# Patient Record
Sex: MALE | Race: BLACK OR AFRICAN AMERICAN | URBAN - METROPOLITAN AREA
[De-identification: names, ages, dates, MRNs, and addresses within clinical notes are randomized per-mention and may not be internally consistent; named-entity substitution may affect disease eponyms.]

---

## 2017-04-04 ENCOUNTER — INPATIENT (INPATIENT)
Facility: HOSPITAL | Age: 82
LOS: 6 days | Discharge: HOME | DRG: 291 | End: 2017-04-11
Attending: HOSPITALIST | Admitting: FAMILY MEDICINE
Payer: COMMERCIAL

## 2017-04-04 VITALS
WEIGHT: 149.91 LBS | HEIGHT: 64 IN | HEART RATE: 79 BPM | SYSTOLIC BLOOD PRESSURE: 139 MMHG | RESPIRATION RATE: 18 BRPM | OXYGEN SATURATION: 95 % | TEMPERATURE: 97 F | DIASTOLIC BLOOD PRESSURE: 70 MMHG

## 2017-04-04 LAB
ALBUMIN SERPL ELPH-MCNC: 4.1 G/DL — SIGNIFICANT CHANGE UP (ref 3.3–5.2)
ALP SERPL-CCNC: 74 U/L — SIGNIFICANT CHANGE UP (ref 40–120)
ALT FLD-CCNC: 31 U/L — SIGNIFICANT CHANGE UP
ANION GAP SERPL CALC-SCNC: 17 MMOL/L — SIGNIFICANT CHANGE UP (ref 5–17)
APPEARANCE UR: ABNORMAL
AST SERPL-CCNC: 40 U/L — HIGH
BASOPHILS # BLD AUTO: 0 K/UL — SIGNIFICANT CHANGE UP (ref 0–0.2)
BASOPHILS NFR BLD AUTO: 0.2 % — SIGNIFICANT CHANGE UP (ref 0–2)
BILIRUB SERPL-MCNC: 0.4 MG/DL — SIGNIFICANT CHANGE UP (ref 0.4–2)
BILIRUB UR-MCNC: NEGATIVE — SIGNIFICANT CHANGE UP
BUN SERPL-MCNC: 87 MG/DL — HIGH (ref 8–20)
CALCIUM SERPL-MCNC: 8.8 MG/DL — SIGNIFICANT CHANGE UP (ref 8.6–10.2)
CHLORIDE SERPL-SCNC: 98 MMOL/L — SIGNIFICANT CHANGE UP (ref 98–107)
CK SERPL-CCNC: 136 U/L — SIGNIFICANT CHANGE UP (ref 30–200)
CO2 SERPL-SCNC: 21 MMOL/L — LOW (ref 22–29)
COLOR SPEC: YELLOW — SIGNIFICANT CHANGE UP
CREAT SERPL-MCNC: 4.03 MG/DL — HIGH (ref 0.5–1.3)
DIFF PNL FLD: ABNORMAL
EOSINOPHIL # BLD AUTO: 0.2 K/UL — SIGNIFICANT CHANGE UP (ref 0–0.5)
EOSINOPHIL NFR BLD AUTO: 3.9 % — SIGNIFICANT CHANGE UP (ref 0–6)
GLUCOSE SERPL-MCNC: 151 MG/DL — HIGH (ref 70–115)
GLUCOSE UR QL: NEGATIVE MG/DL — SIGNIFICANT CHANGE UP
HCT VFR BLD CALC: 27.2 % — LOW (ref 42–52)
HGB BLD-MCNC: 8.4 G/DL — LOW (ref 14–18)
INR BLD: 1.28 RATIO — HIGH (ref 0.88–1.16)
KETONES UR-MCNC: NEGATIVE — SIGNIFICANT CHANGE UP
LEUKOCYTE ESTERASE UR-ACNC: ABNORMAL
LYMPHOCYTES # BLD AUTO: 1.5 K/UL — SIGNIFICANT CHANGE UP (ref 1–4.8)
LYMPHOCYTES # BLD AUTO: 33.3 % — SIGNIFICANT CHANGE UP (ref 20–55)
MAGNESIUM SERPL-MCNC: 2.3 MG/DL — SIGNIFICANT CHANGE UP (ref 1.8–2.5)
MCHC RBC-ENTMCNC: 27.5 PG — SIGNIFICANT CHANGE UP (ref 27–31)
MCHC RBC-ENTMCNC: 30.9 G/DL — LOW (ref 32–36)
MCV RBC AUTO: 88.9 FL — SIGNIFICANT CHANGE UP (ref 80–94)
MONOCYTES # BLD AUTO: 0.4 K/UL — SIGNIFICANT CHANGE UP (ref 0–0.8)
MONOCYTES NFR BLD AUTO: 9.6 % — SIGNIFICANT CHANGE UP (ref 3–10)
NEUTROPHILS # BLD AUTO: 2.3 K/UL — SIGNIFICANT CHANGE UP (ref 1.8–8)
NEUTROPHILS NFR BLD AUTO: 52.5 % — SIGNIFICANT CHANGE UP (ref 37–73)
NITRITE UR-MCNC: POSITIVE
PH UR: 6 — SIGNIFICANT CHANGE UP (ref 4.8–8)
PLATELET # BLD AUTO: 103 K/UL — LOW (ref 150–400)
POTASSIUM SERPL-MCNC: 5.8 MMOL/L — HIGH (ref 3.5–5.3)
POTASSIUM SERPL-SCNC: 5.8 MMOL/L — HIGH (ref 3.5–5.3)
PROT SERPL-MCNC: 7.7 G/DL — SIGNIFICANT CHANGE UP (ref 6.6–8.7)
PROT UR-MCNC: 30 MG/DL
PROTHROM AB SERPL-ACNC: 14.2 SEC — HIGH (ref 9.8–12.7)
RBC # BLD: 3.06 M/UL — LOW (ref 4.6–6.2)
RBC # FLD: 17.5 % — HIGH (ref 11–15.6)
SODIUM SERPL-SCNC: 136 MMOL/L — SIGNIFICANT CHANGE UP (ref 135–145)
SP GR SPEC: 1.01 — SIGNIFICANT CHANGE UP (ref 1.01–1.02)
TROPONIN T SERPL-MCNC: 0.08 NG/ML — HIGH (ref 0–0.06)
UROBILINOGEN FLD QL: NEGATIVE MG/DL — SIGNIFICANT CHANGE UP
WBC # BLD: 4.4 K/UL — LOW (ref 4.8–10.8)
WBC # FLD AUTO: 4.4 K/UL — LOW (ref 4.8–10.8)

## 2017-04-04 PROCEDURE — 93010 ELECTROCARDIOGRAM REPORT: CPT

## 2017-04-04 PROCEDURE — 71010: CPT | Mod: 26

## 2017-04-04 RX ORDER — SODIUM CHLORIDE 9 MG/ML
3 INJECTION INTRAMUSCULAR; INTRAVENOUS; SUBCUTANEOUS ONCE
Qty: 0 | Refills: 0 | Status: COMPLETED | OUTPATIENT
Start: 2017-04-04 | End: 2017-04-04

## 2017-04-04 RX ADMIN — SODIUM CHLORIDE 3 MILLILITER(S): 9 INJECTION INTRAMUSCULAR; INTRAVENOUS; SUBCUTANEOUS at 22:23

## 2017-04-04 NOTE — ED STATDOCS - PROGRESS NOTE DETAILS
88 y/o M, with hx of CHF, DM, PM, on ASA 81 mg presents to ED c/o bilateral LE edema, decreased PO, and generalized weakness s/p mechanical fall. Pt ambulates with a walker and states he fell this morning. He denies head trauma, LOC, blood thinners, chest pain, SOB, neck pain, dysuria, and fevers. Pt denies cardiac stents. Will place in main ED for further evaluation. 88 y/o M, with hx of CHF, DM, PM, on ASA 81 mg presents to ED c/o bilateral LE edema, decreased PO, and generalized weakness s/p mechanical fall. Pt ambulates with a walker and states he fell this morning. He denies head trauma, LOC, blood thinners, chest pain, SOB, neck pain, dysuria, and fevers. Pt has hx of anemia and is on procrit therapy, last hgb ~7. Will place in main ED for further evaluation. 86 y/o M, with hx of CHF, DM, PM, on ASA 81 mg presents to ED c/o bilateral LE edema, decreased PO, and generalized weakness s/p mechanical fall. Pt ambulates with a walker and states he fell this morning. He denies head trauma, LOC, blood thinners, chest pain, SOB, neck pain, dysuria, and fevers. Pt has hx of anemia and is on procrit therapy, last hgb ~7. No hx of transfusion. Will place in main ED for further evaluation.

## 2017-04-04 NOTE — ED PROVIDER NOTE - MEDICAL DECISION MAKING DETAILS
An 87 year old male pt presents to the ED c/o swelling to the lower extremities. Will check labs, CXR, EKG, and re-evaluate.

## 2017-04-04 NOTE — ED STATDOCS - DETAILS:
The progress note was documented by the scribe in my presence and I attest to the accuracy of the documentation.

## 2017-04-04 NOTE — ED ADULT NURSE NOTE - OBJECTIVE STATEMENT
pt received 2200. Pt AOx3, resp even unlabored. c/o bilateral leg swelling and increased SOB with exertion. Pt denies chest pain, numbness or tingling, SOB at rest. + pedal pulses, + ROM, +2 edema to lower extremities. will continue to monitor.

## 2017-04-04 NOTE — ED PROVIDER NOTE - PROGRESS NOTE DETAILS
labs as noted.  Pt with worsening renal function and elev Trop with increased KAY and LE edema.  Case d/w Hospitalist/Dr. Guadarrama and agrees with Tele admission and eval by Cardio and Laure

## 2017-04-04 NOTE — ED ADULT NURSE NOTE - CHPI ED SYMPTOMS NEG
no diaphoresis/no dizziness/no cough/no nausea/no vomiting/no fever/no chills/no back pain/no syncope

## 2017-04-04 NOTE — ED PROVIDER NOTE - OBJECTIVE STATEMENT
An 87 year old male pt presents to the ED c/o swelling to the lower extremities.  The pt has been experiencing swelling in the lower extremities and a decrease in appetite over the past few days. Pt does report chronic dyspnea on exertion. He denies any fevers, N/V/D, or CP. His PMD is Dr. Rosenberg and his cardiologist is Dr. Arora. No further complaints at this time.

## 2017-04-04 NOTE — ED PROVIDER NOTE - PMH
Cataract    CHF (congestive heart failure)  FAMILY/PT NOT SURE IF DIAGNOSED WITH CHF OR COPD  Chronic renal failure    Coronary artery disease involving autologous vein bypass graft    Diabetes    Glaucoma    Hyperlipemia    Hypertension    Pacemaker    PSA elevation

## 2017-04-04 NOTE — ED PROVIDER NOTE - NS ED MD SCRIBE ATTENDING SCRIBE SECTIONS
RESULTS/PHYSICAL EXAM/HISTORY OF PRESENT ILLNESS/DISPOSITION/REVIEW OF SYSTEMS/PAST MEDICAL/SURGICAL/SOCIAL HISTORY/VITAL SIGNS( Pullset)

## 2017-04-04 NOTE — ED ADULT NURSE REASSESSMENT NOTE - NS ED NURSE REASSESS COMMENT FT1
Received Pt awake alert and oriented x 3 respiration even and unlabored, skin warm and dry, color normal for race, Swelling to Bilateral LE observed, Pt denies CP or SOB at this time, kept bed in low position with side rails uip, wife at bed side, plan of are made aware, verbalized understanding. will continue to monitor.

## 2017-04-04 NOTE — ED ADULT TRIAGE NOTE - CHIEF COMPLAINT QUOTE
Pt c/o swelling to bilateral lower extremities and decreased appetite. Denies any fever, chills or sick contacts. Pt denies any pain or discomfort. Denies CP or sob, respirations even and unlabored.

## 2017-04-05 DIAGNOSIS — E87.70 FLUID OVERLOAD, UNSPECIFIED: ICD-10-CM

## 2017-04-05 DIAGNOSIS — I50.9 HEART FAILURE, UNSPECIFIED: ICD-10-CM

## 2017-04-05 DIAGNOSIS — R74.8 ABNORMAL LEVELS OF OTHER SERUM ENZYMES: ICD-10-CM

## 2017-04-05 DIAGNOSIS — N39.0 URINARY TRACT INFECTION, SITE NOT SPECIFIED: ICD-10-CM

## 2017-04-05 DIAGNOSIS — D64.9 ANEMIA, UNSPECIFIED: ICD-10-CM

## 2017-04-05 DIAGNOSIS — I50.23 ACUTE ON CHRONIC SYSTOLIC (CONGESTIVE) HEART FAILURE: ICD-10-CM

## 2017-04-05 DIAGNOSIS — Z95.810 PRESENCE OF AUTOMATIC (IMPLANTABLE) CARDIAC DEFIBRILLATOR: ICD-10-CM

## 2017-04-05 DIAGNOSIS — I10 ESSENTIAL (PRIMARY) HYPERTENSION: ICD-10-CM

## 2017-04-05 DIAGNOSIS — E87.5 HYPERKALEMIA: ICD-10-CM

## 2017-04-05 DIAGNOSIS — E11.9 TYPE 2 DIABETES MELLITUS WITHOUT COMPLICATIONS: ICD-10-CM

## 2017-04-05 DIAGNOSIS — Z29.9 ENCOUNTER FOR PROPHYLACTIC MEASURES, UNSPECIFIED: ICD-10-CM

## 2017-04-05 DIAGNOSIS — N17.9 ACUTE KIDNEY FAILURE, UNSPECIFIED: ICD-10-CM

## 2017-04-05 LAB
ANION GAP SERPL CALC-SCNC: 14 MMOL/L — SIGNIFICANT CHANGE UP (ref 5–17)
BACTERIA # UR AUTO: ABNORMAL
BASOPHILS # BLD AUTO: 0 K/UL — SIGNIFICANT CHANGE UP (ref 0–0.2)
BASOPHILS NFR BLD AUTO: 0.2 % — SIGNIFICANT CHANGE UP (ref 0–2)
BLD GP AB SCN SERPL QL: SIGNIFICANT CHANGE UP
BUN SERPL-MCNC: 82 MG/DL — HIGH (ref 8–20)
CALCIUM SERPL-MCNC: 9.6 MG/DL — SIGNIFICANT CHANGE UP (ref 8.6–10.2)
CHLORIDE SERPL-SCNC: 100 MMOL/L — SIGNIFICANT CHANGE UP (ref 98–107)
CO2 SERPL-SCNC: 25 MMOL/L — SIGNIFICANT CHANGE UP (ref 22–29)
CREAT SERPL-MCNC: 3.64 MG/DL — HIGH (ref 0.5–1.3)
EOSINOPHIL # BLD AUTO: 0.1 K/UL — SIGNIFICANT CHANGE UP (ref 0–0.5)
EOSINOPHIL NFR BLD AUTO: 2.6 % — SIGNIFICANT CHANGE UP (ref 0–5)
GLUCOSE SERPL-MCNC: 122 MG/DL — HIGH (ref 70–115)
LYMPHOCYTES # BLD AUTO: 1 K/UL — SIGNIFICANT CHANGE UP (ref 1–4.8)
LYMPHOCYTES # BLD AUTO: 23.5 % — SIGNIFICANT CHANGE UP (ref 20–55)
MAGNESIUM SERPL-MCNC: 2.1 MG/DL — SIGNIFICANT CHANGE UP (ref 1.8–2.5)
MONOCYTES # BLD AUTO: 0.4 K/UL — SIGNIFICANT CHANGE UP (ref 0–0.8)
MONOCYTES NFR BLD AUTO: 9.1 % — SIGNIFICANT CHANGE UP (ref 3–10)
NEUTROPHILS # BLD AUTO: 2.7 K/UL — SIGNIFICANT CHANGE UP (ref 1.8–8)
NEUTROPHILS NFR BLD AUTO: 64.4 % — SIGNIFICANT CHANGE UP (ref 37–73)
NT-PROBNP SERPL-SCNC: HIGH PG/ML (ref 0–300)
POTASSIUM SERPL-MCNC: 4.5 MMOL/L — SIGNIFICANT CHANGE UP (ref 3.5–5.3)
POTASSIUM SERPL-MCNC: 4.8 MMOL/L — SIGNIFICANT CHANGE UP (ref 3.5–5.3)
POTASSIUM SERPL-SCNC: 4.5 MMOL/L — SIGNIFICANT CHANGE UP (ref 3.5–5.3)
POTASSIUM SERPL-SCNC: 4.8 MMOL/L — SIGNIFICANT CHANGE UP (ref 3.5–5.3)
RBC CASTS # UR COMP ASSIST: >50 /HPF (ref 0–4)
SODIUM SERPL-SCNC: 139 MMOL/L — SIGNIFICANT CHANGE UP (ref 135–145)
TROPONIN T SERPL-MCNC: 0.06 NG/ML — SIGNIFICANT CHANGE UP (ref 0–0.06)
TYPE + AB SCN PNL BLD: SIGNIFICANT CHANGE UP
WBC UR QL: ABNORMAL

## 2017-04-05 PROCEDURE — 99223 1ST HOSP IP/OBS HIGH 75: CPT

## 2017-04-05 PROCEDURE — 99223 1ST HOSP IP/OBS HIGH 75: CPT | Mod: 25

## 2017-04-05 PROCEDURE — 99221 1ST HOSP IP/OBS SF/LOW 40: CPT | Mod: 25

## 2017-04-05 PROCEDURE — 99285 EMERGENCY DEPT VISIT HI MDM: CPT

## 2017-04-05 PROCEDURE — 93010 ELECTROCARDIOGRAM REPORT: CPT

## 2017-04-05 RX ORDER — DEXTROSE 50 % IN WATER 50 %
12.5 SYRINGE (ML) INTRAVENOUS ONCE
Qty: 0 | Refills: 0 | Status: DISCONTINUED | OUTPATIENT
Start: 2017-04-05 | End: 2017-04-11

## 2017-04-05 RX ORDER — GLUCAGON INJECTION, SOLUTION 0.5 MG/.1ML
1 INJECTION, SOLUTION SUBCUTANEOUS ONCE
Qty: 0 | Refills: 0 | Status: DISCONTINUED | OUTPATIENT
Start: 2017-04-05 | End: 2017-04-11

## 2017-04-05 RX ORDER — FOLIC ACID 0.8 MG
1 TABLET ORAL DAILY
Qty: 0 | Refills: 0 | Status: DISCONTINUED | OUTPATIENT
Start: 2017-04-05 | End: 2017-04-11

## 2017-04-05 RX ORDER — CEFTRIAXONE 500 MG/1
1 INJECTION, POWDER, FOR SOLUTION INTRAMUSCULAR; INTRAVENOUS EVERY 24 HOURS
Qty: 0 | Refills: 0 | Status: DISCONTINUED | OUTPATIENT
Start: 2017-04-05 | End: 2017-04-07

## 2017-04-05 RX ORDER — ISOSORBIDE DINITRATE 5 MG/1
10 TABLET ORAL THREE TIMES A DAY
Qty: 0 | Refills: 0 | Status: DISCONTINUED | OUTPATIENT
Start: 2017-04-05 | End: 2017-04-11

## 2017-04-05 RX ORDER — CEFTRIAXONE 500 MG/1
1 INJECTION, POWDER, FOR SOLUTION INTRAMUSCULAR; INTRAVENOUS ONCE
Qty: 0 | Refills: 0 | Status: COMPLETED | OUTPATIENT
Start: 2017-04-05 | End: 2017-04-05

## 2017-04-05 RX ORDER — ATORVASTATIN CALCIUM 80 MG/1
40 TABLET, FILM COATED ORAL AT BEDTIME
Qty: 0 | Refills: 0 | Status: DISCONTINUED | OUTPATIENT
Start: 2017-04-05 | End: 2017-04-11

## 2017-04-05 RX ORDER — FERROUS SULFATE 325(65) MG
325 TABLET ORAL DAILY
Qty: 0 | Refills: 0 | Status: DISCONTINUED | OUTPATIENT
Start: 2017-04-05 | End: 2017-04-07

## 2017-04-05 RX ORDER — ASPIRIN/CALCIUM CARB/MAGNESIUM 324 MG
325 TABLET ORAL DAILY
Qty: 0 | Refills: 0 | Status: DISCONTINUED | OUTPATIENT
Start: 2017-04-05 | End: 2017-04-09

## 2017-04-05 RX ORDER — DEXTROSE 50 % IN WATER 50 %
1 SYRINGE (ML) INTRAVENOUS ONCE
Qty: 0 | Refills: 0 | Status: DISCONTINUED | OUTPATIENT
Start: 2017-04-05 | End: 2017-04-11

## 2017-04-05 RX ORDER — SACCHAROMYCES BOULARDII 250 MG
250 POWDER IN PACKET (EA) ORAL
Qty: 0 | Refills: 0 | Status: DISCONTINUED | OUTPATIENT
Start: 2017-04-05 | End: 2017-04-08

## 2017-04-05 RX ORDER — DEXTROSE 50 % IN WATER 50 %
25 SYRINGE (ML) INTRAVENOUS ONCE
Qty: 0 | Refills: 0 | Status: DISCONTINUED | OUTPATIENT
Start: 2017-04-05 | End: 2017-04-11

## 2017-04-05 RX ORDER — CARVEDILOL PHOSPHATE 80 MG/1
6.25 CAPSULE, EXTENDED RELEASE ORAL EVERY 12 HOURS
Qty: 0 | Refills: 0 | Status: DISCONTINUED | OUTPATIENT
Start: 2017-04-05 | End: 2017-04-11

## 2017-04-05 RX ORDER — HEPARIN SODIUM 5000 [USP'U]/ML
5000 INJECTION INTRAVENOUS; SUBCUTANEOUS EVERY 12 HOURS
Qty: 0 | Refills: 0 | Status: DISCONTINUED | OUTPATIENT
Start: 2017-04-05 | End: 2017-04-05

## 2017-04-05 RX ORDER — HYDRALAZINE HCL 50 MG
10 TABLET ORAL EVERY 8 HOURS
Qty: 0 | Refills: 0 | Status: DISCONTINUED | OUTPATIENT
Start: 2017-04-05 | End: 2017-04-11

## 2017-04-05 RX ORDER — INSULIN LISPRO 100/ML
VIAL (ML) SUBCUTANEOUS
Qty: 0 | Refills: 0 | Status: DISCONTINUED | OUTPATIENT
Start: 2017-04-05 | End: 2017-04-08

## 2017-04-05 RX ORDER — INSULIN LISPRO 100/ML
2 VIAL (ML) SUBCUTANEOUS ONCE
Qty: 0 | Refills: 0 | Status: COMPLETED | OUTPATIENT
Start: 2017-04-05 | End: 2017-04-05

## 2017-04-05 RX ORDER — SODIUM CHLORIDE 9 MG/ML
1000 INJECTION, SOLUTION INTRAVENOUS
Qty: 0 | Refills: 0 | Status: DISCONTINUED | OUTPATIENT
Start: 2017-04-05 | End: 2017-04-11

## 2017-04-05 RX ORDER — FUROSEMIDE 40 MG
40 TABLET ORAL
Qty: 0 | Refills: 0 | Status: DISCONTINUED | OUTPATIENT
Start: 2017-04-05 | End: 2017-04-08

## 2017-04-05 RX ADMIN — ATORVASTATIN CALCIUM 40 MILLIGRAM(S): 80 TABLET, FILM COATED ORAL at 21:43

## 2017-04-05 RX ADMIN — Medication 250 MILLIGRAM(S): at 05:54

## 2017-04-05 RX ADMIN — CARVEDILOL PHOSPHATE 6.25 MILLIGRAM(S): 80 CAPSULE, EXTENDED RELEASE ORAL at 17:44

## 2017-04-05 RX ADMIN — Medication 40 MILLIGRAM(S): at 05:54

## 2017-04-05 RX ADMIN — Medication 10 MILLIGRAM(S): at 21:43

## 2017-04-05 RX ADMIN — Medication 10 MILLIGRAM(S): at 14:48

## 2017-04-05 RX ADMIN — Medication 250 MILLIGRAM(S): at 17:45

## 2017-04-05 RX ADMIN — Medication 325 MILLIGRAM(S): at 11:59

## 2017-04-05 RX ADMIN — Medication 40 MILLIGRAM(S): at 17:44

## 2017-04-05 RX ADMIN — Medication 1 MILLIGRAM(S): at 11:59

## 2017-04-05 RX ADMIN — ISOSORBIDE DINITRATE 10 MILLIGRAM(S): 5 TABLET ORAL at 14:48

## 2017-04-05 RX ADMIN — ISOSORBIDE DINITRATE 10 MILLIGRAM(S): 5 TABLET ORAL at 05:54

## 2017-04-05 RX ADMIN — CARVEDILOL PHOSPHATE 6.25 MILLIGRAM(S): 80 CAPSULE, EXTENDED RELEASE ORAL at 05:54

## 2017-04-05 RX ADMIN — Medication 1: at 11:58

## 2017-04-05 RX ADMIN — ISOSORBIDE DINITRATE 10 MILLIGRAM(S): 5 TABLET ORAL at 21:43

## 2017-04-05 RX ADMIN — CEFTRIAXONE 100 GRAM(S): 500 INJECTION, POWDER, FOR SOLUTION INTRAMUSCULAR; INTRAVENOUS at 02:14

## 2017-04-05 RX ADMIN — Medication 2 UNIT(S): at 23:43

## 2017-04-05 NOTE — H&P ADULT - PROBLEM SELECTOR PLAN 1
Admit to medicine resident service  Diet- Consistent carb  CBC, CMP, Mag, Phos pending in the AM  BNP pending  Echocardiogram, Chest X-ray Penduing  Nuclear stress test- March 31 2016- LVH is  present. There is paradoxical septal motion. Overall LVEF  is 28% with diffuse hypokinesis. Admit to medicine resident service  Diet- Consistent carb  CBC, CMP, Mag, Phos pending in the AM  BNP 66615  Echocardiogram, Chest X-ray Penduing  Nuclear stress test- March 31 2016- LVH is  present. There is paradoxical septal motion. Overall LVEF  is 28% with diffuse hypokinesis.  Dual ICD placed in 2015  Cardio consult pending Admit to medicine resident service  Diet- Consistent carb  CBC, CMP, Mag, Phos pending in the AM  BNP 56561  Echocardiogram, Chest X-ray Penduing  Nuclear stress test- March 31 2016- LVH is  present. There is paradoxical septal motion. Overall LVEF  is 28% with diffuse hypokinesis.  Dual ICD placed in 2015  CABG in 2010  Cardio consult pending Admit to medicine resident service  Diet- Consistent carb  CBC, CMP, Mag, Phos pending in the AM  BNP 50795  Echocardiogram, Chest X-ray Pending  Strict In's and Outs  lasix 40 mg IV BID  Nuclear stress test- March 31 2016- LVH is  present. There is paradoxical septal motion. Overall LVEF  is 28% with diffuse hypokinesis.  Dual ICD placed in 2015  CABG in 2010  Cardio consult pending Admit to medicine resident service  Diet- Consistent carb  CBC, CMP, Mag, Phos pending in the AM  BNP 27095  Echocardiogram, Chest X-ray Pending  Strict In's and Outs  lasix 40 mg IV BID  Nuclear stress test- March 31 2016- LVH is  present. There is paradoxical septal motion. Overall LVEF  is 28% with diffuse hypokinesis.  Dual ICD placed in 2015  CABG in 2010  Cardio consult pending   mg  isosorbide dinitrate 10 mg

## 2017-04-05 NOTE — PROGRESS NOTE ADULT - SUBJECTIVE AND OBJECTIVE BOX
HPI:  ·	67 year old male with a PMH of CHF, DM, HTN, HLD, and CKD presents to the ED with a cc of SOB and leg swelling for 2-3 weeks. Patient states he has been having moderate difficulty of breathing due to "fluid around the lungs". Patient was seen at the bedside this AM in no apparent distress. Patient admits to improved breathing since admission. He requested to be seen later in the day as he did not want to be bothered at that time.          ANTIMICROBIAL:  cefTRIAXone   IVPB 1Gram(s) IV Intermittent every 24 hours    CARDIOVASCULAR:  carvedilol 6.25milliGRAM(s) Oral every 12 hours  furosemide   Injectable 40milliGRAM(s) IV Push two times a day  isosorbide   dinitrate Tablet (ISORDIL) 10milliGRAM(s) Oral three times a day    NEUROLOGIC:  aspirin 325milliGRAM(s) Oral daily    ENDO/METABOLIC:  atorvastatin 40milliGRAM(s) Oral at bedtime  insulin lispro (HumaLOG) corrective regimen sliding scale  SubCutaneous three times a day before meals  dextrose Gel 1Dose(s) Oral once PRN  dextrose 50% Injectable 12.5Gram(s) IV Push once  dextrose 50% Injectable 25Gram(s) IV Push once  dextrose 50% Injectable 25Gram(s) IV Push once  glucagon  Injectable 1milliGRAM(s) IntraMuscular once PRN    IV FLUID/NUTRITION:  ferrous    sulfate 325milliGRAM(s) Oral daily  folic acid 1milliGRAM(s) Oral daily  dextrose 5%. 1000milliLiter(s) IV Continuous <Continuous>    IMMUNOLOGIC & OTHER  saccharomyces boulardii 250milliGRAM(s) Oral two times a day        Allergies    No Known Allergies    Intolerances        SOCIAL HISTORY:    FAMILY HISTORY:  No pertinent family history in first degree relatives      Vital Signs Last 24 Hrs  T(C): 36.6, Max: 36.6 (-05 @ 00:28)  T(F): 97.9, Max: 97.9 (-05 @ 02:34)  HR: 93 (73 - 93)  BP: 182/79 (133/68 - 182/79)  BP(mean): --  RR: 20 (18 - 20)  SpO2: 100% (95% - 100%)      I&O's Detail    I & Os for current day (as of 2017 07:36)  =============================================  IN:    Total IN: 0 ml  ---------------------------------------------  OUT:    Voided: 700 ml    Total OUT: 700 ml  ---------------------------------------------  Total NET: -700 ml    Daily Height in cm: 162.56 (2017 18:00)    Daily     REVIEW OF SYSTEMS:    CONSTITUTIONAL:NAD  improved SOB    PHYSICAL EXAM:    GENERAL: NAD    Primary team to return to complete physical exam today as patient refused physical this AM        LABS:                        8.4    4.4   )-----------( 103      ( 2017 22:23 )             27.2     2017 05:34    x      |  x      |  x      ----------------------------<  x      4.5     |  x      |  x        Ca    8.8        2017 22:23  Mg     2.3       2017 22:23    TPro  7.7    /  Alb  4.1    /  TBili  0.4    /  DBili  x      /  AST  40     /  ALT  31     /  AlkPhos  74     2017 22:23    PT/INR - ( 2017 22:23 )   PT: 14.2 sec;   INR: 1.28 ratio           Urinalysis Basic - ( 2017 23:46 )    Color: Yellow / Appearance: Slightly Turbid / S.010 / pH: x  Gluc: x / Ketone: Negative  / Bili: Negative / Urobili: Negative mg/dL   Blood: x / Protein: 30 mg/dL / Nitrite: Positive   Leuk Esterase: Small / RBC: >50 /HPF / WBC 11-25   Sq Epi: x / Non Sq Epi: x / Bacteria: Many      Serum Pro-Brain Natriuretic Peptide: 63985 pg/mL ( @ 01:27)      PT/INR - ( 2017 22:23 )   PT: 14.2 sec;   INR: 1.28 ratio             RADIOLOGY & ADDITIONAL STUDIES: HPI:  ·	87 year old male with a PMH of CHF, DM, HTN, HLD, and CKD presents to the ED with a cc of SOB and leg swelling for 2-3 weeks. Patient states he has been having moderate difficulty of breathing due to "fluid around the lungs". Patient was seen at the bedside this AM in no apparent distress. Patient admits to improved breathing since admission. He requested to be seen later in the day as he did not want to be bothered at that time.          ANTIMICROBIAL:  cefTRIAXone   IVPB 1Gram(s) IV Intermittent every 24 hours    CARDIOVASCULAR:  carvedilol 6.25milliGRAM(s) Oral every 12 hours  furosemide   Injectable 40milliGRAM(s) IV Push two times a day  isosorbide   dinitrate Tablet (ISORDIL) 10milliGRAM(s) Oral three times a day    NEUROLOGIC:  aspirin 325milliGRAM(s) Oral daily    ENDO/METABOLIC:  atorvastatin 40milliGRAM(s) Oral at bedtime  insulin lispro (HumaLOG) corrective regimen sliding scale  SubCutaneous three times a day before meals  dextrose Gel 1Dose(s) Oral once PRN  dextrose 50% Injectable 12.5Gram(s) IV Push once  dextrose 50% Injectable 25Gram(s) IV Push once  dextrose 50% Injectable 25Gram(s) IV Push once  glucagon  Injectable 1milliGRAM(s) IntraMuscular once PRN    IV FLUID/NUTRITION:  ferrous    sulfate 325milliGRAM(s) Oral daily  folic acid 1milliGRAM(s) Oral daily  dextrose 5%. 1000milliLiter(s) IV Continuous <Continuous>    IMMUNOLOGIC & OTHER  saccharomyces boulardii 250milliGRAM(s) Oral two times a day        Allergies    No Known Allergies    Intolerances        SOCIAL HISTORY:    FAMILY HISTORY:  No pertinent family history in first degree relatives      Vital Signs Last 24 Hrs  T(C): 36.6, Max: 36.6 (-05 @ 00:28)  T(F): 97.9, Max: 97.9 (-05 @ 02:34)  HR: 93 (73 - 93)  BP: 182/79 (133/68 - 182/79)  BP(mean): --  RR: 20 (18 - 20)  SpO2: 100% (95% - 100%)      I&O's Detail    I & Os for current day (as of 2017 07:36)  =============================================  IN:    Total IN: 0 ml  ---------------------------------------------  OUT:    Voided: 700 ml    Total OUT: 700 ml  ---------------------------------------------  Total NET: -700 ml    Daily Height in cm: 162.56 (2017 18:00)    Daily     REVIEW OF SYSTEMS:    CONSTITUTIONAL:NAD  improved SOB    PHYSICAL EXAM:    GENERAL: NAD    Primary team to return to complete physical exam today as patient refused physical this AM        LABS:                        8.4    4.4   )-----------( 103      ( 2017 22:23 )             27.2     2017 05:34    x      |  x      |  x      ----------------------------<  x      4.5     |  x      |  x        Ca    8.8        2017 22:23  Mg     2.3       2017 22:23    TPro  7.7    /  Alb  4.1    /  TBili  0.4    /  DBili  x      /  AST  40     /  ALT  31     /  AlkPhos  74     2017 22:23    PT/INR - ( 2017 22:23 )   PT: 14.2 sec;   INR: 1.28 ratio           Urinalysis Basic - ( 2017 23:46 )    Color: Yellow / Appearance: Slightly Turbid / S.010 / pH: x  Gluc: x / Ketone: Negative  / Bili: Negative / Urobili: Negative mg/dL   Blood: x / Protein: 30 mg/dL / Nitrite: Positive   Leuk Esterase: Small / RBC: >50 /HPF / WBC 11-25   Sq Epi: x / Non Sq Epi: x / Bacteria: Many      Serum Pro-Brain Natriuretic Peptide: 34181 pg/mL ( @ 01:27)      PT/INR - ( 2017 22:23 )   PT: 14.2 sec;   INR: 1.28 ratio             RADIOLOGY & ADDITIONAL STUDIES:

## 2017-04-05 NOTE — CONSULT NOTE ADULT - ASSESSMENT
87 AA male with carotid artery disease, ischemic cardiomyopathy (last EF 40-45% from 2015 echo), CABG in 2012, biventricular ICD 2015, hypertension, hyperlipidemia, DM, CKD who presents with acute decompensation of systolic heart failure.  NYHA Class IV on presentation.  Hypervolemic.  Warm.

## 2017-04-05 NOTE — CONSULT NOTE ADULT - PROBLEM SELECTOR RECOMMENDATION 5
no active issues  on ECG, pacing almost the entire time  will have pacemaker interrogated just to make sure settings are optimized and to ensure > 95% CRT

## 2017-04-05 NOTE — H&P ADULT - ASSESSMENT
67 year old male with a PMH of CHF, DM, HTN, HLD, and CKD presents to the ED with a cc of SOB and leg swelling for 2-3 weeks. Admitted for CHF exacerbation 87 year old male with a PMH of CHF, DM, HTN, HLD, and CKD presents to the ED with a cc of SOB and leg swelling for 2-3 weeks. Admitted for CHF exacerbation

## 2017-04-05 NOTE — CONSULT NOTE ADULT - PROBLEM SELECTOR RECOMMENDATION 2
non-specific  likely due to CHF  do not trend, unless patient complains of chest pain, has sustained ventricular arrhythmias, or hemodynamic instability

## 2017-04-05 NOTE — CONSULT NOTE ADULT - SUBJECTIVE AND OBJECTIVE BOX
Patient is a 87y old  Male who presents with a chief complaint of SOB and leg swelling (2017 01:08)      HPI:  ·	87 year old male with a PMH of CHF, DM, HTN, HLD, and CKD presents to the ED with a cc of SOB and leg swelling for 2-3 weeks. We are called for CRF.  Pt follows with Dr Chambers in our office.      PAST MEDICAL & SURGICAL HISTORY:  Chronic renal failure  Coronary artery disease involving autologous vein bypass graft  CHF (congestive heart failure): FAMILY/PT NOT SURE IF DIAGNOSED WITH CHF OR COPD  Pacemaker  Diabetes  PSA elevation  Hyperlipemia  Hypertension  Cataract  Glaucoma  S/P CABG (coronary artery bypass graft)  S/P prostatectomy:       FAMILY HISTORY:  No pertinent family history in first degree relatives      Social History:  No tobacco, etoh nor drugs    MEDICATIONS  (STANDING):  atorvastatin 40milliGRAM(s) Oral at bedtime  cefTRIAXone   IVPB 1Gram(s) IV Intermittent every 24 hours  ferrous    sulfate 325milliGRAM(s) Oral daily  folic acid 1milliGRAM(s) Oral daily  saccharomyces boulardii 250milliGRAM(s) Oral two times a day  insulin lispro (HumaLOG) corrective regimen sliding scale  SubCutaneous three times a day before meals  dextrose 5%. 1000milliLiter(s) IV Continuous <Continuous>  dextrose 50% Injectable 12.5Gram(s) IV Push once  dextrose 50% Injectable 25Gram(s) IV Push once  dextrose 50% Injectable 25Gram(s) IV Push once  carvedilol 6.25milliGRAM(s) Oral every 12 hours  furosemide   Injectable 40milliGRAM(s) IV Push two times a day  aspirin 325milliGRAM(s) Oral daily  isosorbide   dinitrate Tablet (ISORDIL) 10milliGRAM(s) Oral three times a day  hydrALAZINE 10milliGRAM(s) Oral every 8 hours    MEDICATIONS  (PRN):  dextrose Gel 1Dose(s) Oral once PRN Blood Glucose LESS THAN 70 milliGRAM(s)/deciliter  glucagon  Injectable 1milliGRAM(s) IntraMuscular once PRN Glucose LESS THAN 70 milligrams/deciliter      Allergies    No Known Allergies    Intolerances        REVIEW OF SYSTEMS:    CONSTITUTIONAL: + weight gain, + fatigue  EYES: No eye pain, visual disturbances, or discharge  ENMT:  No difficulty hearing, tinnitus, vertigo; No sinus or throat pain  NECK: No pain or stiffness  BREASTS: No pain, masses, or nipple discharge  RESPIRATORY: + shortness of breath; + dry cough  CARDIOVASCULAR: No chest pain, palpitations, dizziness, + leg swelling  GASTROINTESTINAL: No abdominal or epigastric pain. No nausea, vomiting, or hematemesis; No diarrhea or constipation. No melena or hematochezia.  GENITOURINARY: No dysuria, frequency, hematuria, or incontinence  NEUROLOGICAL: No headaches, memory loss, loss of strength, numbness, or tremors  SKIN: No itching, burning, rashes, or lesions   LYMPH NODES: No enlarged glands  MUSCULOSKELETAL: No joint pain; + swelling; No muscle, back, or extremity pain        Vital Signs Last 24 Hrs  T(C): 35.8, Max: 36.6 ( @ 00:28)  T(F): 96.4, Max: 97.9 ( @ 02:34)  HR: 91 (73 - 93)  BP: 187/74 (133/68 - 187/74)  BP(mean): --  RR: 22 (18 - 22)  SpO2: 100% (95% - 100%)    PHYSICAL EXAM:    GENERAL: NAD, well-groomed, well-developed  HEAD:  Atraumatic, Normocephalic  EYES: EOMI, PERRLA  NECK: Supple, No JVD, Normal thyroid  NERVOUS SYSTEM:  Alert & Oriented X3,  intact and symmetric  CHEST/LUNG: Clear with decreased BS at bases  HEART: Regular rate and rhythm; No rub  ABDOMEN: Soft, Nontender, Nondistended; Bowel sounds present  EXTREMITIES:  2+ Peripheral Pulses, + edema  LYMPH: No lymphadenopathy noted  SKIN: No rashes or lesions      LABS:                        8.4    4.4   )-----------( 103      ( 2017 22:23 )             27.2     -    x   |  x   |  x   ----------------------------<  x   4.5   |  x   |  x     Ca    8.8      2017 22:23  Mg     2.3     -    TPro  7.7  /  Alb  4.1  /  TBili  0.4  /  DBili  x   /  AST  40<H>  /  ALT  31  /  AlkPhos  74  04-    PT/INR - ( 2017 22:23 )   PT: 14.2 sec;   INR: 1.28 ratio       Creatinine, Serum: 4.03 mg/dL (17 @ 22:23)        Urinalysis Basic - ( 2017 23:46 )    Color: Yellow / Appearance: Slightly Turbid / S.010 / pH: x  Gluc: x / Ketone: Negative  / Bili: Negative / Urobili: Negative mg/dL   Blood: x / Protein: 30 mg/dL / Nitrite: Positive   Leuk Esterase: Small / RBC: >50 /HPF / WBC 11-25   Sq Epi: x / Non Sq Epi: x / Bacteria: Many      Magnesium, Serum: 2.3 mg/dL ( @ 22:23)      RADIOLOGY & ADDITIONAL TESTS:   EXAM:  CHEST SINGLE VIEW FRONTAL                          PROCEDURE DATE:  2017        INTERPRETATION:  TECHNIQUE: Single portable view of the chest.    COMPARISON: 3/30/2016    CLINICAL HISTORY: Chest pain, swollen feet, loss of appetite.     FINDINGS:    Single frontal view of the chest demonstrates the lungs to be clear. The   cardiomediastinal silhouette is normal. No acute osseous abnormalities.   Overlying EKG leads and wires are noted. Left-sided AICD. Mediastinal   sternotomy wires.    IMPRESSION: No acute cardiopulmonary disease process.

## 2017-04-05 NOTE — H&P ADULT - HISTORY OF PRESENT ILLNESS
·	67 year old male with a PMH of CHF, DM, HTN, HLD, and CKD presents to the ED with a cc of SOB and leg swelling for 2-3 weeks. Patient states he has been having moderate difficulty of breathing due to "fluid around the lungs". He has had a productive cough. He denies fever, chills, and sick contacts. No recent travel. Patient states that he has noticed his legs swollen more than normal. He states he has be admitted in the past for CHF x 3. He denies chest pain, palpitations, or any acute distress. ·	87 year old male with a PMH of CHF, DM, HTN, HLD, and CKD presents to the ED with a cc of SOB and leg swelling for 2-3 weeks. Patient states he has been having moderate difficulty of breathing due to "fluid around the lungs". He has had a productive cough. He denies fever, chills, and sick contacts. No recent travel. Patient states that he has noticed his legs swollen more than normal. He states he has be admitted in the past for CHF x 3. He denies chest pain, palpitations, or any acute distress.

## 2017-04-05 NOTE — PROGRESS NOTE ADULT - PROBLEM SELECTOR PLAN 1
improving  CBC, CMP, Mag, Phos pending in the AM  BNP 92968  Echocardiogram, Chest X-ray Pending  Strict In's and Outs  lasix 40 mg IV BID  Nuclear stress test- March 31 2016- LVH is  present. There is paradoxical septal motion. Overall LVEF  is 28% with diffuse hypokinesis.  Dual ICD placed in 2015  CABG in 2010  Cardio consult pending- [place]   mg  isosorbide dinitrate 10 mg

## 2017-04-05 NOTE — H&P ADULT - NSHPPHYSICALEXAM_GEN_ALL_CORE
PHYSICAL EXAM:    GENERAL: NAD  HEAD:  Atraumatic, Normocephalic  EYES: +cataracts in right eye  ENMT: No tonsillar erythema, exudates, or enlargement, dentures present  NECK: Supple, No JVD, Normal thyroid  NERVOUS SYSTEM:  Alert & Oriented X3, Good concentration  CHEST/LUNG: Mild crackles at the base of the lungs  HEART: irRegular rate and rhythm; No murmurs  ABDOMEN: Soft, Nontender, Nondistended; Bowel sounds present  EXTREMITIES:  2+ Peripheral Pulses, +2 edema  SKIN: No rashes or lesions PHYSICAL EXAM:    GENERAL: NAD  HEAD:  Atraumatic, Normocephalic  EYES: +cataracts in right eye  ENMT: No tonsillar erythema, exudates, or enlargement, dentures present  NECK: Supple, No JVD, Normal thyroid  NERVOUS SYSTEM:  Alert & Oriented X3, Good concentration  CHEST/LUNG: Mild crackles at the base of the lungs  HEART: irRegular rate and rhythm; No murmurs  ABDOMEN: Soft, Nontender, Nondistended; Bowel sounds present, + hepatojugular reflex  EXTREMITIES:  2+ Peripheral Pulses, +2 edema  SKIN: No rashes or lesions

## 2017-04-05 NOTE — H&P ADULT - PROBLEM SELECTOR PLAN 2
Consult- Dr. Cheema  Trend BUN/ CR  holding IV hydration due to fluid overload at this time likely secondary to cardio renal  Consult- Dr. Cheema  Trend BUN/ CR  holding IV hydration due to fluid overload at this time

## 2017-04-05 NOTE — PROGRESS NOTE ADULT - PROBLEM SELECTOR PLAN 2
likely secondary to cardio renal  Consult- Dr. Cheema  Trend BUN/ CR  holding IV hydration due to fluid overload at this time

## 2017-04-05 NOTE — H&P ADULT - NSHPLABSRESULTS_GEN_ALL_CORE
8.4    4.4   )-----------( 103      ( 04 Apr 2017 22:23 )             27.2      04 Apr 2017 22:23    136    |  98     |  87.0   ----------------------------<  151    5.8     |  21.0   |  4.03     Ca    8.8        04 Apr 2017 22:23  Mg     2.3       04 Apr 2017 22:23    TPro  7.7    /  Alb  4.1    /  TBili  0.4    /  DBili  x      /  AST  40     /  ALT  31     /  AlkPhos  74     04 Apr 2017 22:23

## 2017-04-05 NOTE — H&P ADULT - PROBLEM SELECTOR PLAN 8
Blood pressure stable at this time 133/68  plan to resume home medications once confirmed by pharmacist

## 2017-04-05 NOTE — CONSULT NOTE ADULT - ASSESSMENT
CRF(IV): decompensated CHF  - continue diuretics to keep azotemic  - [place] catheter - daily weights and monitor labs

## 2017-04-05 NOTE — CONSULT NOTE ADULT - SUBJECTIVE AND OBJECTIVE BOX
Patient is a 87y old  Male who presents with a chief complaint of SOB and leg swelling (2017 01:08)      HPI:67 year old male with a PMH of CHF, DM, HTN, HLD, and CKD presents to the ED with a cc of SOB and leg swelling for 2-3 weeks. Patient states he has been having moderate difficulty of breathing due to "fluid around the lungs". He has had a productive cough. He denies fever, chills, and sick contacts. No recent travel. Patient states that he has noticed his legs swollen more than normal. He states he has be admitted in the past for CHF x 3. He denies chest pain, palpitations, or any acute distress. (2017 01:08)      Patient seen and examined this morning.  87 AA male with carotid artery disease, ischemic cardiomyopathy (last EF 40-45% from  echo), CABG in , biventricular ICD , hypertension, hyperlipidemia, DM, CKD who presents with acute decompensation of systolic heart failure.  Received some improvement in dyspnea with lasix 40 mg IV - diuresed ~ 400 cc already. Not orthopneic.  Unclear trigger for decompensation. ?medication non-compliance.  No chest pain, PND, orthopnea, palpitations, no dizziness, no abdominal distention, no syncope. Denies any recent illnesses. Lack of appetite for the past 3 days.       PAST MEDICAL & SURGICAL HISTORY:  Chronic renal failure  Coronary artery disease involving autologous vein bypass graft  CHF (congestive heart failure): FAMILY/PT NOT SURE IF DIAGNOSED WITH CHF OR COPD  Pacemaker  Diabetes  PSA elevation  Hyperlipemia  Hypertension  Cataract  Glaucoma  S/P CABG (coronary artery bypass graft)  S/P prostatectomy:     Allergies  No Known Allergies  Intolerances    MEDICATIONS  (STANDING):  atorvastatin 40milliGRAM(s) Oral at bedtime  cefTRIAXone   IVPB 1Gram(s) IV Intermittent every 24 hours  ferrous    sulfate 325milliGRAM(s) Oral daily  folic acid 1milliGRAM(s) Oral daily  saccharomyces boulardii 250milliGRAM(s) Oral two times a day  insulin lispro (HumaLOG) corrective regimen sliding scale  SubCutaneous three times a day before meals  dextrose 5%. 1000milliLiter(s) IV Continuous <Continuous>  dextrose 50% Injectable 12.5Gram(s) IV Push once  dextrose 50% Injectable 25Gram(s) IV Push once  dextrose 50% Injectable 25Gram(s) IV Push once  carvedilol 6.25milliGRAM(s) Oral every 12 hours  furosemide   Injectable 40milliGRAM(s) IV Push two times a day  aspirin 325milliGRAM(s) Oral daily  isosorbide   dinitrate Tablet (ISORDIL) 10milliGRAM(s) Oral three times a day    MEDICATIONS  (PRN):  dextrose Gel 1Dose(s) Oral once PRN Blood Glucose LESS THAN 70 milliGRAM(s)/deciliter  glucagon  Injectable 1milliGRAM(s) IntraMuscular once PRN Glucose LESS THAN 70 milligrams/deciliter      FAMILY HISTORY:  No pertinent family history in first degree relatives    SOCIAL HISTORY:no tobacco, no etoh, no illicit drug use    PREVIOUS DIAGNOSTIC TESTING:      ECHO FINDINGS: 2015   IMPRESSION:  Summary:   1. The left atrium is normal in size.   2. Normal left ventricular internal cavity size.   3. Left ventricular ejection fraction, by visual estimation, is 40 to   45%.   4. The right atrium is normal in size.   5. Normal right ventricular size and function.   6. Valves not evaluated.   7. There is no evidence of pericardial effusion.   8. Compared to prior study, LV function has improved from 25 to 30% -->   40 to 45%.   9. ECHO-CRT evaluation:  10. Baseline: AV autodetect:110 ms. LV offset=0; Mitral inflow TVI: 31.3;   AV TVI: 22.8; LV ejection interval: 322; Mitral inflow A wave   truncation:Optimal E and A. A wave duration: 164 msec.  11. No significant change and sometimes worse VTI obtained with other   settings. An LV offset of -20 msec did improve the peak Septal to   posterior wall delay on M mode.  12. REcommend current settings. Rec    STRESS FINDINGS: 3/2016  NUCLEAR FINDINGS:  Review of raw data shows: The study is of good technical  quality.  There are small, moderate defects in apex and apical  septal walls that are fixed consistent with a prominent  apical slit. There is no evidence of ischemia  ------------------------------------------------------------------------      GATED ANALYSIS:  The left ventricle is dilated (NST=912 ml). LVH is  present. There is paradoxical septal motion. Overall LVEF  is 28% with diffuse hypokinesis.  ------------------------------------------------------------------------    IMPRESSIONS:  * Review of raw data shows: The study is of good technical  quality.  * There are small, moderate defects in apex and apical  septal walls that are fixed consistent with a prominent  apical slit. There is no evidence of ischemia  * The left ventricle is dilated (OZP=935 ml). LVH is  present. There is paradoxical septal motion. Overall LVEF  is 28% with diffuse hypokinesis.  * Chest Pain: No chest pain with administration of  Regadenoson.  * Symptom: No Symptom.  * HR Response: Appropriate.  * BP Response: Appropriate.  * Heart Rhythm: Normal Sinus Rhythm - 89 BPM.  * ECG Abnormalities: ECG changes could not be interpreted  due to bundle branch block.  * Arrhythmia: Occasional PVC's.    REVIEW OF SYSTEMS:  CONSTITUTIONAL: No fever, weight loss, or fatigue  EYES: No eye pain, visual disturbances, or discharge  ENMT:  No difficulty hearing, tinnitus, vertigo; No sinus or throat pain  NECK: No pain or stiffness  RESPIRATORY: No cough, wheezing, chills or hemoptysis; + SOB  CARDIOVASCULAR: No chest pain, palpitations, passing out, dizziness,No PND or orthopnea; +leg swelling  GASTROINTESTINAL: No abdominal or epigastric pain. No nausea, vomiting, or hematemesis; No diarrhea or constipation. No melena or hematochezia.  GENITOURINARY: No dysuria, frequency, hematuria, or incontinence  NEUROLOGICAL: No headaches, memory loss, loss of strength, numbness, or tremors  SKIN: No itching, burning, rashes, or lesions   LYMPH Nodes: No enlarged glands  ENDOCRINE: No heat or cold intolerance; No hair loss  MUSCULOSKELETAL: No joint pain or swelling; No muscle, back, or extremity pain  PSYCHIATRIC: No depression, anxiety, mood swings, or difficulty sleeping  HEME/LYMPH: No easy bruising, or bleeding gums  ALLERY AND IMMUNOLOGIC: No hives or eczema	    Vital Signs Last 24 Hrs  T(C): 36.4, Max: 36.6 (04-05 @ 00:28)  T(F): 97.5, Max: 97.9 ( @ 02:34)  HR: 81 (73 - 93)  BP: 154/69 (133/68 - 182/79)  BP(mean): --  RR: 18 (18 - 22)  SpO2: 99% (95% - 100%)  Daily Height in cm: 162.56 (2017 18:00)    Daily   I&O's Detail    I & Os for current day (as of 2017 09:01)  =============================================  IN:    Total IN: 0 ml  ---------------------------------------------  OUT:    Voided: 700 ml    Total OUT: 700 ml  ---------------------------------------------  Total NET: -700 ml      PHYSICAL EXAM:  Appearance:  NAD	  HEENT:   Normal oral mucosa, PERRL, EOMI, sclera non-icteric	  Lymphatic: No cervical lymphadenopathy  Cardiovascular: Normal S1 S2,elevated JVP ~ 10-12 cm,  No cardiac murmurs, No carotid bruits, 1+ bilateral LE edema  Respiratory: mid to base crackles  Psychiatry: A & O x 3, Mood & affect appropriate  Gastrointestinal:  Soft, Non-tender, + BS, no bruits	  Skin: No rashes, No ecchymoses, No cyanosis, Dry  Neurologic: Grossly non-focal with full strength in all four extremities  Extremities: Normal range of motion, No clubbing, cyanosis  Vascular: Peripheral pulses palpable 2+ bilaterally, warm      INTERPRETATION OF TELEMETRY: A sensed V paced    ECG (tracing reviewed by me): A sensed, V paced 90 bpm, isolated PVC    LABS:                        8.4    4.4   )-----------( 103      ( 2017 22:23 )             27.2     2017 05:34    x      |  x      |  x      ----------------------------<  x      4.5     |  x      |  x        Ca    8.8        2017 22:23  Mg     2.3       2017 22:23    TPro  7.7    /  Alb  4.1    /  TBili  0.4    /  DBili  x      /  AST  40     /  ALT  31     /  AlkPhos  74     2017 22:23    CARDIAC MARKERS ( 2017 22:23 )  x     / 0.08 ng/mL / 136 U/L / x     / x        PT/INR - ( 2017 22:23 )   PT: 14.2 sec;   INR: 1.28 ratio           Urinalysis Basic - ( 2017 23:46 )    Color: Yellow / Appearance: Slightly Turbid / S.010 / pH: x  Gluc: x / Ketone: Negative  / Bili: Negative / Urobili: Negative mg/dL   Blood: x / Protein: 30 mg/dL / Nitrite: Positive   Leuk Esterase: Small / RBC: >50 /HPF / WBC 11-25   Sq Epi: x / Non Sq Epi: x / Bacteria: Many      I&O's Summary    I & Os for current day (as of 2017 09:01)  =============================================  IN: 0 ml / OUT: 700 ml / NET: -700 ml    BNPSerum Pro-Brain Natriuretic Peptide: 54038 pg/mL ( @ 01:27)    RADIOLOGY & ADDITIONAL STUDIES:  CXR (image reviewed by me): FINDINGS:   A cardiac device overlies and obscures the left hemithorax with leads in   place.  HEART: Normal in size  LUNGS: There is evidence of interstitial edema characterized by Kerley B   line's. No effusion or consolidation. No pneumothorax. Biapical pleural   thickening is seen.    OSSEOUS STRUCTURES: Sternal wires    IMPRESSION: Mild pulmonary congestion.

## 2017-04-05 NOTE — CONSULT NOTE ADULT - PROBLEM SELECTOR RECOMMENDATION 9
Not in a low flow state  will need to touch base with family regarding compliance  agree with intravenous diuresis - mild pulmonary edema, mildly elevated JVP and LE edema  awaiting repeat K; keep K > 4, <5  continue carvedilol  given CKD, not a good candidate for ACEi/ARB (including entresto)  used isordil/hydralazine combination for afterload reduction.   strict I/O  daily standing weights  low sodium diet

## 2017-04-06 LAB
-  AMIKACIN: SIGNIFICANT CHANGE UP
-  AMPICILLIN/SULBACTAM: SIGNIFICANT CHANGE UP
-  AMPICILLIN: SIGNIFICANT CHANGE UP
-  AZTREONAM: SIGNIFICANT CHANGE UP
-  CEFAZOLIN: SIGNIFICANT CHANGE UP
-  CEFEPIME: SIGNIFICANT CHANGE UP
-  CEFOXITIN: SIGNIFICANT CHANGE UP
-  CEFTAZIDIME: SIGNIFICANT CHANGE UP
-  CEFTRIAXONE: SIGNIFICANT CHANGE UP
-  CIPROFLOXACIN: SIGNIFICANT CHANGE UP
-  ERTAPENEM: SIGNIFICANT CHANGE UP
-  GENTAMICIN: SIGNIFICANT CHANGE UP
-  IMIPENEM: SIGNIFICANT CHANGE UP
-  LEVOFLOXACIN: SIGNIFICANT CHANGE UP
-  MEROPENEM: SIGNIFICANT CHANGE UP
-  NITROFURANTOIN: SIGNIFICANT CHANGE UP
-  PIPERACILLIN/TAZOBACTAM: SIGNIFICANT CHANGE UP
-  TOBRAMYCIN: SIGNIFICANT CHANGE UP
-  TRIMETHOPRIM/SULFAMETHOXAZOLE: SIGNIFICANT CHANGE UP
ALBUMIN SERPL ELPH-MCNC: 3.5 G/DL — SIGNIFICANT CHANGE UP (ref 3.3–5.2)
ALP SERPL-CCNC: 64 U/L — SIGNIFICANT CHANGE UP (ref 40–120)
ALT FLD-CCNC: 17 U/L — SIGNIFICANT CHANGE UP
ANION GAP SERPL CALC-SCNC: 12 MMOL/L — SIGNIFICANT CHANGE UP (ref 5–17)
AST SERPL-CCNC: 15 U/L — SIGNIFICANT CHANGE UP
BASOPHILS # BLD AUTO: 0 K/UL — SIGNIFICANT CHANGE UP (ref 0–0.2)
BASOPHILS NFR BLD AUTO: 0.2 % — SIGNIFICANT CHANGE UP (ref 0–2)
BILIRUB SERPL-MCNC: 0.3 MG/DL — LOW (ref 0.4–2)
BUN SERPL-MCNC: 86 MG/DL — HIGH (ref 8–20)
CALCIUM SERPL-MCNC: 9.2 MG/DL — SIGNIFICANT CHANGE UP (ref 8.6–10.2)
CHLORIDE SERPL-SCNC: 101 MMOL/L — SIGNIFICANT CHANGE UP (ref 98–107)
CO2 SERPL-SCNC: 28 MMOL/L — SIGNIFICANT CHANGE UP (ref 22–29)
CREAT SERPL-MCNC: 3.84 MG/DL — HIGH (ref 0.5–1.3)
CULTURE RESULTS: SIGNIFICANT CHANGE UP
EOSINOPHIL # BLD AUTO: 0.2 K/UL — SIGNIFICANT CHANGE UP (ref 0–0.5)
EOSINOPHIL NFR BLD AUTO: 3.7 % — SIGNIFICANT CHANGE UP (ref 0–5)
FERRITIN SERPL-MCNC: 498 NG/ML — HIGH (ref 30–400)
GLUCOSE SERPL-MCNC: 150 MG/DL — HIGH (ref 70–115)
HBA1C BLD-MCNC: 7 % — HIGH (ref 4–5.6)
HCT VFR BLD CALC: 24.9 % — LOW (ref 42–52)
HGB BLD-MCNC: 7.7 G/DL — LOW (ref 14–18)
IRON SATN MFR SERPL: 17 UG/DL — LOW (ref 59–158)
IRON SATN MFR SERPL: 7 % — LOW (ref 16–55)
LYMPHOCYTES # BLD AUTO: 1.3 K/UL — SIGNIFICANT CHANGE UP (ref 1–4.8)
LYMPHOCYTES # BLD AUTO: 30.5 % — SIGNIFICANT CHANGE UP (ref 20–55)
MAGNESIUM SERPL-MCNC: 2 MG/DL — SIGNIFICANT CHANGE UP (ref 1.8–2.5)
MCHC RBC-ENTMCNC: 26.6 PG — LOW (ref 27–31)
MCHC RBC-ENTMCNC: 30.9 G/DL — LOW (ref 32–36)
MCV RBC AUTO: 86.2 FL — SIGNIFICANT CHANGE UP (ref 80–94)
METHOD TYPE: SIGNIFICANT CHANGE UP
MONOCYTES # BLD AUTO: 0.4 K/UL — SIGNIFICANT CHANGE UP (ref 0–0.8)
MONOCYTES NFR BLD AUTO: 8.4 % — SIGNIFICANT CHANGE UP (ref 3–10)
NEUTROPHILS # BLD AUTO: 2.4 K/UL — SIGNIFICANT CHANGE UP (ref 1.8–8)
NEUTROPHILS NFR BLD AUTO: 57 % — SIGNIFICANT CHANGE UP (ref 37–73)
ORGANISM # SPEC MICROSCOPIC CNT: SIGNIFICANT CHANGE UP
ORGANISM # SPEC MICROSCOPIC CNT: SIGNIFICANT CHANGE UP
PHOSPHATE SERPL-MCNC: 4.2 MG/DL — SIGNIFICANT CHANGE UP (ref 2.4–4.7)
PLATELET # BLD AUTO: 74 K/UL — LOW (ref 150–400)
POTASSIUM SERPL-MCNC: 4.4 MMOL/L — SIGNIFICANT CHANGE UP (ref 3.5–5.3)
POTASSIUM SERPL-SCNC: 4.4 MMOL/L — SIGNIFICANT CHANGE UP (ref 3.5–5.3)
PROT SERPL-MCNC: 6.8 G/DL — SIGNIFICANT CHANGE UP (ref 6.6–8.7)
RBC # BLD: 2.89 M/UL — LOW (ref 4.6–6.2)
RBC # FLD: 16.1 % — HIGH (ref 11–15.6)
SODIUM SERPL-SCNC: 141 MMOL/L — SIGNIFICANT CHANGE UP (ref 135–145)
SPECIMEN SOURCE: SIGNIFICANT CHANGE UP
TIBC SERPL-MCNC: 256 UG/DL — SIGNIFICANT CHANGE UP (ref 220–430)
TRANSFERRIN SERPL-MCNC: 179 MG/DL — LOW (ref 180–329)
TROPONIN T SERPL-MCNC: 0.08 NG/ML — HIGH (ref 0–0.06)
WBC # BLD: 4.3 K/UL — LOW (ref 4.8–10.8)
WBC # FLD AUTO: 4.3 K/UL — LOW (ref 4.8–10.8)

## 2017-04-06 PROCEDURE — 93306 TTE W/DOPPLER COMPLETE: CPT | Mod: 26

## 2017-04-06 PROCEDURE — 99233 SBSQ HOSP IP/OBS HIGH 50: CPT | Mod: GC

## 2017-04-06 RX ORDER — ERYTHROPOIETIN 10000 [IU]/ML
15000 INJECTION, SOLUTION INTRAVENOUS; SUBCUTANEOUS
Qty: 0 | Refills: 0 | Status: DISCONTINUED | OUTPATIENT
Start: 2017-04-06 | End: 2017-04-11

## 2017-04-06 RX ORDER — INSULIN LISPRO 100/ML
1 VIAL (ML) SUBCUTANEOUS ONCE
Qty: 0 | Refills: 0 | Status: COMPLETED | OUTPATIENT
Start: 2017-04-06 | End: 2017-04-06

## 2017-04-06 RX ORDER — INFLUENZA VIRUS VACCINE 15; 15; 15; 15 UG/.5ML; UG/.5ML; UG/.5ML; UG/.5ML
0.5 SUSPENSION INTRAMUSCULAR ONCE
Qty: 0 | Refills: 0 | Status: DISCONTINUED | OUTPATIENT
Start: 2017-04-06 | End: 2017-04-11

## 2017-04-06 RX ADMIN — Medication 1 MILLIGRAM(S): at 12:43

## 2017-04-06 RX ADMIN — Medication 10 MILLIGRAM(S): at 22:36

## 2017-04-06 RX ADMIN — ISOSORBIDE DINITRATE 10 MILLIGRAM(S): 5 TABLET ORAL at 14:01

## 2017-04-06 RX ADMIN — Medication 10 MILLIGRAM(S): at 05:03

## 2017-04-06 RX ADMIN — Medication 325 MILLIGRAM(S): at 12:42

## 2017-04-06 RX ADMIN — Medication 1: at 17:27

## 2017-04-06 RX ADMIN — Medication 1 UNIT(S): at 23:19

## 2017-04-06 RX ADMIN — Medication 40 MILLIGRAM(S): at 05:03

## 2017-04-06 RX ADMIN — Medication 250 MILLIGRAM(S): at 17:27

## 2017-04-06 RX ADMIN — ATORVASTATIN CALCIUM 40 MILLIGRAM(S): 80 TABLET, FILM COATED ORAL at 22:36

## 2017-04-06 RX ADMIN — Medication 250 MILLIGRAM(S): at 05:03

## 2017-04-06 RX ADMIN — ERYTHROPOIETIN 15000 UNIT(S): 10000 INJECTION, SOLUTION INTRAVENOUS; SUBCUTANEOUS at 17:27

## 2017-04-06 RX ADMIN — CARVEDILOL PHOSPHATE 6.25 MILLIGRAM(S): 80 CAPSULE, EXTENDED RELEASE ORAL at 05:03

## 2017-04-06 RX ADMIN — ISOSORBIDE DINITRATE 10 MILLIGRAM(S): 5 TABLET ORAL at 05:04

## 2017-04-06 RX ADMIN — Medication 40 MILLIGRAM(S): at 17:27

## 2017-04-06 RX ADMIN — Medication 325 MILLIGRAM(S): at 12:43

## 2017-04-06 RX ADMIN — Medication: at 12:43

## 2017-04-06 RX ADMIN — ISOSORBIDE DINITRATE 10 MILLIGRAM(S): 5 TABLET ORAL at 22:36

## 2017-04-06 RX ADMIN — CEFTRIAXONE 100 GRAM(S): 500 INJECTION, POWDER, FOR SOLUTION INTRAMUSCULAR; INTRAVENOUS at 01:35

## 2017-04-06 RX ADMIN — Medication 10 MILLIGRAM(S): at 14:00

## 2017-04-06 NOTE — PROGRESS NOTE ADULT - PROBLEM SELECTOR PLAN 8
Stable, BP: 126/68  c/w carvedilol 6.25milliGRAM(s) Oral every 12 hours  furosemide   Injectable 40milliGRAM(s) IV Push two times a day  hydrALAZINE 10milliGRAM(s) Oral every 8 hours

## 2017-04-06 NOTE — PROGRESS NOTE ADULT - ASSESSMENT
CRF(IV): decompensated CHF  - continue diuretics to keep azotemic==> consider change to PO dosing at cardiology's discretion  - norwood catheter - daily weights and monitor labs  - pt may require HD at some point given severity of his renal disease    Anemia: +CRF  - check iron stores and serum immunofixation  - check stool for OB  - start NATHAN  - monitor H/H

## 2017-04-06 NOTE — PROGRESS NOTE ADULT - SUBJECTIVE AND OBJECTIVE BOX
Patient is a 87y old  Male who presents with a chief complaint of SOB and leg swelling (2017 01:08)      INTERVAL HPI/OVERNIGHT EVENTS:  87y  Male    ANTIMICROBIAL:  cefTRIAXone   IVPB 1Gram(s) IV Intermittent every 24 hours    CARDIOVASCULAR:  carvedilol 6.25milliGRAM(s) Oral every 12 hours  furosemide   Injectable 40milliGRAM(s) IV Push two times a day  isosorbide   dinitrate Tablet (ISORDIL) 10milliGRAM(s) Oral three times a day  hydrALAZINE 10milliGRAM(s) Oral every 8 hours    PULMONARY:    NEUROLOGIC:  aspirin 325milliGRAM(s) Oral daily    ONCOLOGIC:    HEMATOLOGIC:    GATROINTESTINAL:     MEDS:    ENDO/METABOLIC:  atorvastatin 40milliGRAM(s) Oral at bedtime  insulin lispro (HumaLOG) corrective regimen sliding scale  SubCutaneous three times a day before meals  dextrose Gel 1Dose(s) Oral once PRN  dextrose 50% Injectable 12.5Gram(s) IV Push once  dextrose 50% Injectable 25Gram(s) IV Push once  dextrose 50% Injectable 25Gram(s) IV Push once  glucagon  Injectable 1milliGRAM(s) IntraMuscular once PRN    IV FLUID/NUTRITION:  ferrous    sulfate 325milliGRAM(s) Oral daily  folic acid 1milliGRAM(s) Oral daily  dextrose 5%. 1000milliLiter(s) IV Continuous <Continuous>    TOPICAL:    IMMUNOLOGIC & OTHER  saccharomyces boulardii 250milliGRAM(s) Oral two times a day  influenza   Vaccine 0.5milliLiter(s) IntraMuscular once      Allergies    No Known Allergies    Intolerances        Vital Signs Last 24 Hrs  T(C): 37.2, Max: 37.2 ( @ 21:33)  T(F): 99, Max: 99 ( @ 04:58)  HR: 76 (76 - 91)  BP: 126/68 (126/68 - 187/74)  BP(mean): --  RR: 16 (15 - 22)  SpO2: 100% (98% - 100%)      Daily Height in cm: 162.56 (2017 15:35)    Daily Weight in k.1 (2017 05:00)  I&O's Detail    I & Os for current day (as of 2017 07:24)  =============================================  IN:    Oral Fluid: 120 ml    Total IN: 120 ml  ---------------------------------------------  OUT:    Indwelling Catheter - Urethral: 1700 ml    Total OUT: 1700 ml  ---------------------------------------------  Total NET: -1580 ml    Last Menstrual Period        REVIEW OF SYSTEMS:    CONSTITUTIONAL: No fever, weight loss, or fatigue  EYES: No eye pain, visual disturbances, or discharge  ENMT:  No difficulty hearing, tinnitus, vertigo; No sinus or throat pain  NECK: No pain or stiffness  BREASTS: No pain, masses, or nipple discharge  RESPIRATORY: No cough, wheezing, chills or hemoptysis; No shortness of breath  CARDIOVASCULAR: No chest pain, palpitations, dizziness, or leg swelling  GASTROINTESTINAL: No abdominal or epigastric pain. No nausea, vomiting, or hematemesis; No diarrhea or constipation. No melena or hematochezia.  GENITOURINARY: No dysuria, frequency, hematuria, or incontinence  NEUROLOGICAL: No headaches, memory loss, loss of strength, numbness, or tremors  SKIN: No itching, burning, rashes, or lesions   LYMPH NODES: No enlarged glands  ENDOCRINE: No heat or cold intolerance; No hair loss  MUSCULOSKELETAL: No joint pain or swelling; No muscle, back, or extremity pain  PSYCHIATRIC: No depression, anxiety, mood swings, or difficulty sleeping  HEME/LYMPH: No easy bruising, or bleeding gums  ALLERY AND IMMUNOLOGIC: No hives or eczema      PHYSICAL EXAM:    GENERAL: NAD, well-groomed, well-developed  HEAD:  Atraumatic, Normocephalic  EYES: EOMI, PERRLA, conjunctiva and sclera clear  ENMT: No tonsillar erythema, exudates, or enlargement; Moist mucous membranes, Good dentition, No lesions  NECK: Supple, No JVD, Normal thyroid  NERVOUS SYSTEM:  Alert & Oriented X3, Good concentration; Motor Strength 5/5 B/L upper and lower extremities; DTRs 2+ intact and symmetric  CHEST/LUNG: Clear to percussion bilaterally; No rales, rhonchi, wheezing, or rubs  HEART: Regular rate and rhythm; No murmurs, rubs, or gallops  ABDOMEN: Soft, Nontender, Nondistended; Bowel sounds present  EXTREMITIES:  2+ Peripheral Pulses, No clubbing, cyanosis, or edema  LYMPH: No lymphadenopathy noted  RECTAL: No masses, prostate normal size and smooth, Guiac negative   BREAST: No palpatble masses, skin no lesions no retractions, no discharages. adenexa no palpable masses noted   GYN: uterus normal size, adenexa no palpable masses, no CMT, no uterine discharge   SKIN: No rashes or lesions      LABS:                        8.4    4.4   )-----------( 103      ( 2017 22:23 )             27.2     CBC Full  -  ( 2017 16:37 )  WBC Count : x  Hemoglobin : x  Hematocrit : x  Platelet Count - Automated : x  Mean Cell Volume : x  Mean Cell Hemoglobin : x  Mean Cell Hemoglobin Concentration : x  Auto Neutrophil # : 2.7 K/uL  Auto Lymphocyte # : 1.0 K/uL  Auto Monocyte # : 0.4 K/uL  Auto Eosinophil # : 0.1 K/uL  Auto Basophil # : 0.0 K/uL  Auto Neutrophil % : 64.4 %  Auto Lymphocyte % : 23.5 %  Auto Monocyte % : 9.1 %  Auto Eosinophil % : 2.6 %  Auto Basophil % : 0.2 %        139  |  100  |  82.0<H>  ----------------------------<  122<H>  4.8   |  25.0  |  3.64<H>    Ca    9.6      2017 16:37  Mg     2.1     -    TPro  7.7  /  Alb  4.1  /  TBili  0.4  /  DBili  x   /  AST  40<H>  /  ALT  31  /  AlkPhos  74  04-    PT/INR - ( 2017 22:23 )   PT: 14.2 sec;   INR: 1.28 ratio           Urinalysis Basic - ( 2017 23:46 )    Color: Yellow / Appearance: Slightly Turbid / S.010 / pH: x  Gluc: x / Ketone: Negative  / Bili: Negative / Urobili: Negative mg/dL   Blood: x / Protein: 30 mg/dL / Nitrite: Positive   Leuk Esterase: Small / RBC: >50 /HPF / WBC 11-25   Sq Epi: x / Non Sq Epi: x / Bacteria: Many        Culture Results:   >100,000 CFU/ml Gram Negative Rods Identification and susceptibility to  follow. ( @ 23:46)    Serum Pro-Brain Natriuretic Peptide: 82481 pg/mL ( @ 01:27)        LIVER FUNCTIONS - ( 2017 22:23 )  Alb: 4.1 g/dL / Pro: 7.7 g/dL / ALK PHOS: 74 U/L / ALT: 31 U/L / AST: 40 U/L / GGT: x             RADIOLOGY & ADDITIONAL TESTS:

## 2017-04-06 NOTE — PROGRESS NOTE ADULT - SUBJECTIVE AND OBJECTIVE BOX
NEPHROLOGY INTERVAL HPI/OVERNIGHT EVENTS:  pt lying flat and comfortable  norwood with clear urine  no cp, sob, n/v/d    MEDICATIONS  (STANDING):  atorvastatin 40milliGRAM(s) Oral at bedtime  cefTRIAXone   IVPB 1Gram(s) IV Intermittent every 24 hours  ferrous    sulfate 325milliGRAM(s) Oral daily  folic acid 1milliGRAM(s) Oral daily  saccharomyces boulardii 250milliGRAM(s) Oral two times a day  insulin lispro (HumaLOG) corrective regimen sliding scale  SubCutaneous three times a day before meals  dextrose 5%. 1000milliLiter(s) IV Continuous <Continuous>  dextrose 50% Injectable 12.5Gram(s) IV Push once  dextrose 50% Injectable 25Gram(s) IV Push once  dextrose 50% Injectable 25Gram(s) IV Push once  carvedilol 6.25milliGRAM(s) Oral every 12 hours  furosemide   Injectable 40milliGRAM(s) IV Push two times a day  aspirin 325milliGRAM(s) Oral daily  isosorbide   dinitrate Tablet (ISORDIL) 10milliGRAM(s) Oral three times a day  hydrALAZINE 10milliGRAM(s) Oral every 8 hours  influenza   Vaccine 0.5milliLiter(s) IntraMuscular once    MEDICATIONS  (PRN):  dextrose Gel 1Dose(s) Oral once PRN Blood Glucose LESS THAN 70 milliGRAM(s)/deciliter  glucagon  Injectable 1milliGRAM(s) IntraMuscular once PRN Glucose LESS THAN 70 milligrams/deciliter      Allergies    No Known Allergies          Vital Signs Last 24 Hrs  T(C): 37.2, Max: 37.2 (-05 @ 21:33)  T(F): 99, Max: 99 (04-06 @ 04:58)  HR: 70 (70 - 91)  BP: 126/68 (126/68 - 187/74)  BP(mean): --  RR: 16 (15 - 22)  SpO2: 97% (97% - 100%)    PHYSICAL EXAM:  GENERAL: NAD, well-groomed, well-developed  HEAD:  Atraumatic, Normocephalic  EYES: EOMI, PERRLA  NECK: Supple, No JVD, Normal thyroid  NERVOUS SYSTEM:  Alert & Oriented X3,  intact and symmetric  CHEST/LUNG: Clear with decreased BS at bases  HEART: Regular rate and rhythm; No rub  ABDOMEN: Soft, Nontender, Nondistended; Bowel sounds present  EXTREMITIES:  2+ Peripheral Pulses, + edema stable  LYMPH: No lymphadenopathy noted  SKIN: No rashes or lesions      LABS:                        7.7    4.3   )-----------( 74       ( 2017 07:33 )             24.9     -06    141  |  101  |  86.0<H>  ----------------------------<  150<H>  4.4   |  28.0  |  3.84<H>    Ca    9.2      2017 07:33  Phos  4.2     -  Mg     2.0     -    TPro  6.8  /  Alb  3.5  /  TBili  0.3<L>  /  DBili  x   /  AST  15  /  ALT  17  /  AlkPhos  64  -    PT/INR - ( 2017 22:23 )   PT: 14.2 sec;   INR: 1.28 ratio       Creatinine, Serum: 3.64 mg/dL (17 @ 16:37)        Urinalysis Basic - ( 2017 23:46 )    Color: Yellow / Appearance: Slightly Turbid / S.010 / pH: x  Gluc: x / Ketone: Negative  / Bili: Negative / Urobili: Negative mg/dL   Blood: x / Protein: 30 mg/dL / Nitrite: Positive   Leuk Esterase: Small / RBC: >50 /HPF / WBC 11-25   Sq Epi: x / Non Sq Epi: x / Bacteria: Many      Magnesium, Serum: 2.0 mg/dL ( @ 07:33)  Phosphorus Level, Serum: 4.2 mg/dL ( @ 07:33)  Magnesium, Serum: 2.1 mg/dL ( @ 16:37)      RADIOLOGY & ADDITIONAL TESTS:   EXAM:  US KIDNEY(S)                            PROCEDURE DATE:  2016      INTERPRETATION:  INDICATIONS:  Renal failure    TECHNIQUE:  The examination was performed with the real time apparatus   with color flow and spectral doppler imaging.    COMPARISON EXAMINATION:  Abdominal sonogram 2015    FINDINGS:  RIGHT KIDNEY:  Normal in size, shape, and configuration measuring 9.9 x   4.5 x 4.8 cm.  No cystic or solid masses.  No calculi.  LEFT KIDNEY:  Normal in size, shape, and configuration measuring 9.1 x   4.6 x 4.1 cm. No cystic or solid masses.  No calculi.    There was no evidence of obstruction in either kidney. A Norwood catheter   is noted in the bladder which could not be evaluated.    A right pleural effusion is noted.    IMPRESSION: Normal renal sonogram.  Right pleural effusion     EXAM:  CHEST SINGLE VIEW FRONTAL                          PROCEDURE DATE:  2017        INTERPRETATION:  TECHNIQUE: Single portable view of the chest.    COMPARISON: 3/30/2016    CLINICAL HISTORY: Chest pain, swollen feet, loss of appetite.     FINDINGS:    Single frontal view of the chest demonstrates the lungs to be clear. The   cardiomediastinal silhouette is normal. No acute osseous abnormalities.   Overlying EKG leads and wires are noted. Left-sided AICD. Mediastinal   sternotomy wires.    IMPRESSION: No acute cardiopulmonary disease process.

## 2017-04-06 NOTE — PHYSICAL THERAPY INITIAL EVALUATION ADULT - PERTINENT HX OF CURRENT PROBLEM, REHAB EVAL
87y old  Male who presents with a chief complaint of SOB and leg swelling, Pt admitted for CHF exacerbation

## 2017-04-06 NOTE — PROGRESS NOTE ADULT - SUBJECTIVE AND OBJECTIVE BOX
Patient is a 87y old  Male who presents with a chief complaint of SOB and leg swelling (2017 01:08)      INTERVAL HPI/OVERNIGHT EVENTS:  87y  Male    ANTIMICROBIAL:  cefTRIAXone   IVPB 1Gram(s) IV Intermittent every 24 hours    CARDIOVASCULAR:  carvedilol 6.25milliGRAM(s) Oral every 12 hours  furosemide   Injectable 40milliGRAM(s) IV Push two times a day  isosorbide   dinitrate Tablet (ISORDIL) 10milliGRAM(s) Oral three times a day  hydrALAZINE 10milliGRAM(s) Oral every 8 hours    PULMONARY:    NEUROLOGIC:  aspirin 325milliGRAM(s) Oral daily    ONCOLOGIC:    HEMATOLOGIC:    GATROINTESTINAL:     MEDS:    ENDO/METABOLIC:  atorvastatin 40milliGRAM(s) Oral at bedtime  insulin lispro (HumaLOG) corrective regimen sliding scale  SubCutaneous three times a day before meals  dextrose Gel 1Dose(s) Oral once PRN  dextrose 50% Injectable 12.5Gram(s) IV Push once  dextrose 50% Injectable 25Gram(s) IV Push once  dextrose 50% Injectable 25Gram(s) IV Push once  glucagon  Injectable 1milliGRAM(s) IntraMuscular once PRN    IV FLUID/NUTRITION:  ferrous    sulfate 325milliGRAM(s) Oral daily  folic acid 1milliGRAM(s) Oral daily  dextrose 5%. 1000milliLiter(s) IV Continuous <Continuous>    TOPICAL:    IMMUNOLOGIC & OTHER  saccharomyces boulardii 250milliGRAM(s) Oral two times a day  influenza   Vaccine 0.5milliLiter(s) IntraMuscular once      Allergies    No Known Allergies    Intolerances        Vital Signs Last 24 Hrs  T(C): 37.2, Max: 37.2 ( @ 21:33)  T(F): 99, Max: 99 ( @ 04:58)  HR: 76 (76 - 91)  BP: 126/68 (126/68 - 187/74)  BP(mean): --  RR: 16 (15 - 22)  SpO2: 100% (98% - 100%)      Daily Height in cm: 162.56 (2017 15:35)    Daily Weight in k.1 (2017 05:00)  I&O's Detail    I & Os for current day (as of 2017 07:33)  =============================================  IN:    Oral Fluid: 120 ml    Total IN: 120 ml  ---------------------------------------------  OUT:    Indwelling Catheter - Urethral: 1700 ml    Total OUT: 1700 ml  ---------------------------------------------  Total NET: -1580 ml    Last Menstrual Period        REVIEW OF SYSTEMS:    CONSTITUTIONAL: No fever, weight loss, or fatigue  EYES: No eye pain, visual disturbances, or discharge  ENMT:  No difficulty hearing, tinnitus, vertigo; No sinus or throat pain  NECK: No pain or stiffness  BREASTS: No pain, masses, or nipple discharge  RESPIRATORY: No cough, wheezing, chills or hemoptysis; No shortness of breath  CARDIOVASCULAR: No chest pain, palpitations, dizziness, or leg swelling  GASTROINTESTINAL: No abdominal or epigastric pain. No nausea, vomiting, or hematemesis; No diarrhea or constipation. No melena or hematochezia.  GENITOURINARY: No dysuria, frequency, hematuria, or incontinence  NEUROLOGICAL: No headaches, memory loss, loss of strength, numbness, or tremors  SKIN: No itching, burning, rashes, or lesions   LYMPH NODES: No enlarged glands  ENDOCRINE: No heat or cold intolerance; No hair loss  MUSCULOSKELETAL: No joint pain or swelling; No muscle, back, or extremity pain  PSYCHIATRIC: No depression, anxiety, mood swings, or difficulty sleeping  HEME/LYMPH: No easy bruising, or bleeding gums  ALLERY AND IMMUNOLOGIC: No hives or eczema      PHYSICAL EXAM:    GENERAL: NAD, well-groomed, well-developed  HEAD:  Atraumatic, Normocephalic  EYES: EOMI, PERRLA, conjunctiva and sclera clear  ENMT: No tonsillar erythema, exudates, or enlargement; Moist mucous membranes, Good dentition, No lesions  NECK: Supple, No JVD, Normal thyroid  NERVOUS SYSTEM:  Alert & Oriented X3, Good concentration; Motor Strength 5/5 B/L upper and lower extremities; DTRs 2+ intact and symmetric  CHEST/LUNG: Clear to percussion bilaterally; No rales, rhonchi, wheezing, or rubs  HEART: Regular rate and rhythm; No murmurs, rubs, or gallops  ABDOMEN: Soft, Nontender, Nondistended; Bowel sounds present  EXTREMITIES:  2+ Peripheral Pulses, No clubbing, cyanosis, or edema  LYMPH: No lymphadenopathy noted  RECTAL: No masses, prostate normal size and smooth, Guiac negative   BREAST: No palpatble masses, skin no lesions no retractions, no discharages. adenexa no palpable masses noted   GYN: uterus normal size, adenexa no palpable masses, no CMT, no uterine discharge   SKIN: No rashes or lesions      LABS:                        8.4    4.4   )-----------( 103      ( 2017 22:23 )             27.2     CBC Full  -  ( 2017 16:37 )  WBC Count : x  Hemoglobin : x  Hematocrit : x  Platelet Count - Automated : x  Mean Cell Volume : x  Mean Cell Hemoglobin : x  Mean Cell Hemoglobin Concentration : x  Auto Neutrophil # : 2.7 K/uL  Auto Lymphocyte # : 1.0 K/uL  Auto Monocyte # : 0.4 K/uL  Auto Eosinophil # : 0.1 K/uL  Auto Basophil # : 0.0 K/uL  Auto Neutrophil % : 64.4 %  Auto Lymphocyte % : 23.5 %  Auto Monocyte % : 9.1 %  Auto Eosinophil % : 2.6 %  Auto Basophil % : 0.2 %        139  |  100  |  82.0<H>  ----------------------------<  122<H>  4.8   |  25.0  |  3.64<H>    Ca    9.6      2017 16:37  Mg     2.1     -    TPro  7.7  /  Alb  4.1  /  TBili  0.4  /  DBili  x   /  AST  40<H>  /  ALT  31  /  AlkPhos  74  04-    PT/INR - ( 2017 22:23 )   PT: 14.2 sec;   INR: 1.28 ratio           Urinalysis Basic - ( 2017 23:46 )    Color: Yellow / Appearance: Slightly Turbid / S.010 / pH: x  Gluc: x / Ketone: Negative  / Bili: Negative / Urobili: Negative mg/dL   Blood: x / Protein: 30 mg/dL / Nitrite: Positive   Leuk Esterase: Small / RBC: >50 /HPF / WBC 11-25   Sq Epi: x / Non Sq Epi: x / Bacteria: Many        Culture Results:   >100,000 CFU/ml Gram Negative Rods Identification and susceptibility to  follow. ( @ 23:46)    Serum Pro-Brain Natriuretic Peptide: 66635 pg/mL ( @ 01:27)        LIVER FUNCTIONS - ( 2017 22:23 )  Alb: 4.1 g/dL / Pro: 7.7 g/dL / ALK PHOS: 74 U/L / ALT: 31 U/L / AST: 40 U/L / GGT: x             RADIOLOGY & ADDITIONAL TESTS: Patient is a 87y old  Male who presents with a chief complaint of SOB and leg swelling (2017 01:08)      INTERVAL HPI/OVERNIGHT EVENTS:  87y  Male seen at bedside. Pt has no complaints but reports mild cough and decreased appetite (started previous to admission).  Denies CP, SOB, fever, chills, pain. Pt has a norwood catheter but was empty at time of exam. Pt is also on monitor but no events overnight. Pt was encouraged to ambulate bed to chair.    ANTIMICROBIAL:  cefTRIAXone   IVPB 1Gram(s) IV Intermittent every 24 hours    CARDIOVASCULAR:  carvedilol 6.25milliGRAM(s) Oral every 12 hours  furosemide   Injectable 40milliGRAM(s) IV Push two times a day  isosorbide   dinitrate Tablet (ISORDIL) 10milliGRAM(s) Oral three times a day  hydrALAZINE 10milliGRAM(s) Oral every 8 hours    PULMONARY:    NEUROLOGIC:  aspirin 325milliGRAM(s) Oral daily    ONCOLOGIC:    HEMATOLOGIC:    GATROINTESTINAL:     MEDS:    ENDO/METABOLIC:  atorvastatin 40milliGRAM(s) Oral at bedtime  insulin lispro (HumaLOG) corrective regimen sliding scale  SubCutaneous three times a day before meals  dextrose Gel 1Dose(s) Oral once PRN  dextrose 50% Injectable 12.5Gram(s) IV Push once  dextrose 50% Injectable 25Gram(s) IV Push once  dextrose 50% Injectable 25Gram(s) IV Push once  glucagon  Injectable 1milliGRAM(s) IntraMuscular once PRN    IV FLUID/NUTRITION:  ferrous    sulfate 325milliGRAM(s) Oral daily  folic acid 1milliGRAM(s) Oral daily  dextrose 5%. 1000milliLiter(s) IV Continuous <Continuous>    TOPICAL:    IMMUNOLOGIC & OTHER  saccharomyces boulardii 250milliGRAM(s) Oral two times a day  influenza   Vaccine 0.5milliLiter(s) IntraMuscular once      Allergies    No Known Allergies    Intolerances        Vital Signs Last 24 Hrs  T(C): 37.2, Max: 37.2 (04-05 @ 21:33)  T(F): 99, Max: 99 (04-06 @ 04:58)  HR: 76 (76 - 91)  BP: 126/68 (126/68 - 187/74)  BP(mean): --  RR: 16 (15 - 22)  SpO2: 100% (98% - 100%)      Daily Height in cm: 162.56 (2017 15:35)    Daily Weight in k.1 (2017 05:00)  I&O's Detail    I & Os for current day (as of 2017 07:33)  =============================================  IN:    Oral Fluid: 120 ml    Total IN: 120 ml  ---------------------------------------------  OUT:    Indwelling Catheter - Urethral: 1700 ml    Total OUT: 1700 ml  ---------------------------------------------  Total NET: -1580 ml    Last Menstrual Period        REVIEW OF SYSTEMS:    CONSTITUTIONAL: No fever, weight loss, or fatigue  EYES: No eye pain, visual disturbances, or discharge  ENMT:  No difficulty hearing, tinnitus, vertigo; No sinus or throat pain  NECK: No pain or stiffness  BREASTS: No pain, masses, or nipple discharge  RESPIRATORY: No cough, wheezing, chills or hemoptysis; No shortness of breath  CARDIOVASCULAR: No chest pain, palpitations, dizziness, or leg swelling  GASTROINTESTINAL: No abdominal or epigastric pain. No nausea, vomiting, or hematemesis; No diarrhea or constipation. No melena or hematochezia.  GENITOURINARY: No dysuria, frequency, hematuria, or incontinence  NEUROLOGICAL: No headaches, memory loss, loss of strength, numbness, or tremors  SKIN: No itching, burning, rashes, or lesions   LYMPH NODES: No enlarged glands  ENDOCRINE: No heat or cold intolerance; No hair loss  MUSCULOSKELETAL: No joint pain or swelling; No muscle, back, or extremity pain  PSYCHIATRIC: No depression, anxiety, mood swings, or difficulty sleeping  HEME/LYMPH: No easy bruising, or bleeding gums  ALLERY AND IMMUNOLOGIC: No hives or eczema      PHYSICAL EXAM:    GENERAL: NAD, well-groomed, well-developed  HEAD:  Atraumatic, Normocephalic  EYES: EOMI, PERRLA, conjunctiva and sclera clear  ENMT: No tonsillar erythema, exudates, or enlargement; Moist mucous membranes, Good dentition, No lesions  NECK: Supple, No JVD, Normal thyroid  NERVOUS SYSTEM:  Alert & Oriented X3, Good concentration; Motor Strength 5/5 B/L upper and lower extremities; DTRs 2+ intact and symmetric  CHEST/LUNG: Clear to percussion bilaterally; No rales, rhonchi, wheezing, or rubs  HEART: Regular rate and rhythm; No murmurs, rubs, or gallops  ABDOMEN: Soft, Nontender, Nondistended; Bowel sounds present  EXTREMITIES:  2+ Peripheral Pulses, No clubbing, cyanosis, or edema  LYMPH: No lymphadenopathy noted  RECTAL: No masses, prostate normal size and smooth, Guiac negative   BREAST: No palpatble masses, skin no lesions no retractions, no discharages. adenexa no palpable masses noted   GYN: uterus normal size, adenexa no palpable masses, no CMT, no uterine discharge   SKIN: No rashes or lesions      LABS:                        8.4    4.4   )-----------( 103      ( 2017 22:23 )             27.2     CBC Full  -  ( 2017 16:37 )  WBC Count : x  Hemoglobin : x  Hematocrit : x  Platelet Count - Automated : x  Mean Cell Volume : x  Mean Cell Hemoglobin : x  Mean Cell Hemoglobin Concentration : x  Auto Neutrophil # : 2.7 K/uL  Auto Lymphocyte # : 1.0 K/uL  Auto Monocyte # : 0.4 K/uL  Auto Eosinophil # : 0.1 K/uL  Auto Basophil # : 0.0 K/uL  Auto Neutrophil % : 64.4 %  Auto Lymphocyte % : 23.5 %  Auto Monocyte % : 9.1 %  Auto Eosinophil % : 2.6 %  Auto Basophil % : 0.2 %        139  |  100  |  82.0<H>  ----------------------------<  122<H>  4.8   |  25.0  |  3.64<H>    Ca    9.6      2017 16:37  Mg     2.1     -    TPro  7.7  /  Alb  4.1  /  TBili  0.4  /  DBili  x   /  AST  40<H>  /  ALT  31  /  AlkPhos  74  04-04    PT/INR - ( 2017 22:23 )   PT: 14.2 sec;   INR: 1.28 ratio           Urinalysis Basic - ( 2017 23:46 )    Color: Yellow / Appearance: Slightly Turbid / S.010 / pH: x  Gluc: x / Ketone: Negative  / Bili: Negative / Urobili: Negative mg/dL   Blood: x / Protein: 30 mg/dL / Nitrite: Positive   Leuk Esterase: Small / RBC: >50 /HPF / WBC 11-25   Sq Epi: x / Non Sq Epi: x / Bacteria: Many        Culture Results:   >100,000 CFU/ml Gram Negative Rods Identification and susceptibility to  follow. ( @ 23:46)    Serum Pro-Brain Natriuretic Peptide: 20596 pg/mL ( @ 01:27)        LIVER FUNCTIONS - ( 2017 22:23 )  Alb: 4.1 g/dL / Pro: 7.7 g/dL / ALK PHOS: 74 U/L / ALT: 31 U/L / AST: 40 U/L / GGT: x             RADIOLOGY & ADDITIONAL TESTS: Patient is a 87y old  Male who presents with a chief complaint of SOB and leg swelling (2017 01:08)      INTERVAL HPI/OVERNIGHT EVENTS:  87y  Male seen at bedside. Pt has no complaints but reports mild cough and decreased appetite (started previous to admission).  Denies CP, SOB, fever, chills, pain. Pt has a norwood catheter but was empty at time of exam. Pt is also on monitor but no events overnight. Pt was encouraged to ambulate bed to chair.          Vital Signs Last 24 Hrs  T(C): 37.2, Max: 37.2 ( @ 21:33)  T(F): 99, Max: 99 ( @ 04:58)  HR: 76 (76 - 91)  BP: 126/68 (126/68 - 187/74)  BP(mean): --  RR: 16 (15 - 22)  SpO2: 100% (98% - 100%)      Daily Height in cm: 162.56 (2017 15:35)    Daily Weight in k.1 (2017 05:00)  I&O's Detail    I & Os for current day (as of 2017 07:33)  =============================================  IN:    Oral Fluid: 120 ml    Total IN: 120 ml  ---------------------------------------------  OUT:    Indwelling Catheter - Urethral: 1700 ml    Total OUT: 1700 ml  ---------------------------------------------  Total NET: -1580 ml            REVIEW OF SYSTEMS:    noted above    PHYSICAL EXAM:    GENERAL: NAD, well-groomed, well-developed  HEAD:  Atraumatic, Normocephalic  EYES: EOMI, PERRLA, conjunctiva and sclera clear  ENMT: No tonsillar erythema, exudates, or enlargement; Moist mucous membranes, Good dentition, No lesions  NECK: Supple, No JVD, Normal thyroid  NERVOUS SYSTEM:  Alert & Oriented X3, Good concentration; Motor Strength 5/5 B/L upper and lower extremities  CHEST/LUNG: Clear to percussion bilaterally; No rales, rhonchi, wheezing, or rubs  HEART: Regular rate and rhythm; No murmurs, rubs, or gallops  ABDOMEN: Soft, Nontender, Nondistended; Bowel sounds present  EXTREMITIES:  2+ Peripheral Pulses, No clubbing, cyanosis, or edema  SKIN: No rashes or lesions      LABS:                        8.4    4.4   )-----------( 103      ( 2017 22:23 )             27.2     CBC Full  -  ( 2017 16:37 )  WBC Count : x  Hemoglobin : x  Hematocrit : x  Platelet Count - Automated : x  Mean Cell Volume : x  Mean Cell Hemoglobin : x  Mean Cell Hemoglobin Concentration : x  Auto Neutrophil # : 2.7 K/uL  Auto Lymphocyte # : 1.0 K/uL  Auto Monocyte # : 0.4 K/uL  Auto Eosinophil # : 0.1 K/uL  Auto Basophil # : 0.0 K/uL  Auto Neutrophil % : 64.4 %  Auto Lymphocyte % : 23.5 %  Auto Monocyte % : 9.1 %  Auto Eosinophil % : 2.6 %  Auto Basophil % : 0.2 %        139  |  100  |  82.0<H>  ----------------------------<  122<H>  4.8   |  25.0  |  3.64<H>    Ca    9.6      2017 16:37  Mg     2.1     -    TPro  7.7  /  Alb  4.1  /  TBili  0.4  /  DBili  x   /  AST  40<H>  /  ALT  31  /  AlkPhos  74  04-04    PT/INR - ( 2017 22:23 )   PT: 14.2 sec;   INR: 1.28 ratio           Urinalysis Basic - ( 2017 23:46 )    Color: Yellow / Appearance: Slightly Turbid / S.010 / pH: x  Gluc: x / Ketone: Negative  / Bili: Negative / Urobili: Negative mg/dL   Blood: x / Protein: 30 mg/dL / Nitrite: Positive   Leuk Esterase: Small / RBC: >50 /HPF / WBC 11-25   Sq Epi: x / Non Sq Epi: x / Bacteria: Many        Culture Results:   >100,000 CFU/ml Gram Negative Rods Identification and susceptibility to  follow. ( @ 23:46)    Serum Pro-Brain Natriuretic Peptide: 60943 pg/mL ( @ 01:27)        LIVER FUNCTIONS - ( 2017 22:23 )  Alb: 4.1 g/dL / Pro: 7.7 g/dL / ALK PHOS: 74 U/L / ALT: 31 U/L / AST: 40 U/L / GGT: x             RADIOLOGY & ADDITIONAL TESTS:    MEDICATIONS    ANTIMICROBIAL:  cefTRIAXone   IVPB 1Gram(s) IV Intermittent every 24 hours    CARDIOVASCULAR:  carvedilol 6.25milliGRAM(s) Oral every 12 hours  furosemide   Injectable 40milliGRAM(s) IV Push two times a day  isosorbide   dinitrate Tablet (ISORDIL) 10milliGRAM(s) Oral three times a day  hydrALAZINE 10milliGRAM(s) Oral every 8 hours    PULMONARY:    NEUROLOGIC:  aspirin 325milliGRAM(s) Oral daily      ENDO/METABOLIC:  atorvastatin 40milliGRAM(s) Oral at bedtime  insulin lispro (HumaLOG) corrective regimen sliding scale  SubCutaneous three times a day before meals  dextrose Gel 1Dose(s) Oral once PRN  dextrose 50% Injectable 12.5Gram(s) IV Push once  dextrose 50% Injectable 25Gram(s) IV Push once  dextrose 50% Injectable 25Gram(s) IV Push once  glucagon  Injectable 1milliGRAM(s) IntraMuscular once PRN    IV FLUID/NUTRITION:  ferrous    sulfate 325milliGRAM(s) Oral daily  folic acid 1milliGRAM(s) Oral daily  dextrose 5%. 1000milliLiter(s) IV Continuous <Continuous>    TOPICAL:    IMMUNOLOGIC & OTHER  saccharomyces boulardii 250milliGRAM(s) Oral two times a day  influenza   Vaccine 0.5milliLiter(s) IntraMuscular once      Allergies    No Known Allergies    Intolerances

## 2017-04-06 NOTE — PROGRESS NOTE ADULT - PROBLEM SELECTOR PLAN 1
stable, no edema, CTA, pt is clinically improved  BNP pending  c/w carvedilol 6.25milliGRAM(s) Oral every 12 hours  furosemide   Injectable 40milliGRAM(s) IV Push two times a day

## 2017-04-06 NOTE — PROGRESS NOTE ADULT - PROBLEM SELECTOR PLAN 6
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 Will trend  c/w ferrous    sulfate 325milliGRAM(s) Oral daily  folic acid 1milliGRAM(s) Oral daily

## 2017-04-06 NOTE — PHYSICAL THERAPY INITIAL EVALUATION ADULT - ADDITIONAL COMMENTS
Pt lives in a private home with his wife, 3 steps to enter with handrails, no steps inside. As per pt's wife, Pt was independent PTA with RW for ambulation and ADLs. Pt owns RW and SAC

## 2017-04-06 NOTE — PROGRESS NOTE ADULT - ASSESSMENT
87 year old male with a PMH of CHF, DM, HTN, HLD, and CKD presents to the ED with a cc of SOB and leg swelling for 2-3 weeks. Admitted for CHF exacerbation

## 2017-04-07 LAB
24R-OH-CALCIDIOL SERPL-MCNC: 34.6 NG/ML — SIGNIFICANT CHANGE UP (ref 30–100)
ALBUMIN SERPL ELPH-MCNC: 3.4 G/DL — SIGNIFICANT CHANGE UP (ref 3.3–5.2)
ALP SERPL-CCNC: 59 U/L — SIGNIFICANT CHANGE UP (ref 40–120)
ALT FLD-CCNC: 14 U/L — SIGNIFICANT CHANGE UP
ANION GAP SERPL CALC-SCNC: 12 MMOL/L — SIGNIFICANT CHANGE UP (ref 5–17)
AST SERPL-CCNC: 15 U/L — SIGNIFICANT CHANGE UP
BASOPHILS # BLD AUTO: 0 K/UL — SIGNIFICANT CHANGE UP (ref 0–0.2)
BASOPHILS NFR BLD AUTO: 0.2 % — SIGNIFICANT CHANGE UP (ref 0–2)
BILIRUB SERPL-MCNC: 0.3 MG/DL — LOW (ref 0.4–2)
BUN SERPL-MCNC: 89 MG/DL — HIGH (ref 8–20)
CALCIUM SERPL-MCNC: 9.3 MG/DL — SIGNIFICANT CHANGE UP (ref 8.6–10.2)
CHLORIDE SERPL-SCNC: 99 MMOL/L — SIGNIFICANT CHANGE UP (ref 98–107)
CO2 SERPL-SCNC: 28 MMOL/L — SIGNIFICANT CHANGE UP (ref 22–29)
CREAT SERPL-MCNC: 3.9 MG/DL — HIGH (ref 0.5–1.3)
EOSINOPHIL # BLD AUTO: 0.2 K/UL — SIGNIFICANT CHANGE UP (ref 0–0.5)
EOSINOPHIL NFR BLD AUTO: 3.4 % — SIGNIFICANT CHANGE UP (ref 0–5)
GLUCOSE SERPL-MCNC: 146 MG/DL — HIGH (ref 70–115)
HCT VFR BLD CALC: 24.9 % — LOW (ref 42–52)
HGB BLD-MCNC: 7.6 G/DL — LOW (ref 14–18)
LYMPHOCYTES # BLD AUTO: 1.3 K/UL — SIGNIFICANT CHANGE UP (ref 1–4.8)
LYMPHOCYTES # BLD AUTO: 25.6 % — SIGNIFICANT CHANGE UP (ref 20–55)
MAGNESIUM SERPL-MCNC: 2.1 MG/DL — SIGNIFICANT CHANGE UP (ref 1.8–2.5)
MCHC RBC-ENTMCNC: 26.1 PG — LOW (ref 27–31)
MCHC RBC-ENTMCNC: 30.5 G/DL — LOW (ref 32–36)
MCV RBC AUTO: 85.6 FL — SIGNIFICANT CHANGE UP (ref 80–94)
MONOCYTES # BLD AUTO: 0.6 K/UL — SIGNIFICANT CHANGE UP (ref 0–0.8)
MONOCYTES NFR BLD AUTO: 11.9 % — HIGH (ref 3–10)
NEUTROPHILS # BLD AUTO: 2.9 K/UL — SIGNIFICANT CHANGE UP (ref 1.8–8)
NEUTROPHILS NFR BLD AUTO: 58.9 % — SIGNIFICANT CHANGE UP (ref 37–73)
PHOSPHATE SERPL-MCNC: 4.4 MG/DL — SIGNIFICANT CHANGE UP (ref 2.4–4.7)
PLATELET # BLD AUTO: 77 K/UL — LOW (ref 150–400)
POTASSIUM SERPL-MCNC: 4.2 MMOL/L — SIGNIFICANT CHANGE UP (ref 3.5–5.3)
POTASSIUM SERPL-SCNC: 4.2 MMOL/L — SIGNIFICANT CHANGE UP (ref 3.5–5.3)
PROT SERPL-MCNC: 6.9 G/DL — SIGNIFICANT CHANGE UP (ref 6.6–8.7)
RBC # BLD: 2.91 M/UL — LOW (ref 4.6–6.2)
RBC # FLD: 16 % — HIGH (ref 11–15.6)
SODIUM SERPL-SCNC: 139 MMOL/L — SIGNIFICANT CHANGE UP (ref 135–145)
WBC # BLD: 5 K/UL — SIGNIFICANT CHANGE UP (ref 4.8–10.8)
WBC # FLD AUTO: 5 K/UL — SIGNIFICANT CHANGE UP (ref 4.8–10.8)

## 2017-04-07 PROCEDURE — 99233 SBSQ HOSP IP/OBS HIGH 50: CPT

## 2017-04-07 PROCEDURE — 99233 SBSQ HOSP IP/OBS HIGH 50: CPT | Mod: GC

## 2017-04-07 RX ORDER — INSULIN LISPRO 100/ML
1 VIAL (ML) SUBCUTANEOUS ONCE
Qty: 0 | Refills: 0 | Status: COMPLETED | OUTPATIENT
Start: 2017-04-07 | End: 2017-04-07

## 2017-04-07 RX ORDER — INSULIN GLARGINE 100 [IU]/ML
15 INJECTION, SOLUTION SUBCUTANEOUS AT BEDTIME
Qty: 0 | Refills: 0 | Status: DISCONTINUED | OUTPATIENT
Start: 2017-04-07 | End: 2017-04-08

## 2017-04-07 RX ORDER — IRON SUCROSE 20 MG/ML
100 INJECTION, SOLUTION INTRAVENOUS EVERY 12 HOURS
Qty: 0 | Refills: 0 | Status: COMPLETED | OUTPATIENT
Start: 2017-04-07 | End: 2017-04-08

## 2017-04-07 RX ADMIN — Medication 10 MILLIGRAM(S): at 05:04

## 2017-04-07 RX ADMIN — Medication 325 MILLIGRAM(S): at 13:16

## 2017-04-07 RX ADMIN — CEFTRIAXONE 100 GRAM(S): 500 INJECTION, POWDER, FOR SOLUTION INTRAMUSCULAR; INTRAVENOUS at 01:14

## 2017-04-07 RX ADMIN — INSULIN GLARGINE 15 UNIT(S): 100 INJECTION, SOLUTION SUBCUTANEOUS at 22:02

## 2017-04-07 RX ADMIN — Medication 250 MILLIGRAM(S): at 18:44

## 2017-04-07 RX ADMIN — ISOSORBIDE DINITRATE 10 MILLIGRAM(S): 5 TABLET ORAL at 05:04

## 2017-04-07 RX ADMIN — Medication 250 MILLIGRAM(S): at 05:04

## 2017-04-07 RX ADMIN — Medication 1 MILLIGRAM(S): at 13:16

## 2017-04-07 RX ADMIN — Medication 10 MILLIGRAM(S): at 13:16

## 2017-04-07 RX ADMIN — Medication 40 MILLIGRAM(S): at 18:46

## 2017-04-07 RX ADMIN — ISOSORBIDE DINITRATE 10 MILLIGRAM(S): 5 TABLET ORAL at 18:44

## 2017-04-07 RX ADMIN — Medication 4: at 13:16

## 2017-04-07 RX ADMIN — Medication 40 MILLIGRAM(S): at 05:04

## 2017-04-07 RX ADMIN — CARVEDILOL PHOSPHATE 6.25 MILLIGRAM(S): 80 CAPSULE, EXTENDED RELEASE ORAL at 05:04

## 2017-04-07 RX ADMIN — IRON SUCROSE 210 MILLIGRAM(S): 20 INJECTION, SOLUTION INTRAVENOUS at 21:49

## 2017-04-07 RX ADMIN — Medication 1: at 09:11

## 2017-04-07 RX ADMIN — ATORVASTATIN CALCIUM 40 MILLIGRAM(S): 80 TABLET, FILM COATED ORAL at 21:49

## 2017-04-07 RX ADMIN — IRON SUCROSE 210 MILLIGRAM(S): 20 INJECTION, SOLUTION INTRAVENOUS at 13:19

## 2017-04-07 RX ADMIN — CARVEDILOL PHOSPHATE 6.25 MILLIGRAM(S): 80 CAPSULE, EXTENDED RELEASE ORAL at 18:44

## 2017-04-07 RX ADMIN — Medication 2: at 18:44

## 2017-04-07 RX ADMIN — Medication 10 MILLIGRAM(S): at 21:49

## 2017-04-07 NOTE — PROGRESS NOTE ADULT - SUBJECTIVE AND OBJECTIVE BOX
Patient is a 87y old  Male who presents with a chief complaint of SOB and leg swelling (2017 01:08)      INTERVAL HPI/OVERNIGHT EVENTS:  87y  Male seen at bedside. No events overnight. Pt is comfortable with no complaints. Pt reports voiding, bowel movement, tolerating PO intake, decreased appetite. Pt denies CP, SOB, fever, chills.     ANTIMICROBIAL:  cefTRIAXone   IVPB 1Gram(s) IV Intermittent every 24 hours    CARDIOVASCULAR:  carvedilol 6.25milliGRAM(s) Oral every 12 hours  furosemide   Injectable 40milliGRAM(s) IV Push two times a day  isosorbide   dinitrate Tablet (ISORDIL) 10milliGRAM(s) Oral three times a day  hydrALAZINE 10milliGRAM(s) Oral every 8 hours    PULMONARY:    NEUROLOGIC:  aspirin 325milliGRAM(s) Oral daily    ONCOLOGIC:    HEMATOLOGIC:    GATROINTESTINAL:     MEDS:    ENDO/METABOLIC:  atorvastatin 40milliGRAM(s) Oral at bedtime  insulin lispro (HumaLOG) corrective regimen sliding scale  SubCutaneous three times a day before meals  dextrose Gel 1Dose(s) Oral once PRN  dextrose 50% Injectable 12.5Gram(s) IV Push once  dextrose 50% Injectable 25Gram(s) IV Push once  dextrose 50% Injectable 25Gram(s) IV Push once  glucagon  Injectable 1milliGRAM(s) IntraMuscular once PRN    IV FLUID/NUTRITION:  ferrous    sulfate 325milliGRAM(s) Oral daily  folic acid 1milliGRAM(s) Oral daily  dextrose 5%. 1000milliLiter(s) IV Continuous <Continuous>    TOPICAL:    IMMUNOLOGIC & OTHER  saccharomyces boulardii 250milliGRAM(s) Oral two times a day  influenza   Vaccine 0.5milliLiter(s) IntraMuscular once  epoetin una Injectable [id number](s) SubCutaneous <User Schedule>      Allergies    No Known Allergies    Intolerances        Vital Signs Last 24 Hrs  T(C): 36.8, Max: 38.2 (- @ 17:10)  T(F): 98.2, Max: 100.7 (- @ 17:10)  HR: 80 (70 - 81)  BP: 120/70 (110/68 - 138/70)  BP(mean): --  RR: 16 (16 - 18)  SpO2: 100% (97% - 100%)      Daily     Daily Weight in k.4 (2017 05:30)  I&O's Detail  I & Os for 24h ending 2017 07:00  =============================================  IN:    Oral Fluid: 120 ml    Total IN: 120 ml  ---------------------------------------------  OUT:    Indwelling Catheter - Urethral: 1700 ml    Total OUT: 1700 ml  ---------------------------------------------  Total NET: -1580 ml    I & Os for current day (as of 2017 06:43)  =============================================  IN:    Oral Fluid: 840 ml    Total IN: 840 ml  ---------------------------------------------  OUT:    Indwelling Catheter - Urethral: 1975 ml    Total OUT: 1975 ml  ---------------------------------------------  Total NET: -1135 ml    Last Menstrual Period        REVIEW OF SYSTEMS:    CONSTITUTIONAL: No fever, weight loss, or fatigue  EYES: No eye pain, visual disturbances, or discharge  ENMT:  No difficulty hearing, tinnitus, vertigo; No sinus or throat pain  NECK: No pain or stiffness  BREASTS: No pain, masses, or nipple discharge  RESPIRATORY: No cough, wheezing, chills or hemoptysis; No shortness of breath  CARDIOVASCULAR: No chest pain, palpitations, dizziness, or leg swelling  GASTROINTESTINAL: No abdominal or epigastric pain. No nausea, vomiting, or hematemesis; No diarrhea or constipation. No melena or hematochezia.  GENITOURINARY: No dysuria, frequency, hematuria, or incontinence  NEUROLOGICAL: No headaches, memory loss, loss of strength, numbness, or tremors  SKIN: No itching, burning, rashes, or lesions   LYMPH NODES: No enlarged glands  ENDOCRINE: No heat or cold intolerance; No hair loss  MUSCULOSKELETAL: No joint pain or swelling; No muscle, back, or extremity pain  PSYCHIATRIC: No depression, anxiety, mood swings, or difficulty sleeping  HEME/LYMPH: No easy bruising, or bleeding gums  ALLERY AND IMMUNOLOGIC: No hives or eczema      PHYSICAL EXAM:    GENERAL: NAD, well-groomed, well-developed  HEAD:  Atraumatic, Normocephalic  EYES: EOMI, PERRLA, conjunctiva and sclera clear  ENMT: No tonsillar erythema, exudates, or enlargement; Moist mucous membranes, Good dentition, No lesions  NECK: Supple, No JVD, Normal thyroid  NERVOUS SYSTEM:  Alert & Oriented X3, Good concentration; Motor Strength 5/5 B/L upper and lower extremities; DTRs 2+ intact and symmetric  CHEST/LUNG: Clear to percussion bilaterally; No rales, rhonchi, wheezing, or rubs  HEART: Regular rate and rhythm; No murmurs, rubs, or gallops  ABDOMEN: Soft, Nontender, Nondistended; Bowel sounds present  EXTREMITIES:  2+ Peripheral Pulses, No clubbing, cyanosis, or edema  LYMPH: No lymphadenopathy noted  RECTAL: No masses, prostate normal size and smooth, Guiac negative   BREAST: No palpatble masses, skin no lesions no retractions, no discharages. adenexa no palpable masses noted   GYN: uterus normal size, adenexa no palpable masses, no CMT, no uterine discharge   SKIN: No rashes or lesions      LABS:                        7.7    4.3   )-----------( 74       ( 2017 07:33 )             24.9     CBC Full  -  ( 2017 07:33 )  WBC Count : 4.3 K/uL  Hemoglobin : 7.7 g/dL  Hematocrit : 24.9 %  Platelet Count - Automated : 74 K/uL  Mean Cell Volume : 86.2 fl  Mean Cell Hemoglobin : 26.6 pg  Mean Cell Hemoglobin Concentration : 30.9 g/dL  Auto Neutrophil # : 2.4 K/uL  Auto Lymphocyte # : 1.3 K/uL  Auto Monocyte # : 0.4 K/uL  Auto Eosinophil # : 0.2 K/uL  Auto Basophil # : 0.0 K/uL  Auto Neutrophil % : 57.0 %  Auto Lymphocyte % : 30.5 %  Auto Monocyte % : 8.4 %  Auto Eosinophil % : 3.7 %  Auto Basophil % : 0.2 %        141  |  101  |  86.0<H>  ----------------------------<  150<H>  4.4   |  28.0  |  3.84<H>    Ca    9.2      2017 07:33  Phos  4.2       Mg     2.0         TPro  6.8  /  Alb  3.5  /  TBili  0.3<L>  /  DBili  x   /  AST  15  /  ALT  17  /  AlkPhos  64          Hemoglobin A1C, Whole Blood: 7.0 % ( @ 07:33)    Culture Results:   >100,000 CFU/ml Escherichia coli ( @ 23:46)    Serum Pro-Brain Natriuretic Peptide: 31444 pg/mL ( @ 01:27)      Albumin, Serum: 3.5 g/dL ( @ 07:33)    LIVER FUNCTIONS - ( 2017 07:33 )  Alb: 3.5 g/dL / Pro: 6.8 g/dL / ALK PHOS: 64 U/L / ALT: 17 U/L / AST: 15 U/L / GGT: x             RADIOLOGY & ADDITIONAL TESTS: Patient is a 87y old  Male who presents with a chief complaint of SOB and leg swelling (2017 01:08)      INTERVAL HPI/OVERNIGHT EVENTS:  87y  Male seen at bedside. No events overnight. Pt is comfortable with no complaints. Pt reports voiding, bowel movement, tolerating PO intake, decreased appetite. Pt denies CP, SOB, fever, chills.           Vital Signs Last 24 Hrs  T(C): 36.8, Max: 38.2 ( @ 17:10)  T(F): 98.2, Max: 100.7 ( @ 17:10)  HR: 80 (70 - 81)  BP: 120/70 (110/68 - 138/70)  BP(mean): --  RR: 16 (16 - 18)  SpO2: 100% (97% - 100%)      Daily     Daily Weight in k.4 (2017 05:30)  I&O's Detail  I & Os for 24h ending 2017 07:00  =============================================  IN:    Oral Fluid: 120 ml    Total IN: 120 ml  ---------------------------------------------  OUT:    Indwelling Catheter - Urethral: 1700 ml    Total OUT: 1700 ml  ---------------------------------------------  Total NET: -1580 ml    I & Os for current day (as of 2017 06:43)  =============================================  IN:    Oral Fluid: 840 ml    Total IN: 840 ml  ---------------------------------------------  OUT:    Indwelling Catheter - Urethral: 1975 ml    Total OUT: 1975 ml  ---------------------------------------------  Total NET: -1135 ml        REVIEW OF SYSTEMS:    negative except as noted above    PHYSICAL EXAM:    GENERAL: NAD, well-groomed, well-developed  HEAD:  Atraumatic, Normocephalic  EYES: EOMI, PERRLA, conjunctiva and sclera clear  ENMT: No tonsillar erythema, exudates, or enlargement; Moist mucous membranes  NECK: Supple, No JVD, Normal thyroid  NERVOUS SYSTEM:  Alert & Oriented X3, Good concentration;   CHEST/LUNG: Clear to percussion bilaterally; No rales, rhonchi, wheezing, or rubs  HEART: Regular rate and rhythm; No murmurs, rubs, or gallops  ABDOMEN: Soft, Nontender, Nondistended; Bowel sounds present  EXTREMITIES:  2+ Peripheral Pulses, No clubbing, cyanosis, or edema  SKIN: No rashes or lesions      LABS:                        7.7    4.3   )-----------( 74       ( 2017 07:33 )             24.9     CBC Full  -  ( 2017 07:33 )  WBC Count : 4.3 K/uL  Hemoglobin : 7.7 g/dL  Hematocrit : 24.9 %  Platelet Count - Automated : 74 K/uL  Mean Cell Volume : 86.2 fl  Mean Cell Hemoglobin : 26.6 pg  Mean Cell Hemoglobin Concentration : 30.9 g/dL  Auto Neutrophil # : 2.4 K/uL  Auto Lymphocyte # : 1.3 K/uL  Auto Monocyte # : 0.4 K/uL  Auto Eosinophil # : 0.2 K/uL  Auto Basophil # : 0.0 K/uL  Auto Neutrophil % : 57.0 %  Auto Lymphocyte % : 30.5 %  Auto Monocyte % : 8.4 %  Auto Eosinophil % : 3.7 %  Auto Basophil % : 0.2 %    04-06    141  |  101  |  86.0<H>  ----------------------------<  150<H>  4.4   |  28.0  |  3.84<H>    Ca    9.2      2017 07:33  Phos  4.2     04-06  Mg     2.0         TPro  6.8  /  Alb  3.5  /  TBili  0.3<L>  /  DBili  x   /  AST  15  /  ALT  17  /  AlkPhos  64          Hemoglobin A1C, Whole Blood: 7.0 % ( @ 07:33)    Culture Results:   >100,000 CFU/ml Escherichia coli ( @ 23:46)    Serum Pro-Brain Natriuretic Peptide: 13455 pg/mL ( @ 01:27)      Albumin, Serum: 3.5 g/dL ( @ 07:33)    LIVER FUNCTIONS - ( 2017 07:33 )  Alb: 3.5 g/dL / Pro: 6.8 g/dL / ALK PHOS: 64 U/L / ALT: 17 U/L / AST: 15 U/L / GGT: x             RADIOLOGY & ADDITIONAL TESTS:    MEDICATIONS  ANTIMICROBIAL:  cefTRIAXone   IVPB 1Gram(s) IV Intermittent every 24 hours    CARDIOVASCULAR:  carvedilol 6.25milliGRAM(s) Oral every 12 hours  furosemide   Injectable 40milliGRAM(s) IV Push two times a day  isosorbide   dinitrate Tablet (ISORDIL) 10milliGRAM(s) Oral three times a day  hydrALAZINE 10milliGRAM(s) Oral every 8 hours    PULMONARY:    NEUROLOGIC:  aspirin 325milliGRAM(s) Oral daily    ONCOLOGIC:    HEMATOLOGIC:    GATROINTESTINAL:     MEDS:    ENDO/METABOLIC:  atorvastatin 40milliGRAM(s) Oral at bedtime  insulin lispro (HumaLOG) corrective regimen sliding scale  SubCutaneous three times a day before meals  dextrose Gel 1Dose(s) Oral once PRN  dextrose 50% Injectable 12.5Gram(s) IV Push once  dextrose 50% Injectable 25Gram(s) IV Push once  dextrose 50% Injectable 25Gram(s) IV Push once  glucagon  Injectable 1milliGRAM(s) IntraMuscular once PRN    IV FLUID/NUTRITION:  ferrous    sulfate 325milliGRAM(s) Oral daily  folic acid 1milliGRAM(s) Oral daily  dextrose 5%. 1000milliLiter(s) IV Continuous <Continuous>    TOPICAL:    IMMUNOLOGIC & OTHER  saccharomyces boulardii 250milliGRAM(s) Oral two times a day  influenza   Vaccine 0.5milliLiter(s) IntraMuscular once  epoetin una Injectable 44580Sthi(s) SubCutaneous <User Schedule>      Allergies    No Known Allergies    Intolerances

## 2017-04-07 NOTE — PROGRESS NOTE ADULT - PROBLEM SELECTOR PLAN 1
stable, no edema, CTA, pt is clinically improved  BNP 91027 on 04/05/17, will repeat  c/w carvedilol 6.25milliGRAM(s) Oral every 12 hours  furosemide   Injectable 40milliGRAM(s) IV Push two times a day

## 2017-04-07 NOTE — PROGRESS NOTE ADULT - PROBLEM SELECTOR PLAN 4
2/2 E.Coli. Asymptomatic, no leukocytosis  c/w cefTRIAXone   IVPB 1Gram(s) IV Intermittent every 24 hours Day#3 and Florastor

## 2017-04-07 NOTE — PROGRESS NOTE ADULT - SUBJECTIVE AND OBJECTIVE BOX
CARDIOLOGY PROGRESS NOTE   ([facility])                             Reason for follow up: congestive heart failure                             Overnight: No new events.   Update: getting diuresed  for CHF    Subjective: "  can u get me some paper."   Complains of: no new symptoms. eating dinner. Voiding. tolerateing meds and PO intake  Review of symptoms: Cardiac:  No chest pain. dyspnea improved. . No palpitations.  Respiratory: no cough. No dyspnea  Gastrointestinal: No diarrhea. No abdominal pain. No bleeding.     Past medical history: No updates.   Chronic conditions:  Hypertension: controlled. CHF: Improved   	  Vitals:  T(C): 36.6, Max: 36.9 (04-06 @ 22:24)  HR: 79 (70 - 90)  BP: 130/60 (120/50 - 136/58)  RR: 18 (16 - 18)  SpO2: 98% (98% - 100%)  Wt(kg): --  I&O's Summary  I & Os for 24h ending 07 Apr 2017 07:00  =============================================  IN: 840 ml / OUT: 2425 ml / NET: -1585 ml    I & Os for current day (as of 07 Apr 2017 21:13)  =============================================  IN: 480 ml / OUT: 250 ml / NET: 230 ml    Weight (kg): 67.1 (04-05 @ 15:35)    PHYSICAL EXAM:  Appearance: Comfortable. No acute distress  HEENT:  Head and neck: Atraumatic. Normocephalic.  Normal oral mucosa, PERRL, Neck is supple. No JVD, No carotid bruit.   Neurologic: A & O x 3, no focal deficits. EOMI , Cranial nerves are intact.  Lymphatic: No cervical lymphadenopathy  Cardiovascular: Normal S1 S2, No murmur, rubs/gallops. No JVD, No edema  Respiratory: Lungs clear to auscultation  Gastrointestinal:  Soft, Non-tender, + BS  Lower Extremities: Ntrivia  edema  Psychiatry: Patient is calm. No agitation. Mood & affect appropriate  Skin: No rashes, No ecchymoses, No cyanosis    CURRENT MEDICATIONS:  carvedilol 6.25milliGRAM(s) Oral every 12 hours  furosemide   Injectable 40milliGRAM(s) IV Push two times a day  isosorbide   dinitrate Tablet (ISORDIL) 10milliGRAM(s) Oral three times a day  hydrALAZINE 10milliGRAM(s) Oral every 8 hours    aspirin  atorvastatin  insulin lispro (HumaLOG) corrective regimen sliding scale  dextrose 50% Injectable  dextrose 50% Injectable  dextrose 50% Injectable  insulin glargine Injectable (LANTUS)  folic acid  dextrose 5%.  influenza   Vaccine  epoetin una Injectable  iron sucrose IVPB      LABS:	 	                          7.6    5.0   )-----------( 77       ( 07 Apr 2017 07:23 )             24.9     04-07    139  |  99  |  89.0<H>  ----------------------------<  146<H>  4.2   |  28.0  |  3.90<H>    Ca    9.3      07 Apr 2017 07:23  Phos  4.4     04-07  Mg     2.1     04-07    TPro  6.9  /  Alb  3.4  /  TBili  0.3<L>  /  DBili  x   /  AST  15  /  ALT  14  /  AlkPhos  59  04-07    proBNP: Serum Pro-Brain Natriuretic Peptide: 86678 pg/mL (04-05 @ 01:27)    Lipid Profile:   HgA1c: Hemoglobin A1C, Whole Blood: 7.0 %  TSH:       INTERPRETATION OF TELEMETRY: A sensed V paced    ECG (tracing reviewed by me): A sensed, V paced 90 bpm, isolated PVC      DIAGNOSTIC TESTING:  [ ] Echocardiogram: EF 30-35%.  Elevated filling pressures 27 mm hg. Normal RV function.  MOderate aortic stenosis.

## 2017-04-07 NOTE — PROGRESS NOTE ADULT - ASSESSMENT
CKD IV: decompensated CHF Cr stable  - continue diuretics to keep azotemic==> consider change to PO dosing at cardiology's discretion  - norwood catheter - daily weights and monitor labs  - Electrolytes, BP,  volume status acceptible  - pt may require HD at some point given severity of his renal disease    Anemia: +CKD  - iron stores low 9% cont IV iron  - check stool for OB  - cont NATHAN  - monitor H/H

## 2017-04-07 NOTE — PROGRESS NOTE ADULT - SUBJECTIVE AND OBJECTIVE BOX
NEPHROLOGY INTERVAL HPI/OVERNIGHT EVENTS:    Examined earlier  Looks comfortable  UOP 2425cc/ 24 hrs    MEDICATIONS  (STANDING):  atorvastatin 40milliGRAM(s) Oral at bedtime  folic acid 1milliGRAM(s) Oral daily  saccharomyces boulardii 250milliGRAM(s) Oral two times a day  insulin lispro (HumaLOG) corrective regimen sliding scale  SubCutaneous three times a day before meals  dextrose 5%. 1000milliLiter(s) IV Continuous <Continuous>  dextrose 50% Injectable 12.5Gram(s) IV Push once  dextrose 50% Injectable 25Gram(s) IV Push once  dextrose 50% Injectable 25Gram(s) IV Push once  carvedilol 6.25milliGRAM(s) Oral every 12 hours  furosemide   Injectable 40milliGRAM(s) IV Push two times a day  aspirin 325milliGRAM(s) Oral daily  isosorbide   dinitrate Tablet (ISORDIL) 10milliGRAM(s) Oral three times a day  hydrALAZINE 10milliGRAM(s) Oral every 8 hours  influenza   Vaccine 0.5milliLiter(s) IntraMuscular once  epoetin una Injectable [id number](s) SubCutaneous <User Schedule>  iron sucrose IVPB 100milliGRAM(s) IV Intermittent every 12 hours  insulin glargine Injectable (LANTUS) 15Unit(s) SubCutaneous at bedtime    MEDICATIONS  (PRN):  dextrose Gel 1Dose(s) Oral once PRN Blood Glucose LESS THAN 70 milliGRAM(s)/deciliter  glucagon  Injectable 1milliGRAM(s) IntraMuscular once PRN Glucose LESS THAN 70 milligrams/deciliter      Allergies    No Known Allergies    Intolerances        Vital Signs Last 24 Hrs  T(C): 36.8, Max: 38.2 (- @ 17:10)  T(F): 98.2, Max: 100.7 (- @ 17:10)  HR: 90 (70 - 90)  BP: 136/58 (110/68 - 136/58)  BP(mean): --  RR: 18 (16 - 18)  SpO2: 100% (100% - 100%)  Daily     Daily Weight in k.4 (2017 05:30)    PHYSICAL EXAM:  GENERAL: NAD  HEAD:  NCAT  EYES: EOMI  NECK: Supple, No JVD  NERVOUS SYSTEM:  Alert & Oriented X3  CHEST/LUNG:  decreased BS at bases  HEART: Regular rate and rhythm; No rub  ABDOMEN: Soft, Nontender, Nondistended  EXTREMITIES: + edema stable        LABS:                        7.6    5.0   )-----------( 77       ( 2017 07:23 )             24.9         139  |  99  |  89.0<H>  ----------------------------<  146<H>  4.2   |  28.0  |  3.90<H>    Ca    9.3      2017 07:23  Phos  4.4       Mg     2.1         TPro  6.9  /  Alb  3.4  /  TBili  0.3<L>  /  DBili  x   /  AST  15  /  ALT  14  /  AlkPhos  59          Magnesium, Serum: 2.1 mg/dL ( @ 07:23)  Phosphorus Level, Serum: 4.4 mg/dL ( @ 07:23)          RADIOLOGY & ADDITIONAL TESTS:

## 2017-04-07 NOTE — PROGRESS NOTE ADULT - PROBLEM SELECTOR PLAN 1
ct diuresis. ct hydralazine and isordil. initate low dose lisinopril 5 mg daily. Cr stable.  change IV lasix to Bemetanide 2 mg Q12. (double home dose)

## 2017-04-08 LAB
ALBUMIN SERPL ELPH-MCNC: 3.6 G/DL — SIGNIFICANT CHANGE UP (ref 3.3–5.2)
ALP SERPL-CCNC: 57 U/L — SIGNIFICANT CHANGE UP (ref 40–120)
ALT FLD-CCNC: 14 U/L — SIGNIFICANT CHANGE UP
ANION GAP SERPL CALC-SCNC: 14 MMOL/L — SIGNIFICANT CHANGE UP (ref 5–17)
AST SERPL-CCNC: 16 U/L — SIGNIFICANT CHANGE UP
BASOPHILS # BLD AUTO: 0 K/UL — SIGNIFICANT CHANGE UP (ref 0–0.2)
BASOPHILS NFR BLD AUTO: 0.4 % — SIGNIFICANT CHANGE UP (ref 0–2)
BILIRUB SERPL-MCNC: 0.2 MG/DL — LOW (ref 0.4–2)
BUN SERPL-MCNC: 88 MG/DL — HIGH (ref 8–20)
CALCIUM SERPL-MCNC: 9 MG/DL — SIGNIFICANT CHANGE UP (ref 8.6–10.2)
CHLORIDE SERPL-SCNC: 100 MMOL/L — SIGNIFICANT CHANGE UP (ref 98–107)
CO2 SERPL-SCNC: 27 MMOL/L — SIGNIFICANT CHANGE UP (ref 22–29)
CREAT SERPL-MCNC: 3.76 MG/DL — HIGH (ref 0.5–1.3)
EOSINOPHIL # BLD AUTO: 0.2 K/UL — SIGNIFICANT CHANGE UP (ref 0–0.5)
EOSINOPHIL NFR BLD AUTO: 4.5 % — SIGNIFICANT CHANGE UP (ref 0–5)
GLUCOSE SERPL-MCNC: 54 MG/DL — LOW (ref 70–115)
HCT VFR BLD CALC: 26.5 % — LOW (ref 42–52)
HGB BLD-MCNC: 8 G/DL — LOW (ref 14–18)
INTERPRETATION SERPL IFE-IMP: SIGNIFICANT CHANGE UP
LYMPHOCYTES # BLD AUTO: 1.4 K/UL — SIGNIFICANT CHANGE UP (ref 1–4.8)
LYMPHOCYTES # BLD AUTO: 29.4 % — SIGNIFICANT CHANGE UP (ref 20–55)
MAGNESIUM SERPL-MCNC: 2.3 MG/DL — SIGNIFICANT CHANGE UP (ref 1.8–2.5)
MCHC RBC-ENTMCNC: 25.9 PG — LOW (ref 27–31)
MCHC RBC-ENTMCNC: 30.2 G/DL — LOW (ref 32–36)
MCV RBC AUTO: 85.8 FL — SIGNIFICANT CHANGE UP (ref 80–94)
MONOCYTES # BLD AUTO: 0.5 K/UL — SIGNIFICANT CHANGE UP (ref 0–0.8)
MONOCYTES NFR BLD AUTO: 11 % — HIGH (ref 3–10)
NEUTROPHILS # BLD AUTO: 2.5 K/UL — SIGNIFICANT CHANGE UP (ref 1.8–8)
NEUTROPHILS NFR BLD AUTO: 54.5 % — SIGNIFICANT CHANGE UP (ref 37–73)
PHOSPHATE SERPL-MCNC: 4.2 MG/DL — SIGNIFICANT CHANGE UP (ref 2.4–4.7)
PLATELET # BLD AUTO: 87 K/UL — LOW (ref 150–400)
POTASSIUM SERPL-MCNC: 4 MMOL/L — SIGNIFICANT CHANGE UP (ref 3.5–5.3)
POTASSIUM SERPL-SCNC: 4 MMOL/L — SIGNIFICANT CHANGE UP (ref 3.5–5.3)
PROT SERPL-MCNC: 7.1 G/DL — SIGNIFICANT CHANGE UP (ref 6.6–8.7)
RBC # BLD: 3.09 M/UL — LOW (ref 4.6–6.2)
RBC # FLD: 16.1 % — HIGH (ref 11–15.6)
SODIUM SERPL-SCNC: 141 MMOL/L — SIGNIFICANT CHANGE UP (ref 135–145)
WBC # BLD: 4.6 K/UL — LOW (ref 4.8–10.8)
WBC # FLD AUTO: 4.6 K/UL — LOW (ref 4.8–10.8)

## 2017-04-08 PROCEDURE — 99233 SBSQ HOSP IP/OBS HIGH 50: CPT | Mod: GC

## 2017-04-08 RX ORDER — IRON SUCROSE 20 MG/ML
200 INJECTION, SOLUTION INTRAVENOUS EVERY 24 HOURS
Qty: 0 | Refills: 0 | Status: DISCONTINUED | OUTPATIENT
Start: 2017-04-08 | End: 2017-04-08

## 2017-04-08 RX ORDER — CEFTRIAXONE 500 MG/1
1 INJECTION, POWDER, FOR SOLUTION INTRAMUSCULAR; INTRAVENOUS ONCE
Qty: 0 | Refills: 0 | Status: COMPLETED | OUTPATIENT
Start: 2017-04-08 | End: 2017-04-08

## 2017-04-08 RX ORDER — IRON SUCROSE 20 MG/ML
200 INJECTION, SOLUTION INTRAVENOUS DAILY
Qty: 0 | Refills: 0 | Status: DISCONTINUED | OUTPATIENT
Start: 2017-04-09 | End: 2017-04-10

## 2017-04-08 RX ORDER — BUMETANIDE 0.25 MG/ML
2 INJECTION INTRAMUSCULAR; INTRAVENOUS EVERY 12 HOURS
Qty: 0 | Refills: 0 | Status: DISCONTINUED | OUTPATIENT
Start: 2017-04-08 | End: 2017-04-11

## 2017-04-08 RX ORDER — IRON SUCROSE 20 MG/ML
100 INJECTION, SOLUTION INTRAVENOUS ONCE
Qty: 0 | Refills: 0 | Status: COMPLETED | OUTPATIENT
Start: 2017-04-08 | End: 2017-04-08

## 2017-04-08 RX ORDER — DEXTROSE 50 % IN WATER 50 %
1 SYRINGE (ML) INTRAVENOUS ONCE
Qty: 0 | Refills: 0 | Status: COMPLETED | OUTPATIENT
Start: 2017-04-08 | End: 2017-04-08

## 2017-04-08 RX ORDER — DEXTROSE 50 % IN WATER 50 %
1 SYRINGE (ML) INTRAVENOUS ONCE
Qty: 0 | Refills: 0 | Status: DISCONTINUED | OUTPATIENT
Start: 2017-04-08 | End: 2017-04-11

## 2017-04-08 RX ORDER — LISINOPRIL 2.5 MG/1
5 TABLET ORAL DAILY
Qty: 0 | Refills: 0 | Status: DISCONTINUED | OUTPATIENT
Start: 2017-04-08 | End: 2017-04-11

## 2017-04-08 RX ORDER — CEFTRIAXONE 500 MG/1
1 INJECTION, POWDER, FOR SOLUTION INTRAMUSCULAR; INTRAVENOUS EVERY 24 HOURS
Qty: 0 | Refills: 0 | Status: DISCONTINUED | OUTPATIENT
Start: 2017-04-09 | End: 2017-04-09

## 2017-04-08 RX ORDER — SACCHAROMYCES BOULARDII 250 MG
250 POWDER IN PACKET (EA) ORAL
Qty: 0 | Refills: 0 | Status: DISCONTINUED | OUTPATIENT
Start: 2017-04-08 | End: 2017-04-11

## 2017-04-08 RX ORDER — INSULIN LISPRO 100/ML
VIAL (ML) SUBCUTANEOUS
Qty: 0 | Refills: 0 | Status: DISCONTINUED | OUTPATIENT
Start: 2017-04-08 | End: 2017-04-11

## 2017-04-08 RX ORDER — INSULIN GLARGINE 100 [IU]/ML
10 INJECTION, SOLUTION SUBCUTANEOUS AT BEDTIME
Qty: 0 | Refills: 0 | Status: DISCONTINUED | OUTPATIENT
Start: 2017-04-08 | End: 2017-04-09

## 2017-04-08 RX ORDER — CEFTRIAXONE 500 MG/1
INJECTION, POWDER, FOR SOLUTION INTRAMUSCULAR; INTRAVENOUS
Qty: 0 | Refills: 0 | Status: DISCONTINUED | OUTPATIENT
Start: 2017-04-08 | End: 2017-04-09

## 2017-04-08 RX ADMIN — BUMETANIDE 2 MILLIGRAM(S): 0.25 INJECTION INTRAMUSCULAR; INTRAVENOUS at 18:08

## 2017-04-08 RX ADMIN — Medication 1 DROP(S): at 21:30

## 2017-04-08 RX ADMIN — Medication 250 MILLIGRAM(S): at 06:34

## 2017-04-08 RX ADMIN — Medication 325 MILLIGRAM(S): at 13:05

## 2017-04-08 RX ADMIN — ISOSORBIDE DINITRATE 10 MILLIGRAM(S): 5 TABLET ORAL at 09:15

## 2017-04-08 RX ADMIN — Medication 1 UNIT(S): at 23:15

## 2017-04-08 RX ADMIN — INSULIN GLARGINE 10 UNIT(S): 100 INJECTION, SOLUTION SUBCUTANEOUS at 21:47

## 2017-04-08 RX ADMIN — Medication 10 MILLIGRAM(S): at 06:34

## 2017-04-08 RX ADMIN — ATORVASTATIN CALCIUM 40 MILLIGRAM(S): 80 TABLET, FILM COATED ORAL at 21:31

## 2017-04-08 RX ADMIN — Medication 1 DOSE(S): at 09:15

## 2017-04-08 RX ADMIN — Medication 250 MILLIGRAM(S): at 18:08

## 2017-04-08 RX ADMIN — Medication 2: at 21:47

## 2017-04-08 RX ADMIN — IRON SUCROSE 210 MILLIGRAM(S): 20 INJECTION, SOLUTION INTRAVENOUS at 21:32

## 2017-04-08 RX ADMIN — Medication 10 MILLIGRAM(S): at 13:05

## 2017-04-08 RX ADMIN — Medication 2: at 18:08

## 2017-04-08 RX ADMIN — ISOSORBIDE DINITRATE 10 MILLIGRAM(S): 5 TABLET ORAL at 21:31

## 2017-04-08 RX ADMIN — ISOSORBIDE DINITRATE 10 MILLIGRAM(S): 5 TABLET ORAL at 00:53

## 2017-04-08 RX ADMIN — IRON SUCROSE 210 MILLIGRAM(S): 20 INJECTION, SOLUTION INTRAVENOUS at 09:14

## 2017-04-08 RX ADMIN — CARVEDILOL PHOSPHATE 6.25 MILLIGRAM(S): 80 CAPSULE, EXTENDED RELEASE ORAL at 18:08

## 2017-04-08 RX ADMIN — Medication 1 MILLIGRAM(S): at 13:05

## 2017-04-08 RX ADMIN — CARVEDILOL PHOSPHATE 6.25 MILLIGRAM(S): 80 CAPSULE, EXTENDED RELEASE ORAL at 06:34

## 2017-04-08 RX ADMIN — Medication 10 MILLIGRAM(S): at 21:31

## 2017-04-08 RX ADMIN — Medication 40 MILLIGRAM(S): at 06:34

## 2017-04-08 RX ADMIN — CEFTRIAXONE 100 GRAM(S): 500 INJECTION, POWDER, FOR SOLUTION INTRAMUSCULAR; INTRAVENOUS at 15:59

## 2017-04-08 RX ADMIN — Medication 2: at 13:05

## 2017-04-08 RX ADMIN — ISOSORBIDE DINITRATE 10 MILLIGRAM(S): 5 TABLET ORAL at 13:05

## 2017-04-08 NOTE — PROGRESS NOTE ADULT - PROBLEM SELECTOR PLAN 4
2/2 E.Coli. Asymptomatic, no leukocytosis  c/w cefTRIAXone   IVPB 1Gram(s) IV Intermittent every 24 hours Day#3 and Florastor 2/2 E.Coli. Resolved. Completed ABX course. Will continue to monitor 2/2 E.Coli. Completed ABX course for 3 days. Due to he, h/o DM/htn/ckd - would tx this patient as complicated in the hospital as cx are positive, add rocephin for a few more days to complete 5-7 days of tx   + florastor

## 2017-04-08 NOTE — PROGRESS NOTE ADULT - PROBLEM SELECTOR PLAN 1
stable, no edema, CTA, pt is continuing to improve clinically  BNP 70105 on 04/05/17, will repeat in AM  c/w carvedilol 6.25milliGRAM(s) Oral every 12 hours  cardio recommends changing IV lasix to Bemetanide stable, no edema, CTA, pt is continuing to improve clinically  BNP 92650 on 04/05/17, will repeat in AM  c/w carvedilol 6.25milliGRAM(s) Oral every 12 hours  changing IV lasix to Bemetanide as per Cardio stable, no edema, CTA, pt is continuing to improve clinically  BNP 17261 on 04/05/17, will repeat in AM. I/O are in negative balance and weight is trending down also   c/w carvedilol 6.25milliGRAM(s) Oral every 12 hours  changing IV lasix to Bemetanide as per Cardio  per cardio rec - start a low dose of ace - added and will monitor cr  -plan for stress test on monday

## 2017-04-08 NOTE — PROGRESS NOTE ADULT - PROBLEM SELECTOR PLAN 6
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 Will trend  c/w ferrous    sulfate 325milliGRAM(s) Oral daily  folic acid 1milliGRAM(s) Oral daily likely AOCD. Will trend  c/w ferrous    sulfate 325milliGRAM(s) Oral daily  folic acid 1milliGRAM(s) Oral daily  Procrit likely AOCD. Will trend  c/w ferrous    sulfate 325milliGRAM(s) Oral daily  folic acid 1milliGRAM(s) Oral daily  patient was given IV iron also 100mg x 3 doses - d/w renal   Procrit to continue    PANCYTOPENIA - likely from acute infection - will monitor

## 2017-04-08 NOTE — PROGRESS NOTE ADULT - SUBJECTIVE AND OBJECTIVE BOX
Patient is a 87y old  Male who presents with a chief complaint of SOB and leg swelling (2017 01:08)      INTERVAL HPI/OVERNIGHT EVENTS:  87y  Male seen at bedside. No events overnight. Pt reports no complaints, ambulating, voiding, + bowel movement, decreased appetite. Denies any pain, dysuria, fever, chills.          Vital Signs Last 24 Hrs  T(C): 36.8, Max: 36.8 ( @ 13:05)  T(F): 98.3, Max: 98.3 ( @ 06:28)  HR: 85 (70 - 90)  BP: 144/72 (120/50 - 150/74)  BP(mean): --  RR: 18 (18 - 18)  SpO2: 96% (96% - 100%)      Daily     Daily Weight in k.1 (2017 05:00)  I&O's Detail    I & Os for current day (as of 2017 07:07)  =============================================  IN:    Oral Fluid: 480 ml    Total IN: 480 ml  ---------------------------------------------  OUT:    Indwelling Catheter - Urethral: 250 ml    Total OUT: 250 ml  ---------------------------------------------  Total NET: 230 ml          REVIEW OF SYSTEMS:    negative except as noted above      PHYSICAL EXAM:    GENERAL: NAD, well-groomed, well-developed  HEAD:  Atraumatic, Normocephalic  EYES: EOMI, PERRLA, conjunctiva and sclera clear  ENMT: No tonsillar erythema, exudates, or enlargement; Moist mucous membranes, Good dentition, No lesions  NECK: Supple, No JVD, Normal thyroid  NERVOUS SYSTEM:  Alert & Oriented X3, Good concentration; Motor Strength 5/5 B/L upper and lower extremities; DTRs 2+ intact and symmetric  CHEST/LUNG: Clear to percussion bilaterally; No rales, rhonchi, wheezing, or rubs  HEART: Regular rate and rhythm; No murmurs, rubs, or gallops  ABDOMEN: Soft, Nontender, Nondistended; Bowel sounds present  EXTREMITIES:  2+ Peripheral Pulses, No clubbing, cyanosis, or edema  LYMPH: No lymphadenopathy noted  RECTAL: No masses, prostate normal size and smooth, Guiac negative   BREAST: No palpatble masses, skin no lesions no retractions, no discharages. adenexa no palpable masses noted   GYN: uterus normal size, adenexa no palpable masses, no CMT, no uterine discharge   SKIN: No rashes or lesions      LABS:                        7.6    5.0   )-----------( 77       ( 2017 07:23 )             24.9     CBC Full  -  ( 2017 07:23 )  WBC Count : 5.0 K/uL  Hemoglobin : 7.6 g/dL  Hematocrit : 24.9 %  Platelet Count - Automated : 77 K/uL  Mean Cell Volume : 85.6 fl  Mean Cell Hemoglobin : 26.1 pg  Mean Cell Hemoglobin Concentration : 30.5 g/dL  Auto Neutrophil # : 2.9 K/uL  Auto Lymphocyte # : 1.3 K/uL  Auto Monocyte # : 0.6 K/uL  Auto Eosinophil # : 0.2 K/uL  Auto Basophil # : 0.0 K/uL  Auto Neutrophil % : 58.9 %  Auto Lymphocyte % : 25.6 %  Auto Monocyte % : 11.9 %  Auto Eosinophil % : 3.4 %  Auto Basophil % : 0.2 %        139  |  99  |  89.0<H>  ----------------------------<  146<H>  4.2   |  28.0  |  3.90<H>    Ca    9.3      2017 07:23  Phos  4.4     -  Mg     2.1         TPro  6.9  /  Alb  3.4  /  TBili  0.3<L>  /  DBili  x   /  AST  15  /  ALT  14  /  AlkPhos  59          Hemoglobin A1C, Whole Blood: 7.0 % ( @ 07:33)    Culture Results:   >100,000 CFU/ml Escherichia coli ( @ 23:46)        Albumin, Serum: 3.4 g/dL ( @ 07:23)    LIVER FUNCTIONS - ( 2017 07:23 )  Alb: 3.4 g/dL / Pro: 6.9 g/dL / ALK PHOS: 59 U/L / ALT: 14 U/L / AST: 15 U/L / GGT: x             RADIOLOGY & ADDITIONAL TESTS:    MEDICATIONS    ANTIMICROBIAL:    CARDIOVASCULAR:  carvedilol 6.25milliGRAM(s) Oral every 12 hours  furosemide   Injectable 40milliGRAM(s) IV Push two times a day  isosorbide   dinitrate Tablet (ISORDIL) 10milliGRAM(s) Oral three times a day  hydrALAZINE 10milliGRAM(s) Oral every 8 hours    PULMONARY:    NEUROLOGIC:  aspirin 325milliGRAM(s) Oral daily    ONCOLOGIC:    HEMATOLOGIC:    GATROINTESTINAL:     MEDS:    ENDO/METABOLIC:  atorvastatin 40milliGRAM(s) Oral at bedtime  insulin lispro (HumaLOG) corrective regimen sliding scale  SubCutaneous three times a day before meals  dextrose Gel 1Dose(s) Oral once PRN  dextrose 50% Injectable 12.5Gram(s) IV Push once  dextrose 50% Injectable 25Gram(s) IV Push once  dextrose 50% Injectable 25Gram(s) IV Push once  glucagon  Injectable 1milliGRAM(s) IntraMuscular once PRN  insulin glargine Injectable (LANTUS) 15Unit(s) SubCutaneous at bedtime    IV FLUID/NUTRITION:  folic acid 1milliGRAM(s) Oral daily  dextrose 5%. 1000milliLiter(s) IV Continuous <Continuous>  iron sucrose IVPB 100milliGRAM(s) IV Intermittent every 12 hours    TOPICAL:    IMMUNOLOGIC & OTHER  saccharomyces boulardii 250milliGRAM(s) Oral two times a day  influenza   Vaccine 0.5milliLiter(s) IntraMuscular once  epoetin una Injectable [id number](s) SubCutaneous <User Schedule>      Allergies    No Known Allergies    Intolerances Patient is a 87y old  Male who presents with a chief complaint of SOB and leg swelling (2017 01:08)      INTERVAL HPI/OVERNIGHT EVENTS:  87y  Male seen at bedside. No events overnight. Pt reports no complaints, ambulating, voiding, + bowel movement, decreased appetite. Denies any pain, dysuria, fever, chills.          Vital Signs Last 24 Hrs  T(C): 36.8, Max: 36.8 ( @ 13:05)  T(F): 98.3, Max: 98.3 ( @ 06:28)  HR: 85 (70 - 90)  BP: 144/72 (120/50 - 150/74)  BP(mean): --  RR: 18 (18 - 18)  SpO2: 96% (96% - 100%)      Daily     Daily Weight in k.1 (2017 05:00)  I&O's Detail    I & Os for current day (as of 2017 07:07)  =============================================  IN:    Oral Fluid: 480 ml    Total IN: 480 ml  ---------------------------------------------  OUT:    Indwelling Catheter - Urethral: 250 ml    Total OUT: 250 ml  ---------------------------------------------  Total NET: 230 ml          REVIEW OF SYSTEMS:    negative except as noted above      PHYSICAL EXAM:    GENERAL: NAD, well-groomed, well-developed  HEAD:  Atraumatic, Normocephalic  EYES: EOMI, PERRLA, conjunctiva and sclera clear  ENMT: No tonsillar erythema, exudates, or enlargement; Moist mucous membranes, Good dentition, No lesions  NECK: Supple, No JVD, Normal thyroid  NERVOUS SYSTEM:  Alert & Oriented X3, Good concentration; Motor Strength 5/5 B/L upper and lower extremities  CHEST/LUNG: Clear to percussion bilaterally; No rales, rhonchi, wheezing, or rubs  HEART: Regular rate and rhythm; No murmurs, rubs, or gallops  ABDOMEN: Soft, Nontender, Nondistended; Bowel sounds present  EXTREMITIES:  2+ Peripheral Pulses, No clubbing, cyanosis, or edema  SKIN: No rashes or lesions      LABS:                        7.6    5.0   )-----------( 77       ( 2017 07:23 )             24.9     CBC Full  -  ( 2017 07:23 )  WBC Count : 5.0 K/uL  Hemoglobin : 7.6 g/dL  Hematocrit : 24.9 %  Platelet Count - Automated : 77 K/uL  Mean Cell Volume : 85.6 fl  Mean Cell Hemoglobin : 26.1 pg  Mean Cell Hemoglobin Concentration : 30.5 g/dL  Auto Neutrophil # : 2.9 K/uL  Auto Lymphocyte # : 1.3 K/uL  Auto Monocyte # : 0.6 K/uL  Auto Eosinophil # : 0.2 K/uL  Auto Basophil # : 0.0 K/uL  Auto Neutrophil % : 58.9 %  Auto Lymphocyte % : 25.6 %  Auto Monocyte % : 11.9 %  Auto Eosinophil % : 3.4 %  Auto Basophil % : 0.2 %        139  |  99  |  89.0<H>  ----------------------------<  146<H>  4.2   |  28.0  |  3.90<H>    Ca    9.3      2017 07:23  Phos  4.4       Mg     2.1         TPro  6.9  /  Alb  3.4  /  TBili  0.3<L>  /  DBili  x   /  AST  15  /  ALT  14  /  AlkPhos  59  07        Hemoglobin A1C, Whole Blood: 7.0 % ( @ 07:33)    Culture Results:   >100,000 CFU/ml Escherichia coli ( @ 23:46)        Albumin, Serum: 3.4 g/dL ( @ 07:23)    LIVER FUNCTIONS - ( 2017 07:23 )  Alb: 3.4 g/dL / Pro: 6.9 g/dL / ALK PHOS: 59 U/L / ALT: 14 U/L / AST: 15 U/L / GGT: x             RADIOLOGY & ADDITIONAL TESTS:    MEDICATIONS    ANTIMICROBIAL:    CARDIOVASCULAR:  carvedilol 6.25milliGRAM(s) Oral every 12 hours  furosemide   Injectable 40milliGRAM(s) IV Push two times a day  isosorbide   dinitrate Tablet (ISORDIL) 10milliGRAM(s) Oral three times a day  hydrALAZINE 10milliGRAM(s) Oral every 8 hours    PULMONARY:    NEUROLOGIC:  aspirin 325milliGRAM(s) Oral daily    ONCOLOGIC:    HEMATOLOGIC:    GATROINTESTINAL:     MEDS:    ENDO/METABOLIC:  atorvastatin 40milliGRAM(s) Oral at bedtime  insulin lispro (HumaLOG) corrective regimen sliding scale  SubCutaneous three times a day before meals  dextrose Gel 1Dose(s) Oral once PRN  dextrose 50% Injectable 12.5Gram(s) IV Push once  dextrose 50% Injectable 25Gram(s) IV Push once  dextrose 50% Injectable 25Gram(s) IV Push once  glucagon  Injectable 1milliGRAM(s) IntraMuscular once PRN  insulin glargine Injectable (LANTUS) 15Unit(s) SubCutaneous at bedtime    IV FLUID/NUTRITION:  folic acid 1milliGRAM(s) Oral daily  dextrose 5%. 1000milliLiter(s) IV Continuous <Continuous>  iron sucrose IVPB 100milliGRAM(s) IV Intermittent every 12 hours    TOPICAL:    IMMUNOLOGIC & OTHER  saccharomyces boulardii 250milliGRAM(s) Oral two times a day  influenza   Vaccine 0.5milliLiter(s) IntraMuscular once  epoetin una Injectable [id number](s) SubCutaneous <User Schedule>      Allergies    No Known Allergies    Intolerances Patient is a 87y old  Male who presents with a chief complaint of SOB and leg swelling (05 Apr 2017 01:08)      INTERVAL HPI/OVERNIGHT EVENTS:  87y  Male seen and examined at bedside. No events overnight. Pt reports no complaints, ambulating, voiding, + bowel movement, decreased appetite. Denies any pain, dysuria, fever, chills. Sleepy when seen, apparently he has a reverse sleep/wake cycle.     Vital Signs Last 24 Hrs  T(C): 36.8, Max: 36.8 (04-07 @ 13:05)  T(F): 98.3, Max: 98.3 (04-08 @ 06:28)  HR: 85 (70 - 90)  BP: 144/72 (120/50 - 150/74)  BP(mean): --  RR: 18 (18 - 18)  SpO2: 96% (96% - 100%)        REVIEW OF SYSTEMS:    negative except as noted above      PHYSICAL EXAM:    GENERAL: NAD, well-groomed, well-developed  HEAD:  Atraumatic, Normocephalic  EYES: EOMI, PERRLA, conjunctiva and sclera clear  ENMT: No tonsillar erythema, exudates, or enlargement; Moist mucous membranes, Good dentition, No lesions  NECK: Supple, No JVD, Normal thyroid  NERVOUS SYSTEM:  Alert & Oriented X3, Good concentration; Motor Strength 5/5 B/L upper and lower extremities  CHEST/LUNG: Clear to percussion bilaterally; No rales, rhonchi, wheezing, or rubs  HEART: Regular rate and rhythm; No murmurs, rubs, or gallops  ABDOMEN: Soft, Nontender, Nondistended; Bowel sounds present  EXTREMITIES:  2+ Peripheral Pulses, No clubbing, cyanosis, or edema  SKIN: No rashes or lesions      LABS:                        7.6    5.0   )-----------( 77       ( 07 Apr 2017 07:23 )             24.9     CBC Full  -  ( 07 Apr 2017 07:23 )  WBC Count : 5.0 K/uL  Hemoglobin : 7.6 g/dL  Hematocrit : 24.9 %  Platelet Count - Automated : 77 K/uL  Mean Cell Volume : 85.6 fl  Mean Cell Hemoglobin : 26.1 pg  Mean Cell Hemoglobin Concentration : 30.5 g/dL  Auto Neutrophil # : 2.9 K/uL  Auto Lymphocyte # : 1.3 K/uL  Auto Monocyte # : 0.6 K/uL  Auto Eosinophil # : 0.2 K/uL  Auto Basophil # : 0.0 K/uL  Auto Neutrophil % : 58.9 %  Auto Lymphocyte % : 25.6 %  Auto Monocyte % : 11.9 %  Auto Eosinophil % : 3.4 %  Auto Basophil % : 0.2 %    04-07    139  |  99  |  89.0<H>  ----------------------------<  146<H>  4.2   |  28.0  |  3.90<H>    Ca    9.3      07 Apr 2017 07:23  Phos  4.4     04-07  Mg     2.1     04-07    TPro  6.9  /  Alb  3.4  /  TBili  0.3<L>  /  DBili  x   /  AST  15  /  ALT  14  /  AlkPhos  59  04-07        Hemoglobin A1C, Whole Blood: 7.0 % (04-06 @ 07:33)    Culture Results:   >100,000 CFU/ml Escherichia coli (04-04 @ 23:46)        Albumin, Serum: 3.4 g/dL (04-07 @ 07:23)    LIVER FUNCTIONS - ( 07 Apr 2017 07:23 )  Alb: 3.4 g/dL / Pro: 6.9 g/dL / ALK PHOS: 59 U/L / ALT: 14 U/L / AST: 15 U/L / GGT: x             RADIOLOGY & ADDITIONAL TESTS:    MEDICATIONS    ANTIMICROBIAL:    CARDIOVASCULAR:  carvedilol 6.25milliGRAM(s) Oral every 12 hours  furosemide   Injectable 40milliGRAM(s) IV Push two times a day  isosorbide   dinitrate Tablet (ISORDIL) 10milliGRAM(s) Oral three times a day  hydrALAZINE 10milliGRAM(s) Oral every 8 hours    PULMONARY:    NEUROLOGIC:  aspirin 325milliGRAM(s) Oral daily    ONCOLOGIC:    HEMATOLOGIC:    GATROINTESTINAL:     MEDS:    ENDO/METABOLIC:  atorvastatin 40milliGRAM(s) Oral at bedtime  insulin lispro (HumaLOG) corrective regimen sliding scale  SubCutaneous three times a day before meals  dextrose Gel 1Dose(s) Oral once PRN  dextrose 50% Injectable 12.5Gram(s) IV Push once  dextrose 50% Injectable 25Gram(s) IV Push once  dextrose 50% Injectable 25Gram(s) IV Push once  glucagon  Injectable 1milliGRAM(s) IntraMuscular once PRN  insulin glargine Injectable (LANTUS) 15Unit(s) SubCutaneous at bedtime    IV FLUID/NUTRITION:  folic acid 1milliGRAM(s) Oral daily  dextrose 5%. 1000milliLiter(s) IV Continuous <Continuous>  iron sucrose IVPB 100milliGRAM(s) IV Intermittent every 12 hours    TOPICAL:    IMMUNOLOGIC & OTHER  saccharomyces boulardii 250milliGRAM(s) Oral two times a day  influenza   Vaccine 0.5milliLiter(s) IntraMuscular once  epoetin una Injectable [id number](s) SubCutaneous <User Schedule>      Allergies    No Known Allergies    Intolerances

## 2017-04-08 NOTE — PROGRESS NOTE ADULT - PROBLEM SELECTOR PLAN 5
likely 2/2 to Acute on Chronic Renal Failure, repeat on 04/05/17 was 0.06. likely 2/2 to Acute on Chronic Renal Failure, repeat on 04/05/17 was 0.06. No CP, SOB. likely 2/2 to Acute on Chronic Renal Failure, repeat on 04/05/17 was 0.06. No CP, SOB.  plan for stress test on monday

## 2017-04-08 NOTE — PROGRESS NOTE ADULT - ASSESSMENT
CKD IV: decompensated CHF Cr stable today 3.7  - continue diuretics to keep azotemic==> consider change to PO dosing at cardiology's discretion  - norwood catheter - daily weights and monitor labs  - Electrolytes, BP,  volume status acceptible  - pt may require HD at some point given severity of his renal disease    Anemia: +CKD  - iron stores low 9% cont IV iron  - check stool for OB  - cont NATHAN  - monitor H/H

## 2017-04-08 NOTE — PROGRESS NOTE ADULT - PROBLEM SELECTOR PLAN 7
HgbA1C= 7.  Pt has been intermittently refusing insulin  Accuchecks: 255  192  313  c/w HISS HgbA1C= 7.  Pt has been intermittently refusing insulin  Accuchecks: 67, 151, 331, 242  c/w HISS and Lantus 15mg QHS  will consider adding pre-meal insulin HgbA1C= 7.  Pt has been intermittently refusing insulin  Accuchecks: 67, 151, 331, 242  c/w HISS and Lantus 15mg QHS - would decrease bedtime lantus as am sugar was low. Asymptomatic. reqd 22 units of insulin the night prior OR can give midnight snack to prevent early am hypoglycemia and leave lantus dose as subsequent accucheck wa sover 200   will consider adding pre-meal insulin

## 2017-04-08 NOTE — PROGRESS NOTE ADULT - SUBJECTIVE AND OBJECTIVE BOX
NEPHROLOGY INTERVAL HPI/OVERNIGHT EVENTS:    Examined earlier  Looks comfortable    MEDICATIONS  (STANDING):  atorvastatin 40milliGRAM(s) Oral at bedtime  folic acid 1milliGRAM(s) Oral daily  insulin lispro (HumaLOG) corrective regimen sliding scale  SubCutaneous three times a day before meals  dextrose 5%. 1000milliLiter(s) IV Continuous <Continuous>  dextrose 50% Injectable 12.5Gram(s) IV Push once  dextrose 50% Injectable 25Gram(s) IV Push once  dextrose 50% Injectable 25Gram(s) IV Push once  carvedilol 6.25milliGRAM(s) Oral every 12 hours  aspirin 325milliGRAM(s) Oral daily  isosorbide   dinitrate Tablet (ISORDIL) 10milliGRAM(s) Oral three times a day  hydrALAZINE 10milliGRAM(s) Oral every 8 hours  influenza   Vaccine 0.5milliLiter(s) IntraMuscular once  epoetin una Injectable [id number](s) SubCutaneous <User Schedule>  insulin glargine Injectable (LANTUS) 15Unit(s) SubCutaneous at bedtime  buMETAnide 2milliGRAM(s) Oral every 12 hours    MEDICATIONS  (PRN):  dextrose Gel 1Dose(s) Oral once PRN Blood Glucose LESS THAN 70 milliGRAM(s)/deciliter  glucagon  Injectable 1milliGRAM(s) IntraMuscular once PRN Glucose LESS THAN 70 milligrams/deciliter  dextrose Gel 1Dose(s) Oral once PRN Blood Glucose LESS THAN 70 milliGRAM(s)/deciliter      Allergies    No Known Allergies    Intolerances        Vital Signs Last 24 Hrs  T(C): 36.8, Max: 36.8 ( @ 13:05)  T(F): 98.3, Max: 98.3 ( @ 06:28)  HR: 85 (79 - 90)  BP: 120/80 (120/80 - 150/74)  BP(mean): --  RR: 14 (14 - 18)  SpO2: 94% (94% - 100%)  Daily     Daily Weight in k.1 (2017 07:35)    PHYSICAL EXAM:  GENERAL: NAD  HEAD:  NCAT  EYES: EOMI  NECK: Supple, No JVD  NERVOUS SYSTEM:  Alert & Oriented X3  CHEST/LUNG:  decreased BS at bases  HEART: Regular rate and rhythm; No rub  ABDOMEN: Soft, Nontender, Nondistended  EXTREMITIES: + edema stable          LABS:                        8.0    4.6   )-----------( 87       ( 2017 07:03 )             26.5         141  |  100  |  88.0<H>  ----------------------------<  54<L>  4.0   |  27.0  |  3.76<H>    Ca    9.0      2017 07:01  Phos  4.2       Mg     2.3         TPro  7.1  /  Alb  3.6  /  TBili  0.2<L>  /  DBili  x   /  AST  16  /  ALT  14  /  AlkPhos  57          Magnesium, Serum: 2.3 mg/dL ( @ 07:01)  Phosphorus Level, Serum: 4.2 mg/dL ( @ 07:01)  Vitamin D, 25-Hydroxy: 34.6 ng/mL ( @ 17:30)          RADIOLOGY & ADDITIONAL TESTS:

## 2017-04-08 NOTE — PROGRESS NOTE ADULT - PROBLEM SELECTOR PLAN 8
Stable, BP: 126/68  c/w carvedilol 6.25milliGRAM(s) Oral every 12 hours  furosemide   Injectable 40milliGRAM(s) IV Push two times a day  hydrALAZINE 10milliGRAM(s) Oral every 8 hours Stable, BP: 144/72 (120/50 - 150/74)  c/w carvedilol 6.25milliGRAM(s) Oral every 12 hours  furosemide   Injectable 40milliGRAM(s) IV Push two times a day  hydrALAZINE 10milliGRAM(s) Oral every 8 hours Stable, BP: 144/72 (120/50 - 150/74)  c/w meds as above

## 2017-04-09 ENCOUNTER — TRANSCRIPTION ENCOUNTER (OUTPATIENT)
Age: 82
End: 2017-04-09

## 2017-04-09 DIAGNOSIS — I25.10 ATHEROSCLEROTIC HEART DISEASE OF NATIVE CORONARY ARTERY WITHOUT ANGINA PECTORIS: ICD-10-CM

## 2017-04-09 DIAGNOSIS — I47.2 VENTRICULAR TACHYCARDIA: ICD-10-CM

## 2017-04-09 LAB
ALBUMIN SERPL ELPH-MCNC: 3.4 G/DL — SIGNIFICANT CHANGE UP (ref 3.3–5.2)
ALP SERPL-CCNC: 54 U/L — SIGNIFICANT CHANGE UP (ref 40–120)
ALT FLD-CCNC: 14 U/L — SIGNIFICANT CHANGE UP
ANION GAP SERPL CALC-SCNC: 14 MMOL/L — SIGNIFICANT CHANGE UP (ref 5–17)
ANISOCYTOSIS BLD QL: SLIGHT — SIGNIFICANT CHANGE UP
AST SERPL-CCNC: 19 U/L — SIGNIFICANT CHANGE UP
BASOPHILS # BLD AUTO: 0 K/UL — SIGNIFICANT CHANGE UP (ref 0–0.2)
BASOPHILS NFR BLD AUTO: 0.2 % — SIGNIFICANT CHANGE UP (ref 0–2)
BILIRUB SERPL-MCNC: 0.2 MG/DL — LOW (ref 0.4–2)
BUN SERPL-MCNC: 87 MG/DL — HIGH (ref 8–20)
CALCIUM SERPL-MCNC: 9.3 MG/DL — SIGNIFICANT CHANGE UP (ref 8.6–10.2)
CHLORIDE SERPL-SCNC: 99 MMOL/L — SIGNIFICANT CHANGE UP (ref 98–107)
CO2 SERPL-SCNC: 29 MMOL/L — SIGNIFICANT CHANGE UP (ref 22–29)
CREAT SERPL-MCNC: 3.88 MG/DL — HIGH (ref 0.5–1.3)
EOSINOPHIL # BLD AUTO: 0.2 K/UL — SIGNIFICANT CHANGE UP (ref 0–0.5)
EOSINOPHIL NFR BLD AUTO: 4.7 % — SIGNIFICANT CHANGE UP (ref 0–5)
GLUCOSE SERPL-MCNC: 49 MG/DL — CRITICAL LOW (ref 70–115)
HCT VFR BLD CALC: 25.4 % — LOW (ref 42–52)
HGB BLD-MCNC: 7.9 G/DL — LOW (ref 14–18)
HYPOCHROMIA BLD QL: SLIGHT — SIGNIFICANT CHANGE UP
LYMPHOCYTES # BLD AUTO: 1.4 K/UL — SIGNIFICANT CHANGE UP (ref 1–4.8)
LYMPHOCYTES # BLD AUTO: 29.4 % — SIGNIFICANT CHANGE UP (ref 20–55)
MACROCYTES BLD QL: SLIGHT — SIGNIFICANT CHANGE UP
MAGNESIUM SERPL-MCNC: 2.3 MG/DL — SIGNIFICANT CHANGE UP (ref 1.8–2.5)
MCHC RBC-ENTMCNC: 26.6 PG — LOW (ref 27–31)
MCHC RBC-ENTMCNC: 31.1 G/DL — LOW (ref 32–36)
MCV RBC AUTO: 85.5 FL — SIGNIFICANT CHANGE UP (ref 80–94)
MICROCYTES BLD QL: SLIGHT — SIGNIFICANT CHANGE UP
MONOCYTES # BLD AUTO: 0.6 K/UL — SIGNIFICANT CHANGE UP (ref 0–0.8)
MONOCYTES NFR BLD AUTO: 13.9 % — HIGH (ref 3–10)
NEUTROPHILS # BLD AUTO: 2.4 K/UL — SIGNIFICANT CHANGE UP (ref 1.8–8)
NEUTROPHILS NFR BLD AUTO: 51.6 % — SIGNIFICANT CHANGE UP (ref 37–73)
NRBC # BLD: 1 /100 — HIGH (ref 0–0)
NT-PROBNP SERPL-SCNC: HIGH PG/ML (ref 0–300)
OVALOCYTES BLD QL SMEAR: SLIGHT — SIGNIFICANT CHANGE UP
PHOSPHATE SERPL-MCNC: 4.2 MG/DL — SIGNIFICANT CHANGE UP (ref 2.4–4.7)
PLAT MORPH BLD: NORMAL — SIGNIFICANT CHANGE UP
PLATELET # BLD AUTO: 84 K/UL — LOW (ref 150–400)
PLATELET COUNT - ESTIMATE: ABNORMAL
POIKILOCYTOSIS BLD QL AUTO: SLIGHT — SIGNIFICANT CHANGE UP
POLYCHROMASIA BLD QL SMEAR: SLIGHT — SIGNIFICANT CHANGE UP
POTASSIUM SERPL-MCNC: 3.8 MMOL/L — SIGNIFICANT CHANGE UP (ref 3.5–5.3)
POTASSIUM SERPL-SCNC: 3.8 MMOL/L — SIGNIFICANT CHANGE UP (ref 3.5–5.3)
PROT SERPL-MCNC: 6.9 G/DL — SIGNIFICANT CHANGE UP (ref 6.6–8.7)
RBC # BLD: 2.97 M/UL — LOW (ref 4.6–6.2)
RBC # FLD: 16.1 % — HIGH (ref 11–15.6)
RBC BLD AUTO: ABNORMAL
SODIUM SERPL-SCNC: 142 MMOL/L — SIGNIFICANT CHANGE UP (ref 135–145)
WBC # BLD: 4.7 K/UL — LOW (ref 4.8–10.8)
WBC # FLD AUTO: 4.7 K/UL — LOW (ref 4.8–10.8)

## 2017-04-09 PROCEDURE — 99232 SBSQ HOSP IP/OBS MODERATE 35: CPT

## 2017-04-09 PROCEDURE — 99233 SBSQ HOSP IP/OBS HIGH 50: CPT | Mod: GC

## 2017-04-09 RX ORDER — CIPROFLOXACIN LACTATE 400MG/40ML
250 VIAL (ML) INTRAVENOUS EVERY 24 HOURS
Qty: 0 | Refills: 0 | Status: COMPLETED | OUTPATIENT
Start: 2017-04-09 | End: 2017-04-11

## 2017-04-09 RX ORDER — ASPIRIN/CALCIUM CARB/MAGNESIUM 324 MG
81 TABLET ORAL DAILY
Qty: 0 | Refills: 0 | Status: DISCONTINUED | OUTPATIENT
Start: 2017-04-09 | End: 2017-04-11

## 2017-04-09 RX ADMIN — ISOSORBIDE DINITRATE 10 MILLIGRAM(S): 5 TABLET ORAL at 21:28

## 2017-04-09 RX ADMIN — Medication 1: at 21:28

## 2017-04-09 RX ADMIN — BUMETANIDE 2 MILLIGRAM(S): 0.25 INJECTION INTRAMUSCULAR; INTRAVENOUS at 18:27

## 2017-04-09 RX ADMIN — ATORVASTATIN CALCIUM 40 MILLIGRAM(S): 80 TABLET, FILM COATED ORAL at 21:28

## 2017-04-09 RX ADMIN — Medication 1 DROP(S): at 05:49

## 2017-04-09 RX ADMIN — IRON SUCROSE 110 MILLIGRAM(S): 20 INJECTION, SOLUTION INTRAVENOUS at 11:58

## 2017-04-09 RX ADMIN — Medication 325 MILLIGRAM(S): at 11:57

## 2017-04-09 RX ADMIN — Medication 2: at 18:28

## 2017-04-09 RX ADMIN — CARVEDILOL PHOSPHATE 6.25 MILLIGRAM(S): 80 CAPSULE, EXTENDED RELEASE ORAL at 05:49

## 2017-04-09 RX ADMIN — Medication 250 MILLIGRAM(S): at 21:28

## 2017-04-09 RX ADMIN — Medication 2: at 11:57

## 2017-04-09 RX ADMIN — Medication 250 MILLIGRAM(S): at 18:27

## 2017-04-09 RX ADMIN — Medication 81 MILLIGRAM(S): at 18:27

## 2017-04-09 RX ADMIN — ISOSORBIDE DINITRATE 10 MILLIGRAM(S): 5 TABLET ORAL at 05:49

## 2017-04-09 RX ADMIN — LISINOPRIL 5 MILLIGRAM(S): 2.5 TABLET ORAL at 05:48

## 2017-04-09 RX ADMIN — Medication 10 MILLIGRAM(S): at 05:49

## 2017-04-09 RX ADMIN — Medication 250 MILLIGRAM(S): at 05:50

## 2017-04-09 RX ADMIN — Medication 1 MILLIGRAM(S): at 11:58

## 2017-04-09 RX ADMIN — Medication 10 MILLIGRAM(S): at 21:28

## 2017-04-09 RX ADMIN — BUMETANIDE 2 MILLIGRAM(S): 0.25 INJECTION INTRAMUSCULAR; INTRAVENOUS at 05:49

## 2017-04-09 RX ADMIN — CARVEDILOL PHOSPHATE 6.25 MILLIGRAM(S): 80 CAPSULE, EXTENDED RELEASE ORAL at 18:27

## 2017-04-09 NOTE — DISCHARGE NOTE ADULT - MEDICATION SUMMARY - MEDICATIONS TO TAKE
I will START or STAY ON the medications listed below when I get home from the hospital:    aspirin 325 mg oral tablet  -- 1 tab(s) by mouth once a day  -- Indication: For Coronary artery disease involving autologous vein bypass graft    lisinopril 5 mg oral tablet  -- 1 tab(s) by mouth once a day  -- Indication: For Hypertension    Flomax 0.4 mg oral capsule  -- 1 cap(s) by mouth once a day  -- Indication: For Bph    isosorbide dinitrate 10 mg oral tablet  -- 1 tab(s) by mouth 3 times a day  -- Indication: For Hypertension    gabapentin 100 mg oral capsule  -- 3 cap(s) by mouth 3 times a day  -- Indication: For Neuropathy    insulin lispro 100 units/mL subcutaneous solution  --  subcutaneous 4 times a day (before meals and at bedtime); 1 Unit(s) if Glucose 151 - 200  2 Unit(s) if Glucose 201 - 250  3 Unit(s) if Glucose 251 - 300  4 Unit(s) if Glucose 301 - 350  5 Unit(s) if Glucose 351 - 400  6 Unit(s) if Glucose Greater Than 400  -- Indication: For Diabetes mellitus    Lantus 100 units/mL subcutaneous solution  -- 5 unit(s) subcutaneous once a day (at bedtime)  -- Indication: For Diabetes mellitus    atorvastatin 40 mg oral tablet  -- 1 tab(s) by mouth once a day (at bedtime)  -- Indication: For Hyperlipidemia    carvedilol 6.25 mg oral tablet  -- 1 tab(s) by mouth every 12 hours  -- Indication: For Hypertension    bumetanide 2 mg oral tablet  -- 1 tab(s) by mouth every 12 hours  -- Indication: For fluid overload    ferrous sulfate 324 mg oral delayed release tablet  -- 1 tab(s) by mouth once a day  -- Indication: For Anemia    docusate sodium sodium 100 mg oral capsule  -- 1 cap(s) by mouth 2 times a day  -- Indication: For Constipation    ocular lubricant ophthalmic solution  -- 1 drop(s) to each affected eye 2 times a day  -- Indication: For ophthalmic    hydrALAZINE 10 mg oral tablet  -- 1 tab(s) by mouth every 8 hours  -- Indication: For Hypertension    ascorbic acid 500 mg oral tablet  -- 1 tab(s) by mouth 2 times a day  -- Indication: For Nutritional    folic acid 1 mg oral tablet  -- 1 tab(s) by mouth once a day  -- Indication: For Anemia

## 2017-04-09 NOTE — DISCHARGE NOTE ADULT - CARE PROVIDER_API CALL
Mahamed Gaines (), Internal Medicine  [address] [place] [address]  Phone: [phone]  Fax: [phone]    Lucio Harris), Cardiology; Internal Medicine  [address] [place] [address]  Phone: [phone]  Fax: [phone]

## 2017-04-09 NOTE — DISCHARGE NOTE ADULT - ADDITIONAL INSTRUCTIONS
f/up with Primary Care MD in 2-3 days after discharge - Lindsey Abbott  f/up with cardiology in 1 week and f/up with nephrology in 1 week

## 2017-04-09 NOTE — PROGRESS NOTE ADULT - PROBLEM SELECTOR PLAN 3
resolved stable, mild decrease in BUN/Cr, will continue to trend  based on GFR, CKD 4, previously stage 3 (uncertain if a or b)

## 2017-04-09 NOTE — DISCHARGE NOTE ADULT - SECONDARY DIAGNOSIS.
Urinary tract infection without hematuria, site unspecified Diabetes mellitus Coronary artery disease involving autologous vein bypass graft Essential hypertension Hyperlipemia Acute on chronic renal failure

## 2017-04-09 NOTE — CHART NOTE - NSCHARTNOTEFT_GEN_A_CORE
Called bc pt with pacemaker not firing, as per RN.  Telemetry w/ over approx 16-18 beats Vtach, w/ HR in the 150s.  Labs & chart reviewed.  Pt is asymptomatic and VS were otherwise stable.  Physical exam unchanged, pt in no distress.  Electrolytes WNL.    Likely pacemaker did not fire, but unknown what the settings/limits are, so Cardio reconsulted, awaiting recommendations and likely PM interrogation in the AM.  Otherwise continue current mgmt.

## 2017-04-09 NOTE — DISCHARGE NOTE ADULT - CARE PROVIDERS DIRECT ADDRESSES
,DirectAddress_Unknown,[email or web address],DirectAddress_Unknown

## 2017-04-09 NOTE — DISCHARGE NOTE ADULT - PLAN OF CARE
Resolving Stable. Please monitor weight on daily basis and record. Last weight recorded on bed while lying down today is 63kg. Pt admitted with weight of 67kg as per record. Initial BNP of 34,030. BNP repeat was 31,457. Pt will need monitoring of lower extremity for signs of edema. Please f/u with Cardio in 1 week. Pt intermittently hypoglycemic. Insulin was discontinued but will restart at 5 units. Pt will need accu checks before meal and at bedtime and will need adjustment of insulin dose accordingly. Pt will need f/u outpt with Cardio in 1 week. Pt completed course of antibiotics Ceftriaxone and ciprofloxacin. No outpatient antibiotics course recommended. pt to f/u with PMD for repeat urinalysis. recovery Stable Last B.P was 140/74. Pt stable on meds. Continue current drug regimen and f/u with PMD within a week. Continue lipitor as prescribed. F/u repeat lipid panel as per PMD recommendation Stable. patient had stage 3 CKD, now stage 4. Offerred dialysis and recommended by Dr Gaines, Patient refused and only wants to start HD if his OP neprhologist recommends it - Dr Chambers. Per Renal here, f/up OP and c/w diuresis  Patient gets epogen 15,000 units every week on t and th - can resume with neprhologist as Outpatient for weekly dose at least. Please f/up with nephrologist on discharge Stable. Please monitor weight on daily basis and record. Last weight recorded on bed while lying down today is 63kg. Pt admitted with weight of 67kg as per record. Initial BNP of 34,030. BNP repeat was 31,457. Pt will need monitoring of lower extremity for signs of edema. Please f/u with Cardio in 1 week  continue with your medications as above. 1200 cc water restriction Pt will need f/u outpt with Cardio in 1 week.  continue with aspirin Pt completed course of antibiotics Ceftriaxone and  then ciprofloxacin. No outpatient antibiotics course recommended. pt to f/u with PMD for repeat urinalysis.

## 2017-04-09 NOTE — DISCHARGE NOTE ADULT - CARE PLAN
Principal Discharge DX:	Acute on chronic congestive heart failure, unspecified congestive heart failure type  Secondary Diagnosis:	Urinary tract infection without hematuria, site unspecified  Secondary Diagnosis:	Diabetes mellitus  Secondary Diagnosis:	Coronary artery disease involving autologous vein bypass graft  Secondary Diagnosis:	Essential hypertension  Secondary Diagnosis:	Hyperlipemia Principal Discharge DX:	Acute on chronic congestive heart failure, unspecified congestive heart failure type  Goal:	Resolving  Instructions for follow-up, activity and diet:	Stable. Please monitor weight on daily basis and record. Last weight recorded on bed while lying down today is 63kg. Pt admitted with weight of 67kg as per record. Initial BNP of 34,030. BNP repeat was 31,457. Pt will need monitoring of lower extremity for signs of edema. Please f/u with Cardio in 1 week.  Secondary Diagnosis:	Urinary tract infection without hematuria, site unspecified  Goal:	Resolving  Instructions for follow-up, activity and diet:	Pt completed course of antibiotics Ceftriaxone and ciprofloxacin. No outpatient antibiotics course recommended. pt to f/u with PMD for repeat urinalysis.  Secondary Diagnosis:	Diabetes mellitus  Goal:	recovery  Instructions for follow-up, activity and diet:	Pt intermittently hypoglycemic. Insulin was discontinued but will restart at 5 units. Pt will need accu checks before meal and at bedtime and will need adjustment of insulin dose accordingly.  Secondary Diagnosis:	Coronary artery disease involving autologous vein bypass graft  Goal:	Stable  Instructions for follow-up, activity and diet:	Pt will need f/u outpt with Cardio in 1 week.  Secondary Diagnosis:	Essential hypertension  Goal:	Stable  Instructions for follow-up, activity and diet:	Last B.P was 140/74. Pt stable on meds. Continue current drug regimen and f/u with PMD within a week.  Secondary Diagnosis:	Hyperlipemia  Goal:	Stable.  Instructions for follow-up, activity and diet:	Continue lipitor as prescribed. F/u repeat lipid panel as per PMD recommendation Principal Discharge DX:	Acute on chronic congestive heart failure, unspecified congestive heart failure type  Goal:	Resolving  Instructions for follow-up, activity and diet:	Stable. Please monitor weight on daily basis and record. Last weight recorded on bed while lying down today is 63kg. Pt admitted with weight of 67kg as per record. Initial BNP of 34,030. BNP repeat was 31,457. Pt will need monitoring of lower extremity for signs of edema. Please f/u with Cardio in 1 week  continue with your medications as above. 1200 cc water restriction  Secondary Diagnosis:	Urinary tract infection without hematuria, site unspecified  Goal:	Resolving  Instructions for follow-up, activity and diet:	Pt completed course of antibiotics Ceftriaxone and  then ciprofloxacin. No outpatient antibiotics course recommended. pt to f/u with PMD for repeat urinalysis.  Secondary Diagnosis:	Diabetes mellitus  Goal:	recovery  Instructions for follow-up, activity and diet:	Pt intermittently hypoglycemic. Insulin was discontinued but will restart at 5 units. Pt will need accu checks before meal and at bedtime and will need adjustment of insulin dose accordingly.  Secondary Diagnosis:	Coronary artery disease involving autologous vein bypass graft  Goal:	Stable  Instructions for follow-up, activity and diet:	Pt will need f/u outpt with Cardio in 1 week.  continue with aspirin  Secondary Diagnosis:	Essential hypertension  Goal:	Stable  Instructions for follow-up, activity and diet:	Last B.P was 140/74. Pt stable on meds. Continue current drug regimen and f/u with PMD within a week.  Secondary Diagnosis:	Hyperlipemia  Goal:	Stable.  Instructions for follow-up, activity and diet:	Continue lipitor as prescribed. F/u repeat lipid panel as per PMD recommendation  Secondary Diagnosis:	Acute on chronic renal failure  Instructions for follow-up, activity and diet:	patient had stage 3 CKD, now stage 4. Offerred dialysis and recommended by Dr Gaines, Patient refused and only wants to start HD if his OP neprhologist recommends it - Dr Chambers. Per Renal here, f/up OP and c/w diuresis  Patient gets epogen 15,000 units every week on t and th - can resume with neprhologist as Outpatient for weekly dose at least. Please f/up with nephrologist on discharge

## 2017-04-09 NOTE — PROGRESS NOTE ADULT - PROBLEM SELECTOR PLAN 7
HgbA1C= 7.  Pt has been intermittently refusing insulin  Accuchecks: 67, 151, 331, 242  c/w HISS and Lantus 15mg QHS - would decrease bedtime lantus as am sugar was low. Asymptomatic. reqd 22 units of insulin the night prior OR can give midnight snack to prevent early am hypoglycemia and leave lantus dose as subsequent accucheck wa sover 200   will consider adding pre-meal insulin HgbA1C= 7.  Not well controlled. Pt has been intermittently refusing insulin  Accuchecks: 226, 97, 49, 226  D/c'd night time Lantus 2/2 AM hypoglycemia x 2 days. 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 H/H improved  c/w Iron sucrose and Folate  Pancytopenia - unchanged. uncertain etiology. f/up b12 and folate

## 2017-04-09 NOTE — PROGRESS NOTE ADULT - SUBJECTIVE AND OBJECTIVE BOX
CC: shortness of breath    INTERVAL HISTORY: improved symptoms of shortness of breath.     MEDICATIONS  (STANDING):  atorvastatin 40milliGRAM(s) Oral at bedtime  folic acid 1milliGRAM(s) Oral daily  dextrose 5%. 1000milliLiter(s) IV Continuous <Continuous>  dextrose 50% Injectable 12.5Gram(s) IV Push once  dextrose 50% Injectable 25Gram(s) IV Push once  dextrose 50% Injectable 25Gram(s) IV Push once  carvedilol 6.25milliGRAM(s) Oral every 12 hours  aspirin 325milliGRAM(s) Oral daily  isosorbide   dinitrate Tablet (ISORDIL) 10milliGRAM(s) Oral three times a day  hydrALAZINE 10milliGRAM(s) Oral every 8 hours  influenza   Vaccine 0.5milliLiter(s) IntraMuscular once  epoetin una Injectable [id number](s) SubCutaneous <User Schedule>  buMETAnide 2milliGRAM(s) Oral every 12 hours  lisinopril 5milliGRAM(s) Oral daily  saccharomyces boulardii 250milliGRAM(s) Oral two times a day  iron sucrose IVPB 200milliGRAM(s) IV Intermittent daily  artificial  tears Solution 1Drop(s) Both EYES two times a day  insulin lispro (HumaLOG) corrective regimen sliding scale  SubCutaneous Before meals and at bedtime    ROS: All others negative     PHYSICAL EXAM:  T(C): 36.7, Max: 37.1 (04-09 @ 05:47)  HR: 75 (75 - 87)  BP: 135/60 (130/68 - 142/70)  RR: 16 (16 - 16)  SpO2: 98% (98% - 100%)  Wt(kg): --  I&O's Summary  I & Os for 24h ending 09 Apr 2017 07:00  =============================================  IN: 480 ml / OUT: 100 ml / NET: 380 ml    I & Os for current day (as of 09 Apr 2017 14:30)  =============================================  IN: 360 ml / OUT: 0 ml / NET: 360 ml      Appearance: Normal	  HEENT:   Normal oral mucosa, PERRL, EOMI	  Lymphatic: No lymphadenopathy  Cardiovascular: Normal S1 S2, No JVD, No murmurs, No edema  Respiratory: Lungs clear to auscultation	  Psychiatry: A & O x 3, Mood & affect appropriate  Gastrointestinal:  Soft, Non-tender, + BS	  Skin: No rashes, No ecchymoses, No cyanosis  Neurologic: Non-focal  Extremities: Normal range of motion, No clubbing, cyanosis or edema  Vascular: Peripheral pulses palpable 2+ bilaterally    TELEMETRY: 	      LABS:	 	                        7.9    4.7   )-----------( 84       ( 09 Apr 2017 07:50 )             25.4     04-09    142  |  99  |  87.0<H>  ----------------------------<  49<LL>  3.8   |  29.0  |  3.88<H>    Ca    9.3      09 Apr 2017 07:50  Phos  4.2     04-09  Mg     2.3     04-09    TPro  6.9  /  Alb  3.4  /  TBili  0.2<L>  /  DBili  x   /  AST  19  /  ALT  14  /  AlkPhos  54  04-09

## 2017-04-09 NOTE — PROGRESS NOTE ADULT - PROBLEM SELECTOR PLAN 6
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 H/H improved  c/w Iron sucrose and Folate  Pancytopenia - improved likely 2/2 to Acute on Chronic Renal Failure, repeat on 04/05/17 was 0.06. No CP, SOB.  stress test on monday

## 2017-04-09 NOTE — DISCHARGE NOTE ADULT - MEDICATION SUMMARY - MEDICATIONS TO STOP TAKING
I will STOP taking the medications listed below when I get home from the hospital:    glipiZIDE 5 mg oral tablet  -- 1 tab(s) by mouth once a day    traMADol 50 mg oral tablet  -- 1 tab(s) by mouth every 4 hours    darbepoetin una 60 mcg/0.3 mL injectable solution  --  injectable

## 2017-04-09 NOTE — DISCHARGE NOTE ADULT - PATIENT PORTAL LINK FT
“You can access the [facility] Patient Portal, offered by [facility], by registering with the following website: [email or web address]”

## 2017-04-09 NOTE — PROGRESS NOTE ADULT - PROBLEM SELECTOR PLAN 5
likely 2/2 to Acute on Chronic Renal Failure, repeat on 04/05/17 was 0.06. No CP, SOB.  plan for stress test on monday likely 2/2 to Acute on Chronic Renal Failure, repeat on 04/05/17 was 0.06. No CP, SOB.  stress test on monday 2/2 E.Coli. Rocephin resumed for Complicated UTI with Florastor, however, IV line out and patient refused reinsertion.   Switch to renally dosed cipro for 2 more days

## 2017-04-09 NOTE — PROGRESS NOTE ADULT - SUBJECTIVE AND OBJECTIVE BOX
Patient is a 87y old  Male who presents with a chief complaint of SOB and leg swelling (2017 01:08)      INTERVAL HPI/OVERNIGHT EVENTS:  87y  Male seen at bedside. No events overnight. Pt reports ambulation, bowel movement, decreased voiding, tolerating PO intake, decreased appetite. Pt denies pain, SOB, CP, fever, chills.     ANTIMICROBIAL:  cefTRIAXone   IVPB 1Gram(s) IV Intermittent every 24 hours  cefTRIAXone   IVPB  IV Intermittent     CARDIOVASCULAR:  carvedilol 6.25milliGRAM(s) Oral every 12 hours  isosorbide   dinitrate Tablet (ISORDIL) 10milliGRAM(s) Oral three times a day  hydrALAZINE 10milliGRAM(s) Oral every 8 hours  buMETAnide 2milliGRAM(s) Oral every 12 hours  lisinopril 5milliGRAM(s) Oral daily    PULMONARY:    NEUROLOGIC:  aspirin 325milliGRAM(s) Oral daily    ONCOLOGIC:    HEMATOLOGIC:    GATROINTESTINAL:     MEDS:    ENDO/METABOLIC:  atorvastatin 40milliGRAM(s) Oral at bedtime  dextrose Gel 1Dose(s) Oral once PRN  dextrose 50% Injectable 12.5Gram(s) IV Push once  dextrose 50% Injectable 25Gram(s) IV Push once  dextrose 50% Injectable 25Gram(s) IV Push once  glucagon  Injectable 1milliGRAM(s) IntraMuscular once PRN  dextrose Gel 1Dose(s) Oral once PRN  insulin glargine Injectable (LANTUS) 10Unit(s) SubCutaneous at bedtime  insulin lispro (HumaLOG) corrective regimen sliding scale  SubCutaneous Before meals and at bedtime    IV FLUID/NUTRITION:  folic acid 1milliGRAM(s) Oral daily  dextrose 5%. 1000milliLiter(s) IV Continuous <Continuous>  iron sucrose IVPB 200milliGRAM(s) IV Intermittent daily    TOPICAL:  artificial  tears Solution 1Drop(s) Both EYES two times a day    IMMUNOLOGIC & OTHER  influenza   Vaccine 0.5milliLiter(s) IntraMuscular once  epoetin una Injectable [id number](s) SubCutaneous <User Schedule>  saccharomyces boulardii 250milliGRAM(s) Oral two times a day      Allergies    No Known Allergies    Intolerances        Vital Signs Last 24 Hrs  T(C): 37.1, Max: 37.1 ( @ 05:47)  T(F): 98.8, Max: 98.8 ( @ 05:47)  HR: 87 (82 - 91)  BP: 140/58 (120/80 - 142/70)  BP(mean): --  RR: 16 (14 - 16)  SpO2: 98% (94% - 100%)      Daily     Daily Weight in k.4 (2017 05:51)  I&O's Detail    I & Os for current day (as of 2017 07:16)  =============================================  IN:    Oral Fluid: 480 ml    Total IN: 480 ml  ---------------------------------------------  OUT:    Voided: 100 ml    Total OUT: 100 ml  ---------------------------------------------  Total NET: 380 ml    Last Menstrual Period        REVIEW OF SYSTEMS:    CONSTITUTIONAL: No fever, weight loss, or fatigue  EYES: No eye pain, visual disturbances, or discharge  ENMT:  No difficulty hearing, tinnitus, vertigo; No sinus or throat pain  NECK: No pain or stiffness  BREASTS: No pain, masses, or nipple discharge  RESPIRATORY: No cough, wheezing, chills or hemoptysis; No shortness of breath  CARDIOVASCULAR: No chest pain, palpitations, dizziness, or leg swelling  GASTROINTESTINAL: No abdominal or epigastric pain. No nausea, vomiting, or hematemesis; No diarrhea or constipation. No melena or hematochezia.  GENITOURINARY: No dysuria, frequency, hematuria, or incontinence  NEUROLOGICAL: No headaches, memory loss, loss of strength, numbness, or tremors  SKIN: No itching, burning, rashes, or lesions   LYMPH NODES: No enlarged glands  ENDOCRINE: No heat or cold intolerance; No hair loss  MUSCULOSKELETAL: No joint pain or swelling; No muscle, back, or extremity pain  PSYCHIATRIC: No depression, anxiety, mood swings, or difficulty sleeping  HEME/LYMPH: No easy bruising, or bleeding gums  ALLERY AND IMMUNOLOGIC: No hives or eczema      PHYSICAL EXAM:    GENERAL: NAD, well-groomed, well-developed  HEAD:  Atraumatic, Normocephalic  EYES: EOMI, PERRLA, conjunctiva and sclera clear  ENMT: No tonsillar erythema, exudates, or enlargement; Moist mucous membranes, Good dentition, No lesions  NECK: Supple, No JVD, Normal thyroid  NERVOUS SYSTEM:  Alert & Oriented X3, Good concentration; Motor Strength 5/5 B/L upper and lower extremities; DTRs 2+ intact and symmetric  CHEST/LUNG: Clear to percussion bilaterally; No rales, rhonchi, wheezing, or rubs  HEART: Regular rate and rhythm; No murmurs, rubs, or gallops  ABDOMEN: Soft, Nontender, Nondistended; Bowel sounds present  EXTREMITIES:  2+ Peripheral Pulses, No clubbing, cyanosis, or edema  LYMPH: No lymphadenopathy noted  RECTAL: No masses, prostate normal size and smooth, Guiac negative   BREAST: No palpatble masses, skin no lesions no retractions, no discharages. adenexa no palpable masses noted   GYN: uterus normal size, adenexa no palpable masses, no CMT, no uterine discharge   SKIN: No rashes or lesions      LABS:                        8.0    4.6   )-----------( 87       ( 2017 07:03 )             26.5     CBC Full  -  ( 2017 07:03 )  WBC Count : 4.6 K/uL  Hemoglobin : 8.0 g/dL  Hematocrit : 26.5 %  Platelet Count - Automated : 87 K/uL  Mean Cell Volume : 85.8 fl  Mean Cell Hemoglobin : 25.9 pg  Mean Cell Hemoglobin Concentration : 30.2 g/dL  Auto Neutrophil # : 2.5 K/uL  Auto Lymphocyte # : 1.4 K/uL  Auto Monocyte # : 0.5 K/uL  Auto Eosinophil # : 0.2 K/uL  Auto Basophil # : 0.0 K/uL  Auto Neutrophil % : 54.5 %  Auto Lymphocyte % : 29.4 %  Auto Monocyte % : 11.0 %  Auto Eosinophil % : 4.5 %  Auto Basophil % : 0.4 %    08    141  |  100  |  88.0<H>  ----------------------------<  54<L>  4.0   |  27.0  |  3.76<H>    Ca    9.0      2017 07:01  Phos  4.2     -  Mg     2.3     -    TPro  7.1  /  Alb  3.6  /  TBili  0.2<L>  /  DBili  x   /  AST  16  /  ALT  14  /  AlkPhos  57          Hemoglobin A1C, Whole Blood: 7.0 % ( @ 07:33)    Culture Results:   >100,000 CFU/ml Escherichia coli ( @ 23:46)          LIVER FUNCTIONS - ( 2017 07:01 )  Alb: 3.6 g/dL / Pro: 7.1 g/dL / ALK PHOS: 57 U/L / ALT: 14 U/L / AST: 16 U/L / GGT: x             RADIOLOGY & ADDITIONAL TESTS: Patient is a 87y old  Male who presents with a chief complaint of SOB and leg swelling (2017 01:08)      INTERVAL HPI/OVERNIGHT EVENTS:  87y  Male seen at bedside. No events overnight. Pt reports ambulation, bowel movement, decreased voiding, tolerating PO intake, decreased appetite. Pt denies pain, SOB, CP, fever, chills.             Vital Signs Last 24 Hrs  T(C): 37.1, Max: 37.1 ( @ 05:47)  T(F): 98.8, Max: 98.8 ( @ 05:47)  HR: 87 (82 - 91)  BP: 140/58 (120/80 - 142/70)  BP(mean): --  RR: 16 (14 - 16)  SpO2: 98% (94% - 100%)      Daily     Daily Weight in k.4 (2017 05:51)  I&O's Detail    I & Os for current day (as of 2017 07:16)  =============================================  IN:    Oral Fluid: 480 ml    Total IN: 480 ml  ---------------------------------------------  OUT:    Voided: 100 ml    Total OUT: 100 ml  ---------------------------------------------  Total NET: 380 ml          REVIEW OF SYSTEMS:    negative except as noted above      PHYSICAL EXAM:    GENERAL: NAD, well-groomed, well-developed  HEAD:  Atraumatic, Normocephalic  EYES: EOMI, PERRLA, conjunctiva and sclera clear  ENMT: No tonsillar erythema, exudates, or enlargement; Moist mucous membranes  NECK: Supple, No JVD, Normal thyroid  NERVOUS SYSTEM:  Alert & Oriented X3, Good concentration; Motor Strength 5/5 B/L upper and lower extremities  CHEST/LUNG: Clear to percussion bilaterally; No rales, rhonchi, wheezing, or rubs  HEART: Regular rate and rhythm; No murmurs, rubs, or gallops  ABDOMEN: Soft, Nontender, Nondistended; Bowel sounds present  EXTREMITIES:  2+ Peripheral Pulses, No clubbing, cyanosis, or edema  SKIN: No rashes or lesions      LABS:                        8.0    4.6   )-----------( 87       ( 2017 07:03 )             26.5     CBC Full  -  ( 2017 07:03 )  WBC Count : 4.6 K/uL  Hemoglobin : 8.0 g/dL  Hematocrit : 26.5 %  Platelet Count - Automated : 87 K/uL  Mean Cell Volume : 85.8 fl  Mean Cell Hemoglobin : 25.9 pg  Mean Cell Hemoglobin Concentration : 30.2 g/dL  Auto Neutrophil # : 2.5 K/uL  Auto Lymphocyte # : 1.4 K/uL  Auto Monocyte # : 0.5 K/uL  Auto Eosinophil # : 0.2 K/uL  Auto Basophil # : 0.0 K/uL  Auto Neutrophil % : 54.5 %  Auto Lymphocyte % : 29.4 %  Auto Monocyte % : 11.0 %  Auto Eosinophil % : 4.5 %  Auto Basophil % : 0.4 %        141  |  100  |  88.0<H>  ----------------------------<  54<L>  4.0   |  27.0  |  3.76<H>    Ca    9.0      2017 07:01  Phos  4.2     -  Mg     2.3         TPro  7.1  /  Alb  3.6  /  TBili  0.2<L>  /  DBili  x   /  AST  16  /  ALT  14  /  AlkPhos  57  -08        Hemoglobin A1C, Whole Blood: 7.0 % ( @ 07:33)    Culture Results:   >100,000 CFU/ml Escherichia coli ( @ 23:46)          LIVER FUNCTIONS - ( 2017 07:01 )  Alb: 3.6 g/dL / Pro: 7.1 g/dL / ALK PHOS: 57 U/L / ALT: 14 U/L / AST: 16 U/L / GGT: x             RADIOLOGY & ADDITIONAL TESTS:    MEDICATIONS    ANTIMICROBIAL:  cefTRIAXone   IVPB 1Gram(s) IV Intermittent every 24 hours  cefTRIAXone   IVPB  IV Intermittent     CARDIOVASCULAR:  carvedilol 6.25milliGRAM(s) Oral every 12 hours  isosorbide   dinitrate Tablet (ISORDIL) 10milliGRAM(s) Oral three times a day  hydrALAZINE 10milliGRAM(s) Oral every 8 hours  buMETAnide 2milliGRAM(s) Oral every 12 hours  lisinopril 5milliGRAM(s) Oral daily    PULMONARY:    NEUROLOGIC:  aspirin 325milliGRAM(s) Oral daily     MEDS:    ENDO/METABOLIC:  atorvastatin 40milliGRAM(s) Oral at bedtime  dextrose Gel 1Dose(s) Oral once PRN  dextrose 50% Injectable 12.5Gram(s) IV Push once  dextrose 50% Injectable 25Gram(s) IV Push once  dextrose 50% Injectable 25Gram(s) IV Push once  glucagon  Injectable 1milliGRAM(s) IntraMuscular once PRN  dextrose Gel 1Dose(s) Oral once PRN  insulin glargine Injectable (LANTUS) 10Unit(s) SubCutaneous at bedtime  insulin lispro (HumaLOG) corrective regimen sliding scale  SubCutaneous Before meals and at bedtime    IV FLUID/NUTRITION:  folic acid 1milliGRAM(s) Oral daily  dextrose 5%. 1000milliLiter(s) IV Continuous <Continuous>  iron sucrose IVPB 200milliGRAM(s) IV Intermittent daily    TOPICAL:  artificial  tears Solution 1Drop(s) Both EYES two times a day    IMMUNOLOGIC & OTHER  influenza   Vaccine 0.5milliLiter(s) IntraMuscular once  epoetin una Injectable [id number](s) SubCutaneous <User Schedule>  saccharomyces boulardii 250milliGRAM(s) Oral two times a day      Allergies    No Known Allergies    Intolerances Patient is a 87y old  Male who presents with a chief complaint of SOB and leg swelling (05 Apr 2017 01:08)    INTERVAL HPI/OVERNIGHT EVENTS:  Patient is an 86 y/o Male seen at bedside. No events overnight. Pt reports ambulation, bowel movement, decreased voiding, tolerating PO intake, decreased appetite. Pt denies pain, SOB, CP, fever, chills.   He was upset this am b/c he does not want to be woken up in the morning for blood draws and IV lines and wants to be allowed to sleep.   He himself has no other complaints at this time as above     Called by nurse during rounds for a BMP glucose in the 40's, repeat fingerstick at 47 with patient being asymptomatic, however, sleeping more since last night whereas he is usually more awake at night     Tele: NSVT - 3 beats. 87-88% desat overnight. Overnight yesterday VT x 6 beats noted  Vital Signs Last 24 Hrs  T(C): 37.1, Max: 37.1 (04-09 @ 05:47)  T(F): 98.8, Max: 98.8 (04-09 @ 05:47)  HR: 87 (82 - 91)  BP: 140/58 (120/80 - 142/70)  BP(mean): --  RR: 16 (14 - 16)  SpO2: 98% (94% - 100%)    REVIEW OF SYSTEMS:    negative except as noted above    PHYSICAL EXAM:    GENERAL: NAD, well-groomed, well-developed  HEAD:  Atraumatic, Normocephalic  EYES: EOMI, PERRLA, conjunctiva and sclera clear  ENMT: No tonsillar erythema, exudates, or enlargement; Moist mucous membranes  NECK: Supple, No JVD, Normal thyroid  NERVOUS SYSTEM:  Alert & Oriented X3, Good concentration; Motor Strength 5/5 B/L upper and lower extremities  CHEST/LUNG: bibasilar crackles noted  HEART: Regular rate and rhythm; No murmurs, rubs, or gallops  ABDOMEN: Soft, Nontender, Nondistended; Bowel sounds present  EXTREMITIES:  2+ Peripheral Pulses, No clubbing, cyanosis, or edema  SKIN: No rashes or lesions      LABS:                        8.0    4.6   )-----------( 87       ( 08 Apr 2017 07:03 )             26.5     CBC Full  -  ( 08 Apr 2017 07:03 )  WBC Count : 4.6 K/uL  Hemoglobin : 8.0 g/dL  Hematocrit : 26.5 %  Platelet Count - Automated : 87 K/uL  Mean Cell Volume : 85.8 fl  Mean Cell Hemoglobin : 25.9 pg  Mean Cell Hemoglobin Concentration : 30.2 g/dL  Auto Neutrophil # : 2.5 K/uL  Auto Lymphocyte # : 1.4 K/uL  Auto Monocyte # : 0.5 K/uL  Auto Eosinophil # : 0.2 K/uL  Auto Basophil # : 0.0 K/uL  Auto Neutrophil % : 54.5 %  Auto Lymphocyte % : 29.4 %  Auto Monocyte % : 11.0 %  Auto Eosinophil % : 4.5 %  Auto Basophil % : 0.4 %    04-08    141  |  100  |  88.0<H>  ----------------------------<  54<L>  4.0   |  27.0  |  3.76<H>    Ca    9.0      08 Apr 2017 07:01  Phos  4.2     04-08  Mg     2.3     04-08    TPro  7.1  /  Alb  3.6  /  TBili  0.2<L>  /  DBili  x   /  AST  16  /  ALT  14  /  AlkPhos  57  04-08        Hemoglobin A1C, Whole Blood: 7.0 % (04-06 @ 07:33)    Culture Results:   >100,000 CFU/ml Escherichia coli (04-04 @ 23:46)          LIVER FUNCTIONS - ( 08 Apr 2017 07:01 )  Alb: 3.6 g/dL / Pro: 7.1 g/dL / ALK PHOS: 57 U/L / ALT: 14 U/L / AST: 16 U/L / GGT: x             RADIOLOGY & ADDITIONAL TESTS:    MEDICATIONS    ANTIMICROBIAL:  cefTRIAXone   IVPB 1Gram(s) IV Intermittent every 24 hours  cefTRIAXone   IVPB  IV Intermittent     CARDIOVASCULAR:  carvedilol 6.25milliGRAM(s) Oral every 12 hours  isosorbide   dinitrate Tablet (ISORDIL) 10milliGRAM(s) Oral three times a day  hydrALAZINE 10milliGRAM(s) Oral every 8 hours  buMETAnide 2milliGRAM(s) Oral every 12 hours  lisinopril 5milliGRAM(s) Oral daily    PULMONARY:    NEUROLOGIC:  aspirin 325milliGRAM(s) Oral daily     MEDS:    ENDO/METABOLIC:  atorvastatin 40milliGRAM(s) Oral at bedtime  dextrose Gel 1Dose(s) Oral once PRN  dextrose 50% Injectable 12.5Gram(s) IV Push once  dextrose 50% Injectable 25Gram(s) IV Push once  dextrose 50% Injectable 25Gram(s) IV Push once  glucagon  Injectable 1milliGRAM(s) IntraMuscular once PRN  dextrose Gel 1Dose(s) Oral once PRN  insulin glargine Injectable (LANTUS) 10Unit(s) SubCutaneous at bedtime  insulin lispro (HumaLOG) corrective regimen sliding scale  SubCutaneous Before meals and at bedtime    IV FLUID/NUTRITION:  folic acid 1milliGRAM(s) Oral daily  dextrose 5%. 1000milliLiter(s) IV Continuous <Continuous>  iron sucrose IVPB 200milliGRAM(s) IV Intermittent daily    TOPICAL:  artificial  tears Solution 1Drop(s) Both EYES two times a day    IMMUNOLOGIC & OTHER  influenza   Vaccine 0.5milliLiter(s) IntraMuscular once  epoetin una Injectable [id number](s) SubCutaneous <User Schedule>  saccharomyces boulardii 250milliGRAM(s) Oral two times a day      Allergies    No Known Allergies    Intolerances

## 2017-04-09 NOTE — DISCHARGE NOTE ADULT - HOSPITAL COURSE
87 year old male with a PMH of CHF (systolic), DM, HTN, HLD, and CKD presents to the ED with a cc of SOB and leg swelling for 2-3 weeks. Admitted for CHF exacerbation - acute on chronic systolic CHF, diuresing well. Pt was also found to have a UTI with E coli pansensitive, received complete course of IV abx. Completed course of rocephin and then ciprofloxacin. He has also been having e/o hypoglycemia for which the lantus was initially reduced and d/c'd but will c/w lantus on discharge at a lower dose for intermittently elevated finger sticks. Pt received 3 unit insulin overnight. Pt discharge pending due to issue with NARINDER placement. During this admission, patient was seen by Renal, he was noted to have an increased cr here, seen and offered dialysis which he adamantly refuses until he sees Dr Chambers in the outpatient   Plan d/w patient and wife.   Plan also d/w nephrologist   To c/w diuretics for now.   Total time spent coordinating this discharge is 35 minutes

## 2017-04-09 NOTE — PROGRESS NOTE ADULT - PROBLEM SELECTOR PLAN 8
Stable, BP: 144/72 (120/50 - 150/74)  c/w meds as above Stable, controlled BP: 140/58 (120/80 - 142/70)  c/w Carvedilol, Lisinopril, Bumetanide, Hydralazine, Bumetanide

## 2017-04-09 NOTE — PROGRESS NOTE ADULT - PROBLEM SELECTOR PLAN 2
stable, mild decrease in BUN/Cr, will continue to trend HgbA1C= 7.  Not well controlled. Pt has been intermittently refusing insulin  Accuchecks: 226, 97, 49, 226  D/c'd night time Lantus 2/2 AM hypoglycemia x 2 days. C/w HISS and monitor requirement. Unclear of why hypoglycemic events x 2 in the last 2 days - likely 2/2 decreased renal fx

## 2017-04-09 NOTE — PROGRESS NOTE ADULT - ASSESSMENT
87 year old male with a PMH of CHF (systolic), DM, HTN, HLD, and CKD presents to the ED with a cc of SOB and leg swelling for 2-3 weeks. Admitted for CHF exacerbation - acute on chronic systolic CHF. Pending a stress test. In the interim, also found to have a UTI with E coli pansensitive, received a 3 day course of IV abx, pending to continue for a few more days 87 year old male with a PMH of CHF (systolic), DM, HTN, HLD, and CKD presents to the ED with a cc of SOB and leg swelling for 2-3 weeks. Admitted for CHF exacerbation - acute on chronic systolic CHF, diuresing well. Pending a stress test. In the interim, also found to have a UTI with E coli pansensitive, received a 3 day course of IV abx, pending to continue for a few more days. Lost IV line overnight, does not want it to be tried again. will transition to oral meds. He has also been having e/o hypoglycemia for which the lantus was initially reduced and then d/c'd today

## 2017-04-09 NOTE — DISCHARGE NOTE ADULT - OTHER SIGNIFICANT FINDINGS
ECHO:  1. The left atrium is normal in size.   2. Global diffuse hypokinesis with akinesis in the anterolateral wall   with prominent trebeculations. Normal perfusion on definity contrast   suggest nonischemic cardiomyopathy or resting ishemia.   3. Left ventricular ejection fraction, by visual estimation, is 30 to   35%.   4. Elevated left atrial and left ventricular end-diastolic pressures.   (LAP = 27 mm Hg)   5. The right atrium is normal in size.   6. Normal right ventricular size and function.   7. The Dimesionless Index value is 0.36. Moderate aortic valve stenosis.   Cannot rule out pseudoaortic stenosis.   8. There is no evidence of pericardial effusion.    CXR negative   Hemoglobin 8.1

## 2017-04-09 NOTE — PROGRESS NOTE ADULT - PROBLEM SELECTOR PLAN 1
stable, no edema, CTA, pt improved clinically. Body weight yesterday 67.4Kg  BNP 42419 on 04/05/17, repeat pending.  + Fluid balance of 380mL.   Suspect urinary retention, will bladder scan    c/w Lisinopril, Bumetanide, Carvedilol   -plan for stress test on monday stable, no edema, CTA, pt improved clinically. Body weight yesterday 67.4Kg  BNP 59702 on 04/05/17, repeat pending.  + Fluid balance of 380mL.   Suspect urinary retention, will bladder scan    c/w Lisinopril, Bumetanide, Carvedilol   stress test on monday stable, no edema, improving,, pt improved clinically. Body weight yesterday 67.4Kg  BNP 62441 on 04/05/17, repeat pending- improved and mild + balance with decreased urinary output overnight   Suspect urinary retention, will bladder scan    c/w Lisinopril, Bumetanide, Carvedilol   stress test on monday  Tele monitor reviewed - plan to get device interrogated today   [facility] cardio on board

## 2017-04-10 LAB
ALBUMIN SERPL ELPH-MCNC: 3.5 G/DL — SIGNIFICANT CHANGE UP (ref 3.3–5.2)
ALP SERPL-CCNC: 53 U/L — SIGNIFICANT CHANGE UP (ref 40–120)
ALT FLD-CCNC: 16 U/L — SIGNIFICANT CHANGE UP
ANION GAP SERPL CALC-SCNC: 14 MMOL/L — SIGNIFICANT CHANGE UP (ref 5–17)
AST SERPL-CCNC: 21 U/L — SIGNIFICANT CHANGE UP
BASOPHILS # BLD AUTO: 0 K/UL — SIGNIFICANT CHANGE UP (ref 0–0.2)
BASOPHILS NFR BLD AUTO: 0.4 % — SIGNIFICANT CHANGE UP (ref 0–2)
BILIRUB SERPL-MCNC: 0.2 MG/DL — LOW (ref 0.4–2)
BUN SERPL-MCNC: 96 MG/DL — HIGH (ref 8–20)
CALCIUM SERPL-MCNC: 8.9 MG/DL — SIGNIFICANT CHANGE UP (ref 8.6–10.2)
CHLORIDE SERPL-SCNC: 101 MMOL/L — SIGNIFICANT CHANGE UP (ref 98–107)
CO2 SERPL-SCNC: 27 MMOL/L — SIGNIFICANT CHANGE UP (ref 22–29)
CREAT SERPL-MCNC: 4.6 MG/DL — HIGH (ref 0.5–1.3)
EOSINOPHIL # BLD AUTO: 0.2 K/UL — SIGNIFICANT CHANGE UP (ref 0–0.5)
EOSINOPHIL NFR BLD AUTO: 4 % — SIGNIFICANT CHANGE UP (ref 0–5)
FOLATE SERPL-MCNC: >20 NG/ML — HIGH (ref 4–16)
GLUCOSE SERPL-MCNC: 74 MG/DL — SIGNIFICANT CHANGE UP (ref 70–115)
HCT VFR BLD CALC: 26.1 % — LOW (ref 42–52)
HGB BLD-MCNC: 8.1 G/DL — LOW (ref 14–18)
LYMPHOCYTES # BLD AUTO: 1.7 K/UL — SIGNIFICANT CHANGE UP (ref 1–4.8)
LYMPHOCYTES # BLD AUTO: 35.1 % — SIGNIFICANT CHANGE UP (ref 20–55)
MAGNESIUM SERPL-MCNC: 2.2 MG/DL — SIGNIFICANT CHANGE UP (ref 1.8–2.5)
MCHC RBC-ENTMCNC: 26.6 PG — LOW (ref 27–31)
MCHC RBC-ENTMCNC: 31 G/DL — LOW (ref 32–36)
MCV RBC AUTO: 85.6 FL — SIGNIFICANT CHANGE UP (ref 80–94)
MONOCYTES # BLD AUTO: 0.4 K/UL — SIGNIFICANT CHANGE UP (ref 0–0.8)
MONOCYTES NFR BLD AUTO: 9.4 % — SIGNIFICANT CHANGE UP (ref 3–10)
NEUTROPHILS # BLD AUTO: 2.4 K/UL — SIGNIFICANT CHANGE UP (ref 1.8–8)
NEUTROPHILS NFR BLD AUTO: 51.1 % — SIGNIFICANT CHANGE UP (ref 37–73)
PHOSPHATE SERPL-MCNC: 4.5 MG/DL — SIGNIFICANT CHANGE UP (ref 2.4–4.7)
PLATELET # BLD AUTO: 77 K/UL — LOW (ref 150–400)
POTASSIUM SERPL-MCNC: 4.5 MMOL/L — SIGNIFICANT CHANGE UP (ref 3.5–5.3)
POTASSIUM SERPL-SCNC: 4.5 MMOL/L — SIGNIFICANT CHANGE UP (ref 3.5–5.3)
PREALB SERPL-MCNC: 16 MG/DL — LOW (ref 18–38)
PROCALCITONIN SERPL-MCNC: 0.19 NG/ML — SIGNIFICANT CHANGE UP (ref 0–0.05)
PROT SERPL-MCNC: 7.1 G/DL — SIGNIFICANT CHANGE UP (ref 6.6–8.7)
RBC # BLD: 3.05 M/UL — LOW (ref 4.6–6.2)
RBC # FLD: 16 % — HIGH (ref 11–15.6)
SODIUM SERPL-SCNC: 142 MMOL/L — SIGNIFICANT CHANGE UP (ref 135–145)
VIT B12 SERPL-MCNC: 1885 PG/ML — HIGH (ref 180–914)
WBC # BLD: 4.8 K/UL — SIGNIFICANT CHANGE UP (ref 4.8–10.8)
WBC # FLD AUTO: 4.8 K/UL — SIGNIFICANT CHANGE UP (ref 4.8–10.8)

## 2017-04-10 PROCEDURE — 99233 SBSQ HOSP IP/OBS HIGH 50: CPT | Mod: GC

## 2017-04-10 PROCEDURE — 99233 SBSQ HOSP IP/OBS HIGH 50: CPT

## 2017-04-10 RX ORDER — ASCORBIC ACID 60 MG
500 TABLET,CHEWABLE ORAL
Qty: 0 | Refills: 0 | Status: DISCONTINUED | OUTPATIENT
Start: 2017-04-10 | End: 2017-04-11

## 2017-04-10 RX ORDER — FERROUS SULFATE 325(65) MG
325 TABLET ORAL
Qty: 0 | Refills: 0 | Status: DISCONTINUED | OUTPATIENT
Start: 2017-04-10 | End: 2017-04-11

## 2017-04-10 RX ADMIN — ERYTHROPOIETIN 15000 UNIT(S): 10000 INJECTION, SOLUTION INTRAVENOUS; SUBCUTANEOUS at 17:48

## 2017-04-10 RX ADMIN — Medication 1 DROP(S): at 17:47

## 2017-04-10 RX ADMIN — Medication 10 MILLIGRAM(S): at 15:06

## 2017-04-10 RX ADMIN — BUMETANIDE 2 MILLIGRAM(S): 0.25 INJECTION INTRAMUSCULAR; INTRAVENOUS at 05:33

## 2017-04-10 RX ADMIN — Medication 325 MILLIGRAM(S): at 17:52

## 2017-04-10 RX ADMIN — CARVEDILOL PHOSPHATE 6.25 MILLIGRAM(S): 80 CAPSULE, EXTENDED RELEASE ORAL at 05:33

## 2017-04-10 RX ADMIN — Medication 81 MILLIGRAM(S): at 12:27

## 2017-04-10 RX ADMIN — Medication 500 MILLIGRAM(S): at 17:52

## 2017-04-10 RX ADMIN — ISOSORBIDE DINITRATE 10 MILLIGRAM(S): 5 TABLET ORAL at 15:06

## 2017-04-10 RX ADMIN — ISOSORBIDE DINITRATE 10 MILLIGRAM(S): 5 TABLET ORAL at 05:33

## 2017-04-10 RX ADMIN — CARVEDILOL PHOSPHATE 6.25 MILLIGRAM(S): 80 CAPSULE, EXTENDED RELEASE ORAL at 17:49

## 2017-04-10 RX ADMIN — Medication 2: at 17:47

## 2017-04-10 RX ADMIN — BUMETANIDE 2 MILLIGRAM(S): 0.25 INJECTION INTRAMUSCULAR; INTRAVENOUS at 17:48

## 2017-04-10 RX ADMIN — Medication 1 MILLIGRAM(S): at 12:27

## 2017-04-10 RX ADMIN — Medication 10 MILLIGRAM(S): at 05:33

## 2017-04-10 RX ADMIN — Medication 3: at 12:32

## 2017-04-10 RX ADMIN — Medication: at 22:00

## 2017-04-10 RX ADMIN — Medication 250 MILLIGRAM(S): at 17:48

## 2017-04-10 RX ADMIN — Medication 1 DROP(S): at 05:34

## 2017-04-10 RX ADMIN — LISINOPRIL 5 MILLIGRAM(S): 2.5 TABLET ORAL at 05:33

## 2017-04-10 RX ADMIN — Medication 250 MILLIGRAM(S): at 05:54

## 2017-04-10 NOTE — PROGRESS NOTE ADULT - PROBLEM SELECTOR PLAN 1
ct diuresis. ct hydralazine and isordil. initate low dose lisinopril 5 mg daily.Bemetanide 2 mg Q12.

## 2017-04-10 NOTE — PROGRESS NOTE ADULT - PROBLEM SELECTOR PLAN 6
likely 2/2 to Acute on Chronic Renal Failure  stress test on today likely 2/2 to Acute on Chronic Renal Failure  per cardio - no stress test at this time  patient denies any chest pain, sob

## 2017-04-10 NOTE — PROGRESS NOTE ADULT - PROBLEM SELECTOR PLAN 7
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 H/H improved  c/w Iron sucrose and Folate  Pancytopenia - unchanged. uncertain etiology. 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 H/H improved  c/w Iron sucrose and Folate - due to loss of IV line and patient not wanting to continuously try, will not place midline as we can change to oral iron to decrease also his fluid intake   Pancytopenia - now with normal wbc - anemia and pancytopenia stable

## 2017-04-10 NOTE — PROGRESS NOTE ADULT - SUBJECTIVE AND OBJECTIVE BOX
NEPHROLOGY INTERVAL HPI/OVERNIGHT EVENTS:    Examined earlier  Looks comfortable    MEDICATIONS  (STANDING):  atorvastatin 40milliGRAM(s) Oral at bedtime  folic acid 1milliGRAM(s) Oral daily  dextrose 5%. 1000milliLiter(s) IV Continuous <Continuous>  dextrose 50% Injectable 12.5Gram(s) IV Push once  dextrose 50% Injectable 25Gram(s) IV Push once  dextrose 50% Injectable 25Gram(s) IV Push once  carvedilol 6.25milliGRAM(s) Oral every 12 hours  isosorbide   dinitrate Tablet (ISORDIL) 10milliGRAM(s) Oral three times a day  hydrALAZINE 10milliGRAM(s) Oral every 8 hours  influenza   Vaccine 0.5milliLiter(s) IntraMuscular once  epoetin una Injectable [id number](s) SubCutaneous <User Schedule>  buMETAnide 2milliGRAM(s) Oral every 12 hours  lisinopril 5milliGRAM(s) Oral daily  saccharomyces boulardii 250milliGRAM(s) Oral two times a day  artificial  tears Solution 1Drop(s) Both EYES two times a day  insulin lispro (HumaLOG) corrective regimen sliding scale  SubCutaneous Before meals and at bedtime  ciprofloxacin     Tablet 250milliGRAM(s) Oral every 24 hours  aspirin enteric coated 81milliGRAM(s) Oral daily  ferrous    sulfate 325milliGRAM(s) Oral two times a day with meals  ascorbic acid 500milliGRAM(s) Oral two times a day    MEDICATIONS  (PRN):  dextrose Gel 1Dose(s) Oral once PRN Blood Glucose LESS THAN 70 milliGRAM(s)/deciliter  glucagon  Injectable 1milliGRAM(s) IntraMuscular once PRN Glucose LESS THAN 70 milligrams/deciliter  dextrose Gel 1Dose(s) Oral once PRN Blood Glucose LESS THAN 70 milliGRAM(s)/deciliter      Allergies    No Known Allergies    Intolerances        Vital Signs Last 24 Hrs  T(C): 36.8, Max: 36.9 (- @ 16:52)  T(F): 98.2, Max: 98.4 (04- @ 16:52)  HR: 76 (70 - 90)  BP: 134/60 (108/68 - 140/62)  BP(mean): --  RR: 16 (16 - 96)  SpO2: 96% (96% - 100%)  Daily     Daily Weight in k (10 Apr 2017 05:30)    PHYSICAL EXAM:  GENERAL: NAD  HEAD:  NCAT  EYES: EOMI  NECK: Supple, No JVD  NERVOUS SYSTEM:  Alert & Oriented X3  CHEST/LUNG:  decreased BS at bases  HEART: Regular rate and rhythm; No rub  ABDOMEN: Soft, Nontender, Nondistended  EXTREMITIES: + edema stable        LABS:                        8.1    4.8   )-----------( 77       ( 10 Apr 2017 06:24 )             26.1     04-10    142  |  101  |  96.0<H>  ----------------------------<  74  4.5   |  27.0  |  4.60<H>    Ca    8.9      10 Apr 2017 06:24  Phos  4.5     04-10  Mg     2.2     04-10    TPro  7.1  /  Alb  3.5  /  TBili  0.2<L>  /  DBili  x   /  AST  21  /  ALT  16  /  AlkPhos  53  04-10        Magnesium, Serum: 2.2 mg/dL (04-10 @ 06:24)  Phosphorus Level, Serum: 4.5 mg/dL (04-10 @ 06:24)          RADIOLOGY & ADDITIONAL TESTS:

## 2017-04-10 NOTE — PROGRESS NOTE ADULT - SUBJECTIVE AND OBJECTIVE BOX
CARDIOLOGY PROGRESS NOTE   ([facility])                             Reason for follow up: congestive heart failure                             Overnight: No new events.   Update: optimal CHF meds.     Subjective: "  ricky am doing ok."   Complains of: no new symptoms.  Review of symptoms: Cardiac:  No chest pain. dyspnea improved. . No palpitations.  Respiratory: no cough. No dyspnea  Gastrointestinal: No diarrhea. No abdominal pain. No bleeding.     Past medical history: No updates.   Chronic conditions:  Hypertension: controlled. CHF: Improved   	  Vitals:  Vital Signs Last 24 Hrs  T(C): 36.8, Max: 36.8 (04-10 @ 10:40)  T(F): 98.2, Max: 98.2 (04-10 @ 10:40)  HR: 76 (76 - 90)  BP: 134/60 (108/68 - 138/64)  BP(mean): --  RR: 16 (16 - 96)  SpO2: 96% (96% - 100%)    PHYSICAL EXAM:  Appearance: Comfortable. No acute distress  HEENT:  Head and neck: Atraumatic. Normocephalic.  Normal oral mucosa, PERRL, Neck is supple. No JVD, No carotid bruit.   Neurologic: A & O x 3, no focal deficits. EOMI , Cranial nerves are intact.  Lymphatic: No cervical lymphadenopathy  Cardiovascular: Normal S1 S2, No murmur, rubs/gallops. No JVD, No edema  Respiratory: Lungs clear to auscultation  Gastrointestinal:  Soft, Non-tender, + BS  Lower Extremities: Ntrivia  edema  Psychiatry: Patient is calm. No agitation. Mood & affect appropriate  Skin: No rashes, No ecchymoses, No cyanosis    CURRENT MEDICATIONS:  MEDICATIONS  (STANDING):  carvedilol 6.25milliGRAM(s) Oral every 12 hours  isosorbide   dinitrate Tablet (ISORDIL) 10milliGRAM(s) Oral three times a day  hydrALAZINE 10milliGRAM(s) Oral every 8 hours  buMETAnide 2milliGRAM(s) Oral every 12 hours  lisinopril 5milliGRAM(s) Oral daily    ciprofloxacin     Tablet  atorvastatin  folic acid  dextrose 5%.  dextrose 50% Injectable  dextrose 50% Injectable  dextrose 50% Injectable  influenza   Vaccine  epoetin una Injectable  saccharomyces boulardii  artificial  tears Solution  insulin lispro (HumaLOG) corrective regimen sliding scale  aspirin enteric coated  ferrous    sulfate  ascorbic acid    PRN: dextrose Gel PRN  glucagon  Injectable PRN  dextrose Gel PRN      LABS:	 	                        8.1    4.8   )-----------( 77       ( 10 Apr 2017 06:24 )             26.1   N=x    ; L=x        10 Apr 2017 06:24    142    |  101    |  96.0   ----------------------------<  74     4.5     |  27.0   |  4.60     Ca    8.9        10 Apr 2017 06:24  Phos  4.5       10 Apr 2017 06:24  Mg     2.2       10 Apr 2017 06:24    TPro  7.1    /  Alb  3.5    /  TBili  0.2    /  DBili  x      /  AST  21     /  ALT  16     /  AlkPhos  53     10 Apr 2017 06:24      Hepatic panel: 10 Apr 2017 06:24  7.1   | 3.5                            0.2   | 0.2  /x                              21    | 16                                /53   \par                                     INTERPRETATION OF TELEMETRY: A sensed V paced    ECG (tracing reviewed by me): A sensed, V paced 90 bpm, isolated PVC      DIAGNOSTIC TESTING:  [ ] Echocardiogram: EF 30-35%.  Elevated filling pressures 27 mm hg. Normal RV function.  MOderate aortic stenosis.

## 2017-04-10 NOTE — PROGRESS NOTE ADULT - PROBLEM SELECTOR PLAN 3
stable, mild decrease in BUN/Cr, will continue to trend  based on GFR, CKD 4, previously stage 3 (uncertain if a or b) stable, mild decrease in BUN/Cr, will continue to trend  based on GFR, CKD 4, previously stage 3 (uncertain if a or b)  cr up to 4.6  d/w renal Dr Gaines regarding progressive plan of care for dialysis - patient had wanted to think about it

## 2017-04-10 NOTE — PROGRESS NOTE ADULT - ASSESSMENT
87 year old male with a PMH of CHF (systolic), DM, HTN, HLD, and CKD presents to the ED with a cc of SOB and leg swelling for 2-3 weeks. Admitted for CHF exacerbation - acute on chronic systolic CHF, diuresing well. Pending a stress test. In the interim, also found to have a UTI with E coli pansensitive, received a 3 day course of IV abx, pending to continue for a few more days. Lost IV line overnight, does not want it to be tried again. will transition to oral meds. He has also been having e/o hypoglycemia for which the lantus was initially reduced and d/c. Pt received 1 unit insulin overnight. 87 year old male with a PMH of CHF (systolic), DM, HTN, HLD, and CKD presents to the ED with a cc of SOB and leg swelling for 2-3 weeks. Admitted for CHF exacerbation - acute on chronic systolic CHF, diuresing well. In the interim, also found to have a UTI with E coli pansensitive, received a 3 day course of IV abx, pending to continue for a few more days. Lost IV line overnight, does not want it to be tried again. will transition to oral meds. He has also been having e/o hypoglycemia for which the lantus was initially reduced and d/c. Pt received 1 unit insulin overnight.

## 2017-04-10 NOTE — PROGRESS NOTE ADULT - PROBLEM SELECTOR PLAN 2
HgbA1C= 7.  Not well controlled. Pt has been intermittently refusing insulin  Accu check- 192  C/w HISS and monitor requirement. HgbA1C= 7.  Not well controlled. Pt has been intermittently refusing insulin  Accu check- 192  C/w HISS and monitor requirement.  will d/c long acting insulin - likely changes due to renal insufficiency

## 2017-04-10 NOTE — PROGRESS NOTE ADULT - ASSESSMENT
CKD IV: decompensated CHF Cr worse today 4.7 likely due to diuretics  - continue diuretics to keep azotemic==> consider change to PO dosing at cardiology's discretion  - norwood catheter - daily weights and monitor labs  - Electrolytes, BP,  volume status acceptible  - pt may require HD at some point given severity of his renal disease    Anemia: +CKD  - iron stores low 9% cont IV iron  - check stool for OB  - cont NATHAN  - monitor H/H

## 2017-04-10 NOTE — PROGRESS NOTE ADULT - PROBLEM SELECTOR PLAN 9
VCD boots VCD boots due to low plt count. was OOB to chair this am, then back in bed. c/w OOB to chair   DIspo - STR - plan for STR

## 2017-04-10 NOTE — PROGRESS NOTE ADULT - PROBLEM SELECTOR PLAN 5
2/2 E.Coli. Rocephin resumed for Complicated UTI with Florastor, however, IV line out and patient refused reinsertion.   Switch to renally dosed cipro for 1 more day 2/2 E.Coli. Rocephin resumed for Complicated UTI with Florastor, however, IV line out and patient refused reinsertion.   Switch to renally dosed cipro for 1 more day for a total of atleast 5-7 days of treatment total

## 2017-04-10 NOTE — PROGRESS NOTE ADULT - PROBLEM SELECTOR PLAN 1
Improved clinically  Body weight today- 73kg. Increased from yesterday. Will re-evaluate to discount error.   BNP 31,457  Voided overnight  c/w Lisinopril, Bumetanide, Carvedilol   stress test today  Tele monitor reviewed - will f/u interrogation of pacemaker today  Cardio recommends Bumex 2mg po BID to be continued. No change to meds Improved clinically  Body weight today- 73kg. Increased from yesterday however clinically diuresing well. Output 700cc urine overnight.  Will re-evaluate to discount error.   BNP 31,457 has decreased  c/w Lisinopril, Bumetanide, Carvedilol   Device interrogated today - no arrhythmia noted, device capture nl but with low conduction likely due to fluid in lungs   stress test today  Tele monitor reviewed - will f/u interrogation of pacemaker today  Cardio recommends Bumex 2mg po BID to be continued. No change to meds

## 2017-04-10 NOTE — PROGRESS NOTE ADULT - SUBJECTIVE AND OBJECTIVE BOX
Patient is a 87y old  M with CC of SOB and leg swelling     Pt seen at bedside. No events overnight. 1 bowel movement overnight, voided overnight. Pt c/o decreased appetite but able to tolerate po. Pt denies pain, SOB, chest pain fever, chills. Pt says he feels no pain. Pt denies any pedal edema. Pt has some cough occasionally but doesn't bother him.     Cardiac monitor- V paced. HR- 83. 5 Bouts of v-tach. Tech mentions pt has high rates but unsure if recorded v-tachy actually is real.    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 Mild loss of vision on left eye  NECK: Supple, No JVD, Normal thyroid  CHEST/LUNG: bibasilar crackles noted. more on left  HEART: S1/S2. Pacemaker  ABDOMEN: Soft, Nontender, Nondistended. voided  EXTREMITIES:  2+ Peripheral Pulses, No clubbing, cyanosis, or edema  SKIN: No rashes or lesions      LABS:                                     8.1    4.8   )-----------( 77       ( 10 Apr 2017 06:24 )             26.1   04-10    142  |  101  |  96.0<H>  ----------------------------<  74  4.5   |  27.0  |  4.60<H>    Ca    8.9      10 Apr 2017 06:24  Phos  4.5     04-10  Mg     2.2     04-10    TPro  7.1  /  Alb  3.5  /  TBili  0.2<L>  /  DBili  x   /  AST  21  /  ALT  16  /  AlkPhos  53  04-10          LIVER FUNCTIONS - ( 08 Apr 2017 07:01 )  Alb: 3.6 g/dL / Pro: 7.1 g/dL / ALK PHOS: 57 U/L / ALT: 14 U/L / AST: 16 U/L / GGT: x             MEDICATIONS  (STANDING):  atorvastatin 40milliGRAM(s) Oral at bedtime  folic acid 1milliGRAM(s) Oral daily  dextrose 5%. 1000milliLiter(s) IV Continuous <Continuous>  dextrose 50% Injectable 12.5Gram(s) IV Push once  dextrose 50% Injectable 25Gram(s) IV Push once  dextrose 50% Injectable 25Gram(s) IV Push once  carvedilol 6.25milliGRAM(s) Oral every 12 hours  isosorbide   dinitrate Tablet (ISORDIL) 10milliGRAM(s) Oral three times a day  hydrALAZINE 10milliGRAM(s) Oral every 8 hours  influenza   Vaccine 0.5milliLiter(s) IntraMuscular once  epoetin una Injectable [id number](s) SubCutaneous <User Schedule>  buMETAnide 2milliGRAM(s) Oral every 12 hours  lisinopril 5milliGRAM(s) Oral daily  saccharomyces boulardii 250milliGRAM(s) Oral two times a day  iron sucrose IVPB 200milliGRAM(s) IV Intermittent daily  artificial  tears Solution 1Drop(s) Both EYES two times a day  insulin lispro (HumaLOG) corrective regimen sliding scale  SubCutaneous Before meals and at bedtime  ciprofloxacin     Tablet 250milliGRAM(s) Oral every 24 hours  aspirin enteric coated 81milliGRAM(s) Oral daily    MEDICATIONS  (PRN):  dextrose Gel 1Dose(s) Oral once PRN Blood Glucose LESS THAN 70 milliGRAM(s)/deciliter  glucagon  Injectable 1milliGRAM(s) IntraMuscular once PRN Glucose LESS THAN 70 milligrams/deciliter  dextrose Gel 1Dose(s) Oral once PRN Blood Glucose LESS THAN 70 milliGRAM(s)/deciliter Patient is a 87y old  M with CC of SOB and leg swelling     Pt seen at bedside. No events overnight. 1 bowel movement overnight, voided overnight. Pt c/o decreased appetite but able to tolerate po. Pt denies pain, SOB, chest pain fever, chills. Pt says he feels no pain. Pt denies any pedal edema. Pt has some cough occasionally but doesn't bother him.     Cardiac monitor- V paced. HR- 83. 5 Beats Asx run of v-tach. Tech mentions pt has high rates but unsure if recorded v-tachy actually is real.    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 Mild loss of vision on left eye  NECK: Supple, No JVD, Normal thyroid  CHEST/LUNG: bibasilar crackles noted. more on left  HEART: S1/S2. Pacemaker. no m/r/g   ABDOMEN: Soft, Nontender, Nondistended. voided  EXTREMITIES:  2+ Peripheral Pulses, No clubbing, cyanosis, or edema  SKIN: No rashes or lesions      LABS:                                     8.1    4.8   )-----------( 77       ( 10 Apr 2017 06:24 )             26.1   04-10    142  |  101  |  96.0<H>  ----------------------------<  74  4.5   |  27.0  |  4.60<H>    Ca    8.9      10 Apr 2017 06:24  Phos  4.5     04-10  Mg     2.2     04-10    TPro  7.1  /  Alb  3.5  /  TBili  0.2<L>  /  DBili  x   /  AST  21  /  ALT  16  /  AlkPhos  53  04-10          LIVER FUNCTIONS - ( 08 Apr 2017 07:01 )  Alb: 3.6 g/dL / Pro: 7.1 g/dL / ALK PHOS: 57 U/L / ALT: 14 U/L / AST: 16 U/L / GGT: x             MEDICATIONS  (STANDING):  atorvastatin 40milliGRAM(s) Oral at bedtime  folic acid 1milliGRAM(s) Oral daily  dextrose 5%. 1000milliLiter(s) IV Continuous <Continuous>  dextrose 50% Injectable 12.5Gram(s) IV Push once  dextrose 50% Injectable 25Gram(s) IV Push once  dextrose 50% Injectable 25Gram(s) IV Push once  carvedilol 6.25milliGRAM(s) Oral every 12 hours  isosorbide   dinitrate Tablet (ISORDIL) 10milliGRAM(s) Oral three times a day  hydrALAZINE 10milliGRAM(s) Oral every 8 hours  influenza   Vaccine 0.5milliLiter(s) IntraMuscular once  epoetin una Injectable [id number](s) SubCutaneous <User Schedule>  buMETAnide 2milliGRAM(s) Oral every 12 hours  lisinopril 5milliGRAM(s) Oral daily  saccharomyces boulardii 250milliGRAM(s) Oral two times a day  iron sucrose IVPB 200milliGRAM(s) IV Intermittent daily  artificial  tears Solution 1Drop(s) Both EYES two times a day  insulin lispro (HumaLOG) corrective regimen sliding scale  SubCutaneous Before meals and at bedtime  ciprofloxacin     Tablet 250milliGRAM(s) Oral every 24 hours  aspirin enteric coated 81milliGRAM(s) Oral daily    MEDICATIONS  (PRN):  dextrose Gel 1Dose(s) Oral once PRN Blood Glucose LESS THAN 70 milliGRAM(s)/deciliter  glucagon  Injectable 1milliGRAM(s) IntraMuscular once PRN Glucose LESS THAN 70 milligrams/deciliter  dextrose Gel 1Dose(s) Oral once PRN Blood Glucose LESS THAN 70 milliGRAM(s)/deciliter

## 2017-04-11 VITALS
DIASTOLIC BLOOD PRESSURE: 62 MMHG | OXYGEN SATURATION: 68 % | HEART RATE: 68 BPM | RESPIRATION RATE: 18 BRPM | SYSTOLIC BLOOD PRESSURE: 132 MMHG

## 2017-04-11 PROCEDURE — 97163 PT EVAL HIGH COMPLEX 45 MIN: CPT

## 2017-04-11 PROCEDURE — 85027 COMPLETE CBC AUTOMATED: CPT

## 2017-04-11 PROCEDURE — 36415 COLL VENOUS BLD VENIPUNCTURE: CPT

## 2017-04-11 PROCEDURE — 83550 IRON BINDING TEST: CPT

## 2017-04-11 PROCEDURE — 86900 BLOOD TYPING SEROLOGIC ABO: CPT

## 2017-04-11 PROCEDURE — 80053 COMPREHEN METABOLIC PANEL: CPT

## 2017-04-11 PROCEDURE — 82607 VITAMIN B-12: CPT

## 2017-04-11 PROCEDURE — 84145 PROCALCITONIN (PCT): CPT

## 2017-04-11 PROCEDURE — 87086 URINE CULTURE/COLONY COUNT: CPT

## 2017-04-11 PROCEDURE — 84466 ASSAY OF TRANSFERRIN: CPT

## 2017-04-11 PROCEDURE — 96374 THER/PROPH/DIAG INJ IV PUSH: CPT

## 2017-04-11 PROCEDURE — 84132 ASSAY OF SERUM POTASSIUM: CPT

## 2017-04-11 PROCEDURE — 93306 TTE W/DOPPLER COMPLETE: CPT

## 2017-04-11 PROCEDURE — 86850 RBC ANTIBODY SCREEN: CPT

## 2017-04-11 PROCEDURE — 84484 ASSAY OF TROPONIN QUANT: CPT

## 2017-04-11 PROCEDURE — 84134 ASSAY OF PREALBUMIN: CPT

## 2017-04-11 PROCEDURE — 82306 VITAMIN D 25 HYDROXY: CPT

## 2017-04-11 PROCEDURE — 86334 IMMUNOFIX E-PHORESIS SERUM: CPT

## 2017-04-11 PROCEDURE — 84100 ASSAY OF PHOSPHORUS: CPT

## 2017-04-11 PROCEDURE — 97530 THERAPEUTIC ACTIVITIES: CPT

## 2017-04-11 PROCEDURE — 87186 SC STD MICRODIL/AGAR DIL: CPT

## 2017-04-11 PROCEDURE — 99239 HOSP IP/OBS DSCHRG MGMT >30: CPT

## 2017-04-11 PROCEDURE — 83036 HEMOGLOBIN GLYCOSYLATED A1C: CPT

## 2017-04-11 PROCEDURE — 80048 BASIC METABOLIC PNL TOTAL CA: CPT

## 2017-04-11 PROCEDURE — 97110 THERAPEUTIC EXERCISES: CPT

## 2017-04-11 PROCEDURE — 83880 ASSAY OF NATRIURETIC PEPTIDE: CPT

## 2017-04-11 PROCEDURE — 85610 PROTHROMBIN TIME: CPT

## 2017-04-11 PROCEDURE — 97116 GAIT TRAINING THERAPY: CPT

## 2017-04-11 PROCEDURE — 86901 BLOOD TYPING SEROLOGIC RH(D): CPT

## 2017-04-11 PROCEDURE — 82746 ASSAY OF FOLIC ACID SERUM: CPT

## 2017-04-11 PROCEDURE — 82550 ASSAY OF CK (CPK): CPT

## 2017-04-11 PROCEDURE — 81001 URINALYSIS AUTO W/SCOPE: CPT

## 2017-04-11 PROCEDURE — 71045 X-RAY EXAM CHEST 1 VIEW: CPT

## 2017-04-11 PROCEDURE — 83735 ASSAY OF MAGNESIUM: CPT

## 2017-04-11 PROCEDURE — 99285 EMERGENCY DEPT VISIT HI MDM: CPT | Mod: 25

## 2017-04-11 PROCEDURE — 93005 ELECTROCARDIOGRAM TRACING: CPT

## 2017-04-11 PROCEDURE — 82728 ASSAY OF FERRITIN: CPT

## 2017-04-11 RX ORDER — HYDRALAZINE HCL 50 MG
1 TABLET ORAL
Qty: 0 | Refills: 0 | COMMUNITY
Start: 2017-04-11

## 2017-04-11 RX ORDER — BUMETANIDE 0.25 MG/ML
1 INJECTION INTRAMUSCULAR; INTRAVENOUS
Qty: 0 | Refills: 0 | COMMUNITY
Start: 2017-04-11

## 2017-04-11 RX ORDER — TRAMADOL HYDROCHLORIDE 50 MG/1
1 TABLET ORAL
Qty: 0 | Refills: 0 | COMMUNITY

## 2017-04-11 RX ORDER — ISOSORBIDE DINITRATE 5 MG/1
1 TABLET ORAL
Qty: 0 | Refills: 0 | COMMUNITY
Start: 2017-04-11

## 2017-04-11 RX ORDER — FOLIC ACID 0.8 MG
1 TABLET ORAL
Qty: 0 | Refills: 0 | COMMUNITY
Start: 2017-04-11

## 2017-04-11 RX ORDER — INSULIN GLARGINE 100 [IU]/ML
5 INJECTION, SOLUTION SUBCUTANEOUS AT BEDTIME
Qty: 0 | Refills: 0 | Status: DISCONTINUED | OUTPATIENT
Start: 2017-04-11 | End: 2017-04-11

## 2017-04-11 RX ORDER — DARBEPOETIN ALFA IN POLYSORBAT 200MCG/0.4
0 PEN INJECTOR (ML) SUBCUTANEOUS
Qty: 0 | Refills: 0 | COMMUNITY

## 2017-04-11 RX ORDER — INSULIN LISPRO 100/ML
0 VIAL (ML) SUBCUTANEOUS
Qty: 0 | Refills: 0 | COMMUNITY
Start: 2017-04-11

## 2017-04-11 RX ORDER — LISINOPRIL 2.5 MG/1
1 TABLET ORAL
Qty: 0 | Refills: 0 | COMMUNITY
Start: 2017-04-11

## 2017-04-11 RX ORDER — ATORVASTATIN CALCIUM 80 MG/1
1 TABLET, FILM COATED ORAL
Qty: 0 | Refills: 0 | COMMUNITY
Start: 2017-04-11

## 2017-04-11 RX ORDER — CARVEDILOL PHOSPHATE 80 MG/1
1 CAPSULE, EXTENDED RELEASE ORAL
Qty: 0 | Refills: 0 | COMMUNITY
Start: 2017-04-11

## 2017-04-11 RX ORDER — ASCORBIC ACID 60 MG
1 TABLET,CHEWABLE ORAL
Qty: 0 | Refills: 0 | COMMUNITY
Start: 2017-04-11

## 2017-04-11 RX ADMIN — Medication 3: at 11:54

## 2017-04-11 RX ADMIN — ISOSORBIDE DINITRATE 10 MILLIGRAM(S): 5 TABLET ORAL at 05:55

## 2017-04-11 RX ADMIN — Medication 10 MILLIGRAM(S): at 05:55

## 2017-04-11 RX ADMIN — BUMETANIDE 2 MILLIGRAM(S): 0.25 INJECTION INTRAMUSCULAR; INTRAVENOUS at 05:55

## 2017-04-11 RX ADMIN — Medication 325 MILLIGRAM(S): at 11:55

## 2017-04-11 RX ADMIN — Medication 250 MILLIGRAM(S): at 05:55

## 2017-04-11 RX ADMIN — LISINOPRIL 5 MILLIGRAM(S): 2.5 TABLET ORAL at 05:55

## 2017-04-11 RX ADMIN — Medication 81 MILLIGRAM(S): at 11:55

## 2017-04-11 RX ADMIN — Medication 250 MILLIGRAM(S): at 02:04

## 2017-04-11 RX ADMIN — CARVEDILOL PHOSPHATE 6.25 MILLIGRAM(S): 80 CAPSULE, EXTENDED RELEASE ORAL at 05:56

## 2017-04-11 RX ADMIN — Medication 1 MILLIGRAM(S): at 11:55

## 2017-04-11 RX ADMIN — Medication 1 DROP(S): at 05:55

## 2017-04-11 RX ADMIN — Medication 500 MILLIGRAM(S): at 05:56

## 2017-04-11 RX ADMIN — ATORVASTATIN CALCIUM 40 MILLIGRAM(S): 80 TABLET, FILM COATED ORAL at 02:03

## 2017-04-11 NOTE — PROGRESS NOTE ADULT - PROBLEM SELECTOR PLAN 2
HgbA1C= 7. On sliding scale. Will consider re-adding pt insulin regimen if continues to increase  Accu check- 215. got 3 units overnight  C/w HISS and monitor requirement. HgbA1C= 7. On sliding scale.   Accu check- 215. got 3 units overnight; prior to that a few readings over 200  C/w HISS and monitor requirement  will add low dose lantus 5 units at bedtime

## 2017-04-11 NOTE — PROGRESS NOTE ADULT - PROBLEM SELECTOR PLAN 1
Improved clinically  Body weight today- 63kg as per nurse rechecked at bedside this AM  Output 700cc urine through the day.   c/w Lisinopril, Bumetanide, Carvedilol   Device interrogated yesterday - no arrhythmia noted, device capture nl but with low conduction likely due to fluid in lungs. Monitor d/c  Echo shows EF of 30 to 35%  Cardio recommends f/u outpatient in a week Improved clinically  In negative balance  seen by cardio - cardiac device interrogated with no major events   c/w Lisinopril, Bumetanide, Carvedilol   Echo shows EF of 30 to 35%  Cardio recommends f/u outpatient in a week

## 2017-04-11 NOTE — PROGRESS NOTE ADULT - PROBLEM SELECTOR PROBLEM 1
CHF exacerbation
Heart failure, left systolic, acute on chronic
CHF exacerbation
Heart failure, left systolic, acute on chronic
CHF exacerbation
Heart failure, left systolic, acute on chronic

## 2017-04-11 NOTE — DIETITIAN INITIAL EVALUATION ADULT. - PERTINENT MEDS FT
vitamin C, ferrous sulfate, bumetanide, humalog sliding scale, lisinopril, florastor, procrit, folic acid, hydralazine, isordril

## 2017-04-11 NOTE — PROGRESS NOTE ADULT - PROBLEM SELECTOR PLAN 3
stable, elvated BUN/Cr, will continue to trend  based on GFR, CKD 4, previously stage 3 (uncertain if a or b)  cr up to 4.6  d/w renal Dr Gaines regarding progressive plan of care for dialysis - patient had wanted to think about it.  As per renal, will continue diuresis and continue weight check continues to trend up, spoken to by renal who rec dialysis, however, patient does not want to get dialysis until he sees his OP nephrologist Dr Chambers. d/w Dr Gaines who stated that it was fine for patient to hold off until seen by Dr Chambers - I called wife and informed her that she should make sure that Dr Chambers sees her  in the next week or so. CKD 4  As per renal, will continue diuresis and continue weight check

## 2017-04-11 NOTE — DIETITIAN INITIAL EVALUATION ADULT. - NUTRITIONGOAL OUTCOME1
Pt will consume more than >50% of estimated needs. Pt will consume more than >50% at meals and consume supplement 1-2 times daily

## 2017-04-11 NOTE — PROGRESS NOTE ADULT - ASSESSMENT
87 year old male with a PMH of CHF (systolic), DM, HTN, HLD, and CKD presents to the ED with a cc of SOB and leg swelling for 2-3 weeks. Admitted for CHF exacerbation - acute on chronic systolic CHF, diuresing well. Pt was also found to have a UTI with E coli pansensitive, received complete course of IV abx. Completed course of ciprofloxacin. He has also been having e/o hypoglycemia for which the lantus was initially reduced and d/c. Pt received 3 unit insulin overnight. Pt discharge pending due to issue with NARINDER placement. Social work attempted contacting wife for finalization but unable to reach her. Multiple attempt made by Family medicine team to contact wife but no success. Will make more attempt today and finalize d/c 87 year old male with a PMH of CHF (systolic), DM, HTN, HLD, and CKD presents to the ED with a cc of SOB and leg swelling for 2-3 weeks. Admitted for CHF exacerbation - acute on chronic systolic CHF, diuresing well. Pt was also found to have a UTI with E coli pansensitive, received complete course of IV abx. Completed course of rocpehin and ciprofloxacin. He has also been having e/o hypoglycemia for which the lantus was initially reduced and d/cd but to resume on discharge at much lower dose. Pt received 3 unit insulin overnight. Pt discharge pending due to issue with NARINDER placement.

## 2017-04-11 NOTE — PROGRESS NOTE ADULT - PROBLEM SELECTOR PROBLEM 9
Prophylactic measure

## 2017-04-11 NOTE — PROGRESS NOTE ADULT - PROBLEM SELECTOR PLAN 7
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 H/H improved  c/w ferrous sulfate and vit C  Pancytopenia - now with normal wbc - anemia and pancytopenia stable

## 2017-04-11 NOTE — PROGRESS NOTE ADULT - ATTENDING COMMENTS
discharge plan. PT/OT rehab;.   No further in-patient cardiac work-up/management is needed.  Follow-up in cardiology office in 1 weeks.   Thank you for allowing me to participate in care of your patient.   Please call as needed.
note addended where needed. Plan d/w SW, wife Iram, and Nephrologist Dr Gaines. Patient planned to be discharged. Please see discharge papers for details.
note addended where needed. Plan d/w patient and SW/CM. Would try auth for STR. Called wife today - no answer. Spoke to nurse, she was here briefly this am. Will try to call again later. Confirmed number with CM
Patient has no complaints today.  Denies any chest pain, sob, headache or dizziness.  Patient with ESRD and Hb 7.2 will start on venofer.  Contiue Epogen.  Patient with fluid overload much improved, will continue on lasix.
Patient with acute on chronic systolic CHF continue IV lasix..  Also with chronic renal failure stage V.   Anemia of chronic disease.  Will monitor h/h closely. Continue iron.
Patient with acute on chronic systolic CHF exacerbation.  Will keep on lasix IV 40 mg q 12 hrs.  Will monitor closely.
discharge plan in 2 days. PT/OT rehab;.   No further in-patient cardiac work-up/management is needed.  Follow-up in cardiology office in 1 weeks.   Thank you for allowing me to participate in care of your patient.   Please call as needed.
note addended where needed. Plan as above.
note addended where needed. Resident MD to call family to discuss plan of care. I will call in am. Patient to get OOB to chair today. Plan d/w nurse

## 2017-04-11 NOTE — PROGRESS NOTE ADULT - PROVIDER SPECIALTY LIST ADULT
[facility]    [address] [place] [address]    Phone:  [phone]    Fax:  [phone]       Thank You for choosing us for your health care visit. We are glad to serve you and happy to provide you with this summary of your visit. Please help us to ensure we have accurate records. If you find anything that needs to be changed, please let our staff know as soon as possible.          Your Demographic Information     Patient Name Sex     Amparo Connor Female 1984       Ethnic Group Patient Race    Not of  or  Origin White      Your Visit Details     Date & Time Provider Department    2017 4:00 PM Arnoldo Mcmahon MD [facility]      Your Upcoming Appointment*(Max 10)     2017  4:00 PM CDT   Follow-up Visit with Arnoldo Mcmahon MD   [facility] ([facility])    [facility] [place] [address]   [phone]              Your To Do List     Follow-Up    Return in about 3 months (around 2017).      Conditions Discussed Today or Order-Related Diagnoses        Comments    S/P ACL reconstruction    -  Primary       Your Vitals Were     Smoking Status                   Never Smoker           Medications Prescribed or Re-Ordered Today     oxyCODONE/APAP (PERCOCET) 5-325 MG per tablet    Sig - Route: Take 1-2 tablets by mouth every 4 hours as needed for Pain. - Oral    Class: Normal    meloxicam (MOBIC) 15 MG tablet    Sig - Route: Take 1 tablet by mouth daily. - Oral    Class: Eprescribe    Pharmacy: [facility] [address] [place]  AT [address] Ph #: [phone]      Your Current Medications Are        Disp Refills Start End    oxyCODONE/APAP (PERCOCET) 5-325 MG per tablet 30 tablet 0 2017     Sig - Route: Take 1-2 tablets by mouth every 4 hours as needed for Pain. - 
Cardiology
Family Medicine
Nephrology
Oral    meloxicam (MOBIC) 15 MG tablet 30 tablet 2 1/16/2017     Sig - Route: Take 1 tablet by mouth daily. - Oral    Class: Eprescribe    aspirin 325 MG tablet 30 tablet 0 10/14/2016     Sig - Route: Take 1 tablet by mouth daily. - Oral    Norgestimate-Eth Estradiol (MONONESSA PO)        Sig - Route: Take  by mouth. - Oral    Class: Historical Med      Allergies     No Known Allergies      Immunizations History as of 1/16/2017     Name Date    INFLUENZA QUADRIVALENT 9/29/2016  2:28 PM    Tdap 11/17/2015      Patient Portal Signup     Manage health care for you and your family anytime, anywhere with the new [employer], your free online resource for quick and easy access to personal health information, scheduling appointments, refilling prescriptions, viewing test results, paying bills and more.  Sign up for a free and secure account. Please follow the instructions below to securely complete your enrollment.     1. Go to [email or web address]  2. Click Sign Up Now   3. Enter the Activation Code when prompted     Activation Code: Activation code not generated  Current [facility] Status: Account disabled    If you have questions related to [facility], you can email [email or web address] or call [phone] to talk to our [facility] staff.  For questions related to your health, contact your physician’s office.  Please remember to dial 911 for medical emergencies.              Patient Instructions     None

## 2017-04-11 NOTE — PROGRESS NOTE ADULT - SUBJECTIVE AND OBJECTIVE BOX
NEPHROLOGY INTERVAL HPI/OVERNIGHT EVENTS:    Examined earlier  SOB with minimal exertion   + Rales dec UOP  discussed benefits of HD at length  "I want to talk to Dr Chambers before I do anything"  I explained to him that  I am here rpresenting Dr Chambers     MEDICATIONS  (STANDING):  atorvastatin 40milliGRAM(s) Oral at bedtime  folic acid 1milliGRAM(s) Oral daily  dextrose 5%. 1000milliLiter(s) IV Continuous <Continuous>  dextrose 50% Injectable 12.5Gram(s) IV Push once  dextrose 50% Injectable 25Gram(s) IV Push once  dextrose 50% Injectable 25Gram(s) IV Push once  carvedilol 6.25milliGRAM(s) Oral every 12 hours  isosorbide   dinitrate Tablet (ISORDIL) 10milliGRAM(s) Oral three times a day  hydrALAZINE 10milliGRAM(s) Oral every 8 hours  influenza   Vaccine 0.5milliLiter(s) IntraMuscular once  epoetin una Injectable 53650Gvoi(s) SubCutaneous <User Schedule>  buMETAnide 2milliGRAM(s) Oral every 12 hours  lisinopril 5milliGRAM(s) Oral daily  saccharomyces boulardii 250milliGRAM(s) Oral two times a day  artificial  tears Solution 1Drop(s) Both EYES two times a day  insulin lispro (HumaLOG) corrective regimen sliding scale  SubCutaneous Before meals and at bedtime  aspirin enteric coated 81milliGRAM(s) Oral daily  ferrous    sulfate 325milliGRAM(s) Oral two times a day with meals  ascorbic acid 500milliGRAM(s) Oral two times a day  insulin glargine Injectable (LANTUS) 5Unit(s) SubCutaneous at bedtime    MEDICATIONS  (PRN):  dextrose Gel 1Dose(s) Oral once PRN Blood Glucose LESS THAN 70 milliGRAM(s)/deciliter  glucagon  Injectable 1milliGRAM(s) IntraMuscular once PRN Glucose LESS THAN 70 milligrams/deciliter  dextrose Gel 1Dose(s) Oral once PRN Blood Glucose LESS THAN 70 milliGRAM(s)/deciliter      Allergies    No Known Allergies    Intolerances        Vital Signs Last 24 Hrs  T(C): 36.9, Max: 37.1 (04-10 @ 17:45)  T(F): 98.4, Max: 98.7 (04-10 @ 17:45)  HR: 83 (76 - 84)  BP: 126/59 (114/59 - 140/74)  BP(mean): --  RR: 18 (16 - 20)  SpO2: 98% (95% - 98%)  Daily     Daily Weight in k.5 (2017 09:22)    PHYSICAL EXAM:  GENERAL: NAD  HEAD:  NCAT  EYES: EOMI  NECK: Supple, No JVD  NERVOUS SYSTEM:  Alert & Oriented X3  CHEST/LUNG:  decreased BS at bases with + rales  HEART: Regular rate and rhythm; No rub  ABDOMEN: Soft, Nontender, Nondistended  EXTREMITIES: + dependent edema        LABS:                        8.1    4.8   )-----------( 77       ( 10 Apr 2017 06:24 )             26.1     04-10    142  |  101  |  96.0<H>  ----------------------------<  74  4.5   |  27.0  |  4.60<H>    Ca    8.9      10 Apr 2017 06:24  Phos  4.5     04-10  Mg     2.2     04-10    TPro  7.1  /  Alb  3.5  /  TBili  0.2<L>  /  DBili  x   /  AST  21  /  ALT  16  /  AlkPhos  53  04-10                RADIOLOGY & ADDITIONAL TESTS:

## 2017-04-17 ENCOUNTER — INPATIENT (INPATIENT)
Facility: HOSPITAL | Age: 82
LOS: 20 days | Discharge: HOME | DRG: 189 | End: 2017-05-08
Attending: INTERNAL MEDICINE | Admitting: INTERNAL MEDICINE
Payer: COMMERCIAL

## 2017-04-17 VITALS
WEIGHT: 175.05 LBS | HEIGHT: 70 IN | SYSTOLIC BLOOD PRESSURE: 93 MMHG | OXYGEN SATURATION: 97 % | RESPIRATION RATE: 16 BRPM | HEART RATE: 87 BPM | DIASTOLIC BLOOD PRESSURE: 52 MMHG | TEMPERATURE: 98 F

## 2017-04-17 DIAGNOSIS — I50.22 CHRONIC SYSTOLIC (CONGESTIVE) HEART FAILURE: ICD-10-CM

## 2017-04-17 DIAGNOSIS — I25.810 ATHEROSCLEROSIS OF CORONARY ARTERY BYPASS GRAFT(S) WITHOUT ANGINA PECTORIS: ICD-10-CM

## 2017-04-17 DIAGNOSIS — N17.9 ACUTE KIDNEY FAILURE, UNSPECIFIED: ICD-10-CM

## 2017-04-17 DIAGNOSIS — Z95.0 PRESENCE OF CARDIAC PACEMAKER: ICD-10-CM

## 2017-04-17 DIAGNOSIS — I10 ESSENTIAL (PRIMARY) HYPERTENSION: ICD-10-CM

## 2017-04-17 DIAGNOSIS — Z29.9 ENCOUNTER FOR PROPHYLACTIC MEASURES, UNSPECIFIED: ICD-10-CM

## 2017-04-17 LAB
ALBUMIN SERPL ELPH-MCNC: 3.7 G/DL — SIGNIFICANT CHANGE UP (ref 3.3–5.2)
ALP SERPL-CCNC: 58 U/L — SIGNIFICANT CHANGE UP (ref 40–120)
ALT FLD-CCNC: 16 U/L — SIGNIFICANT CHANGE UP
ANION GAP SERPL CALC-SCNC: 19 MMOL/L — HIGH (ref 5–17)
APTT BLD: 31.7 SEC — SIGNIFICANT CHANGE UP (ref 27.5–37.4)
AST SERPL-CCNC: 22 U/L — SIGNIFICANT CHANGE UP
BASOPHILS # BLD AUTO: 0 K/UL — SIGNIFICANT CHANGE UP (ref 0–0.2)
BASOPHILS NFR BLD AUTO: 0.2 % — SIGNIFICANT CHANGE UP (ref 0–2)
BILIRUB SERPL-MCNC: 0.2 MG/DL — LOW (ref 0.4–2)
BLD GP AB SCN SERPL QL: SIGNIFICANT CHANGE UP
BUN SERPL-MCNC: 142 MG/DL — HIGH (ref 8–20)
CALCIUM SERPL-MCNC: 8.7 MG/DL — SIGNIFICANT CHANGE UP (ref 8.6–10.2)
CHLORIDE SERPL-SCNC: 98 MMOL/L — SIGNIFICANT CHANGE UP (ref 98–107)
CK MB CFR SERPL CALC: 2.9 NG/ML — SIGNIFICANT CHANGE UP (ref 0–6.7)
CK SERPL-CCNC: 187 U/L — SIGNIFICANT CHANGE UP (ref 30–200)
CO2 SERPL-SCNC: 21 MMOL/L — LOW (ref 22–29)
CREAT SERPL-MCNC: 6.66 MG/DL — HIGH (ref 0.5–1.3)
EOSINOPHIL # BLD AUTO: 0.2 K/UL — SIGNIFICANT CHANGE UP (ref 0–0.5)
EOSINOPHIL NFR BLD AUTO: 3.8 % — SIGNIFICANT CHANGE UP (ref 0–5)
GLUCOSE SERPL-MCNC: 108 MG/DL — SIGNIFICANT CHANGE UP (ref 70–115)
HCT VFR BLD CALC: 25.9 % — LOW (ref 42–52)
HGB BLD-MCNC: 8.2 G/DL — LOW (ref 14–18)
INR BLD: 1.18 RATIO — HIGH (ref 0.88–1.16)
LYMPHOCYTES # BLD AUTO: 1.7 K/UL — SIGNIFICANT CHANGE UP (ref 1–4.8)
LYMPHOCYTES # BLD AUTO: 33.6 % — SIGNIFICANT CHANGE UP (ref 20–55)
MCHC RBC-ENTMCNC: 27.1 PG — SIGNIFICANT CHANGE UP (ref 27–31)
MCHC RBC-ENTMCNC: 31.7 G/DL — LOW (ref 32–36)
MCV RBC AUTO: 85.5 FL — SIGNIFICANT CHANGE UP (ref 80–94)
MONOCYTES # BLD AUTO: 0.5 K/UL — SIGNIFICANT CHANGE UP (ref 0–0.8)
MONOCYTES NFR BLD AUTO: 10.4 % — HIGH (ref 3–10)
NEUTROPHILS # BLD AUTO: 2.6 K/UL — SIGNIFICANT CHANGE UP (ref 1.8–8)
NEUTROPHILS NFR BLD AUTO: 51.8 % — SIGNIFICANT CHANGE UP (ref 37–73)
PLATELET # BLD AUTO: 86 K/UL — LOW (ref 150–400)
POTASSIUM SERPL-MCNC: 5.6 MMOL/L — HIGH (ref 3.5–5.3)
POTASSIUM SERPL-SCNC: 5.6 MMOL/L — HIGH (ref 3.5–5.3)
PROT SERPL-MCNC: 7.1 G/DL — SIGNIFICANT CHANGE UP (ref 6.6–8.7)
PROTHROM AB SERPL-ACNC: 13 SEC — HIGH (ref 9.8–12.7)
RBC # BLD: 3.03 M/UL — LOW (ref 4.6–6.2)
RBC # FLD: 17.2 % — HIGH (ref 11–15.6)
SODIUM SERPL-SCNC: 138 MMOL/L — SIGNIFICANT CHANGE UP (ref 135–145)
TROPONIN T SERPL-MCNC: 0.1 NG/ML — HIGH (ref 0–0.06)
TYPE + AB SCN PNL BLD: SIGNIFICANT CHANGE UP
WBC # BLD: 5 K/UL — SIGNIFICANT CHANGE UP (ref 4.8–10.8)
WBC # FLD AUTO: 5 K/UL — SIGNIFICANT CHANGE UP (ref 4.8–10.8)

## 2017-04-17 PROCEDURE — 99223 1ST HOSP IP/OBS HIGH 75: CPT

## 2017-04-17 PROCEDURE — 71010: CPT | Mod: 26

## 2017-04-17 PROCEDURE — 99285 EMERGENCY DEPT VISIT HI MDM: CPT

## 2017-04-17 PROCEDURE — 99233 SBSQ HOSP IP/OBS HIGH 50: CPT

## 2017-04-17 RX ORDER — DEXTROSE 50 % IN WATER 50 %
12.5 SYRINGE (ML) INTRAVENOUS ONCE
Qty: 0 | Refills: 0 | Status: DISCONTINUED | OUTPATIENT
Start: 2017-04-17 | End: 2017-05-08

## 2017-04-17 RX ORDER — GABAPENTIN 400 MG/1
300 CAPSULE ORAL THREE TIMES A DAY
Qty: 0 | Refills: 0 | Status: DISCONTINUED | OUTPATIENT
Start: 2017-04-17 | End: 2017-05-08

## 2017-04-17 RX ORDER — DEXTROSE 50 % IN WATER 50 %
25 SYRINGE (ML) INTRAVENOUS ONCE
Qty: 0 | Refills: 0 | Status: DISCONTINUED | OUTPATIENT
Start: 2017-04-17 | End: 2017-05-08

## 2017-04-17 RX ORDER — ASPIRIN/CALCIUM CARB/MAGNESIUM 324 MG
325 TABLET ORAL DAILY
Qty: 0 | Refills: 0 | Status: DISCONTINUED | OUTPATIENT
Start: 2017-04-17 | End: 2017-05-08

## 2017-04-17 RX ORDER — HYDRALAZINE HCL 50 MG
10 TABLET ORAL EVERY 8 HOURS
Qty: 0 | Refills: 0 | Status: DISCONTINUED | OUTPATIENT
Start: 2017-04-17 | End: 2017-04-19

## 2017-04-17 RX ORDER — INSULIN LISPRO 100/ML
VIAL (ML) SUBCUTANEOUS
Qty: 0 | Refills: 0 | Status: DISCONTINUED | OUTPATIENT
Start: 2017-04-17 | End: 2017-04-19

## 2017-04-17 RX ORDER — SODIUM POLYSTYRENE SULFONATE 4.1 MEQ/G
30 POWDER, FOR SUSPENSION ORAL ONCE
Qty: 0 | Refills: 0 | Status: COMPLETED | OUTPATIENT
Start: 2017-04-17 | End: 2017-04-17

## 2017-04-17 RX ORDER — ATORVASTATIN CALCIUM 80 MG/1
40 TABLET, FILM COATED ORAL AT BEDTIME
Qty: 0 | Refills: 0 | Status: DISCONTINUED | OUTPATIENT
Start: 2017-04-17 | End: 2017-05-08

## 2017-04-17 RX ORDER — ISOSORBIDE DINITRATE 5 MG/1
10 TABLET ORAL THREE TIMES A DAY
Qty: 0 | Refills: 0 | Status: DISCONTINUED | OUTPATIENT
Start: 2017-04-17 | End: 2017-04-19

## 2017-04-17 RX ORDER — SODIUM CHLORIDE 9 MG/ML
1000 INJECTION, SOLUTION INTRAVENOUS
Qty: 0 | Refills: 0 | Status: DISCONTINUED | OUTPATIENT
Start: 2017-04-17 | End: 2017-04-20

## 2017-04-17 RX ORDER — DEXTROSE 50 % IN WATER 50 %
1 SYRINGE (ML) INTRAVENOUS ONCE
Qty: 0 | Refills: 0 | Status: DISCONTINUED | OUTPATIENT
Start: 2017-04-17 | End: 2017-05-08

## 2017-04-17 RX ORDER — DOCUSATE SODIUM 100 MG
100 CAPSULE ORAL
Qty: 0 | Refills: 0 | Status: DISCONTINUED | OUTPATIENT
Start: 2017-04-17 | End: 2017-05-08

## 2017-04-17 RX ORDER — GLUCAGON INJECTION, SOLUTION 0.5 MG/.1ML
1 INJECTION, SOLUTION SUBCUTANEOUS ONCE
Qty: 0 | Refills: 0 | Status: DISCONTINUED | OUTPATIENT
Start: 2017-04-17 | End: 2017-05-08

## 2017-04-17 RX ORDER — SODIUM BICARBONATE 1 MEQ/ML
0.09 SYRINGE (ML) INTRAVENOUS
Qty: 75 | Refills: 0 | Status: DISCONTINUED | OUTPATIENT
Start: 2017-04-17 | End: 2017-04-19

## 2017-04-17 RX ORDER — TAMSULOSIN HYDROCHLORIDE 0.4 MG/1
0.4 CAPSULE ORAL AT BEDTIME
Qty: 0 | Refills: 0 | Status: DISCONTINUED | OUTPATIENT
Start: 2017-04-17 | End: 2017-05-08

## 2017-04-17 RX ORDER — CARVEDILOL PHOSPHATE 80 MG/1
6.25 CAPSULE, EXTENDED RELEASE ORAL EVERY 12 HOURS
Qty: 0 | Refills: 0 | Status: DISCONTINUED | OUTPATIENT
Start: 2017-04-17 | End: 2017-04-19

## 2017-04-17 RX ORDER — BUMETANIDE 0.25 MG/ML
2 INJECTION INTRAMUSCULAR; INTRAVENOUS EVERY 12 HOURS
Qty: 0 | Refills: 0 | Status: DISCONTINUED | OUTPATIENT
Start: 2017-04-17 | End: 2017-04-22

## 2017-04-17 RX ADMIN — Medication 100 MILLIGRAM(S): at 18:46

## 2017-04-17 RX ADMIN — ISOSORBIDE DINITRATE 10 MILLIGRAM(S): 5 TABLET ORAL at 23:54

## 2017-04-17 RX ADMIN — SODIUM POLYSTYRENE SULFONATE 30 GRAM(S): 4.1 POWDER, FOR SUSPENSION ORAL at 15:29

## 2017-04-17 RX ADMIN — BUMETANIDE 2 MILLIGRAM(S): 0.25 INJECTION INTRAMUSCULAR; INTRAVENOUS at 18:46

## 2017-04-17 RX ADMIN — ATORVASTATIN CALCIUM 40 MILLIGRAM(S): 80 TABLET, FILM COATED ORAL at 23:53

## 2017-04-17 RX ADMIN — Medication 10 MILLIGRAM(S): at 23:54

## 2017-04-17 RX ADMIN — Medication 1 DROP(S): at 23:53

## 2017-04-17 RX ADMIN — TAMSULOSIN HYDROCHLORIDE 0.4 MILLIGRAM(S): 0.4 CAPSULE ORAL at 23:54

## 2017-04-17 RX ADMIN — CARVEDILOL PHOSPHATE 6.25 MILLIGRAM(S): 80 CAPSULE, EXTENDED RELEASE ORAL at 18:46

## 2017-04-17 RX ADMIN — Medication 100 MEQ/KG/HR: at 17:49

## 2017-04-17 RX ADMIN — Medication 2: at 18:46

## 2017-04-17 RX ADMIN — GABAPENTIN 300 MILLIGRAM(S): 400 CAPSULE ORAL at 23:54

## 2017-04-17 NOTE — CONSULT NOTE ADULT - PROBLEM SELECTOR RECOMMENDATION 2
CRT -AICD.   Stable. NYHA class 2 symptoms. Ct beta-blocker and ct Angiotensin converting enzyme inhibitor . ct diuretics.

## 2017-04-17 NOTE — H&P ADULT - HISTORY OF PRESENT ILLNESS
87 yr old male with known history of Dm, htn, HLD, ICM, CHF systolic, CRF presents with worsening ARF , last admission was refusing HD now agreeable - plan for HD in AM, already seen by renal Dr lang 87 yr old male with known history of Dm, htn, HLD, ICM, CHF systolic, CRF presents with worsening ARF , last admission was refusing HD now agreeable - plan for HD in AM, already seen by renal Dr lang , complains of "i couldnt control my nerves, i was shaking, no LOC, no syncope, no palpitations , no Cp, no SOB , no fall, symptoms severe , worse over last two days , no relieving or exacerbating conditoons , NO CP, no orthopnea, no PND

## 2017-04-17 NOTE — H&P ADULT - ASSESSMENT
87 yr old male with known history of Dm, htn, HLD, ICM, CHF systolic, CRF presents with worsening ARF , last admission was refusing HD now agreeable - plan for HD in AM, already seen by renal Dr lang

## 2017-04-17 NOTE — ED PROVIDER NOTE - PROGRESS NOTE DETAILS
Dr Chambers in ed and rec admit for dialysis tomorrow pt needs dialysis access he rec 1/2ns at 800cc hr and bladder scan called by nurse to evaluate admitted pt for neck swelling, pt s/p perma cath placement this am for dialysis, pt returned from dialysis with ant neck and submandibular swelling, pt denies any difficulty swallowing or breathing appears to be hematoma to area, call placed to vascular surgeon who placed cath this am, surgery res at bedside to see pt anesthesia present intubation done airway secured pt now to be admitted to icu called by nurse to evaluate admitted pt for neck swelling, pt s/p perma cath placement this am for dialysis, pt returned from dialysis with ant neck and submandibular swelling, pt with some  difficulty swallowing no difficulty breathing appears to be hematoma to area, call placed to vascular surgeon and anesthesia for controlled  intubation for airway protection

## 2017-04-17 NOTE — H&P ADULT - NSHPPHYSICALEXAM_GEN_ALL_CORE
GENERAL: NAD, well-groomed, well-developed  HEAD:  Atraumatic, Normocephalic  EYES: EOMI, PERRLA, conjunctiva and sclera clear  ENMT: No tonsillar erythema, exudates, or enlargement;  dry mucous membranes, Good dentition, No lesions  NECK: Supple, No JVD, Normal thyroid  NERVOUS SYSTEM:  Alert & Oriented X3, Motor Strength 5/5 B/L upper and lower extremities; face symetric speech clear   CHEST/LUNG: Clear to percussion bilaterally; No rales, rhonchi, wheezing, or rubs  HEART: Regular rate and rhythm; No murmurs, rubs, or gallops  ABDOMEN: Soft, Nontender, Nondistended; Bowel sounds present  EXTREMITIES:  2+ Peripheral edema

## 2017-04-17 NOTE — CONSULT NOTE ADULT - SUBJECTIVE AND OBJECTIVE BOX
CARDIOLOGY CONSULTATION NOTE ([facility])  Consult requested by:  Dr.Jeetinder Valdivia (Hospitalist)     Reason for Consultation: congestive heart failure     History obtained by: Patient and medical record     obtained: No    Chief complaint:    Patient is a 87y old  Male who presents with a chief complaint of ARf  on CRF (17 Apr 2017 15:32)      HPI:  87 yr old male with known history of Diabetes Mellitus, Hypertension ,  dyslipidemia , .icm  , CHF systolic, CRF presents with worsening ARF , last admission was refusing HD now agreeable - plan for HD in AM, already seen by renal Dr lang , complains of "i couldnt control my nerves, i was shaking, no LOC, no syncope, no palpitations , no Cp, no SOB , no fall, symptoms severe , worse over last two days , no relieving or exacerbating conditoons , NO CP, no orthopnea, no PND (17 Apr 2017 15:32)  Patient states he is here coz he was having chills.           REVIEW OF SYMPTOMS: Cardiovascular:  See HPI. No chest pain,  No dyspnea,  No syncope,  No palpitations, No dizziness, No Orthopnea,      No Paroxsymal nocturnal dyspnea;  Respiratory:  No Dyspnea, No cough,     Genitourinary:  No dysuria, no hematuria; Gastrointestinal:  No nausea, no vomiting. No diarrhea.  No abdominal pain. No dark color stool, no melena ; Neurological: No headache, no dizziness, no slurred speech;  Psychiatric: No agitation, no anxiety.  ALL OTHER REVIEW OF SYSTEMS ARE NEGATIVE.    ALLERGIES: No Known Allergies    Intolerances-NONE       CURRENT MEDICATIONS:  tamsulosin 0.4milliGRAM(s) Oral at bedtime  isosorbide   dinitrate Tablet (ISORDIL) 10milliGRAM(s) Oral three times a day  carvedilol 6.25milliGRAM(s) Oral every 12 hours  buMETAnide 2milliGRAM(s) Oral every 12 hours  hydrALAZINE 10milliGRAM(s) Oral every 8 hours     aspirin  gabapentin  docusate sodium  sodium bicarbonate  Infusion  atorvastatin  artificial  tears Solution  insulin lispro (HumaLOG) corrective regimen sliding scale  dextrose 5%.  dextrose 50% Injectable  dextrose 50% Injectable  dextrose 50% Injectable      HOME MEDICATIONS:    Home Medications:   * Patient Currently Takes Medications as of 11-Apr-2017 12:36 documented in Order   · 	insulin lispro 100 units/mL subcutaneous solution:  subcutaneous 4 times a day (before meals and at bedtime), 1 Unit(s) if Glucose 151 - 200  	2 Unit(s) if Glucose 201 - 250  	3 Unit(s) if Glucose 251 - 300  	4 Unit(s) if Glucose 301 - 350  	5 Unit(s) if Glucose 351 - 400  	6 Unit(s) if Glucose Greater Than 400  · 	lisinopril 5 mg oral tablet: 1 tab(s) orally once a day  · 	isosorbide dinitrate 10 mg oral tablet: 1 tab(s) orally 3 times a day  · 	atorvastatin 40 mg oral tablet: 1 tab(s) orally once a day (at bedtime)  · 	carvedilol 6.25 mg oral tablet: 1 tab(s) orally every 12 hours  · 	bumetanide 2 mg oral tablet: 1 tab(s) orally every 12 hours  · 	ocular lubricant ophthalmic solution: 1 drop(s) to each affected eye 2 times a day  · 	hydrALAZINE 10 mg oral tablet: 1 tab(s) orally every 8 hours  · 	ascorbic acid 500 mg oral tablet: 1 tab(s) orally 2 times a day  · 	folic acid 1 mg oral tablet: 1 tab(s) orally once a day  · 	Flomax 0.4 mg oral capsule: 1 cap(s) orally once a day  · 	docusate sodium sodium 100 mg oral capsule: 1 cap(s) orally 2 times a day  · 	aspirin 325 mg oral tablet: 1 tab(s) orally once a day  · 	gabapentin 100 mg oral capsule: 3 cap(s) orally 3 times a day  · 	ferrous sulfate 324 mg oral delayed release tablet: 1 tab(s) orally once a day  · 	Lantus 100 units/mL subcutaneous solution: 5 unit(s) subcutaneous once a day (at bedtime)      PAST MEDICAL HISTORY  Chronic renal failure  Coronary artery disease involving autologous vein bypass graft  CHF (congestive heart failure)  Pacemaker  Diabetes  PSA elevation  Hyperlipemia  Hypertension  Cataract  Glaucoma      PAST SURGICAL HISTORY  S/P CABG (coronary artery bypass graft)  S/P prostatectomy      FAMILY HISTORY:  No pertinent family history in first degree relatives      SOCIAL HISTORY:  Denies smoking/alcohol/drugs      Vital Signs Last 24 Hrs  T(C): 36.7, Max: 36.7 (04-17 @ 09:25)  T(F): 98.1, Max: 98.1 (04-17 @ 09:25)  HR: 87 (87 - 87)  BP: 93/52 (93/52 - 93/52)  BP(mean): --  RR: 16 (16 - 16)  SpO2: 97% (97% - 97%)      PHYSICAL EXAM:  Constitutional: Comfortable . No acute distress.   HEENT: Atraumatic and normcephalic , neck is supple . +  JVD. right  carotid bruit. PEERL   CNS: A&Ox3. No focal deficits. EOMI. Cranial nerves II-IX are intact.   Lymph Nodes: Cervical : Not palpable.  Respiratory: CTAB  Cardiovascular: S1S2 RRR. 2/6 holosystolic murmur. No rubs or gallop.  Gastrointestinal: Soft non-tender and non distended . +Bowel sounds. negative Foster's sign.  Extremities: No edema.   Psychiatric: Calm . no agitation.  Skin: No skin rash/ulcers visualized to face, hands or feet.    Intake and output:     LABS:                        8.2    5.0   )-----------( 86       ( 17 Apr 2017 09:42 )             25.9     04-17    138  |  98  |  142.0<H>  ----------------------------<  108  5.6<H>   |  21.0<L>  |  6.66<H>    Ca    8.7      17 Apr 2017 09:42    TPro  7.1  /  Alb  3.7  /  TBili  0.2<L>  /  DBili  x   /  AST  22  /  ALT  16  /  AlkPhos  58  04-17    CARDIAC MARKERS ( 17 Apr 2017 09:42 )  x     / 0.10 ng/mL / 187 U/L / x     / 2.9 ng/mL    ;p-BNP=  PT/INR - ( 17 Apr 2017 09:42 )   PT: 13.0 sec;   INR: 1.18 ratio         PTT - ( 17 Apr 2017 09:42 )  PTT:31.7 sec      INTERPRETATION OF TELEMETRY: Reviewed by me. not available   ECG: Reviewed by me. A sensed V paced.     RADIOLOGY & ADDITIONAL STUDIES:    X-ray:  reviewed by me. No evidence of acute cardiopulmonary disease.     ECHO FINDINGS: Date: 4/6/2017        . The left atrium is normal in size.   2. Global diffuse hypokinesis with akinesis in the anterolateral wall with prominent trebeculations. Normal perfusion on definity contrast suggest nonischemic cardiomyopathy or resting ishemia.   3. Left ventricular ejection fraction, by visual estimation, is 30 to 35%.   4. Elevated left atrial and left ventricular end-diastolic pressures. (LAP = 27 mm Hg)   5. The right atrium is normal in size. . Normal right ventricular size and function.   7. The Dimesionless Index value is 0.36. Moderate aortic valve stenosis.   Cannot rule out pseudoaortic stenosis.   8. There is no evidence of pericardial effusion.

## 2017-04-17 NOTE — CONSULT NOTE ADULT - SUBJECTIVE AND OBJECTIVE BOX
87 yr old male with known history of Dm, htn, HLD, ICM, CHF systolic, CRF presents with worsening ARF , last admission was refusing HD now agreeable - plan for HD in AM, already seen by renal Dr lang , complains of "i couldnt control my nerves, i was shaking, no LOC, no syncope, no palpitations , no Cp, no SOB , no fall, symptoms severe , worse over last two days , no relieving or exacerbating conditoons , NO CP, no orthopnea, no PND (17 Apr 2017 15:32)    Evaluated by Nephrology and will need likely need HD tomorrow. Vascular surgery consulted for HD access  PAST MEDICAL & SURGICAL HISTORY:  Chronic renal failure  Coronary artery disease involving autologous vein bypass graft  CHF (congestive heart failure): FAMILY/PT NOT SURE IF DIAGNOSED WITH CHF OR COPD  Pacemaker  Diabetes  PSA elevation  Hyperlipemia  Hypertension  Cataract  Glaucoma  S/P CABG (coronary artery bypass graft)  S/P prostatectomy: 1992      REVIEW OF SYSTEMS: negative except as stated above    MEDICATIONS  (STANDING):  sodium bicarbonate  Infusion 0.094mEq/kG/Hr IV Continuous <Continuous>  aspirin 325milliGRAM(s) Oral daily  tamsulosin 0.4milliGRAM(s) Oral at bedtime  isosorbide   dinitrate Tablet (ISORDIL) 10milliGRAM(s) Oral three times a day  gabapentin 300milliGRAM(s) Oral three times a day  atorvastatin 40milliGRAM(s) Oral at bedtime  carvedilol 6.25milliGRAM(s) Oral every 12 hours  docusate sodium 100milliGRAM(s) Oral two times a day  buMETAnide 2milliGRAM(s) Oral every 12 hours  artificial  tears Solution 1Drop(s) Both EYES two times a day  hydrALAZINE 10milliGRAM(s) Oral every 8 hours  insulin lispro (HumaLOG) corrective regimen sliding scale  SubCutaneous three times a day before meals  dextrose 5%. 1000milliLiter(s) IV Continuous <Continuous>  dextrose 50% Injectable 12.5Gram(s) IV Push once  dextrose 50% Injectable 25Gram(s) IV Push once  dextrose 50% Injectable 25Gram(s) IV Push once    MEDICATIONS  (PRN):  dextrose Gel 1Dose(s) Oral once PRN Blood Glucose LESS THAN 70 milliGRAM(s)/deciliter  glucagon  Injectable 1milliGRAM(s) IntraMuscular once PRN Glucose LESS THAN 70 milligrams/deciliter      Allergies    No Known Allergies    Intolerances        SOCIAL HISTORY: denies toxic habits x3    FAMILY HISTORY:  No pertinent family history in first degree relatives      Vital Signs Last 24 Hrs  T(C): 36.7, Max: 36.7 (04-17 @ 09:25)  T(F): 98.1, Max: 98.1 (04-17 @ 09:25)  HR: 87 (87 - 87)  BP: 93/52 (93/52 - 93/52)  BP(mean): --  RR: 16 (16 - 16)  SpO2: 97% (97% - 97%)    PHYSICAL EXAM:    GENERAL: NAD, well-groomed, well-developed  HEAD:  Atraumatic, Normocephalic  EYES: EOMI, PERRLA, conjunctiva and sclera clear  ENMT: No tonsillar erythema, exudates, or enlargement;  dry mucous membranes, Good dentition, No lesions  NECK: Supple, No JVD, Normal thyroid  NERVOUS SYSTEM:  Alert & Oriented X3, Motor Strength 5/5 B/L upper and lower extremities; face symetric speech clear   CHEST/LUNG: Clear to percussion bilaterally; No rales, rhonchi, wheezing, or rubs  HEART: Regular rate and rhythm; No murmurs, rubs, or gallops  ABDOMEN: Soft, Nontender, Nondistended; Bowel sounds present  EXTREMITIES:  2+ Peripheral edema    LABS:                        8.2    5.0   )-----------( 86       ( 17 Apr 2017 09:42 )             25.9     04-17    138  |  98  |  142.0<H>  ----------------------------<  108  5.6<H>   |  21.0<L>  |  6.66<H>    Ca    8.7      17 Apr 2017 09:42    TPro  7.1  /  Alb  3.7  /  TBili  0.2<L>  /  DBili  x   /  AST  22  /  ALT  16  /  AlkPhos  58  04-17    PT/INR - ( 17 Apr 2017 09:42 )   PT: 13.0 sec;   INR: 1.18 ratio         PTT - ( 17 Apr 2017 09:42 )  PTT:31.7 sec      RADIOLOGY & ADDITIONAL STUDIES:

## 2017-04-17 NOTE — CONSULT NOTE ADULT - ASSESSMENT
86 yo M with need of HD access  -Plan for HD tomorrow  -NPO after midnight  -Preop for tomorrow  -Please document medical clearance

## 2017-04-17 NOTE — ED ADULT TRIAGE NOTE - CHIEF COMPLAINT QUOTE
BIBA< patient is awake and oriented to person at this time, sent to the ED for eval of weakness, patient was supposed to start dialysis today, patients acceptance and consent to dialysis is unclear

## 2017-04-17 NOTE — CONSULT NOTE ADULT - SUBJECTIVE AND OBJECTIVE BOX
Patient is a 87y old  Male who presents with a chief complaint of feeling tired, cough with expectration and some SOB  No cp NV F/c  on questioning thinks passed urine 1-2 d ago but has the urge  recent d/c from [facility], had CHF; refused HD at that time    PAST MEDICAL & SURGICAL HISTORY:  Chronic renal failure- creat 3- 3.5  Coronary artery disease involving autologous vein bypass graft  CHF (congestive heart failure): FAMILY/PT NOT SURE IF DIAGNOSED WITH CHF OR COPD  Pacemaker  Diabetes  PSA elevation  Hyperlipemia  Hypertension  Cataract  Glaucoma  S/P CABG (coronary artery bypass graft)  S/P prostatectomy: 1992      FAMILY HISTORY:  No pertinent family history in first degree relatives    No Known Allergies    Intolerances        REVIEW OF SYSTEMS:    CONSTITUTIONAL: No fever, weight loss, or fatigue  EYES: No eye pain or visual disturbances,   RESPIRATORY: No cough, hemoptysis; or SOB  CARDIOVASCULAR: No chest pain, palpitations,  leg swelling  GASTROINTESTINAL: No abdominal , NVD or GIB  GENITOURINARY: No UTI sx  NEUROLOGICAL: No headaches/wk/numbness  MUSCULOSKELETAL: No joint pain or swelling; No muscle, back, or extremity pain      Vital Signs Last 24 Hrs  T(C): 36.7, Max: 36.7 (04-17 @ 09:25)  T(F): 98.1, Max: 98.1 (04-17 @ 09:25)  HR: 87 (87 - 87)  BP: 93/52 (93/52 - 93/52)  BP(mean): --  RR: 16 (16 - 16)  SpO2: 97% (97% - 97%)    PHYSICAL EXAM:    GENERAL: NAD,   EYES:  conjunctiva and sclera clear  NECK: Supple, No JVD/Bruit, Normal thyroid  NERVOUS SYSTEM:  A/O x3,   CHEST/LUNG:CTA No rales, rhonchi,  or rubs  HEART: Regular rate and rhythm; No murmurs, rubs, or gallops  ABDOMEN: Soft, NT/ND BS+  EXTREMITIES:  + Peripheral Pulses, No C/C/E  SKIN: No rashes or lesions      LABS:                        8.2    5.0   )-----------( 86       ( 17 Apr 2017 09:42 )             25.9     04-17    138  |  98  |  142.0<H>  ----------------------------<  108  5.6<H>   |  21.0<L>  |  6.66<H>    Ca    8.7      17 Apr 2017 09:42    TPro  7.1  /  Alb  3.7  /  TBili  0.2<L>  /  DBili  x   /  AST  22  /  ALT  16  /  AlkPhos  58  04-17    PT/INR - ( 17 Apr 2017 09:42 )   PT: 13.0 sec;   INR: 1.18 ratio         PTT - ( 17 Apr 2017 09:42 )  PTT:31.7 sec        RADIOLOGY & ADDITIONAL TESTS:    MEDICATIONS  (STANDING):  sodium bicarbonate  Infusion  sodium polystyrene sulfonate Suspension    Assesment/Plan :

## 2017-04-17 NOTE — ED PROVIDER NOTE - OBJECTIVE STATEMENT
86 y/o male with a h/o DM and HTN and renal failure and dementia sent from [facility] for acute exacerbation of chronic chf pt denies c/o except admits to feelsing fatigued when asked

## 2017-04-17 NOTE — ED PROVIDER NOTE - CARE PLAN
Principal Discharge DX:	CHF (congestive heart failure) Principal Discharge DX:	Renal failure (ARF), acute on chronic

## 2017-04-17 NOTE — CONSULT NOTE ADULT - ASSESSMENT
87 year old male with CKD not on hemodialysis with chronic systolic heart failure s/p BIV AICD, with acute renal failure for initiation of hemodialysis

## 2017-04-17 NOTE — CONSULT NOTE ADULT - ASSESSMENT
CKD - stage 4, now with NIDHI  cause of NIDHI unclear- shall try to r/o Obstruction  no obv pre renal factors has elevated K  'woill likeky need HD  d/w patient at length  agrees for HD- but wants wife tosign the consents; shall wait for her  Dr. Velez called for PC am  Kayexalate and IVf ordered  Bl scan ordered  check iron studies  Likely HD am

## 2017-04-18 ENCOUNTER — TRANSCRIPTION ENCOUNTER (OUTPATIENT)
Age: 82
End: 2017-04-18

## 2017-04-18 LAB
ALBUMIN SERPL ELPH-MCNC: 3.2 G/DL — LOW (ref 3.3–5.2)
ALP SERPL-CCNC: 66 U/L — SIGNIFICANT CHANGE UP (ref 40–120)
ALT FLD-CCNC: 20 U/L — SIGNIFICANT CHANGE UP
ANION GAP SERPL CALC-SCNC: 18 MMOL/L — HIGH (ref 5–17)
AST SERPL-CCNC: 24 U/L — SIGNIFICANT CHANGE UP
BILIRUB SERPL-MCNC: 0.2 MG/DL — LOW (ref 0.4–2)
BUN SERPL-MCNC: 138 MG/DL — HIGH (ref 8–20)
CALCIUM SERPL-MCNC: 8.2 MG/DL — LOW (ref 8.6–10.2)
CHLORIDE SERPL-SCNC: 96 MMOL/L — LOW (ref 98–107)
CO2 SERPL-SCNC: 24 MMOL/L — SIGNIFICANT CHANGE UP (ref 22–29)
CREAT SERPL-MCNC: 6.32 MG/DL — HIGH (ref 0.5–1.3)
FERRITIN SERPL-MCNC: 623.1 NG/ML — HIGH (ref 30–400)
GLUCOSE SERPL-MCNC: 181 MG/DL — HIGH (ref 70–115)
HCT VFR BLD CALC: 26.8 % — LOW (ref 42–52)
HGB BLD-MCNC: 8.4 G/DL — LOW (ref 14–18)
IRON SATN MFR SERPL: 23 UG/DL — LOW (ref 59–158)
IRON SATN MFR SERPL: 9 % — LOW (ref 16–55)
MCHC RBC-ENTMCNC: 26.9 PG — LOW (ref 27–31)
MCHC RBC-ENTMCNC: 31.3 G/DL — LOW (ref 32–36)
MCV RBC AUTO: 85.9 FL — SIGNIFICANT CHANGE UP (ref 80–94)
PHOSPHATE SERPL-MCNC: 6.5 MG/DL — HIGH (ref 2.4–4.7)
PLATELET # BLD AUTO: 86 K/UL — LOW (ref 150–400)
POTASSIUM SERPL-MCNC: 5.3 MMOL/L — SIGNIFICANT CHANGE UP (ref 3.5–5.3)
POTASSIUM SERPL-SCNC: 5.3 MMOL/L — SIGNIFICANT CHANGE UP (ref 3.5–5.3)
PROT SERPL-MCNC: 6.7 G/DL — SIGNIFICANT CHANGE UP (ref 6.6–8.7)
RBC # BLD: 3.12 M/UL — LOW (ref 4.6–6.2)
RBC # FLD: 17.2 % — HIGH (ref 11–15.6)
SODIUM SERPL-SCNC: 138 MMOL/L — SIGNIFICANT CHANGE UP (ref 135–145)
TIBC SERPL-MCNC: 249 UG/DL — SIGNIFICANT CHANGE UP (ref 220–430)
WBC # BLD: 5.6 K/UL — SIGNIFICANT CHANGE UP (ref 4.8–10.8)
WBC # FLD AUTO: 5.6 K/UL — SIGNIFICANT CHANGE UP (ref 4.8–10.8)

## 2017-04-18 PROCEDURE — 99153 MOD SED SAME PHYS/QHP EA: CPT

## 2017-04-18 PROCEDURE — 36558 INSERT TUNNELED CV CATH: CPT | Mod: RT

## 2017-04-18 PROCEDURE — 99152 MOD SED SAME PHYS/QHP 5/>YRS: CPT

## 2017-04-18 PROCEDURE — 70498 CT ANGIOGRAPHY NECK: CPT | Mod: 26

## 2017-04-18 PROCEDURE — 71010: CPT | Mod: 26

## 2017-04-18 PROCEDURE — 99233 SBSQ HOSP IP/OBS HIGH 50: CPT

## 2017-04-18 RX ORDER — CHLORHEXIDINE GLUCONATE 213 G/1000ML
15 SOLUTION TOPICAL EVERY 12 HOURS
Qty: 0 | Refills: 0 | Status: DISCONTINUED | OUTPATIENT
Start: 2017-04-18 | End: 2017-04-20

## 2017-04-18 RX ORDER — ERYTHROPOIETIN 10000 [IU]/ML
10000 INJECTION, SOLUTION INTRAVENOUS; SUBCUTANEOUS
Qty: 0 | Refills: 0 | Status: DISCONTINUED | OUTPATIENT
Start: 2017-04-18 | End: 2017-05-08

## 2017-04-18 RX ORDER — DEXMEDETOMIDINE HYDROCHLORIDE IN 0.9% SODIUM CHLORIDE 4 UG/ML
0.5 INJECTION INTRAVENOUS
Qty: 200 | Refills: 0 | Status: DISCONTINUED | OUTPATIENT
Start: 2017-04-18 | End: 2017-04-19

## 2017-04-18 RX ORDER — PROPOFOL 10 MG/ML
10 INJECTION, EMULSION INTRAVENOUS
Qty: 500 | Refills: 0 | Status: DISCONTINUED | OUTPATIENT
Start: 2017-04-18 | End: 2017-04-19

## 2017-04-18 RX ORDER — SODIUM CHLORIDE 9 MG/ML
500 INJECTION INTRAMUSCULAR; INTRAVENOUS; SUBCUTANEOUS ONCE
Qty: 0 | Refills: 0 | Status: COMPLETED | OUTPATIENT
Start: 2017-04-18 | End: 2017-04-18

## 2017-04-18 RX ADMIN — Medication 100 MILLIGRAM(S): at 19:10

## 2017-04-18 RX ADMIN — CARVEDILOL PHOSPHATE 6.25 MILLIGRAM(S): 80 CAPSULE, EXTENDED RELEASE ORAL at 06:02

## 2017-04-18 RX ADMIN — GABAPENTIN 300 MILLIGRAM(S): 400 CAPSULE ORAL at 06:02

## 2017-04-18 RX ADMIN — Medication 1 DROP(S): at 12:40

## 2017-04-18 RX ADMIN — ISOSORBIDE DINITRATE 10 MILLIGRAM(S): 5 TABLET ORAL at 06:01

## 2017-04-18 RX ADMIN — Medication 10 MILLIGRAM(S): at 06:02

## 2017-04-18 RX ADMIN — PROPOFOL 4.76 MICROGRAM(S)/KG/MIN: 10 INJECTION, EMULSION INTRAVENOUS at 20:24

## 2017-04-18 RX ADMIN — ERYTHROPOIETIN 10000 UNIT(S): 10000 INJECTION, SOLUTION INTRAVENOUS; SUBCUTANEOUS at 16:52

## 2017-04-18 RX ADMIN — Medication 100 MEQ/KG/HR: at 06:01

## 2017-04-18 RX ADMIN — CARVEDILOL PHOSPHATE 6.25 MILLIGRAM(S): 80 CAPSULE, EXTENDED RELEASE ORAL at 19:10

## 2017-04-18 RX ADMIN — Medication 325 MILLIGRAM(S): at 19:10

## 2017-04-18 RX ADMIN — BUMETANIDE 2 MILLIGRAM(S): 0.25 INJECTION INTRAMUSCULAR; INTRAVENOUS at 19:10

## 2017-04-18 RX ADMIN — BUMETANIDE 2 MILLIGRAM(S): 0.25 INJECTION INTRAMUSCULAR; INTRAVENOUS at 06:01

## 2017-04-18 RX ADMIN — SODIUM CHLORIDE 1000 MILLILITER(S): 9 INJECTION INTRAMUSCULAR; INTRAVENOUS; SUBCUTANEOUS at 20:55

## 2017-04-18 NOTE — ED ADULT NURSE REASSESSMENT NOTE - NS ED NURSE REASSESS COMMENT FT1
Pt had perma cath placed this a.m. Pt returned from dialysis @ 1900, pt noticed to have increased neck swelling, unable to swallow secretions, diff breathing. Anaesthesia called stat for intubation @ 2000. Arrived by 2005. Doctors in the room. Dr. Hernandez, Dr. Sheth, Dr. Blunt, Dr. Diamond, Dr. Boss, Dr. Wolf. MARILUZ Chang and Dalton. respiratory Norman and Nisha. RNs Efrem, Pili, Nidia, Yoav, and Victorino.  2012- 20g left wrist by RN Nidia.   2015- Dr. Diamond pushed 40mg Ketamine for intubation.  2018- Pt intubated by MD Wolf, VSS, tube-8 24 cm @ the lip vent settings: 14/5/500/60%.   2019- Central line attempted by MARILUZ Chang from left femoral artery. Unsuccessful with placement.  2024- Propofol drip started @ 10 mcg/kg to be titrated for sedation.   2034- 16F OG tube placed to right side of mouth.  2038- X-ray came for confirmation of ET tube and OG.  2055- Propofol titrated down to 2.5 mcg due to hypotension. Bolus hung and pt placed in Trendelenburg position.

## 2017-04-18 NOTE — PROGRESS NOTE ADULT - PROBLEM SELECTOR PLAN 1
HgbA1C= 7. On sliding scale.   Accu check- 215. got 3 units overnight; prior to that a few readings over 200  C/w HISS and monitor requirement  will add low dose lantus 5 units at bedtime

## 2017-04-18 NOTE — PROGRESS NOTE ADULT - ASSESSMENT
87 year old male with a PMH of CHF (systolic), DM, HTN, HLD, and CKD presents to the ED with a cc of SOB and leg swelling for 2-3 weeks. Admitted for CHF exacerbation - acute on chronic systolic CHF,post permacath by IR plan for HD

## 2017-04-18 NOTE — PROGRESS NOTE ADULT - SUBJECTIVE AND OBJECTIVE BOX
Patient seen and examined  s/p PC  seen on HD  feels better    I&O's Summary    I & Os for current day (as of 18 Apr 2017 19:31)  =============================================  IN: 0 ml / OUT: 400 ml / NET: -400 ml      REVIEW OF SYSTEMS:    CONSTITUTIONAL: No F/C    RESPIRATORY: No cough or SOB  CARDIOVASCULAR: No CP/palpitations,    GASTROINTESTINAL: No abdominal , NVD   GENITOURINARY: No UTI sx  NEUROLOGICAL: No headaches/wk/numbness  MUSCULOSKELETAL:  No joint pain/swelling; No LBP  EXTREMITIES : no swelling, pain    Vital Signs Last 24 Hrs  T(C): 36.8, Max: 36.8 (04-18 @ 18:15)  T(F): 98.3, Max: 98.3 (04-18 @ 18:15)  HR: 113 (68 - 113)  BP: 147/70 (140/89 - 148/67)  BP(mean): --  RR: 20 (14 - 20)  SpO2: 99% (99% - 100%)    PHYSICAL EXAM:    GENERAL: NAD,   EYES:  conjunctiva and sclera clear  NECK: Supple, No JVD/Bruit  NERVOUS SYSTEM:  A/O x3,   CHEST:  CTA ,No rales orrhonchi  HEART:  RRR, No murmurs  ABDOMEN: Soft, NT/ND BS+  EXTREMITIES:  No C/C,  tr Edema; NT  SKIN: No rashes    LABS:                        8.4    5.6   )-----------( 86       ( 18 Apr 2017 08:13 )             26.8     04-18    138  |  96<L>  |  138.0<H>  ----------------------------<  181<H>  5.3   |  24.0  |  6.32<H>    Ca    8.2<L>      18 Apr 2017 08:13  Phos  6.5     04-18    TPro  6.7  /  Alb  3.2<L>  /  TBili  0.2<L>  /  DBili  x   /  AST  24  /  ALT  20  /  AlkPhos  66  04-18      MEDICATIONS  (STANDING):  sodium bicarbonate  Infusion  aspirin  tamsulosin  isosorbide   dinitrate Tablet (ISORDIL)  gabapentin  atorvastatin  carvedilol  docusate sodium  buMETAnide  artificial  tears Solution  hydrALAZINE  insulin lispro (HumaLOG) corrective regimen sliding scale  dextrose 5%.  dextrose Gel PRN  dextrose 50% Injectable  dextrose 50% Injectable  dextrose 50% Injectable  glucagon  Injectable PRN  epoetin una Injectable

## 2017-04-18 NOTE — CHART NOTE - NSCHARTNOTEFT_GEN_A_CORE
Wife - Iram Reyes at bedside. Explained to her about the need for Emergent Intubation and then CTA Neck to rule out any vascular injury. Based on CTA finding patient may or may not need intervention. She understands what's going on.. and is in agreement with current plan. All the questions were answered.

## 2017-04-18 NOTE — PROGRESS NOTE ADULT - SUBJECTIVE AND OBJECTIVE BOX
EMILY LITTLEJOHN Patient is a 87y old  Male who presents with a chief complaint of ARf  on CRF (17 Apr 2017 15:32)     HPI:  87 yr old male with known history of Dm, htn, HLD, ICM, CHF systolic, CRF presents with worsening ARF , last admission was refusing HD now agreeable - plan for HD in AM, already seen by renal Dr lang , complains of "i couldnt control my nerves, i was shaking, no LOC, no syncope, no palpitations , no Cp, no SOB , no fall, symptoms severe , worse over last two days , no relieving or exacerbating conditoons , NO CP, no orthopnea, no PND (17 Apr 2017 15:32)    The patient was seen and evaluated   The patient is in no acute distress.  Denied any fever chest pain, palpitations, shortness of breath, abdominal pain, fever, dysuria, cough, edema   Complains of     I&O's Summary    Allergies    No Known Allergies    Intolerances      HEALTH ISSUES - PROBLEM Dx:  Prophylactic measure: Prophylactic measure  Pacemaker: Pacemaker  Essential hypertension: Essential hypertension  Chronic systolic congestive heart failure: Chronic systolic congestive heart failure  Coronary artery disease involving autologous vein coronary bypass graft without angina pectoris: Coronary artery disease involving autologous vein coronary bypass graft without angina pectoris        PAST MEDICAL & SURGICAL HISTORY:  Chronic renal failure  Coronary artery disease involving autologous vein bypass graft  CHF (congestive heart failure): FAMILY/PT NOT SURE IF DIAGNOSED WITH CHF OR COPD  Pacemaker  Diabetes  PSA elevation  Hyperlipemia  Hypertension  Cataract  Glaucoma  S/P CABG (coronary artery bypass graft)  S/P prostatectomy: 1992          Vital Signs Last 24 Hrs  T(C): 36.7, Max: 36.7 (04-17 @ 18:32)  T(F): 98.1, Max: 98.1 (04-17 @ 18:32)  HR: 68 (68 - 98)  BP: 148/67 (128/76 - 148/67)  BP(mean): --  RR: 18 (14 - 18)  SpO2: 99% (98% - 100%)T(C): 36.7, Max: 36.7 (04-17 @ 18:32)  HR: 68 (68 - 98)  BP: 148/67 (128/76 - 148/67)  RR: 18 (14 - 18)  SpO2: 99% (98% - 100%)  Wt(kg): --    PHYSICAL EXAM:    GENERAL: NAD,   HEAD:  Atraumatic, Normocephalic  EYES: EOMI,  conjunctiva and sclera clear,   NERVOUS SYSTEM:  Alert & Oriented X3,  Moves upper and lower extremities;   CHEST/LUNG: Clear to auscultation bilaterally; No rales, rhonchi, wheezing, right side   HEART: Regular rate and rhythm; No murmurs,   ABDOMEN: Soft, Nontender, Nondistended; Bowel sounds present  EXTREMITIES:  Peripheral Pulses, No  cyanosis, or edema      sodium bicarbonate  Infusion 0.094mEq/kG/Hr IV Continuous <Continuous>  aspirin 325milliGRAM(s) Oral daily  tamsulosin 0.4milliGRAM(s) Oral at bedtime  isosorbide   dinitrate Tablet (ISORDIL) 10milliGRAM(s) Oral three times a day  gabapentin 300milliGRAM(s) Oral three times a day  atorvastatin 40milliGRAM(s) Oral at bedtime  carvedilol 6.25milliGRAM(s) Oral every 12 hours  docusate sodium 100milliGRAM(s) Oral two times a day  buMETAnide 2milliGRAM(s) Oral every 12 hours  artificial  tears Solution 1Drop(s) Both EYES two times a day  hydrALAZINE 10milliGRAM(s) Oral every 8 hours  insulin lispro (HumaLOG) corrective regimen sliding scale  SubCutaneous three times a day before meals  dextrose 5%. 1000milliLiter(s) IV Continuous <Continuous>  dextrose Gel 1Dose(s) Oral once PRN  dextrose 50% Injectable 12.5Gram(s) IV Push once  dextrose 50% Injectable 25Gram(s) IV Push once  dextrose 50% Injectable 25Gram(s) IV Push once  glucagon  Injectable 1milliGRAM(s) IntraMuscular once PRN  epoetin una Injectable 25511Nmem(s) IV Push <User Schedule>      LABS:                          8.4    5.6   )-----------( 86       ( 18 Apr 2017 08:13 )             26.8     04-18    138  |  96<L>  |  138.0<H>  ----------------------------<  181<H>  5.3   |  24.0  |  6.32<H>    Ca    8.2<L>      18 Apr 2017 08:13  Phos  6.5     04-18    TPro  6.7  /  Alb  3.2<L>  /  TBili  0.2<L>  /  DBili  x   /  AST  24  /  ALT  20  /  AlkPhos  66  04-18    LIVER FUNCTIONS - ( 18 Apr 2017 08:13 )  Alb: 3.2 g/dL / Pro: 6.7 g/dL / ALK PHOS: 66 U/L / ALT: 20 U/L / AST: 24 U/L / GGT: x           PT/INR - ( 17 Apr 2017 09:42 )   PT: 13.0 sec;   INR: 1.18 ratio         PTT - ( 17 Apr 2017 09:42 )  PTT:31.7 sec  CARDIAC MARKERS ( 17 Apr 2017 09:42 )  x     / 0.10 ng/mL / 187 U/L / x     / 2.9 ng/mL        CAPILLARY BLOOD GLUCOSE  144 (18 Apr 2017 12:40)  164 (18 Apr 2017 06:08)  248 (17 Apr 2017 23:15)      RADIOLOGY & ADDITIONAL TESTS:      Consultant notes reviewed    Case discussed with consultant/provider/ family /patient

## 2017-04-18 NOTE — CHART NOTE - NSCHARTNOTEFT_GEN_A_CORE
Patient needs emergent CTA Neck to rule out vascular injury. Benefits outweigh risks for IV contrast. He is dialysis patient and will be dialyzed in am - discussed with Nephrology.

## 2017-04-18 NOTE — ED ADULT NURSE REASSESSMENT NOTE - NS ED NURSE REASSESS COMMENT FT1
Obtained new IV access in LFA, unable to obtain bloods, MARILUZ Chang aware, MD Sheth aware, MARILUZ Chang attempting central line at this time

## 2017-04-18 NOTE — CHART NOTE - NSCHARTNOTEFT_GEN_A_CORE
While Pt was being intubated, I made attempt at L femoral CVC for access  I was unable to cannulate vein  While attempting procedure, PIV access x 2 obtained, so I aborted  No hematoma or active bleeding

## 2017-04-19 DIAGNOSIS — E11.9 TYPE 2 DIABETES MELLITUS WITHOUT COMPLICATIONS: ICD-10-CM

## 2017-04-19 DIAGNOSIS — N18.6 END STAGE RENAL DISEASE: ICD-10-CM

## 2017-04-19 DIAGNOSIS — R22.1 LOCALIZED SWELLING, MASS AND LUMP, NECK: ICD-10-CM

## 2017-04-19 DIAGNOSIS — R04.0 EPISTAXIS: ICD-10-CM

## 2017-04-19 DIAGNOSIS — N17.9 ACUTE KIDNEY FAILURE, UNSPECIFIED: ICD-10-CM

## 2017-04-19 DIAGNOSIS — J96.00 ACUTE RESPIRATORY FAILURE, UNSPECIFIED WHETHER WITH HYPOXIA OR HYPERCAPNIA: ICD-10-CM

## 2017-04-19 DIAGNOSIS — I95.9 HYPOTENSION, UNSPECIFIED: ICD-10-CM

## 2017-04-19 LAB
ALBUMIN SERPL ELPH-MCNC: 3.4 G/DL — SIGNIFICANT CHANGE UP (ref 3.3–5.2)
ALP SERPL-CCNC: 63 U/L — SIGNIFICANT CHANGE UP (ref 40–120)
ALT FLD-CCNC: 17 U/L — SIGNIFICANT CHANGE UP
ANION GAP SERPL CALC-SCNC: 15 MMOL/L — SIGNIFICANT CHANGE UP (ref 5–17)
ANION GAP SERPL CALC-SCNC: 16 MMOL/L — SIGNIFICANT CHANGE UP (ref 5–17)
ANION GAP SERPL CALC-SCNC: 17 MMOL/L — SIGNIFICANT CHANGE UP (ref 5–17)
AST SERPL-CCNC: 22 U/L — SIGNIFICANT CHANGE UP
BASE EXCESS BLDA CALC-SCNC: 0.4 MMOL/L — SIGNIFICANT CHANGE UP (ref -3–3)
BILIRUB SERPL-MCNC: 0.2 MG/DL — LOW (ref 0.4–2)
BLOOD GAS COMMENTS ARTERIAL: SIGNIFICANT CHANGE UP
BUN SERPL-MCNC: 69 MG/DL — HIGH (ref 8–20)
BUN SERPL-MCNC: 72 MG/DL — HIGH (ref 8–20)
BUN SERPL-MCNC: 75 MG/DL — HIGH (ref 8–20)
CALCIUM SERPL-MCNC: 7.9 MG/DL — LOW (ref 8.6–10.2)
CALCIUM SERPL-MCNC: 7.9 MG/DL — LOW (ref 8.6–10.2)
CALCIUM SERPL-MCNC: 8.2 MG/DL — LOW (ref 8.6–10.2)
CHLORIDE SERPL-SCNC: 100 MMOL/L — SIGNIFICANT CHANGE UP (ref 98–107)
CHLORIDE SERPL-SCNC: 97 MMOL/L — LOW (ref 98–107)
CHLORIDE SERPL-SCNC: 99 MMOL/L — SIGNIFICANT CHANGE UP (ref 98–107)
CO2 SERPL-SCNC: 24 MMOL/L — SIGNIFICANT CHANGE UP (ref 22–29)
CO2 SERPL-SCNC: 25 MMOL/L — SIGNIFICANT CHANGE UP (ref 22–29)
CO2 SERPL-SCNC: 25 MMOL/L — SIGNIFICANT CHANGE UP (ref 22–29)
CREAT SERPL-MCNC: 3.62 MG/DL — HIGH (ref 0.5–1.3)
CREAT SERPL-MCNC: 3.81 MG/DL — HIGH (ref 0.5–1.3)
CREAT SERPL-MCNC: 4.29 MG/DL — HIGH (ref 0.5–1.3)
GAS PNL BLDA: SIGNIFICANT CHANGE UP
GAS PNL BLDA: SIGNIFICANT CHANGE UP
GLUCOSE SERPL-MCNC: 161 MG/DL — HIGH (ref 70–115)
GLUCOSE SERPL-MCNC: 182 MG/DL — HIGH (ref 70–115)
GLUCOSE SERPL-MCNC: 194 MG/DL — HIGH (ref 70–115)
HCO3 BLDA-SCNC: 25 MMOL/L — SIGNIFICANT CHANGE UP (ref 20–26)
HCT VFR BLD CALC: 22.3 % — LOW (ref 42–52)
HCT VFR BLD CALC: 24.3 % — LOW (ref 42–52)
HCT VFR BLD CALC: 24.9 % — LOW (ref 42–52)
HGB BLD-MCNC: 6.9 G/DL — CRITICAL LOW (ref 14–18)
HGB BLD-MCNC: 7.7 G/DL — LOW (ref 14–18)
HGB BLD-MCNC: 7.7 G/DL — LOW (ref 14–18)
HOROWITZ INDEX BLDA+IHG-RTO: 30 — SIGNIFICANT CHANGE UP
MCHC RBC-ENTMCNC: 26.7 PG — LOW (ref 27–31)
MCHC RBC-ENTMCNC: 26.7 PG — LOW (ref 27–31)
MCHC RBC-ENTMCNC: 27.3 PG — SIGNIFICANT CHANGE UP (ref 27–31)
MCHC RBC-ENTMCNC: 30.9 G/DL — LOW (ref 32–36)
MCHC RBC-ENTMCNC: 30.9 G/DL — LOW (ref 32–36)
MCHC RBC-ENTMCNC: 31.7 G/DL — LOW (ref 32–36)
MCV RBC AUTO: 86.2 FL — SIGNIFICANT CHANGE UP (ref 80–94)
MCV RBC AUTO: 86.4 FL — SIGNIFICANT CHANGE UP (ref 80–94)
MCV RBC AUTO: 86.5 FL — SIGNIFICANT CHANGE UP (ref 80–94)
PCO2 BLDA: 46 MMHG — HIGH (ref 35–45)
PH BLDA: 7.36 — SIGNIFICANT CHANGE UP (ref 7.35–7.45)
PLATELET # BLD AUTO: 69 K/UL — LOW (ref 150–400)
PLATELET # BLD AUTO: 70 K/UL — LOW (ref 150–400)
PLATELET # BLD AUTO: 73 K/UL — LOW (ref 150–400)
PO2 BLDA: 135 MMHG — HIGH (ref 83–108)
POTASSIUM SERPL-MCNC: 4.2 MMOL/L — SIGNIFICANT CHANGE UP (ref 3.5–5.3)
POTASSIUM SERPL-MCNC: 4.4 MMOL/L — SIGNIFICANT CHANGE UP (ref 3.5–5.3)
POTASSIUM SERPL-MCNC: 4.6 MMOL/L — SIGNIFICANT CHANGE UP (ref 3.5–5.3)
POTASSIUM SERPL-SCNC: 4.2 MMOL/L — SIGNIFICANT CHANGE UP (ref 3.5–5.3)
POTASSIUM SERPL-SCNC: 4.4 MMOL/L — SIGNIFICANT CHANGE UP (ref 3.5–5.3)
POTASSIUM SERPL-SCNC: 4.6 MMOL/L — SIGNIFICANT CHANGE UP (ref 3.5–5.3)
PROT SERPL-MCNC: 6.6 G/DL — SIGNIFICANT CHANGE UP (ref 6.6–8.7)
RBC # BLD: 2.58 M/UL — LOW (ref 4.6–6.2)
RBC # BLD: 2.82 M/UL — LOW (ref 4.6–6.2)
RBC # BLD: 2.88 M/UL — LOW (ref 4.6–6.2)
RBC # FLD: 17.2 % — HIGH (ref 11–15.6)
RBC # FLD: 17.3 % — HIGH (ref 11–15.6)
RBC # FLD: 17.6 % — HIGH (ref 11–15.6)
SAO2 % BLDA: 100 % — HIGH (ref 95–99)
SODIUM SERPL-SCNC: 138 MMOL/L — SIGNIFICANT CHANGE UP (ref 135–145)
SODIUM SERPL-SCNC: 139 MMOL/L — SIGNIFICANT CHANGE UP (ref 135–145)
SODIUM SERPL-SCNC: 141 MMOL/L — SIGNIFICANT CHANGE UP (ref 135–145)
WBC # BLD: 5.4 K/UL — SIGNIFICANT CHANGE UP (ref 4.8–10.8)
WBC # BLD: 6.1 K/UL — SIGNIFICANT CHANGE UP (ref 4.8–10.8)
WBC # BLD: 7.2 K/UL — SIGNIFICANT CHANGE UP (ref 4.8–10.8)
WBC # FLD AUTO: 5.4 K/UL — SIGNIFICANT CHANGE UP (ref 4.8–10.8)
WBC # FLD AUTO: 6.1 K/UL — SIGNIFICANT CHANGE UP (ref 4.8–10.8)
WBC # FLD AUTO: 7.2 K/UL — SIGNIFICANT CHANGE UP (ref 4.8–10.8)

## 2017-04-19 PROCEDURE — 99291 CRITICAL CARE FIRST HOUR: CPT

## 2017-04-19 RX ORDER — SODIUM CHLORIDE 9 MG/ML
1000 INJECTION, SOLUTION INTRAVENOUS ONCE
Qty: 0 | Refills: 0 | Status: COMPLETED | OUTPATIENT
Start: 2017-04-19 | End: 2017-04-19

## 2017-04-19 RX ORDER — ISOSORBIDE DINITRATE 5 MG/1
10 TABLET ORAL THREE TIMES A DAY
Qty: 0 | Refills: 0 | Status: DISCONTINUED | OUTPATIENT
Start: 2017-04-19 | End: 2017-04-19

## 2017-04-19 RX ORDER — SODIUM CHLORIDE 9 MG/ML
500 INJECTION, SOLUTION INTRAVENOUS ONCE
Qty: 0 | Refills: 0 | Status: COMPLETED | OUTPATIENT
Start: 2017-04-19 | End: 2017-04-19

## 2017-04-19 RX ORDER — HYDRALAZINE HCL 50 MG
10 TABLET ORAL EVERY 8 HOURS
Qty: 0 | Refills: 0 | Status: DISCONTINUED | OUTPATIENT
Start: 2017-04-19 | End: 2017-04-19

## 2017-04-19 RX ORDER — CARVEDILOL PHOSPHATE 80 MG/1
6.25 CAPSULE, EXTENDED RELEASE ORAL EVERY 12 HOURS
Qty: 0 | Refills: 0 | Status: DISCONTINUED | OUTPATIENT
Start: 2017-04-19 | End: 2017-04-19

## 2017-04-19 RX ORDER — INSULIN LISPRO 100/ML
VIAL (ML) SUBCUTANEOUS EVERY 6 HOURS
Qty: 0 | Refills: 0 | Status: DISCONTINUED | OUTPATIENT
Start: 2017-04-19 | End: 2017-04-23

## 2017-04-19 RX ORDER — INSULIN GLARGINE 100 [IU]/ML
5 INJECTION, SOLUTION SUBCUTANEOUS AT BEDTIME
Qty: 0 | Refills: 0 | Status: DISCONTINUED | OUTPATIENT
Start: 2017-04-19 | End: 2017-04-23

## 2017-04-19 RX ORDER — PROPOFOL 10 MG/ML
10 INJECTION, EMULSION INTRAVENOUS
Qty: 1000 | Refills: 0 | Status: DISCONTINUED | OUTPATIENT
Start: 2017-04-19 | End: 2017-04-19

## 2017-04-19 RX ORDER — CEFAZOLIN SODIUM 1 G
1000 VIAL (EA) INJECTION ONCE
Qty: 0 | Refills: 0 | Status: COMPLETED | OUTPATIENT
Start: 2017-04-19 | End: 2017-04-19

## 2017-04-19 RX ORDER — PANTOPRAZOLE SODIUM 20 MG/1
40 TABLET, DELAYED RELEASE ORAL DAILY
Qty: 0 | Refills: 0 | Status: DISCONTINUED | OUTPATIENT
Start: 2017-04-19 | End: 2017-04-20

## 2017-04-19 RX ORDER — FERROUS SULFATE 325(65) MG
1 TABLET ORAL
Qty: 0 | Refills: 0 | COMMUNITY

## 2017-04-19 RX ADMIN — Medication 4: at 08:07

## 2017-04-19 RX ADMIN — INSULIN GLARGINE 5 UNIT(S): 100 INJECTION, SOLUTION SUBCUTANEOUS at 22:21

## 2017-04-19 RX ADMIN — SODIUM CHLORIDE 2000 MILLILITER(S): 9 INJECTION, SOLUTION INTRAVENOUS at 15:46

## 2017-04-19 RX ADMIN — ATORVASTATIN CALCIUM 40 MILLIGRAM(S): 80 TABLET, FILM COATED ORAL at 21:38

## 2017-04-19 RX ADMIN — BUMETANIDE 2 MILLIGRAM(S): 0.25 INJECTION INTRAMUSCULAR; INTRAVENOUS at 17:15

## 2017-04-19 RX ADMIN — PROPOFOL 4.14 MICROGRAM(S)/KG/MIN: 10 INJECTION, EMULSION INTRAVENOUS at 04:03

## 2017-04-19 RX ADMIN — CHLORHEXIDINE GLUCONATE 15 MILLILITER(S): 213 SOLUTION TOPICAL at 06:22

## 2017-04-19 RX ADMIN — BUMETANIDE 2 MILLIGRAM(S): 0.25 INJECTION INTRAMUSCULAR; INTRAVENOUS at 06:22

## 2017-04-19 RX ADMIN — CHLORHEXIDINE GLUCONATE 15 MILLILITER(S): 213 SOLUTION TOPICAL at 17:15

## 2017-04-19 RX ADMIN — PANTOPRAZOLE SODIUM 40 MILLIGRAM(S): 20 TABLET, DELAYED RELEASE ORAL at 11:40

## 2017-04-19 RX ADMIN — GABAPENTIN 300 MILLIGRAM(S): 400 CAPSULE ORAL at 07:00

## 2017-04-19 RX ADMIN — GABAPENTIN 300 MILLIGRAM(S): 400 CAPSULE ORAL at 21:38

## 2017-04-19 RX ADMIN — SODIUM CHLORIDE 1000 MILLILITER(S): 9 INJECTION, SOLUTION INTRAVENOUS at 14:06

## 2017-04-19 RX ADMIN — Medication 1 DROP(S): at 07:01

## 2017-04-19 RX ADMIN — Medication 1 DROP(S): at 17:15

## 2017-04-19 RX ADMIN — Medication 325 MILLIGRAM(S): at 11:40

## 2017-04-19 RX ADMIN — Medication 6: at 17:14

## 2017-04-19 RX ADMIN — Medication 100 MILLIGRAM(S): at 16:10

## 2017-04-19 RX ADMIN — TAMSULOSIN HYDROCHLORIDE 0.4 MILLIGRAM(S): 0.4 CAPSULE ORAL at 21:42

## 2017-04-19 RX ADMIN — Medication 100 MILLIGRAM(S): at 06:22

## 2017-04-19 RX ADMIN — GABAPENTIN 300 MILLIGRAM(S): 400 CAPSULE ORAL at 14:47

## 2017-04-19 RX ADMIN — ISOSORBIDE DINITRATE 10 MILLIGRAM(S): 5 TABLET ORAL at 06:23

## 2017-04-19 RX ADMIN — CARVEDILOL PHOSPHATE 6.25 MILLIGRAM(S): 80 CAPSULE, EXTENDED RELEASE ORAL at 06:22

## 2017-04-19 RX ADMIN — Medication 10 MILLIGRAM(S): at 07:01

## 2017-04-19 NOTE — PROGRESS NOTE ADULT - SUBJECTIVE AND OBJECTIVE BOX
Patient is a 87y old  Male who presents with a chief complaint of pt got intubated in the ED after HD (19 Apr 2017 04:25)      BRIEF HOSPITAL COURSE: 87 yom with ESRD, CHF, DM, CAD presenting with worsening renal failure, started on dialysis developed neck swelling after dialysis requiring intubation, also having nose bleed after ng insertion    Events last 24 hours: nasal packing placed, hypotensive this afternoon responding to fluids, repeat labs pending, neck swelling improved    PAST MEDICAL & SURGICAL HISTORY:  Chronic renal failure  Coronary artery disease involving autologous vein bypass graft  CHF (congestive heart failure): FAMILY/PT NOT SURE IF DIAGNOSED WITH CHF OR COPD  Pacemaker  Diabetes  PSA elevation  Hyperlipemia  Hypertension  Cataract  Glaucoma  S/P CABG (coronary artery bypass graft)  S/P prostatectomy: 1992      Review of Systems:    Medications:  ceFAZolin   IVPB 1000milliGRAM(s) IV Intermittent once    tamsulosin 0.4milliGRAM(s) Oral at bedtime  buMETAnide 2milliGRAM(s) Oral every 12 hours  isosorbide   dinitrate Tablet (ISORDIL) 10milliGRAM(s) Oral three times a day  carvedilol 6.25milliGRAM(s) Oral every 12 hours  hydrALAZINE 10milliGRAM(s) Oral every 8 hours      aspirin 325milliGRAM(s) Oral daily  gabapentin 300milliGRAM(s) Oral three times a day        docusate sodium 100milliGRAM(s) Oral two times a day  pantoprazole  Injectable 40milliGRAM(s) IV Push daily      atorvastatin 40milliGRAM(s) Oral at bedtime  dextrose Gel 1Dose(s) Oral once PRN  dextrose 50% Injectable 12.5Gram(s) IV Push once  dextrose 50% Injectable 25Gram(s) IV Push once  dextrose 50% Injectable 25Gram(s) IV Push once  glucagon  Injectable 1milliGRAM(s) IntraMuscular once PRN  insulin lispro (HumaLOG) corrective regimen sliding scale  SubCutaneous every 6 hours  insulin glargine Injectable (LANTUS) 5Unit(s) SubCutaneous at bedtime    dextrose 5%. 1000milliLiter(s) IV Continuous <Continuous>  multiple electrolytes Injection Type 1 Bolus 1000milliLiter(s) IV Bolus once    epoetin una Injectable 34441Cinj(s) IV Push <User Schedule>    artificial  tears Solution 1Drop(s) Both EYES two times a day  chlorhexidine 0.12% Liquid 15milliLiter(s) Swish and Spit every 12 hours        Mode: AC/ CMV (Assist Control/ Continuous Mandatory Ventilation)  RR (machine): 12  TV (machine): 440  FiO2: 25  PEEP: 5  MAP: 8  PIP: 19      ICU Vital Signs Last 24 Hrs  T(C): 37.8, Max: 37.8 (04-19 @ 12:00)  T(F): 100, Max: 100 (04-19 @ 12:00)  HR: 91 (64 - 113)  BP: 91/54 (74/37 - 154/72)  BP(mean): 70 (50 - 86)  ABP: --  ABP(mean): --  RR: 15 (13 - 23)  SpO2: 98% (97% - 100%)      ABG - ( 19 Apr 2017 00:28 )  pH: 7.36  /  pCO2: 46    /  pO2: 135   / HCO3: 25    / Base Excess: 0.4   /  SaO2: 100                 I&O's Detail  I & Os for 24h ending 19 Apr 2017 07:00  =============================================  IN:    Oral Fluid: 120 ml    propofol Infusion: 43.8 ml    propofol Infusion: 11 ml    Total IN: 174.8 ml  ---------------------------------------------  OUT:    Indwelling Catheter - Urethral: 1795 ml    Other: 400 ml    Total OUT: 2195 ml  ---------------------------------------------  Total NET: -2020.2 ml    I & Os for current day (as of 19 Apr 2017 15:40)  =============================================  IN:    IV PiggyBack: 1000 ml    Nepro: 165 ml    Total IN: 1165 ml  ---------------------------------------------  OUT:    Indwelling Catheter - Urethral: 105 ml    Total OUT: 105 ml  ---------------------------------------------  Total NET: 1060 ml        LABS:                        7.7    6.1   )-----------( 73       ( 19 Apr 2017 07:00 )             24.9     04-19    141  |  100  |  72.0<H>  ----------------------------<  182<H>  4.4   |  25.0  |  3.81<H>    Ca    8.2<L>      19 Apr 2017 07:00  Phos  6.5     04-18    TPro  6.6  /  Alb  3.4  /  TBili  0.2<L>  /  DBili  x   /  AST  22  /  ALT  17  /  AlkPhos  63  04-19          CAPILLARY BLOOD GLUCOSE  106 (19 Apr 2017 12:00)        CULTURES:      Physical Examination:    General: No acute distress.  intubated    HEENT: Pupils equal, reactive to light.  Symmetric.    PULM: Clear to auscultation bilaterally, no significant sputum production    CVS: Regular rate and rhythm, no murmurs, rubs, or gallops    ABD: Soft, nondistended, nontender, normoactive bowel sounds, no masses    EXT: No edema, nontender    SKIN: Warm and well perfused, no rashes noted.    RADIOLOGY:  EXAM:  CT ANGIO NECK (W)AW IC                          PROCEDURE DATE:  04/18/2017        INTERPRETATION:  CLINICAL HISTORY: Status post right permacath, rule out   vascular injury, confusion, weakness    TECHNIQUE: CTA neck. 96 cc Omnipaque 350 Intravenous contrast was   administered. 2-D MIP and 3-D volume rendering images.    COMPARISON: None    FINDINGS:    CTA NECK:    Right-sided dialysis catheter is noted entering the right subclavian   vein. The distal end is not visualized.    Mild atherosclerotic disease throughout the proximal great vessels.   Moderate calcific and soft plaque in the right carotid bulb extending   into the proximal right internal carotid artery. There is less than 50%   stenosis in the right carotid bulb. Less than 50% stenosis of the origin   of the right internal carotid.    Moderate calcific and soft plaque in the left carotid bulb and proximal   internal carotid artery. Less than 50 % stenosis in the left carotid   bulb. Less than 50% stenosis at the origin of the left internal carotid   artery.    Severe bilateral cavernous carotid artery calcifications. Moderate   stenosis in the right cavernous carotid on image 58, series 8. Mild   calcific plaque in the right vertebral artery on image 95, series 8,   causing mild stenosis.    A left-sided aortic arch is demonstrated. There is normal relationship to   the great vessels. The common carotid arteries, internal carotid arteries   and vertebral arteries show no other evidence of significant stenosis,   occlusion or saccular aneurysm dilation. The vertebral arteries are   codominant.    Severe diffuse bilateral neck edema/infiltrating hematoma in the right   supraclavicular fossa extending cephalad bilaterally.    IMPRESSION:          No evidence of vascular injury. Additional findings above.    Preliminary report provided by Hector Lara.    CRITICAL CARE TIME SPENT: 50

## 2017-04-19 NOTE — PROGRESS NOTE ADULT - PROBLEM SELECTOR PLAN 1
s/p HD   HD per renal   Cont to trend UOP that has been good despite elevated Cr.  Renal  feels NIDHI may improve

## 2017-04-19 NOTE — PROGRESS NOTE ADULT - SUBJECTIVE AND OBJECTIVE BOX
NEPHROLOGY INTERVAL HPI/OVERNIGHT EVENTS:    Examined bedside MICU family present  Permcath placed then developed swelling in R Neck was emergently  Intubated by Anesthesia  concern of poss expanding  hematoma.  First HD yesterday  SBP 70-80's  UOP 1795cc /24h    MEDICATIONS  (STANDING):  aspirin 325milliGRAM(s) Oral daily  tamsulosin 0.4milliGRAM(s) Oral at bedtime  gabapentin 300milliGRAM(s) Oral three times a day  atorvastatin 40milliGRAM(s) Oral at bedtime  docusate sodium 100milliGRAM(s) Oral two times a day  buMETAnide 2milliGRAM(s) Oral every 12 hours  artificial  tears Solution 1Drop(s) Both EYES two times a day  dextrose 5%. 1000milliLiter(s) IV Continuous <Continuous>  dextrose 50% Injectable 12.5Gram(s) IV Push once  dextrose 50% Injectable 25Gram(s) IV Push once  dextrose 50% Injectable 25Gram(s) IV Push once  epoetin una Injectable 04043Nogc(s) IV Push <User Schedule>  chlorhexidine 0.12% Liquid 15milliLiter(s) Swish and Spit every 12 hours  pantoprazole  Injectable 40milliGRAM(s) IV Push daily  insulin lispro (HumaLOG) corrective regimen sliding scale  SubCutaneous every 6 hours  insulin glargine Injectable (LANTUS) 5Unit(s) SubCutaneous at bedtime  isosorbide   dinitrate Tablet (ISORDIL) 10milliGRAM(s) Oral three times a day  carvedilol 6.25milliGRAM(s) Oral every 12 hours  hydrALAZINE 10milliGRAM(s) Oral every 8 hours    MEDICATIONS  (PRN):  dextrose Gel 1Dose(s) Oral once PRN Blood Glucose LESS THAN 70 milliGRAM(s)/deciliter  glucagon  Injectable 1milliGRAM(s) IntraMuscular once PRN Glucose LESS THAN 70 milligrams/deciliter      Allergies    No Known Allergies    Intolerances        Vital Signs Last 24 Hrs  T(C): 37.8, Max: 37.8 (04-19 @ 12:00)  T(F): 100, Max: 100 (04-19 @ 12:00)  HR: 91 (64 - 113)  BP: 91/54 (74/37 - 154/72)  BP(mean): 70 (50 - 86)  RR: 15 (13 - 23)  SpO2: 98% (97% - 100%)  Daily Height in cm: 177.8 (2017 23:30)    Daily Weight in k (2017 11:05)    PHYSICAL EXAM:  GENERAL: NAD,   EYES:  conjunctiva and sclera clear  NECK: Supple, No JVD/Bruit  NERVOUS SYSTEM:  A/O x3,   CHEST:  CTA ,No rales orrhonchi  HEART:  RRR, No murmurs  ABDOMEN: Soft, NT/ND BS+  EXTREMITIES:  No C/C,  tr Edema; NT  SKIN: No rashes    LABS:                        7.7    6.1   )-----------( 73       ( 2017 07:00 )             24.9         141  |  100  |  72.0<H>  ----------------------------<  182<H>  4.4   |  25.0  |  3.81<H>    Ca    8.2<L>      2017 07:00  Phos  6.5         TPro  6.6  /  Alb  3.4  /  TBili  0.2<L>  /  DBili  x   /  AST  22  /  ALT  17  /  AlkPhos  63            ABG - ( 2017 00:28 )  pH: 7.36  /  pCO2: 46    /  pO2: 135   / HCO3: 25    / Base Excess: 0.4   /  SaO2: 100                   RADIOLOGY & ADDITIONAL TESTS:

## 2017-04-19 NOTE — PROGRESS NOTE ADULT - ASSESSMENT
Pt is an 87 yom with mltiple medical problems who developed neck swelling yesterday after dialysis requiring intubation    Talked with wife maryurin was at home until last hospitalization when he went to rehab, readmitted with worsening renal failure and started on dialysis, baseline he walks with walker, no dementia, no advanced directives    LISA norwood Pt is an 87 yom with mltiple medical problems who developed neck swelling yesterday after dialysis requiring intubation    Talked with wife patient was at home until last hospitalization when he went to rehab, readmitted with worsening renal failure and started on dialysis, baseline he walks with walker, no dementia, no advanced directives    LISA norwood

## 2017-04-19 NOTE — PROGRESS NOTE ADULT - PROBLEM SELECTOR PLAN 2
F/U offical CTA neck read  Monitor swelling   Lung protective Vent settings   Leak test and SBT in AM   Defer Steroids at this point  Titrated FOi2 down to 35%  AC on hold tonight - Permacath site weeping SCD's

## 2017-04-19 NOTE — PROGRESS NOTE ADULT - SUBJECTIVE AND OBJECTIVE BOX
Patient is a 87y old  Male who presents with a chief complaint of ARf  on CRF (17 Apr 2017 15:32)      BRIEF HOSPITAL COURSE: 87 year old male PMHx. DM2, HTN, HLD, ICM, Systolic CHF, CRF.  Presents to ED 4/17/2017 with worsening ARF.  Had refused HD on last admission.       Events last 24 hours: Perma-cath placement by IR 4/18 and s/p 1st HD tonight.  Upon return to ED ( was awaiting Tele Bed) found to have swelling in R Neck and was emergently Intubated (by Anestesia) for airway protection for concern of poss expanding  hematoma.      PAST MEDICAL & SURGICAL HISTORY:  Chronic renal failure  Coronary artery disease involving autologous vein bypass graft  CHF (congestive heart failure): FAMILY/PT NOT SURE IF DIAGNOSED WITH CHF OR COPD  Pacemaker  Diabetes  PSA elevation  Hyperlipemia  Hypertension  Cataract  Glaucoma  S/P CABG (coronary artery bypass graft)  S/P prostatectomy: 1992      Review of Systems: Unable to access         Medications:  tamsulosin 0.4milliGRAM(s) Oral at bedtime  isosorbide   dinitrate Tablet (ISORDIL) 10milliGRAM(s) Oral three times a day  carvedilol 6.25milliGRAM(s) Oral every 12 hours  buMETAnide 2milliGRAM(s) Oral every 12 hours  hydrALAZINE 10milliGRAM(s) Oral every 8 hours  aspirin 325milliGRAM(s) Oral daily  gabapentin 300milliGRAM(s) Oral three times a day  propofol Infusion 10MICROgram(s)/kG/Min IV Continuous   docusate sodium 100milliGRAM(s) Oral two times a day  pantoprazole  Injectable 40milliGRAM(s) IV Push daily  atorvastatin 40milliGRAM(s) Oral at bedtime  insulin lispro (HumaLOG) corrective regimen sliding scale  SubCutaneous three times a day before meals  dextrose Gel 1Dose(s) Oral once PRN  dextrose 50% Injectable 12.5Gram(s) IV Push once  dextrose 50% Injectable 25Gram(s) IV Push once  dextrose 50% Injectable 25Gram(s) IV Push once  glucagon  Injectable 1milliGRAM(s) IntraMuscular once PRN  dextrose 5%. 1000milliLiter(s) IV Continuous   epoetin una Injectable 16019Mofu(s) IV Push   artificial  tears Solution 1Drop(s) Both EYES two times a day  chlorhexidine 0.12% Liquid 15milliLiter(s) Swish and Spit every 12 hours            Mode: AC/ CMV (Assist Control/ Continuous Mandatory Ventilation)  RR (machine): 12  TV (machine): 440  FiO2: 30  PEEP: 5  MAP: 8  PIP: 19      ICU Vital Signs Last 24 Hrs  T(C): 36.6, Max: 36.8 (04-18 @ 18:15)  T(F): 97.9, Max: 98.3 (04-18 @ 18:15)  HR: 93 (64 - 113)  BP: 115/55 (75/37 - 154/72)  BP(mean): 79 (79 - 79)  ABP: --  ABP(mean): --  RR: 15 (14 - 22)  SpO2: 100% (99% - 100%)      ABG - ( 19 Apr 2017 00:28 )  pH: 7.36  /  pCO2: 46    /  pO2: 135   / HCO3: 25    / Base Excess: 0.4   /  SaO2: 100                 I&O's Detail    I & Os for current day (as of 19 Apr 2017 03:34)  =============================================  IN:    propofol Infusion: 11 ml    Total IN: 11 ml  ---------------------------------------------  OUT:    Indwelling Catheter - Urethral: 1600 ml    Other: 400 ml    Total OUT: 2000 ml  ---------------------------------------------  Total NET: -1989 ml        LABS:                        7.7    5.4   )-----------( 70       ( 19 Apr 2017 00:47 )             24.3     04-19    139  |  99  |  69.0<H>  ----------------------------<  194<H>  4.2   |  25.0  |  3.62<H>    Ca    7.9<L>      19 Apr 2017 00:47  Phos  6.5     04-18    TPro  6.6  /  Alb  3.4  /  TBili  0.2<L>  /  DBili  x   /  AST  22  /  ALT  17  /  AlkPhos  63  04-19      CARDIAC MARKERS ( 17 Apr 2017 09:42 )  x     / 0.10 ng/mL / 187 U/L / x     / 2.9 ng/mL      CAPILLARY BLOOD GLUCOSE  160 (18 Apr 2017 18:15)    PT/INR - ( 17 Apr 2017 09:42 )   PT: 13.0 sec;   INR: 1.18 ratio         PTT - ( 17 Apr 2017 09:42 )  PTT:31.7 sec    CULTURES:            Physical Examination:    General: No acute distress.  Alert, oriented, interactive, nonfocal    HEENT: Pupils equal, reactive to light.  Symmetric. Neck with swelling most notable to R neck, ET / OGT     PULM: Clear to auscultation bilaterally, no significant sputum production, R ACW perma-cath with blood saturated dressing No crepitis, slight underlying hematoma.    CVS: Regular rate and rhythm, no murmurs, rubs, or gallops    ABD: Soft, nondistended, nontender, normoactive bowel sounds, no masses    EXT: No edema, nontender    SKIN: Warm and well perfused, no rashes noted.            RADIOLOGY: EXAM:  CHEST SINGLE VIEW FRONTAL                          PROCEDURE DATE:  04/17/2017        INTERPRETATION:  CHEST AP PORTABLE:    History: r/o infiltrate. Generalized weakness    Date and time of exam: 4/17/2017 10:55 AM.    Technique: A single AP view of the chest was obtained.    Comparison exam: 4/4/2017.    Findings:  Post cardiac surgery changes. A pacemaker/defibrillator is noted with 3   leads. This finding is unchanged. No hilar or mediastinal abnormality.   The lungs are clear. No evidence of a pneumothorax or pleural effusion..    Impression:  No evidence of acute cardiopulmonary disease and no change since 4/4/2017.      CTA Neck :  No active bleeding or hematoma noted,  + soft tissue selling mild -  official read pending     CRITICAL CARE TIME SPENT: 40 minutes

## 2017-04-19 NOTE — ED ADULT NURSE REASSESSMENT NOTE - NS ED NURSE REASSESS COMMENT FT1
2126- Pt titrated back up on propofol drip to 7.5 mcg/kg.  2127- 20g placed in the Right forearm by LILLY Gleason.  2140- Left for CT scan, noticed perma cath dressing was about 30% covered in nadine red blood. Upon arrival to CT the guaze was 100% saturated, dressing reinforced. MD Sheth made aware. MD to call vascular surgeon.   2220- Jair MCGRAW from MICU came to assess pt. PA made aware of saturated perma cath dressing.   2243- Report given to LILLY Sanchez  2251-Left for MICU and full report given @ bedside.

## 2017-04-19 NOTE — PROGRESS NOTE ADULT - PROBLEM SELECTOR PLAN 1
unclear cause improving but not back to baseline per wide, will re-assess in am  cefazolin for nasal packing unclear cause improving but not back to baseline per wide, will re-assess in am

## 2017-04-19 NOTE — PATIENT PROFILE ADULT. - VISION (WITH CORRECTIVE LENSES IF THE PATIENT USUALLY WEARS THEM):
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

## 2017-04-19 NOTE — PROGRESS NOTE ADULT - SUBJECTIVE AND OBJECTIVE BOX
Pt seen and evaluated.  s/p RIJ permcath placement 4/18/2017 in AM. Had mild slight ooze around catheter post procedure, which resolved with pressure  Subsequently dialyzed through catheter in afternoon without difficulties  Overnight, developed neck swelling and dyspnea and intubated by medical team and sent to MICU.   On evaluation this morning, doing OK. Remains intubated. Neck soft. Tunnel track of catheter soft. No firmness or overlying ecchymosis. No bleeding on dressing. CTA of neck done overnight, which demonstrates subcutaneous edema, but no evidence of hematoma around RIJ, no evidence of arterial bleeding.  Unclear cause to his neck swelling, but improved today. May be anaphylactic reaction to medication. Doubt it's related to catheter placement  Would leave permcath in place and continue to access as needed for HD  Should eventually be weaned and extubated

## 2017-04-19 NOTE — PROGRESS NOTE ADULT - ASSESSMENT
NIDHI on CKD s/p HD x 1 yest developed R neck swelling same side as pc allergic reaction?  will hold off on HD today hemodynamically labile SBP  Electrolytes BP acceptable cont to watch for renal recovery  Anemia 2/2 ESRD TS 9% will hold off on giving Venofer until etiology of  swollen neck more clear concern for allergic RXN

## 2017-04-19 NOTE — PROGRESS NOTE ADULT - ASSESSMENT
87 year old male with Acute on Chronic Renal Failure now s/p Perma-cath placement and 1st HD, with soft tissue swelling to neck unclear etiology now on full vent support for airway protection.

## 2017-04-20 DIAGNOSIS — H26.9 UNSPECIFIED CATARACT: ICD-10-CM

## 2017-04-20 DIAGNOSIS — E78.2 MIXED HYPERLIPIDEMIA: ICD-10-CM

## 2017-04-20 LAB
ANION GAP SERPL CALC-SCNC: 18 MMOL/L — HIGH (ref 5–17)
BUN SERPL-MCNC: 80 MG/DL — HIGH (ref 8–20)
CALCIUM SERPL-MCNC: 8.7 MG/DL — SIGNIFICANT CHANGE UP (ref 8.6–10.2)
CHLORIDE SERPL-SCNC: 99 MMOL/L — SIGNIFICANT CHANGE UP (ref 98–107)
CO2 SERPL-SCNC: 23 MMOL/L — SIGNIFICANT CHANGE UP (ref 22–29)
CREAT SERPL-MCNC: 4.74 MG/DL — HIGH (ref 0.5–1.3)
GLUCOSE SERPL-MCNC: 130 MG/DL — HIGH (ref 70–115)
HBV CORE AB SER-ACNC: SIGNIFICANT CHANGE UP
HBV CORE IGM SER-ACNC: SIGNIFICANT CHANGE UP
HBV SURFACE AB SER-ACNC: SIGNIFICANT CHANGE UP
HBV SURFACE AG SER-ACNC: SIGNIFICANT CHANGE UP
HCT VFR BLD CALC: 27.9 % — LOW (ref 42–52)
HCT VFR BLD CALC: 28.5 % — LOW (ref 42–52)
HCV AB S/CO SERPL IA: 0.27 S/CO — SIGNIFICANT CHANGE UP
HCV AB SERPL-IMP: SIGNIFICANT CHANGE UP
HGB BLD-MCNC: 8.8 G/DL — LOW (ref 14–18)
HGB BLD-MCNC: 9.1 G/DL — LOW (ref 14–18)
MAGNESIUM SERPL-MCNC: 2.1 MG/DL — SIGNIFICANT CHANGE UP (ref 1.8–2.5)
MCHC RBC-ENTMCNC: 26.6 PG — LOW (ref 27–31)
MCHC RBC-ENTMCNC: 26.8 PG — LOW (ref 27–31)
MCHC RBC-ENTMCNC: 31.5 G/DL — LOW (ref 32–36)
MCHC RBC-ENTMCNC: 31.9 G/DL — LOW (ref 32–36)
MCV RBC AUTO: 83.3 FL — SIGNIFICANT CHANGE UP (ref 80–94)
MCV RBC AUTO: 85.1 FL — SIGNIFICANT CHANGE UP (ref 80–94)
PHOSPHATE SERPL-MCNC: 7.2 MG/DL — HIGH (ref 2.4–4.7)
PLATELET # BLD AUTO: 58 K/UL — LOW (ref 150–400)
PLATELET # BLD AUTO: 64 K/UL — LOW (ref 150–400)
POTASSIUM SERPL-MCNC: 4.3 MMOL/L — SIGNIFICANT CHANGE UP (ref 3.5–5.3)
POTASSIUM SERPL-SCNC: 4.3 MMOL/L — SIGNIFICANT CHANGE UP (ref 3.5–5.3)
RBC # BLD: 3.28 M/UL — LOW (ref 4.6–6.2)
RBC # BLD: 3.42 M/UL — LOW (ref 4.6–6.2)
RBC # FLD: 17.2 % — HIGH (ref 11–15.6)
RBC # FLD: 17.7 % — HIGH (ref 11–15.6)
SODIUM SERPL-SCNC: 140 MMOL/L — SIGNIFICANT CHANGE UP (ref 135–145)
WBC # BLD: 7.1 K/UL — SIGNIFICANT CHANGE UP (ref 4.8–10.8)
WBC # BLD: 8.2 K/UL — SIGNIFICANT CHANGE UP (ref 4.8–10.8)
WBC # FLD AUTO: 7.1 K/UL — SIGNIFICANT CHANGE UP (ref 4.8–10.8)
WBC # FLD AUTO: 8.2 K/UL — SIGNIFICANT CHANGE UP (ref 4.8–10.8)

## 2017-04-20 PROCEDURE — 99223 1ST HOSP IP/OBS HIGH 75: CPT

## 2017-04-20 PROCEDURE — 99233 SBSQ HOSP IP/OBS HIGH 50: CPT

## 2017-04-20 PROCEDURE — 99291 CRITICAL CARE FIRST HOUR: CPT

## 2017-04-20 RX ORDER — SODIUM CHLORIDE 9 MG/ML
1000 INJECTION INTRAMUSCULAR; INTRAVENOUS; SUBCUTANEOUS
Qty: 0 | Refills: 0 | Status: DISCONTINUED | OUTPATIENT
Start: 2017-04-20 | End: 2017-04-21

## 2017-04-20 RX ORDER — ASPIRIN/CALCIUM CARB/MAGNESIUM 324 MG
324 TABLET ORAL ONCE
Qty: 0 | Refills: 0 | Status: COMPLETED | OUTPATIENT
Start: 2017-04-20 | End: 2017-04-20

## 2017-04-20 RX ORDER — HEPARIN SODIUM 5000 [USP'U]/ML
5000 INJECTION INTRAVENOUS; SUBCUTANEOUS EVERY 12 HOURS
Qty: 0 | Refills: 0 | Status: DISCONTINUED | OUTPATIENT
Start: 2017-04-20 | End: 2017-05-08

## 2017-04-20 RX ADMIN — GABAPENTIN 300 MILLIGRAM(S): 400 CAPSULE ORAL at 05:17

## 2017-04-20 RX ADMIN — BUMETANIDE 2 MILLIGRAM(S): 0.25 INJECTION INTRAMUSCULAR; INTRAVENOUS at 05:17

## 2017-04-20 RX ADMIN — SODIUM CHLORIDE 70 MILLILITER(S): 9 INJECTION INTRAMUSCULAR; INTRAVENOUS; SUBCUTANEOUS at 16:55

## 2017-04-20 RX ADMIN — HEPARIN SODIUM 5000 UNIT(S): 5000 INJECTION INTRAVENOUS; SUBCUTANEOUS at 17:05

## 2017-04-20 RX ADMIN — Medication 1 DROP(S): at 17:05

## 2017-04-20 RX ADMIN — Medication 324 MILLIGRAM(S): at 17:00

## 2017-04-20 RX ADMIN — CHLORHEXIDINE GLUCONATE 15 MILLILITER(S): 213 SOLUTION TOPICAL at 05:17

## 2017-04-20 RX ADMIN — Medication 1 DROP(S): at 05:17

## 2017-04-20 RX ADMIN — ERYTHROPOIETIN 10000 UNIT(S): 10000 INJECTION, SOLUTION INTRAVENOUS; SUBCUTANEOUS at 11:44

## 2017-04-20 RX ADMIN — INSULIN GLARGINE 5 UNIT(S): 100 INJECTION, SOLUTION SUBCUTANEOUS at 22:59

## 2017-04-20 NOTE — CONSULT NOTE ADULT - SUBJECTIVE AND OBJECTIVE BOX
HPI:  87 yr old male with known history of Dm, htn, HLD, ICM, CHF systolic, CRF presents with worsening ARF , last admission was refusing HD now agreeable - plan for HD in AM, already seen by renal Dr lang , complains of "i couldnt control my nerves, i was shaking, no LOC, no syncope, no palpitations , no Cp, no SOB , no fall, symptoms severe , worse over last two days , no relieving or exacerbating conditoons , NO CP, no orthopnea, no PND (17 Apr 2017 15:32)      PERTINENT PMH REVIEWED: Yes No    PAST MEDICAL & SURGICAL HISTORY:  Chronic renal failure  Coronary artery disease involving autologous vein bypass graft  CHF (congestive heart failure): FAMILY/PT NOT SURE IF DIAGNOSED WITH CHF OR COPD  Pacemaker  Diabetes  PSA elevation  Hyperlipemia  Hypertension  Cataract  Glaucoma  S/P CABG (coronary artery bypass graft)  S/P prostatectomy: 1992      SOCIAL HISTORY:  EtOH Yes   No                                    Drugs  Yes  No                                    smoker  nonsmoker                                    Admitted from: home  SNF _________ [facility] ________Surrogate/HCP/Guardian: Phone#:    FAMILY HISTORY:  No pertinent family history in first degree relatives      Baseline ADLs (prior to admission):  Independent/ Dependent      Present Symptoms:     Dyspnea: 0 1 2 3   Nausea/Vomiting: Yes No  Anxiety:  Yes No  Depression: Yes No  Fatigue: Yes No  Loss of appetite: Yes No    Pain:             Character-            Duration-            Effect-            Factors-            Frequency-            Location-            Severity-    Review of Systems: Reviewed                     Negative:                     Positive:  Unable to obtain due to poor mentation   All others negative    MEDICATIONS  (STANDING):  aspirin 325milliGRAM(s) Oral daily  tamsulosin 0.4milliGRAM(s) Oral at bedtime  gabapentin 300milliGRAM(s) Oral three times a day  atorvastatin 40milliGRAM(s) Oral at bedtime  docusate sodium 100milliGRAM(s) Oral two times a day  buMETAnide 2milliGRAM(s) Oral every 12 hours  artificial  tears Solution 1Drop(s) Both EYES two times a day  dextrose 5%. 1000milliLiter(s) IV Continuous <Continuous>  dextrose 50% Injectable 12.5Gram(s) IV Push once  dextrose 50% Injectable 25Gram(s) IV Push once  dextrose 50% Injectable 25Gram(s) IV Push once  epoetin una Injectable 24807Bvbx(s) IV Push <User Schedule>  insulin lispro (HumaLOG) corrective regimen sliding scale  SubCutaneous every 6 hours  insulin glargine Injectable (LANTUS) 5Unit(s) SubCutaneous at bedtime    MEDICATIONS  (PRN):  dextrose Gel 1Dose(s) Oral once PRN Blood Glucose LESS THAN 70 milliGRAM(s)/deciliter  glucagon  Injectable 1milliGRAM(s) IntraMuscular once PRN Glucose LESS THAN 70 milligrams/deciliter      Allergies    No Known Allergies    Intolerances        PHYSICAL EXAM:    Vital Signs Last 24 Hrs  T(C): 37.4, Max: 37.9 (04-19 @ 18:00)  T(F): 99.3, Max: 100.2 (04-19 @ 18:00)  HR: 102 (86 - 104)  BP: 122/58 (74/37 - 142/70)  BP(mean): 84 (50 - 98)  RR: 16 (13 - 23)  SpO2: 84% (84% - 100%)    General: alert  oriented x ____ lethargic agitated                  cachexia  nonverbal  coma    Karnofsky:  %    HEENT: normal  dry mouth  ET tube/trach    Lungs: comfortable tachypnea/labored breathing  excessive secretions    CV: normal  tachycardia    GI: normal  distended  tender  no BS               PEG/NG/OG tube  constipation  last BM:     : normal  incontinent  oliguria/anuria  onrwood    MSK: normal  weakness  edema             ambulatory  bedbound/wheelchair bound    Skin: normal  pressure ulcers- Stage_____  no rash    LABS:                        9.1    8.2   )-----------( 64       ( 20 Apr 2017 05:42 )             28.5     04-20    140  |  99  |  80.0<H>  ----------------------------<  130<H>  4.3   |  23.0  |  4.74<H>    Ca    8.7      20 Apr 2017 05:42  Phos  7.2     04-20  Mg     2.1     04-20    TPro  6.6  /  Alb  3.4  /  TBili  0.2<L>  /  DBili  x   /  AST  22  /  ALT  17  /  AlkPhos  63  04-19        I&O's Summary  I & Os for 24h ending 19 Apr 2017 07:00  =============================================  IN: 174.8 ml / OUT: 2195 ml / NET: -2020.2 ml    I & Os for current day (as of 20 Apr 2017 06:41)  =============================================  IN: 2858 ml / OUT: 295 ml / NET: 2563 ml      RADIOLOGY & ADDITIONAL STUDIES:    ADVANCE DIRECTIVES:   DNR YES NO  Completed on:                     MOLST  YES NO   Completed on:  Living Will  YES NO   Completed on:    COUNSELING:    Face to face meeting to discuss Advanced Care Planning - Time Spent ______ Minutes.  See goals of care note.    More than 50% time spent in counseling and coordinating care. ______ Minutes.     Thank you for the opportunity to assist with the care of this patient.   [facility] [phone]. HPI:  87 yr old male with known history of Dm, htn, HLD, ICM, CHF systolic, CRF presents with worsening ARF , last admission was refusing HD now agreeable - plan for HD in AM, already seen by renal Dr lang , complains of "i couldnt control my nerves, i was shaking, no LOC, no syncope, no palpitations , no Cp, no SOB , no fall, symptoms severe , worse over last two days , no relieving or exacerbating conditoons , NO CP, no orthopnea, no PND (17 Apr 2017 15:32)      PERTINENT PMH REVIEWED: Yes    PAST MEDICAL & SURGICAL HISTORY:  Chronic renal failure  Coronary artery disease involving autologous vein bypass graft  CHF (congestive heart failure): FAMILY/PT NOT SURE IF DIAGNOSED WITH CHF OR COPD  Pacemaker  Diabetes  PSA elevation  Hyperlipemia  Hypertension  Cataract  Glaucoma  S/P CABG (coronary artery bypass graft)  S/P prostatectomy: 1992      SOCIAL HISTORY:  EtOH   No                                    Drugs   No                                    nonsmoker                                    [facility]Surrogate/HCP/Guardian: Phone#:  wife [phone]    FAMILY HISTORY:  No pertinent family history in first degree relatives      Baseline ADLs (prior to admission):  Independent/     Present Symptoms:     Dyspnea:  extubated 4/20    currently on Ventimask tolerating well  Nausea/Vomiting:  No  Anxiety:   No  Depression:  No  Fatigue: Yes  Loss of appetite: currently NPO  related to extubated this am    Pain: denies            Character-            Duration-            Effect-            Factors-            Frequency-            Location-            Severity-    Review of Systems: Reviewed                     Negative:                     Positive:  SOB  related to ESRD   continues with dialysis  for fluid removall  Unable to obtain due to poor mentation   All others negative    MEDICATIONS  (STANDING):  aspirin 325milliGRAM(s) Oral daily  tamsulosin 0.4milliGRAM(s) Oral at bedtime  gabapentin 300milliGRAM(s) Oral three times a day  atorvastatin 40milliGRAM(s) Oral at bedtime  docusate sodium 100milliGRAM(s) Oral two times a day  buMETAnide 2milliGRAM(s) Oral every 12 hours  artificial  tears Solution 1Drop(s) Both EYES two times a day  dextrose 5%. 1000milliLiter(s) IV Continuous <Continuous>  dextrose 50% Injectable 12.5Gram(s) IV Push once  dextrose 50% Injectable 25Gram(s) IV Push once  dextrose 50% Injectable 25Gram(s) IV Push once  epoetin una Injectable 86979Yqgf(s) IV Push <User Schedule>  insulin lispro (HumaLOG) corrective regimen sliding scale  SubCutaneous every 6 hours  insulin glargine Injectable (LANTUS) 5Unit(s) SubCutaneous at bedtime    MEDICATIONS  (PRN):  dextrose Gel 1Dose(s) Oral once PRN Blood Glucose LESS THAN 70 milliGRAM(s)/deciliter  glucagon  Injectable 1milliGRAM(s) IntraMuscular once PRN Glucose LESS THAN 70 milligrams/deciliter      Allergies    No Known Allergies    Intolerances        PHYSICAL EXAM:    Vital Signs Last 24 Hrs  T(C): 37.4, Max: 37.9 (04-19 @ 18:00)  T(F): 99.3, Max: 100.2 (04-19 @ 18:00)  HR: 102 (86 - 104)  BP: 122/58 (74/37 - 142/70)  BP(mean): 84 (50 - 98)  RR: 16 (13 - 23)  SpO2: 84% (84% - 100%)    General: alert  oriented x _3___ lethargic             HEENT: normal     Lungs: comfortable tachypnea/labored breathing    CV: normal    GI: normal     :  enma    MSK:   weakness  edema  related to ESRD            ambulatory  bedbound/wheelchair bound    Skin: normal      LABS:                        9.1    8.2   )-----------( 64       ( 20 Apr 2017 05:42 )             28.5     04-20    140  |  99  |  80.0<H>  ----------------------------<  130<H>  4.3   |  23.0  |  4.74<H>    Ca    8.7      20 Apr 2017 05:42  Phos  7.2     04-20  Mg     2.1     04-20    TPro  6.6  /  Alb  3.4  /  TBili  0.2<L>  /  DBili  x   /  AST  22  /  ALT  17  /  AlkPhos  63  04-19        I&O's Summary  I & Os for 24h ending 19 Apr 2017 07:00  =============================================  IN: 174.8 ml / OUT: 2195 ml / NET: -2020.2 ml    I & Os for current day (as of 20 Apr 2017 06:41)  =============================================  IN: 2858 ml / OUT: 295 ml / NET: 2563 ml      RADIOLOGY & ADDITIONAL STUDIES:    ADVANCE DIRECTIVES:   DNR YES NO  Completed on:   no                  MOLST  YES NO   Completed on: no  Living Will  YES NO   Completed on: no

## 2017-04-20 NOTE — CONSULT NOTE ADULT - ATTENDING COMMENTS
COUNSELING:    Face to face meeting to discuss Advanced Care Planning - Time Spent ______ Minutes.  See goals of care note.    More than 50% time spent in counseling and coordinating care. 45______ Minutes.     Thank you for the opportunity to assist with the care of this patient.   [facility] [phone].

## 2017-04-20 NOTE — PROGRESS NOTE ADULT - ASSESSMENT
88 yo M with h/o DM, HTN, ischemic cardiomyopathy, chronic renal failure here with ESRD now requiring dialysis and neck hematoma

## 2017-04-20 NOTE — PROGRESS NOTE ADULT - SUBJECTIVE AND OBJECTIVE BOX
NEPHROLOGY INTERVAL HPI/OVERNIGHT EVENTS: No new events.    MEDICATIONS  (STANDING):  aspirin 325milliGRAM(s) Oral daily  tamsulosin 0.4milliGRAM(s) Oral at bedtime  gabapentin 300milliGRAM(s) Oral three times a day  atorvastatin 40milliGRAM(s) Oral at bedtime  docusate sodium 100milliGRAM(s) Oral two times a day  buMETAnide 2milliGRAM(s) Oral every 12 hours  artificial  tears Solution 1Drop(s) Both EYES two times a day  dextrose 50% Injectable 12.5Gram(s) IV Push once  dextrose 50% Injectable 25Gram(s) IV Push once  dextrose 50% Injectable 25Gram(s) IV Push once  epoetin una Injectable 33135Tkza(s) IV Push <User Schedule>  insulin lispro (HumaLOG) corrective regimen sliding scale  SubCutaneous every 6 hours  insulin glargine Injectable (LANTUS) 5Unit(s) SubCutaneous at bedtime  heparin  Injectable 5000Unit(s) SubCutaneous every 12 hours    MEDICATIONS  (PRN):  dextrose Gel 1Dose(s) Oral once PRN Blood Glucose LESS THAN 70 milliGRAM(s)/deciliter  glucagon  Injectable 1milliGRAM(s) IntraMuscular once PRN Glucose LESS THAN 70 milligrams/deciliter      Allergies    No Known Allergies    Intolerances        Vital Signs Last 24 Hrs  T(C): 37.1, Max: 37.9 (-19 @ 18:00)  T(F): 98.8, Max: 100.2 (04-19 @ 18:00)  HR: 86 (86 - 104)  BP: 120/56 (74/37 - 149/65)  BP(mean): 80 (50 - 98)  RR: 12 (12 - 17)  SpO2: 97% (84% - 100%)  Daily     Daily Weight in k.2 (2017 07:40)    PHYSICAL EXAM:    GENERAL: same  HEAD:  same  EYES:   ENMT:   NECK:  right permacath site clean with les neck edema  NERVOUS SYSTEM:  awake, alert, verbal  CHEST/LUNG: no wheezes,02 by mask  HEART: no rub  ABDOMEN: soft  EXTREMITIES:  no leg edema  LYMPH:   SKIN: no rash   neg  LABS:                        9.1    8.2   )-----------( 64       ( 2017 05:42 )             28.5     04-20    140  |  99  |  80.0<H>  ----------------------------<  130<H>  4.3   |  23.0  |  4.74<H>    Ca    8.7      2017 05:42  Phos  7.2       Mg     2.1         TPro  6.6  /  Alb  3.4  /  TBili  0.2<L>  /  DBili  x   /  AST  22  /  ALT  17  /  AlkPhos  63          Magnesium, Serum: 2.1 mg/dL ( @ 05:42)  Phosphorus Level, Serum: 7.2 mg/dL ( @ 05:42)    ABG - ( 2017 15:20 )  pH: 7.39  /  pCO2: 41    /  pO2: 94    / HCO3: x     / Base Excess: x     /  SaO2: 99                    RADIOLOGY & ADDITIONAL TESTS:

## 2017-04-20 NOTE — PROGRESS NOTE ADULT - PROBLEM SELECTOR PLAN 3
- unclear etiology. Per verbal signout was told "tongue edema" during intubation, however reviewing the CT read and images, more likely vascular injury (not major arterial bleeding) of venous that stopped bleeding though medication reaction is possible as well, though clinically less likely without other systemic manifestations.   - Had episode of epistaxsis, nasal packing place, would remove 4/21. does not require abx at this time.   - - unclear etiology. Per verbal signout was told "tongue edema" during intubation, however reviewing the CT read and images, more likely vascular injury (not major arterial bleeding) of venous that stopped bleeding though medication reaction is possible as well, though clinically less likely without other systemic manifestations.   - Had episode of epistaxsis, nasal packing place, would remove 4/21. does not require abx at this time.   - has some supplemental (3L) of oxygen requirements at this time, will titrate off of Nasal cannula, as no focal etiology for hypoxemia.

## 2017-04-20 NOTE — CONSULT NOTE ADULT - ASSESSMENT
COUNSELING:    Face to face meeting to discuss Advanced Care Planning - Time Spent ______ Minutes.  See goals of care note.    More than 50% time spent in counseling and coordinating care. 45______ Minutes.     Thank you for the opportunity to assist with the care of this patient.   [facility] Consult [facility] [phone].

## 2017-04-20 NOTE — PROGRESS NOTE ADULT - SUBJECTIVE AND OBJECTIVE BOX
Patient is a 87y old  Male who presents with a chief complaint of pt got intubated in the ED after HD (19 Apr 2017 04:25)    PAST MEDICAL & SURGICAL HISTORY:  Chronic renal failure  Coronary artery disease involving autologous vein bypass graft  CHF (congestive heart failure): FAMILY/PT NOT SURE IF DIAGNOSED WITH CHF OR COPD  Pacemaker  Diabetes  PSA elevation  Hyperlipemia  Hypertension  Cataract  Glaucoma  S/P CABG (coronary artery bypass graft)  S/P prostatectomy: 1992      BRIEF HOSPITAL COURSE:  87 yom with ESRD, CHF, DM, CAD presenting with worsening renal failure, started on dialysis developed neck swelling after dialysis requiring intubation, also having nose bleed after ng insertion.  Vascular feels neck swelling unrelated to Catheter insertion.      Events last 24 hours: nasal packing placed, hypotensive this afternoon responding to fluids, repeat labs pending, neck swelling improved.  ABLA from epistaxis requiring transfusion        Review of Systems:         UATO vented                                       ICU Vital Signs Last 24 Hrs  T(C): 37.3, Max: 37.9 (04-19 @ 18:00)  T(F): 99.1, Max: 100.2 (04-19 @ 18:00)  HR: 91 (86 - 104)  BP: 121/58 (74/37 - 132/60)  BP(mean): 84 (50 - 86)  ABP: --  ABP(mean): --  RR: 13 (13 - 23)  SpO2: 99% (97% - 100%)    Physical Examination:    General:   no distress     HEENT: tunnelled cath R chest wall no swelling.  R neck swollen but improved by report       PULM:  bilateral BS     CVS: s1 s2 reg      ABD: soft     EXT: no edema     SKIN: warm    Neuro: alert awake marianela sedation      ABG - ( 19 Apr 2017 15:20 )  pH: 7.39  /  pCO2: 41    /  pO2: 94    / HCO3: x     / Base Excess: x     /  SaO2: 99        Mode: AC/ CMV (Assist Control/ Continuous Mandatory Ventilation)  RR (machine): 12  TV (machine): 370  FiO2: 25  PEEP: 5  MAP: 8  PIP: 19    Mode: AC/ CMV (Assist Control/ Continuous Mandatory Ventilation), RR (machine): 12, TV (machine): 370, FiO2: 25, PEEP: 5, MAP: 8, PIP: 19  LABS:                        8.8    7.1   )-----------( x        ( 20 Apr 2017 00:11 )             27.9     04-19    138  |  97<L>  |  75.0<H>  ----------------------------<  161<H>  4.6   |  24.0  |  4.29<H>    Ca    7.9<L>      19 Apr 2017 15:40  Phos  6.5     04-18    TPro  6.6  /  Alb  3.4  /  TBili  0.2<L>  /  DBili  x   /  AST  22  /  ALT  17  /  AlkPhos  63  04-19          CAPILLARY BLOOD GLUCOSE  252 (19 Apr 2017 18:00)    CAPILLARY BLOOD GLUCOSE  252 (19 Apr 2017 18:00)  106 (19 Apr 2017 12:00)  210 (19 Apr 2017 08:00)    Medications:    tamsulosin 0.4milliGRAM(s) Oral at bedtime  buMETAnide 2milliGRAM(s) Oral every 12 hours  aspirin 325milliGRAM(s) Oral daily  gabapentin 300milliGRAM(s) Oral three times a day  docusate sodium 100milliGRAM(s) Oral two times a day  pantoprazole  Injectable 40milliGRAM(s) IV Push daily  atorvastatin 40milliGRAM(s) Oral at bedtime  insulin lispro (HumaLOG) corrective regimen sliding scale  SubCutaneous every 6 hours  insulin glargine Injectable (LANTUS) 5Unit(s) SubCutaneous at bedtime  epoetin una Injectable 11817Vzob(s) IV Push <User Schedule>  artificial  tears Solution 1Drop(s) Both EYES two times a day  chlorhexidine 0.12% Liquid 15milliLiter(s) Swish and Spit every 12 hours        RADIOLOGY/IMAGING/ECHO      CTA NECK:    Right-sided dialysis catheter is noted entering the right subclavian   vein. The distal end is not visualized.    Mild atherosclerotic disease throughout the proximal great vessels.   Moderate calcific and soft plaque in the right carotid bulb extending   into the proximal right internal carotid artery. There is less than 50%   stenosis in the right carotid bulb. Less than 50% stenosis of the origin   of the right internal carotid.    Moderate calcific and soft plaque in the left carotid bulb and proximal   internal carotid artery. Less than 50 % stenosis in the left carotid   bulb. Less than 50% stenosis at the origin of the left internal carotid   artery.    Severe bilateral cavernous carotid artery calcifications. Moderate   stenosis in the right cavernous carotid on image 58, series 8. Mild   calcific plaque in the right vertebral artery on image 95, series 8,   causing mild stenosis.    A left-sided aortic arch is demonstrated. There is normal relationship to   the great vessels. The common carotid arteries, internal carotid arteries   and vertebral arteries show no other evidence of significant stenosis,   occlusion or saccular aneurysm dilation. The vertebral arteries are   codominant.    Severe diffuse bilateral neck edema/infiltrating hematoma in the right   supraclavicular fossa extending cephalad bilaterally.    IMPRESSION:          No evidence of vascular injury. Additional findings above.    Preliminary report provided by [facility].      Assessment/Plan:    87M ESRD CHF DM HTN HLD   VDRF due to resp distress related to neck swelling of unclear etiology. Epistaxis (now packed) causing  ABLA associated with hypotension  requiring transfusion.   HGB stable now Neck swelling improved.    Will perform leak test.  If > 25% loss of TV will plan for extubation.            Critical Care time: 34 min   (Reviewing data, imaging, discussing with multidisciplinary team, non inclusive of procedures, discussing goals of care with patient/family)

## 2017-04-20 NOTE — PROGRESS NOTE ADULT - SUBJECTIVE AND OBJECTIVE BOX
No Known Allergies                                                             Code Status:EMILY RODRÍGUEZ Patient is a 87y old  Male who presents with a chief complaint of pt got intubated in the ED after HD (19 Apr 2017 04:25)      BRIEF HOSPITAL COURSE: admitted with NIDHI on CKD need for HD. s/p permcath placement with acute swelling of right neck, intubated for airway protection, extubated 4/20 ~ 0700; remains getting HD.     Events last 24 hours: extubated    Review of systems:     No generalized pain; feels "well" all ROS negative as intereviewed.         PAST MEDICAL & SURGICAL HISTORY:  Chronic renal failure  Coronary artery disease involving autologous vein bypass graft  CHF (congestive heart failure): FAMILY/PT NOT SURE IF DIAGNOSED WITH CHF OR COPD  Pacemaker  Diabetes  PSA elevation  Hyperlipemia  Hypertension  Cataract  Glaucoma  S/P CABG (coronary artery bypass graft)  S/P prostatectomy: 1992        Medications:    tamsulosin 0.4milliGRAM(s) Oral at bedtime  buMETAnide 2milliGRAM(s) Oral every 12 hours      aspirin 325milliGRAM(s) Oral daily  gabapentin 300milliGRAM(s) Oral three times a day      heparin  Injectable 5000Unit(s) SubCutaneous every 12 hours    docusate sodium 100milliGRAM(s) Oral two times a day      atorvastatin 40milliGRAM(s) Oral at bedtime  dextrose Gel 1Dose(s) Oral once PRN  dextrose 50% Injectable 12.5Gram(s) IV Push once  dextrose 50% Injectable 25Gram(s) IV Push once  dextrose 50% Injectable 25Gram(s) IV Push once  glucagon  Injectable 1milliGRAM(s) IntraMuscular once PRN  insulin lispro (HumaLOG) corrective regimen sliding scale  SubCutaneous every 6 hours  insulin glargine Injectable (LANTUS) 5Unit(s) SubCutaneous at bedtime      epoetin una Injectable 02934Zyvy(s) IV Push <User Schedule>    artificial  tears Solution 1Drop(s) Both EYES two times a day        Mode: CPAP with PS  FiO2: 25  PEEP: 5  PS: 5  MAP: 7  PIP: 11      Adult Advanced Hemodynamics Last 24 Hrs  CVP(mm Hg): --  CVP(cm H2O): --  CO: --  CI: --  PA: --  PA(mean): --  PCWP: --  SVR: --  SVRI: --  PVR: --  PVRI: --      ICU Vital Signs Last 24 Hrs  T(C): 37.1, Max: 37.9 (04-19 @ 18:00)  T(F): 98.8, Max: 100.2 (04-19 @ 18:00)  HR: 98 (86 - 104)  BP: 114/67 (74/37 - 149/65)  BP(mean): 82 (50 - 98)  ABP: --  ABP(mean): --  RR: 17 (12 - 17)  SpO2: 98% (84% - 100%)    CAPILLARY BLOOD GLUCOSE  110 (20 Apr 2017 12:00)  118 (20 Apr 2017 05:00)  149 (19 Apr 2017 23:59)  252 (19 Apr 2017 18:00)            LABS:  CBC Full  -  ( 20 Apr 2017 05:42 )  WBC Count : 8.2 K/uL  Hemoglobin : 9.1 g/dL  Hematocrit : 28.5 %  Platelet Count - Automated : 64 K/uL  Mean Cell Volume : 83.3 fl  Mean Cell Hemoglobin : 26.6 pg  Mean Cell Hemoglobin Concentration : 31.9 g/dL  Auto Neutrophil # : x  Auto Lymphocyte # : x  Auto Monocyte # : x  Auto Eosinophil # : x  Auto Basophil # : x  Auto Neutrophil % : x  Auto Lymphocyte % : x  Auto Monocyte % : x  Auto Eosinophil % : x  Auto Basophil % : x    04-20    140  |  99  |  80.0<H>  ----------------------------<  130<H>  4.3   |  23.0  |  4.74<H>    Ca    8.7      20 Apr 2017 05:42  Phos  7.2     04-20  Mg     2.1     04-20    TPro  6.6  /  Alb  3.4  /  TBili  0.2<L>  /  DBili  x   /  AST  22  /  ALT  17  /  AlkPhos  63  04-19                   CULTURES:      Physical Examination:    General: No acute distress.  Alert, oriented, interactive. Follows simple instructions    HEENT: Atraumatic, MMM, Pupils equal, reactive to light.  Symmetric.   PULM: reduced at bases y, no significant sputum production    CVS: Regular rate and rhythm, no murmurs, rubs; S1/S2. No JVD/HJR;     ABD: Soft, nondistended, nontender, normoactive bowel sounds, no masses    EXT: No edema, nontender.     SKIN: Warm and well perfused, no rashes noted.; right permcath c/d/i    Neuro: No focal deficits, follows instructions.     RADIOLOGY:      EXAM:  CT ANGIO NECK (W)AW IC                          PROCEDURE DATE:  04/18/2017        INTERPRETATION:  CLINICAL HISTORY: Status post right permacath, rule out   vascular injury, confusion, weakness    TECHNIQUE: CTA neck. 96 cc Omnipaque 350 Intravenous contrast was   administered. 2-D MIP and 3-D volume rendering images.    COMPARISON: None    FINDINGS:    CTA NECK:    Right-sided dialysis catheter is noted entering the right subclavian   vein. The distal end is not visualized.    Mild atherosclerotic disease throughout the proximal great vessels.   Moderate calcific and soft plaque in the right carotid bulb extending   into the proximal right internal carotid artery. There is less than 50%   stenosis in the right carotid bulb. Less than 50% stenosis of the origin   of the right internal carotid.    Moderate calcific and soft plaque in the left carotid bulb and proximal   internal carotid artery. Less than 50 % stenosis in the left carotid   bulb. Less than 50% stenosis at the origin of the left internal carotid   artery.    Severe bilateral cavernous carotid artery calcifications. Moderate   stenosis in the right cavernous carotid on image 58, series 8. Mild   calcific plaque in the right vertebral artery on image 95, series 8,   causing mild stenosis.    A left-sided aortic arch is demonstrated. There is normal relationship to   the great vessels. The common carotid arteries, internal carotid arteries   and vertebral arteries show no other evidence of significant stenosis,   occlusion or saccular aneurysm dilation. The vertebral arteries are   codominant.    Severe diffuse bilateral neck edema/infiltrating hematoma in the right   supraclavicular fossa extending cephalad bilaterally.    IMPRESSION:          No evidence of vascular injury. Additional findings above.    Preliminary report provided by Horenstein,[facility]ector [facility].                YASSER LESLY M.D., ATTENDING RADIOLOGIST  This document has been electronically signed. Apr 19 2017  8:58AM                  CRITICAL CARE TIME SPENT: 50 minutes

## 2017-04-20 NOTE — PROGRESS NOTE ADULT - SUBJECTIVE AND OBJECTIVE BOX
EMILY LITTLEJOHN    [id number]    87y      Male    Hospitalist acceptance note    INTERVAL HPI/OVERNIGHT EVENTS: No complaints. Son, Emily Littlejohn Jr at bedside.     Hospital course:  86 yo M with h/o DM, HTN, ischemic cardiomyopathy, chronic renal failure presents with worsening ARF. Pt was previously admitted for similar condition, but had refused hemodialysis until this admission. Pt had permacath placed but developed large neck swelling at the site and was unable to swallow secretions and had difficulty breathing. Pt was emergently intubated on 4/18. CTA of neck did not reveal any vascular injury, and showed severe diffuse bilateral neck edema/infiltrating hematoma in the right   supraclavicular fossa extending cephalad bilaterally. Per vascular surgery, unclear cause to neck swelling, and may be an anaphylactic reaction to medication. Swelling improved, and pt was extubated on 4/20. Pt failed bedside dysphagia and is now awaiting formal speech and swallow eval.       REVIEW OF SYSTEMS:    CONSTITUTIONAL: No fever   RESPIRATORY: No cough, No shortness of breath  CARDIOVASCULAR: No chest pain, palpitations  GASTROINTESTINAL: No abdominal pain. No nausea, vomiting  NEUROLOGICAL: No headaches       Vital Signs Last 24 Hrs  T(C): 37.1, Max: 37.9 (04-19 @ 18:00)  T(F): 98.8, Max: 100.2 (04-19 @ 18:00)  HR: 101 (86 - 106)  BP: 128/66 (94/52 - 149/65)  BP(mean): 91 (70 - 102)  RR: 17 (12 - 18)  SpO2: 98% (84% - 100%)    PHYSICAL EXAM:    GENERAL: NAD   HEENT: PERRL, +EOMI, MMM  NECK: soft,   CHEST/LUNG: decreased bs at bases  HEART: S1S2+, Regular rate and rhythm   ABDOMEN: Soft, Nontender, Nondistended; Bowel sounds present  EXTREMITIES: No edema  SKIN: warm and dry  NEURO: AAOX3   PSYCH: normal mood      LABS:                        9.1    8.2   )-----------( 64       ( 20 Apr 2017 05:42 )             28.5     04-20    140  |  99  |  80.0<H>  ----------------------------<  130<H>  4.3   |  23.0  |  4.74<H>    Ca    8.7      20 Apr 2017 05:42  Phos  7.2     04-20  Mg     2.1     04-20    TPro  6.6  /  Alb  3.4  /  TBili  0.2<L>  /  DBili  x   /  AST  22  /  ALT  17  /  AlkPhos  63  04-19            MEDICATIONS  (STANDING):  aspirin 325milliGRAM(s) Oral daily  tamsulosin 0.4milliGRAM(s) Oral at bedtime  gabapentin 300milliGRAM(s) Oral three times a day  atorvastatin 40milliGRAM(s) Oral at bedtime  docusate sodium 100milliGRAM(s) Oral two times a day  buMETAnide 2milliGRAM(s) Oral every 12 hours  artificial  tears Solution 1Drop(s) Both EYES two times a day  dextrose 50% Injectable 12.5Gram(s) IV Push once  dextrose 50% Injectable 25Gram(s) IV Push once  dextrose 50% Injectable 25Gram(s) IV Push once  epoetin una Injectable 76184Wefr(s) IV Push <User Schedule>  insulin lispro (HumaLOG) corrective regimen sliding scale  SubCutaneous every 6 hours  insulin glargine Injectable (LANTUS) 5Unit(s) SubCutaneous at bedtime  heparin  Injectable 5000Unit(s) SubCutaneous every 12 hours    MEDICATIONS  (PRN):  dextrose Gel 1Dose(s) Oral once PRN Blood Glucose LESS THAN 70 milliGRAM(s)/deciliter  glucagon  Injectable 1milliGRAM(s) IntraMuscular once PRN Glucose LESS THAN 70 milligrams/deciliter      RADIOLOGY & ADDITIONAL TESTS:

## 2017-04-20 NOTE — PROGRESS NOTE ADULT - ASSESSMENT
87M admitted with NIDHI on CKD, s/p perm cath placement for now ESRD requiring HDwith acute neck edema, intubated (extubated 4/20); in setting of chronic medical disease

## 2017-04-21 DIAGNOSIS — J96.01 ACUTE RESPIRATORY FAILURE WITH HYPOXIA: ICD-10-CM

## 2017-04-21 DIAGNOSIS — I25.5 ISCHEMIC CARDIOMYOPATHY: ICD-10-CM

## 2017-04-21 DIAGNOSIS — I48.1 PERSISTENT ATRIAL FIBRILLATION: ICD-10-CM

## 2017-04-21 DIAGNOSIS — I95.3 HYPOTENSION OF HEMODIALYSIS: ICD-10-CM

## 2017-04-21 LAB
ANION GAP SERPL CALC-SCNC: 15 MMOL/L — SIGNIFICANT CHANGE UP (ref 5–17)
BASE EXCESS BLDA CALC-SCNC: 3.8 MMOL/L — HIGH (ref -3–3)
BLOOD GAS COMMENTS ARTERIAL: SIGNIFICANT CHANGE UP
BUN SERPL-MCNC: 49 MG/DL — HIGH (ref 8–20)
CALCIUM SERPL-MCNC: 8.7 MG/DL — SIGNIFICANT CHANGE UP (ref 8.6–10.2)
CHLORIDE SERPL-SCNC: 100 MMOL/L — SIGNIFICANT CHANGE UP (ref 98–107)
CO2 SERPL-SCNC: 24 MMOL/L — SIGNIFICANT CHANGE UP (ref 22–29)
CREAT SERPL-MCNC: 3.96 MG/DL — HIGH (ref 0.5–1.3)
GAS PNL BLDA: SIGNIFICANT CHANGE UP
GLUCOSE SERPL-MCNC: 213 MG/DL — HIGH (ref 70–115)
GRAM STN FLD: SIGNIFICANT CHANGE UP
HCO3 BLDA-SCNC: 28 MMOL/L — HIGH (ref 20–26)
HCT VFR BLD CALC: 30.9 % — LOW (ref 42–52)
HGB BLD-MCNC: 9.6 G/DL — LOW (ref 14–18)
HOROWITZ INDEX BLDA+IHG-RTO: SIGNIFICANT CHANGE UP
MAGNESIUM SERPL-MCNC: 2 MG/DL — SIGNIFICANT CHANGE UP (ref 1.8–2.5)
MCHC RBC-ENTMCNC: 26.4 PG — LOW (ref 27–31)
MCHC RBC-ENTMCNC: 31.1 G/DL — LOW (ref 32–36)
MCV RBC AUTO: 84.9 FL — SIGNIFICANT CHANGE UP (ref 80–94)
PCO2 BLDA: 40 MMHG — SIGNIFICANT CHANGE UP (ref 35–45)
PH BLDA: 7.45 — SIGNIFICANT CHANGE UP (ref 7.35–7.45)
PLATELET # BLD AUTO: 58 K/UL — LOW (ref 150–400)
PO2 BLDA: 145 MMHG — HIGH (ref 83–108)
POTASSIUM SERPL-MCNC: 4 MMOL/L — SIGNIFICANT CHANGE UP (ref 3.5–5.3)
POTASSIUM SERPL-SCNC: 4 MMOL/L — SIGNIFICANT CHANGE UP (ref 3.5–5.3)
RBC # BLD: 3.64 M/UL — LOW (ref 4.6–6.2)
RBC # FLD: 17.4 % — HIGH (ref 11–15.6)
SAO2 % BLDA: 100 % — HIGH (ref 95–99)
SODIUM SERPL-SCNC: 139 MMOL/L — SIGNIFICANT CHANGE UP (ref 135–145)
SPECIMEN SOURCE: SIGNIFICANT CHANGE UP
WBC # BLD: 5 K/UL — SIGNIFICANT CHANGE UP (ref 4.8–10.8)
WBC # FLD AUTO: 5 K/UL — SIGNIFICANT CHANGE UP (ref 4.8–10.8)

## 2017-04-21 PROCEDURE — 93010 ELECTROCARDIOGRAM REPORT: CPT

## 2017-04-21 PROCEDURE — 99233 SBSQ HOSP IP/OBS HIGH 50: CPT

## 2017-04-21 RX ORDER — PIPERACILLIN AND TAZOBACTAM 4; .5 G/20ML; G/20ML
2.25 INJECTION, POWDER, LYOPHILIZED, FOR SOLUTION INTRAVENOUS ONCE
Qty: 0 | Refills: 0 | Status: COMPLETED | OUTPATIENT
Start: 2017-04-21 | End: 2017-04-21

## 2017-04-21 RX ORDER — GLUCAGON INJECTION, SOLUTION 0.5 MG/.1ML
1 INJECTION, SOLUTION SUBCUTANEOUS ONCE
Qty: 0 | Refills: 0 | Status: COMPLETED | OUTPATIENT
Start: 2017-04-21 | End: 2017-04-21

## 2017-04-21 RX ORDER — PIPERACILLIN AND TAZOBACTAM 4; .5 G/20ML; G/20ML
2.25 INJECTION, POWDER, LYOPHILIZED, FOR SOLUTION INTRAVENOUS EVERY 6 HOURS
Qty: 0 | Refills: 0 | Status: COMPLETED | OUTPATIENT
Start: 2017-04-22 | End: 2017-04-24

## 2017-04-21 RX ORDER — LISINOPRIL 2.5 MG/1
2.5 TABLET ORAL DAILY
Qty: 0 | Refills: 0 | Status: DISCONTINUED | OUTPATIENT
Start: 2017-04-21 | End: 2017-04-21

## 2017-04-21 RX ORDER — PIPERACILLIN AND TAZOBACTAM 4; .5 G/20ML; G/20ML
INJECTION, POWDER, LYOPHILIZED, FOR SOLUTION INTRAVENOUS
Qty: 0 | Refills: 0 | Status: DISCONTINUED | OUTPATIENT
Start: 2017-04-21 | End: 2017-04-21

## 2017-04-21 RX ORDER — VANCOMYCIN HCL 1 G
1000 VIAL (EA) INTRAVENOUS ONCE
Qty: 0 | Refills: 0 | Status: COMPLETED | OUTPATIENT
Start: 2017-04-21 | End: 2017-04-21

## 2017-04-21 RX ORDER — DEXTROSE 50 % IN WATER 50 %
25 SYRINGE (ML) INTRAVENOUS ONCE
Qty: 0 | Refills: 0 | Status: COMPLETED | OUTPATIENT
Start: 2017-04-21 | End: 2017-04-21

## 2017-04-21 RX ORDER — VANCOMYCIN HCL 1 G
1500 VIAL (EA) INTRAVENOUS ONCE
Qty: 0 | Refills: 0 | Status: DISCONTINUED | OUTPATIENT
Start: 2017-04-21 | End: 2017-04-21

## 2017-04-21 RX ORDER — ACETAMINOPHEN 500 MG
1000 TABLET ORAL ONCE
Qty: 0 | Refills: 0 | Status: DISCONTINUED | OUTPATIENT
Start: 2017-04-21 | End: 2017-04-22

## 2017-04-21 RX ORDER — PIPERACILLIN AND TAZOBACTAM 4; .5 G/20ML; G/20ML
INJECTION, POWDER, LYOPHILIZED, FOR SOLUTION INTRAVENOUS
Qty: 0 | Refills: 0 | Status: COMPLETED | OUTPATIENT
Start: 2017-04-21 | End: 2017-04-24

## 2017-04-21 RX ORDER — ACETAMINOPHEN 500 MG
1000 TABLET ORAL ONCE
Qty: 0 | Refills: 0 | Status: COMPLETED | OUTPATIENT
Start: 2017-04-21 | End: 2017-04-21

## 2017-04-21 RX ORDER — CARVEDILOL PHOSPHATE 80 MG/1
6.25 CAPSULE, EXTENDED RELEASE ORAL EVERY 12 HOURS
Qty: 0 | Refills: 0 | Status: DISCONTINUED | OUTPATIENT
Start: 2017-04-21 | End: 2017-04-22

## 2017-04-21 RX ORDER — FUROSEMIDE 40 MG
40 TABLET ORAL ONCE
Qty: 0 | Refills: 0 | Status: COMPLETED | OUTPATIENT
Start: 2017-04-21 | End: 2017-04-21

## 2017-04-21 RX ORDER — ALBUMIN HUMAN 25 %
50 VIAL (ML) INTRAVENOUS ONCE
Qty: 0 | Refills: 0 | Status: COMPLETED | OUTPATIENT
Start: 2017-04-21 | End: 2017-04-21

## 2017-04-21 RX ORDER — CALCIUM ACETATE 667 MG
667 TABLET ORAL
Qty: 0 | Refills: 0 | Status: DISCONTINUED | OUTPATIENT
Start: 2017-04-21 | End: 2017-05-08

## 2017-04-21 RX ORDER — PHENYLEPHRINE HYDROCHLORIDE 10 MG/ML
0.4 INJECTION INTRAVENOUS
Qty: 80 | Refills: 0 | Status: DISCONTINUED | OUTPATIENT
Start: 2017-04-21 | End: 2017-04-22

## 2017-04-21 RX ORDER — BUMETANIDE 0.25 MG/ML
1 INJECTION INTRAMUSCULAR; INTRAVENOUS ONCE
Qty: 0 | Refills: 0 | Status: COMPLETED | OUTPATIENT
Start: 2017-04-21 | End: 2017-04-21

## 2017-04-21 RX ADMIN — PIPERACILLIN AND TAZOBACTAM 200 GRAM(S): 4; .5 INJECTION, POWDER, LYOPHILIZED, FOR SOLUTION INTRAVENOUS at 23:05

## 2017-04-21 RX ADMIN — Medication 400 MILLIGRAM(S): at 17:39

## 2017-04-21 RX ADMIN — BUMETANIDE 2 MILLIGRAM(S): 0.25 INJECTION INTRAMUSCULAR; INTRAVENOUS at 17:39

## 2017-04-21 RX ADMIN — ATORVASTATIN CALCIUM 40 MILLIGRAM(S): 80 TABLET, FILM COATED ORAL at 23:06

## 2017-04-21 RX ADMIN — Medication 25 GRAM(S): at 05:31

## 2017-04-21 RX ADMIN — Medication 325 MILLIGRAM(S): at 12:04

## 2017-04-21 RX ADMIN — Medication 40 MILLIGRAM(S): at 13:30

## 2017-04-21 RX ADMIN — PHENYLEPHRINE HYDROCHLORIDE 10.35 MICROGRAM(S)/KG/MIN: 10 INJECTION INTRAVENOUS at 21:39

## 2017-04-21 RX ADMIN — GABAPENTIN 300 MILLIGRAM(S): 400 CAPSULE ORAL at 23:06

## 2017-04-21 RX ADMIN — Medication 1 DROP(S): at 05:32

## 2017-04-21 RX ADMIN — Medication 50 MILLILITER(S): at 20:41

## 2017-04-21 RX ADMIN — Medication 4: at 12:04

## 2017-04-21 RX ADMIN — Medication 1 DROP(S): at 17:39

## 2017-04-21 RX ADMIN — INSULIN GLARGINE 5 UNIT(S): 100 INJECTION, SOLUTION SUBCUTANEOUS at 23:06

## 2017-04-21 RX ADMIN — GLUCAGON INJECTION, SOLUTION 1 MILLIGRAM(S): 0.5 INJECTION, SOLUTION SUBCUTANEOUS at 18:44

## 2017-04-21 RX ADMIN — Medication 667 MILLIGRAM(S): at 12:04

## 2017-04-21 RX ADMIN — Medication 25 GRAM(S): at 18:03

## 2017-04-21 RX ADMIN — Medication 100 MILLIGRAM(S): at 17:39

## 2017-04-21 RX ADMIN — PHENYLEPHRINE HYDROCHLORIDE 10.35 MICROGRAM(S)/KG/MIN: 10 INJECTION INTRAVENOUS at 18:44

## 2017-04-21 RX ADMIN — CARVEDILOL PHOSPHATE 6.25 MILLIGRAM(S): 80 CAPSULE, EXTENDED RELEASE ORAL at 17:39

## 2017-04-21 RX ADMIN — HEPARIN SODIUM 5000 UNIT(S): 5000 INJECTION INTRAVENOUS; SUBCUTANEOUS at 17:39

## 2017-04-21 RX ADMIN — CARVEDILOL PHOSPHATE 6.25 MILLIGRAM(S): 80 CAPSULE, EXTENDED RELEASE ORAL at 12:04

## 2017-04-21 RX ADMIN — BUMETANIDE 1 MILLIGRAM(S): 0.25 INJECTION INTRAMUSCULAR; INTRAVENOUS at 11:35

## 2017-04-21 RX ADMIN — HEPARIN SODIUM 5000 UNIT(S): 5000 INJECTION INTRAVENOUS; SUBCUTANEOUS at 05:31

## 2017-04-21 RX ADMIN — Medication 0.25 MILLIGRAM(S): at 14:42

## 2017-04-21 RX ADMIN — TAMSULOSIN HYDROCHLORIDE 0.4 MILLIGRAM(S): 0.4 CAPSULE ORAL at 23:06

## 2017-04-21 RX ADMIN — Medication 250 MILLIGRAM(S): at 21:38

## 2017-04-21 NOTE — PROGRESS NOTE ADULT - PROBLEM SELECTOR PLAN 2
Cardizem mult doses given, may need to consider cardizem gtt if symptoms persist  Recall cardiology to re-evaluate

## 2017-04-21 NOTE — PROGRESS NOTE ADULT - PROBLEM SELECTOR PLAN 3
If lung US showing Blines may need additional HD to help with fluid overload  Got Bumex, will give dose Lasix now as he still makes urine.

## 2017-04-21 NOTE — PROGRESS NOTE ADULT - PROBLEM SELECTOR PLAN 1
NIPPV 12/5 titrate Fi02 to >92%  Can increase IPAP as needed if work of breathing isn't improving, will monitor  Will US chest to r/o fluid vs consolidation  Afebrile at present

## 2017-04-21 NOTE — SWALLOW BEDSIDE ASSESSMENT ADULT - ADDITIONAL RECOMMENDATIONS
1. Upper dentures to be placed when denture adhesive present   2. Will follow-up as schedule permits to check PO tolerance

## 2017-04-21 NOTE — SWALLOW BEDSIDE ASSESSMENT ADULT - SLP GENERAL OBSERVATIONS
Pt received & seen seated upright in chair, +O2 via nasal canula (sats:96%), +awake/alert, +oriented

## 2017-04-21 NOTE — PROGRESS NOTE ADULT - PROBLEM SELECTOR PLAN 2
-currently on NEOsynepherine gtt, titrating for MAP 65-70  -will give 25% albumin  -give Vancomycin when 1 hour of HD is left  -hold zosyn until after HD is complete

## 2017-04-21 NOTE — SWALLOW BEDSIDE ASSESSMENT ADULT - SWALLOW EVAL: DIAGNOSIS
Mild oral dysphagia; pharyngeal stage of swallow clinically judged to be WFL with no overt s/s of aspiration

## 2017-04-21 NOTE — PROGRESS NOTE ADULT - SUBJECTIVE AND OBJECTIVE BOX
NEPHROLOGY INTERVAL HPI/OVERNIGHT EVENTS:    Examined earlier  Events noted tachycardic SOB placed on BiPaP once again  Will HD today for additional UF    MEDICATIONS  (STANDING):  aspirin 325milliGRAM(s) Oral daily  tamsulosin 0.4milliGRAM(s) Oral at bedtime  gabapentin 300milliGRAM(s) Oral three times a day  atorvastatin 40milliGRAM(s) Oral at bedtime  docusate sodium 100milliGRAM(s) Oral two times a day  buMETAnide 2milliGRAM(s) Oral every 12 hours  dextrose 50% Injectable 12.5Gram(s) IV Push once  dextrose 50% Injectable 25Gram(s) IV Push once  dextrose 50% Injectable 25Gram(s) IV Push once  epoetin una Injectable 92290Pwbz(s) IV Push <User Schedule>  insulin lispro (HumaLOG) corrective regimen sliding scale  SubCutaneous every 6 hours  insulin glargine Injectable (LANTUS) 5Unit(s) SubCutaneous at bedtime  heparin  Injectable 5000Unit(s) SubCutaneous every 12 hours  artificial  tears Solution 1Drop(s) Left EYE two times a day  calcium acetate 667milliGRAM(s) Oral three times a day with meals  carvedilol 6.25milliGRAM(s) Oral every 12 hours    MEDICATIONS  (PRN):  dextrose Gel 1Dose(s) Oral once PRN Blood Glucose LESS THAN 70 milliGRAM(s)/deciliter  glucagon  Injectable 1milliGRAM(s) IntraMuscular once PRN Glucose LESS THAN 70 milligrams/deciliter      Allergies    No Known Allergies    Intolerances        Vital Signs Last 24 Hrs  T(C): 39.7, Max: 39.7 ( @ 16:00)  T(F): 103.5, Max: 103.5 ( @ 16:00)  HR: 99 (84 - 135)  BP: 113/54 (102/58 - 162/81)  BP(mean): 78 (75 - 113)  RR: 23 (11 - 29)  SpO2: 98% (87% - 99%)  Daily     Daily Weight in k.7 (2017 06:00)    PHYSICAL EXAM:  GENERAL: NAD,   EYES:  conjunctiva and sclera clear  NECK: Supple, No JVD/Bruit  NERVOUS SYSTEM:  A/O x3,   CHEST:  CTA ,No rales orrhonchi  HEART:  RRR, No murmurs  ABDOMEN: Soft, NT/ND BS+  EXTREMITIES:  No C/C,  tr Edema; NT  SKIN: No rashes      LABS:                        9.6    5.0   )-----------( 58       ( 2017 12:07 )             30.9         139  |  100  |  49.0<H>  ----------------------------<  213<H>  4.0   |  24.0  |  3.96<H>    Ca    8.7      2017 12:07  Phos  7.2       Mg     2.0               Magnesium, Serum: 2.0 mg/dL ( @ 12:07)          RADIOLOGY & ADDITIONAL TESTS:

## 2017-04-21 NOTE — PROGRESS NOTE ADULT - SUBJECTIVE AND OBJECTIVE BOX
Pt is an 87 YOM h/o DM, CHF, HTN, HLD, CKD new to HD.  Pt was initially admitted for Malaise, initially had been refusing HD but then decided to accept the tx.  He had an HD cath and his initial HD treatment and had developed neck hematoma/edema and was intubated for airway protection and extubated several days later.  Pt had been downgraded from ICU but today developed Afib with RVR and became hypoxic with increased work of breathing and respiratory distress.  He was given Bumex as he still makes some urine by his hospitalist this am without improvement.  Currently he is tachycardic, hypoxemic with progressively worsening dyspnea throughout the morning.  He denies CP.  Pt was still located in ICU as a border awaiting a floor bed so we accepted pt back as ICU pt.  12Ld EKG done.  Cardizem 10mg IV x2 given.  Tried on high flow 02 but requiring NIPPV now due to increased work of breathing and respiratory distress.  Family- daughter and granddaughter at bedside.  Patient is a 87y old  Male who presents with a chief complaint of pt got intubated in the ED after HD (19 Apr 2017 04:25)      BRIEF HOSPITAL COURSE: as above  Events last 24 hours: as above    PAST MEDICAL & SURGICAL HISTORY:  Chronic renal failure  Coronary artery disease involving autologous vein bypass graft  CHF (congestive heart failure): FAMILY/PT NOT SURE IF DIAGNOSED WITH CHF OR COPD  Pacemaker  Diabetes  PSA elevation  Hyperlipemia  Hypertension  Cataract  Glaucoma  S/P CABG (coronary artery bypass graft)  S/P prostatectomy: 1992      Review of Systems:  CONSTITUTIONAL: distressed  EYES: No eye pain, visual disturbances, or discharge  ENMT:  No difficulty hearing, tinnitus, vertigo; No sinus or throat pain  NECK: No pain or stiffness  RESPIRATORY: Short of breath  CARDIOVASCULAR: No chest pain, palpitations, dizziness, or leg swelling  GASTROINTESTINAL: No abdominal or epigastric pain. No nausea, vomiting, or hematemesis; No diarrhea or constipation. No melena or hematochezia.  GENITOURINARY: No dysuria, frequency, hematuria, or incontinence  NEUROLOGICAL: No headaches, memory loss, loss of strength, numbness, or tremors  SKIN: No itching, burning, rashes, or lesions   MUSCULOSKELETAL: No joint pain or swelling; No muscle, back, or extremity pain  PSYCHIATRIC: No depression, anxiety, mood swings, or difficulty sleeping      Medications:    tamsulosin 0.4milliGRAM(s) Oral at bedtime  buMETAnide 2milliGRAM(s) Oral every 12 hours  carvedilol 6.25milliGRAM(s) Oral every 12 hours      aspirin 325milliGRAM(s) Oral daily  gabapentin 300milliGRAM(s) Oral three times a day      heparin  Injectable 5000Unit(s) SubCutaneous every 12 hours    docusate sodium 100milliGRAM(s) Oral two times a day      atorvastatin 40milliGRAM(s) Oral at bedtime  dextrose Gel 1Dose(s) Oral once PRN  dextrose 50% Injectable 12.5Gram(s) IV Push once  dextrose 50% Injectable 25Gram(s) IV Push once  dextrose 50% Injectable 25Gram(s) IV Push once  glucagon  Injectable 1milliGRAM(s) IntraMuscular once PRN  insulin lispro (HumaLOG) corrective regimen sliding scale  SubCutaneous every 6 hours  insulin glargine Injectable (LANTUS) 5Unit(s) SubCutaneous at bedtime    calcium acetate 667milliGRAM(s) Oral three times a day with meals    epoetin una Injectable 07411Ikwu(s) IV Push <User Schedule>    artificial  tears Solution 1Drop(s) Left EYE two times a day            ICU Vital Signs Last 24 Hrs  T(C): 36.8, Max: 37.2 (04-21 @ 04:00)  T(F): 98.3, Max: 98.9 (04-21 @ 04:00)  HR: 109 (84 - 135)  BP: 162/81 (102/58 - 162/81)  BP(mean): 113 (75 - 113)  ABP: --  ABP(mean): --  RR: 29 (11 - 29)  SpO2: 96% (87% - 100%)      ABG - ( 19 Apr 2017 15:20 )  pH: 7.39  /  pCO2: 41    /  pO2: 94    / HCO3: x     / Base Excess: x     /  SaO2: 99                        LABS:                        9.6    5.0   )-----------( 58       ( 21 Apr 2017 12:07 )             30.9     04-21    139  |  100  |  49.0<H>  ----------------------------<  213<H>  4.0   |  24.0  |  3.96<H>    Ca    8.7      21 Apr 2017 12:07  Phos  7.2     04-20  Mg     2.0     04-21            CAPILLARY BLOOD GLUCOSE  212 (21 Apr 2017 12:00)        CULTURES:      Physical Examination:    General: Acute respiratory distress with increased oxygen demands throughout morning    HEENT: Pupils equal, reactive to light.  Symmetric.    PULM: b/l basilar crackles/rhonci, course throughout, increased sputum production, dark    CVS: Irregular/tachy, afib with RVR, no murmurs, rubs, or gallops    ABD: Soft, nondistended, nontender, normoactive bowel sounds, no masses    EXT: Mild edema, nontender    SKIN: Warm and well perfused, no rashes noted.    RADIOLOGY: ***    CRITICAL CARE TIME SPENT: 50min

## 2017-04-21 NOTE — CHART NOTE - NSCHARTNOTEFT_GEN_A_CORE
- noted fever 103 ( I was alerted @ 1840, our CCPA at time of fever); now with hypotension (though received coreg); unable to give IVF's as he has respiratory distress (requiring NIV)  and bilateral b-lines on screening bedside ultrasound with plans to have emergent HD  - phenylephrine ordered for pressure support as recent a.fib with RVR and concerns for tachycardia with norepinephrine  - blood cultures ordered @ time of fever  - will assess lactate as well.

## 2017-04-21 NOTE — SWALLOW BEDSIDE ASSESSMENT ADULT - SWALLOW EVAL: RECOMMENDED FEEDING/EATING TECHNIQUES
allow for swallow between intakes/maintain upright posture during/after eating for 30 mins/small sips/bites/alternate food with liquid/position upright (90 degrees)

## 2017-04-21 NOTE — PROGRESS NOTE ADULT - PROBLEM SELECTOR PLAN 3
Likely in flash pulmonary edema 2/2 IVF.  Will d/c IVF  Stat bumex now  Echo 4/6: EF 30-35% with moderate AS (possibly pseudoaortic stensosis) Likely in flash pulmonary edema 2/2 IVF.  Will d/c IVF  Stat bumex now  Echo 4/6: EF 30-35% with moderate AS (possibly pseudoaortic stensosis)  Restart home carvedilol and lisinopril.

## 2017-04-21 NOTE — PROGRESS NOTE ADULT - PROBLEM SELECTOR PLAN 2
Unclear etiology: ? venous injury  Speech and swallow eval appreciated - soft food with thin liquids

## 2017-04-21 NOTE — SWALLOW BEDSIDE ASSESSMENT ADULT - ASR SWALLOW ASPIRATION MONITOR
cough/throat clearing/gurgly voice/fever/pneumonia/change of breathing pattern/upper respiratory infection/position upright (90Y)/oral hygiene

## 2017-04-21 NOTE — PROGRESS NOTE ADULT - ASSESSMENT
87M w/ new hemodialysis, neck mass/hematoma, was intubtaed tehn extubated, noted today to go into rapid afib, and flashed lungs. Currently on BiPAP, HD running. Also requiring NEOsynepherine drip for hypotension

## 2017-04-21 NOTE — PROGRESS NOTE ADULT - PROBLEM SELECTOR PLAN 1
-patient is currently on BiPAP, will titrate setting for SaO2 >90%  -checking STAT ABG now  -was recently extubated after being intubated for airway protection for neck mass/hematoma  -will complete HD and re-evaluate respiratory status

## 2017-04-21 NOTE — PROGRESS NOTE ADULT - SUBJECTIVE AND OBJECTIVE BOX
EMILY LITTLEJOHN    [id number]    87y      Male    INTERVAL HPI/OVERNIGHT EVENTS: No events on. Notified by RN that pt more tachycardic and tachypneic today. Pt states that mild shortness of breath, but otherwise offers no other complaints.    Hospital course:  86 yo M with h/o DM, HTN, ischemic cardiomyopathy (EF 30-35%), chronic renal failure presents with worsening ARF. Pt was previously admitted for similar condition, but had refused hemodialysis until this admission. Pt had permacath placed but developed large neck swelling at the site and was unable to swallow secretions and had difficulty breathing. Pt was emergently intubated on 4/18. CTA of neck did not reveal any vascular injury, and showed severe diffuse bilateral neck edema/infiltrating hematoma in the right   supraclavicular fossa extending cephalad bilaterally. Per vascular surgery, unclear cause to neck swelling, and may be an anaphylactic reaction to medication. Swelling improved, and pt was extubated on 4/20. Pt failed bedside dysphagia and is now awaiting formal speech and swallow eval.       REVIEW OF SYSTEMS:    CONSTITUTIONAL: No fevers  RESPIRATORY: No cough  CARDIOVASCULAR: No chest pain, palpitations    Vital Signs Last 24 Hrs  T(C): 37.1, Max: 37.2 (04-21 @ 04:00)  T(F): 98.8, Max: 98.9 (04-21 @ 04:00)  HR: 124 (84 - 124)  BP: 102/58 (102/58 - 150/66)  BP(mean): 75 (75 - 102)  RR: 25 (11 - 25)  SpO2: 90% (88% - 100%)    PHYSICAL EXAM:    GENERAL: mild respiratory distress  HEENT: PERRL, +EOMI, MMM  NECK: soft, Supple, No JVD,   CHEST/LUNG: diffuse fine crackles, no wheezing  HEART: S1S2+, tachy but regular rhythm   ABDOMEN: Soft, Nontender, Nondistended; Bowel sounds present    LABS:                        9.1    8.2   )-----------( 64       ( 20 Apr 2017 05:42 )             28.5     04-20    140  |  99  |  80.0<H>  ----------------------------<  130<H>  4.3   |  23.0  |  4.74<H>    Ca    8.7      20 Apr 2017 05:42  Phos  7.2     04-20  Mg     2.1     04-20              MEDICATIONS  (STANDING):  aspirin 325milliGRAM(s) Oral daily  tamsulosin 0.4milliGRAM(s) Oral at bedtime  gabapentin 300milliGRAM(s) Oral three times a day  atorvastatin 40milliGRAM(s) Oral at bedtime  docusate sodium 100milliGRAM(s) Oral two times a day  buMETAnide 2milliGRAM(s) Oral every 12 hours  dextrose 50% Injectable 12.5Gram(s) IV Push once  dextrose 50% Injectable 25Gram(s) IV Push once  dextrose 50% Injectable 25Gram(s) IV Push once  epoetin una Injectable 73094Vatv(s) IV Push <User Schedule>  insulin lispro (HumaLOG) corrective regimen sliding scale  SubCutaneous every 6 hours  insulin glargine Injectable (LANTUS) 5Unit(s) SubCutaneous at bedtime  heparin  Injectable 5000Unit(s) SubCutaneous every 12 hours  artificial  tears Solution 1Drop(s) Left EYE two times a day  buMETAnide Injectable 1milliGRAM(s) IV Push once  calcium acetate 667milliGRAM(s) Oral three times a day with meals    MEDICATIONS  (PRN):  dextrose Gel 1Dose(s) Oral once PRN Blood Glucose LESS THAN 70 milliGRAM(s)/deciliter  glucagon  Injectable 1milliGRAM(s) IntraMuscular once PRN Glucose LESS THAN 70 milligrams/deciliter      RADIOLOGY & ADDITIONAL TESTS:

## 2017-04-21 NOTE — PROGRESS NOTE ADULT - ASSESSMENT
NIDHI on CKD s/p initiation of HD tolerated yest ok will HD again today for additional UF  Electrolytes BP acceptable cont to watch for renal recovery  Anemia 2/2 ESRD TS 9% will hold off on giving Venofer until etiology of  swollen neck more clear concern for allergic RXN

## 2017-04-21 NOTE — PROGRESS NOTE ADULT - SUBJECTIVE AND OBJECTIVE BOX
Patient is a 87y old  Male who presents with a chief complaint of pt got intubated in the ED after HD (19 Apr 2017 04:25)      BRIEF HOSPITAL COURSE:   Pt is an 87 YOM h/o DM, CHF, HTN, HLD, CKD new to HD.  Pt was initially admitted for Malaise, initially had been refusing HD but then decided to accept the tx.  He had an HD cath and his initial HD treatment and had developed neck hematoma/edema and was intubated for airway protection and extubated several days later.  Pt had been downgraded from ICU but today developed Afib with RVR and became hypoxic with increased work of breathing and respiratory distress.  He was given Bumex as he still makes some urine by his hospitalist this am without improvement.  Tried on high flow 02 but requiring NIPPV now due to increased work of breathing and respiratory distress. -CXR showed flash pulmonary edema, and patient is now on urgent hemodualysis session    Events last 24 hours:   -patient is currently on hemodialysis  -is requiring neosynepherine drip for hypotension and to allow fluid removal  -remains on BiPAP, lethargic, ABG pending  -disuccsed with renal, give VANCO when there is 1 hr of HD left, and hold Zosyn until; after HD is completed.      PAST MEDICAL & SURGICAL HISTORY:  Chronic renal failure  Coronary artery disease involving autologous vein bypass graft  CHF (congestive heart failure): FAMILY/PT NOT SURE IF DIAGNOSED WITH CHF OR COPD  Pacemaker  Diabetes  PSA elevation  Hyperlipemia  Hypertension  Cataract  Glaucoma  S/P CABG (coronary artery bypass graft)  S/P prostatectomy: 1992      Review of Systems:  N/A, patient is drowsy on BiPAP      Medications:  piperacillin/tazobactam IVPB.  IV Intermittent   vancomycin  IVPB 1000milliGRAM(s) IV Intermittent once  piperacillin/tazobactam IVPB. 2.25Gram(s) IV Intermittent once    tamsulosin 0.4milliGRAM(s) Oral at bedtime  buMETAnide 2milliGRAM(s) Oral every 12 hours  carvedilol 6.25milliGRAM(s) Oral every 12 hours  phenylephrine    Infusion 0.4MICROgram(s)/kG/Min IV Continuous <Continuous>      aspirin 325milliGRAM(s) Oral daily  gabapentin 300milliGRAM(s) Oral three times a day  acetaminophen  IVPB 1000milliGRAM(s) IV Intermittent once      heparin  Injectable 5000Unit(s) SubCutaneous every 12 hours    docusate sodium 100milliGRAM(s) Oral two times a day      atorvastatin 40milliGRAM(s) Oral at bedtime  dextrose Gel 1Dose(s) Oral once PRN  dextrose 50% Injectable 12.5Gram(s) IV Push once  dextrose 50% Injectable 25Gram(s) IV Push once  dextrose 50% Injectable 25Gram(s) IV Push once  glucagon  Injectable 1milliGRAM(s) IntraMuscular once PRN  insulin lispro (HumaLOG) corrective regimen sliding scale  SubCutaneous every 6 hours  insulin glargine Injectable (LANTUS) 5Unit(s) SubCutaneous at bedtime    calcium acetate 667milliGRAM(s) Oral three times a day with meals  albumin human 25% IVPB 50milliLiter(s) IV Intermittent once    epoetin una Injectable 55446Ixil(s) IV Push <User Schedule>    artificial  tears Solution 1Drop(s) Left EYE two times a day            ICU Vital Signs Last 24 Hrs  T(C): 38.3, Max: 39.7 (04-21 @ 16:00)  T(F): 101, Max: 103.5 (04-21 @ 16:00)  HR: 76 (75 - 135)  BP: 114/56 (77/42 - 162/81)  BP(mean): 80 (54 - 113)  ABP: --  ABP(mean): --  RR: 15 (11 - 29)  SpO2: 100% (87% - 100%)          I&O's Detail  I & Os for 24h ending 21 Apr 2017 07:00  =============================================  IN:    sodium chloride 0.9%: 1120 ml    Total IN: 1120 ml  ---------------------------------------------  OUT:    Other: 1500 ml    Incontinent per Condom Catheter: 50 ml    Total OUT: 1550 ml  ---------------------------------------------  Total NET: -430 ml    I & Os for current day (as of 21 Apr 2017 20:32)  =============================================  IN:    sodium chloride 0.9%: 280 ml    Solution: 100 ml    Total IN: 380 ml  ---------------------------------------------  OUT:    Incontinent per Condom Catheter: 30 ml    Total OUT: 30 ml  ---------------------------------------------  Total NET: 350 ml        LABS:                        9.6    5.0   )-----------( 58       ( 21 Apr 2017 12:07 )             30.9     04-21    139  |  100  |  49.0<H>  ----------------------------<  213<H>  4.0   |  24.0  |  3.96<H>    Ca    8.7      21 Apr 2017 12:07  Phos  7.2     04-20  Mg     2.0     04-21            CAPILLARY BLOOD GLUCOSE  188 (21 Apr 2017 18:30)        CULTURES:      Physical Examination:    General: HD running, on BiPAP, lethargic    HEENT: Pupils equal, reactive to light.  Symmetric.    PULM: crackles noted b/l, no significant sputum production    CVS: a fib noted., no murmurs, rubs, or gallops    ABD: Soft, nondistended, nontender, normoactive bowel sounds, no masses        RADIOLOGY:   EXAM:  CT ANGIO NECK (W)AW IC                          PROCEDURE DATE:  04/18/2017        INTERPRETATION:  CLINICAL HISTORY: Status post right permacath, rule out   vascular injury, confusion, weakness    TECHNIQUE: CTA neck. 96 cc Omnipaque 350 Intravenous contrast was   administered. 2-D MIP and 3-D volume rendering images.    COMPARISON: None    FINDINGS:    CTA NECK:    Right-sided dialysis catheter is noted entering the right subclavian   vein. The distal end is not visualized.    Mild atherosclerotic disease throughout the proximal great vessels.   Moderate calcific and soft plaque in the right carotid bulb extending   into the proximal right internal carotid artery. There is less than 50%   stenosis in the right carotid bulb. Less than 50% stenosis of the origin   of the right internal carotid.    Moderate calcific and soft plaque in the left carotid bulb and proximal   internal carotid artery. Less than 50 % stenosis in the left carotid   bulb. Less than 50% stenosis at the origin of the left internal carotid   artery.    Severe bilateral cavernous carotid artery calcifications. Moderate   stenosis in the right cavernous carotid on image 58, series 8. Mild   calcific plaque in the right vertebral artery on image 95, series 8,   causing mild stenosis.    A left-sided aortic arch is demonstrated. There is normal relationship to   the great vessels. The common carotid arteries, internal carotid arteries   and vertebral arteries show no other evidence of significant stenosis,   occlusion or saccular aneurysm dilation. The vertebral arteries are   codominant.    Severe diffuse bilateral neck edema/infiltrating hematoma in the right   supraclavicular fossa extending cephalad bilaterally.    IMPRESSION:          No evidence of vascular injury. Additional findings above.    Preliminary report provided by Horenstein,Hector [facility].                YASSER LESLY M.D., ATTENDING RADIOLOGIST  This document has been electronically signed. Apr 19 2017  8:58A    CRITICAL CARE TIME SPENT: 35 minutes

## 2017-04-22 DIAGNOSIS — I50.43 ACUTE ON CHRONIC COMBINED SYSTOLIC (CONGESTIVE) AND DIASTOLIC (CONGESTIVE) HEART FAILURE: ICD-10-CM

## 2017-04-22 DIAGNOSIS — E11.59 TYPE 2 DIABETES MELLITUS WITH OTHER CIRCULATORY COMPLICATIONS: ICD-10-CM

## 2017-04-22 DIAGNOSIS — R57.9 SHOCK, UNSPECIFIED: ICD-10-CM

## 2017-04-22 LAB
ANA TITR SER: NEGATIVE — SIGNIFICANT CHANGE UP
ANION GAP SERPL CALC-SCNC: 16 MMOL/L — SIGNIFICANT CHANGE UP (ref 5–17)
BUN SERPL-MCNC: 28 MG/DL — HIGH (ref 8–20)
CALCIUM SERPL-MCNC: 8.7 MG/DL — SIGNIFICANT CHANGE UP (ref 8.6–10.2)
CHLORIDE SERPL-SCNC: 98 MMOL/L — SIGNIFICANT CHANGE UP (ref 98–107)
CO2 SERPL-SCNC: 26 MMOL/L — SIGNIFICANT CHANGE UP (ref 22–29)
CREAT SERPL-MCNC: 2.91 MG/DL — HIGH (ref 0.5–1.3)
GLUCOSE SERPL-MCNC: 108 MG/DL — SIGNIFICANT CHANGE UP (ref 70–115)
HCT VFR BLD CALC: 29.3 % — LOW (ref 42–52)
HGB BLD-MCNC: 9.1 G/DL — LOW (ref 14–18)
MAGNESIUM SERPL-MCNC: 1.9 MG/DL — SIGNIFICANT CHANGE UP (ref 1.8–2.5)
MCHC RBC-ENTMCNC: 26.4 PG — LOW (ref 27–31)
MCHC RBC-ENTMCNC: 31.1 G/DL — LOW (ref 32–36)
MCV RBC AUTO: 84.9 FL — SIGNIFICANT CHANGE UP (ref 80–94)
PF4 HEPARIN CMPLX AB SER-ACNC: NEGATIVE — SIGNIFICANT CHANGE UP
PF4 HEPARIN CMPLX AB SERPL QL IA: 0.11 ABS — SIGNIFICANT CHANGE UP
PLATELET # BLD AUTO: 58 K/UL — LOW (ref 150–400)
POTASSIUM SERPL-MCNC: 3.8 MMOL/L — SIGNIFICANT CHANGE UP (ref 3.5–5.3)
POTASSIUM SERPL-SCNC: 3.8 MMOL/L — SIGNIFICANT CHANGE UP (ref 3.5–5.3)
RBC # BLD: 3.45 M/UL — LOW (ref 4.6–6.2)
RBC # FLD: 16.9 % — HIGH (ref 11–15.6)
SODIUM SERPL-SCNC: 140 MMOL/L — SIGNIFICANT CHANGE UP (ref 135–145)
WBC # BLD: 7.1 K/UL — SIGNIFICANT CHANGE UP (ref 4.8–10.8)
WBC # FLD AUTO: 7.1 K/UL — SIGNIFICANT CHANGE UP (ref 4.8–10.8)

## 2017-04-22 PROCEDURE — 99291 CRITICAL CARE FIRST HOUR: CPT

## 2017-04-22 PROCEDURE — 99233 SBSQ HOSP IP/OBS HIGH 50: CPT

## 2017-04-22 RX ORDER — PHENYLEPHRINE HYDROCHLORIDE 10 MG/ML
0.1 INJECTION INTRAVENOUS
Qty: 80 | Refills: 0 | Status: DISCONTINUED | OUTPATIENT
Start: 2017-04-22 | End: 2017-04-23

## 2017-04-22 RX ORDER — BUMETANIDE 0.25 MG/ML
0.5 INJECTION INTRAMUSCULAR; INTRAVENOUS
Qty: 0 | Refills: 0 | Status: DISCONTINUED | OUTPATIENT
Start: 2017-04-22 | End: 2017-05-08

## 2017-04-22 RX ORDER — CARVEDILOL PHOSPHATE 80 MG/1
3.12 CAPSULE, EXTENDED RELEASE ORAL EVERY 12 HOURS
Qty: 0 | Refills: 0 | Status: DISCONTINUED | OUTPATIENT
Start: 2017-04-22 | End: 2017-05-01

## 2017-04-22 RX ORDER — MIDODRINE HYDROCHLORIDE 2.5 MG/1
5 TABLET ORAL THREE TIMES A DAY
Qty: 0 | Refills: 0 | Status: DISCONTINUED | OUTPATIENT
Start: 2017-04-22 | End: 2017-04-23

## 2017-04-22 RX ADMIN — PIPERACILLIN AND TAZOBACTAM 200 GRAM(S): 4; .5 INJECTION, POWDER, LYOPHILIZED, FOR SOLUTION INTRAVENOUS at 18:01

## 2017-04-22 RX ADMIN — INSULIN GLARGINE 5 UNIT(S): 100 INJECTION, SOLUTION SUBCUTANEOUS at 21:20

## 2017-04-22 RX ADMIN — GABAPENTIN 300 MILLIGRAM(S): 400 CAPSULE ORAL at 05:48

## 2017-04-22 RX ADMIN — BUMETANIDE 0.5 MILLIGRAM(S): 0.25 INJECTION INTRAMUSCULAR; INTRAVENOUS at 17:07

## 2017-04-22 RX ADMIN — HEPARIN SODIUM 5000 UNIT(S): 5000 INJECTION INTRAVENOUS; SUBCUTANEOUS at 17:08

## 2017-04-22 RX ADMIN — Medication 325 MILLIGRAM(S): at 13:12

## 2017-04-22 RX ADMIN — MIDODRINE HYDROCHLORIDE 5 MILLIGRAM(S): 2.5 TABLET ORAL at 17:07

## 2017-04-22 RX ADMIN — TAMSULOSIN HYDROCHLORIDE 0.4 MILLIGRAM(S): 0.4 CAPSULE ORAL at 21:20

## 2017-04-22 RX ADMIN — Medication 1 DROP(S): at 17:08

## 2017-04-22 RX ADMIN — GABAPENTIN 300 MILLIGRAM(S): 400 CAPSULE ORAL at 13:12

## 2017-04-22 RX ADMIN — ATORVASTATIN CALCIUM 40 MILLIGRAM(S): 80 TABLET, FILM COATED ORAL at 21:20

## 2017-04-22 RX ADMIN — Medication 100 MILLIGRAM(S): at 17:07

## 2017-04-22 RX ADMIN — Medication 667 MILLIGRAM(S): at 17:07

## 2017-04-22 RX ADMIN — PIPERACILLIN AND TAZOBACTAM 200 GRAM(S): 4; .5 INJECTION, POWDER, LYOPHILIZED, FOR SOLUTION INTRAVENOUS at 13:58

## 2017-04-22 RX ADMIN — Medication 667 MILLIGRAM(S): at 13:12

## 2017-04-22 RX ADMIN — BUMETANIDE 2 MILLIGRAM(S): 0.25 INJECTION INTRAMUSCULAR; INTRAVENOUS at 05:48

## 2017-04-22 RX ADMIN — Medication 100 MILLIGRAM(S): at 05:48

## 2017-04-22 RX ADMIN — Medication 4: at 23:13

## 2017-04-22 RX ADMIN — CARVEDILOL PHOSPHATE 6.25 MILLIGRAM(S): 80 CAPSULE, EXTENDED RELEASE ORAL at 05:48

## 2017-04-22 RX ADMIN — PIPERACILLIN AND TAZOBACTAM 200 GRAM(S): 4; .5 INJECTION, POWDER, LYOPHILIZED, FOR SOLUTION INTRAVENOUS at 22:07

## 2017-04-22 RX ADMIN — CARVEDILOL PHOSPHATE 3.12 MILLIGRAM(S): 80 CAPSULE, EXTENDED RELEASE ORAL at 18:01

## 2017-04-22 RX ADMIN — HEPARIN SODIUM 5000 UNIT(S): 5000 INJECTION INTRAVENOUS; SUBCUTANEOUS at 05:48

## 2017-04-22 RX ADMIN — PHENYLEPHRINE HYDROCHLORIDE 10.35 MICROGRAM(S)/KG/MIN: 10 INJECTION INTRAVENOUS at 05:49

## 2017-04-22 RX ADMIN — PHENYLEPHRINE HYDROCHLORIDE 10.35 MICROGRAM(S)/KG/MIN: 10 INJECTION INTRAVENOUS at 07:41

## 2017-04-22 RX ADMIN — Medication 1 DROP(S): at 05:48

## 2017-04-22 RX ADMIN — PIPERACILLIN AND TAZOBACTAM 200 GRAM(S): 4; .5 INJECTION, POWDER, LYOPHILIZED, FOR SOLUTION INTRAVENOUS at 05:48

## 2017-04-22 RX ADMIN — PHENYLEPHRINE HYDROCHLORIDE 2.59 MICROGRAM(S)/KG/MIN: 10 INJECTION INTRAVENOUS at 18:01

## 2017-04-22 RX ADMIN — GABAPENTIN 300 MILLIGRAM(S): 400 CAPSULE ORAL at 21:20

## 2017-04-22 NOTE — PROGRESS NOTE ADULT - SUBJECTIVE AND OBJECTIVE BOX
No Known Allergies                                                             Code Status:EMILY RODRÍGUEZ Patient is a 87y old  Male who presents with a chief complaint of pt got intubated in the ED after HD (19 Apr 2017 04:25)      BRIEF HOSPITAL COURSE:   Pt is an 87 YOM h/o DM, CHF, HTN, HLD, CKD new to HD.  Pt was initially admitted for Malaise, initially had been refusing HD but then decided to accept the tx.  He had an HD cath and his initial HD treatment and had developed neck hematoma/edema and was intubated for airway protection and extubated several days later.  Pt had been transferred from ICU but 4/21 developed Afib with RVR and became hypoxic with increased work of breathing and respiratory distress.  He was given Bumex without improvement.  Tried on high flow 02 but requiring NIV due to increased work of breathing and respiratory distress. CXR noted pulmonary edema required emergent HD. he also had Tmax 103 (4/21) where about IVFs could not be given (secondary to acute respiratory distress requiring IHD emergently), vancomycin and pipercillin/tazobactam were started and cultures sent. phenylephrine was started for hypotension.     Events last 24 hours: noted above; no acute overnight; phenylephrine has been off since ~ 0700 4/22    Review of systems:     - lethargic, but all ROS negative as interviewed.       PAST MEDICAL & SURGICAL HISTORY:  Chronic renal failure  Coronary artery disease involving autologous vein bypass graft  CHF (congestive heart failure): FAMILY/PT NOT SURE IF DIAGNOSED WITH CHF OR COPD  Pacemaker  Diabetes  PSA elevation  Hyperlipemia  Hypertension  Cataract  Glaucoma  S/P CABG (coronary artery bypass graft)  S/P prostatectomy: 1992        Medications:  piperacillin/tazobactam IVPB.  IV Intermittent   piperacillin/tazobactam IVPB. 2.25Gram(s) IV Intermittent every 6 hours    tamsulosin 0.4milliGRAM(s) Oral at bedtime  buMETAnide 2milliGRAM(s) Oral every 12 hours  carvedilol 6.25milliGRAM(s) Oral every 12 hours      aspirin 325milliGRAM(s) Oral daily  gabapentin 300milliGRAM(s) Oral three times a day      heparin  Injectable 5000Unit(s) SubCutaneous every 12 hours    docusate sodium 100milliGRAM(s) Oral two times a day      atorvastatin 40milliGRAM(s) Oral at bedtime  dextrose Gel 1Dose(s) Oral once PRN  dextrose 50% Injectable 12.5Gram(s) IV Push once  dextrose 50% Injectable 25Gram(s) IV Push once  dextrose 50% Injectable 25Gram(s) IV Push once  glucagon  Injectable 1milliGRAM(s) IntraMuscular once PRN  insulin lispro (HumaLOG) corrective regimen sliding scale  SubCutaneous every 6 hours  insulin glargine Injectable (LANTUS) 5Unit(s) SubCutaneous at bedtime    calcium acetate 667milliGRAM(s) Oral three times a day with meals    epoetin una Injectable 45999Cusg(s) IV Push <User Schedule>    artificial  tears Solution 1Drop(s) Left EYE two times a day            Adult Advanced Hemodynamics Last 24 Hrs  CVP(mm Hg): --  CVP(cm H2O): --  CO: --  CI: --  PA: --  PA(mean): --  PCWP: --  SVR: --  SVRI: --  PVR: --  PVRI: --      ICU Vital Signs Last 24 Hrs  T(C): 36.9, Max: 39.7 (04-21 @ 16:00)  T(F): 98.4, Max: 103.5 (04-21 @ 16:00)  HR: 86 (69 - 135)  BP: 94/55 (77/42 - 162/81)  BP(mean): 70 (54 - 113)  ABP: --  ABP(mean): --  RR: 16 (13 - 29)  SpO2: 100% (92% - 100%)    CAPILLARY BLOOD GLUCOSE  111 (22 Apr 2017 05:30)  116 (21 Apr 2017 23:00)  188 (21 Apr 2017 18:30)  65 (21 Apr 2017 18:00)  212 (21 Apr 2017 12:00)            LABS:  CBC Full  -  ( 22 Apr 2017 06:55 )  WBC Count : 7.1 K/uL  Hemoglobin : 9.1 g/dL  Hematocrit : 29.3 %  Platelet Count - Automated : 58 K/uL  Mean Cell Volume : 84.9 fl  Mean Cell Hemoglobin : 26.4 pg  Mean Cell Hemoglobin Concentration : 31.1 g/dL  Auto Neutrophil # : x  Auto Lymphocyte # : x  Auto Monocyte # : x  Auto Eosinophil # : x  Auto Basophil # : x  Auto Neutrophil % : x  Auto Lymphocyte % : x  Auto Monocyte % : x  Auto Eosinophil % : x  Auto Basophil % : x    04-22    140  |  98  |  28.0<H>  ----------------------------<  108  3.8   |  26.0  |  2.91<H>    Ca    8.7      22 Apr 2017 06:55  Mg     1.9     04-22                     CULTURES:      Physical Examination:    General: No acute distress.  lethargic  interactive. Follows simple instructions    HEENT: Atraumatic, membranes dry, Pupil reactive to light.  Symmetric.     PULM: reduced at bases with scant rales no significant sputum production    CVS: Regular rate and rhythm, WENDI, rubs; S1/S2. No JVD/HJR    ABD: Soft, nondistended, nontender, normoactive bowel sounds, no masses    EXT: No edema, nontender. Distal pulses 2+ equal bilaterally    SKIN: Warm and well perfused, no rashes noted. right HD cath c/d/i    Neuro: nonfocal    RADIOLOGY:     CRITICAL CARE TIME SPENT: 45 minutes

## 2017-04-22 NOTE — PROGRESS NOTE ADULT - SUBJECTIVE AND OBJECTIVE BOX
EMILY [place]    [id number]    87y      Male    INTERVAL HPI/OVERNIGHT EVENTS: Had urgent hemodialysis last night and now improving.    Hospital course:  86 yo M with h/o DM, HTN, ischemic cardiomyopathy (EF 30-35%), chronic renal failure presents with worsening ARF. Pt was previously admitted for similar condition, but had refused hemodialysis until this admission. Pt had permacath placed but developed large neck swelling at the site and was unable to swallow secretions and had difficulty breathing. Pt was emergently intubated on 4/18. CTA of neck did not reveal any vascular injury, and showed severe diffuse bilateral neck edema/infiltrating hematoma in the right   supraclavicular fossa extending cephalad bilaterally. Per vascular surgery, unclear cause to neck swelling, and may be an anaphylactic reaction to medication. Swelling improved, and pt was extubated on 4/20. Pt passed speech and swallow and advanced to soft diet. However, 4/21, pt developed afib with RVR and found to have acute hypoxic respiratory failure which improved with urgent dialysis. Noted have fever and had blood cultures.     REVIEW OF SYSTEMS:    CONSTITUTIONAL: No fevers  RESPIRATORY: No cough; No shortness of breath  CARDIOVASCULAR: No chest pain, palpitations  GASTROINTESTINAL: No abdominal pain. No nausea, vomiting  NEUROLOGICAL: No headaches      Vital Signs Last 24 Hrs  T(C): 36.9, Max: 39.7 (04-21 @ 16:00)  T(F): 98.4, Max: 103.5 (04-21 @ 16:00)  HR: 83 (69 - 135)  BP: 97/52 (77/42 - 162/81)  BP(mean): 70 (54 - 113)  RR: 15 (13 - 29)  SpO2: 99% (87% - 100%)    PHYSICAL EXAM:    GENERAL: NAD  HEENT: PERRL, +EOMI, MMM, +mucus secretions  NECK: soft, Supple, No JVD,   CHEST/LUNG: diffuse rales  HEART: S1S2+, Regular rate and rhythm   ABDOMEN: Soft, Nontender, Nondistended; Bowel sounds present  EXTREMITIES:  No edema    LABS:                        9.1    7.1   )-----------( 58       ( 22 Apr 2017 06:55 )             29.3     04-22    140  |  98  |  28.0<H>  ----------------------------<  108  3.8   |  26.0  |  2.91<H>    Ca    8.7      22 Apr 2017 06:55  Mg     1.9     04-22              MEDICATIONS  (STANDING):  aspirin 325milliGRAM(s) Oral daily  tamsulosin 0.4milliGRAM(s) Oral at bedtime  gabapentin 300milliGRAM(s) Oral three times a day  atorvastatin 40milliGRAM(s) Oral at bedtime  docusate sodium 100milliGRAM(s) Oral two times a day  buMETAnide 2milliGRAM(s) Oral every 12 hours  dextrose 50% Injectable 12.5Gram(s) IV Push once  dextrose 50% Injectable 25Gram(s) IV Push once  dextrose 50% Injectable 25Gram(s) IV Push once  epoetin una Injectable 49963Ewuj(s) IV Push <User Schedule>  insulin lispro (HumaLOG) corrective regimen sliding scale  SubCutaneous every 6 hours  insulin glargine Injectable (LANTUS) 5Unit(s) SubCutaneous at bedtime  heparin  Injectable 5000Unit(s) SubCutaneous every 12 hours  artificial  tears Solution 1Drop(s) Left EYE two times a day  calcium acetate 667milliGRAM(s) Oral three times a day with meals  carvedilol 6.25milliGRAM(s) Oral every 12 hours  phenylephrine    Infusion 0.4MICROgram(s)/kG/Min IV Continuous <Continuous>  piperacillin/tazobactam IVPB.  IV Intermittent   piperacillin/tazobactam IVPB. 2.25Gram(s) IV Intermittent every 6 hours  acetaminophen  IVPB 1000milliGRAM(s) IV Intermittent once    MEDICATIONS  (PRN):  dextrose Gel 1Dose(s) Oral once PRN Blood Glucose LESS THAN 70 milliGRAM(s)/deciliter  glucagon  Injectable 1milliGRAM(s) IntraMuscular once PRN Glucose LESS THAN 70 milligrams/deciliter      RADIOLOGY & ADDITIONAL TESTS:

## 2017-04-22 NOTE — PROGRESS NOTE ADULT - PROBLEM SELECTOR PLAN 4
Likely in flash pulmonary edema 2/2 IVF.  Will d/c IVF  Stat bumex now  Echo 4/6: EF 30-35% with moderate AS (possibly pseudoaortic stensosis)  Restart home carvedilol and lisinopril.

## 2017-04-22 NOTE — PROGRESS NOTE ADULT - ASSESSMENT
87M developed acute cardiogenic pulmonary edema secondary to atrial fibrillation with rapid ventricular response, Tmax 103, concerns for sepsis (unclear source), in setting of ESRD on HD, chronic combined systolic and diastolic heart failure, hypertension (essential), and diabetes requiring insulin.   - initially in MICU for neck hematoma

## 2017-04-22 NOTE — PROGRESS NOTE ADULT - SUBJECTIVE AND OBJECTIVE BOX
NEPHROLOGY INTERVAL HPI/OVERNIGHT EVENTS: No events.    MEDICATIONS  (STANDING):  aspirin 325milliGRAM(s) Oral daily  tamsulosin 0.4milliGRAM(s) Oral at bedtime  gabapentin 300milliGRAM(s) Oral three times a day  atorvastatin 40milliGRAM(s) Oral at bedtime  docusate sodium 100milliGRAM(s) Oral two times a day  dextrose 50% Injectable 12.5Gram(s) IV Push once  dextrose 50% Injectable 25Gram(s) IV Push once  dextrose 50% Injectable 25Gram(s) IV Push once  epoetin una Injectable 01150Aqev(s) IV Push <User Schedule>  insulin lispro (HumaLOG) corrective regimen sliding scale  SubCutaneous every 6 hours  insulin glargine Injectable (LANTUS) 5Unit(s) SubCutaneous at bedtime  heparin  Injectable 5000Unit(s) SubCutaneous every 12 hours  artificial  tears Solution 1Drop(s) Left EYE two times a day  calcium acetate 667milliGRAM(s) Oral three times a day with meals  carvedilol 6.25milliGRAM(s) Oral every 12 hours  piperacillin/tazobactam IVPB.  IV Intermittent   piperacillin/tazobactam IVPB. 2.25Gram(s) IV Intermittent every 6 hours  buMETAnide 0.5milliGRAM(s) Oral two times a day    MEDICATIONS  (PRN):  dextrose Gel 1Dose(s) Oral once PRN Blood Glucose LESS THAN 70 milliGRAM(s)/deciliter  glucagon  Injectable 1milliGRAM(s) IntraMuscular once PRN Glucose LESS THAN 70 milligrams/deciliter      Allergies    No Known Allergies    Intolerances        Vital Signs Last 24 Hrs  T(C): 36.9, Max: 39.7 (- @ 16:00)  T(F): 98.4, Max: 103.5 (- @ 16:00)  HR: 86 (69 - 126)  BP: 94/55 (77/42 - 162/81)  BP(mean): 70 (54 - 113)  RR: 16 (13 - 29)  SpO2: 100% (94% - 100%)  Daily     Daily Weight in k.7 (2017 06:00)    PHYSICAL EXAM:    GENERAL: awake in MICU bed, family member present  HEAD:    EYES: same  ENMT:   NECK: right permacath site  with no bleeding  NERVOUS SYSTEM:    CHEST/LUNG: no wheezes, bs bilaterally  HEART: no rub, monitor rate noted  ABDOMEN: not tender  EXTREMITIES:  compression devices to legs  LYMPH:   SKIN: no rash   [place] present with small amount yellow  urine  LABS:                        9.1    7.1   )-----------( 58       ( 2017 06:55 )             29.3     -    140  |  98  |  28.0<H>  ----------------------------<  108  3.8   |  26.0  |  2.91<H>    Ca    8.7      2017 06:55  Mg     1.9               Magnesium, Serum: 1.9 mg/dL ( @ 06:55)    ABG - ( 2017 20:37 )  pH: 7.45  /  pCO2: 40    /  pO2: 145   / HCO3: 28    / Base Excess: 3.8   /  SaO2: 100                   RADIOLOGY & ADDITIONAL TESTS:

## 2017-04-22 NOTE — PROGRESS NOTE ADULT - ASSESSMENT
86 yo M with h/o DM, HTN, ischemic cardiomyopathy, chronic renal failure here with ESRD now requiring dialysis and neck hematoma

## 2017-04-22 NOTE — PROGRESS NOTE ADULT - PROBLEM SELECTOR PLAN 1
- on nasal canula (relative hypoxemia) now s/p HD and diuresis (makes urine)  - NIV prn  - possible aspiration pneumonitis, though less likely

## 2017-04-22 NOTE — PROGRESS NOTE ADULT - PROBLEM SELECTOR PLAN 2
- betablocker, diuretics, ACE-I held secondary to hypotension   - trigger yesterday was rapid ventricular response

## 2017-04-22 NOTE — PROGRESS NOTE ADULT - PROBLEM SELECTOR PLAN 4
- could be infectious nidus   - cultures pending results  - vancomycin (by level); pipercillin/tazobactam (renal dosed)  - goal MAP ~ 65mmHg; may add midodrine (5mg tid), will re-evaluate throughout day

## 2017-04-23 LAB
-  AMIKACIN: SIGNIFICANT CHANGE UP
-  AZTREONAM: SIGNIFICANT CHANGE UP
-  CEFEPIME: SIGNIFICANT CHANGE UP
-  CEFTAZIDIME: SIGNIFICANT CHANGE UP
-  CIPROFLOXACIN: SIGNIFICANT CHANGE UP
-  GENTAMICIN: SIGNIFICANT CHANGE UP
-  IMIPENEM: SIGNIFICANT CHANGE UP
-  LEVOFLOXACIN: SIGNIFICANT CHANGE UP
-  MEROPENEM: SIGNIFICANT CHANGE UP
-  PIPERACILLIN/TAZOBACTAM: SIGNIFICANT CHANGE UP
-  TOBRAMYCIN: SIGNIFICANT CHANGE UP
ALBUMIN SERPL ELPH-MCNC: 3 G/DL — LOW (ref 3.3–5.2)
ALP SERPL-CCNC: 51 U/L — SIGNIFICANT CHANGE UP (ref 40–120)
ALT FLD-CCNC: 9 U/L — SIGNIFICANT CHANGE UP
ANION GAP SERPL CALC-SCNC: 16 MMOL/L — SIGNIFICANT CHANGE UP (ref 5–17)
AST SERPL-CCNC: 33 U/L — SIGNIFICANT CHANGE UP
BILIRUB SERPL-MCNC: 0.6 MG/DL — SIGNIFICANT CHANGE UP (ref 0.4–2)
BUN SERPL-MCNC: 54 MG/DL — HIGH (ref 8–20)
CALCIUM SERPL-MCNC: 8.8 MG/DL — SIGNIFICANT CHANGE UP (ref 8.6–10.2)
CHLORIDE SERPL-SCNC: 96 MMOL/L — LOW (ref 98–107)
CO2 SERPL-SCNC: 26 MMOL/L — SIGNIFICANT CHANGE UP (ref 22–29)
CREAT SERPL-MCNC: 4.77 MG/DL — HIGH (ref 0.5–1.3)
CULTURE RESULTS: SIGNIFICANT CHANGE UP
GLUCOSE SERPL-MCNC: 133 MG/DL — HIGH (ref 70–115)
HCT VFR BLD CALC: 26.8 % — LOW (ref 42–52)
HGB BLD-MCNC: 8.3 G/DL — LOW (ref 14–18)
MCHC RBC-ENTMCNC: 26.3 PG — LOW (ref 27–31)
MCHC RBC-ENTMCNC: 31 G/DL — LOW (ref 32–36)
MCV RBC AUTO: 85.1 FL — SIGNIFICANT CHANGE UP (ref 80–94)
METHOD TYPE: SIGNIFICANT CHANGE UP
ORGANISM # SPEC MICROSCOPIC CNT: SIGNIFICANT CHANGE UP
ORGANISM # SPEC MICROSCOPIC CNT: SIGNIFICANT CHANGE UP
PLATELET # BLD AUTO: 56 K/UL — LOW (ref 150–400)
POTASSIUM SERPL-MCNC: 4.2 MMOL/L — SIGNIFICANT CHANGE UP (ref 3.5–5.3)
POTASSIUM SERPL-SCNC: 4.2 MMOL/L — SIGNIFICANT CHANGE UP (ref 3.5–5.3)
PROT SERPL-MCNC: 6.9 G/DL — SIGNIFICANT CHANGE UP (ref 6.6–8.7)
RBC # BLD: 3.15 M/UL — LOW (ref 4.6–6.2)
RBC # FLD: 16.6 % — HIGH (ref 11–15.6)
SODIUM SERPL-SCNC: 138 MMOL/L — SIGNIFICANT CHANGE UP (ref 135–145)
SPECIMEN SOURCE: SIGNIFICANT CHANGE UP
WBC # BLD: 6.5 K/UL — SIGNIFICANT CHANGE UP (ref 4.8–10.8)
WBC # FLD AUTO: 6.5 K/UL — SIGNIFICANT CHANGE UP (ref 4.8–10.8)

## 2017-04-23 PROCEDURE — 99233 SBSQ HOSP IP/OBS HIGH 50: CPT

## 2017-04-23 PROCEDURE — 99291 CRITICAL CARE FIRST HOUR: CPT

## 2017-04-23 RX ORDER — MIDODRINE HYDROCHLORIDE 2.5 MG/1
10 TABLET ORAL THREE TIMES A DAY
Qty: 0 | Refills: 0 | Status: DISCONTINUED | OUTPATIENT
Start: 2017-04-23 | End: 2017-04-26

## 2017-04-23 RX ORDER — INSULIN LISPRO 100/ML
VIAL (ML) SUBCUTANEOUS
Qty: 0 | Refills: 0 | Status: DISCONTINUED | OUTPATIENT
Start: 2017-04-23 | End: 2017-05-08

## 2017-04-23 RX ORDER — INSULIN GLARGINE 100 [IU]/ML
10 INJECTION, SOLUTION SUBCUTANEOUS AT BEDTIME
Qty: 0 | Refills: 0 | Status: DISCONTINUED | OUTPATIENT
Start: 2017-04-23 | End: 2017-04-30

## 2017-04-23 RX ADMIN — Medication 10: at 11:40

## 2017-04-23 RX ADMIN — Medication: at 16:39

## 2017-04-23 RX ADMIN — INSULIN GLARGINE 10 UNIT(S): 100 INJECTION, SOLUTION SUBCUTANEOUS at 21:12

## 2017-04-23 RX ADMIN — MIDODRINE HYDROCHLORIDE 10 MILLIGRAM(S): 2.5 TABLET ORAL at 09:09

## 2017-04-23 RX ADMIN — HEPARIN SODIUM 5000 UNIT(S): 5000 INJECTION INTRAVENOUS; SUBCUTANEOUS at 17:06

## 2017-04-23 RX ADMIN — GABAPENTIN 300 MILLIGRAM(S): 400 CAPSULE ORAL at 05:31

## 2017-04-23 RX ADMIN — CARVEDILOL PHOSPHATE 3.12 MILLIGRAM(S): 80 CAPSULE, EXTENDED RELEASE ORAL at 17:07

## 2017-04-23 RX ADMIN — Medication 4: at 21:12

## 2017-04-23 RX ADMIN — PIPERACILLIN AND TAZOBACTAM 200 GRAM(S): 4; .5 INJECTION, POWDER, LYOPHILIZED, FOR SOLUTION INTRAVENOUS at 17:06

## 2017-04-23 RX ADMIN — BUMETANIDE 0.5 MILLIGRAM(S): 0.25 INJECTION INTRAMUSCULAR; INTRAVENOUS at 17:06

## 2017-04-23 RX ADMIN — PIPERACILLIN AND TAZOBACTAM 200 GRAM(S): 4; .5 INJECTION, POWDER, LYOPHILIZED, FOR SOLUTION INTRAVENOUS at 05:31

## 2017-04-23 RX ADMIN — CARVEDILOL PHOSPHATE 3.12 MILLIGRAM(S): 80 CAPSULE, EXTENDED RELEASE ORAL at 05:31

## 2017-04-23 RX ADMIN — MIDODRINE HYDROCHLORIDE 5 MILLIGRAM(S): 2.5 TABLET ORAL at 02:09

## 2017-04-23 RX ADMIN — PIPERACILLIN AND TAZOBACTAM 200 GRAM(S): 4; .5 INJECTION, POWDER, LYOPHILIZED, FOR SOLUTION INTRAVENOUS at 11:39

## 2017-04-23 RX ADMIN — Medication 667 MILLIGRAM(S): at 11:40

## 2017-04-23 RX ADMIN — GABAPENTIN 300 MILLIGRAM(S): 400 CAPSULE ORAL at 21:12

## 2017-04-23 RX ADMIN — Medication 667 MILLIGRAM(S): at 07:36

## 2017-04-23 RX ADMIN — Medication 100 MILLIGRAM(S): at 05:31

## 2017-04-23 RX ADMIN — GABAPENTIN 300 MILLIGRAM(S): 400 CAPSULE ORAL at 16:38

## 2017-04-23 RX ADMIN — PIPERACILLIN AND TAZOBACTAM 200 GRAM(S): 4; .5 INJECTION, POWDER, LYOPHILIZED, FOR SOLUTION INTRAVENOUS at 23:30

## 2017-04-23 RX ADMIN — Medication 100 MILLIGRAM(S): at 17:07

## 2017-04-23 RX ADMIN — Medication 667 MILLIGRAM(S): at 16:38

## 2017-04-23 RX ADMIN — Medication 1 DROP(S): at 17:06

## 2017-04-23 RX ADMIN — TAMSULOSIN HYDROCHLORIDE 0.4 MILLIGRAM(S): 0.4 CAPSULE ORAL at 21:12

## 2017-04-23 RX ADMIN — HEPARIN SODIUM 5000 UNIT(S): 5000 INJECTION INTRAVENOUS; SUBCUTANEOUS at 05:32

## 2017-04-23 RX ADMIN — Medication 325 MILLIGRAM(S): at 11:40

## 2017-04-23 RX ADMIN — ATORVASTATIN CALCIUM 40 MILLIGRAM(S): 80 TABLET, FILM COATED ORAL at 21:12

## 2017-04-23 RX ADMIN — MIDODRINE HYDROCHLORIDE 10 MILLIGRAM(S): 2.5 TABLET ORAL at 17:07

## 2017-04-23 RX ADMIN — Medication 1 DROP(S): at 05:31

## 2017-04-23 RX ADMIN — BUMETANIDE 0.5 MILLIGRAM(S): 0.25 INJECTION INTRAMUSCULAR; INTRAVENOUS at 05:32

## 2017-04-23 NOTE — PROGRESS NOTE ADULT - PROBLEM SELECTOR PLAN 5
- long acting and NISS  - 1 FS > 300 4/23, increased long acting; will follow and may require premeal, however has not required coverage insulin in past 24 hours.

## 2017-04-23 NOTE — PROGRESS NOTE ADULT - SUBJECTIVE AND OBJECTIVE BOX
EMILY LITTLEJOHN    [id number]    87y      Male    INTERVAL HPI/OVERNIGHT EVENTS: Sitting in chair with daughters at bedside. States that he feels well.    Hospital course:  88 yo M with h/o DM, HTN, ischemic cardiomyopathy (EF 30-35%), chronic renal failure presents with worsening ARF. Pt was previously admitted for similar condition, but had refused hemodialysis until this admission. Pt had permacath placed but developed large neck swelling at the site and was unable to swallow secretions and had difficulty breathing. Pt was emergently intubated on 4/18. CTA of neck did not reveal any vascular injury, and showed severe diffuse bilateral neck edema/infiltrating hematoma in the right supraclavicular fossa extending cephalad bilaterally. Per vascular surgery, unclear cause to neck swelling, and may be an anaphylactic reaction to medication. Swelling improved, and pt was extubated on 4/20. Pt passed speech and swallow and advanced to soft diet. However, 4/21, pt developed afib with RVR and found to have acute hypoxic respiratory failure which improved with urgent dialysis. Noted have fever and had blood cultures.     REVIEW OF SYSTEMS:    CONSTITUTIONAL: No fever   RESPIRATORY: No cough; No shortness of breath  CARDIOVASCULAR: No chest pain, palpitations    Vital Signs Last 24 Hrs  T(C): 36.6, Max: 36.9 (04-22 @ 16:00)  T(F): 97.9, Max: 98.4 (04-22 @ 16:00)  HR: 80 (74 - 88)  BP: 81/46 (81/46 - 123/60)  BP(mean): 61 (61 - 87)  RR: 12 (11 - 18)  SpO2: 100% (95% - 100%)    PHYSICAL EXAM:    GENERAL: not in respiratory distress, comfortable   HEENT: MMM  CHEST/LUNG: Clear to percussion bilaterally; No wheezing  HEART: S1S2+, Regular rate and rhythm; No murmurs   ABDOMEN: Soft, Nontender, Nondistended; Bowel sounds present      LABS:                        8.3    6.5   )-----------( 56       ( 23 Apr 2017 06:08 )             26.8     04-23    138  |  96<L>  |  54.0<H>  ----------------------------<  133<H>  4.2   |  26.0  |  4.77<H>    Ca    8.8      23 Apr 2017 06:08  Mg     1.9     04-22    TPro  6.9  /  Alb  3.0<L>  /  TBili  0.6  /  DBili  x   /  AST  33  /  ALT  9   /  AlkPhos  51  04-23            MEDICATIONS  (STANDING):  aspirin 325milliGRAM(s) Oral daily  tamsulosin 0.4milliGRAM(s) Oral at bedtime  gabapentin 300milliGRAM(s) Oral three times a day  atorvastatin 40milliGRAM(s) Oral at bedtime  docusate sodium 100milliGRAM(s) Oral two times a day  dextrose 50% Injectable 12.5Gram(s) IV Push once  dextrose 50% Injectable 25Gram(s) IV Push once  dextrose 50% Injectable 25Gram(s) IV Push once  epoetin una Injectable [id number](s) IV Push <User Schedule>  insulin lispro (HumaLOG) corrective regimen sliding scale  SubCutaneous every 6 hours  heparin  Injectable 5000Unit(s) SubCutaneous every 12 hours  artificial  tears Solution 1Drop(s) Left EYE two times a day  calcium acetate 667milliGRAM(s) Oral three times a day with meals  piperacillin/tazobactam IVPB.  IV Intermittent   piperacillin/tazobactam IVPB. 2.25Gram(s) IV Intermittent every 6 hours  buMETAnide 0.5milliGRAM(s) Oral two times a day  carvedilol 3.125milliGRAM(s) Oral every 12 hours  midodrine 10milliGRAM(s) Oral three times a day  insulin glargine Injectable (LANTUS) 10Unit(s) SubCutaneous at bedtime    MEDICATIONS  (PRN):  dextrose Gel 1Dose(s) Oral once PRN Blood Glucose LESS THAN 70 milliGRAM(s)/deciliter  glucagon  Injectable 1milliGRAM(s) IntraMuscular once PRN Glucose LESS THAN 70 milligrams/deciliter      RADIOLOGY & ADDITIONAL TESTS:

## 2017-04-23 NOTE — PROGRESS NOTE ADULT - SUBJECTIVE AND OBJECTIVE BOX
No Known Allergies                                                             Code Status:EMILY RODRÍGUEZ Patient is a 87y old  Male who presents with a chief complaint of pt got intubated in the ED after HD (19 Apr 2017 04:25)      BRIEF HOSPITAL COURSE:   Pt is an 87 YOM h/o DM, CHF, HTN, HLD, CKD new to HD.  Pt was initially admitted for Malaise, initially had been refusing HD but then decided to accept the tx.  He had an HD cath and his initial HD treatment and had developed neck hematoma/edema and was intubated for airway protection and extubated several days later.  Pt had been transferred from ICU but 4/21 developed Afib with RVR and became hypoxic with increased work of breathing and respiratory distress.  He was given Bumex without improvement.  Tried on high flow 02 but requiring NIV due to increased work of breathing and respiratory distress. CXR noted pulmonary edema required emergent HD. he also had Tmax 103 (4/21) where about IVFs could not be given (secondary to acute respiratory distress requiring IHD emergently), vancomycin and pipercillin/tazobactam were started and cultures sent. phenylephrine was started for hypotension.     Events last 24 hours: no acute events overnight. OOB and tolerating PO.     Review of systems:      all ROS negative as interviewed.       PAST MEDICAL & SURGICAL HISTORY:  Chronic renal failure  Coronary artery disease involving autologous vein bypass graft  CHF (congestive heart failure): FAMILY/PT NOT SURE IF DIAGNOSED WITH CHF OR COPD  Pacemaker  Diabetes  PSA elevation  Hyperlipemia  Hypertension  Cataract  Glaucoma  S/P CABG (coronary artery bypass graft)  S/P prostatectomy: 1992        MEDICATIONS  (STANDING):  aspirin 325milliGRAM(s) Oral daily  tamsulosin 0.4milliGRAM(s) Oral at bedtime  gabapentin 300milliGRAM(s) Oral three times a day  atorvastatin 40milliGRAM(s) Oral at bedtime  docusate sodium 100milliGRAM(s) Oral two times a day  dextrose 50% Injectable 12.5Gram(s) IV Push once  dextrose 50% Injectable 25Gram(s) IV Push once  dextrose 50% Injectable 25Gram(s) IV Push once  epoetin una Injectable [id number](s) IV Push <User Schedule>  insulin lispro (HumaLOG) corrective regimen sliding scale  SubCutaneous every 6 hours  heparin  Injectable 5000Unit(s) SubCutaneous every 12 hours  artificial  tears Solution 1Drop(s) Left EYE two times a day  calcium acetate 667milliGRAM(s) Oral three times a day with meals  piperacillin/tazobactam IVPB.  IV Intermittent   piperacillin/tazobactam IVPB. 2.25Gram(s) IV Intermittent every 6 hours  buMETAnide 0.5milliGRAM(s) Oral two times a day  carvedilol 3.125milliGRAM(s) Oral every 12 hours  midodrine 10milliGRAM(s) Oral three times a day  insulin glargine Injectable (LANTUS) 10Unit(s) SubCutaneous at bedtime    MEDICATIONS  (PRN):  dextrose Gel 1Dose(s) Oral once PRN Blood Glucose LESS THAN 70 milliGRAM(s)/deciliter  glucagon  Injectable 1milliGRAM(s) IntraMuscular once PRN Glucose LESS THAN 70 milligrams/deciliter          ICU Vital Signs Last 24 Hrs  T(C): 36.6, Max: 36.9 (04-22 @ 16:00)  T(F): 97.9, Max: 98.4 (04-22 @ 16:00)  HR: 84 (74 - 93)  BP: 110/55 (86/52 - 123/60)  BP(mean): 79 (61 - 87)  ABP: --  ABP(mean): --  RR: 15 (11 - 18)  SpO2: 99% (95% - 100%)            LABS:                        8.3    6.5   )-----------( 56       ( 23 Apr 2017 06:08 )             26.8   04-23    138  |  96<L>  |  54.0<H>  ----------------------------<  133<H>  4.2   |  26.0  |  4.77<H>    Ca    8.8      23 Apr 2017 06:08  Mg     1.9     04-22    TPro  6.9  /  Alb  3.0<L>  /  TBili  0.6  /  DBili  x   /  AST  33  /  ALT  9   /  AlkPhos  51  04-23                   CULTURES:      Physical Examination:    General: No acute distress.  alert interactive. Follows simple instructions    HEENT: Atraumatic, membranes dry, Pupil reactive to light.  Symmetric.     PULM: reduced at bases with scant rales no significant sputum production    CVS: Regular rate and rhythm, WENDI, rubs; S1/S2. No JVD/HJR    ABD: Soft, nondistended, nontender, normoactive bowel sounds, no masses    EXT: No edema, nontender. Distal pulses 2+ equal bilaterally    SKIN: Warm and well perfused, no rashes noted. right HD cath c/d/i    Neuro: nonfocal    RADIOLOGY:     CRITICAL CARE TIME SPENT: 45 minutes

## 2017-04-23 NOTE — PROGRESS NOTE ADULT - SUBJECTIVE AND OBJECTIVE BOX
NEPHROLOGY INTERVAL HPI/OVERNIGHT EVENTS: no new events.    MEDICATIONS  (STANDING):  aspirin 325milliGRAM(s) Oral daily  tamsulosin 0.4milliGRAM(s) Oral at bedtime  gabapentin 300milliGRAM(s) Oral three times a day  atorvastatin 40milliGRAM(s) Oral at bedtime  docusate sodium 100milliGRAM(s) Oral two times a day  dextrose 50% Injectable 12.5Gram(s) IV Push once  dextrose 50% Injectable 25Gram(s) IV Push once  dextrose 50% Injectable 25Gram(s) IV Push once  epoetin una Injectable [id number](s) IV Push <User Schedule>  insulin lispro (HumaLOG) corrective regimen sliding scale  SubCutaneous every 6 hours  heparin  Injectable 5000Unit(s) SubCutaneous every 12 hours  artificial  tears Solution 1Drop(s) Left EYE two times a day  calcium acetate 667milliGRAM(s) Oral three times a day with meals  piperacillin/tazobactam IVPB.  IV Intermittent   piperacillin/tazobactam IVPB. 2.25Gram(s) IV Intermittent every 6 hours  buMETAnide 0.5milliGRAM(s) Oral two times a day  carvedilol 3.125milliGRAM(s) Oral every 12 hours  midodrine 10milliGRAM(s) Oral three times a day  insulin glargine Injectable (LANTUS) 10Unit(s) SubCutaneous at bedtime    MEDICATIONS  (PRN):  dextrose Gel 1Dose(s) Oral once PRN Blood Glucose LESS THAN 70 milliGRAM(s)/deciliter  glucagon  Injectable 1milliGRAM(s) IntraMuscular once PRN Glucose LESS THAN 70 milligrams/deciliter      Allergies    No Known Allergies    Intolerances        Vital Signs Last 24 Hrs  T(C): 36.6, Max: 36.9 (04-22 @ 16:00)  T(F): 97.9, Max: 98.4 (04-22 @ 16:00)  HR: 84 (74 - 88)  BP: 87/49 (81/46 - 123/60)  BP(mean): 64 (61 - 87)  RR: 17 (11 - 18)  SpO2: 98% (95% - 100%)  Daily     Daily     PHYSICAL EXAM:    GENERAL: same  HEAD:    EYES: same  ENMT:   NECK: right permacath noted  NERVOUS SYSTEM: awake, verbal   CHEST/LUNG: clearer  HEART: no rub  ABDOMEN: not tender  EXTREMITIES:  +2 lower leg edema  LYMPH:   SKIN: no rash  CARMENZA [place] noted with yellow  urine  LABS:                        8.3    6.5   )-----------( 56       ( 23 Apr 2017 06:08 )             26.8     04-23    138  |  96<L>  |  54.0<H>  ----------------------------<  133<H>  4.2   |  26.0  |  4.77<H>    Ca    8.8      23 Apr 2017 06:08  Mg     1.9     04-22    TPro  6.9  /  Alb  3.0<L>  /  TBili  0.6  /  DBili  x   /  AST  33  /  ALT  9   /  AlkPhos  51  04-23          ABG - ( 21 Apr 2017 20:37 )  pH: 7.45  /  pCO2: 40    /  pO2: 145   / HCO3: 28    / Base Excess: 3.8   /  SaO2: 100                   RADIOLOGY & ADDITIONAL TESTS:

## 2017-04-23 NOTE — PROGRESS NOTE ADULT - PROBLEM SELECTOR PLAN 4
- goal MAP ~ 65mmHg; midodrine (10mg tid) started 4/22  - could be infectious nidus   - sputum = Gram negatives; blood pending results;   - would continue with pipercillin/tazobactam (renal dosed) until sputum ID'd

## 2017-04-24 DIAGNOSIS — S10.93XD: ICD-10-CM

## 2017-04-24 DIAGNOSIS — D64.9 ANEMIA, UNSPECIFIED: ICD-10-CM

## 2017-04-24 LAB
ANION GAP SERPL CALC-SCNC: 14 MMOL/L — SIGNIFICANT CHANGE UP (ref 5–17)
BUN SERPL-MCNC: 72 MG/DL — HIGH (ref 8–20)
CALCIUM SERPL-MCNC: 8.3 MG/DL — LOW (ref 8.6–10.2)
CHLORIDE SERPL-SCNC: 92 MMOL/L — LOW (ref 98–107)
CO2 SERPL-SCNC: 26 MMOL/L — SIGNIFICANT CHANGE UP (ref 22–29)
CREAT SERPL-MCNC: 5.77 MG/DL — HIGH (ref 0.5–1.3)
GLUCOSE SERPL-MCNC: 155 MG/DL — HIGH (ref 70–115)
HCT VFR BLD CALC: 23.8 % — LOW (ref 42–52)
HGB BLD-MCNC: 7.6 G/DL — LOW (ref 14–18)
MAGNESIUM SERPL-MCNC: 2 MG/DL — SIGNIFICANT CHANGE UP (ref 1.8–2.5)
MCHC RBC-ENTMCNC: 26.7 PG — LOW (ref 27–31)
MCHC RBC-ENTMCNC: 31.9 G/DL — LOW (ref 32–36)
MCV RBC AUTO: 83.5 FL — SIGNIFICANT CHANGE UP (ref 80–94)
PHOSPHATE SERPL-MCNC: 4.5 MG/DL — SIGNIFICANT CHANGE UP (ref 2.4–4.7)
PLATELET # BLD AUTO: 51 K/UL — LOW (ref 150–400)
POTASSIUM SERPL-MCNC: 4.1 MMOL/L — SIGNIFICANT CHANGE UP (ref 3.5–5.3)
POTASSIUM SERPL-SCNC: 4.1 MMOL/L — SIGNIFICANT CHANGE UP (ref 3.5–5.3)
RBC # BLD: 2.85 M/UL — LOW (ref 4.6–6.2)
RBC # FLD: 16.4 % — HIGH (ref 11–15.6)
SODIUM SERPL-SCNC: 132 MMOL/L — LOW (ref 135–145)
WBC # BLD: 6.3 K/UL — SIGNIFICANT CHANGE UP (ref 4.8–10.8)
WBC # FLD AUTO: 6.3 K/UL — SIGNIFICANT CHANGE UP (ref 4.8–10.8)

## 2017-04-24 PROCEDURE — 99233 SBSQ HOSP IP/OBS HIGH 50: CPT

## 2017-04-24 PROCEDURE — 71010: CPT | Mod: 26

## 2017-04-24 RX ADMIN — Medication 100 MILLIGRAM(S): at 05:31

## 2017-04-24 RX ADMIN — PIPERACILLIN AND TAZOBACTAM 200 GRAM(S): 4; .5 INJECTION, POWDER, LYOPHILIZED, FOR SOLUTION INTRAVENOUS at 22:57

## 2017-04-24 RX ADMIN — Medication 10: at 22:58

## 2017-04-24 RX ADMIN — BUMETANIDE 0.5 MILLIGRAM(S): 0.25 INJECTION INTRAMUSCULAR; INTRAVENOUS at 05:30

## 2017-04-24 RX ADMIN — HEPARIN SODIUM 5000 UNIT(S): 5000 INJECTION INTRAVENOUS; SUBCUTANEOUS at 05:30

## 2017-04-24 RX ADMIN — HEPARIN SODIUM 5000 UNIT(S): 5000 INJECTION INTRAVENOUS; SUBCUTANEOUS at 17:42

## 2017-04-24 RX ADMIN — Medication 1 DROP(S): at 05:30

## 2017-04-24 RX ADMIN — Medication 100 MILLIGRAM(S): at 17:39

## 2017-04-24 RX ADMIN — GABAPENTIN 300 MILLIGRAM(S): 400 CAPSULE ORAL at 05:30

## 2017-04-24 RX ADMIN — TAMSULOSIN HYDROCHLORIDE 0.4 MILLIGRAM(S): 0.4 CAPSULE ORAL at 22:57

## 2017-04-24 RX ADMIN — MIDODRINE HYDROCHLORIDE 10 MILLIGRAM(S): 2.5 TABLET ORAL at 01:14

## 2017-04-24 RX ADMIN — GABAPENTIN 300 MILLIGRAM(S): 400 CAPSULE ORAL at 22:57

## 2017-04-24 RX ADMIN — MIDODRINE HYDROCHLORIDE 10 MILLIGRAM(S): 2.5 TABLET ORAL at 09:33

## 2017-04-24 RX ADMIN — Medication 1 DROP(S): at 17:33

## 2017-04-24 RX ADMIN — CARVEDILOL PHOSPHATE 3.12 MILLIGRAM(S): 80 CAPSULE, EXTENDED RELEASE ORAL at 05:31

## 2017-04-24 RX ADMIN — Medication 667 MILLIGRAM(S): at 08:08

## 2017-04-24 RX ADMIN — Medication 8: at 11:23

## 2017-04-24 RX ADMIN — BUMETANIDE 0.5 MILLIGRAM(S): 0.25 INJECTION INTRAMUSCULAR; INTRAVENOUS at 17:33

## 2017-04-24 RX ADMIN — MIDODRINE HYDROCHLORIDE 10 MILLIGRAM(S): 2.5 TABLET ORAL at 17:42

## 2017-04-24 RX ADMIN — INSULIN GLARGINE 10 UNIT(S): 100 INJECTION, SOLUTION SUBCUTANEOUS at 22:58

## 2017-04-24 RX ADMIN — CARVEDILOL PHOSPHATE 3.12 MILLIGRAM(S): 80 CAPSULE, EXTENDED RELEASE ORAL at 17:37

## 2017-04-24 RX ADMIN — PIPERACILLIN AND TAZOBACTAM 200 GRAM(S): 4; .5 INJECTION, POWDER, LYOPHILIZED, FOR SOLUTION INTRAVENOUS at 10:11

## 2017-04-24 RX ADMIN — PIPERACILLIN AND TAZOBACTAM 200 GRAM(S): 4; .5 INJECTION, POWDER, LYOPHILIZED, FOR SOLUTION INTRAVENOUS at 17:43

## 2017-04-24 RX ADMIN — GABAPENTIN 300 MILLIGRAM(S): 400 CAPSULE ORAL at 14:09

## 2017-04-24 RX ADMIN — PIPERACILLIN AND TAZOBACTAM 200 GRAM(S): 4; .5 INJECTION, POWDER, LYOPHILIZED, FOR SOLUTION INTRAVENOUS at 05:30

## 2017-04-24 RX ADMIN — Medication 667 MILLIGRAM(S): at 17:35

## 2017-04-24 RX ADMIN — Medication 667 MILLIGRAM(S): at 11:23

## 2017-04-24 RX ADMIN — Medication 325 MILLIGRAM(S): at 11:23

## 2017-04-24 RX ADMIN — ATORVASTATIN CALCIUM 40 MILLIGRAM(S): 80 TABLET, FILM COATED ORAL at 22:57

## 2017-04-24 NOTE — PROGRESS NOTE ADULT - PROBLEM SELECTOR PLAN 1
Continue IV Abx.  Pulmonary status improving.  Blood cultures negative, but sputum cultures showing pseudomonas.  Continue IV Abx.  Check CXR.

## 2017-04-24 NOTE — PROGRESS NOTE ADULT - SUBJECTIVE AND OBJECTIVE BOX
NEPHROLOGY INTERVAL HPI/OVERNIGHT EVENTS: No new events    MEDICATIONS  (STANDING):  aspirin 325milliGRAM(s) Oral daily  tamsulosin 0.4milliGRAM(s) Oral at bedtime  gabapentin 300milliGRAM(s) Oral three times a day  atorvastatin 40milliGRAM(s) Oral at bedtime  docusate sodium 100milliGRAM(s) Oral two times a day  dextrose 50% Injectable 12.5Gram(s) IV Push once  dextrose 50% Injectable 25Gram(s) IV Push once  dextrose 50% Injectable 25Gram(s) IV Push once  epoetin una Injectable [id number](s) IV Push <User Schedule>  heparin  Injectable 5000Unit(s) SubCutaneous every 12 hours  artificial  tears Solution 1Drop(s) Left EYE two times a day  calcium acetate 667milliGRAM(s) Oral three times a day with meals  piperacillin/tazobactam IVPB.  IV Intermittent   piperacillin/tazobactam IVPB. 2.25Gram(s) IV Intermittent every 6 hours  buMETAnide 0.5milliGRAM(s) Oral two times a day  carvedilol 3.125milliGRAM(s) Oral every 12 hours  midodrine 10milliGRAM(s) Oral three times a day  insulin glargine Injectable (LANTUS) 10Unit(s) SubCutaneous at bedtime  insulin lispro (HumaLOG) corrective regimen sliding scale  SubCutaneous Before meals and at bedtime    MEDICATIONS  (PRN):  dextrose Gel 1Dose(s) Oral once PRN Blood Glucose LESS THAN 70 milliGRAM(s)/deciliter  glucagon  Injectable 1milliGRAM(s) IntraMuscular once PRN Glucose LESS THAN 70 milligrams/deciliter      Allergies    No Known Allergies    Intolerances        Vital Signs Last 24 Hrs  T(C): 36.2, Max: 36.7 (04-23 @ 16:00)  T(F): 97.2, Max: 98 (04-23 @ 16:00)  HR: 91 (73 - 91)  BP: 115/57 (81/46 - 128/55)  BP(mean): 82 (61 - 84)  RR: 17 (11 - 21)  SpO2: 95% (95% - 100%)  Daily     Daily     PHYSICAL EXAM:    GENERAL: same  HEAD:    EYES: same  ENMT:   NECK: right permacath site clean  NERVOUS SYSTEM:  same  CHEST/LUNG: no wheezes, off 02  HEART: no rub  ABDOMEN: not tender  EXTREMITIES: +1 lower leg edema   LYMPH:   SKIN: no rash    LABS:                        7.6    6.3   )-----------( 51       ( 24 Apr 2017 06:38 )             23.8     04-24    132<L>  |  92<L>  |  72.0<H>  ----------------------------<  155<H>  4.1   |  26.0  |  5.77<H>    Ca    8.3<L>      24 Apr 2017 06:38  Phos  4.5     04-24  Mg     2.0     04-24    TPro  6.9  /  Alb  3.0<L>  /  TBili  0.6  /  DBili  x   /  AST  33  /  ALT  9   /  AlkPhos  51  04-23        Magnesium, Serum: 2.0 mg/dL (04-24 @ 06:38)  Phosphorus Level, Serum: 4.5 mg/dL (04-24 @ 06:38)          RADIOLOGY & ADDITIONAL TESTS:

## 2017-04-24 NOTE — PROGRESS NOTE ADULT - SUBJECTIVE AND OBJECTIVE BOX
Patient: EMILY LITTLEJOHN [id number] 87y Male  Internal Medicine Hospitalist Progress Note - Dr. Live Schmitz    Chief Complaint: Patient is a 87y old  Male who presents with a chief complaint of pt got intubated in the ED after HD (19 Apr 2017 04:25)    Hospital course:  88 yo M with h/o DM, HTN, ischemic cardiomyopathy (EF 30-35%), chronic renal failure presents with worsening ARF. Pt was previously admitted for similar condition, but had refused hemodialysis until this admission. Pt had permacath placed but developed large neck swelling at the site and was unable to swallow secretions and had difficulty breathing. Pt was emergently intubated on 4/18. CTA of neck did not reveal any vascular injury, and showed severe diffuse bilateral neck edema/infiltrating hematoma in the right supraclavicular fossa extending cephalad bilaterally. Per vascular surgery, unclear cause to neck swelling, and may be an anaphylactic reaction to medication. Swelling improved, and pt was extubated on 4/20. Pt passed speech and swallow and advanced to soft diet. However, 4/21, pt developed afib with RVR and found to have acute hypoxic respiratory failure which improved with urgent dialysis. Noted have fever and had blood cultures.     Currently without complaints.  No pain.  No SOB.  Afebrile.      ____________________PHYSICAL EXAM:  Vitals reviewed as indicated below  GENERAL:  NAD Alert and Oriented x 3   HEENT: NCAT.  Visually impaired.   CARDIOVASCULAR:  S1, S2  LUNGS: CTAB  ABDOMEN:  soft, (-) tenderness, (-) distension, (+) bowel sounds, (-) guarding, (-) rebound (-) rigidity  EXTREMITIES:  no cyanosis / clubbing / edema.   ____________________    BACKGROUND:  HEALTH ISSUES - PROBLEM Dx:  Type 2 diabetes mellitus with other circulatory complication, without long-term current use of insulin: Type 2 diabetes mellitus with other circulatory complication, without long-term current use of insulin  Shock: Shock  Acute on chronic combined systolic and diastolic congestive heart failure: Acute on chronic combined systolic and diastolic congestive heart failure  Hemodialysis-associated hypotension: Hemodialysis-associated hypotension  Persistent atrial fibrillation: Persistent atrial fibrillation  Acute respiratory failure with hypoxia: Acute respiratory failure with hypoxia  Ischemic cardiomyopathy: Ischemic cardiomyopathy  Cataract: Cataract  Mixed hyperlipidemia: Mixed hyperlipidemia  Hypotension: Hypotension  Nasal bleeding: Nasal bleeding  Diabetes: Diabetes  ESRD (end stage renal disease): ESRD (end stage renal disease)  Neck swelling: Neck swelling  Acute respiratory failure, unspecified whether with hypoxia or hypercapnia: Acute respiratory failure, unspecified whether with hypoxia or hypercapnia  Renal failure (ARF), acute on chronic: Renal failure (ARF), acute on chronic  Prophylactic measure: Prophylactic measure  Pacemaker: Pacemaker  Essential hypertension: Essential hypertension  Chronic systolic congestive heart failure: Chronic systolic congestive heart failure  Coronary artery disease involving autologous vein coronary bypass graft without angina pectoris: Coronary artery disease involving autologous vein coronary bypass graft without angina pectoris        MEDICATIONS  (STANDING):  aspirin 325milliGRAM(s) Oral daily  tamsulosin 0.4milliGRAM(s) Oral at bedtime  gabapentin 300milliGRAM(s) Oral three times a day  atorvastatin 40milliGRAM(s) Oral at bedtime  docusate sodium 100milliGRAM(s) Oral two times a day  dextrose 50% Injectable 12.5Gram(s) IV Push once  dextrose 50% Injectable 25Gram(s) IV Push once  dextrose 50% Injectable 25Gram(s) IV Push once  epoetin una Injectable [id number](s) IV Push <User Schedule>  heparin  Injectable 5000Unit(s) SubCutaneous every 12 hours  artificial  tears Solution 1Drop(s) Left EYE two times a day  calcium acetate 667milliGRAM(s) Oral three times a day with meals  piperacillin/tazobactam IVPB.  IV Intermittent   piperacillin/tazobactam IVPB. 2.25Gram(s) IV Intermittent every 6 hours  buMETAnide 0.5milliGRAM(s) Oral two times a day  carvedilol 3.125milliGRAM(s) Oral every 12 hours  midodrine 10milliGRAM(s) Oral three times a day  insulin glargine Injectable (LANTUS) 10Unit(s) SubCutaneous at bedtime  insulin lispro (HumaLOG) corrective regimen sliding scale  SubCutaneous Before meals and at bedtime    MEDICATIONS  (PRN):  dextrose Gel 1Dose(s) Oral once PRN Blood Glucose LESS THAN 70 milliGRAM(s)/deciliter  glucagon  Injectable 1milliGRAM(s) IntraMuscular once PRN Glucose LESS THAN 70 milligrams/deciliter    Allergies    No Known Allergies    Intolerances      PAST MEDICAL & SURGICAL HISTORY:  Chronic renal failure  Coronary artery disease involving autologous vein bypass graft  CHF (congestive heart failure): FAMILY/PT NOT SURE IF DIAGNOSED WITH CHF OR COPD  Pacemaker  Diabetes  PSA elevation  Hyperlipemia  Hypertension  Cataract  Glaucoma  S/P CABG (coronary artery bypass graft)  S/P prostatectomy: 1992      VITALS:  Vital Signs Last 24 Hrs  T(C): 36.2, Max: 36.7 (04-23 @ 16:00)  T(F): 97.2, Max: 98 (04-23 @ 16:00)  HR: 91 (73 - 91)  BP: 97/56 (81/46 - 128/55)  BP(mean): 72 (61 - 84)  RR: 17 (11 - 21)  SpO2: 95% (95% - 100%) Daily     Daily   CAPILLARY BLOOD GLUCOSE  326 (24 Apr 2017 11:00)  144 (24 Apr 2017 06:00)  202 (23 Apr 2017 22:00)  173 (23 Apr 2017 16:40)    I&O's Summary  I & Os for 24h ending 24 Apr 2017 07:00  =============================================  IN: 1300 ml / OUT: 0 ml / NET: 1300 ml    I & Os for current day (as of 24 Apr 2017 12:08)  =============================================  IN: 820 ml / OUT: 0 ml / NET: 820 ml      LABS:                        7.6    6.3   )-----------( 51       ( 24 Apr 2017 06:38 )             23.8     04-24    132<L>  |  92<L>  |  72.0<H>  ----------------------------<  155<H>  4.1   |  26.0  |  5.77<H>    Ca    8.3<L>      24 Apr 2017 06:38  Phos  4.5     04-24  Mg     2.0     04-24    TPro  6.9  /  Alb  3.0<L>  /  TBili  0.6  /  DBili  x   /  AST  33  /  ALT  9   /  AlkPhos  51  04-23      RECENT CULTURES:  04-21 .Blood Blood XXXX XXXX   No growth at 48 hours    04-21 .Blood Blood XXXX XXXX   No growth at 48 hours    04-21 .Sputum Sputum Pseudomonas aeruginosa   No White blood cells  No organisms seen   Numerous Pseudomonas aeruginosa  Moderate Routine respiratory eusebio present        LIVER FUNCTIONS - ( 23 Apr 2017 06:08 )  Alb: 3.0 g/dL / Pro: 6.9 g/dL / ALK PHOS: 51 U/L / ALT: 9 U/L / AST: 33 U/L / GGT: x

## 2017-04-25 LAB
ANION GAP SERPL CALC-SCNC: 16 MMOL/L — SIGNIFICANT CHANGE UP (ref 5–17)
APTT BLD: 32.5 SEC — SIGNIFICANT CHANGE UP (ref 27.5–37.4)
BLD GP AB SCN SERPL QL: SIGNIFICANT CHANGE UP
BUN SERPL-MCNC: 95 MG/DL — HIGH (ref 8–20)
CALCIUM SERPL-MCNC: 8.2 MG/DL — LOW (ref 8.6–10.2)
CHLORIDE SERPL-SCNC: 92 MMOL/L — LOW (ref 98–107)
CO2 SERPL-SCNC: 24 MMOL/L — SIGNIFICANT CHANGE UP (ref 22–29)
CREAT SERPL-MCNC: 6.69 MG/DL — HIGH (ref 0.5–1.3)
GLUCOSE SERPL-MCNC: 212 MG/DL — HIGH (ref 70–115)
HCT VFR BLD CALC: 22.7 % — LOW (ref 42–52)
HGB BLD-MCNC: 7.1 G/DL — LOW (ref 14–18)
INR BLD: 1.16 RATIO — SIGNIFICANT CHANGE UP (ref 0.88–1.16)
MAGNESIUM SERPL-MCNC: 2 MG/DL — SIGNIFICANT CHANGE UP (ref 1.8–2.5)
MCHC RBC-ENTMCNC: 26.1 PG — LOW (ref 27–31)
MCHC RBC-ENTMCNC: 31.3 G/DL — LOW (ref 32–36)
MCV RBC AUTO: 83.5 FL — SIGNIFICANT CHANGE UP (ref 80–94)
PLATELET # BLD AUTO: 55 K/UL — LOW (ref 150–400)
POTASSIUM SERPL-MCNC: 4.4 MMOL/L — SIGNIFICANT CHANGE UP (ref 3.5–5.3)
POTASSIUM SERPL-SCNC: 4.4 MMOL/L — SIGNIFICANT CHANGE UP (ref 3.5–5.3)
PROCALCITONIN SERPL-MCNC: 35 NG/ML — HIGH (ref 0–0.05)
PROTHROM AB SERPL-ACNC: 12.8 SEC — HIGH (ref 9.8–12.7)
RBC # BLD: 2.72 M/UL — LOW (ref 4.6–6.2)
RBC # FLD: 16.5 % — HIGH (ref 11–15.6)
SODIUM SERPL-SCNC: 132 MMOL/L — LOW (ref 135–145)
TYPE + AB SCN PNL BLD: SIGNIFICANT CHANGE UP
WBC # BLD: 5.3 K/UL — SIGNIFICANT CHANGE UP (ref 4.8–10.8)
WBC # FLD AUTO: 5.3 K/UL — SIGNIFICANT CHANGE UP (ref 4.8–10.8)

## 2017-04-25 PROCEDURE — 99233 SBSQ HOSP IP/OBS HIGH 50: CPT

## 2017-04-25 PROCEDURE — 71010: CPT | Mod: 26

## 2017-04-25 PROCEDURE — 99222 1ST HOSP IP/OBS MODERATE 55: CPT

## 2017-04-25 RX ORDER — TUBERCULIN PURIFIED PROTEIN DERIVATIVE 5 [IU]/.1ML
5 INJECTION, SOLUTION INTRADERMAL ONCE
Qty: 0 | Refills: 0 | Status: COMPLETED | OUTPATIENT
Start: 2017-04-25 | End: 2017-04-25

## 2017-04-25 RX ORDER — LISINOPRIL 2.5 MG/1
10 TABLET ORAL DAILY
Qty: 0 | Refills: 0 | Status: DISCONTINUED | OUTPATIENT
Start: 2017-04-25 | End: 2017-04-26

## 2017-04-25 RX ADMIN — Medication 4: at 18:20

## 2017-04-25 RX ADMIN — Medication 4: at 08:58

## 2017-04-25 RX ADMIN — GABAPENTIN 300 MILLIGRAM(S): 400 CAPSULE ORAL at 05:31

## 2017-04-25 RX ADMIN — TUBERCULIN PURIFIED PROTEIN DERIVATIVE 5 UNIT(S): 5 INJECTION, SOLUTION INTRADERMAL at 18:21

## 2017-04-25 RX ADMIN — MIDODRINE HYDROCHLORIDE 10 MILLIGRAM(S): 2.5 TABLET ORAL at 02:44

## 2017-04-25 RX ADMIN — TAMSULOSIN HYDROCHLORIDE 0.4 MILLIGRAM(S): 0.4 CAPSULE ORAL at 23:23

## 2017-04-25 RX ADMIN — CARVEDILOL PHOSPHATE 3.12 MILLIGRAM(S): 80 CAPSULE, EXTENDED RELEASE ORAL at 18:21

## 2017-04-25 RX ADMIN — GABAPENTIN 300 MILLIGRAM(S): 400 CAPSULE ORAL at 18:21

## 2017-04-25 RX ADMIN — Medication 1 DROP(S): at 18:20

## 2017-04-25 RX ADMIN — INSULIN GLARGINE 10 UNIT(S): 100 INJECTION, SOLUTION SUBCUTANEOUS at 23:22

## 2017-04-25 RX ADMIN — Medication 667 MILLIGRAM(S): at 08:57

## 2017-04-25 RX ADMIN — Medication 667 MILLIGRAM(S): at 18:21

## 2017-04-25 RX ADMIN — Medication 1 DROP(S): at 05:33

## 2017-04-25 RX ADMIN — ERYTHROPOIETIN 10000 UNIT(S): 10000 INJECTION, SOLUTION INTRAVENOUS; SUBCUTANEOUS at 12:18

## 2017-04-25 RX ADMIN — ATORVASTATIN CALCIUM 40 MILLIGRAM(S): 80 TABLET, FILM COATED ORAL at 23:22

## 2017-04-25 RX ADMIN — CARVEDILOL PHOSPHATE 3.12 MILLIGRAM(S): 80 CAPSULE, EXTENDED RELEASE ORAL at 05:31

## 2017-04-25 RX ADMIN — Medication 100 MILLIGRAM(S): at 05:32

## 2017-04-25 RX ADMIN — MIDODRINE HYDROCHLORIDE 10 MILLIGRAM(S): 2.5 TABLET ORAL at 18:21

## 2017-04-25 RX ADMIN — MIDODRINE HYDROCHLORIDE 10 MILLIGRAM(S): 2.5 TABLET ORAL at 08:57

## 2017-04-25 RX ADMIN — Medication 6: at 23:27

## 2017-04-25 RX ADMIN — BUMETANIDE 0.5 MILLIGRAM(S): 0.25 INJECTION INTRAMUSCULAR; INTRAVENOUS at 18:21

## 2017-04-25 RX ADMIN — Medication 100 MILLIGRAM(S): at 18:21

## 2017-04-25 RX ADMIN — GABAPENTIN 300 MILLIGRAM(S): 400 CAPSULE ORAL at 23:23

## 2017-04-25 RX ADMIN — Medication 325 MILLIGRAM(S): at 18:21

## 2017-04-25 RX ADMIN — BUMETANIDE 0.5 MILLIGRAM(S): 0.25 INJECTION INTRAMUSCULAR; INTRAVENOUS at 05:31

## 2017-04-25 NOTE — CHART NOTE - NSCHARTNOTEFT_GEN_A_CORE
pt started coughing last night. small amt blood noted with coughing. extubated 4/21. NAD. VS. po 99%. CXR ordered and PT/ PTT INR STAT. am heparin held

## 2017-04-25 NOTE — PROGRESS NOTE ADULT - PROBLEM SELECTOR PLAN 1
Continue IV Abx.  Pulmonary status improving.  Blood cultures negative, but sputum cultures showing pseudomonas.  Continue IV Abx.  Check CXR.  ID evaluation apprecaited.

## 2017-04-25 NOTE — PROGRESS NOTE ADULT - PROBLEM SELECTOR PLAN 3
Echo 4/6: EF 30-35% with moderate AS (possibly pseudoaortic stensosis)  c/w carvedilol, Bumex, Initiate Angiotensin converting enzyme inhibitor .

## 2017-04-25 NOTE — CONSULT NOTE ADULT - SUBJECTIVE AND OBJECTIVE BOX
[facility] MD FACP   JOEL SIM MD  Diplomates American Board of Internal Medicine and Infecctious Diseases  [phone]  [phone]       EMILY [id number]      HPI:  88 yo M with h/o DM, HTN, ischemic cardiomyopathy (EF 30-35%), chronic renal failure presents with worsening ARF. Pt was previously admitted for similar condition, but had refused hemodialysis until this admission. Pt had permacath placed but developed large neck swelling at the site and was unable to swallow secretions and had difficulty breathing. Pt was emergently intubated on 4/18. CTA of neck did not reveal any vascular injury, and showed severe diffuse bilateral neck edema/infiltrating hematoma in the right supraclavicular fossa extending cephalad bilaterally. Per vascular surgery, unclear cause to neck swelling, and may be an anaphylactic reaction to medication. Swelling improved, and pt was extubated on 4/20. Pt passed speech and swallow and advanced to soft diet. However, 4/21, pt developed afib with RVR and found to have acute hypoxic respiratory failure which improved with urgent dialysis. Noted have fever and had blood cultures.  NOW AFEBRILE  OUT OF ICU   ON ZOSYN ASKED TO EVALUATE FROM ID STANDPOINT        PAST MEDICAL & SURGICAL HISTORY:  Chronic renal failure  Coronary artery disease involving autologous vein bypass graft  CHF (congestive heart failure): FAMILY/PT NOT SURE IF DIAGNOSED WITH CHF OR COPD  Pacemaker  Diabetes  PSA elevation  Hyperlipemia  Hypertension  Cataract  Glaucoma  S/P CABG (coronary artery bypass graft)  S/P prostatectomy: 1992      ANTIBIOTICS  ZOSYN    Allergies    No Known Allergies    Intolerances        SOCIAL HISTORY:    FAMILY HISTORY:  No pertinent family history in first degree relatives      Vital Signs Last 24 Hrs  T(C): 36.7, Max: 36.8 (04-24 @ 17:01)  T(F): 98, Max: 98.2 (04-24 @ 17:01)  HR: 82 (78 - 97)  BP: 97/56 (97/56 - 142/66)  BP(mean): 83 (63 - 90)  RR: 16 (12 - 24)  SpO2: 99% (95% - 99%)  Drug Dosing Weight  Height (cm): 177.8 (18 Apr 2017 23:30)  Weight (kg): 69 (18 Apr 2017 23:30)  BMI (kg/m2): 21.8 (18 Apr 2017 23:30)  BSA (m2): 1.86 (18 Apr 2017 23:30)      REVIEW OF SYSTEMS:    CONSTITUTIONAL:  As per HPI.    HEENT:  Eyes:  No diplopia or blurred vision. ENT:  No earache, sore throat or runny nose.    CARDIOVASCULAR:  No pressure, squeezing, strangling, tightness, heaviness or aching about the chest, neck, axilla or epigastrium.    RESPIRATORY:  No cough, shortness of breath, PND or orthopnea.    GASTROINTESTINAL:  No nausea, vomiting or diarrhea.    GENITOURINARY:  No dysuria, frequency or urgency.    MUSCULOSKELETAL:  As per HPI.    SKIN:  No change in skin, hair or nails.    NEUROLOGIC:  No paresthesias, fasciculations, seizures or weakness.                  PHYSICAL EXAMINATION:    GENERAL: The patient is a well-developed, well-nourished __IN NAD    VITAL SIGNS: T(C): 36.7, Max: 36.8 (04-24 @ 17:01)  HR: 82 (78 - 97)  BP: 97/56 (97/56 - 142/66)  RR: 16 (12 - 24)  SpO2: 99% (95% - 99%)  Wt(kg): --    HEENT: Head is normocephalic and atraumatic.  ANICTERIC  NECK: Supple. No carotid bruits.  No lymphadenopathy or thyromegaly. RIGHT NECK EDEMA     LUNGS: COARSE BREATH SOUNDS    HEART: Regular rate and rhythm without murmur.    ABDOMEN: Soft, nontender, and nondistended.  Positive bowel sounds.  No hepatosplenomegaly was noted. NO REBOUND NO GUARDING    EXTREMITIES: NO EDEMA NO ERYTHEMA    NEUROLOGIC: NON FOCAL      SKIN: No ulceration or induration present. NO RASH        BLOOD CULTURES  Culture Results:   No growth at 48 hours (04-21 @ 20:32)  Culture Results:   No growth at 48 hours (04-21 @ 17:19)  Culture Results:   Numerous Pseudomonas aeruginosa  Moderate Routine respiratory eusebio present (04-21 @ 17:18)       URINE CX          LABS:                        7.1    5.3   )-----------( x        ( 25 Apr 2017 07:07 )             22.7     04-25    132<L>  |  92<L>  |  95.0<H>  ----------------------------<  212<H>  4.4   |  24.0  |  6.69<H>    Ca    8.2<L>      25 Apr 2017 07:07  Phos  4.5     04-24  Mg     2.0     04-25      PT/INR - ( 25 Apr 2017 07:07 )   PT: 12.8 sec;   INR: 1.16 ratio         PTT - ( 25 Apr 2017 07:07 )  PTT:32.5 sec      RADIOLOGY & ADDITIONAL STUDIES:      ASSESSMENT/PLAN    88 yo M with h/o DM, HTN, ischemic cardiomyopathy (EF 30-35%), chronic renal failure presents with worsening ARF. Pt was previously admitted for similar condition, but had refused hemodialysis until this admission. Pt had permacath placed but developed large neck swelling at the site and was unable to swallow secretions and had difficulty breathing. Pt was emergently intubated on 4/18. CTA of neck did not reveal any vascular injury, and showed severe diffuse bilateral neck edema/infiltrating hematoma in the right supraclavicular fossa extending cephalad bilaterally. Per vascular surgery, unclear cause to neck swelling, and may be an anaphylactic reaction to medication. Swelling improved, and pt was extubated on 4/20. Pt passed speech and swallow and advanced to soft diet. However, 4/21, pt developed afib with RVR and found to have acute hypoxic respiratory failure which improved with urgent dialysis. Noted have fever and had blood cultures. AND HAD BEEN STARTED ON ZOSYN NOW OFF   UNCLEAR IF TRUE PNEUMONIA PT WITH HEMOPTYSIS   WILL CHECK PROCALCITONIN  OBSERVE OFF ZOSYN  WILL FOLLOW UP IF HEMOPTYSIS CONTINUE WOULD SUGGEST CT SCAN CHEST  WILL FOLLOW UP            JOEL GERBER MD

## 2017-04-25 NOTE — PROGRESS NOTE ADULT - SUBJECTIVE AND OBJECTIVE BOX
[facility]                                                        Office: [address] [address]                                                       Telephone: [phone]. Fax:[phone]                                                                             PROGRESS NOTE   Reason for follow up: congestive heart failure                             Overnight: No new events.   Update: Patient was planned for hemodialysis. During HD cathether placement, got neck hematoma adn hemorrhage. Was in the ICU, Airway compromised.  intubated. Now transfered to floor. Feels better. Started eating.     Subjective: "  i did not get out of the bed whole day and i peed on my self. "   Complains of: Cough with blodo in sputum   Review of symptoms: Cardiac:  No chest pain + dyspnea. No palpitations.  Respiratory:+  cough with bloody sputum.  No dyspnea  Gastrointestinal: No diarrhea. No abdominal pain. No bleeding.     Past medical history: No updates.   Chronic conditions:  Hypertension: controlled. CHF: Stable.  	  Vitals:  T(C): 36.7, Max: 37.1 (04-25 @ 08:18)  HR: 81 (78 - 82)  BP: 136/65 (90/58 - 136/65)  RR: 18 (16 - 18)  SpO2: 100% (99% - 100%)  Wt(kg): --  I&O's Summary  I & Os for 24h ending 25 Apr 2017 07:00  =============================================  IN: 820 ml / OUT: 0 ml / NET: 820 ml    I & Os for current day (as of 25 Apr 2017 18:40)  =============================================  IN: 1170 ml / OUT: 2000 ml / NET: -830 ml        PHYSICAL EXAM:  PHYSICAL EXAM:  Constitutional: Comfortable . No acute distress.   HEENT: Atraumatic and normcephalic , neck is supple . +  JVD. right  carotid bruit. PEERL   CNS: A&Ox3. No focal deficits. EOMI. Cranial nerves II-IX are intact.   Lymph Nodes: Cervical : Not palpable.  Respiratory: CTAB  Cardiovascular: S1S2 RRR. 2/6 holosystolic murmur. No rubs or gallop.  Gastrointestinal: Soft non-tender and non distended . +Bowel sounds. negative Foster's sign.  Extremities: No edema.   Psychiatric: Calm . no agitation.  Skin: No skin rash/ulcers visualized to face, hands or feet.      CURRENT MEDICATIONS:  tamsulosin 0.4milliGRAM(s) Oral at bedtime  buMETAnide 0.5milliGRAM(s) Oral two times a day  carvedilol 3.125milliGRAM(s) Oral every 12 hours  midodrine 10milliGRAM(s) Oral three times a day    aspirin  gabapentin  docusate sodium  atorvastatin  dextrose 50% Injectable  dextrose 50% Injectable  dextrose 50% Injectable  insulin glargine Injectable (LANTUS)  insulin lispro (HumaLOG) corrective regimen sliding scale  epoetin una Injectable  heparin  Injectable  artificial  tears Solution  calcium acetate      LABS:	 	                          7.1    5.3   )-----------( 55       ( 25 Apr 2017 07:07 )             22.7     04-25    132<L>  |  92<L>  |  95.0<H>  ----------------------------<  212<H>  4.4   |  24.0  |  6.69<H>    Ca    8.2<L>      25 Apr 2017 07:07  Phos  4.5     04-24  Mg     2.0     04-25      proBNP:   Lipid Profile:   HgA1c: TSH:     	INTERPRETATION OF TELEMETRY: Reviewed by me. not available . as per nurse 4 beat NSVT  ECG: Reviewed by me. A sensed V paced.     RADIOLOGY & ADDITIONAL STUDIES:    X-ray:  reviewed by me. No evidence of acute cardiopulmonary disease.     ECHO FINDINGS: Date: 4/6/2017        . The left atrium is normal in size.   2. Global diffuse hypokinesis with akinesis in the anterolateral wall with prominent trebeculations. Normal perfusion on definity contrast suggest nonischemic cardiomyopathy or resting ishemia.   3. Left ventricular ejection fraction, by visual estimation, is 30 to 35%.   4. Elevated left atrial and left ventricular end-diastolic pressures. (LAP = 27 mm Hg)   5. The right atrium is normal in size. . Normal right ventricular size and function.   7. The Dimesionless Index value is 0.36. Moderate aortic valve stenosis.   Cannot rule out pseudoaortic stenosis.   8. There is no evidence of pericardial effusion.

## 2017-04-25 NOTE — PROGRESS NOTE ADULT - SUBJECTIVE AND OBJECTIVE BOX
Patient: EMILY LITTLEJOHN [id number] 87y Male  Internal Medicine Hospitalist Progress Note - Dr. Live Schmitz    Chief Complaint: Patient is a 87y old  Male who presents with a chief complaint of pt got intubated in the ED after HD (2017 04:25)    Hospital course:  86 yo M with h/o DM, HTN, ischemic cardiomyopathy (EF 30-35%), chronic renal failure presents with worsening ARF. Pt was previously admitted for similar condition, but had refused hemodialysis until this admission. Pt had permacath placed but developed large neck swelling at the site and was unable to swallow secretions and had difficulty breathing. Pt was emergently intubated on . CTA of neck did not reveal any vascular injury, and showed severe diffuse bilateral neck edema/infiltrating hematoma in the right supraclavicular fossa extending cephalad bilaterally. Per vascular surgery, unclear cause to neck swelling, and may be an anaphylactic reaction to medication. Swelling improved, and pt was extubated on . Pt passed speech and swallow and advanced to soft diet. However, , pt developed afib with RVR and found to have acute hypoxic respiratory failure which improved with urgent dialysis. Noted have fever and had blood cultures.     Seen at HD.  C/o fatigue.  No pain.  No SOB.  No additional complaints.     ____________________PHYSICAL EXAM:  Vitals reviewed as indicated below  GENERAL:  NAD Alert and Oriented x 3   HEENT: NCAT.  Visually impaired.   CARDIOVASCULAR:  S1, S2  LUNGS: CTAB  ABDOMEN:  soft, (-) tenderness, (-) distension, (+) bowel sounds, (-) guarding, (-) rebound (-) rigidity  EXTREMITIES:  no cyanosis / clubbing / edema.   ____________________      MEDICATIONS:  aspirin 325milliGRAM(s) Oral daily  tamsulosin 0.4milliGRAM(s) Oral at bedtime  gabapentin 300milliGRAM(s) Oral three times a day  atorvastatin 40milliGRAM(s) Oral at bedtime  docusate sodium 100milliGRAM(s) Oral two times a day  dextrose Gel 1Dose(s) Oral once PRN  dextrose 50% Injectable 12.5Gram(s) IV Push once  dextrose 50% Injectable 25Gram(s) IV Push once  dextrose 50% Injectable 25Gram(s) IV Push once  glucagon  Injectable 1milliGRAM(s) IntraMuscular once PRN  epoetin una Injectable 96737Reie(s) IV Push <User Schedule>  heparin  Injectable 5000Unit(s) SubCutaneous every 12 hours  artificial  tears Solution 1Drop(s) Left EYE two times a day  calcium acetate 667milliGRAM(s) Oral three times a day with meals  buMETAnide 0.5milliGRAM(s) Oral two times a day  carvedilol 3.125milliGRAM(s) Oral every 12 hours  midodrine 10milliGRAM(s) Oral three times a day  insulin glargine Injectable (LANTUS) 10Unit(s) SubCutaneous at bedtime  insulin lispro (HumaLOG) corrective regimen sliding scale  SubCutaneous Before meals and at bedtime      VITALS:  Vital Signs Last 24 Hrs  T(C): 36.9, Max: 37.1 ( @ 08:18)  T(F): 98.5, Max: 98.7 ( @ 08:18)  HR: 78 (78 - 82)  BP: 113/53 (90/58 - 142/66)  BP(mean): 83 (83 - 83)  RR: 18 (12 - 18)  SpO2: 100% (99% - 100%) Daily     Daily Weight in k.5 (2017 11:25)  CAPILLARY BLOOD GLUCOSE  210 (2017 08:34)  385 (2017 22:00)    I&O's Summary  I & Os for 24h ending 2017 07:00  =============================================  IN: 820 ml / OUT: 0 ml / NET: 820 ml    I & Os for current day (as of 2017 15:54)  =============================================  IN: 690 ml / OUT: 0 ml / NET: 690 ml      LABS:                        7.1    5.3   )-----------( 55       ( 2017 07:07 )             22.7         132<L>  |  92<L>  |  95.0<H>  ----------------------------<  212<H>  4.4   |  24.0  |  6.69<H>    Ca    8.2<L>      2017 07:07  Phos  4.5       Mg     2.0           PT/INR - ( 2017 07:07 )   PT: 12.8 sec;   INR: 1.16 ratio         PTT - ( 2017 07:07 )  PTT:32.5 sec  RECENT CULTURES:   .Blood Blood XXXX XXXX   No growth at 48 hours     .Blood Blood XXXX XXXX   No growth at 48 hours     .Sputum Sputum Pseudomonas aeruginosa   No White blood cells  No organisms seen   Numerous Pseudomonas aeruginosa  Moderate Routine respiratory eusebio present

## 2017-04-25 NOTE — PROGRESS NOTE ADULT - SUBJECTIVE AND OBJECTIVE BOX
NEPHROLOGY INTERVAL HPI/OVERNIGHT EVENTS:    MEDICATIONS  (STANDING):  aspirin 325milliGRAM(s) Oral daily  tamsulosin 0.4milliGRAM(s) Oral at bedtime  gabapentin 300milliGRAM(s) Oral three times a day  atorvastatin 40milliGRAM(s) Oral at bedtime  docusate sodium 100milliGRAM(s) Oral two times a day  dextrose 50% Injectable 12.5Gram(s) IV Push once  dextrose 50% Injectable 25Gram(s) IV Push once  dextrose 50% Injectable 25Gram(s) IV Push once  epoetin una Injectable 90410Lnsh(s) IV Push <User Schedule>  heparin  Injectable 5000Unit(s) SubCutaneous every 12 hours  artificial  tears Solution 1Drop(s) Left EYE two times a day  calcium acetate 667milliGRAM(s) Oral three times a day with meals  buMETAnide 0.5milliGRAM(s) Oral two times a day  carvedilol 3.125milliGRAM(s) Oral every 12 hours  midodrine 10milliGRAM(s) Oral three times a day  insulin glargine Injectable (LANTUS) 10Unit(s) SubCutaneous at bedtime  insulin lispro (HumaLOG) corrective regimen sliding scale  SubCutaneous Before meals and at bedtime    MEDICATIONS  (PRN):  dextrose Gel 1Dose(s) Oral once PRN Blood Glucose LESS THAN 70 milliGRAM(s)/deciliter  glucagon  Injectable 1milliGRAM(s) IntraMuscular once PRN Glucose LESS THAN 70 milligrams/deciliter      Allergies    No Known Allergies    Intolerances        Vital Signs Last 24 Hrs  T(C): 36.7, Max: 36.8 (04-24 @ 17:01)  T(F): 98, Max: 98.2 (04-24 @ 17:01)  HR: 82 (78 - 97)  BP: 97/56 (97/56 - 142/66)  BP(mean): 83 (63 - 90)  RR: 16 (12 - 24)  SpO2: 99% (95% - 99%)  Daily     Daily     PHYSICAL EXAM:    GENERAL: appears chronically ill.  HEAD:  same  EYES:   ENMT:   NECK: right permacath site clean, no bleeding  NERVOUS SYSTEM:  awake, verbal  CHEST/LUNG: no whezes  HEART: no rub or gallop  ABDOMEN: soft,not tender  EXTREMITIES: same    LYMPH:   SKIN:  no rash    LABS:                        7.6    6.3   )-----------( 51       ( 24 Apr 2017 06:38 )             23.8     04-24    132<L>  |  92<L>  |  72.0<H>  ----------------------------<  155<H>  4.1   |  26.0  |  5.77<H>    Ca    8.3<L>      24 Apr 2017 06:38  Phos  4.5     04-24  Mg     2.0     04-24                  RADIOLOGY & ADDITIONAL TESTS:

## 2017-04-25 NOTE — PROGRESS NOTE ADULT - PROBLEM SELECTOR PLAN 8
Anemia, thrombocytopenia attributed to chronic disease.  Monitor CBC.  Transfused pRBCs.    patient has heart failure. H/h > 8 if possible. Epogen injections.

## 2017-04-26 LAB
ANION GAP SERPL CALC-SCNC: 12 MMOL/L — SIGNIFICANT CHANGE UP (ref 5–17)
BUN SERPL-MCNC: 57 MG/DL — HIGH (ref 8–20)
CALCIUM SERPL-MCNC: 8.5 MG/DL — LOW (ref 8.6–10.2)
CHLORIDE SERPL-SCNC: 100 MMOL/L — SIGNIFICANT CHANGE UP (ref 98–107)
CO2 SERPL-SCNC: 27 MMOL/L — SIGNIFICANT CHANGE UP (ref 22–29)
CREAT SERPL-MCNC: 3.99 MG/DL — HIGH (ref 0.5–1.3)
CULTURE RESULTS: SIGNIFICANT CHANGE UP
CULTURE RESULTS: SIGNIFICANT CHANGE UP
GLUCOSE SERPL-MCNC: 92 MG/DL — SIGNIFICANT CHANGE UP (ref 70–115)
HCT VFR BLD CALC: 26.4 % — LOW (ref 42–52)
HGB BLD-MCNC: 8.4 G/DL — LOW (ref 14–18)
MCHC RBC-ENTMCNC: 26.6 PG — LOW (ref 27–31)
MCHC RBC-ENTMCNC: 31.8 G/DL — LOW (ref 32–36)
MCV RBC AUTO: 83.5 FL — SIGNIFICANT CHANGE UP (ref 80–94)
PLATELET # BLD AUTO: 56 K/UL — LOW (ref 150–400)
POTASSIUM SERPL-MCNC: 4.2 MMOL/L — SIGNIFICANT CHANGE UP (ref 3.5–5.3)
POTASSIUM SERPL-SCNC: 4.2 MMOL/L — SIGNIFICANT CHANGE UP (ref 3.5–5.3)
RBC # BLD: 3.16 M/UL — LOW (ref 4.6–6.2)
RBC # FLD: 16.2 % — HIGH (ref 11–15.6)
SODIUM SERPL-SCNC: 139 MMOL/L — SIGNIFICANT CHANGE UP (ref 135–145)
SPECIMEN SOURCE: SIGNIFICANT CHANGE UP
SPECIMEN SOURCE: SIGNIFICANT CHANGE UP
WBC # BLD: 4.9 K/UL — SIGNIFICANT CHANGE UP (ref 4.8–10.8)
WBC # FLD AUTO: 4.9 K/UL — SIGNIFICANT CHANGE UP (ref 4.8–10.8)

## 2017-04-26 PROCEDURE — 99232 SBSQ HOSP IP/OBS MODERATE 35: CPT

## 2017-04-26 PROCEDURE — 99233 SBSQ HOSP IP/OBS HIGH 50: CPT

## 2017-04-26 RX ORDER — MIDODRINE HYDROCHLORIDE 2.5 MG/1
5 TABLET ORAL EVERY 8 HOURS
Qty: 0 | Refills: 0 | Status: DISCONTINUED | OUTPATIENT
Start: 2017-04-26 | End: 2017-04-27

## 2017-04-26 RX ORDER — IRON SUCROSE 20 MG/ML
100 INJECTION, SOLUTION INTRAVENOUS ONCE
Qty: 0 | Refills: 0 | Status: COMPLETED | OUTPATIENT
Start: 2017-04-26 | End: 2017-04-27

## 2017-04-26 RX ORDER — IRON SUCROSE 20 MG/ML
100 INJECTION, SOLUTION INTRAVENOUS
Qty: 0 | Refills: 0 | Status: DISCONTINUED | OUTPATIENT
Start: 2017-04-26 | End: 2017-04-27

## 2017-04-26 RX ORDER — LISINOPRIL 2.5 MG/1
5 TABLET ORAL DAILY
Qty: 0 | Refills: 0 | Status: ACTIVE | OUTPATIENT
Start: 2017-04-27 | End: 2018-03-26

## 2017-04-26 RX ADMIN — Medication 100 MILLIGRAM(S): at 17:27

## 2017-04-26 RX ADMIN — GABAPENTIN 300 MILLIGRAM(S): 400 CAPSULE ORAL at 05:53

## 2017-04-26 RX ADMIN — GABAPENTIN 300 MILLIGRAM(S): 400 CAPSULE ORAL at 17:26

## 2017-04-26 RX ADMIN — TAMSULOSIN HYDROCHLORIDE 0.4 MILLIGRAM(S): 0.4 CAPSULE ORAL at 21:39

## 2017-04-26 RX ADMIN — Medication 2: at 21:44

## 2017-04-26 RX ADMIN — ATORVASTATIN CALCIUM 40 MILLIGRAM(S): 80 TABLET, FILM COATED ORAL at 21:39

## 2017-04-26 RX ADMIN — Medication 1 DROP(S): at 05:53

## 2017-04-26 RX ADMIN — Medication 6: at 17:26

## 2017-04-26 RX ADMIN — CARVEDILOL PHOSPHATE 3.12 MILLIGRAM(S): 80 CAPSULE, EXTENDED RELEASE ORAL at 17:28

## 2017-04-26 RX ADMIN — Medication 667 MILLIGRAM(S): at 12:22

## 2017-04-26 RX ADMIN — Medication 667 MILLIGRAM(S): at 17:25

## 2017-04-26 RX ADMIN — CARVEDILOL PHOSPHATE 3.12 MILLIGRAM(S): 80 CAPSULE, EXTENDED RELEASE ORAL at 05:53

## 2017-04-26 RX ADMIN — Medication 2: at 12:20

## 2017-04-26 RX ADMIN — Medication 325 MILLIGRAM(S): at 12:30

## 2017-04-26 RX ADMIN — GABAPENTIN 300 MILLIGRAM(S): 400 CAPSULE ORAL at 21:39

## 2017-04-26 RX ADMIN — HEPARIN SODIUM 5000 UNIT(S): 5000 INJECTION INTRAVENOUS; SUBCUTANEOUS at 17:28

## 2017-04-26 RX ADMIN — Medication 100 MILLIGRAM(S): at 05:53

## 2017-04-26 RX ADMIN — BUMETANIDE 0.5 MILLIGRAM(S): 0.25 INJECTION INTRAMUSCULAR; INTRAVENOUS at 05:54

## 2017-04-26 RX ADMIN — MIDODRINE HYDROCHLORIDE 10 MILLIGRAM(S): 2.5 TABLET ORAL at 01:11

## 2017-04-26 RX ADMIN — LISINOPRIL 10 MILLIGRAM(S): 2.5 TABLET ORAL at 05:54

## 2017-04-26 RX ADMIN — MIDODRINE HYDROCHLORIDE 5 MILLIGRAM(S): 2.5 TABLET ORAL at 17:29

## 2017-04-26 RX ADMIN — MIDODRINE HYDROCHLORIDE 5 MILLIGRAM(S): 2.5 TABLET ORAL at 21:39

## 2017-04-26 RX ADMIN — Medication 1 DROP(S): at 17:25

## 2017-04-26 RX ADMIN — HEPARIN SODIUM 5000 UNIT(S): 5000 INJECTION INTRAVENOUS; SUBCUTANEOUS at 05:54

## 2017-04-26 RX ADMIN — BUMETANIDE 0.5 MILLIGRAM(S): 0.25 INJECTION INTRAMUSCULAR; INTRAVENOUS at 17:27

## 2017-04-26 RX ADMIN — INSULIN GLARGINE 10 UNIT(S): 100 INJECTION, SOLUTION SUBCUTANEOUS at 21:39

## 2017-04-26 RX ADMIN — Medication 667 MILLIGRAM(S): at 08:53

## 2017-04-26 NOTE — PROGRESS NOTE ADULT - ASSESSMENT
with worsening ARF. Pt was previously admitted for similar condition, but had refused hemodialysis until this admission. Pt had permacath placed but developed large neck swelling at the site and was unable to swallow secretions and had difficulty breathing. Pt was emergently intubated on 4/18. CTA of neck did not reveal any vascular injury, and showed severe diffuse bilateral neck edema/infiltrating hematoma in the right supraclavicular fossa extending cephalad bilaterally. Per vascular surgery, unclear cause to neck swelling, and may be an anaphylactic reaction to medication. Swelling improved, and pt was extubated on 4/20. Pt passed speech and swallow and advanced to soft diet. However, 4/21, pt developed afib with RVR and found to have acute hypoxic respiratory failure which improved with urgent dialysis. Noted have fever and had blood cultures.  NOW AFEBRILE  OUT OF ICU  OFF ABX  COUGHED UP SOME BLOOD  WILLORDER CT CHEST TO EVALUATE  UNCLEAR IF PNEUMONIA  OF ZOSYN ELEVATED PROCALCITONIN  WILL FOLLOW UP  MAY RESTART ABX IF FEVERS OF CHANGE IN CLINICAL STATUS  FOLLOW UP CT CHEST

## 2017-04-26 NOTE — PROGRESS NOTE ADULT - PROBLEM SELECTOR PLAN 1
Continue IV Abx.  Pulmonary status improving.  Blood cultures negative, but sputum cultures showing pseudomonas.  Continue IV Abx.  CXR pneumonia.   ID evaluation appreciated.

## 2017-04-26 NOTE — PROGRESS NOTE ADULT - SUBJECTIVE AND OBJECTIVE BOX
Patient: EMILY LITTLEJOHN [id number] 87y Male  Internal Medicine Hospitalist Progress Note - Dr. Live Schmitz    Chief Complaint: Patient is a 87y old  Male who presents with a chief complaint of pt got intubated in the ED after HD (2017 04:25)    Hospital course:  86 yo M with h/o DM, HTN, ischemic cardiomyopathy (EF 30-35%), chronic renal failure presents with worsening ARF. Pt was previously admitted for similar condition, but had refused hemodialysis until this admission. Pt had permacath placed but developed large neck swelling at the site and was unable to swallow secretions and had difficulty breathing. Pt was emergently intubated on . CTA of neck did not reveal any vascular injury, and showed severe diffuse bilateral neck edema/infiltrating hematoma in the right supraclavicular fossa extending cephalad bilaterally. Per vascular surgery, unclear cause to neck swelling, and may be an anaphylactic reaction to medication. Swelling improved, and pt was extubated on . Pt passed speech and swallow and advanced to soft diet. However, , pt developed afib with RVR and found to have acute hypoxic respiratory failure which improved with urgent dialysis. Noted have fever and had blood cultures.     No complaints.  No fevers.  No pain.      ____________________PHYSICAL EXAM:  Vitals reviewed as indicated below  GENERAL:  NAD Alert and Oriented x 3   HEENT: NCAT.  Visually impaired.   CARDIOVASCULAR:  S1, S2  LUNGS: CTAB  ABDOMEN:  soft, (-) tenderness, (-) distension, (+) bowel sounds, (-) guarding, (-) rebound (-) rigidity  EXTREMITIES:  no cyanosis / clubbing / edema.   ____________________      MEDICATIONS:  aspirin 325milliGRAM(s) Oral daily  tamsulosin 0.4milliGRAM(s) Oral at bedtime  gabapentin 300milliGRAM(s) Oral three times a day  atorvastatin 40milliGRAM(s) Oral at bedtime  docusate sodium 100milliGRAM(s) Oral two times a day  dextrose Gel 1Dose(s) Oral once PRN  dextrose 50% Injectable 12.5Gram(s) IV Push once  dextrose 50% Injectable 25Gram(s) IV Push once  dextrose 50% Injectable 25Gram(s) IV Push once  glucagon  Injectable 1milliGRAM(s) IntraMuscular once PRN  epoetin una Injectable 45884Htcw(s) IV Push <User Schedule>  heparin  Injectable 5000Unit(s) SubCutaneous every 12 hours  artificial  tears Solution 1Drop(s) Left EYE two times a day  calcium acetate 667milliGRAM(s) Oral three times a day with meals  buMETAnide 0.5milliGRAM(s) Oral two times a day  carvedilol 3.125milliGRAM(s) Oral every 12 hours  insulin glargine Injectable (LANTUS) 10Unit(s) SubCutaneous at bedtime  insulin lispro (HumaLOG) corrective regimen sliding scale  SubCutaneous Before meals and at bedtime  midodrine 5milliGRAM(s) Oral every 8 hours      VITALS:  Vital Signs Last 24 Hrs  T(C): 36.8, Max: 37.1 ( @ 02:00)  T(F): 98.3, Max: 98.8 ( @ 02:00)  HR: 80 (80 - 81)  BP: 106/60 (101/52 - 136/65)  BP(mean): --  RR: 18 (18 - 18)  SpO2: 99% (99% - 100%) Daily     Daily Weight in k.5 (2017 15:00)  CAPILLARY BLOOD GLUCOSE  259 (2017 22:00)  235 (2017 18:19)    I&O's Summary    I & Os for current day (as of 2017 14:21)  =============================================  IN: 1170 ml / OUT: 2000 ml / NET: -830 ml      LABS:                        8.4    4.9   )-----------( 56       ( 2017 06:43 )             26.4         139  |  100  |  57.0<H>  ----------------------------<  92  4.2   |  27.0  |  3.99<H>    Ca    8.5<L>      2017 06:43  Mg     2.0           PT/INR - ( 2017 07:07 )   PT: 12.8 sec;   INR: 1.16 ratio         PTT - ( 2017 07:07 )  PTT:32.5 sec  RECENT CULTURES:   .Blood Blood XXXX XXXX   No growth at 48 hours     .Blood Blood XXXX XXXX   No growth at 48 hours     .Sputum Sputum Pseudomonas aeruginosa   No White blood cells  No organisms seen   Numerous Pseudomonas aeruginosa  Moderate Routine respiratory eusebio present

## 2017-04-26 NOTE — PROGRESS NOTE ADULT - SUBJECTIVE AND OBJECTIVE BOX
EMILY LITTLEJOHN is a 87y Male with HPI:  with worsening ARF. Pt was previously admitted for similar condition, but had refused hemodialysis until this admission. Pt had permacath placed but developed large neck swelling at the site and was unable to swallow secretions and had difficulty breathing. Pt was emergently intubated on 4/18. CTA of neck did not reveal any vascular injury, and showed severe diffuse bilateral neck edema/infiltrating hematoma in the right supraclavicular fossa extending cephalad bilaterally. Per vascular surgery, unclear cause to neck swelling, and may be an anaphylactic reaction to medication. Swelling improved, and pt was extubated on 4/20. Pt passed speech and swallow and advanced to soft diet. However, 4/21, pt developed afib with RVR and found to have acute hypoxic respiratory failure which improved with urgent dialysis. Noted have fever and had blood cultures.  NOW AFEBRILE  OUT OF ICU  WAS ON ZOSYN NOW OFF ELEVATED PROCLCITONIN'  PT REPORTS COUGHING UP BLOOD AGIAN    Allergies:  No Known Allergies      Medications:  aspirin 325milliGRAM(s) Oral daily  tamsulosin 0.4milliGRAM(s) Oral at bedtime  gabapentin 300milliGRAM(s) Oral three times a day  atorvastatin 40milliGRAM(s) Oral at bedtime  docusate sodium 100milliGRAM(s) Oral two times a day  dextrose Gel 1Dose(s) Oral once PRN  dextrose 50% Injectable 12.5Gram(s) IV Push once  dextrose 50% Injectable 25Gram(s) IV Push once  dextrose 50% Injectable 25Gram(s) IV Push once  glucagon  Injectable 1milliGRAM(s) IntraMuscular once PRN  epoetin una Injectable [id number](s) IV Push <User Schedule>  heparin  Injectable 5000Unit(s) SubCutaneous every 12 hours  artificial  tears Solution 1Drop(s) Left EYE two times a day  calcium acetate 667milliGRAM(s) Oral three times a day with meals  buMETAnide 0.5milliGRAM(s) Oral two times a day  carvedilol 3.125milliGRAM(s) Oral every 12 hours  insulin glargine Injectable (LANTUS) 10Unit(s) SubCutaneous at bedtime  insulin lispro (HumaLOG) corrective regimen sliding scale  SubCutaneous Before meals and at bedtime  midodrine 5milliGRAM(s) Oral every 8 hours      ANTIBIOTICS: NONE        Review of Systems: - Negative except as mentioned above     Physical Exam:  ICU Vital Signs Last 24 Hrs  T(C): 36.8, Max: 37.1 (04-26 @ 02:00)  T(F): 98.3, Max: 98.8 (04-26 @ 02:00)  HR: 80 (80 - 81)  BP: 106/60 (101/52 - 136/65)  BP(mean): --  ABP: --  ABP(mean): --  RR: 18 (18 - 18)  SpO2: 99% (99% - 100%)    GEN: NAD, pleasant  HEENT: normocephalic and atraumatic. EOMI. LUCY...  NECK: Supple. No carotid bruits.  No lymphadenopathy or thyromegaly.  LUNGS: Clear to auscultation.  HEART: Regular rate and rhythm without murmur.  ABDOMEN: Soft, nontender, and nondistended.  Positive bowel sounds.  No hepatosplenomegaly was noted.  NO REBOUND NO GUARDING  EXTREMITIES: Without any cyanosis, clubbing, rash, lesions or edema.  NEUROLOGIC: Cranial nerves II through XII are grossly intact.    SKIN: No ulceration or induration present.      Labs:  04-26    139  |  100  |  57.0<H>  ----------------------------<  92  4.2   |  27.0  |  3.99<H>    Ca    8.5<L>      26 Apr 2017 06:43  Mg     2.0     04-25                            8.4    4.9   )-----------( 56       ( 26 Apr 2017 06:43 )             26.4       PT/INR - ( 25 Apr 2017 07:07 )   PT: 12.8 sec;   INR: 1.16 ratio         PTT - ( 25 Apr 2017 07:07 )  PTT:32.5 sec          CAPILLARY BLOOD GLUCOSE  259 (25 Apr 2017 22:00)  235 (25 Apr 2017 18:19)        RECENT CULTURES:  04-21 .Blood Blood XXXX XXXX   No growth at 48 hours    04-21 .Blood Blood XXXX XXXX   No growth at 48 hours    04-21 .Sputum Sputum Pseudomonas aeruginosa   No White blood cells  No organisms seen   Numerous Pseudomonas aeruginosa  Moderate Routine respiratory eusebio present

## 2017-04-26 NOTE — PROGRESS NOTE ADULT - SUBJECTIVE AND OBJECTIVE BOX
Patient seen and examined    Feels fine  no c/o CP SOB NV   No swelling feet  ate better    Vital Signs Last 24 Hrs  T(C): 36.9, Max: 37.1 (04-26 @ 02:00)  T(F): 98.4, Max: 98.8 (04-26 @ 02:00)  HR: 92 (80 - 92)  BP: 130/68 (101/52 - 130/68)  BP(mean): --  RR: 16 (16 - 18)  SpO2: 98% (98% - 100%)  Awake/alert; NAD  chest Clear  No JVD  No murmer  abd soft, NT BS +  No edema      26 Apr 2017 06:43    139    |  100    |  57.0   ----------------------------<  92     4.2     |  27.0   |  3.99     Ca    8.5        26 Apr 2017 06:43  Mg     2.0       25 Apr 2017 07:07                            8.4    4.9   )-----------( 56       ( 26 Apr 2017 06:43 )             26.4       Impression  patient stable  hb still low  Tsat was 9  add venofer  HD am    Continue same treatment

## 2017-04-26 NOTE — PROGRESS NOTE ADULT - SUBJECTIVE AND OBJECTIVE BOX
[place] [facility]                                                        Office: [address] [address]                                                       Telephone: [phone]. Fax:[phone]                                                                             PROGRESS NOTE   Reason for follow up: congestive heart failure                             Overnight: No new events.   Update: no new updates today    Subjective: "  i guess i am alrgith"   Complains of: Cough with blood in sputum. Dyspnea   Review of symptoms: Cardiac:  No chest pain + dyspnea. No palpitations.  Respiratory:+  cough with bloody sputum.  No dyspnea  Gastrointestinal: No diarrhea. No abdominal pain. No bleeding.     Past medical history: No updates.   Chronic conditions:  Hypertension: controlled. CHF: Stable.  	  Vitals:  Vital Signs Last 24 Hrs  T(C): 36.8, Max: 37.1 (04-26 @ 02:00)  T(F): 98.3, Max: 98.8 (04-26 @ 02:00)  HR: 80 (78 - 81)  BP: 106/60 (101/52 - 136/65)  BP(mean): --  RR: 18 (18 - 18)  SpO2: 99% (99% - 100%)        PHYSICAL EXAM:  PHYSICAL EXAM:  Constitutional: Comfortable . No acute distress.   HEENT: Atraumatic and normcephalic , neck is supple  no  JVD. right  carotid bruit. PEERL .  neck hemtoma resolved.   CNS: A&Ox3. No focal deficits. EOMI. Cranial nerves II-IX are intact.   Lymph Nodes: Cervical : Not palpable.  Respiratory: CTAB  Cardiovascular: S1S2 RRR. 2/6 holosystolic murmur. No rubs or gallop.  Gastrointestinal: Soft non-tender and non distended . +Bowel sounds. negative Foster's sign.  Extremities: No edema.   Psychiatric: Calm . no agitation.  Skin: No skin rash/ulcers visualized to face, hands or feet.      CURRENT MEDICATIONS:  MEDICATIONS  (STANDING):  tamsulosin 0.4milliGRAM(s) Oral at bedtime  buMETAnide 0.5milliGRAM(s) Oral two times a day  carvedilol 3.125milliGRAM(s) Oral every 12 hours  midodrine 10milliGRAM(s) Oral three times a day  lisinopril 10milliGRAM(s) Oral daily    docusate sodium  aspirin  gabapentin  atorvastatin  dextrose 50% Injectable  dextrose 50% Injectable  dextrose 50% Injectable  epoetin una Injectable  heparin  Injectable  artificial  tears Solution  calcium acetate  insulin glargine Injectable (LANTUS)  insulin lispro (HumaLOG) corrective regimen sliding scale    PRN: dextrose Gel PRN  glucagon  Injectable PRN        LABS:	 	                        8.4    4.9   )-----------( 56       ( 26 Apr 2017 06:43 )             26.4   N=x    ; L=x        26 Apr 2017 06:43    139    |  100    |  57.0   ----------------------------<  92     4.2     |  27.0   |  3.99     Ca    8.5        26 Apr 2017 06:43  Mg     2.0       25 Apr 2017 07:07    Lipid Profile:   HgA1c: TSH:     	INTERPRETATION OF TELEMETRY: Reviewed by me. not available . as per nurse 4 beat NSVT  ECG: Reviewed by me. A sensed V paced.     RADIOLOGY & ADDITIONAL STUDIES:    X-ray:  reviewed by me. No evidence of acute cardiopulmonary disease.     ECHO FINDINGS: Date: 4/6/2017        . The left atrium is normal in size.   2. Global diffuse hypokinesis with akinesis in the anterolateral wall with prominent trebeculations. Normal perfusion on definity contrast suggest nonischemic cardiomyopathy or resting ishemia.   3. Left ventricular ejection fraction, by visual estimation, is 30 to 35%.   4. Elevated left atrial and left ventricular end-diastolic pressures. (LAP = 27 mm Hg)   5. The right atrium is normal in size. . Normal right ventricular size and function.   7. The Dimesionless Index value is 0.36. Moderate aortic valve stenosis.   Cannot rule out pseudoaortic stenosis.   8. There is no evidence of pericardial effusion. [place] [facility]                                                        Office: [address] [address]                                                       Telephone: [phone]. Fax:[phone]                                                                             PROGRESS NOTE   Reason for follow up: congestive heart failure                             Overnight: No new events.   Update: no new updates today    Subjective: "  i guess i am alrgith"   Complains of: Cough with blood in sputum. Dyspnea   Review of symptoms: Cardiac:  No chest pain + dyspnea. No palpitations.  Respiratory:+  cough with bloody sputum.  No dyspnea  Gastrointestinal: No diarrhea. No abdominal pain. No bleeding.     Past medical history: No updates.   Chronic conditions:  Hypertension: controlled. CHF: Stable.  	  Vitals:  Vital Signs Last 24 Hrs  T(C): 36.8, Max: 37.1 (04-26 @ 02:00)  T(F): 98.3, Max: 98.8 (04-26 @ 02:00)  HR: 80 (78 - 81)  BP: 106/60 (101/52 - 136/65)  BP(mean): --  RR: 18 (18 - 18)  SpO2: 99% (99% - 100%)        PHYSICAL EXAM:  PHYSICAL EXAM:  Constitutional: Comfortable . No acute distress.   HEENT: Atraumatic and normcephalic , neck is supple  no  JVD. right  carotid bruit. PEERL .  neck hemtoma resolved.   CNS: A&Ox3. No focal deficits. EOMI. Cranial nerves II-IX are intact.  Right eye " corneal opacification.   Lymph Nodes: Cervical : Not palpable.  Respiratory: CTAB  Cardiovascular: S1S2 RRR. 2/6 holosystolic murmur. No rubs or gallop.  Gastrointestinal: Soft non-tender and non distended . +Bowel sounds. negative Foster's sign.  Extremities: No edema.   Psychiatric: Calm . no agitation.  Skin: No skin rash/ulcers visualized to face, hands or feet.      CURRENT MEDICATIONS:  MEDICATIONS  (STANDING):  tamsulosin 0.4milliGRAM(s) Oral at bedtime  buMETAnide 0.5milliGRAM(s) Oral two times a day  carvedilol 3.125milliGRAM(s) Oral every 12 hours  midodrine 10milliGRAM(s) Oral three times a day  lisinopril 10milliGRAM(s) Oral daily    docusate sodium  aspirin  gabapentin  atorvastatin  dextrose 50% Injectable  dextrose 50% Injectable  dextrose 50% Injectable  epoetin una Injectable  heparin  Injectable  artificial  tears Solution  calcium acetate  insulin glargine Injectable (LANTUS)  insulin lispro (HumaLOG) corrective regimen sliding scale    PRN: dextrose Gel PRN  glucagon  Injectable PRN        LABS:	 	                        8.4    4.9   )-----------( 56       ( 26 Apr 2017 06:43 )             26.4   N=x    ; L=x        26 Apr 2017 06:43    139    |  100    |  57.0   ----------------------------<  92     4.2     |  27.0   |  3.99     Ca    8.5        26 Apr 2017 06:43  Mg     2.0       25 Apr 2017 07:07    Lipid Profile:   HgA1c: TSH:     	INTERPRETATION OF TELEMETRY: Reviewed by me. not available . as per nurse 4 beat NSVT  ECG: Reviewed by me. A sensed V paced.     RADIOLOGY & ADDITIONAL STUDIES:    X-ray:  reviewed by me. No evidence of acute cardiopulmonary disease.     ECHO FINDINGS: Date: 4/6/2017        . The left atrium is normal in size.   2. Global diffuse hypokinesis with akinesis in the anterolateral wall with prominent trebeculations. Normal perfusion on definity contrast suggest nonischemic cardiomyopathy or resting ishemia.   3. Left ventricular ejection fraction, by visual estimation, is 30 to 35%.   4. Elevated left atrial and left ventricular end-diastolic pressures. (LAP = 27 mm Hg)   5. The right atrium is normal in size. . Normal right ventricular size and function.   7. The Dimesionless Index value is 0.36. Moderate aortic valve stenosis.   Cannot rule out pseudoaortic stenosis.   8. There is no evidence of pericardial effusion.

## 2017-04-26 NOTE — PROGRESS NOTE ADULT - PROBLEM SELECTOR PLAN 3
Echo 4/6: EF 30-35% with moderate AS (possibly pseudoaortic stensosis)  c/w carvedilol, Bumex, Initiate Angiotensin converting enzyme inhibitor .lisinoopril 5 mg daily.   Patietn is on midodrine. reduce dose of midorine 5 mg Q8hr.   Out of Bed to Chair

## 2017-04-27 LAB
ANION GAP SERPL CALC-SCNC: 16 MMOL/L — SIGNIFICANT CHANGE UP (ref 5–17)
BUN SERPL-MCNC: 83 MG/DL — HIGH (ref 8–20)
CALCIUM SERPL-MCNC: 8.8 MG/DL — SIGNIFICANT CHANGE UP (ref 8.6–10.2)
CHLORIDE SERPL-SCNC: 98 MMOL/L — SIGNIFICANT CHANGE UP (ref 98–107)
CO2 SERPL-SCNC: 25 MMOL/L — SIGNIFICANT CHANGE UP (ref 22–29)
CREAT SERPL-MCNC: 4.32 MG/DL — HIGH (ref 0.5–1.3)
GLUCOSE SERPL-MCNC: 112 MG/DL — SIGNIFICANT CHANGE UP (ref 70–115)
HCT VFR BLD CALC: 28.4 % — LOW (ref 42–52)
HGB BLD-MCNC: 8.7 G/DL — LOW (ref 14–18)
MCHC RBC-ENTMCNC: 25.9 PG — LOW (ref 27–31)
MCHC RBC-ENTMCNC: 30.6 G/DL — LOW (ref 32–36)
MCV RBC AUTO: 84.5 FL — SIGNIFICANT CHANGE UP (ref 80–94)
PLATELET # BLD AUTO: 65 K/UL — LOW (ref 150–400)
POTASSIUM SERPL-MCNC: 4.4 MMOL/L — SIGNIFICANT CHANGE UP (ref 3.5–5.3)
POTASSIUM SERPL-SCNC: 4.4 MMOL/L — SIGNIFICANT CHANGE UP (ref 3.5–5.3)
RBC # BLD: 3.36 M/UL — LOW (ref 4.6–6.2)
RBC # FLD: 16 % — HIGH (ref 11–15.6)
SODIUM SERPL-SCNC: 139 MMOL/L — SIGNIFICANT CHANGE UP (ref 135–145)
WBC # BLD: 5.2 K/UL — SIGNIFICANT CHANGE UP (ref 4.8–10.8)
WBC # FLD AUTO: 5.2 K/UL — SIGNIFICANT CHANGE UP (ref 4.8–10.8)

## 2017-04-27 PROCEDURE — 71250 CT THORAX DX C-: CPT | Mod: 26

## 2017-04-27 PROCEDURE — 99233 SBSQ HOSP IP/OBS HIGH 50: CPT

## 2017-04-27 RX ORDER — IRON SUCROSE 20 MG/ML
100 INJECTION, SOLUTION INTRAVENOUS
Qty: 0 | Refills: 0 | Status: DISCONTINUED | OUTPATIENT
Start: 2017-04-27 | End: 2017-04-28

## 2017-04-27 RX ORDER — MIDODRINE HYDROCHLORIDE 2.5 MG/1
2.5 TABLET ORAL EVERY 8 HOURS
Qty: 0 | Refills: 0 | Status: DISCONTINUED | OUTPATIENT
Start: 2017-04-27 | End: 2017-05-08

## 2017-04-27 RX ADMIN — HEPARIN SODIUM 5000 UNIT(S): 5000 INJECTION INTRAVENOUS; SUBCUTANEOUS at 05:54

## 2017-04-27 RX ADMIN — BUMETANIDE 0.5 MILLIGRAM(S): 0.25 INJECTION INTRAMUSCULAR; INTRAVENOUS at 05:53

## 2017-04-27 RX ADMIN — MIDODRINE HYDROCHLORIDE 5 MILLIGRAM(S): 2.5 TABLET ORAL at 12:15

## 2017-04-27 RX ADMIN — Medication 100 MILLIGRAM(S): at 18:34

## 2017-04-27 RX ADMIN — CARVEDILOL PHOSPHATE 3.12 MILLIGRAM(S): 80 CAPSULE, EXTENDED RELEASE ORAL at 18:34

## 2017-04-27 RX ADMIN — Medication 667 MILLIGRAM(S): at 12:14

## 2017-04-27 RX ADMIN — TAMSULOSIN HYDROCHLORIDE 0.4 MILLIGRAM(S): 0.4 CAPSULE ORAL at 22:29

## 2017-04-27 RX ADMIN — ATORVASTATIN CALCIUM 40 MILLIGRAM(S): 80 TABLET, FILM COATED ORAL at 22:29

## 2017-04-27 RX ADMIN — MIDODRINE HYDROCHLORIDE 5 MILLIGRAM(S): 2.5 TABLET ORAL at 05:57

## 2017-04-27 RX ADMIN — HEPARIN SODIUM 5000 UNIT(S): 5000 INJECTION INTRAVENOUS; SUBCUTANEOUS at 18:34

## 2017-04-27 RX ADMIN — Medication 1 DROP(S): at 05:53

## 2017-04-27 RX ADMIN — BUMETANIDE 0.5 MILLIGRAM(S): 0.25 INJECTION INTRAMUSCULAR; INTRAVENOUS at 18:34

## 2017-04-27 RX ADMIN — TUBERCULIN PURIFIED PROTEIN DERIVATIVE 5 UNIT(S): 5 INJECTION, SOLUTION INTRADERMAL at 18:39

## 2017-04-27 RX ADMIN — Medication 667 MILLIGRAM(S): at 18:33

## 2017-04-27 RX ADMIN — Medication 1 DROP(S): at 18:37

## 2017-04-27 RX ADMIN — Medication 6: at 22:27

## 2017-04-27 RX ADMIN — Medication 4: at 18:33

## 2017-04-27 RX ADMIN — CARVEDILOL PHOSPHATE 3.12 MILLIGRAM(S): 80 CAPSULE, EXTENDED RELEASE ORAL at 05:54

## 2017-04-27 RX ADMIN — Medication 100 MILLIGRAM(S): at 05:53

## 2017-04-27 RX ADMIN — MIDODRINE HYDROCHLORIDE 2.5 MILLIGRAM(S): 2.5 TABLET ORAL at 22:29

## 2017-04-27 RX ADMIN — LISINOPRIL 5 MILLIGRAM(S): 2.5 TABLET ORAL at 05:53

## 2017-04-27 RX ADMIN — Medication 667 MILLIGRAM(S): at 12:10

## 2017-04-27 RX ADMIN — GABAPENTIN 300 MILLIGRAM(S): 400 CAPSULE ORAL at 12:14

## 2017-04-27 RX ADMIN — IRON SUCROSE 210 MILLIGRAM(S): 20 INJECTION, SOLUTION INTRAVENOUS at 00:04

## 2017-04-27 RX ADMIN — GABAPENTIN 300 MILLIGRAM(S): 400 CAPSULE ORAL at 22:29

## 2017-04-27 RX ADMIN — GABAPENTIN 300 MILLIGRAM(S): 400 CAPSULE ORAL at 05:54

## 2017-04-27 RX ADMIN — INSULIN GLARGINE 10 UNIT(S): 100 INJECTION, SOLUTION SUBCUTANEOUS at 22:21

## 2017-04-27 RX ADMIN — IRON SUCROSE 210 MILLIGRAM(S): 20 INJECTION, SOLUTION INTRAVENOUS at 05:56

## 2017-04-27 RX ADMIN — IRON SUCROSE 210 MILLIGRAM(S): 20 INJECTION, SOLUTION INTRAVENOUS at 23:50

## 2017-04-27 RX ADMIN — Medication 325 MILLIGRAM(S): at 12:14

## 2017-04-27 RX ADMIN — Medication 4: at 12:18

## 2017-04-27 NOTE — PROGRESS NOTE ADULT - SUBJECTIVE AND OBJECTIVE BOX
[place] [facility]                                                        Office: [address] [address]                                                       Telephone: [phone]. Fax:[phone]                                                                             PROGRESS NOTE   Reason for follow up: congestive heart failure                             Overnight: No new events.   Update: no new updates today    Subjective: "  i need help with food"   Complains of: Cough.  Dyspnea   Review of symptoms: Cardiac:  No chest pain + dyspnea. No palpitations.  Respiratory:+  cough with bloody sputum.  No dyspnea  Gastrointestinal: No diarrhea. No abdominal pain. No bleeding.     Past medical history: No updates.   Chronic conditions:  Hypertension: controlled. CHF: Stable.  	  Vitals:  Vital Signs Last 24 Hrs  T(C): 36.3, Max: 36.9 (04-26 @ 15:37)  T(F): 97.4, Max: 98.4 (04-26 @ 15:37)  HR: 79 (76 - 92)  BP: 108/56 (108/56 - 130/68)  BP(mean): --  RR: 16 (16 - 16)  SpO2: 99% (95% - 99%)        PHYSICAL EXAM:  PHYSICAL EXAM:  Constitutional: Comfortable . No acute distress.   HEENT: Atraumatic and normcephalic , neck is supple  no  JVD. right  carotid bruit. PEERL .  neck hemtoma resolved.   CNS: A&Ox3. No focal deficits. EOMI. Cranial nerves II-IX are intact.  Right eye " corneal opacification.   Lymph Nodes: Cervical : Not palpable.  Respiratory: CTAB  Cardiovascular: S1S2 RRR. 2/6 holosystolic murmur. No rubs or gallop.  Gastrointestinal: Soft non-tender and non distended . +Bowel sounds. negative Foster's sign.  Extremities: No edema.   Psychiatric: Calm . no agitation.  Skin: No skin rash/ulcers visualized to face, hands or feet.      CURRENT MEDICATIONS:  MEDICATIONS  (STANDING):  tamsulosin 0.4milliGRAM(s) Oral at bedtime  buMETAnide 0.5milliGRAM(s) Oral two times a day  carvedilol 3.125milliGRAM(s) Oral every 12 hours  midodrine 5milliGRAM(s) Oral every 8 hours  lisinopril 5milliGRAM(s) Oral daily    docusate sodium  aspirin  gabapentin  atorvastatin  dextrose 50% Injectable  dextrose 50% Injectable  dextrose 50% Injectable  epoetin una Injectable  heparin  Injectable  artificial  tears Solution  calcium acetate  insulin glargine Injectable (LANTUS)  insulin lispro (HumaLOG) corrective regimen sliding scale  iron sucrose IVPB    PRN: dextrose Gel PRN  glucagon  Injectable PRN        LABS:	 	                                8.7    5.2   )-----------( 65       ( 27 Apr 2017 07:14 )             28.4   N=x    ; L=x        27 Apr 2017 07:14    139    |  98     |  83.0   ----------------------------<  112    4.4     |  25.0   |  4.32     Ca    8.8        27 Apr 2017 07:14    Lipid Profile:   HgA1c: TSH:     	INTERPRETATION OF TELEMETRY: Reviewed by me. not available . as per nurse 4 beat NSVT  ECG: Reviewed by me. A sensed V paced.     RADIOLOGY & ADDITIONAL STUDIES:    X-ray:  reviewed by me. No evidence of acute cardiopulmonary disease.     ECHO FINDINGS: Date: 4/6/2017        . The left atrium is normal in size.   2. Global diffuse hypokinesis with akinesis in the anterolateral wall with prominent trebeculations. Normal perfusion on definity contrast suggest nonischemic cardiomyopathy or resting ishemia.   3. Left ventricular ejection fraction, by visual estimation, is 30 to 35%.   4. Elevated left atrial and left ventricular end-diastolic pressures. (LAP = 27 mm Hg)   5. The right atrium is normal in size. . Normal right ventricular size and function.   7. The Dimesionless Index value is 0.36. Moderate aortic valve stenosis.   Cannot rule out pseudoaortic stenosis.   8. There is no evidence of pericardial effusion.

## 2017-04-27 NOTE — PROGRESS NOTE ADULT - PROBLEM SELECTOR PLAN 1
Continue IV Abx.  Pulmonary status improving.  Blood cultures negative, but sputum cultures showing pseudomonas.  Continue IV Abx.  CXR pneumonia.

## 2017-04-27 NOTE — PROGRESS NOTE ADULT - PROBLEM SELECTOR PLAN 3
Echo 4/6: EF 30-35% with moderate AS (possibly pseudoaortic stensosis)  c/w carvedilol, Bumex,  ct lisinoopril 5 mg daily.   Patietn is on midodrine. titrate off midodrine as needed. decrease midorine to 2.5 mg Q8hr.   Out of Bed to Chair

## 2017-04-27 NOTE — PROGRESS NOTE ADULT - SUBJECTIVE AND OBJECTIVE BOX
Patient seen and examined    Feels fine  no c/o CP SOB NV   No swelling feet    Vital Signs Last 24 Hrs  T(C): 36.6, Max: 36.6 (04-27 @ 00:21)  T(F): 97.8, Max: 97.8 (04-27 @ 00:21)  HR: 89 (76 - 89)  BP: 135/67 (108/56 - 135/67)  BP(mean): --  RR: 16 (16 - 16)  SpO2: 99% (95% - 99%)  Awake/alert; NAD  chest Clear  No JVD  No murmer  abd soft, NT BS +  No edema      27 Apr 2017 07:14    139    |  98     |  83.0   ----------------------------<  112    4.4     |  25.0   |  4.32     Ca    8.8        27 Apr 2017 07:14                            8.7    5.2   )-----------( 65       ( 27 Apr 2017 07:14 )             28.4       Impression  patient stable  HD today; feels tired and wants to avoid but will think it over  cont venofer/Epo  check stool OB    Continue same treatment

## 2017-04-28 LAB
ANION GAP SERPL CALC-SCNC: 14 MMOL/L — SIGNIFICANT CHANGE UP (ref 5–17)
BUN SERPL-MCNC: 100 MG/DL — HIGH (ref 8–20)
CALCIUM SERPL-MCNC: 9.4 MG/DL — SIGNIFICANT CHANGE UP (ref 8.6–10.2)
CHLORIDE SERPL-SCNC: 97 MMOL/L — LOW (ref 98–107)
CO2 SERPL-SCNC: 26 MMOL/L — SIGNIFICANT CHANGE UP (ref 22–29)
CREAT SERPL-MCNC: 4.59 MG/DL — HIGH (ref 0.5–1.3)
GLUCOSE SERPL-MCNC: 89 MG/DL — SIGNIFICANT CHANGE UP (ref 70–115)
HCT VFR BLD CALC: 27.4 % — LOW (ref 42–52)
HGB BLD-MCNC: 8.6 G/DL — LOW (ref 14–18)
MCHC RBC-ENTMCNC: 26.5 PG — LOW (ref 27–31)
MCHC RBC-ENTMCNC: 31.4 G/DL — LOW (ref 32–36)
MCV RBC AUTO: 84.6 FL — SIGNIFICANT CHANGE UP (ref 80–94)
PLATELET # BLD AUTO: 75 K/UL — LOW (ref 150–400)
POTASSIUM SERPL-MCNC: 4.7 MMOL/L — SIGNIFICANT CHANGE UP (ref 3.5–5.3)
POTASSIUM SERPL-SCNC: 4.7 MMOL/L — SIGNIFICANT CHANGE UP (ref 3.5–5.3)
RBC # BLD: 3.24 M/UL — LOW (ref 4.6–6.2)
RBC # FLD: 16.5 % — HIGH (ref 11–15.6)
SODIUM SERPL-SCNC: 137 MMOL/L — SIGNIFICANT CHANGE UP (ref 135–145)
WBC # BLD: 5.7 K/UL — SIGNIFICANT CHANGE UP (ref 4.8–10.8)
WBC # FLD AUTO: 5.7 K/UL — SIGNIFICANT CHANGE UP (ref 4.8–10.8)

## 2017-04-28 PROCEDURE — 99233 SBSQ HOSP IP/OBS HIGH 50: CPT

## 2017-04-28 RX ORDER — IRON SUCROSE 20 MG/ML
100 INJECTION, SOLUTION INTRAVENOUS
Qty: 0 | Refills: 0 | Status: DISCONTINUED | OUTPATIENT
Start: 2017-04-28 | End: 2017-05-08

## 2017-04-28 RX ADMIN — ERYTHROPOIETIN 10000 UNIT(S): 10000 INJECTION, SOLUTION INTRAVENOUS; SUBCUTANEOUS at 13:26

## 2017-04-28 RX ADMIN — Medication 1 DROP(S): at 17:12

## 2017-04-28 RX ADMIN — MIDODRINE HYDROCHLORIDE 2.5 MILLIGRAM(S): 2.5 TABLET ORAL at 22:23

## 2017-04-28 RX ADMIN — GABAPENTIN 300 MILLIGRAM(S): 400 CAPSULE ORAL at 22:22

## 2017-04-28 RX ADMIN — Medication 2: at 17:12

## 2017-04-28 RX ADMIN — Medication 100 MILLIGRAM(S): at 17:11

## 2017-04-28 RX ADMIN — CARVEDILOL PHOSPHATE 3.12 MILLIGRAM(S): 80 CAPSULE, EXTENDED RELEASE ORAL at 17:11

## 2017-04-28 RX ADMIN — MIDODRINE HYDROCHLORIDE 2.5 MILLIGRAM(S): 2.5 TABLET ORAL at 05:50

## 2017-04-28 RX ADMIN — Medication 667 MILLIGRAM(S): at 09:06

## 2017-04-28 RX ADMIN — GABAPENTIN 300 MILLIGRAM(S): 400 CAPSULE ORAL at 05:48

## 2017-04-28 RX ADMIN — Medication 100 MILLIGRAM(S): at 05:48

## 2017-04-28 RX ADMIN — BUMETANIDE 0.5 MILLIGRAM(S): 0.25 INJECTION INTRAMUSCULAR; INTRAVENOUS at 17:11

## 2017-04-28 RX ADMIN — Medication 1 DROP(S): at 05:49

## 2017-04-28 RX ADMIN — TAMSULOSIN HYDROCHLORIDE 0.4 MILLIGRAM(S): 0.4 CAPSULE ORAL at 22:22

## 2017-04-28 RX ADMIN — Medication 667 MILLIGRAM(S): at 12:31

## 2017-04-28 RX ADMIN — HEPARIN SODIUM 5000 UNIT(S): 5000 INJECTION INTRAVENOUS; SUBCUTANEOUS at 17:11

## 2017-04-28 RX ADMIN — MIDODRINE HYDROCHLORIDE 2.5 MILLIGRAM(S): 2.5 TABLET ORAL at 17:11

## 2017-04-28 RX ADMIN — Medication 667 MILLIGRAM(S): at 17:11

## 2017-04-28 RX ADMIN — HEPARIN SODIUM 5000 UNIT(S): 5000 INJECTION INTRAVENOUS; SUBCUTANEOUS at 05:49

## 2017-04-28 RX ADMIN — INSULIN GLARGINE 10 UNIT(S): 100 INJECTION, SOLUTION SUBCUTANEOUS at 22:22

## 2017-04-28 RX ADMIN — BUMETANIDE 0.5 MILLIGRAM(S): 0.25 INJECTION INTRAMUSCULAR; INTRAVENOUS at 05:48

## 2017-04-28 RX ADMIN — IRON SUCROSE 210 MILLIGRAM(S): 20 INJECTION, SOLUTION INTRAVENOUS at 13:26

## 2017-04-28 RX ADMIN — Medication 8: at 12:32

## 2017-04-28 RX ADMIN — GABAPENTIN 300 MILLIGRAM(S): 400 CAPSULE ORAL at 17:11

## 2017-04-28 RX ADMIN — ATORVASTATIN CALCIUM 40 MILLIGRAM(S): 80 TABLET, FILM COATED ORAL at 22:22

## 2017-04-28 RX ADMIN — Medication 325 MILLIGRAM(S): at 12:32

## 2017-04-28 NOTE — PROGRESS NOTE ADULT - ASSESSMENT
ESRD on HD TTS schedule Next HD candie  tolerating ok  CM systolic HF cards following  Anemia 2/2 CKD cont Epo w HD and IV iron  BP electrolytes acceptable

## 2017-04-28 NOTE — PHYSICAL THERAPY INITIAL EVALUATION ADULT - PERTINENT HX OF CURRENT PROBLEM, REHAB EVAL
86 y/o male admitted 4/17 with acute on chronic renal failure and acute on chronic CHF. SOB, LE swelling. Permacath placed right IJ, HD 4/19, post HD had right neck swelling and urgently intubated for airway protection. Extubated 4/22. CT chest reveals bilateral pleural effusion, LLL infiltrate c/w PNA.

## 2017-04-28 NOTE — PHYSICAL THERAPY INITIAL EVALUATION ADULT - GENERAL OBSERVATIONS, REHAB EVAL
Pt received lying in bed on 2 gulden (+) supplemental O2 via nasal cannula @ 3.0L/min, NAD. Agreeable to PT

## 2017-04-28 NOTE — PROGRESS NOTE ADULT - SUBJECTIVE AND OBJECTIVE BOX
NEPHROLOGY INTERVAL HPI/OVERNIGHT EVENTS:    Examined earlier  Looks comfortable    MEDICATIONS  (STANDING):  aspirin 325milliGRAM(s) Oral daily  tamsulosin 0.4milliGRAM(s) Oral at bedtime  gabapentin 300milliGRAM(s) Oral three times a day  atorvastatin 40milliGRAM(s) Oral at bedtime  docusate sodium 100milliGRAM(s) Oral two times a day  dextrose 50% Injectable 12.5Gram(s) IV Push once  dextrose 50% Injectable 25Gram(s) IV Push once  dextrose 50% Injectable 25Gram(s) IV Push once  epoetin una Injectable [id number](s) IV Push <User Schedule>  heparin  Injectable 5000Unit(s) SubCutaneous every 12 hours  artificial  tears Solution 1Drop(s) Left EYE two times a day  calcium acetate 667milliGRAM(s) Oral three times a day with meals  buMETAnide 0.5milliGRAM(s) Oral two times a day  carvedilol 3.125milliGRAM(s) Oral every 12 hours  insulin glargine Injectable (LANTUS) 10Unit(s) SubCutaneous at bedtime  insulin lispro (HumaLOG) corrective regimen sliding scale  SubCutaneous Before meals and at bedtime  lisinopril 5milliGRAM(s) Oral daily  midodrine 2.5milliGRAM(s) Oral every 8 hours  iron sucrose Injectable 100milliGRAM(s) IV Push <User Schedule>    MEDICATIONS  (PRN):  dextrose Gel 1Dose(s) Oral once PRN Blood Glucose LESS THAN 70 milliGRAM(s)/deciliter  glucagon  Injectable 1milliGRAM(s) IntraMuscular once PRN Glucose LESS THAN 70 milligrams/deciliter      Allergies    No Known Allergies    Intolerances        Vital Signs Last 24 Hrs  T(C): 37.1, Max: 37.2 (- @ 23:56)  T(F): 98.7, Max: 99 (- @ 23:56)  HR: 96 (84 - 101)  BP: 126/59 (112/58 - 134/60)  BP(mean): --  RR: 16 (16 - 18)  SpO2: 95% (95% - 98%)  Daily     Daily Weight in k (2017 16:00)    PHYSICAL EXAM:  Awake/alert; NAD  chest Clear  No JVD  No murmer  abd soft, NT BS +  No edema      LABS:                        8.6    5.7   )-----------( 75       ( 2017 06:39 )             27.4     04-    137  |  97<L>  |  100.0<H>  ----------------------------<  89  4.7   |  26.0  |  4.59<H>    Ca    9.4      2017 06:39                  RADIOLOGY & ADDITIONAL TESTS:

## 2017-04-28 NOTE — PROGRESS NOTE ADULT - SUBJECTIVE AND OBJECTIVE BOX
Patient: EMILY LITTLEJOHN [id number] 87y Male  Internal Medicine Hospitalist Progress Note - Dr. Live Schmitz    Chief Complaint: Patient is a 87y old  Male who presents with a chief complaint of pt got intubated in the ED after HD (2017 04:25)    Hospital course:  88 yo M with h/o DM, HTN, ischemic cardiomyopathy (EF 30-35%), chronic renal failure presents with worsening ARF. Pt was previously admitted for similar condition, but had refused hemodialysis until this admission. Pt had permacath placed but developed large neck swelling at the site and was unable to swallow secretions and had difficulty breathing. Pt was emergently intubated on . CTA of neck did not reveal any vascular injury, and showed severe diffuse bilateral neck edema/infiltrating hematoma in the right supraclavicular fossa extending cephalad bilaterally. Per vascular surgery, unclear cause to neck swelling, and may be an anaphylactic reaction to medication. Swelling improved, and pt was extubated on . Pt passed speech and swallow and advanced to soft diet. However, , pt developed afib with RVR and found to have acute hypoxic respiratory failure which improved with urgent dialysis. Noted have fever and had blood cultures. Seen by ID, off antibiotics.     No complaints.  NO SOB.  No fever / chills.  Tolerating HD.     ____________________PHYSICAL EXAM:  Vitals reviewed as indicated below  GENERAL:  NAD Alert and Oriented x 3   HEENT: NCAT.  Visually impaired.   CARDIOVASCULAR:  S1, S2  LUNGS: CTAB  ABDOMEN:  soft, (-) tenderness, (-) distension, (+) bowel sounds, (-) guarding, (-) rebound (-) rigidity  EXTREMITIES:  no cyanosis / clubbing / edema.   ____________________      MEDICATIONS:  aspirin 325milliGRAM(s) Oral daily  tamsulosin 0.4milliGRAM(s) Oral at bedtime  gabapentin 300milliGRAM(s) Oral three times a day  atorvastatin 40milliGRAM(s) Oral at bedtime  docusate sodium 100milliGRAM(s) Oral two times a day  dextrose Gel 1Dose(s) Oral once PRN  dextrose 50% Injectable 12.5Gram(s) IV Push once  dextrose 50% Injectable 25Gram(s) IV Push once  dextrose 50% Injectable 25Gram(s) IV Push once  glucagon  Injectable 1milliGRAM(s) IntraMuscular once PRN  epoetin una Injectable 73515Pjri(s) IV Push <User Schedule>  heparin  Injectable 5000Unit(s) SubCutaneous every 12 hours  artificial  tears Solution 1Drop(s) Left EYE two times a day  calcium acetate 667milliGRAM(s) Oral three times a day with meals  buMETAnide 0.5milliGRAM(s) Oral two times a day  carvedilol 3.125milliGRAM(s) Oral every 12 hours  insulin glargine Injectable (LANTUS) 10Unit(s) SubCutaneous at bedtime  insulin lispro (HumaLOG) corrective regimen sliding scale  SubCutaneous Before meals and at bedtime  lisinopril 5milliGRAM(s) Oral daily  midodrine 2.5milliGRAM(s) Oral every 8 hours  iron sucrose Injectable 100milliGRAM(s) IV Push <User Schedule>      VITALS:  Vital Signs Last 24 Hrs  T(C): 37.2, Max: 37.2 ( @ 23:56)  T(F): 99, Max: 99 ( @ 23:56)  HR: 101 (84 - 101)  BP: 124/60 (112/58 - 135/67)  BP(mean): --  RR: 16 (16 - 18)  SpO2: 95% (95% - 99%) Daily     Daily Weight in k (2017 12:53)  CAPILLARY BLOOD GLUCOSE  285 (2017 22:24)    I&O's Summary    I & Os for current day (as of 2017 14:28)  =============================================  IN: 720 ml / OUT: 0 ml / NET: 720 ml      LABS:                        8.6    5.7   )-----------( 75       ( 2017 06:39 )             27.4     04-28    137  |  97<L>  |  100.0<H>  ----------------------------<  89  4.7   |  26.0  |  4.59<H>    Ca    9.4      2017 06:39        RECENT CULTURES:  - .Blood Blood XXXX XXXX   No growth at 5 days.    - .Blood Blood XXXX XXXX   No growth at 5 days.     .Sputum Sputum Pseudomonas aeruginosa   No White blood cells  No organisms seen   Numerous Pseudomonas aeruginosa  Moderate Routine respiratory eusebio present

## 2017-04-28 NOTE — PHYSICAL THERAPY INITIAL EVALUATION ADULT - ADDITIONAL COMMENTS
Pt reports living with spouse in a private house with 4 steps to enter (+) rail. Pt reports that he is modified independent with RW for ambulation. Also owns several SAC's and a transport wheelchair.

## 2017-04-29 LAB
ANION GAP SERPL CALC-SCNC: 13 MMOL/L — SIGNIFICANT CHANGE UP (ref 5–17)
BUN SERPL-MCNC: 54 MG/DL — HIGH (ref 8–20)
CALCIUM SERPL-MCNC: 8.7 MG/DL — SIGNIFICANT CHANGE UP (ref 8.6–10.2)
CHLORIDE SERPL-SCNC: 101 MMOL/L — SIGNIFICANT CHANGE UP (ref 98–107)
CO2 SERPL-SCNC: 27 MMOL/L — SIGNIFICANT CHANGE UP (ref 22–29)
CREAT SERPL-MCNC: 3.03 MG/DL — HIGH (ref 0.5–1.3)
GLUCOSE SERPL-MCNC: 57 MG/DL — LOW (ref 70–115)
HCT VFR BLD CALC: 26.8 % — LOW (ref 42–52)
HGB BLD-MCNC: 8.2 G/DL — LOW (ref 14–18)
MCHC RBC-ENTMCNC: 26.4 PG — LOW (ref 27–31)
MCHC RBC-ENTMCNC: 30.6 G/DL — LOW (ref 32–36)
MCV RBC AUTO: 86.2 FL — SIGNIFICANT CHANGE UP (ref 80–94)
PLATELET # BLD AUTO: 72 K/UL — LOW (ref 150–400)
POTASSIUM SERPL-MCNC: 4.5 MMOL/L — SIGNIFICANT CHANGE UP (ref 3.5–5.3)
POTASSIUM SERPL-SCNC: 4.5 MMOL/L — SIGNIFICANT CHANGE UP (ref 3.5–5.3)
RBC # BLD: 3.11 M/UL — LOW (ref 4.6–6.2)
RBC # FLD: 17.1 % — HIGH (ref 11–15.6)
SODIUM SERPL-SCNC: 141 MMOL/L — SIGNIFICANT CHANGE UP (ref 135–145)
WBC # BLD: 5 K/UL — SIGNIFICANT CHANGE UP (ref 4.8–10.8)
WBC # FLD AUTO: 5 K/UL — SIGNIFICANT CHANGE UP (ref 4.8–10.8)

## 2017-04-29 PROCEDURE — 99233 SBSQ HOSP IP/OBS HIGH 50: CPT

## 2017-04-29 PROCEDURE — 99232 SBSQ HOSP IP/OBS MODERATE 35: CPT

## 2017-04-29 RX ADMIN — GABAPENTIN 300 MILLIGRAM(S): 400 CAPSULE ORAL at 05:14

## 2017-04-29 RX ADMIN — Medication 667 MILLIGRAM(S): at 13:19

## 2017-04-29 RX ADMIN — BUMETANIDE 0.5 MILLIGRAM(S): 0.25 INJECTION INTRAMUSCULAR; INTRAVENOUS at 17:05

## 2017-04-29 RX ADMIN — CARVEDILOL PHOSPHATE 3.12 MILLIGRAM(S): 80 CAPSULE, EXTENDED RELEASE ORAL at 17:06

## 2017-04-29 RX ADMIN — HEPARIN SODIUM 5000 UNIT(S): 5000 INJECTION INTRAVENOUS; SUBCUTANEOUS at 17:04

## 2017-04-29 RX ADMIN — Medication 4: at 21:02

## 2017-04-29 RX ADMIN — Medication 667 MILLIGRAM(S): at 17:06

## 2017-04-29 RX ADMIN — GABAPENTIN 300 MILLIGRAM(S): 400 CAPSULE ORAL at 21:01

## 2017-04-29 RX ADMIN — CARVEDILOL PHOSPHATE 3.12 MILLIGRAM(S): 80 CAPSULE, EXTENDED RELEASE ORAL at 09:11

## 2017-04-29 RX ADMIN — Medication 100 MILLIGRAM(S): at 17:05

## 2017-04-29 RX ADMIN — INSULIN GLARGINE 10 UNIT(S): 100 INJECTION, SOLUTION SUBCUTANEOUS at 21:02

## 2017-04-29 RX ADMIN — Medication 667 MILLIGRAM(S): at 09:11

## 2017-04-29 RX ADMIN — MIDODRINE HYDROCHLORIDE 2.5 MILLIGRAM(S): 2.5 TABLET ORAL at 13:19

## 2017-04-29 RX ADMIN — HEPARIN SODIUM 5000 UNIT(S): 5000 INJECTION INTRAVENOUS; SUBCUTANEOUS at 05:14

## 2017-04-29 RX ADMIN — MIDODRINE HYDROCHLORIDE 2.5 MILLIGRAM(S): 2.5 TABLET ORAL at 21:02

## 2017-04-29 RX ADMIN — LISINOPRIL 5 MILLIGRAM(S): 2.5 TABLET ORAL at 09:11

## 2017-04-29 RX ADMIN — Medication 1 DROP(S): at 17:44

## 2017-04-29 RX ADMIN — Medication 325 MILLIGRAM(S): at 11:24

## 2017-04-29 RX ADMIN — Medication 100 MILLIGRAM(S): at 05:13

## 2017-04-29 RX ADMIN — TAMSULOSIN HYDROCHLORIDE 0.4 MILLIGRAM(S): 0.4 CAPSULE ORAL at 21:01

## 2017-04-29 RX ADMIN — Medication 4: at 17:05

## 2017-04-29 RX ADMIN — ATORVASTATIN CALCIUM 40 MILLIGRAM(S): 80 TABLET, FILM COATED ORAL at 21:01

## 2017-04-29 RX ADMIN — BUMETANIDE 0.5 MILLIGRAM(S): 0.25 INJECTION INTRAMUSCULAR; INTRAVENOUS at 05:13

## 2017-04-29 RX ADMIN — Medication 1 DROP(S): at 05:13

## 2017-04-29 RX ADMIN — Medication 2: at 11:28

## 2017-04-29 RX ADMIN — MIDODRINE HYDROCHLORIDE 2.5 MILLIGRAM(S): 2.5 TABLET ORAL at 05:14

## 2017-04-29 RX ADMIN — GABAPENTIN 300 MILLIGRAM(S): 400 CAPSULE ORAL at 13:19

## 2017-04-29 NOTE — PROGRESS NOTE ADULT - ASSESSMENT
with worsening ARF. Pt was previously admitted for similar condition, but had refused hemodialysis until this admission. Pt had permacath placed but developed large neck swelling at the site and was unable to swallow secretions and had difficulty breathing. Pt was emergently intubated on 4/18. CTA of neck did not reveal any vascular injury, and showed severe diffuse bilateral neck edema/infiltrating hematoma in the right supraclavicular fossa extending cephalad bilaterally. Per vascular surgery, unclear cause to neck swelling, and may be an anaphylactic reaction to medication. Swelling improved, and pt was extubated on 4/20. Pt passed speech and swallow and advanced to soft diet. However, 4/21, pt developed afib with RVR and found to have acute hypoxic respiratory failure which improved with urgent dialysis. Noted have fever and had blood cultures.  AFEBRILE   FOR SEVERAL DAYS  OFF ABX   NON TOXIC   WILL FOLLOW UP AS NEEDED   PLEASE  CALL IF QUESTIONS

## 2017-04-29 NOTE — PROGRESS NOTE ADULT - ASSESSMENT
ESRD on HD TTS schedule Next HD today  tolerating ok  CM systolic HF cards following  Anemia 2/2 CKD cont Epo w HD and IV iron  BP electrolytes acceptable

## 2017-04-29 NOTE — PROGRESS NOTE ADULT - SUBJECTIVE AND OBJECTIVE BOX
NEPHROLOGY INTERVAL HPI/OVERNIGHT EVENTS:    Examined earlier  Looks comfortable  HD today    MEDICATIONS  (STANDING):  aspirin 325milliGRAM(s) Oral daily  tamsulosin 0.4milliGRAM(s) Oral at bedtime  gabapentin 300milliGRAM(s) Oral three times a day  atorvastatin 40milliGRAM(s) Oral at bedtime  docusate sodium 100milliGRAM(s) Oral two times a day  dextrose 50% Injectable 12.5Gram(s) IV Push once  dextrose 50% Injectable 25Gram(s) IV Push once  dextrose 50% Injectable 25Gram(s) IV Push once  epoetin una Injectable 83424Ptpc(s) IV Push <User Schedule>  heparin  Injectable 5000Unit(s) SubCutaneous every 12 hours  artificial  tears Solution 1Drop(s) Left EYE two times a day  calcium acetate 667milliGRAM(s) Oral three times a day with meals  buMETAnide 0.5milliGRAM(s) Oral two times a day  carvedilol 3.125milliGRAM(s) Oral every 12 hours  insulin glargine Injectable (LANTUS) 10Unit(s) SubCutaneous at bedtime  insulin lispro (HumaLOG) corrective regimen sliding scale  SubCutaneous Before meals and at bedtime  lisinopril 5milliGRAM(s) Oral daily  midodrine 2.5milliGRAM(s) Oral every 8 hours  iron sucrose Injectable 100milliGRAM(s) IV Push <User Schedule>    MEDICATIONS  (PRN):  dextrose Gel 1Dose(s) Oral once PRN Blood Glucose LESS THAN 70 milliGRAM(s)/deciliter  glucagon  Injectable 1milliGRAM(s) IntraMuscular once PRN Glucose LESS THAN 70 milligrams/deciliter      Allergies    No Known Allergies    Intolerances        Vital Signs Last 24 Hrs  T(C): 36.2, Max: 37.2 (- @ 12:53)  T(F): 97.1, Max: 99 (- @ 12:53)  HR: 99 (85 - 101)  BP: 120/50 (108/48 - 126/59)  BP(mean): --  RR: 16 (16 - 17)  SpO2: 96% (95% - 98%)  Daily     Daily Weight in k (2017 08:00)    PHYSICAL EXAM:  Awake/alert; NAD  chest Clear  No JVD  No murmer  abd soft, NT BS +  No edema        LABS:                        8.2    5.0   )-----------( 72       ( 2017 06:08 )             26.8     04-29    141  |  101  |  54.0<H>  ----------------------------<  57<L>  4.5   |  27.0  |  3.03<H>    Ca    8.7      2017 06:07                  RADIOLOGY & ADDITIONAL TESTS:

## 2017-04-29 NOTE — PROGRESS NOTE ADULT - SUBJECTIVE AND OBJECTIVE BOX
EMILY LITTLEJOHN is a 87y Male with HPI:  with worsening ARF. Pt was previously admitted for similar condition, but had refused hemodialysis until this admission. Pt had permacath placed but developed large neck swelling at the site and was unable to swallow secretions and had difficulty breathing. Pt was emergently intubated on 4/18. CTA of neck did not reveal any vascular injury, and showed severe diffuse bilateral neck edema/infiltrating hematoma in the right supraclavicular fossa extending cephalad bilaterally. Per vascular surgery, unclear cause to neck swelling, and may be an anaphylactic reaction to medication. Swelling improved, and pt was extubated on 4/20. Pt passed speech and swallow and advanced to soft diet. However, 4/21, pt developed afib with RVR and found to have acute hypoxic respiratory failure which improved with urgent dialysis. Noted have fever and had blood cultures.  AFEBRILE    WAS ON ZOSYN NOW OFF SEVERAL DAYS  NO FEVERS     ELEVATED PROCALCITONIN UNCLEAR  PT IN NAD    Allergies:  No Known Allergies      Medications:  aspirin 325milliGRAM(s) Oral daily  tamsulosin 0.4milliGRAM(s) Oral at bedtime  gabapentin 300milliGRAM(s) Oral three times a day  atorvastatin 40milliGRAM(s) Oral at bedtime  docusate sodium 100milliGRAM(s) Oral two times a day  dextrose Gel 1Dose(s) Oral once PRN  dextrose 50% Injectable 12.5Gram(s) IV Push once  dextrose 50% Injectable 25Gram(s) IV Push once  dextrose 50% Injectable 25Gram(s) IV Push once  glucagon  Injectable 1milliGRAM(s) IntraMuscular once PRN  epoetin una Injectable 94823Utyp(s) IV Push <User Schedule>  heparin  Injectable 5000Unit(s) SubCutaneous every 12 hours  artificial  tears Solution 1Drop(s) Left EYE two times a day  calcium acetate 667milliGRAM(s) Oral three times a day with meals  buMETAnide 0.5milliGRAM(s) Oral two times a day  carvedilol 3.125milliGRAM(s) Oral every 12 hours  insulin glargine Injectable (LANTUS) 10Unit(s) SubCutaneous at bedtime  insulin lispro (HumaLOG) corrective regimen sliding scale  SubCutaneous Before meals and at bedtime  midodrine 5milliGRAM(s) Oral every 8 hours      ANTIBIOTICS: NONE        Review of Systems: - Negative except as mentioned above     Physical Exam:  ICU Vital Signs Last 24 Hrs  T(C): 36.8, Max: 37.1 (04-26 @ 02:00)  T(F): 98.3, Max: 98.8 (04-26 @ 02:00)  HR: 80 (80 - 81)  BP: 106/60 (101/52 - 136/65)  BP(mean): --  ABP: --  ABP(mean): --  RR: 18 (18 - 18)  SpO2: 99% (99% - 100%)    GEN: NAD, pleasant  HEENT: normocephalic and atraumatic. EOMI. LUCY...  NECK: Supple. No carotid bruits.  No lymphadenopathy or thyromegaly.  LUNGS: Clear to auscultation.  HEART: Regular rate and rhythm without murmur.  ABDOMEN: Soft, nontender, and nondistended.  Positive bowel sounds.  No hepatosplenomegaly was noted.  NO REBOUND NO GUARDING  EXTREMITIES: Without any cyanosis, clubbing, rash, lesions or edema.  NEUROLOGIC: Cranial nerves II through XII are grossly intact.    SKIN: No ulceration or induration present.      Labs:  04-26    139  |  100  |  57.0<H>  ----------------------------<  92  4.2   |  27.0  |  3.99<H>    Ca    8.5<L>      26 Apr 2017 06:43  Mg     2.0     04-25                            8.4    4.9   )-----------( 56       ( 26 Apr 2017 06:43 )             26.4       PT/INR - ( 25 Apr 2017 07:07 )   PT: 12.8 sec;   INR: 1.16 ratio         PTT - ( 25 Apr 2017 07:07 )  PTT:32.5 sec          CAPILLARY BLOOD GLUCOSE  259 (25 Apr 2017 22:00)  235 (25 Apr 2017 18:19)        RECENT CULTURES:  04-21 .Blood Blood XXXX XXXX   No growth at 48 hours    04-21 .Blood Blood XXXX XXXX   No growth at 48 hours    04-21 .Sputum Sputum Pseudomonas aeruginosa   No White blood cells  No organisms seen   Numerous Pseudomonas aeruginosa  Moderate Routine respiratory eusebio present

## 2017-04-30 DIAGNOSIS — R04.2 HEMOPTYSIS: ICD-10-CM

## 2017-04-30 LAB
ANION GAP SERPL CALC-SCNC: 12 MMOL/L — SIGNIFICANT CHANGE UP (ref 5–17)
BUN SERPL-MCNC: 76 MG/DL — HIGH (ref 8–20)
CALCIUM SERPL-MCNC: 9 MG/DL — SIGNIFICANT CHANGE UP (ref 8.6–10.2)
CHLORIDE SERPL-SCNC: 98 MMOL/L — SIGNIFICANT CHANGE UP (ref 98–107)
CO2 SERPL-SCNC: 26 MMOL/L — SIGNIFICANT CHANGE UP (ref 22–29)
CREAT SERPL-MCNC: 3.36 MG/DL — HIGH (ref 0.5–1.3)
GLUCOSE SERPL-MCNC: 72 MG/DL — SIGNIFICANT CHANGE UP (ref 70–115)
HCT VFR BLD CALC: 28 % — LOW (ref 42–52)
HGB BLD-MCNC: 8.6 G/DL — LOW (ref 14–18)
INR BLD: 1.23 RATIO — HIGH (ref 0.88–1.16)
MCHC RBC-ENTMCNC: 26.6 PG — LOW (ref 27–31)
MCHC RBC-ENTMCNC: 30.7 G/DL — LOW (ref 32–36)
MCV RBC AUTO: 86.7 FL — SIGNIFICANT CHANGE UP (ref 80–94)
PLATELET # BLD AUTO: 76 K/UL — LOW (ref 150–400)
POTASSIUM SERPL-MCNC: 5.1 MMOL/L — SIGNIFICANT CHANGE UP (ref 3.5–5.3)
POTASSIUM SERPL-SCNC: 5.1 MMOL/L — SIGNIFICANT CHANGE UP (ref 3.5–5.3)
PROTHROM AB SERPL-ACNC: 13.6 SEC — HIGH (ref 9.8–12.7)
RBC # BLD: 3.23 M/UL — LOW (ref 4.6–6.2)
RBC # FLD: 17.2 % — HIGH (ref 11–15.6)
SODIUM SERPL-SCNC: 136 MMOL/L — SIGNIFICANT CHANGE UP (ref 135–145)
WBC # BLD: 7.1 K/UL — SIGNIFICANT CHANGE UP (ref 4.8–10.8)
WBC # FLD AUTO: 7.1 K/UL — SIGNIFICANT CHANGE UP (ref 4.8–10.8)

## 2017-04-30 PROCEDURE — 71010: CPT | Mod: 26

## 2017-04-30 PROCEDURE — 99233 SBSQ HOSP IP/OBS HIGH 50: CPT

## 2017-04-30 RX ORDER — IPRATROPIUM/ALBUTEROL SULFATE 18-103MCG
3 AEROSOL WITH ADAPTER (GRAM) INHALATION EVERY 4 HOURS
Qty: 0 | Refills: 0 | Status: DISCONTINUED | OUTPATIENT
Start: 2017-04-30 | End: 2017-05-08

## 2017-04-30 RX ORDER — INSULIN GLARGINE 100 [IU]/ML
14 INJECTION, SOLUTION SUBCUTANEOUS AT BEDTIME
Qty: 0 | Refills: 0 | Status: DISCONTINUED | OUTPATIENT
Start: 2017-04-30 | End: 2017-05-03

## 2017-04-30 RX ADMIN — BUMETANIDE 0.5 MILLIGRAM(S): 0.25 INJECTION INTRAMUSCULAR; INTRAVENOUS at 17:23

## 2017-04-30 RX ADMIN — ATORVASTATIN CALCIUM 40 MILLIGRAM(S): 80 TABLET, FILM COATED ORAL at 22:08

## 2017-04-30 RX ADMIN — Medication 1 DROP(S): at 05:49

## 2017-04-30 RX ADMIN — MIDODRINE HYDROCHLORIDE 2.5 MILLIGRAM(S): 2.5 TABLET ORAL at 13:34

## 2017-04-30 RX ADMIN — Medication 100 MILLIGRAM(S): at 05:47

## 2017-04-30 RX ADMIN — Medication 1 DROP(S): at 17:20

## 2017-04-30 RX ADMIN — GABAPENTIN 300 MILLIGRAM(S): 400 CAPSULE ORAL at 05:48

## 2017-04-30 RX ADMIN — Medication 667 MILLIGRAM(S): at 17:21

## 2017-04-30 RX ADMIN — Medication 100 MILLIGRAM(S): at 17:21

## 2017-04-30 RX ADMIN — GABAPENTIN 300 MILLIGRAM(S): 400 CAPSULE ORAL at 13:33

## 2017-04-30 RX ADMIN — HEPARIN SODIUM 5000 UNIT(S): 5000 INJECTION INTRAVENOUS; SUBCUTANEOUS at 05:49

## 2017-04-30 RX ADMIN — BUMETANIDE 0.5 MILLIGRAM(S): 0.25 INJECTION INTRAMUSCULAR; INTRAVENOUS at 05:48

## 2017-04-30 RX ADMIN — HEPARIN SODIUM 5000 UNIT(S): 5000 INJECTION INTRAVENOUS; SUBCUTANEOUS at 17:21

## 2017-04-30 RX ADMIN — GABAPENTIN 300 MILLIGRAM(S): 400 CAPSULE ORAL at 22:07

## 2017-04-30 RX ADMIN — MIDODRINE HYDROCHLORIDE 2.5 MILLIGRAM(S): 2.5 TABLET ORAL at 05:48

## 2017-04-30 RX ADMIN — MIDODRINE HYDROCHLORIDE 2.5 MILLIGRAM(S): 2.5 TABLET ORAL at 22:07

## 2017-04-30 RX ADMIN — Medication 6: at 17:20

## 2017-04-30 RX ADMIN — Medication 667 MILLIGRAM(S): at 07:58

## 2017-04-30 RX ADMIN — INSULIN GLARGINE 14 UNIT(S): 100 INJECTION, SOLUTION SUBCUTANEOUS at 22:07

## 2017-04-30 RX ADMIN — TAMSULOSIN HYDROCHLORIDE 0.4 MILLIGRAM(S): 0.4 CAPSULE ORAL at 22:07

## 2017-04-30 RX ADMIN — Medication 3 MILLILITER(S): at 16:11

## 2017-04-30 RX ADMIN — CARVEDILOL PHOSPHATE 3.12 MILLIGRAM(S): 80 CAPSULE, EXTENDED RELEASE ORAL at 05:49

## 2017-04-30 RX ADMIN — Medication 3 MILLILITER(S): at 20:47

## 2017-04-30 RX ADMIN — CARVEDILOL PHOSPHATE 3.12 MILLIGRAM(S): 80 CAPSULE, EXTENDED RELEASE ORAL at 17:21

## 2017-04-30 RX ADMIN — Medication 325 MILLIGRAM(S): at 12:18

## 2017-04-30 RX ADMIN — Medication 8: at 22:08

## 2017-04-30 RX ADMIN — Medication 667 MILLIGRAM(S): at 12:18

## 2017-04-30 RX ADMIN — Medication 2: at 07:58

## 2017-04-30 NOTE — PROGRESS NOTE ADULT - PROBLEM SELECTOR PLAN 1
Pulmonary status stable.  No indication of underlying pneumonia.  Off antibiotics per ID. ? etiology.  CT neck unremarkable.  Repeat CXR.  WIll consider pulmonary evaluation.  H/H stable.

## 2017-04-30 NOTE — PROGRESS NOTE ADULT - PROBLEM SELECTOR PLAN 3
Echo 4/6: EF 30-35% with moderate AS (possibly pseudoaortic stensosis)  c/w carvedilol, Bumex.  Cardiology followup appreciated. HD per renal.  Outpatient HD placement, likely will require [facility]ER.

## 2017-04-30 NOTE — CHART NOTE - NSCHARTNOTEFT_GEN_A_CORE
86 yo M with h/o DM, HTN, ischemic cardiomyopathy (EF 30-35%), chronic renal failure presents with worsening ARF. His course was complicated by a right neck hematoma from PermaCath placement which cause swelling of his airway and required him to be intubated. CTA at the time did not reveal any vascular injury. He developed rapid A-fib with RVR which improved with dialysis. He did spike a fever, worked up by ID. RN calls me now because she states he is spitting out blood. The patient was seen and examined at the bedside. He states he has been coughing up blood since the tube was pulled out of his throat on 4/20. He states he does not cough up a lot of blood maybe one to two episodes per day. The blood is bright red. He Denies fevers, chills, nausea, vomiting, black stools, chest pain, SOB. ID note on 4/26 states patient has been coughing up blood. A CT chest was ordered to evaluate which showed PNA in right upper and left lower lobes. From IDs note he was off antibiotics for several days, has been afebrile, and in NAD, so no antibiotics.     Vital Signs Last 24 Hrs  T(C): 36.4, Max: 37.1 (04-29 @ 11:00)  T(F): 97.6, Max: 98.8 (04-29 @ 11:00)  HR: 87 (85 - 99)  BP: 119/66 (108/48 - 141/69)  RR: 18 (16 - 18)  SpO2: 98% (95% - 98%)  GEN: patient lying in bed comfortably sleeping, NAD, no complaints, A+Ox4, follow commands  HEENT: Mouth clear, no bleeding, no lesions, no residual blood, No wounds to tongue.  LUNGS: rales bilateral bases  HEART: S1 and S2    EXAM:  CT CHEST                          PROCEDURE DATE:  04/27/2017    Impression: Bilateral pleural effusions. Airspace disease right upper   lobe and left lower lobe with air bronchograms consistent with pneumonia.    Subsegmental atelectasis right and left lower lobes.    Status post median sternotomy and aortic valve replacement. Pacemaker.    EXAM:  CHEST SINGLE VIEW FRONTAL                          PROCEDURE DATE:  04/25/2017      Impression: Residual infiltrates left lower lobe. Findings compatible   with pneumonia.    Pacemaker. Prior median sternotomy.      A&P:  86 yo M with h/o DM, HTN, ischemic cardiomyopathy (EF 30-35%), chronic renal failure, admitted for ARF presents with hemoptysis which is not new. Patient states it has been going on for at least a week. ID note on 4/26 notes hemoptysis and subsequent CT chest was ordered.    1) Hemoptysis  -DDX: trauma from intubation vs. PNA, vs. Bronchitis, vs. complication with hematoma  -H&H slowly decreasing will order CBC for AM  -Will order CXR to evaluate left lower infiltrate on previous CXR  -Will hold off on starting antibiotics given ID recommendations  -? need for CTA of neck, for evaluation of possible fistula between hematoma and trachea, vs. esophagus, however unlikely given slowly decreasing H&H and infrequency of hemoptysis   -Will include in PA to night hospitalist sign out 86 yo M with h/o DM, HTN, ischemic cardiomyopathy (EF 30-35%), chronic renal failure presents with worsening ARF. His course was complicated by a right neck hematoma from PermaCath placement which cause swelling of his airway and required him to be intubated. CTA at the time did not reveal any vascular injury. He developed rapid A-fib with RVR which improved with dialysis. He did spike a fever, worked up by ID. RN calls me now because she states he is spitting out blood. The patient was seen and examined at the bedside. He states he has been coughing up blood since the tube was pulled out of his throat on 4/20. He states he does not cough up a lot of blood maybe one to two episodes per day. The blood is bright red. He Denies fevers, chills, nausea, vomiting, black stools, chest pain, SOB. ID note on 4/26 states patient has been coughing up blood. A CT chest was ordered to evaluate which showed PNA in right upper and left lower lobes. From IDs note he was off antibiotics for several days, has been afebrile, and in NAD, so no antibiotics.     Patient was also evaluated for hemoptysis on 4/25 with a CXR, PT/INR. At that time PT /INR was normal    Vital Signs Last 24 Hrs  T(C): 36.4, Max: 37.1 (04-29 @ 11:00)  T(F): 97.6, Max: 98.8 (04-29 @ 11:00)  HR: 87 (85 - 99)  BP: 119/66 (108/48 - 141/69)  RR: 18 (16 - 18)  SpO2: 98% (95% - 98%)  GEN: patient lying in bed comfortably sleeping, NAD, no complaints, A+Ox4, follow commands  HEENT: Mouth clear, no bleeding, no lesions, no residual blood, No wounds to tongue.  LUNGS: rales bilateral bases  HEART: S1 and S2    EXAM:  CT CHEST                          PROCEDURE DATE:  04/27/2017    Impression: Bilateral pleural effusions. Airspace disease right upper   lobe and left lower lobe with air bronchograms consistent with pneumonia.    Subsegmental atelectasis right and left lower lobes.    Status post median sternotomy and aortic valve replacement. Pacemaker.    EXAM:  CHEST SINGLE VIEW FRONTAL                          PROCEDURE DATE:  04/25/2017      Impression: Residual infiltrates left lower lobe. Findings compatible   with pneumonia.    Pacemaker. Prior median sternotomy.    CXR 4/30 @ 1:50    Appear left lower lobe infiltrate from previous CXR on 4/25 is still present  -Will wait for official report      A&P:  86 yo M with h/o DM, HTN, ischemic cardiomyopathy (EF 30-35%), chronic renal failure, admitted for ARF presents with hemoptysis which is not new. Patient states it has been going on for at least a week. ID note on 4/26 notes hemoptysis and subsequent CT chest was ordered.    1) Hemoptysis  -DDX: trauma from intubation vs. PNA, vs. Bronchitis, vs. complication with hematoma  -H&H slowly decreasing will order CBC for AM  -Will order CXR to evaluate left lower infiltrate on previous CXR  -Will hold off on starting antibiotics given ID recommendations  -? need for CTA of neck, for evaluation of possible fistula between hematoma and trachea, vs. esophagus, however unlikely given slowly decreasing H&H and infrequency of hemoptysis   -Will include in PA to night hospitalist sign out 86 yo M with h/o DM, HTN, ischemic cardiomyopathy (EF 30-35%), chronic renal failure presents with worsening ARF. His course was complicated by a right neck hematoma from PermaCath placement which cause swelling of his airway and required him to be intubated. CTA at the time did not reveal any vascular injury. He developed rapid A-fib with RVR which improved with dialysis. He did spike a fever, worked up by ID. RN calls me now because she states he is spitting out blood. The patient was seen and examined at the bedside. He states he has been coughing up blood since the tube was pulled out of his throat on 4/20. He states he does not cough up a lot of blood maybe one to two episodes per day. The blood is bright red. He Denies fevers, chills, nausea, vomiting, black stools, chest pain, SOB. ID note on 4/26 states patient has been coughing up blood. A CT chest was ordered to evaluate which showed PNA in right upper and left lower lobes. From IDs note he was off antibiotics for several days, has been afebrile, and in NAD, so no antibiotics.     Patient was also evaluated for hemoptysis on 4/25 with a CXR, PT/INR. At that time PT /INR was normal    Vital Signs Last 24 Hrs  T(C): 36.4, Max: 37.1 (04-29 @ 11:00)  T(F): 97.6, Max: 98.8 (04-29 @ 11:00)  HR: 87 (85 - 99)  BP: 119/66 (108/48 - 141/69)  RR: 18 (16 - 18)  SpO2: 98% (95% - 98%)  GEN: patient lying in bed comfortably sleeping, NAD, no complaints, A+Ox4, follow commands  HEENT: Mouth clear, no bleeding, no lesions, no residual blood, No wounds to tongue.  LUNGS: rales bilateral bases  HEART: S1 and S2    EXAM:  CT CHEST                          PROCEDURE DATE:  04/27/2017    Impression: Bilateral pleural effusions. Airspace disease right upper   lobe and left lower lobe with air bronchograms consistent with pneumonia.    Subsegmental atelectasis right and left lower lobes.    Status post median sternotomy and aortic valve replacement. Pacemaker.    EXAM:  CHEST SINGLE VIEW FRONTAL                          PROCEDURE DATE:  04/25/2017      Impression: Residual infiltrates left lower lobe. Findings compatible   with pneumonia.    Pacemaker. Prior median sternotomy.    CXR 4/30 @ 1:50    Appear left lower lobe infiltrate from previous CXR on 4/25 is still present  -Will wait for official report      A&P:  86 yo M with h/o DM, HTN, ischemic cardiomyopathy (EF 30-35%), chronic renal failure, admitted for ARF presents with hemoptysis which is not new. Patient states it has been going on for at least a week. ID note on 4/26 notes hemoptysis and subsequent CT chest was ordered.    1) Hemoptysis  -DDX: trauma from intubation vs. PNA, vs. Bronchitis, vs. complication with hematoma  -H&H slowly decreasing will order CBC and PT/INR for AM  -Will order CXR to evaluate left lower infiltrate on previous CXR  -Will hold off on starting antibiotics given ID recommendations  -? need for CTA of neck, for evaluation of possible fistula between hematoma and trachea, vs. esophagus, however unlikely given slowly decreasing H&H and infrequency of hemoptysis   -Will include in PA to night hospitalist sign out

## 2017-04-30 NOTE — PROGRESS NOTE ADULT - PROBLEM SELECTOR PLAN 4
FS elevated.   Escalated Lantus, continue Insulin coverage. Echo 4/6: EF 30-35% with moderate AS (possibly pseudoaortic stensosis)  c/w carvedilol, Bumex.  Cardiology followup appreciated.

## 2017-04-30 NOTE — PROGRESS NOTE ADULT - SUBJECTIVE AND OBJECTIVE BOX
Patient: EMILY LITTLEJOHN [id number] 87y Male  Internal Medicine Hospitalist Progress Note - Dr. Live Schmitz    Chief Complaint: Patient is a 87y old  Male who presents with a chief complaint of pt got intubated in the ED after HD (2017 04:25)    Hospital course:  86 yo M with h/o DM, HTN, ischemic cardiomyopathy (EF 30-35%), chronic renal failure presents with worsening ARF. Pt was previously admitted for similar condition, but had refused hemodialysis until this admission. Pt had permacath placed but developed large neck swelling at the site and was unable to swallow secretions and had difficulty breathing. Pt was emergently intubated on . CTA of neck did not reveal any vascular injury, and showed severe diffuse bilateral neck edema/infiltrating hematoma in the right supraclavicular fossa extending cephalad bilaterally. Per vascular surgery, unclear cause to neck swelling, and may be an anaphylactic reaction to medication. Swelling improved, and pt was extubated on . Pt passed speech and swallow and advanced to soft diet. However, , pt developed afib with RVR and found to have acute hypoxic respiratory failure which improved with urgent dialysis. Noted have fever and elevated procalcitonin, attributed to pneumonia.  Seen by ID, antibiotics stopped.      No complaints.  NO SOB.  No fever / chills.  No chest pain.  Tolerating meals.      ____________________PHYSICAL EXAM:  Vitals reviewed as indicated below  GENERAL:  NAD Alert and Oriented x 3   HEENT: NCAT.  Visually impaired.   CARDIOVASCULAR:  S1, S2  LUNGS: CTAB  ABDOMEN:  soft, (-) tenderness, (-) distension, (+) bowel sounds, (-) guarding, (-) rebound (-) rigidity  EXTREMITIES:  no cyanosis / clubbing / edema.   ____________________      MEDICATIONS:  aspirin 325milliGRAM(s) Oral daily  tamsulosin 0.4milliGRAM(s) Oral at bedtime  gabapentin 300milliGRAM(s) Oral three times a day  atorvastatin 40milliGRAM(s) Oral at bedtime  docusate sodium 100milliGRAM(s) Oral two times a day  dextrose Gel 1Dose(s) Oral once PRN  dextrose 50% Injectable 12.5Gram(s) IV Push once  dextrose 50% Injectable 25Gram(s) IV Push once  dextrose 50% Injectable 25Gram(s) IV Push once  glucagon  Injectable 1milliGRAM(s) IntraMuscular once PRN  epoetin una Injectable 12543Kans(s) IV Push <User Schedule>  heparin  Injectable 5000Unit(s) SubCutaneous every 12 hours  artificial  tears Solution 1Drop(s) Left EYE two times a day  calcium acetate 667milliGRAM(s) Oral three times a day with meals  buMETAnide 0.5milliGRAM(s) Oral two times a day  carvedilol 3.125milliGRAM(s) Oral every 12 hours  insulin glargine Injectable (LANTUS) 10Unit(s) SubCutaneous at bedtime  insulin lispro (HumaLOG) corrective regimen sliding scale  SubCutaneous Before meals and at bedtime  lisinopril 5milliGRAM(s) Oral daily  midodrine 2.5milliGRAM(s) Oral every 8 hours  iron sucrose Injectable 100milliGRAM(s) IV Push <User Schedule>      VITALS:  Vital Signs Last 24 Hrs  T(C): 37, Max: 37.1 ( @ 11:00)  T(F): 98.6, Max: 98.8 ( @ 11:00)  HR: 92 (86 - 97)  BP: 129/67 (100/58 - 141/69)  BP(mean): --  RR: 17 (16 - 18)  SpO2: 98% (95% - 98%) Daily     Daily Weight in k.8 (2017 06:43)  CAPILLARY BLOOD GLUCOSE  157 (2017 08:00)  226 (2017 20:58)  240 (2017 16:59)  187 (2017 11:27)    I&O's Summary    I & Os for current day (as of 2017 09:19)  =============================================  IN: 480 ml / OUT: 0 ml / NET: 480 ml      LABS:                        8.6    7.1   )-----------( 76       ( 2017 06:47 )             28.0     04-30    136  |  98  |  76.0<H>  ----------------------------<  72  5.1   |  26.0  |  3.36<H>    Ca    9.0      2017 06:47      PT/INR - ( 2017 06:47 )   PT: 13.6 sec;   INR: 1.23 ratio           RECENT CULTURES: Patient: EMILY LITTLEJOHN [id number] 87y Male  Internal Medicine Hospitalist Progress Note - Dr. Live Schmitz    Chief Complaint: Patient is a 87y old  Male who presents with a chief complaint of pt got intubated in the ED after HD (2017 04:25)    Hospital course:  86 yo M with h/o DM, HTN, ischemic cardiomyopathy (EF 30-35%), chronic renal failure presents with worsening ARF. Pt was previously admitted for similar condition, but had refused hemodialysis until this admission. Pt had permacath placed but developed large neck swelling at the site and was unable to swallow secretions and had difficulty breathing. Pt was emergently intubated on . CTA of neck did not reveal any vascular injury, and showed severe diffuse bilateral neck edema/infiltrating hematoma in the right supraclavicular fossa extending cephalad bilaterally. Per vascular surgery, unclear cause to neck swelling, and may be an anaphylactic reaction to medication. Swelling improved, and pt was extubated on . Pt passed speech and swallow and advanced to soft diet. However, , pt developed afib with RVR and found to have acute hypoxic respiratory failure which improved with urgent dialysis. Noted have fever and elevated procalcitonin, attributed to pneumonia.  Seen by ID, antibiotics stopped.      Episodes of hemoptysis noted.  Per RN, pt with intermittent cough with bloody sputum.  He denies SOB.  NO SOB.  No fever / chills.  No chest pain.  Tolerating meals.      ____________________PHYSICAL EXAM:  Vitals reviewed as indicated below  GENERAL:  NAD Alert and Oriented x 3   HEENT: NCAT.  Visually impaired.   CARDIOVASCULAR:  S1, S2  LUNGS: coarse BS b/l  ABDOMEN:  soft, (-) tenderness, (-) distension, (+) bowel sounds, (-) guarding, (-) rebound (-) rigidity  EXTREMITIES:  no cyanosis / clubbing / edema.   ____________________      MEDICATIONS:  aspirin 325milliGRAM(s) Oral daily  tamsulosin 0.4milliGRAM(s) Oral at bedtime  gabapentin 300milliGRAM(s) Oral three times a day  atorvastatin 40milliGRAM(s) Oral at bedtime  docusate sodium 100milliGRAM(s) Oral two times a day  dextrose Gel 1Dose(s) Oral once PRN  dextrose 50% Injectable 12.5Gram(s) IV Push once  dextrose 50% Injectable 25Gram(s) IV Push once  dextrose 50% Injectable 25Gram(s) IV Push once  glucagon  Injectable 1milliGRAM(s) IntraMuscular once PRN  epoetin una Injectable 28091Ozkm(s) IV Push <User Schedule>  heparin  Injectable 5000Unit(s) SubCutaneous every 12 hours  artificial  tears Solution 1Drop(s) Left EYE two times a day  calcium acetate 667milliGRAM(s) Oral three times a day with meals  buMETAnide 0.5milliGRAM(s) Oral two times a day  carvedilol 3.125milliGRAM(s) Oral every 12 hours  insulin glargine Injectable (LANTUS) 10Unit(s) SubCutaneous at bedtime  insulin lispro (HumaLOG) corrective regimen sliding scale  SubCutaneous Before meals and at bedtime  lisinopril 5milliGRAM(s) Oral daily  midodrine 2.5milliGRAM(s) Oral every 8 hours  iron sucrose Injectable 100milliGRAM(s) IV Push <User Schedule>      VITALS:  Vital Signs Last 24 Hrs  T(C): 37, Max: 37.1 ( @ 11:00)  T(F): 98.6, Max: 98.8 ( @ 11:00)  HR: 92 (86 - 97)  BP: 129/67 (100/58 - 141/69)  BP(mean): --  RR: 17 (16 - 18)  SpO2: 98% (95% - 98%) Daily     Daily Weight in k.8 (2017 06:43)  CAPILLARY BLOOD GLUCOSE  157 (2017 08:00)  226 (2017 20:58)  240 (2017 16:59)  187 (2017 11:27)    I&O's Summary    I & Os for current day (as of 2017 09:19)  =============================================  IN: 480 ml / OUT: 0 ml / NET: 480 ml      LABS:                        8.6    7.1   )-----------( 76       ( 2017 06:47 )             28.0     04-30    136  |  98  |  76.0<H>  ----------------------------<  72  5.1   |  26.0  |  3.36<H>    Ca    9.0      2017 06:47      PT/INR - ( 2017 06:47 )   PT: 13.6 sec;   INR: 1.23 ratio           RECENT CULTURES:

## 2017-04-30 NOTE — PROGRESS NOTE ADULT - PROBLEM SELECTOR PLAN 2
HD per renal.  Outpatient HD placement, likely will require NARINDER. Pulmonary status stable.  No indication of underlying pneumonia.  Off antibiotics per ID.

## 2017-05-01 PROCEDURE — 99233 SBSQ HOSP IP/OBS HIGH 50: CPT

## 2017-05-01 RX ORDER — CARVEDILOL PHOSPHATE 80 MG/1
3.12 CAPSULE, EXTENDED RELEASE ORAL EVERY 12 HOURS
Qty: 0 | Refills: 0 | Status: DISCONTINUED | OUTPATIENT
Start: 2017-05-01 | End: 2017-05-05

## 2017-05-01 RX ORDER — SODIUM CHLORIDE 9 MG/ML
500 INJECTION INTRAMUSCULAR; INTRAVENOUS; SUBCUTANEOUS ONCE
Qty: 0 | Refills: 0 | Status: COMPLETED | OUTPATIENT
Start: 2017-05-01 | End: 2017-05-01

## 2017-05-01 RX ADMIN — Medication 1 DROP(S): at 06:09

## 2017-05-01 RX ADMIN — HEPARIN SODIUM 5000 UNIT(S): 5000 INJECTION INTRAVENOUS; SUBCUTANEOUS at 17:25

## 2017-05-01 RX ADMIN — Medication 325 MILLIGRAM(S): at 13:20

## 2017-05-01 RX ADMIN — Medication 100 MILLIGRAM(S): at 06:09

## 2017-05-01 RX ADMIN — Medication 667 MILLIGRAM(S): at 17:25

## 2017-05-01 RX ADMIN — Medication 2: at 13:21

## 2017-05-01 RX ADMIN — Medication 1 DROP(S): at 17:25

## 2017-05-01 RX ADMIN — ERYTHROPOIETIN 10000 UNIT(S): 10000 INJECTION, SOLUTION INTRAVENOUS; SUBCUTANEOUS at 12:56

## 2017-05-01 RX ADMIN — GABAPENTIN 300 MILLIGRAM(S): 400 CAPSULE ORAL at 22:54

## 2017-05-01 RX ADMIN — TAMSULOSIN HYDROCHLORIDE 0.4 MILLIGRAM(S): 0.4 CAPSULE ORAL at 22:54

## 2017-05-01 RX ADMIN — GABAPENTIN 300 MILLIGRAM(S): 400 CAPSULE ORAL at 06:09

## 2017-05-01 RX ADMIN — Medication 3 MILLILITER(S): at 00:22

## 2017-05-01 RX ADMIN — Medication 3 MILLILITER(S): at 08:45

## 2017-05-01 RX ADMIN — MIDODRINE HYDROCHLORIDE 2.5 MILLIGRAM(S): 2.5 TABLET ORAL at 22:54

## 2017-05-01 RX ADMIN — HEPARIN SODIUM 5000 UNIT(S): 5000 INJECTION INTRAVENOUS; SUBCUTANEOUS at 06:09

## 2017-05-01 RX ADMIN — Medication 667 MILLIGRAM(S): at 09:13

## 2017-05-01 RX ADMIN — IRON SUCROSE 100 MILLIGRAM(S): 20 INJECTION, SOLUTION INTRAVENOUS at 16:01

## 2017-05-01 RX ADMIN — Medication 667 MILLIGRAM(S): at 13:21

## 2017-05-01 RX ADMIN — BUMETANIDE 0.5 MILLIGRAM(S): 0.25 INJECTION INTRAMUSCULAR; INTRAVENOUS at 06:08

## 2017-05-01 RX ADMIN — MIDODRINE HYDROCHLORIDE 2.5 MILLIGRAM(S): 2.5 TABLET ORAL at 13:24

## 2017-05-01 RX ADMIN — BUMETANIDE 0.5 MILLIGRAM(S): 0.25 INJECTION INTRAMUSCULAR; INTRAVENOUS at 17:25

## 2017-05-01 RX ADMIN — MIDODRINE HYDROCHLORIDE 2.5 MILLIGRAM(S): 2.5 TABLET ORAL at 06:17

## 2017-05-01 RX ADMIN — GABAPENTIN 300 MILLIGRAM(S): 400 CAPSULE ORAL at 13:21

## 2017-05-01 RX ADMIN — Medication 6: at 22:53

## 2017-05-01 RX ADMIN — Medication 100 MILLIGRAM(S): at 17:25

## 2017-05-01 RX ADMIN — SODIUM CHLORIDE 250 MILLILITER(S): 9 INJECTION INTRAMUSCULAR; INTRAVENOUS; SUBCUTANEOUS at 08:46

## 2017-05-01 RX ADMIN — ATORVASTATIN CALCIUM 40 MILLIGRAM(S): 80 TABLET, FILM COATED ORAL at 22:54

## 2017-05-01 RX ADMIN — INSULIN GLARGINE 14 UNIT(S): 100 INJECTION, SOLUTION SUBCUTANEOUS at 22:53

## 2017-05-01 NOTE — PROGRESS NOTE ADULT - SUBJECTIVE AND OBJECTIVE BOX
NEPHROLOGY INTERVAL HPI/OVERNIGHT EVENTS:    Examined earlier  Looks comfortable  HD today    MEDICATIONS  (STANDING):  aspirin 325milliGRAM(s) Oral daily  tamsulosin 0.4milliGRAM(s) Oral at bedtime  gabapentin 300milliGRAM(s) Oral three times a day  atorvastatin 40milliGRAM(s) Oral at bedtime  docusate sodium 100milliGRAM(s) Oral two times a day  dextrose 50% Injectable 12.5Gram(s) IV Push once  dextrose 50% Injectable 25Gram(s) IV Push once  dextrose 50% Injectable 25Gram(s) IV Push once  epoetin una Injectable 51374Xbit(s) IV Push <User Schedule>  heparin  Injectable 5000Unit(s) SubCutaneous every 12 hours  artificial  tears Solution 1Drop(s) Left EYE two times a day  calcium acetate 667milliGRAM(s) Oral three times a day with meals  buMETAnide 0.5milliGRAM(s) Oral two times a day  insulin lispro (HumaLOG) corrective regimen sliding scale  SubCutaneous Before meals and at bedtime  midodrine 2.5milliGRAM(s) Oral every 8 hours  iron sucrose Injectable 100milliGRAM(s) IV Push <User Schedule>  insulin glargine Injectable (LANTUS) 14Unit(s) SubCutaneous at bedtime  carvedilol 3.125milliGRAM(s) Oral every 12 hours    MEDICATIONS  (PRN):  dextrose Gel 1Dose(s) Oral once PRN Blood Glucose LESS THAN 70 milliGRAM(s)/deciliter  glucagon  Injectable 1milliGRAM(s) IntraMuscular once PRN Glucose LESS THAN 70 milligrams/deciliter  ALBUTerol/ipratropium for Nebulization 3milliLiter(s) Nebulizer every 4 hours PRN SOB      Allergies    No Known Allergies    Intolerances        Vital Signs Last 24 Hrs  T(C): 37, Max: 37.9 (05-01 @ 04:00)  T(F): 98.6, Max: 100.3 (05-01 @ 04:00)  HR: 98 (61 - 106)  BP: 95/46 (89/49 - 109/61)  BP(mean): --  RR: 18 (18 - 20)  SpO2: 99% (98% - 100%)  Daily     Daily     PHYSICAL EXAM:  Awake/alert; NAD  chest Clear  No JVD  No murmer  abd soft, NT BS +  No edema        LABS:                        8.1    9.1   )-----------( 71       ( 01 May 2017 06:31 )             26.0     05-01    137  |  99  |  101.0<H>  ----------------------------<  130<H>  5.7<H>   |  23.0  |  4.31<H>    Ca    8.9      01 May 2017 06:31      PT/INR - ( 30 Apr 2017 06:47 )   PT: 13.6 sec;   INR: 1.23 ratio                     RADIOLOGY & ADDITIONAL TESTS:

## 2017-05-01 NOTE — PROGRESS NOTE ADULT - SUBJECTIVE AND OBJECTIVE BOX
Patient: EMILY LITTLEJOHN [id number] 87y Male  Internal Medicine Hospitalist Progress Note - Dr. Live Schmitz    Chief Complaint: Patient is a 87y old  Male who presents with a chief complaint of pt got intubated in the ED after HD (19 Apr 2017 04:25)    Hospital course:  88 yo M with h/o DM, HTN, ischemic cardiomyopathy (EF 30-35%), chronic renal failure presents with worsening ARF. Pt was previously admitted for similar condition, but had refused hemodialysis until this admission. Pt had permacath placed but developed large neck swelling at the site and was unable to swallow secretions and had difficulty breathing. Pt was emergently intubated on 4/18. CTA of neck did not reveal any vascular injury, and showed severe diffuse bilateral neck edema/infiltrating hematoma in the right supraclavicular fossa extending cephalad bilaterally. Per vascular surgery, unclear cause to neck swelling, and may be an anaphylactic reaction to medication. Swelling improved, and pt was extubated on 4/20. Pt passed speech and swallow and advanced to soft diet. However, 4/21, pt developed afib with RVR and found to have acute hypoxic respiratory failure which improved with urgent dialysis. Noted have fever and elevated procalcitonin, attributed to pneumonia.  Seen by ID, antibiotics stopped.      Mild cough, no hemoptysis.  Denies SOB.  No fever / chills.      ____________________PHYSICAL EXAM:  Vitals reviewed as indicated below  GENERAL:  NAD Alert and Oriented x 3   HEENT: NCAT.  Visually impaired.   CARDIOVASCULAR:  S1, S2  LUNGS: coarse BS b/l  ABDOMEN:  soft, (-) tenderness, (-) distension, (+) bowel sounds, (-) guarding, (-) rebound (-) rigidity  EXTREMITIES:  no cyanosis / clubbing / edema.   ____________________        MEDICATIONS:  aspirin 325milliGRAM(s) Oral daily  tamsulosin 0.4milliGRAM(s) Oral at bedtime  gabapentin 300milliGRAM(s) Oral three times a day  atorvastatin 40milliGRAM(s) Oral at bedtime  docusate sodium 100milliGRAM(s) Oral two times a day  dextrose Gel 1Dose(s) Oral once PRN  dextrose 50% Injectable 12.5Gram(s) IV Push once  dextrose 50% Injectable 25Gram(s) IV Push once  dextrose 50% Injectable 25Gram(s) IV Push once  glucagon  Injectable 1milliGRAM(s) IntraMuscular once PRN  epoetin una Injectable [id number](s) IV Push <User Schedule>  heparin  Injectable 5000Unit(s) SubCutaneous every 12 hours  artificial  tears Solution 1Drop(s) Left EYE two times a day  calcium acetate 667milliGRAM(s) Oral three times a day with meals  buMETAnide 0.5milliGRAM(s) Oral two times a day  insulin lispro (HumaLOG) corrective regimen sliding scale  SubCutaneous Before meals and at bedtime  midodrine 2.5milliGRAM(s) Oral every 8 hours  iron sucrose Injectable 100milliGRAM(s) IV Push <User Schedule>  insulin glargine Injectable (LANTUS) 14Unit(s) SubCutaneous at bedtime  ALBUTerol/ipratropium for Nebulization 3milliLiter(s) Nebulizer every 4 hours PRN  carvedilol 3.125milliGRAM(s) Oral every 12 hours      VITALS:  Vital Signs Last 24 Hrs  T(C): 37, Max: 37.9 (05-01 @ 04:00)  T(F): 98.6, Max: 100.3 (05-01 @ 04:00)  HR: 98 (61 - 106)  BP: 95/46 (89/49 - 126/62)  BP(mean): --  RR: 18 (18 - 20)  SpO2: 99% (95% - 100%) Daily     Daily   CAPILLARY BLOOD GLUCOSE  177 (01 May 2017 12:08)  118 (01 May 2017 08:13)  310 (30 Apr 2017 22:00)  277 (30 Apr 2017 16:49)    I&O's Summary      LABS:                        8.1    9.1   )-----------( 71       ( 01 May 2017 06:31 )             26.0     05-01    137  |  99  |  101.0<H>  ----------------------------<  130<H>  5.7<H>   |  23.0  |  4.31<H>    Ca    8.9      01 May 2017 06:31      PT/INR - ( 30 Apr 2017 06:47 )   PT: 13.6 sec;   INR: 1.23 ratio           RECENT CULTURES:

## 2017-05-01 NOTE — PROGRESS NOTE ADULT - ASSESSMENT
ESRD on HD MWF schedule HD today  tolerating ok  HyperKalemia HD today on a 2 K bath  CM systolic HF cards following  Anemia 2/2 CKD cont Epo w HD and IV iron  BP electrolytes acceptable

## 2017-05-01 NOTE — PROGRESS NOTE ADULT - PROBLEM SELECTOR PLAN 1
S/p extubation.  Despite low grade Temp 100.3, no clear indication of underlying infection at present.  Observe off antibiotics.  Pt otherwise asymptomatic.

## 2017-05-01 NOTE — PROGRESS NOTE ADULT - PROBLEM SELECTOR PLAN 4
Echo 4/6: EF 30-35% with moderate AS (possibly pseudoaortic stensosis)  c/w carvedilol, Bumex.  Cardiology followup appreciated.

## 2017-05-02 LAB
ANION GAP SERPL CALC-SCNC: 13 MMOL/L — SIGNIFICANT CHANGE UP (ref 5–17)
BUN SERPL-MCNC: 59 MG/DL — HIGH (ref 8–20)
CALCIUM SERPL-MCNC: 8.5 MG/DL — LOW (ref 8.6–10.2)
CHLORIDE SERPL-SCNC: 97 MMOL/L — LOW (ref 98–107)
CO2 SERPL-SCNC: 28 MMOL/L — SIGNIFICANT CHANGE UP (ref 22–29)
CREAT SERPL-MCNC: 3.15 MG/DL — HIGH (ref 0.5–1.3)
GLUCOSE SERPL-MCNC: 90 MG/DL — SIGNIFICANT CHANGE UP (ref 70–115)
HCT VFR BLD CALC: 24.1 % — LOW (ref 42–52)
HGB BLD-MCNC: 7.1 G/DL — LOW (ref 14–18)
MCHC RBC-ENTMCNC: 26.1 PG — LOW (ref 27–31)
MCHC RBC-ENTMCNC: 29.5 G/DL — LOW (ref 32–36)
MCV RBC AUTO: 88.6 FL — SIGNIFICANT CHANGE UP (ref 80–94)
PLATELET # BLD AUTO: 69 K/UL — LOW (ref 150–400)
POTASSIUM SERPL-MCNC: 4.6 MMOL/L — SIGNIFICANT CHANGE UP (ref 3.5–5.3)
POTASSIUM SERPL-SCNC: 4.6 MMOL/L — SIGNIFICANT CHANGE UP (ref 3.5–5.3)
RBC # BLD: 2.72 M/UL — LOW (ref 4.6–6.2)
RBC # FLD: 17.5 % — HIGH (ref 11–15.6)
SODIUM SERPL-SCNC: 138 MMOL/L — SIGNIFICANT CHANGE UP (ref 135–145)
WBC # BLD: 6.8 K/UL — SIGNIFICANT CHANGE UP (ref 4.8–10.8)
WBC # FLD AUTO: 6.8 K/UL — SIGNIFICANT CHANGE UP (ref 4.8–10.8)

## 2017-05-02 PROCEDURE — 99233 SBSQ HOSP IP/OBS HIGH 50: CPT

## 2017-05-02 RX ADMIN — Medication 667 MILLIGRAM(S): at 13:34

## 2017-05-02 RX ADMIN — INSULIN GLARGINE 14 UNIT(S): 100 INJECTION, SOLUTION SUBCUTANEOUS at 22:45

## 2017-05-02 RX ADMIN — BUMETANIDE 0.5 MILLIGRAM(S): 0.25 INJECTION INTRAMUSCULAR; INTRAVENOUS at 17:49

## 2017-05-02 RX ADMIN — TAMSULOSIN HYDROCHLORIDE 0.4 MILLIGRAM(S): 0.4 CAPSULE ORAL at 22:45

## 2017-05-02 RX ADMIN — Medication 100 MILLIGRAM(S): at 06:23

## 2017-05-02 RX ADMIN — GABAPENTIN 300 MILLIGRAM(S): 400 CAPSULE ORAL at 22:45

## 2017-05-02 RX ADMIN — ATORVASTATIN CALCIUM 40 MILLIGRAM(S): 80 TABLET, FILM COATED ORAL at 22:45

## 2017-05-02 RX ADMIN — Medication 667 MILLIGRAM(S): at 17:47

## 2017-05-02 RX ADMIN — HEPARIN SODIUM 5000 UNIT(S): 5000 INJECTION INTRAVENOUS; SUBCUTANEOUS at 17:50

## 2017-05-02 RX ADMIN — Medication 2: at 22:50

## 2017-05-02 RX ADMIN — MIDODRINE HYDROCHLORIDE 2.5 MILLIGRAM(S): 2.5 TABLET ORAL at 06:23

## 2017-05-02 RX ADMIN — MIDODRINE HYDROCHLORIDE 2.5 MILLIGRAM(S): 2.5 TABLET ORAL at 13:34

## 2017-05-02 RX ADMIN — Medication 667 MILLIGRAM(S): at 09:46

## 2017-05-02 RX ADMIN — HEPARIN SODIUM 5000 UNIT(S): 5000 INJECTION INTRAVENOUS; SUBCUTANEOUS at 06:23

## 2017-05-02 RX ADMIN — GABAPENTIN 300 MILLIGRAM(S): 400 CAPSULE ORAL at 13:35

## 2017-05-02 RX ADMIN — Medication 1 DROP(S): at 06:23

## 2017-05-02 RX ADMIN — MIDODRINE HYDROCHLORIDE 2.5 MILLIGRAM(S): 2.5 TABLET ORAL at 22:45

## 2017-05-02 RX ADMIN — Medication 4: at 17:47

## 2017-05-02 RX ADMIN — GABAPENTIN 300 MILLIGRAM(S): 400 CAPSULE ORAL at 06:23

## 2017-05-02 RX ADMIN — Medication 3 MILLILITER(S): at 23:00

## 2017-05-02 RX ADMIN — Medication 1 DROP(S): at 17:47

## 2017-05-02 RX ADMIN — Medication 325 MILLIGRAM(S): at 13:34

## 2017-05-02 RX ADMIN — CARVEDILOL PHOSPHATE 3.12 MILLIGRAM(S): 80 CAPSULE, EXTENDED RELEASE ORAL at 17:49

## 2017-05-02 RX ADMIN — Medication 100 MILLIGRAM(S): at 17:49

## 2017-05-02 NOTE — PROGRESS NOTE ADULT - SUBJECTIVE AND OBJECTIVE BOX
Patient: EMILY LITTLEJOHN [id number] 87y Male  Internal Medicine Hospitalist Progress Note - Dr. iLve Schmitz    Chief Complaint: Patient is a 87y old  Male who presents with a chief complaint of pt got intubated in the ED after HD (19 Apr 2017 04:25)    Hospital course:  86 yo M with h/o DM, HTN, ischemic cardiomyopathy (EF 30-35%), chronic renal failure presents with worsening ARF. Pt was previously admitted for similar condition, but had refused hemodialysis until this admission. Pt had permacath placed but developed large neck swelling at the site and was unable to swallow secretions and had difficulty breathing. Pt was emergently intubated on 4/18. CTA of neck did not reveal any vascular injury, and showed severe diffuse bilateral neck edema/infiltrating hematoma in the right supraclavicular fossa extending cephalad bilaterally. Per vascular surgery, unclear cause to neck swelling, and may be an anaphylactic reaction to medication. Swelling improved, and pt was extubated on 4/20. Pt passed speech and swallow and advanced to soft diet. However, 4/21, pt developed afib with RVR and found to have acute hypoxic respiratory failure which improved with urgent dialysis. Noted have fever and elevated procalcitonin, attributed to pneumonia.  Seen by ID, antibiotics stopped.      Mild cough, no SOB.  no hemoptysis.  No fever / chills.      ____________________PHYSICAL EXAM:  Vitals reviewed as indicated below  GENERAL:  NAD Alert and Oriented x 3   HEENT: NCAT.  Visually impaired.   CARDIOVASCULAR:  S1, S2  LUNGS: coarse BS b/l  ABDOMEN:  soft, (-) tenderness, (-) distension, (+) bowel sounds, (-) guarding, (-) rebound (-) rigidity  EXTREMITIES:  no cyanosis / clubbing / edema.   ____________________      MEDICATIONS:  aspirin 325milliGRAM(s) Oral daily  tamsulosin 0.4milliGRAM(s) Oral at bedtime  gabapentin 300milliGRAM(s) Oral three times a day  atorvastatin 40milliGRAM(s) Oral at bedtime  docusate sodium 100milliGRAM(s) Oral two times a day  dextrose Gel 1Dose(s) Oral once PRN  dextrose 50% Injectable 12.5Gram(s) IV Push once  dextrose 50% Injectable 25Gram(s) IV Push once  dextrose 50% Injectable 25Gram(s) IV Push once  glucagon  Injectable 1milliGRAM(s) IntraMuscular once PRN  epoetin una Injectable 39595Mejc(s) IV Push <User Schedule>  heparin  Injectable 5000Unit(s) SubCutaneous every 12 hours  artificial  tears Solution 1Drop(s) Left EYE two times a day  calcium acetate 667milliGRAM(s) Oral three times a day with meals  buMETAnide 0.5milliGRAM(s) Oral two times a day  insulin lispro (HumaLOG) corrective regimen sliding scale  SubCutaneous Before meals and at bedtime  midodrine 2.5milliGRAM(s) Oral every 8 hours  iron sucrose Injectable 100milliGRAM(s) IV Push <User Schedule>  insulin glargine Injectable (LANTUS) 14Unit(s) SubCutaneous at bedtime  ALBUTerol/ipratropium for Nebulization 3milliLiter(s) Nebulizer every 4 hours PRN  carvedilol 3.125milliGRAM(s) Oral every 12 hours      VITALS:  Vital Signs Last 24 Hrs  T(C): 36.8, Max: 36.8 (05-01 @ 21:17)  T(F): 98.2, Max: 98.2 (05-01 @ 21:17)  HR: 97 (55 - 97)  BP: 94/53 (92/57 - 110/56)  BP(mean): --  RR: 18 (18 - 18)  SpO2: 99% (99% - 99%) Daily     Daily   CAPILLARY BLOOD GLUCOSE  85 (02 May 2017 08:37)  268 (01 May 2017 21:17)  177 (01 May 2017 12:08)    I&O's Summary    I & Os for current day (as of 02 May 2017 11:46)  =============================================  IN: 360 ml / OUT: 1400 ml / NET: -1040 ml      LABS:                        7.1    6.8   )-----------( 69       ( 02 May 2017 07:33 )             24.1     05-02    138  |  97<L>  |  59.0<H>  ----------------------------<  90  4.6   |  28.0  |  3.15<H>    Ca    8.5<L>      02 May 2017 07:33        RECENT CULTURES:

## 2017-05-02 NOTE — PROGRESS NOTE ADULT - ASSESSMENT
ESRD on HD MWF schedule HD for tristin  Has been tolerating ok  HyperKalemia HD on a 2 K bath  CM systolic HF cards following  Anemia 2/2 CKD cont Epo w HD and IV iron  BP electrolytes acceptable

## 2017-05-02 NOTE — PROGRESS NOTE ADULT - SUBJECTIVE AND OBJECTIVE BOX
NEPHROLOGY INTERVAL HPI/OVERNIGHT EVENTS:    Examined earlier  Looks comfortable    MEDICATIONS  (STANDING):  aspirin 325milliGRAM(s) Oral daily  tamsulosin 0.4milliGRAM(s) Oral at bedtime  gabapentin 300milliGRAM(s) Oral three times a day  atorvastatin 40milliGRAM(s) Oral at bedtime  docusate sodium 100milliGRAM(s) Oral two times a day  dextrose 50% Injectable 12.5Gram(s) IV Push once  dextrose 50% Injectable 25Gram(s) IV Push once  dextrose 50% Injectable 25Gram(s) IV Push once  epoetin una Injectable 70266Dxgb(s) IV Push <User Schedule>  heparin  Injectable 5000Unit(s) SubCutaneous every 12 hours  artificial  tears Solution 1Drop(s) Left EYE two times a day  calcium acetate 667milliGRAM(s) Oral three times a day with meals  buMETAnide 0.5milliGRAM(s) Oral two times a day  insulin lispro (HumaLOG) corrective regimen sliding scale  SubCutaneous Before meals and at bedtime  midodrine 2.5milliGRAM(s) Oral every 8 hours  iron sucrose Injectable 100milliGRAM(s) IV Push <User Schedule>  insulin glargine Injectable (LANTUS) 14Unit(s) SubCutaneous at bedtime  carvedilol 3.125milliGRAM(s) Oral every 12 hours    MEDICATIONS  (PRN):  dextrose Gel 1Dose(s) Oral once PRN Blood Glucose LESS THAN 70 milliGRAM(s)/deciliter  glucagon  Injectable 1milliGRAM(s) IntraMuscular once PRN Glucose LESS THAN 70 milligrams/deciliter  ALBUTerol/ipratropium for Nebulization 3milliLiter(s) Nebulizer every 4 hours PRN SOB      Allergies    No Known Allergies    Intolerances        Vital Signs Last 24 Hrs  T(C): 36.8, Max: 37 (05-02 @ 13:39)  T(F): 98.3, Max: 98.6 (05-02 @ 13:39)  HR: 80 (73 - 97)  BP: 110/57 (94/53 - 113/65)  BP(mean): --  RR: 20 (18 - 20)  SpO2: 100% (88% - 100%)  Daily     Daily     PHYSICAL EXAM:  Awake/alert; NAD  chest Clear  No JVD  No murmer  abd soft, NT BS +  No edema        LABS:                        7.1    6.8   )-----------( 69       ( 02 May 2017 07:33 )             24.1     05-02    138  |  97<L>  |  59.0<H>  ----------------------------<  90  4.6   |  28.0  |  3.15<H>    Ca    8.5<L>      02 May 2017 07:33                  RADIOLOGY & ADDITIONAL TESTS:

## 2017-05-03 LAB
ANION GAP SERPL CALC-SCNC: 13 MMOL/L — SIGNIFICANT CHANGE UP (ref 5–17)
BUN SERPL-MCNC: 79 MG/DL — HIGH (ref 8–20)
CALCIUM SERPL-MCNC: 8.6 MG/DL — SIGNIFICANT CHANGE UP (ref 8.6–10.2)
CHLORIDE SERPL-SCNC: 98 MMOL/L — SIGNIFICANT CHANGE UP (ref 98–107)
CO2 SERPL-SCNC: 26 MMOL/L — SIGNIFICANT CHANGE UP (ref 22–29)
CREAT SERPL-MCNC: 4.06 MG/DL — HIGH (ref 0.5–1.3)
GLUCOSE SERPL-MCNC: 55 MG/DL — LOW (ref 70–115)
HCT VFR BLD CALC: 25.4 % — LOW (ref 42–52)
HGB BLD-MCNC: 7.7 G/DL — LOW (ref 14–18)
MCHC RBC-ENTMCNC: 26.4 PG — LOW (ref 27–31)
MCHC RBC-ENTMCNC: 30.3 G/DL — LOW (ref 32–36)
MCV RBC AUTO: 87 FL — SIGNIFICANT CHANGE UP (ref 80–94)
PLATELET # BLD AUTO: 80 K/UL — LOW (ref 150–400)
POTASSIUM SERPL-MCNC: 4.5 MMOL/L — SIGNIFICANT CHANGE UP (ref 3.5–5.3)
POTASSIUM SERPL-SCNC: 4.5 MMOL/L — SIGNIFICANT CHANGE UP (ref 3.5–5.3)
RBC # BLD: 2.92 M/UL — LOW (ref 4.6–6.2)
RBC # FLD: 17.4 % — HIGH (ref 11–15.6)
SODIUM SERPL-SCNC: 137 MMOL/L — SIGNIFICANT CHANGE UP (ref 135–145)
WBC # BLD: 6.6 K/UL — SIGNIFICANT CHANGE UP (ref 4.8–10.8)
WBC # FLD AUTO: 6.6 K/UL — SIGNIFICANT CHANGE UP (ref 4.8–10.8)

## 2017-05-03 PROCEDURE — 99233 SBSQ HOSP IP/OBS HIGH 50: CPT

## 2017-05-03 PROCEDURE — 71010: CPT | Mod: 26

## 2017-05-03 RX ORDER — DEXTROSE 50 % IN WATER 50 %
1 SYRINGE (ML) INTRAVENOUS ONCE
Qty: 0 | Refills: 0 | Status: DISCONTINUED | OUTPATIENT
Start: 2017-05-03 | End: 2017-05-08

## 2017-05-03 RX ADMIN — Medication 2: at 17:45

## 2017-05-03 RX ADMIN — TAMSULOSIN HYDROCHLORIDE 0.4 MILLIGRAM(S): 0.4 CAPSULE ORAL at 21:46

## 2017-05-03 RX ADMIN — Medication 667 MILLIGRAM(S): at 08:55

## 2017-05-03 RX ADMIN — BUMETANIDE 0.5 MILLIGRAM(S): 0.25 INJECTION INTRAMUSCULAR; INTRAVENOUS at 17:44

## 2017-05-03 RX ADMIN — ERYTHROPOIETIN 10000 UNIT(S): 10000 INJECTION, SOLUTION INTRAVENOUS; SUBCUTANEOUS at 11:48

## 2017-05-03 RX ADMIN — Medication 325 MILLIGRAM(S): at 13:47

## 2017-05-03 RX ADMIN — Medication 100 MILLIGRAM(S): at 05:57

## 2017-05-03 RX ADMIN — Medication 100 MILLIGRAM(S): at 17:45

## 2017-05-03 RX ADMIN — Medication 4: at 21:46

## 2017-05-03 RX ADMIN — Medication 667 MILLIGRAM(S): at 17:45

## 2017-05-03 RX ADMIN — GABAPENTIN 300 MILLIGRAM(S): 400 CAPSULE ORAL at 05:52

## 2017-05-03 RX ADMIN — HEPARIN SODIUM 5000 UNIT(S): 5000 INJECTION INTRAVENOUS; SUBCUTANEOUS at 05:52

## 2017-05-03 RX ADMIN — Medication 1 DROP(S): at 17:46

## 2017-05-03 RX ADMIN — BUMETANIDE 0.5 MILLIGRAM(S): 0.25 INJECTION INTRAMUSCULAR; INTRAVENOUS at 05:51

## 2017-05-03 RX ADMIN — MIDODRINE HYDROCHLORIDE 2.5 MILLIGRAM(S): 2.5 TABLET ORAL at 13:47

## 2017-05-03 RX ADMIN — Medication 1 DROP(S): at 05:52

## 2017-05-03 RX ADMIN — Medication 667 MILLIGRAM(S): at 13:47

## 2017-05-03 RX ADMIN — IRON SUCROSE 100 MILLIGRAM(S): 20 INJECTION, SOLUTION INTRAVENOUS at 11:49

## 2017-05-03 RX ADMIN — GABAPENTIN 300 MILLIGRAM(S): 400 CAPSULE ORAL at 21:46

## 2017-05-03 RX ADMIN — CARVEDILOL PHOSPHATE 3.12 MILLIGRAM(S): 80 CAPSULE, EXTENDED RELEASE ORAL at 17:45

## 2017-05-03 RX ADMIN — GABAPENTIN 300 MILLIGRAM(S): 400 CAPSULE ORAL at 13:47

## 2017-05-03 RX ADMIN — MIDODRINE HYDROCHLORIDE 2.5 MILLIGRAM(S): 2.5 TABLET ORAL at 21:46

## 2017-05-03 RX ADMIN — HEPARIN SODIUM 5000 UNIT(S): 5000 INJECTION INTRAVENOUS; SUBCUTANEOUS at 17:44

## 2017-05-03 RX ADMIN — ATORVASTATIN CALCIUM 40 MILLIGRAM(S): 80 TABLET, FILM COATED ORAL at 21:46

## 2017-05-03 RX ADMIN — MIDODRINE HYDROCHLORIDE 2.5 MILLIGRAM(S): 2.5 TABLET ORAL at 05:57

## 2017-05-03 NOTE — CONSULT NOTE ADULT - ASSESSMENT
Imp--hypoxemia due to combination of CHF and fluid overload from renal failure.  There may have been some areas pneumonia, poss COPD.  Plan--will get CXR post HD, and RA pulse ox post HD.Even if they improve post HD, the patient may require home O2 because of variability in his condition.

## 2017-05-03 NOTE — PROGRESS NOTE ADULT - SUBJECTIVE AND OBJECTIVE BOX
Patient: EMILY LITTLEJOHN [id number] 87y Male  Internal Medicine Hospitalist Progress Note - Dr. Live Schmitz    Chief Complaint: Patient is a 87y old  Male who presents with a chief complaint of pt got intubated in the ED after HD (2017 04:25)    Hospital course:  88 yo M with h/o DM, HTN, ischemic cardiomyopathy (EF 30-35%), chronic renal failure presents with worsening ARF. Pt was previously admitted for similar condition, but had refused hemodialysis until this admission. Pt had permacath placed but developed large neck swelling at the site and was unable to swallow secretions and had difficulty breathing. Pt was emergently intubated on . CTA of neck did not reveal any vascular injury, and showed severe diffuse bilateral neck edema/infiltrating hematoma in the right supraclavicular fossa extending cephalad bilaterally. Per vascular surgery, unclear cause to neck swelling, and may be an anaphylactic reaction to medication. Swelling improved, and pt was extubated on . Pt passed speech and swallow and advanced to soft diet. However, , pt developed afib with RVR and found to have acute hypoxic respiratory failure which improved with urgent dialysis. Noted have fever and elevated procalcitonin, attributed to pneumonia.  Seen by ID, antibiotics stopped.      Mild cough, no SOB.  Still requires O2 via NC.  No hemoptysis.  nac    ____________________PHYSICAL EXAM:  Vitals reviewed as indicated below  GENERAL:  NAD Alert and Oriented x 3   HEENT: NCAT.  Visually impaired.   CARDIOVASCULAR:  S1, S2  LUNGS: coarse BS b/l  ABDOMEN:  soft, (-) tenderness, (-) distension, (+) bowel sounds, (-) guarding, (-) rebound (-) rigidity  EXTREMITIES:  no cyanosis / clubbing / edema.   ____________________      MEDICATIONS:  aspirin 325milliGRAM(s) Oral daily  tamsulosin 0.4milliGRAM(s) Oral at bedtime  gabapentin 300milliGRAM(s) Oral three times a day  atorvastatin 40milliGRAM(s) Oral at bedtime  docusate sodium 100milliGRAM(s) Oral two times a day  dextrose Gel 1Dose(s) Oral once PRN  dextrose 50% Injectable 12.5Gram(s) IV Push once  dextrose 50% Injectable 25Gram(s) IV Push once  dextrose 50% Injectable 25Gram(s) IV Push once  glucagon  Injectable 1milliGRAM(s) IntraMuscular once PRN  epoetin una Injectable 32599Iepv(s) IV Push <User Schedule>  heparin  Injectable 5000Unit(s) SubCutaneous every 12 hours  artificial  tears Solution 1Drop(s) Left EYE two times a day  calcium acetate 667milliGRAM(s) Oral three times a day with meals  buMETAnide 0.5milliGRAM(s) Oral two times a day  insulin lispro (HumaLOG) corrective regimen sliding scale  SubCutaneous Before meals and at bedtime  midodrine 2.5milliGRAM(s) Oral every 8 hours  iron sucrose Injectable 100milliGRAM(s) IV Push <User Schedule>  ALBUTerol/ipratropium for Nebulization 3milliLiter(s) Nebulizer every 4 hours PRN  carvedilol 3.125milliGRAM(s) Oral every 12 hours  dextrose Gel 1Dose(s) Oral once PRN  dextrose Gel 1Dose(s) Oral once PRN  dextrose Gel 1Dose(s) Oral once PRN      VITALS:  Vital Signs Last 24 Hrs  T(C): 36.4, Max: 37.1 ( @ 22:38)  T(F): 97.5, Max: 98.8 ( @ 22:38)  HR: 91 (80 - 91)  BP: 121/64 (100/52 - 121/64)  BP(mean): --  RR: 20 (16 - 20)  SpO2: 100% (88% - 100%) Daily     Daily Weight in k.7 (03 May 2017 10:40)  CAPILLARY BLOOD GLUCOSE  99 (03 May 2017 09:09)  51 (03 May 2017 08:45)  36 (03 May 2017 08:22)  159 (02 May 2017 22:38)  215 (02 May 2017 17:37)  147 (02 May 2017 12:00)    I&O's Summary    I & Os for current day (as of 03 May 2017 11:16)  =============================================  IN: 360 ml / OUT: 0 ml / NET: 360 ml      LABS:                        7.7    6.6   )-----------( 80       ( 03 May 2017 05:50 )             25.4         137  |  98  |  79.0<H>  ----------------------------<  55<L>  4.5   |  26.0  |  4.06<H>    Ca    8.6      03 May 2017 05:50        RECENT CULTURES:

## 2017-05-03 NOTE — CONSULT NOTE ADULT - SUBJECTIVE AND OBJECTIVE BOX
PULMONARY CONSULT NOTE      EMILY LITTLEJOHNNURY-[id number]    Patient is a 87y old  Male who presents with a chief complaint of pt got intubated in the ED after HD (19 Apr 2017 04:25)      INTERVAL HPI/OVERNIGHT EVENTS:Pt with cardiomyopathy, AS, ESRD adm 4/17 with resp failure, intubated.  Pt had refused HD in past currently accepting HD and undergoing it.  Has 50-60 pk yr smoking hx, quit age 57.  Denies CP.  Has had ongoing hypoxemia.    MEDICATIONS  (STANDING):  aspirin 325milliGRAM(s) Oral daily  tamsulosin 0.4milliGRAM(s) Oral at bedtime  gabapentin 300milliGRAM(s) Oral three times a day  atorvastatin 40milliGRAM(s) Oral at bedtime  docusate sodium 100milliGRAM(s) Oral two times a day  dextrose 50% Injectable 12.5Gram(s) IV Push once  dextrose 50% Injectable 25Gram(s) IV Push once  dextrose 50% Injectable 25Gram(s) IV Push once  epoetin una Injectable 01767Hnyy(s) IV Push <User Schedule>  heparin  Injectable 5000Unit(s) SubCutaneous every 12 hours  artificial  tears Solution 1Drop(s) Left EYE two times a day  calcium acetate 667milliGRAM(s) Oral three times a day with meals  buMETAnide 0.5milliGRAM(s) Oral two times a day  insulin lispro (HumaLOG) corrective regimen sliding scale  SubCutaneous Before meals and at bedtime  midodrine 2.5milliGRAM(s) Oral every 8 hours  iron sucrose Injectable 100milliGRAM(s) IV Push <User Schedule>  carvedilol 3.125milliGRAM(s) Oral every 12 hours      MEDICATIONS  (PRN):  dextrose Gel 1Dose(s) Oral once PRN Blood Glucose LESS THAN 70 milliGRAM(s)/deciliter  glucagon  Injectable 1milliGRAM(s) IntraMuscular once PRN Glucose LESS THAN 70 milligrams/deciliter  ALBUTerol/ipratropium for Nebulization 3milliLiter(s) Nebulizer every 4 hours PRN SOB  dextrose Gel 1Dose(s) Oral once PRN Blood Glucose LESS THAN 70 milliGRAM(s)/deciliter  dextrose Gel 1Dose(s) Oral once PRN Blood Glucose LESS THAN 70 milliGRAM(s)/deciliter  dextrose Gel 1Dose(s) Oral once PRN Blood Glucose LESS THAN 70 milliGRAM(s)/deciliter      Allergies    No Known Allergies    Intolerances        PAST MEDICAL & SURGICAL HISTORY:  Chronic renal failure  Coronary artery disease involving autologous vein bypass graft  CHF (congestive heart failure): FAMILY/PT NOT SURE IF DIAGNOSED WITH CHF OR COPD  Pacemaker  Diabetes  PSA elevation  Hyperlipemia  Hypertension  Cataract  Glaucoma  S/P CABG (coronary artery bypass graft)  S/P prostatectomy: 1992      FAMILY HISTORY:  No pertinent family history in first degree relatives      SOCIAL HISTORY  Smoking History: former    REVIEW OF SYSTEMS:    CONSTITUTIONAL:  As per HPI.    HEENT:  Eyes:  No diplopia or blurred vision. ENT:  No earache, sore throat or runny nose.    CARDIOVASCULAR:  No pressure, squeezing, tightness, heaviness or aching about the chest; no palpitations.    RESPIRATORY:  per HPI    GASTROINTESTINAL:  No nausea, vomiting or diarrhea.    GENITOURINARY:  No dysuria, frequency or urgency.    MUSCULOSKELETAL:  No joint pains    SKIN:  No new lesions.    NEUROLOGIC:  No paresthesias, fasciculations, seizures or weakness.    PSYCHIATRIC:  No disorder of thought or mood.    ENDOCRINE:  No heat or cold intolerance, polyuria or polydipsia.    HEMATOLOGICAL:  No easy bruising or bleeding.     Vital Signs Last 24 Hrs  T(C): 36.4, Max: 37.1 (05-02 @ 22:38)  T(F): 97.5, Max: 98.8 (05-02 @ 22:38)  HR: 91 (80 - 91)  BP: 121/64 (100/52 - 121/64)  BP(mean): --  RR: 20 (16 - 20)  SpO2: 100% (100% - 100%)    PHYSICAL EXAMINATION:    GENERAL: The patient is a well-developed, well-nourished _BM_in no apparent distress, currently undergoing HD.    HEENT: Head is normocephalic and atraumatic. Extraocular muscles are intact. Mucous membranes are moist.     NECK: Supple.     LUNGS: scattered rhonchi    HEART: Regular rate and rhythm without murmur.    ABDOMEN: Soft, nontender, and nondistended.  No hepatosplenomegaly is noted.    EXTREMITIES: Without any cyanosis, clubbing, rash, lesions or edema.    NEUROLOGIC: Grossly intact.    SKIN: No ulceration or induration present.      LABS:                        7.7    6.6   )-----------( 80       ( 03 May 2017 05:50 )             25.4     05-03    137  |  98  |  79.0<H>  ----------------------------<  55<L>  4.5   |  26.0  |  4.06<H>    Ca    8.6      03 May 2017 05:50        Blood Gas Profile - Arterial (04.21.17 @ 20:37)    pH, Arterial: 7.45    pCO2, Arterial: 40 mmHg    pO2, Arterial: 145 mmHg    HCO3, Arterial: 28 mmoL/L    Base Excess, Arterial: 3.8 mmol/L    Oxygen Saturation, Arterial: 100 %    FIO2, Arterial: 50%    Blood Gas Comments Arterial: bipap 12/5/50%/    Blood Gas Source Arterial: Arterial                      MICROBIOLOGY:    RADIOLOGY & ADDITIONAL STUDIES:   EXAM:  CHEST SINGLE VIEW FRONTAL                          PROCEDURE DATE:  04/30/2017        INTERPRETATION:  Portable chest radiograph        CLINICAL INFORMATION:   persistent cough. Hemoptysis    TECHNIQUE:  Portable  AP view of the chest was obtained.    COMPARISON: No previous examinations are available for review.    FINDINGS:   The lungs  show perihilar and in the lower lobe interstitial   reticulonodular infiltrates with possible left lower lobe retrocardiac   airspace consolidation. There is vascular congestion. Findings may   indicate them a pulmonary edema of cardiac origin. Underlying infectious   infiltrate cannot be excluded.         The heart and mediastinum are within normal limits.  Right subclavian   double lumen cathetertip in SVC. Right atrium and biventricular cardiac   wire leads in place.. Status post cardiac valve replacement.        Visualized osseous structures are intact.        IMPRESSION:   Interstitial basilar M opacities likely indicate pulmonary   edemaof cardiac or noncardiac origin. Infectious pneumonia cannot   excluded. Catheters in place.             EXAM:  CT CHEST                          PROCEDURE DATE:  04/27/2017        INTERPRETATION:      CT tomographic sections of the chest were performed from the thoracic   inlet to the upper abdomen. Axial sections were performed followed by   reformatted parasagittal and coronal MIP reconstructions. No intravenous   contrast.      History:  Cough and hemoptysis. Pacemaker    Comparisons: Chest x-ray dated 4/25/2017    Findings: Bilateral pleural effusions. Pulmonary infiltrates are present   in the right upper lobe and left lower lobe.. Subsegmental atelectasis at   the lung bases.     The pulmonary vasculature and aorta are normal in   caliber and contour. The heart size is normal. Pacemaker wires in right   atrium and ventricle. No pericardial effusion. Median sternotomy. There   is no evidence of lymphadenopathy in the hilum, mediastinum or subcarinal   area. The axilla appears normal.    Bone window examination is normal for age.    Limited views of the upper abdomen demonstrate a normal size liver and   spleen. The adrenal glands are unremarkable. Tiny gallstones in the   gallbladder.    Impression: Bilateral pleural effusions. Airspace disease right upper   lobe and left lower lobe with air bronchograms consistent with pneumonia.    Subsegmental atelectasis right and left lower lobes.    Status post median sternotomy and aortic valve replacement. Pacemaker.                GARY JORGE M.D., ATTENDING RADIOLOGIST  This document has been electronically signed. Apr 27 2017  2:58PM    PHYSICIAN INTERPRETATION:  Left Ventricle: The left ventricular internal cavity size is normal. Left   ventricular wall thickness is normal.  Global LV systolic function was severely decreased. Left ventricular   ejection fraction, by visual estimation, is 30 to 35%. Elevated left   atrial and left ventricular end-diastolic pressures. Global diffuse   hypokinesis with akinesis in the anterolateral wall with prominent   trebeculations. Normal perfusion on definity contrast suggest nonischemic   cardiomyopathy or resting ishemia.        LV Wall Scoring:  The mid anterolateral segment, apical lateral segment, and apex are   akinetic.  The mid and apical anterior wall, apicalseptal segment, and basal  anterolateral segment are hypokinetic.     Right Ventricle: Normal right ventricular size and function. TV S' 0.9   m/s.  Left Atrium: The left atrium is normal in size.  Right Atrium: The right atrium is normal in size.  Pericardium: There is no evidence of pericardial effusion.  Mitral Valve: Thickening and calcification of the anterior and posterior   mitral valve leaflets. Peak transmitral mean gradient equals 3.0 mmHg,   calculated mitral valve area by pressure half time equals 4.39 cm²   consistent with No evidence of mitral stenosis. Trace mitral valve   regurgitation is seen.  Tricuspid Valve: The tricuspid valve is not well seen. Trivial tricuspid   regurgitation is visualized.  Aortic Valve: The aortic valve was not well visualized. Peak transaortic   gradient equals 16.6 mmHg, mean transaortic gradient equals 9.4 mmHg, the   calculated aortic valve area equals 1.26 cm² by the continuity equation   consistent with moderate aortic stenosis. No evidence of aortic valve   regurgitation is seen. The Dimesionless Index value is 0.36.  Pulmonic Valve: The pulmonic valve was not well visualized.  Aorta: The aorta was not well visualized.  Pulmonary Artery: The pulmonary artery is not well seen. Normal pulmonary   artery pressure.  Venous: A systolic blunting flow pattern is recorded from the right upper   pulmonary vein. The inferior vena cava was normal sized, with respiratory   size variation greater than 50%.  Additional Comments: A pacer wire is visualized in the right atrium and   right ventricle.        Summary:   1. The left atrium is normal in size.   2. Global diffuse hypokinesis with akinesis in the anterolateral wall   with prominent trebeculations. Normal perfusion on definity contrast  suggest nonischemic cardiomyopathy or resting ishemia.   3. Left ventricular ejection fraction, by visual estimation, is 30 to   35%.   4. Elevated left atrial and left ventricular end-diastolic pressures.   (LAP = 27 mm Hg)   5. The right atrium is normal in size.   6. Normal right ventricular size and function.   7. The Dimesionless Index value is 0.36. Moderate aortic valve stenosis.   Cannot rule out pseudoaortic stenosis.   8. There is no evidence of pericardial effusion.     MD Nicolette Electronically signed on 4/6/2017 at 3:30:00 PM

## 2017-05-03 NOTE — PROGRESS NOTE ADULT - PROBLEM SELECTOR PLAN 2
No indication of pneumonia, but still requiring O2 .  SaO2 88% on RA.  CXR reviewed.  Pulmonary evaluation.

## 2017-05-03 NOTE — PROGRESS NOTE ADULT - SUBJECTIVE AND OBJECTIVE BOX
Patient was seen and evaluated on dialysis.   No c/o CP SOB NV  no F/C      T(C): 36.4, Max: 37.1 (05-02 @ 22:38)  HR: 92 (84 - 92)  BP: 110/72 (100/52 - 121/64)  Wt(kg): --  PE ;  NAD  lungs - CTA  CV gr  murmer, No gallop or rub  Abd : soft, NT BS +, No masses  Ext- No edema  Neuro : Grossly intact, moving extremities                             7.7    6.6   )-----------( 80       ( 03 May 2017 05:50 )             25.4        05-03    137  |  98  |  79.0<H>  ----------------------------<  55<L>  4.5   |  26.0  |  4.06<H>    Ca    8.6      03 May 2017 05:50        MEDICATIONS  (STANDING):  aspirin  tamsulosin  gabapentin  atorvastatin  docusate sodium  dextrose Gel PRN  dextrose 50% Injectable  dextrose 50% Injectable  dextrose 50% Injectable  glucagon  Injectable PRN  epoetin una Injectable  heparin  Injectable  artificial  tears Solution  calcium acetate  buMETAnide  insulin lispro (HumaLOG) corrective regimen sliding scale  midodrine  iron sucrose Injectable  ALBUTerol/ipratropium for Nebulization PRN  carvedilol  dextrose Gel PRN  dextrose Gel PRN  dextrose Gel PRN      Patient stable  [facility] HD easily  his outpt schedule at [place] will be T/T/S  next HD saturday  Pulm noted  Continue

## 2017-05-04 LAB
ANION GAP SERPL CALC-SCNC: 12 MMOL/L — SIGNIFICANT CHANGE UP (ref 5–17)
BUN SERPL-MCNC: 49 MG/DL — HIGH (ref 8–20)
CALCIUM SERPL-MCNC: 8.5 MG/DL — LOW (ref 8.6–10.2)
CHLORIDE SERPL-SCNC: 95 MMOL/L — LOW (ref 98–107)
CO2 SERPL-SCNC: 28 MMOL/L — SIGNIFICANT CHANGE UP (ref 22–29)
CREAT SERPL-MCNC: 2.87 MG/DL — HIGH (ref 0.5–1.3)
GLUCOSE SERPL-MCNC: 89 MG/DL — SIGNIFICANT CHANGE UP (ref 70–115)
HCT VFR BLD CALC: 24.2 % — LOW (ref 42–52)
HGB BLD-MCNC: 7.3 G/DL — LOW (ref 14–18)
MCHC RBC-ENTMCNC: 26.4 PG — LOW (ref 27–31)
MCHC RBC-ENTMCNC: 30.2 G/DL — LOW (ref 32–36)
MCV RBC AUTO: 87.7 FL — SIGNIFICANT CHANGE UP (ref 80–94)
PLATELET # BLD AUTO: 88 K/UL — LOW (ref 150–400)
POTASSIUM SERPL-MCNC: 4.1 MMOL/L — SIGNIFICANT CHANGE UP (ref 3.5–5.3)
POTASSIUM SERPL-SCNC: 4.1 MMOL/L — SIGNIFICANT CHANGE UP (ref 3.5–5.3)
RBC # BLD: 2.76 M/UL — LOW (ref 4.6–6.2)
RBC # FLD: 17.4 % — HIGH (ref 11–15.6)
SODIUM SERPL-SCNC: 135 MMOL/L — SIGNIFICANT CHANGE UP (ref 135–145)
WBC # BLD: 6 K/UL — SIGNIFICANT CHANGE UP (ref 4.8–10.8)
WBC # FLD AUTO: 6 K/UL — SIGNIFICANT CHANGE UP (ref 4.8–10.8)

## 2017-05-04 PROCEDURE — 99233 SBSQ HOSP IP/OBS HIGH 50: CPT

## 2017-05-04 RX ORDER — INSULIN LISPRO 100/ML
12 VIAL (ML) SUBCUTANEOUS ONCE
Qty: 0 | Refills: 0 | Status: COMPLETED | OUTPATIENT
Start: 2017-05-04 | End: 2017-05-04

## 2017-05-04 RX ADMIN — Medication 100 MILLIGRAM(S): at 05:23

## 2017-05-04 RX ADMIN — MIDODRINE HYDROCHLORIDE 2.5 MILLIGRAM(S): 2.5 TABLET ORAL at 12:49

## 2017-05-04 RX ADMIN — Medication 40 MILLIGRAM(S): at 06:34

## 2017-05-04 RX ADMIN — MIDODRINE HYDROCHLORIDE 2.5 MILLIGRAM(S): 2.5 TABLET ORAL at 05:23

## 2017-05-04 RX ADMIN — BUMETANIDE 0.5 MILLIGRAM(S): 0.25 INJECTION INTRAMUSCULAR; INTRAVENOUS at 17:10

## 2017-05-04 RX ADMIN — Medication 1 DROP(S): at 17:10

## 2017-05-04 RX ADMIN — BUMETANIDE 0.5 MILLIGRAM(S): 0.25 INJECTION INTRAMUSCULAR; INTRAVENOUS at 05:22

## 2017-05-04 RX ADMIN — Medication 1 DROP(S): at 05:23

## 2017-05-04 RX ADMIN — Medication 667 MILLIGRAM(S): at 17:10

## 2017-05-04 RX ADMIN — Medication 100 MILLIGRAM(S): at 17:10

## 2017-05-04 RX ADMIN — Medication 325 MILLIGRAM(S): at 12:47

## 2017-05-04 RX ADMIN — GABAPENTIN 300 MILLIGRAM(S): 400 CAPSULE ORAL at 22:22

## 2017-05-04 RX ADMIN — Medication 12: at 17:10

## 2017-05-04 RX ADMIN — HEPARIN SODIUM 5000 UNIT(S): 5000 INJECTION INTRAVENOUS; SUBCUTANEOUS at 05:23

## 2017-05-04 RX ADMIN — Medication 667 MILLIGRAM(S): at 12:47

## 2017-05-04 RX ADMIN — Medication 3 MILLILITER(S): at 20:17

## 2017-05-04 RX ADMIN — Medication 667 MILLIGRAM(S): at 09:06

## 2017-05-04 RX ADMIN — Medication 12 UNIT(S): at 18:25

## 2017-05-04 RX ADMIN — TAMSULOSIN HYDROCHLORIDE 0.4 MILLIGRAM(S): 0.4 CAPSULE ORAL at 22:22

## 2017-05-04 RX ADMIN — ATORVASTATIN CALCIUM 40 MILLIGRAM(S): 80 TABLET, FILM COATED ORAL at 22:22

## 2017-05-04 RX ADMIN — Medication 3 MILLILITER(S): at 05:30

## 2017-05-04 RX ADMIN — GABAPENTIN 300 MILLIGRAM(S): 400 CAPSULE ORAL at 05:23

## 2017-05-04 RX ADMIN — CARVEDILOL PHOSPHATE 3.12 MILLIGRAM(S): 80 CAPSULE, EXTENDED RELEASE ORAL at 17:10

## 2017-05-04 RX ADMIN — MIDODRINE HYDROCHLORIDE 2.5 MILLIGRAM(S): 2.5 TABLET ORAL at 22:22

## 2017-05-04 RX ADMIN — GABAPENTIN 300 MILLIGRAM(S): 400 CAPSULE ORAL at 12:47

## 2017-05-04 RX ADMIN — Medication 8: at 22:24

## 2017-05-04 RX ADMIN — HEPARIN SODIUM 5000 UNIT(S): 5000 INJECTION INTRAVENOUS; SUBCUTANEOUS at 17:10

## 2017-05-04 NOTE — PROGRESS NOTE ADULT - SUBJECTIVE AND OBJECTIVE BOX
PULMONARY PROGRESS NOTE      EMILY LITTLEJOHNN-[id number]    Patient is a 87y old  Male who presents with a chief complaint of pt got intubated in the ED after HD (19 Apr 2017 04:25)      INTERVAL HPI/OVERNIGHT EVENTS:  S/P HD.  comfortable.  Sat 87% on RA after HD    MEDICATIONS  (STANDING):  aspirin 325milliGRAM(s) Oral daily  tamsulosin 0.4milliGRAM(s) Oral at bedtime  gabapentin 300milliGRAM(s) Oral three times a day  atorvastatin 40milliGRAM(s) Oral at bedtime  docusate sodium 100milliGRAM(s) Oral two times a day  dextrose 50% Injectable 12.5Gram(s) IV Push once  dextrose 50% Injectable 25Gram(s) IV Push once  dextrose 50% Injectable 25Gram(s) IV Push once  epoetin una Injectable 03285Zwiv(s) IV Push <User Schedule>  heparin  Injectable 5000Unit(s) SubCutaneous every 12 hours  artificial  tears Solution 1Drop(s) Left EYE two times a day  calcium acetate 667milliGRAM(s) Oral three times a day with meals  buMETAnide 0.5milliGRAM(s) Oral two times a day  insulin lispro (HumaLOG) corrective regimen sliding scale  SubCutaneous Before meals and at bedtime  midodrine 2.5milliGRAM(s) Oral every 8 hours  iron sucrose Injectable 100milliGRAM(s) IV Push <User Schedule>  carvedilol 3.125milliGRAM(s) Oral every 12 hours      MEDICATIONS  (PRN):  dextrose Gel 1Dose(s) Oral once PRN Blood Glucose LESS THAN 70 milliGRAM(s)/deciliter  glucagon  Injectable 1milliGRAM(s) IntraMuscular once PRN Glucose LESS THAN 70 milligrams/deciliter  ALBUTerol/ipratropium for Nebulization 3milliLiter(s) Nebulizer every 4 hours PRN SOB  dextrose Gel 1Dose(s) Oral once PRN Blood Glucose LESS THAN 70 milliGRAM(s)/deciliter  dextrose Gel 1Dose(s) Oral once PRN Blood Glucose LESS THAN 70 milliGRAM(s)/deciliter  dextrose Gel 1Dose(s) Oral once PRN Blood Glucose LESS THAN 70 milliGRAM(s)/deciliter      Allergies    No Known Allergies    Intolerances        PAST MEDICAL & SURGICAL HISTORY:  Chronic renal failure  Coronary artery disease involving autologous vein bypass graft  CHF (congestive heart failure): FAMILY/PT NOT SURE IF DIAGNOSED WITH CHF OR COPD  Pacemaker  Diabetes  PSA elevation  Hyperlipemia  Hypertension  Cataract  Glaucoma  S/P CABG (coronary artery bypass graft)  S/P prostatectomy: 1992      SOCIAL HISTORY  Smoking History:       REVIEW OF SYSTEMS:    CONSTITUTIONAL:  No distress    HEENT:  Eyes:  No diplopia or blurred vision. ENT:  No earache, sore throat or runny nose.    CARDIOVASCULAR:  No pressure, squeezing, tightness, heaviness or aching about the chest; no palpitations.    RESPIRATORY: mild cough nonprod,  KAY    GASTROINTESTINAL:  No nausea, vomiting or diarrhea.    GENITOURINARY:  No dysuria, frequency or urgency.    MUSCULOSKELETAL:  No joint pain    SKIN:  No new lesions.    NEUROLOGIC:  No paresthesias, fasciculations, seizures or weakness.    PSYCHIATRIC:  No disorder of thought or mood.    ENDOCRINE:  No heat or cold intolerance, polyuria or polydipsia.    HEMATOLOGICAL:  No easy bruising or bleeding.     Vital Signs Last 24 Hrs  T(C): 36, Max: 37.6 (05-03 @ 21:40)  T(F): 96.8, Max: 99.6 (05-03 @ 21:40)  HR: 83 (80 - 92)  BP: 90/56 (90/56 - 121/64)  BP(mean): --  RR: 18 (16 - 20)  SpO2: 96% (87% - 100%)    PHYSICAL EXAMINATION:    GENERAL: The patient is awake and alert in no apparent distress.     HEENT: Head is normocephalic and atraumatic. Extraocular muscles are intact. Mucous membranes are moist.    NECK: Supple.    LUNGS:scattered rhonchi    HEART: Regular rate and rhythm without murmur.    ABDOMEN: Soft, nontender, and nondistended.      EXTREMITIES: Without any cyanosis, clubbing, rash, lesions or edema.    NEUROLOGIC: Grossly intact.    SKIN: No ulceration or induration present.      LABS:                        7.3    6.0   )-----------( 88       ( 04 May 2017 05:32 )             24.2     05-04    135  |  95<L>  |  49.0<H>  ----------------------------<  89  4.1   |  28.0  |  2.87<H>    Ca    8.5<L>      04 May 2017 05:32                          MICROBIOLOGY:    RADIOLOGY & ADDITIONAL STUDIES:   EXAM:  CHEST SINGLE VIEW FRONTAL                          PROCEDURE DATE:  05/03/2017        INTERPRETATION:  Portable chest radiograph        CLINICAL INFORMATION:   Short of breath. Chronic renal failure.    TECHNIQUE:  Portable  AP view of the chest was obtained.    COMPARISON: No previous examinations are available for review.    FINDINGS:   There , vascular congestion and perihilar interstitial infiltrates with   left lower lobe retrocardiac focal airspace consolidation. There are   bilateral small pleural effusions.. Constellation of findings suggestive   of pulmonary edema of the cardiac and noncardiac origin..  Cardiac chambers normal size left magnification. Status post cardiac   valve replacement. Right atrium and biventricular cardiac wire leads in   place.. An  Double-lumen the right tunneled IJ catheter tip in SVC.  Trachea midline. No hilar mass.   Visualized osseous structures are intact.        IMPRESSION: Bilateral pulmonary edema with a left retrocardiac airspace   consolidation and small bilateral pleural effusions as described.   Compared to prior examination no significant changes occurred.                SHERIE APPIAH M.D., ATTENDING RADIOLOGIST  This document has been electronically signed. May  3 2017  7:51PM

## 2017-05-04 NOTE — PROGRESS NOTE ADULT - ASSESSMENT
Imp--Pt likely with residual pulm vasc congestion leading to hypoxemia.  ? contribution of COPD.  No active pneumonia.  Pt with RA sat 87% after HD.  Recommend home O2 2l/min,[place] nebulizer with Duoneb QID  Will f/u prn in hosp.

## 2017-05-04 NOTE — PROGRESS NOTE ADULT - PROBLEM SELECTOR PLAN 1
Multifactorial - component of CHF / pulmonary edema and /or COPD.  Discussed with pulmonary.  Arrange for home O2.

## 2017-05-04 NOTE — PROGRESS NOTE ADULT - SUBJECTIVE AND OBJECTIVE BOX
Patient: EMILY LITTLEJOHN [id number] 87y Male  Internal Medicine Hospitalist Progress Note - Dr. Live Schmitz    Chief Complaint: Patient is a 87y old  Male who presents with a chief complaint of pt got intubated in the ED after HD (19 Apr 2017 04:25)    Hospital course:  88 yo M with h/o DM, HTN, ischemic cardiomyopathy (EF 30-35%), chronic renal failure presents with worsening ARF. Pt was previously admitted for similar condition, but had refused hemodialysis until this admission. Pt had permacath placed but developed large neck swelling at the site and was unable to swallow secretions and had difficulty breathing. Pt was emergently intubated on 4/18. CTA of neck did not reveal any vascular injury, and showed severe diffuse bilateral neck edema/infiltrating hematoma in the right supraclavicular fossa extending cephalad bilaterally. Per vascular surgery, unclear cause to neck swelling, and may be an anaphylactic reaction to medication. Swelling improved, and pt was extubated on 4/20. Pt passed speech and swallow and advanced to soft diet. However, 4/21, pt developed afib with RVR and found to have acute hypoxic respiratory failure which improved with urgent dialysis. Noted have fever and elevated procalcitonin, attributed to pneumonia.  Seen by ID, antibiotics stopped.      Mild cough, no SOB.  Still requires O2 via NC.  No additional complaints.     ____________________PHYSICAL EXAM:  Vitals reviewed as indicated below  GENERAL:  NAD Alert and Oriented x 3   HEENT: NCAT.  Visually impaired.   CARDIOVASCULAR:  S1, S2  LUNGS: coarse BS b/l  ABDOMEN:  soft, (-) tenderness, (-) distension, (+) bowel sounds, (-) guarding, (-) rebound (-) rigidity  EXTREMITIES:  no cyanosis / clubbing / edema.   ____________________      MEDICATIONS:  aspirin 325milliGRAM(s) Oral daily  tamsulosin 0.4milliGRAM(s) Oral at bedtime  gabapentin 300milliGRAM(s) Oral three times a day  atorvastatin 40milliGRAM(s) Oral at bedtime  docusate sodium 100milliGRAM(s) Oral two times a day  dextrose Gel 1Dose(s) Oral once PRN  dextrose 50% Injectable 12.5Gram(s) IV Push once  dextrose 50% Injectable 25Gram(s) IV Push once  dextrose 50% Injectable 25Gram(s) IV Push once  glucagon  Injectable 1milliGRAM(s) IntraMuscular once PRN  epoetin una Injectable 37768Stub(s) IV Push <User Schedule>  heparin  Injectable 5000Unit(s) SubCutaneous every 12 hours  artificial  tears Solution 1Drop(s) Left EYE two times a day  calcium acetate 667milliGRAM(s) Oral three times a day with meals  buMETAnide 0.5milliGRAM(s) Oral two times a day  insulin lispro (HumaLOG) corrective regimen sliding scale  SubCutaneous Before meals and at bedtime  midodrine 2.5milliGRAM(s) Oral every 8 hours  iron sucrose Injectable 100milliGRAM(s) IV Push <User Schedule>  ALBUTerol/ipratropium for Nebulization 3milliLiter(s) Nebulizer every 4 hours PRN  carvedilol 3.125milliGRAM(s) Oral every 12 hours  dextrose Gel 1Dose(s) Oral once PRN  dextrose Gel 1Dose(s) Oral once PRN  dextrose Gel 1Dose(s) Oral once PRN      VITALS:  Vital Signs Last 24 Hrs  T(C): 36, Max: 37.6 (05-03 @ 21:40)  T(F): 96.8, Max: 99.6 (05-03 @ 21:40)  HR: 83 (80 - 92)  BP: 90/56 (90/56 - 110/72)  BP(mean): --  RR: 18 (16 - 20)  SpO2: 96% (87% - 100%) Daily     Daily   CAPILLARY BLOOD GLUCOSE  80 (04 May 2017 08:36)  243 (03 May 2017 21:40)  194 (03 May 2017 17:41)    I&O's Summary    I & Os for current day (as of 04 May 2017 12:02)  =============================================  IN: 1160 ml / OUT: 2600 ml / NET: -1440 ml      LABS:                        7.3    6.0   )-----------( 88       ( 04 May 2017 05:32 )             24.2     05-04    135  |  95<L>  |  49.0<H>  ----------------------------<  89  4.1   |  28.0  |  2.87<H>    Ca    8.5<L>      04 May 2017 05:32        RECENT CULTURES:

## 2017-05-04 NOTE — PROGRESS NOTE ADULT - SUBJECTIVE AND OBJECTIVE BOX
Patient seen and examined    Feels fine; OOB/Ch; stronger  no c/o CP SOB NV   No swelling feet    Vital Signs Last 24 Hrs  T(C): 37.1, Max: 37.6 (05-03 @ 21:40)  T(F): 98.7, Max: 99.6 (05-03 @ 21:40)  HR: 86 (80 - 92)  BP: 118/56 (90/56 - 118/56)  BP(mean): --  RR: 18 (16 - 18)  SpO2: 99% (87% - 100%)  Awake/alert; NAD  chest Clear  No JVD  No murmer  abd soft, NT BS +  No edema      04 May 2017 05:32    135    |  95     |  49.0   ----------------------------<  89     4.1     |  28.0   |  2.87     Ca    8.5        04 May 2017 05:32                            7.3    6.0   )-----------( 88       ( 04 May 2017 05:32 )             24.2       Impression  patient stable  HD am  Hb remains low despite Epogen and Venofer  Continue same treatment

## 2017-05-05 ENCOUNTER — TRANSCRIPTION ENCOUNTER (OUTPATIENT)
Age: 82
End: 2017-05-05

## 2017-05-05 LAB
ANION GAP SERPL CALC-SCNC: 15 MMOL/L — SIGNIFICANT CHANGE UP (ref 5–17)
BLD GP AB SCN SERPL QL: SIGNIFICANT CHANGE UP
BUN SERPL-MCNC: 89 MG/DL — HIGH (ref 8–20)
CALCIUM SERPL-MCNC: 8.4 MG/DL — LOW (ref 8.6–10.2)
CHLORIDE SERPL-SCNC: 93 MMOL/L — LOW (ref 98–107)
CO2 SERPL-SCNC: 23 MMOL/L — SIGNIFICANT CHANGE UP (ref 22–29)
CREAT SERPL-MCNC: 3.91 MG/DL — HIGH (ref 0.5–1.3)
GLUCOSE SERPL-MCNC: 242 MG/DL — HIGH (ref 70–115)
POTASSIUM SERPL-MCNC: 4.4 MMOL/L — SIGNIFICANT CHANGE UP (ref 3.5–5.3)
POTASSIUM SERPL-SCNC: 4.4 MMOL/L — SIGNIFICANT CHANGE UP (ref 3.5–5.3)
SODIUM SERPL-SCNC: 131 MMOL/L — LOW (ref 135–145)
TYPE + AB SCN PNL BLD: SIGNIFICANT CHANGE UP

## 2017-05-05 PROCEDURE — 99233 SBSQ HOSP IP/OBS HIGH 50: CPT

## 2017-05-05 RX ORDER — GABAPENTIN 400 MG/1
1 CAPSULE ORAL
Qty: 0 | Refills: 0 | COMMUNITY
Start: 2017-05-05

## 2017-05-05 RX ORDER — GABAPENTIN 400 MG/1
3 CAPSULE ORAL
Qty: 0 | Refills: 0 | COMMUNITY

## 2017-05-05 RX ORDER — INSULIN GLARGINE 100 [IU]/ML
10 INJECTION, SOLUTION SUBCUTANEOUS AT BEDTIME
Qty: 0 | Refills: 0 | Status: DISCONTINUED | OUTPATIENT
Start: 2017-05-05 | End: 2017-05-08

## 2017-05-05 RX ORDER — INSULIN GLARGINE 100 [IU]/ML
10 INJECTION, SOLUTION SUBCUTANEOUS
Qty: 0 | Refills: 0 | COMMUNITY
Start: 2017-05-05

## 2017-05-05 RX ORDER — INSULIN GLARGINE 100 [IU]/ML
5 INJECTION, SOLUTION SUBCUTANEOUS
Qty: 0 | Refills: 0 | COMMUNITY

## 2017-05-05 RX ORDER — MIDODRINE HYDROCHLORIDE 2.5 MG/1
1 TABLET ORAL
Qty: 0 | Refills: 0 | COMMUNITY
Start: 2017-05-05

## 2017-05-05 RX ORDER — INSULIN LISPRO 100/ML
1 VIAL (ML) SUBCUTANEOUS
Qty: 0 | Refills: 0 | COMMUNITY
Start: 2017-05-05

## 2017-05-05 RX ORDER — CALCIUM ACETATE 667 MG
1 TABLET ORAL
Qty: 0 | Refills: 0 | COMMUNITY
Start: 2017-05-05

## 2017-05-05 RX ORDER — CARVEDILOL PHOSPHATE 80 MG/1
1 CAPSULE, EXTENDED RELEASE ORAL
Qty: 0 | Refills: 0 | COMMUNITY
Start: 2017-05-05

## 2017-05-05 RX ORDER — BUMETANIDE 0.25 MG/ML
1 INJECTION INTRAMUSCULAR; INTRAVENOUS
Qty: 0 | Refills: 0 | COMMUNITY
Start: 2017-05-05

## 2017-05-05 RX ADMIN — CARVEDILOL PHOSPHATE 3.12 MILLIGRAM(S): 80 CAPSULE, EXTENDED RELEASE ORAL at 05:04

## 2017-05-05 RX ADMIN — GABAPENTIN 300 MILLIGRAM(S): 400 CAPSULE ORAL at 05:05

## 2017-05-05 RX ADMIN — Medication 8: at 12:38

## 2017-05-05 RX ADMIN — GABAPENTIN 300 MILLIGRAM(S): 400 CAPSULE ORAL at 22:04

## 2017-05-05 RX ADMIN — Medication 325 MILLIGRAM(S): at 12:38

## 2017-05-05 RX ADMIN — GABAPENTIN 300 MILLIGRAM(S): 400 CAPSULE ORAL at 12:38

## 2017-05-05 RX ADMIN — Medication 667 MILLIGRAM(S): at 08:34

## 2017-05-05 RX ADMIN — MIDODRINE HYDROCHLORIDE 2.5 MILLIGRAM(S): 2.5 TABLET ORAL at 22:04

## 2017-05-05 RX ADMIN — Medication 100 MILLIGRAM(S): at 18:01

## 2017-05-05 RX ADMIN — BUMETANIDE 0.5 MILLIGRAM(S): 0.25 INJECTION INTRAMUSCULAR; INTRAVENOUS at 05:05

## 2017-05-05 RX ADMIN — Medication 667 MILLIGRAM(S): at 18:01

## 2017-05-05 RX ADMIN — Medication 1 DROP(S): at 05:04

## 2017-05-05 RX ADMIN — ATORVASTATIN CALCIUM 40 MILLIGRAM(S): 80 TABLET, FILM COATED ORAL at 22:04

## 2017-05-05 RX ADMIN — BUMETANIDE 0.5 MILLIGRAM(S): 0.25 INJECTION INTRAMUSCULAR; INTRAVENOUS at 18:00

## 2017-05-05 RX ADMIN — Medication 4: at 08:33

## 2017-05-05 RX ADMIN — INSULIN GLARGINE 10 UNIT(S): 100 INJECTION, SOLUTION SUBCUTANEOUS at 22:04

## 2017-05-05 RX ADMIN — Medication 667 MILLIGRAM(S): at 12:38

## 2017-05-05 RX ADMIN — Medication 100 MILLIGRAM(S): at 05:05

## 2017-05-05 RX ADMIN — HEPARIN SODIUM 5000 UNIT(S): 5000 INJECTION INTRAVENOUS; SUBCUTANEOUS at 05:05

## 2017-05-05 RX ADMIN — MIDODRINE HYDROCHLORIDE 2.5 MILLIGRAM(S): 2.5 TABLET ORAL at 12:38

## 2017-05-05 RX ADMIN — Medication 2: at 18:01

## 2017-05-05 RX ADMIN — Medication 1 DROP(S): at 18:02

## 2017-05-05 RX ADMIN — HEPARIN SODIUM 5000 UNIT(S): 5000 INJECTION INTRAVENOUS; SUBCUTANEOUS at 18:01

## 2017-05-05 RX ADMIN — TAMSULOSIN HYDROCHLORIDE 0.4 MILLIGRAM(S): 0.4 CAPSULE ORAL at 22:05

## 2017-05-05 RX ADMIN — MIDODRINE HYDROCHLORIDE 2.5 MILLIGRAM(S): 2.5 TABLET ORAL at 05:05

## 2017-05-05 NOTE — DISCHARGE NOTE ADULT - MEDICATION SUMMARY - MEDICATIONS TO TAKE
I will START or STAY ON the medications listed below when I get home from the hospital:    aspirin 325 mg oral tablet  -- 1 tab(s) by mouth once a day  -- Indication: For Coronary artery disease involving autologous vein coronary bypass graft without angina pectoris    Flomax 0.4 mg oral capsule  -- 1 cap(s) by mouth once a day  -- Indication: For BPH    gabapentin 300 mg oral capsule  -- 1 cap(s) by mouth 3 times a day  -- Indication: For Neuropathy    insulin glargine  -- 10 unit(s) subcutaneous once a day  -- Indication: For Diabetes    insulin lispro 100 units/mL subcutaneous solution  -- 1 dose(s) subcutaneous 4 times a day (before meals and at bedtime) ; 2 Unit(s) if Glucose 151 - 200  4 Unit(s) if Glucose 201 - 250  6 Unit(s) if Glucose 251 - 300  8 Unit(s) if Glucose 301 - 350  10 Unit(s) if Glucose 351 - 400  12 Unit(s) if Glucose Greater Than 400  -- Indication: For Diabetes    atorvastatin 40 mg oral tablet  -- 1 tab(s) by mouth once a day (at bedtime)  -- Indication: For Hyperlipidemia    carvedilol 3.125 mg oral tablet  -- 1 tab(s) by mouth every 12 hours  -- Indication: For CaD    bumetanide 0.5 mg oral tablet  -- 1 tab(s) by mouth 2 times a day  -- Indication: For ChF    ferrous sulfate 324 mg oral delayed release tablet  -- 1 tab(s) by mouth once a day  -- Indication: For Supplement    docusate sodium sodium 100 mg oral capsule  -- 1 cap(s) by mouth 2 times a day  -- Indication: For Laxative    midodrine 2.5 mg oral tablet  -- 1 tab(s) by mouth every 8 hours  -- Indication: For Hypotension    ocular lubricant ophthalmic solution  -- 1 drop(s) to each affected eye 2 times a day  -- Indication: For Opthalmic lubricant    calcium acetate 667 mg oral tablet  -- 1 tab(s) by mouth 3 times a day (with meals)  -- Indication: For ESRD (end stage renal disease) I will START or STAY ON the medications listed below when I get home from the hospital:    Flomax 0.4 mg oral capsule  -- 1 cap(s) by mouth once a day  -- Indication: For BPH    gabapentin 300 mg oral capsule  -- 1 cap(s) by mouth 3 times a day  -- Indication: For Neuropathy    insulin glargine  -- 10 unit(s) subcutaneous once a day  -- Indication: For Diabetes    insulin lispro 100 units/mL subcutaneous solution  -- 1 dose(s) subcutaneous 4 times a day (before meals and at bedtime) ; 2 Unit(s) if Glucose 151 - 200  4 Unit(s) if Glucose 201 - 250  6 Unit(s) if Glucose 251 - 300  8 Unit(s) if Glucose 301 - 350  10 Unit(s) if Glucose 351 - 400  12 Unit(s) if Glucose Greater Than 400  -- Indication: For Diabetes    carvedilol 3.125 mg oral tablet  -- 1 tab(s) by mouth every 12 hours  -- Indication: For CaD    bumetanide 0.5 mg oral tablet  -- 1 tab(s) by mouth 2 times a day  -- Indication: For ChF    epoetin una  -- 42418 unit(s) intravenous 2 times a week  -- Indication: For Anemia, unspecified type    ferrous sulfate 324 mg oral delayed release tablet  -- 1 tab(s) by mouth once a day  -- Indication: For Supplement    midodrine 2.5 mg oral tablet  -- 1 tab(s) by mouth every 8 hours  -- Indication: For Hypotension    ocular lubricant ophthalmic solution  -- 1 drop(s) to each affected eye 2 times a day  -- Indication: For Opthalmic lubricant    calcium acetate 667 mg oral tablet  -- 1 tab(s) by mouth 3 times a day (with meals)  -- Indication: For ESRD (end stage renal disease)

## 2017-05-05 NOTE — PROGRESS NOTE ADULT - SUBJECTIVE AND OBJECTIVE BOX
Patient: EMILY LITTLEJOHN [id number] 87y Male  Internal Medicine Hospitalist Progress Note - Dr. Live Schmitz    Chief Complaint: Patient is a 87y old  Male who presents with a chief complaint of pt got intubated in the ED after HD (19 Apr 2017 04:25)    Hospital course:  88 yo M with h/o DM, HTN, ischemic cardiomyopathy (EF 30-35%), chronic renal failure presents with worsening ARF. Pt was previously admitted for similar condition, but had refused hemodialysis until this admission. Pt had permacath placed but developed large neck swelling at the site and was unable to swallow secretions and had difficulty breathing. Pt was emergently intubated on 4/18. CTA of neck did not reveal any vascular injury, and showed severe diffuse bilateral neck edema/infiltrating hematoma in the right supraclavicular fossa extending cephalad bilaterally. Per vascular surgery, unclear cause to neck swelling, and may be an anaphylactic reaction to medication. Swelling improved, and pt was extubated on 4/20. Pt passed speech and swallow and advanced to soft diet. However, 4/21, pt developed afib with RVR and found to have acute hypoxic respiratory failure which improved with urgent dialysis. Noted have fever and elevated procalcitonin, attributed to pneumonia.  Seen by ID, antibiotics stopped.  Still hypoxic, seen by pulmonary, felt to be multifactorial, likely COPD, CHF, pulmonary edema.  Post HD CXR still suggestive of     Mild cough, no SOB.  No fever / chills.  Still requires O2 via NC.  No additional complaints.     ____________________PHYSICAL EXAM:  Vitals reviewed as indicated below  GENERAL:  NAD Alert and Oriented x 3   HEENT: NCAT.  Visually impaired.   CARDIOVASCULAR:  S1, S2  LUNGS: coarse BS b/l  ABDOMEN:  soft, (-) tenderness, (-) distension, (+) bowel sounds, (-) guarding, (-) rebound (-) rigidity  EXTREMITIES:  no cyanosis / clubbing / edema.   ____________________      MEDICATIONS:  aspirin 325milliGRAM(s) Oral daily  tamsulosin 0.4milliGRAM(s) Oral at bedtime  gabapentin 300milliGRAM(s) Oral three times a day  atorvastatin 40milliGRAM(s) Oral at bedtime  docusate sodium 100milliGRAM(s) Oral two times a day  dextrose Gel 1Dose(s) Oral once PRN  dextrose 50% Injectable 12.5Gram(s) IV Push once  dextrose 50% Injectable 25Gram(s) IV Push once  dextrose 50% Injectable 25Gram(s) IV Push once  glucagon  Injectable 1milliGRAM(s) IntraMuscular once PRN  epoetin una Injectable 19436Xfpo(s) IV Push <User Schedule>  heparin  Injectable 5000Unit(s) SubCutaneous every 12 hours  artificial  tears Solution 1Drop(s) Left EYE two times a day  calcium acetate 667milliGRAM(s) Oral three times a day with meals  buMETAnide 0.5milliGRAM(s) Oral two times a day  insulin lispro (HumaLOG) corrective regimen sliding scale  SubCutaneous Before meals and at bedtime  midodrine 2.5milliGRAM(s) Oral every 8 hours  iron sucrose Injectable 100milliGRAM(s) IV Push <User Schedule>  ALBUTerol/ipratropium for Nebulization 3milliLiter(s) Nebulizer every 4 hours PRN  carvedilol 3.125milliGRAM(s) Oral every 12 hours  dextrose Gel 1Dose(s) Oral once PRN  dextrose Gel 1Dose(s) Oral once PRN  dextrose Gel 1Dose(s) Oral once PRN  insulin glargine Injectable (LANTUS) 10Unit(s) SubCutaneous at bedtime      VITALS:  Vital Signs Last 24 Hrs  T(C): 36.3, Max: 37.1 (05-04 @ 16:59)  T(F): 97.4, Max: 98.7 (05-04 @ 16:59)  HR: 73 (73 - 86)  BP: 101/56 (101/56 - 118/56)  BP(mean): --  RR: 18 (16 - 18)  SpO2: 99% (99% - 99%) Daily     Daily   CAPILLARY BLOOD GLUCOSE  318 (05 May 2017 12:14)  222 (05 May 2017 07:47)  311 (04 May 2017 22:18)  386 (04 May 2017 20:26)  432 (04 May 2017 18:49)  471 (04 May 2017 17:46)  476 (04 May 2017 16:43)    I&O's Summary      LABS:                        7.3    6.0   )-----------( 88       ( 04 May 2017 05:32 )             24.2     05-05    131<L>  |  93<L>  |  89.0<H>  ----------------------------<  242<H>  4.4   |  23.0  |  3.91<H>    Ca    8.4<L>      05 May 2017 05:12        RECENT CULTURES: Patient: EMILY LITTLEJOHN [id number] 87y Male  Internal Medicine Hospitalist Progress Note - Dr. Live Schmitz    Chief Complaint: Patient is a 87y old  Male who presents with a chief complaint of pt got intubated in the ED after HD (19 Apr 2017 04:25)    Hospital course:  86 yo M with h/o DM, HTN, ischemic cardiomyopathy (EF 30-35%), chronic renal failure presents with worsening ARF. Pt was previously admitted for similar condition, but had refused hemodialysis until this admission. Pt had permacath placed but developed large neck swelling at the site and was unable to swallow secretions and had difficulty breathing. Pt was emergently intubated on 4/18. CTA of neck did not reveal any vascular injury, and showed severe diffuse bilateral neck edema/infiltrating hematoma in the right supraclavicular fossa extending cephalad bilaterally. Per vascular surgery, unclear cause to neck swelling, and may be an anaphylactic reaction to medication. Swelling improved, and pt was extubated on 4/20. Pt passed speech and swallow and advanced to soft diet. However, 4/21, pt developed afib with RVR and found to have acute hypoxic respiratory failure which improved with urgent dialysis. Noted have fever and elevated procalcitonin, attributed to pneumonia.  Seen by ID, antibiotics stopped.  Still hypoxic, seen by pulmonary, felt to be multifactorial, likely COPD, CHF, pulmonary edema.  Post HD CXR still suggestive of     Mild cough, no SOB.  No fever / chills.  Still requires O2 via NC.  No additional complaints.     ____________________PHYSICAL EXAM:  Vitals reviewed as indicated below  GENERAL:  NAD Alert and Oriented x 3   HEENT: NCAT.  Visually impaired.   CARDIOVASCULAR:  S1, S2  LUNGS: coarse BS b/l  ABDOMEN:  soft, (-) tenderness, (-) distension, (+) bowel sounds, (-) guarding, (-) rebound (-) rigidity  EXTREMITIES:  no cyanosis / clubbing / edema.   ____________________      MEDICATIONS:  aspirin 325milliGRAM(s) Oral daily  tamsulosin 0.4milliGRAM(s) Oral at bedtime  gabapentin 300milliGRAM(s) Oral three times a day  atorvastatin 40milliGRAM(s) Oral at bedtime  docusate sodium 100milliGRAM(s) Oral two times a day  dextrose Gel 1Dose(s) Oral once PRN  dextrose 50% Injectable 12.5Gram(s) IV Push once  dextrose 50% Injectable 25Gram(s) IV Push once  dextrose 50% Injectable 25Gram(s) IV Push once  glucagon  Injectable 1milliGRAM(s) IntraMuscular once PRN  epoetin una Injectable 92756Kbdb(s) IV Push <User Schedule>  heparin  Injectable 5000Unit(s) SubCutaneous every 12 hours  artificial  tears Solution 1Drop(s) Left EYE two times a day  calcium acetate 667milliGRAM(s) Oral three times a day with meals  buMETAnide 0.5milliGRAM(s) Oral two times a day  insulin lispro (HumaLOG) corrective regimen sliding scale  SubCutaneous Before meals and at bedtime  midodrine 2.5milliGRAM(s) Oral every 8 hours  iron sucrose Injectable 100milliGRAM(s) IV Push <User Schedule>  ALBUTerol/ipratropium for Nebulization 3milliLiter(s) Nebulizer every 4 hours PRN  carvedilol 3.125milliGRAM(s) Oral every 12 hours  dextrose Gel 1Dose(s) Oral once PRN  dextrose Gel 1Dose(s) Oral once PRN  dextrose Gel 1Dose(s) Oral once PRN  insulin glargine Injectable (LANTUS) 10Unit(s) SubCutaneous at bedtime      VITALS:  Vital Signs Last 24 Hrs  T(C): 36.3, Max: 37.1 (05-04 @ 16:59)  T(F): 97.4, Max: 98.7 (05-04 @ 16:59)  HR: 73 (73 - 86)  BP: 101/56 (101/56 - 118/56)  BP(mean): --  RR: 18 (16 - 18)  SpO2: 99% (99% - 99%) Daily    SaO2 88% on RA at rest, 86% on RA with exertion, improves to 96-99% on 2L nc at rest.     Daily   CAPILLARY BLOOD GLUCOSE  318 (05 May 2017 12:14)  222 (05 May 2017 07:47)  311 (04 May 2017 22:18)  386 (04 May 2017 20:26)  432 (04 May 2017 18:49)  471 (04 May 2017 17:46)  476 (04 May 2017 16:43)    I&O's Summary      LABS:                        7.3    6.0   )-----------( 88       ( 04 May 2017 05:32 )             24.2     05-05    131<L>  |  93<L>  |  89.0<H>  ----------------------------<  242<H>  4.4   |  23.0  |  3.91<H>    Ca    8.4<L>      05 May 2017 05:12        RECENT CULTURES:

## 2017-05-05 NOTE — DISCHARGE NOTE ADULT - PATIENT PORTAL LINK FT
“You can access the [facility] Patient Portal, offered by [facility], by registering with the following website: [email or web address]”

## 2017-05-05 NOTE — DISCHARGE NOTE ADULT - MEDICATION SUMMARY - MEDICATIONS TO STOP TAKING
I will STOP taking the medications listed below when I get home from the hospital:    lisinopril 5 mg oral tablet  -- 1 tab(s) by mouth once a day    isosorbide dinitrate 10 mg oral tablet  -- 1 tab(s) by mouth 3 times a day

## 2017-05-05 NOTE — DISCHARGE NOTE ADULT - CARE PLAN
Principal Discharge DX:	Acute respiratory failure with hypoxia  Goal:	stable for discharge  Instructions for follow-up, activity and diet:	It is important to see your primary physician as well as the physicians noted below within the next week to perform a comprehensive medical review.  Call their offices for an appointment as soon as you leave the hospital.  If you do not have a primary physician, contact the [facility] at [place] [phone] located on [address] [place] [address], [place] [address].  Your medical issues appear to be stable at this time, but if your symptoms recur or worsen, contact your physicians and/or return to the hospital if necessary.  If you encounter any issues or questions with your medication, call your physicians before stopping the medication.  Do not drive.  Limit your diet to 2 grams of sodium daily.  Secondary Diagnosis:	Acute on chronic combined systolic and diastolic congestive heart failure  Secondary Diagnosis:	ESRD (end stage renal disease)  Secondary Diagnosis:	Anemia, unspecified type  Secondary Diagnosis:	Type 2 diabetes mellitus with diabetic polyneuropathy, with long-term current use of insulin  Secondary Diagnosis:	S/P CABG (coronary artery bypass graft)  Secondary Diagnosis:	Atrial fibrillation, unspecified type Principal Discharge DX:	Acute respiratory failure with hypoxia  Goal:	stable for discharge  Instructions for follow-up, activity and diet:	It is important to see your primary physician as well as the physicians noted below within the next week to perform a comprehensive medical review.  Call their offices for an appointment as soon as you leave the hospital.  If you do not have a primary physician, contact the [facility] at [place] [phone] located on [address] [place] [address], [place] [address].  Your medical issues appear to be stable at this time, but if your symptoms recur or worsen, contact your physicians and/or return to the hospital if necessary.  If you encounter any issues or questions with your medication, call your physicians before stopping the medication.  Do not drive.  Limit your diet to 2 grams of sodium daily.  Secondary Diagnosis:	Acute on chronic combined systolic and diastolic congestive heart failure  Secondary Diagnosis:	ESRD (end stage renal disease)  Secondary Diagnosis:	Anemia, unspecified type  Secondary Diagnosis:	Type 2 diabetes mellitus with diabetic polyneuropathy, with long-term current use of insulin  Secondary Diagnosis:	S/P CABG (coronary artery bypass graft)  Secondary Diagnosis:	Atrial fibrillation, unspecified type Principal Discharge DX:	Acute respiratory failure with hypoxia  Goal:	stable for discharge  Instructions for follow-up, activity and diet:	It is important to see your primary physician as well as the physicians noted below within the next week to perform a comprehensive medical review.  Call their offices for an appointment as soon as you leave the hospital.  If you do not have a primary physician, contact the [facility] at [place] [phone] located on [address] [place] [address], [place] [address].  Your medical issues appear to be stable at this time, but if your symptoms recur or worsen, contact your physicians and/or return to the hospital if necessary.  If you encounter any issues or questions with your medication, call your physicians before stopping the medication.  Do not drive.  Limit your diet to 2 grams of sodium daily.  Secondary Diagnosis:	Acute on chronic combined systolic and diastolic congestive heart failure  Secondary Diagnosis:	ESRD (end stage renal disease)  Secondary Diagnosis:	Anemia, unspecified type  Secondary Diagnosis:	Type 2 diabetes mellitus with diabetic polyneuropathy, with long-term current use of insulin  Secondary Diagnosis:	S/P CABG (coronary artery bypass graft)  Secondary Diagnosis:	Atrial fibrillation, unspecified type Principal Discharge DX:	Acute respiratory failure with hypoxia  Goal:	stable for discharge  Instructions for follow-up, activity and diet:	It is important to see your primary physician as well as the physicians noted below within the next week to perform a comprehensive medical review.  Call their offices for an appointment as soon as you leave the hospital.  If you do not have a primary physician, contact the [facility] at [place] [phone] located on [address] [place] [address], [place] [address].  Your medical issues appear to be stable at this time, but if your symptoms recur or worsen, contact your physicians and/or return to the hospital if necessary.  If you encounter any issues or questions with your medication, call your physicians before stopping the medication.  Do not drive.  Limit your diet to 2 grams of sodium daily.  Secondary Diagnosis:	Acute on chronic combined systolic and diastolic congestive heart failure  Secondary Diagnosis:	ESRD (end stage renal disease)  Secondary Diagnosis:	Anemia, unspecified type  Secondary Diagnosis:	Type 2 diabetes mellitus with diabetic polyneuropathy, with long-term current use of insulin  Secondary Diagnosis:	S/P CABG (coronary artery bypass graft)  Secondary Diagnosis:	Atrial fibrillation, unspecified type Principal Discharge DX:	Acute respiratory failure with hypoxia  Goal:	stable for discharge  Instructions for follow-up, activity and diet:	It is important to see your primary physician as well as the physicians noted below within the next week to perform a comprehensive medical review.  Call their offices for an appointment as soon as you leave the hospital.  If you do not have a primary physician, contact the [facility] at [place] [phone] located on [address] [place] [address], [place] [address].  Your medical issues appear to be stable at this time, but if your symptoms recur or worsen, contact your physicians and/or return to the hospital if necessary.  If you encounter any issues or questions with your medication, call your physicians before stopping the medication.  Do not drive.  Limit your diet to 2 grams of sodium daily.  Secondary Diagnosis:	Acute on chronic combined systolic and diastolic congestive heart failure  Secondary Diagnosis:	ESRD (end stage renal disease)  Secondary Diagnosis:	Anemia, unspecified type  Secondary Diagnosis:	Type 2 diabetes mellitus with diabetic polyneuropathy, with long-term current use of insulin  Secondary Diagnosis:	S/P CABG (coronary artery bypass graft)  Secondary Diagnosis:	Atrial fibrillation, unspecified type Principal Discharge DX:	Acute respiratory failure with hypoxia  Goal:	stable for discharge  Instructions for follow-up, activity and diet:	It is important to see your primary physician as well as the physicians noted below within the next week to perform a comprehensive medical review.  Call their offices for an appointment as soon as you leave the hospital.  If you do not have a primary physician, contact the [place] [facility] at [place] [phone] located on [address] [place] [address], [place] [address].  Your medical issues appear to be stable at this time, but if your symptoms recur or worsen, contact your physicians and/or return to the hospital if necessary.  If you encounter any issues or questions with your medication, call your physicians before stopping the medication.  Do not drive.  Limit your diet to 2 grams of sodium daily.  Secondary Diagnosis:	Acute on chronic combined systolic and diastolic congestive heart failure  Secondary Diagnosis:	ESRD (end stage renal disease)  Secondary Diagnosis:	Anemia, unspecified type  Secondary Diagnosis:	Type 2 diabetes mellitus with diabetic polyneuropathy, with long-term current use of insulin  Secondary Diagnosis:	S/P CABG (coronary artery bypass graft)  Secondary Diagnosis:	Atrial fibrillation, unspecified type

## 2017-05-05 NOTE — DISCHARGE NOTE ADULT - SECONDARY DIAGNOSIS.
Acute on chronic combined systolic and diastolic congestive heart failure ESRD (end stage renal disease) Anemia, unspecified type Type 2 diabetes mellitus with diabetic polyneuropathy, with long-term current use of insulin S/P CABG (coronary artery bypass graft) Atrial fibrillation, unspecified type

## 2017-05-05 NOTE — PROGRESS NOTE ADULT - ASSESSMENT
ESRD: HD due tomorrow    Anemia: progressive (CRF and neck hematoma)  - NATHAN at dialysis; cont IV Fe  - PRBCs at HD tomorrow

## 2017-05-05 NOTE — PROGRESS NOTE ADULT - SUBJECTIVE AND OBJECTIVE BOX
NEPHROLOGY INTERVAL HPI/OVERNIGHT EVENTS:  pt comfortable and seated in chair when seen earlier  no acute distress noted    MEDICATIONS  (STANDING):  aspirin 325milliGRAM(s) Oral daily  tamsulosin 0.4milliGRAM(s) Oral at bedtime  gabapentin 300milliGRAM(s) Oral three times a day  atorvastatin 40milliGRAM(s) Oral at bedtime  docusate sodium 100milliGRAM(s) Oral two times a day  dextrose 50% Injectable 12.5Gram(s) IV Push once  dextrose 50% Injectable 25Gram(s) IV Push once  dextrose 50% Injectable 25Gram(s) IV Push once  epoetin una Injectable 87502Chyo(s) IV Push <User Schedule>  heparin  Injectable 5000Unit(s) SubCutaneous every 12 hours  artificial  tears Solution 1Drop(s) Left EYE two times a day  calcium acetate 667milliGRAM(s) Oral three times a day with meals  buMETAnide 0.5milliGRAM(s) Oral two times a day  insulin lispro (HumaLOG) corrective regimen sliding scale  SubCutaneous Before meals and at bedtime  midodrine 2.5milliGRAM(s) Oral every 8 hours  iron sucrose Injectable 100milliGRAM(s) IV Push <User Schedule>  carvedilol 3.125milliGRAM(s) Oral every 12 hours    MEDICATIONS  (PRN):  dextrose Gel 1Dose(s) Oral once PRN Blood Glucose LESS THAN 70 milliGRAM(s)/deciliter  glucagon  Injectable 1milliGRAM(s) IntraMuscular once PRN Glucose LESS THAN 70 milligrams/deciliter  ALBUTerol/ipratropium for Nebulization 3milliLiter(s) Nebulizer every 4 hours PRN SOB  dextrose Gel 1Dose(s) Oral once PRN Blood Glucose LESS THAN 70 milliGRAM(s)/deciliter  dextrose Gel 1Dose(s) Oral once PRN Blood Glucose LESS THAN 70 milliGRAM(s)/deciliter  dextrose Gel 1Dose(s) Oral once PRN Blood Glucose LESS THAN 70 milliGRAM(s)/deciliter      Allergies    No Known Allergies        Vital Signs Last 24 Hrs  T(C): 36.3, Max: 37.1 (05-04 @ 16:59)  T(F): 97.4, Max: 98.7 (05-04 @ 16:59)  HR: 73 (73 - 88)  BP: 101/56 (96/58 - 118/56)  BP(mean): --  RR: 18 (16 - 18)  SpO2: 99% (99% - 99%)    PHYSICAL EXAM:  Awake/alert; NAD  chest Clear  No JVD  No rub  abd soft, NT BS +  No edema    LABS:                        7.3    6.0   )-----------( 88       ( 04 May 2017 05:32 )             24.2     05-05    131<L>  |  93<L>  |  89.0<H>  ----------------------------<  242<H>  4.4   |  23.0  |  3.91<H>    Ca    8.4<L>      05 May 2017 05:12  Hemoglobin: 7.7 g/dL (05.03.17 @ 05:50)    Iron with Total Binding Capacity (04.18.17 @ 08:13)    % Saturation, Iron: 9 %    Iron - Total Binding Capacity.: 249 ug/dL    Iron Total, Serum: 23 ug/dL                    RADIOLOGY & ADDITIONAL TESTS:   EXAM:  CHEST SINGLE VIEW FRONTAL                          PROCEDURE DATE:  05/03/2017        INTERPRETATION:  Portable chest radiograph        CLINICAL INFORMATION:   Short of breath. Chronic renal failure.    TECHNIQUE:  Portable  AP view of the chest was obtained.    COMPARISON: No previous examinations are available for review.    FINDINGS:   There , vascular congestion and perihilar interstitial infiltrates with   left lower lobe retrocardiac focal airspace consolidation. There are   bilateral small pleural effusions.. Constellation of findings suggestive   of pulmonary edema of the cardiac and noncardiac origin..  Cardiac chambers normal size left magnification. Status post cardiac   valve replacement. Right atrium and biventricular cardiac wire leads in   place.. An  Double-lumen the right tunneled IJ catheter tip in SVC.  Trachea midline. No hilar mass.   Visualized osseous structures are intact.        IMPRESSION: Bilateral pulmonary edema with a left retrocardiac airspace   consolidation and small bilateral pleural effusions as described.   Compared to prior examination no significant changes occurred.     EXAM:  US KIDNEY(S)                            PROCEDURE DATE:  04/01/2016      INTERPRETATION:  INDICATIONS:  Renal failure    TECHNIQUE:  The examination was performed with the real time apparatus   with color flow and spectral doppler imaging.    COMPARISON EXAMINATION:  Abdominal sonogram 2/17/2015    FINDINGS:  RIGHT KIDNEY:  Normal in size, shape, and configuration measuring 9.9 x   4.5 x 4.8 cm.  No cystic or solid masses.  No calculi.  LEFT KIDNEY:  Normal in size, shape, and configuration measuring 9.1 x   4.6 x 4.1 cm. No cystic or solid masses.  No calculi.    There was no evidence of obstruction in either kidney. A Mario catheter   is noted in the bladder which could not be evaluated.    A right pleural effusion is noted.    IMPRESSION: Normal renal sonogram.  Right pleural effusion

## 2017-05-05 NOTE — DISCHARGE NOTE ADULT - MEDICATION SUMMARY - MEDICATIONS TO CHANGE
I will SWITCH the dose or number of times a day I take the medications listed below when I get home from the hospital:    carvedilol 6.25 mg oral tablet  -- 1 tab(s) by mouth every 12 hours I will SWITCH the dose or number of times a day I take the medications listed below when I get home from the hospital:    insulin lispro 100 units/mL subcutaneous solution  --  subcutaneous 4 times a day (before meals and at bedtime); 1 Unit(s) if Glucose 151 - 200  2 Unit(s) if Glucose 201 - 250  3 Unit(s) if Glucose 251 - 300  4 Unit(s) if Glucose 301 - 350  5 Unit(s) if Glucose 351 - 400  6 Unit(s) if Glucose Greater Than 400    Lantus 100 units/mL subcutaneous solution  -- 5 unit(s) subcutaneous once a day (at bedtime)    carvedilol 6.25 mg oral tablet  -- 1 tab(s) by mouth every 12 hours

## 2017-05-05 NOTE — DISCHARGE NOTE ADULT - CARE PROVIDER_API CALL
Lucio Harris), Cardiology; Internal Medicine  [address] [place] [address]  Phone: [phone]  Fax: [phone]    Jimmie Alvarez), Internal Medicine; Nephrology  [address] [place], [place] [address]  Phone: [phone]  Fax: [phone]    Awa Chandler), Critical Care Medicine; Internal Medicine; Pulmonary Disease; Sleep Medicine  [address] [place] [address]  Phone: [phone]  Fax: [phone]

## 2017-05-05 NOTE — DISCHARGE NOTE ADULT - CARE PROVIDERS DIRECT ADDRESSES
[email or web address]

## 2017-05-05 NOTE — DISCHARGE NOTE ADULT - HOSPITAL COURSE
Patient: EMILY LITTLEJOHN [id number]  Internal Medicine Hospitalist Discharge Note - Dr. Live Schmitz    Chief Complaint: Patient is a 87y old  Male who presents with a chief complaint of pt got intubated in the ED after HD (19 Apr 2017 04:25)    Hospital course:  88 yo M with h/o DM, HTN, ischemic cardiomyopathy (EF 30-35%), chronic renal failure presents with worsening ARF. Pt was previously admitted for similar condition, but had refused hemodialysis until this admission. Pt had permacath placed but developed large neck swelling at the site and was unable to swallow secretions and had difficulty breathing. Pt was emergently intubated on 4/18. CTA of neck did not reveal any vascular injury, and showed severe diffuse bilateral neck edema/infiltrating hematoma in the right supraclavicular fossa extending cephalad bilaterally. Per vascular surgery, unclear cause to neck swelling, and may be an anaphylactic reaction to medication. Swelling improved, and pt was extubated on 4/20. Pt passed speech and swallow and advanced to soft diet. However, 4/21, pt developed transient afib with RVR and found to have acute hypoxic respiratory failure which improved with urgent dialysis. Noted have fever and elevated procalcitonin, attributed to pneumonia.  Seen by ID, antibiotics stopped.  Still hypoxic, seen by pulmonary, felt to be multifactorial, likely COPD, CHF, pulmonary edema.  Post HD CXR showed mild pulmonary edema.  Per pulmonary, pt will require outpatient O2 with continued HD.  Stable for placement in [facility]. Patient: EMILY LITTLEJOHN [id number]  Internal Medicine Hospitalist Discharge Note - Dr. Live Schmitz    Chief Complaint: Patient is a 87 year old  Male who presents with a chief complaint of pt got intubated in the ED after HD (19 Apr 2017 04:25)    Hospital course:  87 year old Male with h/o DM, HTN, ischemic cardiomyopathy (EF 30-35%), chronic renal failure presents with worsening ARF. Pt was previously admitted for similar condition, but had refused hemodialysis until this admission. Pt had permacath placed but developed large neck swelling at the site and was unable to swallow secretions and had difficulty breathing. Pt was emergently intubated on 4/18. CTA of neck did not reveal any vascular injury, and showed severe diffuse bilateral neck edema/infiltrating hematoma in the right supraclavicular fossa extending cephalad bilaterally. Per vascular surgery, unclear cause to neck swelling, and may be an anaphylactic reaction to medication. Swelling improved, and pt was extubated on 4/20. Pt passed speech and swallow and advanced to soft diet. However, 4/21, pt developed transient afib with RVR and found to have acute hypoxic respiratory failure which improved with urgent dialysis. Noted have fever and elevated procalcitonin, attributed to pneumonia.  Seen by ID, antibiotics stopped.  Still hypoxic, seen by pulmonary, felt to be multifactorial, likely COPD, CHF, pulmonary edema.  Post HD CXR showed mild pulmonary edema.  Per pulmonary, pt will require outpatient O2 with continued HD.  Stable for placement in [facility].    5/8/2017 Patient has been tolerating dialysis. He is stable for transfer to rehab with close outpatient follow up. Patient: EMILY LITTLEJOHN [id number]  Internal Medicine Hospitalist Discharge Note - Dr. Live Schmitz    Chief Complaint: Patient is a 87 year old  Male who presents with a chief complaint of pt got intubated in the ED after HD (19 Apr 2017 04:25)    Hospital course:  87 year old Male with h/o DM, HTN, ischemic cardiomyopathy (EF 30-35%), chronic renal failure presents with worsening ARF. Pt was previously admitted for similar condition, but had refused hemodialysis until this admission. Pt had permacath placed but developed large neck swelling at the site and was unable to swallow secretions and had difficulty breathing. Pt was emergently intubated on 4/18. CTA of neck did not reveal any vascular injury, and showed severe diffuse bilateral neck edema/infiltrating hematoma in the right supraclavicular fossa extending cephalad bilaterally. Per vascular surgery, unclear cause to neck swelling, and may be an anaphylactic reaction to medication. Swelling improved, and pt was extubated on 4/20. Pt passed speech and swallow and advanced to soft diet. However, 4/21, pt developed transient afib with RVR and found to have acute hypoxic respiratory failure which improved with urgent dialysis. Noted have fever and elevated procalcitonin, attributed to pneumonia.  Seen by ID, antibiotics stopped.  Still hypoxic, seen by pulmonary, felt to be multifactorial, likely COPD, CHF, pulmonary edema.  Post HD CXR showed mild pulmonary edema.  Per pulmonary, pt will require outpatient O2 with continued HD.  Stable for placement in [facility].  Anemia patient received one unit of packed red blood cells after dialysis.  5/8/2017 Patient has been tolerating dialysis. He is stable for transfer to rehab with close outpatient follow up.

## 2017-05-06 DIAGNOSIS — I50.23 ACUTE ON CHRONIC SYSTOLIC (CONGESTIVE) HEART FAILURE: ICD-10-CM

## 2017-05-06 LAB
ANION GAP SERPL CALC-SCNC: 18 MMOL/L — HIGH (ref 5–17)
BUN SERPL-MCNC: 104 MG/DL — HIGH (ref 8–20)
CALCIUM SERPL-MCNC: 8.3 MG/DL — LOW (ref 8.6–10.2)
CHLORIDE SERPL-SCNC: 92 MMOL/L — LOW (ref 98–107)
CO2 SERPL-SCNC: 23 MMOL/L — SIGNIFICANT CHANGE UP (ref 22–29)
CREAT SERPL-MCNC: 4.73 MG/DL — HIGH (ref 0.5–1.3)
GLUCOSE SERPL-MCNC: 131 MG/DL — HIGH (ref 70–115)
HCT VFR BLD CALC: 23.5 % — LOW (ref 42–52)
HGB BLD-MCNC: 7.2 G/DL — LOW (ref 14–18)
MCHC RBC-ENTMCNC: 26.6 PG — LOW (ref 27–31)
MCHC RBC-ENTMCNC: 30.6 G/DL — LOW (ref 32–36)
MCV RBC AUTO: 86.7 FL — SIGNIFICANT CHANGE UP (ref 80–94)
PLATELET # BLD AUTO: 98 K/UL — LOW (ref 150–400)
POTASSIUM SERPL-MCNC: 4.8 MMOL/L — SIGNIFICANT CHANGE UP (ref 3.5–5.3)
POTASSIUM SERPL-SCNC: 4.8 MMOL/L — SIGNIFICANT CHANGE UP (ref 3.5–5.3)
RBC # BLD: 2.71 M/UL — LOW (ref 4.6–6.2)
RBC # FLD: 17.3 % — HIGH (ref 11–15.6)
SODIUM SERPL-SCNC: 133 MMOL/L — LOW (ref 135–145)
WBC # BLD: 5.6 K/UL — SIGNIFICANT CHANGE UP (ref 4.8–10.8)
WBC # FLD AUTO: 5.6 K/UL — SIGNIFICANT CHANGE UP (ref 4.8–10.8)

## 2017-05-06 PROCEDURE — 71010: CPT | Mod: 26

## 2017-05-06 PROCEDURE — 99233 SBSQ HOSP IP/OBS HIGH 50: CPT

## 2017-05-06 RX ORDER — ACETAMINOPHEN 500 MG
650 TABLET ORAL EVERY 6 HOURS
Qty: 0 | Refills: 0 | Status: DISCONTINUED | OUTPATIENT
Start: 2017-05-06 | End: 2017-05-08

## 2017-05-06 RX ADMIN — MIDODRINE HYDROCHLORIDE 2.5 MILLIGRAM(S): 2.5 TABLET ORAL at 21:50

## 2017-05-06 RX ADMIN — Medication 1 DROP(S): at 17:13

## 2017-05-06 RX ADMIN — Medication 1 DROP(S): at 06:39

## 2017-05-06 RX ADMIN — Medication 3 MILLILITER(S): at 09:36

## 2017-05-06 RX ADMIN — GABAPENTIN 300 MILLIGRAM(S): 400 CAPSULE ORAL at 06:39

## 2017-05-06 RX ADMIN — ERYTHROPOIETIN 10000 UNIT(S): 10000 INJECTION, SOLUTION INTRAVENOUS; SUBCUTANEOUS at 11:08

## 2017-05-06 RX ADMIN — Medication 325 MILLIGRAM(S): at 17:14

## 2017-05-06 RX ADMIN — Medication 667 MILLIGRAM(S): at 08:50

## 2017-05-06 RX ADMIN — ATORVASTATIN CALCIUM 40 MILLIGRAM(S): 80 TABLET, FILM COATED ORAL at 21:49

## 2017-05-06 RX ADMIN — Medication 100 MILLIGRAM(S): at 06:40

## 2017-05-06 RX ADMIN — IRON SUCROSE 100 MILLIGRAM(S): 20 INJECTION, SOLUTION INTRAVENOUS at 11:08

## 2017-05-06 RX ADMIN — HEPARIN SODIUM 5000 UNIT(S): 5000 INJECTION INTRAVENOUS; SUBCUTANEOUS at 17:13

## 2017-05-06 RX ADMIN — MIDODRINE HYDROCHLORIDE 2.5 MILLIGRAM(S): 2.5 TABLET ORAL at 15:38

## 2017-05-06 RX ADMIN — BUMETANIDE 0.5 MILLIGRAM(S): 0.25 INJECTION INTRAMUSCULAR; INTRAVENOUS at 06:40

## 2017-05-06 RX ADMIN — GABAPENTIN 300 MILLIGRAM(S): 400 CAPSULE ORAL at 21:50

## 2017-05-06 RX ADMIN — Medication 650 MILLIGRAM(S): at 17:13

## 2017-05-06 RX ADMIN — GABAPENTIN 300 MILLIGRAM(S): 400 CAPSULE ORAL at 15:39

## 2017-05-06 RX ADMIN — INSULIN GLARGINE 10 UNIT(S): 100 INJECTION, SOLUTION SUBCUTANEOUS at 21:50

## 2017-05-06 RX ADMIN — Medication 4: at 17:14

## 2017-05-06 RX ADMIN — TAMSULOSIN HYDROCHLORIDE 0.4 MILLIGRAM(S): 0.4 CAPSULE ORAL at 21:50

## 2017-05-06 RX ADMIN — BUMETANIDE 0.5 MILLIGRAM(S): 0.25 INJECTION INTRAMUSCULAR; INTRAVENOUS at 17:13

## 2017-05-06 RX ADMIN — MIDODRINE HYDROCHLORIDE 2.5 MILLIGRAM(S): 2.5 TABLET ORAL at 06:40

## 2017-05-06 RX ADMIN — Medication 667 MILLIGRAM(S): at 17:13

## 2017-05-06 RX ADMIN — HEPARIN SODIUM 5000 UNIT(S): 5000 INJECTION INTRAVENOUS; SUBCUTANEOUS at 06:40

## 2017-05-06 RX ADMIN — Medication 650 MILLIGRAM(S): at 18:51

## 2017-05-06 RX ADMIN — Medication 100 MILLIGRAM(S): at 17:13

## 2017-05-06 RX ADMIN — Medication 3 MILLILITER(S): at 14:47

## 2017-05-06 NOTE — PROGRESS NOTE ADULT - ASSESSMENT
88 yo M with h/o DM, HTN, ischemic cardiomyopathy, chronic renal failure here with ESRD requiring dialysis and neck hematoma.  Pt with continued hypoxia and recurrent CHF.

## 2017-05-06 NOTE — PROGRESS NOTE ADULT - SUBJECTIVE AND OBJECTIVE BOX
NEPHROLOGY INTERVAL HPI/OVERNIGHT EVENTS:  pt clinically stable  no acute distress    MEDICATIONS  (STANDING):  aspirin 325milliGRAM(s) Oral daily  tamsulosin 0.4milliGRAM(s) Oral at bedtime  gabapentin 300milliGRAM(s) Oral three times a day  atorvastatin 40milliGRAM(s) Oral at bedtime  docusate sodium 100milliGRAM(s) Oral two times a day  dextrose 50% Injectable 12.5Gram(s) IV Push once  dextrose 50% Injectable 25Gram(s) IV Push once  dextrose 50% Injectable 25Gram(s) IV Push once  epoetin una Injectable 15745Vspa(s) IV Push <User Schedule>  heparin  Injectable 5000Unit(s) SubCutaneous every 12 hours  artificial  tears Solution 1Drop(s) Left EYE two times a day  calcium acetate 667milliGRAM(s) Oral three times a day with meals  buMETAnide 0.5milliGRAM(s) Oral two times a day  insulin lispro (HumaLOG) corrective regimen sliding scale  SubCutaneous Before meals and at bedtime  midodrine 2.5milliGRAM(s) Oral every 8 hours  iron sucrose Injectable 100milliGRAM(s) IV Push <User Schedule>  insulin glargine Injectable (LANTUS) 10Unit(s) SubCutaneous at bedtime    MEDICATIONS  (PRN):  dextrose Gel 1Dose(s) Oral once PRN Blood Glucose LESS THAN 70 milliGRAM(s)/deciliter  glucagon  Injectable 1milliGRAM(s) IntraMuscular once PRN Glucose LESS THAN 70 milligrams/deciliter  ALBUTerol/ipratropium for Nebulization 3milliLiter(s) Nebulizer every 4 hours PRN SOB  dextrose Gel 1Dose(s) Oral once PRN Blood Glucose LESS THAN 70 milliGRAM(s)/deciliter  dextrose Gel 1Dose(s) Oral once PRN Blood Glucose LESS THAN 70 milliGRAM(s)/deciliter  dextrose Gel 1Dose(s) Oral once PRN Blood Glucose LESS THAN 70 milliGRAM(s)/deciliter      Allergies    No Known Allergies    Intolerances          Vital Signs Last 24 Hrs  T(C): 37.2, Max: 37.7 (05-06 @ 05:34)  T(F): 98.9, Max: 99.8 (05-06 @ 05:34)  HR: 85 (72 - 86)  BP: 104/62 (92/54 - 104/62)  BP(mean): --  RR: 18 (18 - 18)  SpO2: 80% (80% - 98%)    PHYSICAL EXAM:  Awake/alert; NAD  chest Clear  No JVD  No rub  abd soft, NT BS +  No edema      LABS:                        7.2    5.6   )-----------( x        ( 06 May 2017 07:10 )             23.5     05-06    133<L>  |  92<L>  |  104.0<H>  ----------------------------<  131<H>  4.8   |  23.0  |  4.73<H>    Ca    8.3<L>      06 May 2017 07:10              RADIOLOGY & ADDITIONAL TESTS:

## 2017-05-06 NOTE — PROGRESS NOTE ADULT - SUBJECTIVE AND OBJECTIVE BOX
Patient: EMILY LITTLEJOHN [id number] 87y Male  Internal Medicine Hospitalist Progress Note - Dr. Live Schmitz    Chief Complaint: Patient is a 87y old  Male who presents with a chief complaint of pt got intubated in the ED after HD (2017 04:25)    Hospital course:  86 yo M with h/o DM, HTN, ischemic cardiomyopathy (EF 30-35%), chronic renal failure presents with worsening ARF. Pt was previously admitted for similar condition, but had refused hemodialysis until this admission. Pt had permacath placed but developed large neck swelling at the site and was unable to swallow secretions and had difficulty breathing. Pt was emergently intubated on . CTA of neck did not reveal any vascular injury, and showed severe diffuse bilateral neck edema/infiltrating hematoma in the right supraclavicular fossa extending cephalad bilaterally. Per vascular surgery, unclear cause to neck swelling, and may be an anaphylactic reaction to medication. Swelling improved, and pt was extubated on . Pt passed speech and swallow and advanced to soft diet. However, , pt developed afib with RVR and found to have acute hypoxic respiratory failure which improved with urgent dialysis. Noted have fever and elevated procalcitonin, attributed to pneumonia.  Seen by ID, antibiotics stopped.  Still hypoxic, seen by pulmonary, felt to be multifactorial, likely COPD, CHF, pulmonary edema.  Post HD CXR showed Bilateral pulmonary edema with a left retrocardiac airspace consolidation and small bilateral pleural effusions as described. Compared to prior examination no significant changes occurred.    Pt seen during HD, receiving pRBCs.  Per RN, after breakfast pt had episode of acute SOB, requiring 60% FIO2.  Pt now denies SOB / fever / chills.  + cough.  No additional complaints.     ____________________PHYSICAL EXAM:  Vitals reviewed as indicated below  GENERAL:  NAD Alert and Oriented x 3   HEENT: NCAT.  Visually impaired.   CARDIOVASCULAR:  S1, S2  LUNGS: b/l rhonchi  ABDOMEN:  soft, (-) tenderness, (-) distension, (+) bowel sounds, (-) guarding, (-) rebound (-) rigidity  EXTREMITIES:  no cyanosis / clubbing.  1+ edema.   ____________________      MEDICATIONS:  aspirin 325milliGRAM(s) Oral daily  tamsulosin 0.4milliGRAM(s) Oral at bedtime  gabapentin 300milliGRAM(s) Oral three times a day  atorvastatin 40milliGRAM(s) Oral at bedtime  docusate sodium 100milliGRAM(s) Oral two times a day  dextrose Gel 1Dose(s) Oral once PRN  dextrose 50% Injectable 12.5Gram(s) IV Push once  dextrose 50% Injectable 25Gram(s) IV Push once  dextrose 50% Injectable 25Gram(s) IV Push once  glucagon  Injectable 1milliGRAM(s) IntraMuscular once PRN  epoetin una Injectable 46899Zcbf(s) IV Push <User Schedule>  heparin  Injectable 5000Unit(s) SubCutaneous every 12 hours  artificial  tears Solution 1Drop(s) Left EYE two times a day  calcium acetate 667milliGRAM(s) Oral three times a day with meals  buMETAnide 0.5milliGRAM(s) Oral two times a day  insulin lispro (HumaLOG) corrective regimen sliding scale  SubCutaneous Before meals and at bedtime  midodrine 2.5milliGRAM(s) Oral every 8 hours  iron sucrose Injectable 100milliGRAM(s) IV Push <User Schedule>  ALBUTerol/ipratropium for Nebulization 3milliLiter(s) Nebulizer every 4 hours PRN  dextrose Gel 1Dose(s) Oral once PRN  dextrose Gel 1Dose(s) Oral once PRN  dextrose Gel 1Dose(s) Oral once PRN  insulin glargine Injectable (LANTUS) 10Unit(s) SubCutaneous at bedtime      VITALS:  Vital Signs Last 24 Hrs  T(C): 36.4, Max: 37.7 ( @ 05:34)  T(F): 97.5, Max: 99.8 ( @ 05:34)  HR: 87 (72 - 87)  BP: 134/69 (92/54 - 134/69)  BP(mean): --  RR: 20 (18 - 20)  SpO2: 100% (80% - 100%) Daily     Daily Weight in k.7 (05 May 2017 13:18)  CAPILLARY BLOOD GLUCOSE  110 (06 May 2017 12:09)  128 (06 May 2017 08:48)  179 (05 May 2017 17:08)    I&O's Summary      LABS:                        7.2    5.6   )-----------( 98       ( 06 May 2017 07:10 )             23.5     05-06    133<L>  |  92<L>  |  104.0<H>  ----------------------------<  131<H>  4.8   |  23.0  |  4.73<H>    Ca    8.3<L>      06 May 2017 07:10        RECENT CULTURES:

## 2017-05-06 NOTE — PROGRESS NOTE ADULT - PROBLEM SELECTOR PLAN 1
Multifactorial - component of CHF / pulmonary edema / COPD.  Discussed with pulmonary.   Repeat CXR.  CT chest to assess ? infiltrate.  Clinically not consistent with infection.

## 2017-05-06 NOTE — PROGRESS NOTE ADULT - ASSESSMENT
ESRD: HD due today    Anemia: progressive (CRF and neck hematoma)  - NATHAN at dialysis; cont IV Fe  - PRBCs at HD today

## 2017-05-07 PROCEDURE — 71250 CT THORAX DX C-: CPT | Mod: 26

## 2017-05-07 PROCEDURE — 99233 SBSQ HOSP IP/OBS HIGH 50: CPT

## 2017-05-07 RX ADMIN — BUMETANIDE 0.5 MILLIGRAM(S): 0.25 INJECTION INTRAMUSCULAR; INTRAVENOUS at 17:48

## 2017-05-07 RX ADMIN — Medication 667 MILLIGRAM(S): at 17:48

## 2017-05-07 RX ADMIN — Medication 650 MILLIGRAM(S): at 18:10

## 2017-05-07 RX ADMIN — Medication 650 MILLIGRAM(S): at 09:05

## 2017-05-07 RX ADMIN — Medication 100 MILLIGRAM(S): at 17:48

## 2017-05-07 RX ADMIN — GABAPENTIN 300 MILLIGRAM(S): 400 CAPSULE ORAL at 06:02

## 2017-05-07 RX ADMIN — GABAPENTIN 300 MILLIGRAM(S): 400 CAPSULE ORAL at 13:43

## 2017-05-07 RX ADMIN — HEPARIN SODIUM 5000 UNIT(S): 5000 INJECTION INTRAVENOUS; SUBCUTANEOUS at 17:49

## 2017-05-07 RX ADMIN — MIDODRINE HYDROCHLORIDE 2.5 MILLIGRAM(S): 2.5 TABLET ORAL at 06:02

## 2017-05-07 RX ADMIN — INSULIN GLARGINE 10 UNIT(S): 100 INJECTION, SOLUTION SUBCUTANEOUS at 21:25

## 2017-05-07 RX ADMIN — TAMSULOSIN HYDROCHLORIDE 0.4 MILLIGRAM(S): 0.4 CAPSULE ORAL at 21:26

## 2017-05-07 RX ADMIN — ATORVASTATIN CALCIUM 40 MILLIGRAM(S): 80 TABLET, FILM COATED ORAL at 21:25

## 2017-05-07 RX ADMIN — MIDODRINE HYDROCHLORIDE 2.5 MILLIGRAM(S): 2.5 TABLET ORAL at 21:26

## 2017-05-07 RX ADMIN — HEPARIN SODIUM 5000 UNIT(S): 5000 INJECTION INTRAVENOUS; SUBCUTANEOUS at 06:01

## 2017-05-07 RX ADMIN — Medication 650 MILLIGRAM(S): at 09:30

## 2017-05-07 RX ADMIN — Medication 650 MILLIGRAM(S): at 17:55

## 2017-05-07 RX ADMIN — GABAPENTIN 300 MILLIGRAM(S): 400 CAPSULE ORAL at 21:25

## 2017-05-07 RX ADMIN — Medication 100 MILLIGRAM(S): at 06:02

## 2017-05-07 RX ADMIN — BUMETANIDE 0.5 MILLIGRAM(S): 0.25 INJECTION INTRAMUSCULAR; INTRAVENOUS at 06:02

## 2017-05-07 RX ADMIN — Medication 1 DROP(S): at 17:47

## 2017-05-07 RX ADMIN — MIDODRINE HYDROCHLORIDE 2.5 MILLIGRAM(S): 2.5 TABLET ORAL at 13:43

## 2017-05-07 RX ADMIN — Medication 1 DROP(S): at 06:01

## 2017-05-07 RX ADMIN — Medication 325 MILLIGRAM(S): at 13:45

## 2017-05-07 RX ADMIN — Medication 667 MILLIGRAM(S): at 13:43

## 2017-05-07 RX ADMIN — Medication 3 MILLILITER(S): at 21:31

## 2017-05-07 RX ADMIN — Medication 667 MILLIGRAM(S): at 09:04

## 2017-05-07 NOTE — PROGRESS NOTE ADULT - SUBJECTIVE AND OBJECTIVE BOX
EMILY LITTLEJOHN     Chief Complaint: Patient is a 87y old  Male who presents with a chief complaint of pt got intubated in the ED after HD (19 Apr 2017 04:25)      PAST MEDICAL & SURGICAL HISTORY:  Chronic renal failure  Coronary artery disease involving autologous vein bypass graft  CHF (congestive heart failure): FAMILY/PT NOT SURE IF DIAGNOSED WITH CHF OR COPD  Pacemaker  Diabetes  PSA elevation  Hyperlipemia  Hypertension  Cataract  Glaucoma  S/P CABG (coronary artery bypass graft)  S/P prostatectomy: 1992      HPI/OVERNIGHT EVENTS: Patient lying in bed, confused. He needs assistance to eat.    MEDICATIONS  (STANDING):  aspirin 325milliGRAM(s) Oral daily  tamsulosin 0.4milliGRAM(s) Oral at bedtime  gabapentin 300milliGRAM(s) Oral three times a day  atorvastatin 40milliGRAM(s) Oral at bedtime  docusate sodium 100milliGRAM(s) Oral two times a day  dextrose 50% Injectable 12.5Gram(s) IV Push once  dextrose 50% Injectable 25Gram(s) IV Push once  dextrose 50% Injectable 25Gram(s) IV Push once  epoetin una Injectable [id number](s) IV Push <User Schedule>  heparin  Injectable 5000Unit(s) SubCutaneous every 12 hours  artificial  tears Solution 1Drop(s) Left EYE two times a day  calcium acetate 667milliGRAM(s) Oral three times a day with meals  buMETAnide 0.5milliGRAM(s) Oral two times a day  insulin lispro (HumaLOG) corrective regimen sliding scale  SubCutaneous Before meals and at bedtime  midodrine 2.5milliGRAM(s) Oral every 8 hours  iron sucrose Injectable 100milliGRAM(s) IV Push <User Schedule>  insulin glargine Injectable (LANTUS) 10Unit(s) SubCutaneous at bedtime      Vital Signs Last 24 Hrs  T(C): 36.4, Max: 37 (05-06 @ 21:54)  T(F): 97.6, Max: 98.6 (05-06 @ 21:54)  HR: 79 (79 - 88)  BP: 106/53 (102/57 - 128/68)  BP(mean): --  RR: 18 (18 - 18)  SpO2: 97% (94% - 99%)    PHYSICAL EXAM:     HEENT: PERRLA, EOMI, Normal Hearing, MMM  Neck: No LAD, No JVD  Back: Normal spine flexure, No CVA tenderness  Respiratory: CTAB Cardiovascular: right subclavian  Gastrointestinal: BS+, soft, NT/ND  Extremities: No peripheral edema  Vascular: 2+ peripheral pulses  Neurological: A/O x 3, no focal deficits  Psychiatric: Normal mood, normal affect  Musculoskeletal: 5/5 strength b/l upper and lower extremities  Skin: No rashes    CAPILLARY BLOOD GLUCOSE  98 (07 May 2017 12:47)  81 (07 May 2017 09:00)  64 (07 May 2017 08:02)  147 (06 May 2017 21:54)  212 (06 May 2017 17:10)  110 (06 May 2017 12:09)  128 (06 May 2017 08:48)  179 (05 May 2017 17:08)  318 (05 May 2017 12:14)  222 (05 May 2017 07:47)  311 (04 May 2017 22:18)  386 (04 May 2017 20:26)  432 (04 May 2017 18:49)  471 (04 May 2017 17:46)  476 (04 May 2017 16:43)  136 (04 May 2017 12:02)  80 (04 May 2017 08:36)  243 (03 May 2017 21:40)  194 (03 May 2017 17:41)    LABS:                        7.2    5.6   )-----------( 98       ( 06 May 2017 07:10 )             23.5     05-06    133<L>  |  92<L>  |  104.0<H>  ----------------------------<  131<H>  4.8   |  23.0  |  4.73<H>    Ca    8.3<L>      06 May 2017 07:10            RADIOLOGY & ADDITIONAL TESTS:

## 2017-05-07 NOTE — PROGRESS NOTE ADULT - PROBLEM SELECTOR PLAN 1
Multifactorial - component of CHF / pulmonary edema / COPD.  Discussed with pulmonary.   ct shows bilateral infiltrates.

## 2017-05-08 VITALS
DIASTOLIC BLOOD PRESSURE: 75 MMHG | RESPIRATION RATE: 18 BRPM | HEART RATE: 93 BPM | WEIGHT: 159.84 LBS | TEMPERATURE: 98 F | SYSTOLIC BLOOD PRESSURE: 140 MMHG | OXYGEN SATURATION: 95 %

## 2017-05-08 LAB
ANION GAP SERPL CALC-SCNC: 16 MMOL/L — SIGNIFICANT CHANGE UP (ref 5–17)
BUN SERPL-MCNC: 77 MG/DL — HIGH (ref 8–20)
CALCIUM SERPL-MCNC: 8.6 MG/DL — SIGNIFICANT CHANGE UP (ref 8.6–10.2)
CHLORIDE SERPL-SCNC: 99 MMOL/L — SIGNIFICANT CHANGE UP (ref 98–107)
CO2 SERPL-SCNC: 27 MMOL/L — SIGNIFICANT CHANGE UP (ref 22–29)
CREAT SERPL-MCNC: 3.42 MG/DL — HIGH (ref 0.5–1.3)
GLUCOSE SERPL-MCNC: 73 MG/DL — SIGNIFICANT CHANGE UP (ref 70–115)
HCT VFR BLD CALC: 31.5 % — LOW (ref 42–52)
HGB BLD-MCNC: 9.7 G/DL — LOW (ref 14–18)
MCHC RBC-ENTMCNC: 25.6 PG — LOW (ref 27–31)
MCHC RBC-ENTMCNC: 30.8 G/DL — LOW (ref 32–36)
MCV RBC AUTO: 83.1 FL — SIGNIFICANT CHANGE UP (ref 80–94)
PLATELET # BLD AUTO: 94 K/UL — LOW (ref 150–400)
POTASSIUM SERPL-MCNC: 3.9 MMOL/L — SIGNIFICANT CHANGE UP (ref 3.5–5.3)
POTASSIUM SERPL-SCNC: 3.9 MMOL/L — SIGNIFICANT CHANGE UP (ref 3.5–5.3)
RBC # BLD: 3.79 M/UL — LOW (ref 4.6–6.2)
RBC # FLD: 22.3 % — HIGH (ref 11–15.6)
SODIUM SERPL-SCNC: 142 MMOL/L — SIGNIFICANT CHANGE UP (ref 135–145)
WBC # BLD: 4.7 K/UL — LOW (ref 4.8–10.8)
WBC # FLD AUTO: 4.7 K/UL — LOW (ref 4.8–10.8)

## 2017-05-08 PROCEDURE — 83735 ASSAY OF MAGNESIUM: CPT

## 2017-05-08 PROCEDURE — C1887: CPT

## 2017-05-08 PROCEDURE — 84295 ASSAY OF SERUM SODIUM: CPT

## 2017-05-08 PROCEDURE — 92610 EVALUATE SWALLOWING FUNCTION: CPT

## 2017-05-08 PROCEDURE — 84484 ASSAY OF TROPONIN QUANT: CPT

## 2017-05-08 PROCEDURE — 82330 ASSAY OF CALCIUM: CPT

## 2017-05-08 PROCEDURE — 70498 CT ANGIOGRAPHY NECK: CPT

## 2017-05-08 PROCEDURE — 83550 IRON BINDING TEST: CPT

## 2017-05-08 PROCEDURE — 82550 ASSAY OF CK (CPK): CPT

## 2017-05-08 PROCEDURE — 86706 HEP B SURFACE ANTIBODY: CPT

## 2017-05-08 PROCEDURE — 86038 ANTINUCLEAR ANTIBODIES: CPT

## 2017-05-08 PROCEDURE — 85014 HEMATOCRIT: CPT

## 2017-05-08 PROCEDURE — 87040 BLOOD CULTURE FOR BACTERIA: CPT

## 2017-05-08 PROCEDURE — 80053 COMPREHEN METABOLIC PANEL: CPT

## 2017-05-08 PROCEDURE — 82553 CREATINE MB FRACTION: CPT

## 2017-05-08 PROCEDURE — 82803 BLOOD GASES ANY COMBINATION: CPT

## 2017-05-08 PROCEDURE — 94660 CPAP INITIATION&MGMT: CPT

## 2017-05-08 PROCEDURE — 97110 THERAPEUTIC EXERCISES: CPT

## 2017-05-08 PROCEDURE — P9047: CPT

## 2017-05-08 PROCEDURE — 71250 CT THORAX DX C-: CPT

## 2017-05-08 PROCEDURE — 84100 ASSAY OF PHOSPHORUS: CPT

## 2017-05-08 PROCEDURE — 84132 ASSAY OF SERUM POTASSIUM: CPT

## 2017-05-08 PROCEDURE — 99239 HOSP IP/OBS DSCHRG MGMT >30: CPT

## 2017-05-08 PROCEDURE — 99261: CPT

## 2017-05-08 PROCEDURE — 86022 PLATELET ANTIBODIES: CPT

## 2017-05-08 PROCEDURE — 82947 ASSAY GLUCOSE BLOOD QUANT: CPT

## 2017-05-08 PROCEDURE — 80048 BASIC METABOLIC PNL TOTAL CA: CPT

## 2017-05-08 PROCEDURE — 99231 SBSQ HOSP IP/OBS SF/LOW 25: CPT

## 2017-05-08 PROCEDURE — 36430 TRANSFUSION BLD/BLD COMPNT: CPT

## 2017-05-08 PROCEDURE — 92526 ORAL FUNCTION THERAPY: CPT

## 2017-05-08 PROCEDURE — 99285 EMERGENCY DEPT VISIT HI MDM: CPT | Mod: 25

## 2017-05-08 PROCEDURE — 86850 RBC ANTIBODY SCREEN: CPT

## 2017-05-08 PROCEDURE — P9016: CPT

## 2017-05-08 PROCEDURE — 86803 HEPATITIS C AB TEST: CPT

## 2017-05-08 PROCEDURE — 82728 ASSAY OF FERRITIN: CPT

## 2017-05-08 PROCEDURE — 86900 BLOOD TYPING SEROLOGIC ABO: CPT

## 2017-05-08 PROCEDURE — C1769: CPT

## 2017-05-08 PROCEDURE — 87340 HEPATITIS B SURFACE AG IA: CPT

## 2017-05-08 PROCEDURE — 71045 X-RAY EXAM CHEST 1 VIEW: CPT

## 2017-05-08 PROCEDURE — C1750: CPT

## 2017-05-08 PROCEDURE — 86704 HEP B CORE ANTIBODY TOTAL: CPT

## 2017-05-08 PROCEDURE — 85027 COMPLETE CBC AUTOMATED: CPT

## 2017-05-08 PROCEDURE — 87070 CULTURE OTHR SPECIMN AEROBIC: CPT

## 2017-05-08 PROCEDURE — 94002 VENT MGMT INPAT INIT DAY: CPT

## 2017-05-08 PROCEDURE — C1894: CPT

## 2017-05-08 PROCEDURE — 86901 BLOOD TYPING SEROLOGIC RH(D): CPT

## 2017-05-08 PROCEDURE — 87186 SC STD MICRODIL/AGAR DIL: CPT

## 2017-05-08 PROCEDURE — 83605 ASSAY OF LACTIC ACID: CPT

## 2017-05-08 PROCEDURE — 94640 AIRWAY INHALATION TREATMENT: CPT

## 2017-05-08 PROCEDURE — 84145 PROCALCITONIN (PCT): CPT

## 2017-05-08 PROCEDURE — 86920 COMPATIBILITY TEST SPIN: CPT

## 2017-05-08 PROCEDURE — 97163 PT EVAL HIGH COMPLEX 45 MIN: CPT

## 2017-05-08 PROCEDURE — 77001 FLUOROGUIDE FOR VEIN DEVICE: CPT

## 2017-05-08 PROCEDURE — 86705 HEP B CORE ANTIBODY IGM: CPT

## 2017-05-08 PROCEDURE — 97530 THERAPEUTIC ACTIVITIES: CPT

## 2017-05-08 PROCEDURE — 85610 PROTHROMBIN TIME: CPT

## 2017-05-08 PROCEDURE — 82435 ASSAY OF BLOOD CHLORIDE: CPT

## 2017-05-08 PROCEDURE — 85730 THROMBOPLASTIN TIME PARTIAL: CPT

## 2017-05-08 PROCEDURE — 94003 VENT MGMT INPAT SUBQ DAY: CPT

## 2017-05-08 PROCEDURE — 93005 ELECTROCARDIOGRAM TRACING: CPT

## 2017-05-08 PROCEDURE — 36415 COLL VENOUS BLD VENIPUNCTURE: CPT

## 2017-05-08 PROCEDURE — 36600 WITHDRAWAL OF ARTERIAL BLOOD: CPT

## 2017-05-08 PROCEDURE — 99221 1ST HOSP IP/OBS SF/LOW 40: CPT

## 2017-05-08 RX ORDER — ERYTHROPOIETIN 10000 [IU]/ML
10000 INJECTION, SOLUTION INTRAVENOUS; SUBCUTANEOUS
Qty: 0 | Refills: 0 | COMMUNITY
Start: 2017-05-08

## 2017-05-08 RX ADMIN — GABAPENTIN 300 MILLIGRAM(S): 400 CAPSULE ORAL at 13:20

## 2017-05-08 RX ADMIN — Medication 667 MILLIGRAM(S): at 09:34

## 2017-05-08 RX ADMIN — MIDODRINE HYDROCHLORIDE 2.5 MILLIGRAM(S): 2.5 TABLET ORAL at 13:20

## 2017-05-08 RX ADMIN — Medication 667 MILLIGRAM(S): at 13:19

## 2017-05-08 RX ADMIN — Medication 3 MILLILITER(S): at 14:53

## 2017-05-08 RX ADMIN — Medication 100 MILLIGRAM(S): at 05:01

## 2017-05-08 RX ADMIN — Medication 1 DROP(S): at 05:01

## 2017-05-08 RX ADMIN — Medication 325 MILLIGRAM(S): at 13:19

## 2017-05-08 RX ADMIN — HEPARIN SODIUM 5000 UNIT(S): 5000 INJECTION INTRAVENOUS; SUBCUTANEOUS at 18:21

## 2017-05-08 RX ADMIN — HEPARIN SODIUM 5000 UNIT(S): 5000 INJECTION INTRAVENOUS; SUBCUTANEOUS at 05:01

## 2017-05-08 RX ADMIN — Medication 1 DROP(S): at 18:21

## 2017-05-08 RX ADMIN — BUMETANIDE 0.5 MILLIGRAM(S): 0.25 INJECTION INTRAMUSCULAR; INTRAVENOUS at 05:01

## 2017-05-08 RX ADMIN — Medication 4: at 13:20

## 2017-05-08 RX ADMIN — MIDODRINE HYDROCHLORIDE 2.5 MILLIGRAM(S): 2.5 TABLET ORAL at 05:01

## 2017-05-08 RX ADMIN — GABAPENTIN 300 MILLIGRAM(S): 400 CAPSULE ORAL at 05:01

## 2017-05-08 RX ADMIN — Medication 667 MILLIGRAM(S): at 18:21

## 2017-05-08 RX ADMIN — BUMETANIDE 0.5 MILLIGRAM(S): 0.25 INJECTION INTRAMUSCULAR; INTRAVENOUS at 18:21

## 2017-05-08 RX ADMIN — Medication 100 MILLIGRAM(S): at 18:21

## 2017-05-08 NOTE — PROGRESS NOTE ADULT - PROBLEM SELECTOR PLAN 6
C/w atorvastatin
Supportive care.  Monitor H/H
Supportive care.  Monitor H/H
C/w atorvastatin
Normotensive  Monitor
Normotensive  Monitor
Stable, controlled /74  c/w current regimen. Carvedilol, Lisinopril, Bumetanide, Hydralazine
Supportive care.  Monitor H/H
Per pt, he was only using artificial eye drops
nasal packing, cefazolin for packing

## 2017-05-08 NOTE — PROGRESS NOTE ADULT - SUBJECTIVE AND OBJECTIVE BOX
NEPHROLOGY INTERVAL HPI/OVERNIGHT EVENTS:  pt doing well  no acute distress noted    MEDICATIONS  (STANDING):  aspirin 325milliGRAM(s) Oral daily  tamsulosin 0.4milliGRAM(s) Oral at bedtime  gabapentin 300milliGRAM(s) Oral three times a day  atorvastatin 40milliGRAM(s) Oral at bedtime  docusate sodium 100milliGRAM(s) Oral two times a day  dextrose 50% Injectable 12.5Gram(s) IV Push once  dextrose 50% Injectable 25Gram(s) IV Push once  dextrose 50% Injectable 25Gram(s) IV Push once  epoetin una Injectable [id number](s) IV Push <User Schedule>  heparin  Injectable 5000Unit(s) SubCutaneous every 12 hours  artificial  tears Solution 1Drop(s) Left EYE two times a day  calcium acetate 667milliGRAM(s) Oral three times a day with meals  buMETAnide 0.5milliGRAM(s) Oral two times a day  insulin lispro (HumaLOG) corrective regimen sliding scale  SubCutaneous Before meals and at bedtime  midodrine 2.5milliGRAM(s) Oral every 8 hours  iron sucrose Injectable 100milliGRAM(s) IV Push <User Schedule>  insulin glargine Injectable (LANTUS) 10Unit(s) SubCutaneous at bedtime    MEDICATIONS  (PRN):  dextrose Gel 1Dose(s) Oral once PRN Blood Glucose LESS THAN 70 milliGRAM(s)/deciliter  glucagon  Injectable 1milliGRAM(s) IntraMuscular once PRN Glucose LESS THAN 70 milligrams/deciliter  ALBUTerol/ipratropium for Nebulization 3milliLiter(s) Nebulizer every 4 hours PRN SOB  dextrose Gel 1Dose(s) Oral once PRN Blood Glucose LESS THAN 70 milliGRAM(s)/deciliter  dextrose Gel 1Dose(s) Oral once PRN Blood Glucose LESS THAN 70 milliGRAM(s)/deciliter  dextrose Gel 1Dose(s) Oral once PRN Blood Glucose LESS THAN 70 milliGRAM(s)/deciliter  acetaminophen   Tablet. 650milliGRAM(s) Oral every 6 hours PRN Moderate Pain (4 - 6)      Allergies    No Known Allergies    Intolerances          Vital Signs Last 24 Hrs  T(C): 36.9, Max: 36.9 (05-08 @ 08:00)  T(F): 98.4, Max: 98.4 (05-08 @ 08:00)  HR: 94 (79 - 100)  BP: 137/88 (106/53 - 138/82)  BP(mean): --  RR: 18 (18 - 20)  SpO2: 16% (16% - 97%)    PHYSICAL EXAM:  Awake/alert; NAD  chest Clear  No JVD  No rub  abd soft, NT BS +  Tr edema      LABS:                        9.7    4.7   )-----------( 94       ( 08 May 2017 07:31 )             31.5     05-08    142  |  99  |  77.0<H>  ----------------------------<  73  3.9   |  27.0  |  3.42<H>    Ca    8.6      08 May 2017 07:31              RADIOLOGY & ADDITIONAL TESTS:   EXAM:  CT CHEST                          PROCEDURE DATE:  05/07/2017        INTERPRETATION:      CT tomographic sections of the chest were performed from the thoracic   inlet to the upper abdomen. Axial sections were performed followed by   reformatted parasagittal and coronal MIP reconstructions. No intravenous   contrast.      History:  Dyspnea. Hypoxia.    Comparisons: Chest x-ray dated 5/6/2017 and CT chest dated 4/27/2017    Findings: Moderate bilateral pleural effusions. Infiltrates and   atelectasis noted at the lung bases. CVP line in the SVC. Patchy alveolar   infiltrates are present in the upper lobes. Bronchiectasis right upper   lobe. Small peripheral bullae in right upper lobe    The pulmonary vasculature and aorta are normal in caliber and contour.   The heart size is normal. Aortic valve replacement.  Pacemaker Mildly   enlarged mediastinal lymph nodes are present in the retrocaval   pretracheal space measuring 15 to 19 mm in diameter. The axilla appears   normal.    Status post median sternotomy.    Limited views of the upper abdomen demonstrate a normal size liver and   spleen. The adrenal glands are unremarkable. Small calcified gallstones   layering in the dependent portion of the gallbladder. No obstructive   uropathy.    Impression: Bibasilar atelectasis and pleural effusions. Patchy   infiltrates in right and left upper lobes. Bronchiectasis right upper   lobe.    Status post median sternotomy and bypass surgery. Permanent pacemaker.    Mediastinal lymphadenopathy. .    Small gallstones                GARY JORGE M.D., ATTENDING RADIOLOGIST  This document has been electronically signed. May  7 2017 10:24AM

## 2017-05-08 NOTE — PROGRESS NOTE ADULT - ATTENDING COMMENTS
GISELA tomorrow.
HD now .
HD today, repeat H&H.
HD tomorrow with epogen
No HD today.
The patient is in critical condition and requires ICU care and monitoring. Total Critical Care time spent by attending physician was ____45_____ minutes, excluding procedure time.     Palliative/Ethics: palliative following; family updated by MICU team at bedside     RASS 0  CAM-ICU negative  Sedation: none  VENT Bundle Reviewed: n/a  Glycemic Control: long acting, niss  DVT PPX: heparin   Nutrition: dysphagia; dash/dm  PT/OT: ordered, OBB to chair as tolerated    Invasive Lines/Mario:    4/19- HD cath -
The Hospitalist Service will assume care of this patient beginning tomorrow.
Transfer to [facility]
Discussed with patient and care management.  Expect discharge to [facility] in 24h.
The Hospitalist Service will assume care of this patient beginning tomorrow.
note addended where needed. Plan d/w SW, wife Iram, and Nephrologist Dr Gaines. Patient planned to be discharged. Please see discharge papers for details.
The patient is in critical condition and requires ICU care and monitoring. Total Critical Care time spent by attending physician was ___50____ minutes, excluding procedure time.     Palliative/Ethics: palliative following; will update family this afternoon.     RASS -1  CAM-ICU negaive  Sedation: none  VENT Bundle Reviewed: n/a  Glycemic Control: lantus, niss  DVT PPX: heparin 5000U subcutaneously;   Nutrition: PO as tolerated, dysphagia screening  PT/OT: ordered    Invasive Lines/Mario:     4/19- per cath placement- ESRD on IHD
The patient is in critical condition and requires ICU care and monitoring. Total Critical Care time spent by attending physician was ____45_____ minutes, excluding procedure time.     Palliative/Ethics: palliative following; will update family this afternoon.     RASS -1  CAM-ICU negative  Sedation: none  VENT Bundle Reviewed: n/a  Glycemic Control: long acting, niss  DVT PPX: heparin   Nutrition: dysphagia; dash/dm  PT/OT: ordered, OBB to chair as tolerated    Invasive Lines/Mario:    4/19- HD cath -

## 2017-05-08 NOTE — PROGRESS NOTE ADULT - ASSESSMENT
ESRD: HD tomorrow    Anemia: progressive (CRF and neck hematoma)==> now stable  - NATHAN at dialysis; cont IV Fe  - s/p PRBCs at last HD treatment

## 2017-05-08 NOTE — PROGRESS NOTE ADULT - SUBJECTIVE AND OBJECTIVE BOX
EMILY LITTLEJOHN     Chief Complaint: Patient is a 87y old  Male who presents with a chief complaint of pt got intubated in the ED after HD (19 Apr 2017 04:25)      PAST MEDICAL & SURGICAL HISTORY:  Chronic renal failure  Coronary artery disease involving autologous vein bypass graft  CHF (congestive heart failure): FAMILY/PT NOT SURE IF DIAGNOSED WITH CHF OR COPD  Pacemaker  Diabetes  PSA elevation  Hyperlipemia  Hypertension  Cataract  Glaucoma  S/P CABG (coronary artery bypass graft)  S/P prostatectomy: 1992      HPI/OVERNIGHT EVENTS:    MEDICATIONS  (STANDING):  aspirin 325milliGRAM(s) Oral daily  tamsulosin 0.4milliGRAM(s) Oral at bedtime  gabapentin 300milliGRAM(s) Oral three times a day  atorvastatin 40milliGRAM(s) Oral at bedtime  docusate sodium 100milliGRAM(s) Oral two times a day  dextrose 50% Injectable 12.5Gram(s) IV Push once  dextrose 50% Injectable 25Gram(s) IV Push once  dextrose 50% Injectable 25Gram(s) IV Push once  epoetin una Injectable [id number](s) IV Push <User Schedule>  heparin  Injectable 5000Unit(s) SubCutaneous every 12 hours  artificial  tears Solution 1Drop(s) Left EYE two times a day  calcium acetate 667milliGRAM(s) Oral three times a day with meals  buMETAnide 0.5milliGRAM(s) Oral two times a day  insulin lispro (HumaLOG) corrective regimen sliding scale  SubCutaneous Before meals and at bedtime  midodrine 2.5milliGRAM(s) Oral every 8 hours  iron sucrose Injectable 100milliGRAM(s) IV Push <User Schedule>  insulin glargine Injectable (LANTUS) 10Unit(s) SubCutaneous at bedtime      Vital Signs Last 24 Hrs  T(C): 36.9, Max: 36.9 (05-08 @ 08:00)  T(F): 98.4, Max: 98.4 (05-08 @ 08:00)  HR: 100 (94 - 100)  BP: 140/77 (119/59 - 140/77)  BP(mean): --  RR: 16 (16 - 20)  SpO2: 96% (16% - 96%)    PHYSICAL EXAM:  Constitutional: NAD, well-groomed, well-developed  HEENT: PERRLA, EOMI, Normal Hearing, MMM  Neck: No LAD, No JVD  Back: Normal spine flexure, No CVA tenderness  Respiratory: CTAB Cardiovascular: S1 and S2, RRR, no M/G/R  Gastrointestinal: BS+, soft, NT/ND  Extremities: No peripheral edema  Vascular: 2+ peripheral pulses  Neurological: A/O x 3, no focal deficits  Psychiatric: Normal mood, normal affect  Musculoskeletal: 5/5 strength b/l upper and lower extremities  Skin: No rashes    CAPILLARY BLOOD GLUCOSE  215 (08 May 2017 13:17)  68 (08 May 2017 09:15)  150 (07 May 2017 21:21)  137 (07 May 2017 16:20)  98 (07 May 2017 12:47)  81 (07 May 2017 09:00)  64 (07 May 2017 08:02)  147 (06 May 2017 21:54)  212 (06 May 2017 17:10)  110 (06 May 2017 12:09)  128 (06 May 2017 08:48)  179 (05 May 2017 17:08)  318 (05 May 2017 12:14)  222 (05 May 2017 07:47)  311 (04 May 2017 22:18)  386 (04 May 2017 20:26)  432 (04 May 2017 18:49)  471 (04 May 2017 17:46)  476 (04 May 2017 16:43)    LABS:                        9.7    4.7   )-----------( 94       ( 08 May 2017 07:31 )             31.5     05-08    142  |  99  |  77.0<H>  ----------------------------<  73  3.9   |  27.0  |  3.42<H>    Ca    8.6      08 May 2017 07:31            RADIOLOGY & ADDITIONAL TESTS:

## 2017-05-08 NOTE — PROGRESS NOTE ADULT - PROVIDER SPECIALTY LIST ADULT
Cardiology
Cardiology
Critical Care
Hospitalist
Infectious Disease
Infectious Disease
Nephrology
Pulmonology
Vascular Surgery
Cardiology

## 2017-05-08 NOTE — PROGRESS NOTE ADULT - ASSESSMENT
86 yo M with h/o DM, HTN, ischemic cardiomyopathy, chronic renal failure here with ESRD requiring dialysis and neck hematoma.  Pt with continued hypoxia and recurrent CHF.

## 2017-05-08 NOTE — PROGRESS NOTE ADULT - PROBLEM SELECTOR PROBLEM 6
Mixed hyperlipidemia
Hematoma of neck, subsequent encounter
Hematoma of neck, subsequent encounter
Essential hypertension
Essential hypertension
Hematoma of neck, subsequent encounter
Hypertension
Mixed hyperlipidemia
Cataract
Nasal bleeding

## 2017-05-29 ENCOUNTER — EMERGENCY (EMERGENCY)
Facility: HOSPITAL | Age: 82
LOS: 1 days | End: 2017-05-29
Payer: MEDICARE

## 2017-05-29 ENCOUNTER — INPATIENT (INPATIENT)
Facility: HOSPITAL | Age: 82
LOS: 20 days | Discharge: NURSING, REHAB OR LONG-TERM CARE | DRG: 177 | End: 2017-06-19
Attending: INTERNAL MEDICINE | Admitting: HOSPITALIST
Payer: COMMERCIAL

## 2017-05-29 VITALS
WEIGHT: 199.96 LBS | DIASTOLIC BLOOD PRESSURE: 81 MMHG | SYSTOLIC BLOOD PRESSURE: 149 MMHG | HEIGHT: 71 IN | HEART RATE: 99 BPM | OXYGEN SATURATION: 100 % | TEMPERATURE: 98 F | RESPIRATION RATE: 26 BRPM

## 2017-05-29 DIAGNOSIS — N18.6 END STAGE RENAL DISEASE: ICD-10-CM

## 2017-05-29 DIAGNOSIS — J15.6 PNEUMONIA DUE TO OTHER GRAM-NEGATIVE BACTERIA: ICD-10-CM

## 2017-05-29 DIAGNOSIS — I50.23 ACUTE ON CHRONIC SYSTOLIC (CONGESTIVE) HEART FAILURE: ICD-10-CM

## 2017-05-29 DIAGNOSIS — R06.00 DYSPNEA, UNSPECIFIED: ICD-10-CM

## 2017-05-29 DIAGNOSIS — I50.9 HEART FAILURE, UNSPECIFIED: ICD-10-CM

## 2017-05-29 DIAGNOSIS — Z51.5 ENCOUNTER FOR PALLIATIVE CARE: ICD-10-CM

## 2017-05-29 LAB
ALBUMIN SERPL ELPH-MCNC: 3.4 G/DL — SIGNIFICANT CHANGE UP (ref 3.3–5.2)
ALP SERPL-CCNC: 109 U/L — SIGNIFICANT CHANGE UP (ref 40–120)
ALT FLD-CCNC: 19 U/L — SIGNIFICANT CHANGE UP
ANION GAP SERPL CALC-SCNC: 15 MMOL/L — SIGNIFICANT CHANGE UP (ref 5–17)
ANISOCYTOSIS BLD QL: SLIGHT — SIGNIFICANT CHANGE UP
APTT BLD: 32.8 SEC — SIGNIFICANT CHANGE UP (ref 27.5–37.4)
AST SERPL-CCNC: 26 U/L — SIGNIFICANT CHANGE UP
BASOPHILS # BLD AUTO: 0 K/UL — SIGNIFICANT CHANGE UP (ref 0–0.2)
BASOPHILS NFR BLD AUTO: 0.2 % — SIGNIFICANT CHANGE UP (ref 0–2)
BILIRUB SERPL-MCNC: 0.4 MG/DL — SIGNIFICANT CHANGE UP (ref 0.4–2)
BUN SERPL-MCNC: 91 MG/DL — HIGH (ref 8–20)
CALCIUM SERPL-MCNC: 9.2 MG/DL — SIGNIFICANT CHANGE UP (ref 8.6–10.2)
CHLORIDE SERPL-SCNC: 93 MMOL/L — LOW (ref 98–107)
CO2 SERPL-SCNC: 27 MMOL/L — SIGNIFICANT CHANGE UP (ref 22–29)
CREAT SERPL-MCNC: 3.5 MG/DL — HIGH (ref 0.5–1.3)
EOSINOPHIL # BLD AUTO: 0.1 K/UL — SIGNIFICANT CHANGE UP (ref 0–0.5)
EOSINOPHIL NFR BLD AUTO: 1.8 % — SIGNIFICANT CHANGE UP (ref 0–5)
GLUCOSE SERPL-MCNC: 170 MG/DL — HIGH (ref 70–115)
HCT VFR BLD CALC: 29.5 % — LOW (ref 42–52)
HGB BLD-MCNC: 8.7 G/DL — LOW (ref 14–18)
HYPOCHROMIA BLD QL: SLIGHT — SIGNIFICANT CHANGE UP
INR BLD: 1.15 RATIO — SIGNIFICANT CHANGE UP (ref 0.88–1.16)
LYMPHOCYTES # BLD AUTO: 1.4 K/UL — SIGNIFICANT CHANGE UP (ref 1–4.8)
LYMPHOCYTES # BLD AUTO: 23.7 % — SIGNIFICANT CHANGE UP (ref 20–55)
MACROCYTES BLD QL: SLIGHT — SIGNIFICANT CHANGE UP
MCHC RBC-ENTMCNC: 25.3 PG — LOW (ref 27–31)
MCHC RBC-ENTMCNC: 29.5 G/DL — LOW (ref 32–36)
MCV RBC AUTO: 85.8 FL — SIGNIFICANT CHANGE UP (ref 80–94)
MICROCYTES BLD QL: SLIGHT — SIGNIFICANT CHANGE UP
MONOCYTES # BLD AUTO: 0.5 K/UL — SIGNIFICANT CHANGE UP (ref 0–0.8)
MONOCYTES NFR BLD AUTO: 8.6 % — SIGNIFICANT CHANGE UP (ref 3–10)
NEUTROPHILS # BLD AUTO: 3.7 K/UL — SIGNIFICANT CHANGE UP (ref 1.8–8)
NEUTROPHILS NFR BLD AUTO: 65.5 % — SIGNIFICANT CHANGE UP (ref 37–73)
NT-PROBNP SERPL-SCNC: HIGH PG/ML (ref 0–300)
OVALOCYTES BLD QL SMEAR: SLIGHT — SIGNIFICANT CHANGE UP
PLAT MORPH BLD: NORMAL — SIGNIFICANT CHANGE UP
PLATELET # BLD AUTO: 85 K/UL — LOW (ref 150–400)
POTASSIUM SERPL-MCNC: 3.9 MMOL/L — SIGNIFICANT CHANGE UP (ref 3.5–5.3)
POTASSIUM SERPL-SCNC: 3.9 MMOL/L — SIGNIFICANT CHANGE UP (ref 3.5–5.3)
PROT SERPL-MCNC: 7.6 G/DL — SIGNIFICANT CHANGE UP (ref 6.6–8.7)
PROTHROM AB SERPL-ACNC: 12.7 SEC — SIGNIFICANT CHANGE UP (ref 9.8–12.7)
RBC # BLD: 3.44 M/UL — LOW (ref 4.6–6.2)
RBC # FLD: 21 % — HIGH (ref 11–15.6)
RBC BLD AUTO: ABNORMAL
SODIUM SERPL-SCNC: 135 MMOL/L — SIGNIFICANT CHANGE UP (ref 135–145)
TARGETS BLD QL SMEAR: SLIGHT — SIGNIFICANT CHANGE UP
WBC # BLD: 5.7 K/UL — SIGNIFICANT CHANGE UP (ref 4.8–10.8)
WBC # FLD AUTO: 5.7 K/UL — SIGNIFICANT CHANGE UP (ref 4.8–10.8)

## 2017-05-29 PROCEDURE — 99223 1ST HOSP IP/OBS HIGH 75: CPT

## 2017-05-29 PROCEDURE — 99285 EMERGENCY DEPT VISIT HI MDM: CPT

## 2017-05-29 PROCEDURE — 71010: CPT | Mod: 26

## 2017-05-29 RX ORDER — HEPARIN SODIUM 5000 [USP'U]/ML
5000 INJECTION INTRAVENOUS; SUBCUTANEOUS EVERY 12 HOURS
Qty: 0 | Refills: 0 | Status: DISCONTINUED | OUTPATIENT
Start: 2017-05-29 | End: 2017-06-18

## 2017-05-29 RX ORDER — VANCOMYCIN HCL 1 G
1000 VIAL (EA) INTRAVENOUS ONCE
Qty: 0 | Refills: 0 | Status: COMPLETED | OUTPATIENT
Start: 2017-05-29 | End: 2017-05-30

## 2017-05-29 RX ORDER — MIDODRINE HYDROCHLORIDE 2.5 MG/1
2.5 TABLET ORAL EVERY 8 HOURS
Qty: 0 | Refills: 0 | Status: DISCONTINUED | OUTPATIENT
Start: 2017-05-29 | End: 2017-06-01

## 2017-05-29 RX ORDER — DEXTROSE 50 % IN WATER 50 %
25 SYRINGE (ML) INTRAVENOUS ONCE
Qty: 0 | Refills: 0 | Status: DISCONTINUED | OUTPATIENT
Start: 2017-05-29 | End: 2017-06-19

## 2017-05-29 RX ORDER — CARVEDILOL PHOSPHATE 80 MG/1
3.12 CAPSULE, EXTENDED RELEASE ORAL EVERY 12 HOURS
Qty: 0 | Refills: 0 | Status: DISCONTINUED | OUTPATIENT
Start: 2017-05-29 | End: 2017-06-08

## 2017-05-29 RX ORDER — FUROSEMIDE 40 MG
40 TABLET ORAL ONCE
Qty: 0 | Refills: 0 | Status: COMPLETED | OUTPATIENT
Start: 2017-05-29 | End: 2017-05-29

## 2017-05-29 RX ORDER — BUMETANIDE 0.25 MG/ML
0.5 INJECTION INTRAMUSCULAR; INTRAVENOUS
Qty: 0 | Refills: 0 | Status: DISCONTINUED | OUTPATIENT
Start: 2017-05-29 | End: 2017-05-31

## 2017-05-29 RX ORDER — TAMSULOSIN HYDROCHLORIDE 0.4 MG/1
0.4 CAPSULE ORAL AT BEDTIME
Qty: 0 | Refills: 0 | Status: DISCONTINUED | OUTPATIENT
Start: 2017-05-29 | End: 2017-06-19

## 2017-05-29 RX ORDER — IRON SUCROSE 20 MG/ML
100 INJECTION, SOLUTION INTRAVENOUS
Qty: 0 | Refills: 0 | Status: DISCONTINUED | OUTPATIENT
Start: 2017-05-29 | End: 2017-06-07

## 2017-05-29 RX ORDER — CALCIUM ACETATE 667 MG
667 TABLET ORAL
Qty: 0 | Refills: 0 | Status: DISCONTINUED | OUTPATIENT
Start: 2017-05-29 | End: 2017-06-19

## 2017-05-29 RX ORDER — PIPERACILLIN AND TAZOBACTAM 4; .5 G/20ML; G/20ML
3.38 INJECTION, POWDER, LYOPHILIZED, FOR SOLUTION INTRAVENOUS ONCE
Qty: 0 | Refills: 0 | Status: COMPLETED | OUTPATIENT
Start: 2017-05-29 | End: 2017-05-29

## 2017-05-29 RX ORDER — SODIUM CHLORIDE 9 MG/ML
1000 INJECTION, SOLUTION INTRAVENOUS
Qty: 0 | Refills: 0 | Status: DISCONTINUED | OUTPATIENT
Start: 2017-05-29 | End: 2017-06-19

## 2017-05-29 RX ORDER — INSULIN LISPRO 100/ML
VIAL (ML) SUBCUTANEOUS
Qty: 0 | Refills: 0 | Status: DISCONTINUED | OUTPATIENT
Start: 2017-05-29 | End: 2017-06-19

## 2017-05-29 RX ORDER — GLUCAGON INJECTION, SOLUTION 0.5 MG/.1ML
1 INJECTION, SOLUTION SUBCUTANEOUS ONCE
Qty: 0 | Refills: 0 | Status: DISCONTINUED | OUTPATIENT
Start: 2017-05-29 | End: 2017-06-19

## 2017-05-29 RX ORDER — FERROUS SULFATE 325(65) MG
325 TABLET ORAL DAILY
Qty: 0 | Refills: 0 | Status: DISCONTINUED | OUTPATIENT
Start: 2017-05-29 | End: 2017-06-19

## 2017-05-29 RX ORDER — DEXTROSE 50 % IN WATER 50 %
1 SYRINGE (ML) INTRAVENOUS ONCE
Qty: 0 | Refills: 0 | Status: DISCONTINUED | OUTPATIENT
Start: 2017-05-29 | End: 2017-06-19

## 2017-05-29 RX ORDER — NITROGLYCERIN 6.5 MG
2 CAPSULE, EXTENDED RELEASE ORAL ONCE
Qty: 0 | Refills: 0 | Status: COMPLETED | OUTPATIENT
Start: 2017-05-29 | End: 2017-05-29

## 2017-05-29 RX ORDER — PIPERACILLIN AND TAZOBACTAM 4; .5 G/20ML; G/20ML
2.25 INJECTION, POWDER, LYOPHILIZED, FOR SOLUTION INTRAVENOUS EVERY 8 HOURS
Qty: 0 | Refills: 0 | Status: COMPLETED | OUTPATIENT
Start: 2017-05-29 | End: 2017-06-05

## 2017-05-29 RX ORDER — DEXTROSE 50 % IN WATER 50 %
12.5 SYRINGE (ML) INTRAVENOUS ONCE
Qty: 0 | Refills: 0 | Status: DISCONTINUED | OUTPATIENT
Start: 2017-05-29 | End: 2017-06-19

## 2017-05-29 RX ORDER — GABAPENTIN 400 MG/1
300 CAPSULE ORAL THREE TIMES A DAY
Qty: 0 | Refills: 0 | Status: DISCONTINUED | OUTPATIENT
Start: 2017-05-29 | End: 2017-06-19

## 2017-05-29 RX ORDER — ASPIRIN/CALCIUM CARB/MAGNESIUM 324 MG
325 TABLET ORAL ONCE
Qty: 0 | Refills: 0 | Status: COMPLETED | OUTPATIENT
Start: 2017-05-29 | End: 2017-05-29

## 2017-05-29 RX ORDER — SODIUM CHLORIDE 9 MG/ML
3 INJECTION INTRAMUSCULAR; INTRAVENOUS; SUBCUTANEOUS ONCE
Qty: 0 | Refills: 0 | Status: COMPLETED | OUTPATIENT
Start: 2017-05-29 | End: 2017-05-29

## 2017-05-29 RX ADMIN — SODIUM CHLORIDE 3 MILLILITER(S): 9 INJECTION INTRAMUSCULAR; INTRAVENOUS; SUBCUTANEOUS at 14:15

## 2017-05-29 RX ADMIN — CARVEDILOL PHOSPHATE 3.12 MILLIGRAM(S): 80 CAPSULE, EXTENDED RELEASE ORAL at 21:30

## 2017-05-29 RX ADMIN — PIPERACILLIN AND TAZOBACTAM 200 GRAM(S): 4; .5 INJECTION, POWDER, LYOPHILIZED, FOR SOLUTION INTRAVENOUS at 21:29

## 2017-05-29 RX ADMIN — BUMETANIDE 0.5 MILLIGRAM(S): 0.25 INJECTION INTRAMUSCULAR; INTRAVENOUS at 21:29

## 2017-05-29 RX ADMIN — Medication 325 MILLIGRAM(S): at 15:52

## 2017-05-29 RX ADMIN — PIPERACILLIN AND TAZOBACTAM 200 GRAM(S): 4; .5 INJECTION, POWDER, LYOPHILIZED, FOR SOLUTION INTRAVENOUS at 17:07

## 2017-05-29 RX ADMIN — Medication 667 MILLIGRAM(S): at 21:30

## 2017-05-29 RX ADMIN — IRON SUCROSE 210 MILLIGRAM(S): 20 INJECTION, SOLUTION INTRAVENOUS at 18:34

## 2017-05-29 RX ADMIN — TAMSULOSIN HYDROCHLORIDE 0.4 MILLIGRAM(S): 0.4 CAPSULE ORAL at 21:30

## 2017-05-29 RX ADMIN — Medication 2 INCH(S): at 15:52

## 2017-05-29 RX ADMIN — MIDODRINE HYDROCHLORIDE 2.5 MILLIGRAM(S): 2.5 TABLET ORAL at 21:30

## 2017-05-29 RX ADMIN — Medication 1 DROP(S): at 21:30

## 2017-05-29 RX ADMIN — GABAPENTIN 300 MILLIGRAM(S): 400 CAPSULE ORAL at 21:29

## 2017-05-29 RX ADMIN — HEPARIN SODIUM 5000 UNIT(S): 5000 INJECTION INTRAVENOUS; SUBCUTANEOUS at 21:29

## 2017-05-29 RX ADMIN — Medication 40 MILLIGRAM(S): at 15:52

## 2017-05-29 NOTE — ED ADULT TRIAGE NOTE - CHIEF COMPLAINT QUOTE
BIBA, patient is arousable to verbal stimuli, sent to the ED for eval of hypoxia, no family present in ED, patient is a poor historian

## 2017-05-29 NOTE — H&P ADULT - PROBLEM SELECTOR PLAN 2
CXR showing new RML and RLL infiltrate  Dysphagia screening  Possibly has been aspirating  NPO  Afebrile, no leukocytosis  Blood cultures pending  Start vanc and zosyn given recent hospitalization

## 2017-05-29 NOTE — ED ADULT NURSE NOTE - OBJECTIVE STATEMENT
Pt was sent over via ambulance from [facility] for hypoxia and AMS   Pt was brought in on a non-rebreather and 100%   HR is paced, lungs diminished b/l abd soft with positve bowel sounds in all four quadrants   pt has a dialysis port to rcw.  will cont to monitor.

## 2017-05-29 NOTE — CONSULT NOTE ADULT - SUBJECTIVE AND OBJECTIVE BOX
HPI:  88 yo M with h/o DM, HTN, ischemic cardiomyopathy (EF 30-35%), ESRD (HD TTS) presents with labored breathing. Wife and son at bedside. Pt was sent in from [facility] rehab. Per EMS documentation, he was found unresponsive usinge accessory muscles to breathe. He was placed on a nonrebreather which improved his oxygenation to 98%. Pt somewhat uncooperative and unable to provide clear history.   Per wife, pt has had a gradual deterioration over the last year. He has not been eating well, and has had minimal interaction. She states that he has intermittent moments of lucidity. in ED SOB +orthopnea +lethargic denies HA CPN/V/D    ROS above      PAST MEDICAL & SURGICAL HISTORY:  Chronic renal failure  Coronary artery disease involving autologous vein bypass graft  CHF (congestive heart failure): FAMILY/PT NOT SURE IF DIAGNOSED WITH CHF OR COPD  Pacemaker  Diabetes  PSA elevation  Hyperlipemia  Hypertension  Cataract  Glaucoma  S/P CABG (coronary artery bypass graft)  S/P prostatectomy: 1992   DM 2, MO, hypothyroidism, prior DVT and IVC filter; Cholecystectomy    FAMILY HISTORY:  No pertinent family history in first degree relatives  [place]    Social History:Non smoker    MEDICATIONS  (STANDING):  vancomycin  IVPB 1000milliGRAM(s) IV Intermittent once  piperacillin/tazobactam IVPB. 3.375Gram(s) IV Intermittent once  piperacillin/tazobactam IVPB. 2.25Gram(s) IV Intermittent every 8 hours  buMETAnide 0.5milliGRAM(s) Oral two times a day  calcium acetate 667milliGRAM(s) Oral three times a day with meals  carvedilol 3.125milliGRAM(s) Oral every 12 hours  ferrous    sulfate 325milliGRAM(s) Oral daily  tamsulosin 0.4milliGRAM(s) Oral at bedtime  gabapentin 300milliGRAM(s) Oral three times a day  insulin lispro (HumaLOG) corrective regimen sliding scale  SubCutaneous three times a day before meals  dextrose 5%. 1000milliLiter(s) IV Continuous <Continuous>  dextrose 50% Injectable 12.5Gram(s) IV Push once  dextrose 50% Injectable 25Gram(s) IV Push once  dextrose 50% Injectable 25Gram(s) IV Push once  midodrine 2.5milliGRAM(s) Oral every 8 hours  artificial  tears Solution 1Drop(s) Both EYES two times a day  heparin  Injectable 5000Unit(s) SubCutaneous every 12 hours    MEDICATIONS  (PRN):  dextrose Gel 1Dose(s) Oral once PRN Blood Glucose LESS THAN 70 milliGRAM(s)/deciliter  glucagon  Injectable 1milliGRAM(s) IntraMuscular once PRN Glucose LESS THAN 70 milligrams/deciliter   Meds reviewed    Allergies      Vital Signs Last 24 Hrs  T(C): 36.8, Max: 36.8 (05-29 @ 12:54)  T(F): 98.2, Max: 98.2 (05-29 @ 12:54)  HR: 94 (94 - 99)  BP: 122/68 (122/68 - 149/81)  BP(mean): 86 (86 - 86)  RR: 24 (24 - 26)  SpO2: 98% (98% - 100%)  Daily Height in cm: 180.34 (29 May 2017 12:54)    Daily     PHYSICAL EXAM:    GENERAL: appears chronically ill  HEAD:  Atraumatic, Normocephalic  EYES: EOMI  NECK: Supple, neck  veins full  NERVOUS SYSTEM:  Alert & Oriented X2  CHEST/LUNG: dec BS B/L  HEART: Regular rate and rhythm; No murmurs, rubs  ABDOMEN: Soft, Nontender, Nondistended; +BS  EXTREMITIES:  +2  edema B/L LE      LABS:                        8.7    5.7   )-----------( 85       ( 29 May 2017 13:37 )             29.5     05-29    135  |  93<L>  |  91.0<H>  ----------------------------<  170<H>  3.9   |  27.0  |  3.50<H>    Ca    9.2      29 May 2017 14:42    TPro  7.6  /  Alb  3.4  /  TBili  0.4  /  DBili  x   /  AST  26  /  ALT  19  /  AlkPhos  109  05-29    PT/INR - ( 29 May 2017 14:42 )   PT: 12.7 sec;   INR: 1.15 ratio         PTT - ( 29 May 2017 14:42 )  PTT:32.8 sec            RADIOLOGY & ADDITIONAL TESTS:

## 2017-05-29 NOTE — H&P ADULT - HISTORY OF PRESENT ILLNESS
86 yo M with h/o DM, HTN, ischemic cardiomyopathy (EF 30-35%), ESRD (HD TTS) presents with labored breathing. Wife and son at bedside. Pt was sent in from [facility] rehab. Per EMS documentation, he was found unresponsive usinge accessory muscles to breathe. He was placed on a nonrebreather which improved his oxygenation to 98%. Pt somewhat uncooperative and unable to provide clear history.   Per wife, pt has had a gradual deterioration over the last year. He has not been eating well, and has had minimal interaction. She states that he has intermittent moments of lucidity.

## 2017-05-29 NOTE — H&P ADULT - PROBLEM SELECTOR PLAN 5
Had extensive discussion with wife and son at bedside regarding recent multiple hospitalizations and pt's progressive deterioration. They are willing to discuss with palliative care team regarding goals of care. Consult placed

## 2017-05-29 NOTE — ED PROVIDER NOTE - OBJECTIVE STATEMENT
CC: SOB  Presenting symptoms: 86yo male sent in from NH with SOB and hypoxia.  Pertinent Positives: SOB  Pertinent Negatives: no vomiting  Timing: continuous today  Quality:  Radiation:  Severity:  Aggravating Factors:  Relieving Factors: CC: SOB  Presenting symptoms: 86yo male sent in from NH with SOB and hypoxia.  Pertinent Positives: SOB  Pertinent Negatives: no vomiting  Timing: continuous today  Quality: SOB  Radiation: none  Severity: moderate  Aggravating Factors: none  Relieving Factors: supplemental O2

## 2017-05-29 NOTE — ED PROVIDER NOTE - CARE PLAN
Principal Discharge DX:	CHF (congestive heart failure)  Secondary Diagnosis:	Chronic renal failure  Secondary Diagnosis:	Diabetes

## 2017-05-29 NOTE — ED PROVIDER NOTE - PHYSICAL EXAMINATION
ENT: Airway patent. Nose clear. Mouth with normal mucosa.   Head: Atraumatic.   Eyes: Clear bilaterally  Cardiac: Normal rate. b/l leg edema pitting  Respiratory: Breath sounds rales diffusely with tachypnea   GI: Abdomen soft, non-tender, no guarding.   : No CVA or bladder tenderness.   Musculoskeletal: FROM, no muscle or joint tenderness or swelling.   Skin: Dry, intact, no rash.

## 2017-05-30 DIAGNOSIS — I50.9 HEART FAILURE, UNSPECIFIED: ICD-10-CM

## 2017-05-30 DIAGNOSIS — Z29.9 ENCOUNTER FOR PROPHYLACTIC MEASURES, UNSPECIFIED: ICD-10-CM

## 2017-05-30 DIAGNOSIS — E78.5 HYPERLIPIDEMIA, UNSPECIFIED: ICD-10-CM

## 2017-05-30 DIAGNOSIS — Z95.810 PRESENCE OF AUTOMATIC (IMPLANTABLE) CARDIAC DEFIBRILLATOR: ICD-10-CM

## 2017-05-30 DIAGNOSIS — R53.81 OTHER MALAISE: ICD-10-CM

## 2017-05-30 DIAGNOSIS — I25.810 ATHEROSCLEROSIS OF CORONARY ARTERY BYPASS GRAFT(S) WITHOUT ANGINA PECTORIS: ICD-10-CM

## 2017-05-30 DIAGNOSIS — E11.9 TYPE 2 DIABETES MELLITUS WITHOUT COMPLICATIONS: ICD-10-CM

## 2017-05-30 DIAGNOSIS — I10 ESSENTIAL (PRIMARY) HYPERTENSION: ICD-10-CM

## 2017-05-30 DIAGNOSIS — Z95.0 PRESENCE OF CARDIAC PACEMAKER: ICD-10-CM

## 2017-05-30 LAB
ANION GAP SERPL CALC-SCNC: 14 MMOL/L — SIGNIFICANT CHANGE UP (ref 5–17)
BUN SERPL-MCNC: 56 MG/DL — HIGH (ref 8–20)
CALCIUM SERPL-MCNC: 8.8 MG/DL — SIGNIFICANT CHANGE UP (ref 8.6–10.2)
CHLORIDE SERPL-SCNC: 94 MMOL/L — LOW (ref 98–107)
CO2 SERPL-SCNC: 29 MMOL/L — SIGNIFICANT CHANGE UP (ref 22–29)
CREAT SERPL-MCNC: 2.79 MG/DL — HIGH (ref 0.5–1.3)
GLUCOSE SERPL-MCNC: 138 MG/DL — HIGH (ref 70–115)
HBV SURFACE AB SER-ACNC: <3 MIU/ML — LOW
HBV SURFACE AG SER-ACNC: SIGNIFICANT CHANGE UP
HCT VFR BLD CALC: 28.5 % — LOW (ref 42–52)
HCV AB S/CO SERPL IA: 0.29 S/CO — SIGNIFICANT CHANGE UP
HCV AB SERPL-IMP: SIGNIFICANT CHANGE UP
HGB BLD-MCNC: 8.7 G/DL — LOW (ref 14–18)
MCHC RBC-ENTMCNC: 26 PG — LOW (ref 27–31)
MCHC RBC-ENTMCNC: 30.5 G/DL — LOW (ref 32–36)
MCV RBC AUTO: 85.1 FL — SIGNIFICANT CHANGE UP (ref 80–94)
PLATELET # BLD AUTO: 82 K/UL — LOW (ref 150–400)
POTASSIUM SERPL-MCNC: 5 MMOL/L — SIGNIFICANT CHANGE UP (ref 3.5–5.3)
POTASSIUM SERPL-SCNC: 5 MMOL/L — SIGNIFICANT CHANGE UP (ref 3.5–5.3)
RBC # BLD: 3.35 M/UL — LOW (ref 4.6–6.2)
RBC # FLD: 21.2 % — HIGH (ref 11–15.6)
SODIUM SERPL-SCNC: 137 MMOL/L — SIGNIFICANT CHANGE UP (ref 135–145)
WBC # BLD: 6.2 K/UL — SIGNIFICANT CHANGE UP (ref 4.8–10.8)
WBC # FLD AUTO: 6.2 K/UL — SIGNIFICANT CHANGE UP (ref 4.8–10.8)

## 2017-05-30 PROCEDURE — 99223 1ST HOSP IP/OBS HIGH 75: CPT

## 2017-05-30 PROCEDURE — 99497 ADVNCD CARE PLAN 30 MIN: CPT | Mod: 25

## 2017-05-30 PROCEDURE — 99222 1ST HOSP IP/OBS MODERATE 55: CPT

## 2017-05-30 PROCEDURE — 99233 SBSQ HOSP IP/OBS HIGH 50: CPT

## 2017-05-30 PROCEDURE — 93010 ELECTROCARDIOGRAM REPORT: CPT

## 2017-05-30 RX ORDER — ERYTHROPOIETIN 10000 [IU]/ML
10000 INJECTION, SOLUTION INTRAVENOUS; SUBCUTANEOUS ONCE
Qty: 0 | Refills: 0 | Status: COMPLETED | OUTPATIENT
Start: 2017-05-30 | End: 2017-05-30

## 2017-05-30 RX ADMIN — Medication 1 DROP(S): at 05:04

## 2017-05-30 RX ADMIN — GABAPENTIN 300 MILLIGRAM(S): 400 CAPSULE ORAL at 23:34

## 2017-05-30 RX ADMIN — PIPERACILLIN AND TAZOBACTAM 200 GRAM(S): 4; .5 INJECTION, POWDER, LYOPHILIZED, FOR SOLUTION INTRAVENOUS at 05:04

## 2017-05-30 RX ADMIN — Medication 250 MILLIGRAM(S): at 01:13

## 2017-05-30 RX ADMIN — HEPARIN SODIUM 5000 UNIT(S): 5000 INJECTION INTRAVENOUS; SUBCUTANEOUS at 05:07

## 2017-05-30 RX ADMIN — GABAPENTIN 300 MILLIGRAM(S): 400 CAPSULE ORAL at 05:05

## 2017-05-30 RX ADMIN — BUMETANIDE 0.5 MILLIGRAM(S): 0.25 INJECTION INTRAMUSCULAR; INTRAVENOUS at 05:05

## 2017-05-30 RX ADMIN — HEPARIN SODIUM 5000 UNIT(S): 5000 INJECTION INTRAVENOUS; SUBCUTANEOUS at 17:53

## 2017-05-30 RX ADMIN — PIPERACILLIN AND TAZOBACTAM 200 GRAM(S): 4; .5 INJECTION, POWDER, LYOPHILIZED, FOR SOLUTION INTRAVENOUS at 23:30

## 2017-05-30 RX ADMIN — MIDODRINE HYDROCHLORIDE 2.5 MILLIGRAM(S): 2.5 TABLET ORAL at 05:05

## 2017-05-30 RX ADMIN — ERYTHROPOIETIN 10000 UNIT(S): 10000 INJECTION, SOLUTION INTRAVENOUS; SUBCUTANEOUS at 11:41

## 2017-05-30 RX ADMIN — CARVEDILOL PHOSPHATE 3.12 MILLIGRAM(S): 80 CAPSULE, EXTENDED RELEASE ORAL at 05:07

## 2017-05-30 RX ADMIN — MIDODRINE HYDROCHLORIDE 2.5 MILLIGRAM(S): 2.5 TABLET ORAL at 23:34

## 2017-05-30 RX ADMIN — Medication 1 DROP(S): at 17:52

## 2017-05-30 NOTE — SWALLOW BEDSIDE ASSESSMENT ADULT - SWALLOW EVAL: DIAGNOSIS
Mild oral dysphagia with all fluid densities with suspected pharyngeal dysphagia for all fluid densities, with +overt s/s of aspiration post intake

## 2017-05-30 NOTE — CONSULT NOTE ADULT - PROBLEM SELECTOR RECOMMENDATION 6
Obtaining HCP for chart, also needed for conversations re: discharge plan, Advance directives.  Will schedule family meeting when HCP is re-established

## 2017-05-30 NOTE — PROGRESS NOTE ADULT - PROBLEM SELECTOR PLAN 1
Dialysis today. Continue Coreg, Bumex Dialysis today. Continue Coreg, Bumex. Not on ace, unclear why

## 2017-05-30 NOTE — SWALLOW BEDSIDE ASSESSMENT ADULT - ASR SWALLOW ASPIRATION MONITOR
oral hygiene/upper respiratory infection/cough/change of breathing pattern/position upright (90Y)/fever/gurgly voice/throat clearing/pneumonia

## 2017-05-30 NOTE — SWALLOW BEDSIDE ASSESSMENT ADULT - SLP GENERAL OBSERVATIONS
Pt received & seen via stretcher in ED, +awake, +O2 via nasal canula, +reduced cognition, +fair cooperation at best

## 2017-05-30 NOTE — PROGRESS NOTE ADULT - ATTENDING COMMENTS
Reviewed above note and d/w PA Mery.  Pt's dyspnea improved with HD. Appears more comfortable today. Agree with above plan.

## 2017-05-30 NOTE — SWALLOW BEDSIDE ASSESSMENT ADULT - PHARYNGEAL PHASE
Within functional limits Delayed cough post oral intake/Delayed throat clear post oral intake Delayed cough post oral intake Delayed throat clear post oral intake

## 2017-05-30 NOTE — SWALLOW BEDSIDE ASSESSMENT ADULT - SLP PERTINENT HISTORY OF CURRENT PROBLEM
Pt known to this dept for piror admission & was seen for bedside swallow evaluation 4/21/17 with RX for soft diet & thin fluids

## 2017-05-30 NOTE — CONSULT NOTE ADULT - ASSESSMENT
ESRD on HD on TTS schedule clinically volume overloaded w SOB  Will HD today and candie and then possibly cont on a TTS schedule  Anemia will check iron studies  AM labs
Right Upper and Lower lobe Infiltrates-Dyspnea on exertion  Acute on Chronic Systolic CHF  ESRD on HD  Debility-Multi-Organ-Failure

## 2017-05-30 NOTE — CONSULT NOTE ADULT - SUBJECTIVE AND OBJECTIVE BOX
HPI:This is a frail elderly 87yoM lying on Left side in HD suite, getting Dialysis treatment. He is lethargic but responding to his name. Is confused to time and place.  Denies CP, palpitations, N/V/D, dizziness. Poor appetite. Using Nasal O2, in no distress  86 yo M with h/o DM, HTN, ischemic cardiomyopathy (EF 30-35%), ESRD (HD TTS) presents with labored breathing. Wife and son at bedside. Pt was sent in from [facility] rehab. Per EMS documentation, he was found unresponsive using accessory muscles to breathe. He was placed on a nonrebreather which improved his oxygenation to 98%. Pt somewhat uncooperative and unable to provide clear history.   Per wife, pt has had a gradual deterioration over the last year. He has not been eating well, and has had minimal interaction. She states that he has intermittent moments of lucidity. (29 May 2017 15:57)      PERTINENT PMH REVIEWED: Yes     PAST MEDICAL & SURGICAL HISTORY:  Chronic renal failure  Coronary artery disease involving autologous vein bypass graft  CHF (congestive heart failure): FAMILY/PT NOT SURE IF DIAGNOSED WITH CHF OR COPD  Pacemaker  Diabetes  PSA elevation  Hyperlipemia  Hypertension  Cataract  Glaucoma  S/P CABG (coronary artery bypass graft)  S/P prostatectomy: 1992      SOCIAL HISTORY:  EtOH    No                                    Drugs    No                                    smoker  nonsmoker                                    Admitted from: home  [facility]  HCP:    FAMILY HISTORY:  No pertinent family history in first degree relatives      No Known Allergies    Baseline ADLs (prior to admission):  Dependent      Present Symptoms:     Dyspnea:  1   Nausea/Vomiting: No  Anxiety:  No  Depression:  No  Fatigue: Yes   Loss of appetite: Yes     Pain: denies            Character-            Duration-            Effect-            Factors-            Frequency-            Location-            Severity-    Review of Systems: Reviewed                                       Unable to obtain due to poor mentation       MEDICATIONS  (STANDING):  piperacillin/tazobactam IVPB. 2.25Gram(s) IV Intermittent every 8 hours  buMETAnide 0.5milliGRAM(s) Oral two times a day  calcium acetate 667milliGRAM(s) Oral three times a day with meals  carvedilol 3.125milliGRAM(s) Oral every 12 hours  ferrous    sulfate 325milliGRAM(s) Oral daily  tamsulosin 0.4milliGRAM(s) Oral at bedtime  gabapentin 300milliGRAM(s) Oral three times a day  insulin lispro (HumaLOG) corrective regimen sliding scale  SubCutaneous three times a day before meals  dextrose 5%. 1000milliLiter(s) IV Continuous <Continuous>  dextrose 50% Injectable 12.5Gram(s) IV Push once  dextrose 50% Injectable 25Gram(s) IV Push once  dextrose 50% Injectable 25Gram(s) IV Push once  midodrine 2.5milliGRAM(s) Oral every 8 hours  artificial  tears Solution 1Drop(s) Both EYES two times a day  heparin  Injectable 5000Unit(s) SubCutaneous every 12 hours  iron sucrose IVPB 100milliGRAM(s) IV Intermittent every 48 hours    MEDICATIONS  (PRN):  dextrose Gel 1Dose(s) Oral once PRN Blood Glucose LESS THAN 70 milliGRAM(s)/deciliter  glucagon  Injectable 1milliGRAM(s) IntraMuscular once PRN Glucose LESS THAN 70 milligrams/deciliter      PHYSICAL EXAM:    Vital Signs Last 24 Hrs  T(C): 36.8, Max: 36.8 (05-29 @ 12:54)  T(F): 98.3, Max: 98.3 (05-29 @ 20:36)  HR: 85 (84 - 102)  BP: 102/53 (101/52 - 149/81)  BP(mean): 86 (86 - 86)  RR: 20 (20 - 26)  SpO2: 99% (93% - 100%)    General: alert  oriented x _1___ lethargic               few words          HEENT:  dry mouth      Lungs: comfortable    CV: normal     GI: normal                 constipation  last BM:     :    oliguria/anuria      MSK  weakness  edema         bedbound/    Skin:   sacral pressure ulcers-   no rash    LABS:                        8.7    6.2   )-----------( 82       ( 30 May 2017 08:45 )             28.5     05-30    137  |  94<L>  |  56.0<H>  ----------------------------<  138<H>  5.0   |  29.0  |  2.79<H>    Ca    8.8      30 May 2017 08:45    TPro  7.6  /  Alb  3.4  /  TBili  0.4  /  DBili  x   /  AST  26  /  ALT  19  /  AlkPhos  109  05-29    PT/INR - ( 29 May 2017 14:42 )   PT: 12.7 sec;   INR: 1.15 ratio         PTT - ( 29 May 2017 14:42 )  PTT:32.8 sec    I&O's Summary    I & Os for current day (as of 30 May 2017 12:09)  =============================================  IN: 0 ml / OUT: 1400 ml / NET: -1400 ml      RADIOLOGY & ADDITIONAL STUDIES:    ADVANCE DIRECTIVES:   DNR  NO  Completed on:                     MOLST   NO   Completed on:  Living Will   NO   Completed on:

## 2017-05-30 NOTE — CONSULT NOTE ADULT - ATTENDING COMMENTS
COUNSELING:    Face to face meeting to discuss Advanced Care Planning - Time Spent ______ Minutes.  See goals of care note.    More than 50% time spent in counseling and coordinating care. __25__ Minutes.     Thank you for the opportunity to assist with the care of this patient.   [place] [facility] Consult Service [phone].

## 2017-05-30 NOTE — SWALLOW BEDSIDE ASSESSMENT ADULT - SWALLOW EVAL: RECOMMENDED FEEDING/EATING TECHNIQUES
small sips/bites/oral hygiene/maintain upright posture during/after eating for 30 mins/crush medication (when feasible)/position upright (90 degrees)/allow for swallow between intakes

## 2017-05-30 NOTE — PROGRESS NOTE ADULT - ATTENDING COMMENTS
PT seen and examined. He has hx of ICM, stress test negative for ischemia. He is admitted with volume overload and responded to HD.  He is on coreg, add ACEI. HD as per renal service. PT seen and examined. He has hx of ICM, stress test negative for ischemia. He is admitted with volume overload and responded to HD.  He is on coreg, add ACEI. HD as per renal service. Pt has BIV-ICD, [employer], will have it interrogated. .  Add dig for CHF and if BP allows bidil.

## 2017-05-30 NOTE — PROGRESS NOTE ADULT - PROBLEM SELECTOR PLAN 2
volume overload  Acute on chronic CHF  Coreg  Bumex  Midodrine  ECHO  Cardiology Consult appreciated

## 2017-05-31 DIAGNOSIS — R06.00 DYSPNEA, UNSPECIFIED: ICD-10-CM

## 2017-05-31 LAB
ALBUMIN SERPL ELPH-MCNC: 3.3 G/DL — SIGNIFICANT CHANGE UP (ref 3.3–5.2)
ALP SERPL-CCNC: 198 U/L — HIGH (ref 40–120)
ALT FLD-CCNC: 31 U/L — SIGNIFICANT CHANGE UP
ANION GAP SERPL CALC-SCNC: 18 MMOL/L — HIGH (ref 5–17)
AST SERPL-CCNC: 40 U/L — HIGH
BILIRUB SERPL-MCNC: 0.6 MG/DL — SIGNIFICANT CHANGE UP (ref 0.4–2)
BUN SERPL-MCNC: 41 MG/DL — HIGH (ref 8–20)
CALCIUM SERPL-MCNC: 8.6 MG/DL — SIGNIFICANT CHANGE UP (ref 8.6–10.2)
CHLORIDE SERPL-SCNC: 96 MMOL/L — LOW (ref 98–107)
CO2 SERPL-SCNC: 23 MMOL/L — SIGNIFICANT CHANGE UP (ref 22–29)
CREAT SERPL-MCNC: 2.63 MG/DL — HIGH (ref 0.5–1.3)
GLUCOSE SERPL-MCNC: 280 MG/DL — HIGH (ref 70–115)
HCT VFR BLD CALC: 28.9 % — LOW (ref 42–52)
HGB BLD-MCNC: 8.8 G/DL — LOW (ref 14–18)
INR BLD: 1.35 RATIO — HIGH (ref 0.88–1.16)
MAGNESIUM SERPL-MCNC: 2 MG/DL — SIGNIFICANT CHANGE UP (ref 1.8–2.6)
MCHC RBC-ENTMCNC: 25.8 PG — LOW (ref 27–31)
MCHC RBC-ENTMCNC: 30.4 G/DL — LOW (ref 32–36)
MCV RBC AUTO: 84.8 FL — SIGNIFICANT CHANGE UP (ref 80–94)
NT-PROBNP SERPL-SCNC: HIGH PG/ML (ref 0–300)
PLATELET # BLD AUTO: 75 K/UL — LOW (ref 150–400)
POTASSIUM SERPL-MCNC: 4.4 MMOL/L — SIGNIFICANT CHANGE UP (ref 3.5–5.3)
POTASSIUM SERPL-SCNC: 4.4 MMOL/L — SIGNIFICANT CHANGE UP (ref 3.5–5.3)
PROT SERPL-MCNC: 7.3 G/DL — SIGNIFICANT CHANGE UP (ref 6.6–8.7)
PROTHROM AB SERPL-ACNC: 14.9 SEC — HIGH (ref 9.8–12.7)
RBC # BLD: 3.41 M/UL — LOW (ref 4.6–6.2)
RBC # FLD: 21.2 % — HIGH (ref 11–15.6)
SODIUM SERPL-SCNC: 137 MMOL/L — SIGNIFICANT CHANGE UP (ref 135–145)
WBC # BLD: 4.8 K/UL — SIGNIFICANT CHANGE UP (ref 4.8–10.8)
WBC # FLD AUTO: 4.8 K/UL — SIGNIFICANT CHANGE UP (ref 4.8–10.8)

## 2017-05-31 PROCEDURE — 99232 SBSQ HOSP IP/OBS MODERATE 35: CPT

## 2017-05-31 PROCEDURE — 71010: CPT | Mod: 26

## 2017-05-31 PROCEDURE — 99233 SBSQ HOSP IP/OBS HIGH 50: CPT

## 2017-05-31 PROCEDURE — 99497 ADVNCD CARE PLAN 30 MIN: CPT | Mod: 25

## 2017-05-31 RX ORDER — INSULIN GLARGINE 100 [IU]/ML
10 INJECTION, SOLUTION SUBCUTANEOUS AT BEDTIME
Qty: 0 | Refills: 0 | Status: DISCONTINUED | OUTPATIENT
Start: 2017-05-31 | End: 2017-06-03

## 2017-05-31 RX ORDER — ATROPINE SULFATE 1 %
2 DROPS OPHTHALMIC (EYE) EVERY 4 HOURS
Qty: 0 | Refills: 0 | Status: DISCONTINUED | OUTPATIENT
Start: 2017-05-31 | End: 2017-06-19

## 2017-05-31 RX ORDER — LISINOPRIL 2.5 MG/1
2.5 TABLET ORAL DAILY
Qty: 0 | Refills: 0 | Status: DISCONTINUED | OUTPATIENT
Start: 2017-05-31 | End: 2017-06-08

## 2017-05-31 RX ORDER — HYDROMORPHONE HYDROCHLORIDE 2 MG/ML
0.5 INJECTION INTRAMUSCULAR; INTRAVENOUS; SUBCUTANEOUS ONCE
Qty: 0 | Refills: 0 | Status: DISCONTINUED | OUTPATIENT
Start: 2017-05-31 | End: 2017-05-31

## 2017-05-31 RX ORDER — HYDROMORPHONE HYDROCHLORIDE 2 MG/ML
0.5 INJECTION INTRAMUSCULAR; INTRAVENOUS; SUBCUTANEOUS EVERY 4 HOURS
Qty: 0 | Refills: 0 | Status: DISCONTINUED | OUTPATIENT
Start: 2017-05-31 | End: 2017-06-01

## 2017-05-31 RX ADMIN — Medication 6: at 08:52

## 2017-05-31 RX ADMIN — GABAPENTIN 300 MILLIGRAM(S): 400 CAPSULE ORAL at 06:11

## 2017-05-31 RX ADMIN — INSULIN GLARGINE 10 UNIT(S): 100 INJECTION, SOLUTION SUBCUTANEOUS at 22:28

## 2017-05-31 RX ADMIN — Medication 1 DROP(S): at 06:10

## 2017-05-31 RX ADMIN — MIDODRINE HYDROCHLORIDE 2.5 MILLIGRAM(S): 2.5 TABLET ORAL at 22:28

## 2017-05-31 RX ADMIN — HEPARIN SODIUM 5000 UNIT(S): 5000 INJECTION INTRAVENOUS; SUBCUTANEOUS at 06:10

## 2017-05-31 RX ADMIN — PIPERACILLIN AND TAZOBACTAM 200 GRAM(S): 4; .5 INJECTION, POWDER, LYOPHILIZED, FOR SOLUTION INTRAVENOUS at 06:17

## 2017-05-31 RX ADMIN — PIPERACILLIN AND TAZOBACTAM 200 GRAM(S): 4; .5 INJECTION, POWDER, LYOPHILIZED, FOR SOLUTION INTRAVENOUS at 14:29

## 2017-05-31 RX ADMIN — HYDROMORPHONE HYDROCHLORIDE 0.5 MILLIGRAM(S): 2 INJECTION INTRAMUSCULAR; INTRAVENOUS; SUBCUTANEOUS at 16:06

## 2017-05-31 RX ADMIN — PIPERACILLIN AND TAZOBACTAM 200 GRAM(S): 4; .5 INJECTION, POWDER, LYOPHILIZED, FOR SOLUTION INTRAVENOUS at 22:27

## 2017-05-31 RX ADMIN — TAMSULOSIN HYDROCHLORIDE 0.4 MILLIGRAM(S): 0.4 CAPSULE ORAL at 22:27

## 2017-05-31 RX ADMIN — BUMETANIDE 0.5 MILLIGRAM(S): 0.25 INJECTION INTRAMUSCULAR; INTRAVENOUS at 06:10

## 2017-05-31 RX ADMIN — GABAPENTIN 300 MILLIGRAM(S): 400 CAPSULE ORAL at 22:28

## 2017-05-31 RX ADMIN — MIDODRINE HYDROCHLORIDE 2.5 MILLIGRAM(S): 2.5 TABLET ORAL at 06:10

## 2017-05-31 RX ADMIN — HYDROMORPHONE HYDROCHLORIDE 0.5 MILLIGRAM(S): 2 INJECTION INTRAMUSCULAR; INTRAVENOUS; SUBCUTANEOUS at 13:47

## 2017-05-31 RX ADMIN — CARVEDILOL PHOSPHATE 3.12 MILLIGRAM(S): 80 CAPSULE, EXTENDED RELEASE ORAL at 06:10

## 2017-05-31 NOTE — PHYSICAL THERAPY INITIAL EVALUATION ADULT - ADDITIONAL COMMENTS
Pt comes to us from [facility], amb with RW and assist.  Prior to [facility], patient lives in a 2 story house with wife with 4 steps to enter.  Amb with RW and Modified Independent with all ADLs and self care

## 2017-05-31 NOTE — PROGRESS NOTE ADULT - PROBLEM SELECTOR PLAN 2
Acute on chronic heart failure with reduced EF  ECHO pending  c/w Coreg, Midodrine  Appreciate cardiology consult - start low dose lisinopril  Cardiology Consult appreciated

## 2017-05-31 NOTE — PROGRESS NOTE ADULT - ATTENDING COMMENTS
COUNSELING:    Face to face meeting to discuss Advanced Care Planning - Time Spent ______ Minutes.  *******See goals of care note.    More than 50% time spent in counseling and coordinating care. __60____ Minutes.     Thank you for the opportunity to assist with the care of this patient.   [place] [facility] Consult Service [phone].

## 2017-05-31 NOTE — GOALS OF CARE CONVERSATION - PERSONAL ADVANCE DIRECTIVE - CONVERSATION DETAILS
I spoke at length with Mrs. Reyes about past few months of her husbands physical and cognitive decline.  He is not on HD long, Although she seems to understand that  trouble with swallowing, and overall increasing weakness and inability to get on his feet in rehab-is sign that his body is breaking down, she still wants to speak with son's before establishing ADV Directives. PHIL discussed, education provided will F/U tomorrow No talk about stopping HD

## 2017-06-01 DIAGNOSIS — R63.0 ANOREXIA: ICD-10-CM

## 2017-06-01 DIAGNOSIS — R53.1 WEAKNESS: ICD-10-CM

## 2017-06-01 LAB
ANION GAP SERPL CALC-SCNC: 11 MMOL/L — SIGNIFICANT CHANGE UP (ref 5–17)
BUN SERPL-MCNC: 51 MG/DL — HIGH (ref 8–20)
CALCIUM SERPL-MCNC: 8.6 MG/DL — SIGNIFICANT CHANGE UP (ref 8.6–10.2)
CHLORIDE SERPL-SCNC: 99 MMOL/L — SIGNIFICANT CHANGE UP (ref 98–107)
CO2 SERPL-SCNC: 29 MMOL/L — SIGNIFICANT CHANGE UP (ref 22–29)
CREAT SERPL-MCNC: 3.32 MG/DL — HIGH (ref 0.5–1.3)
GLUCOSE SERPL-MCNC: 165 MG/DL — HIGH (ref 70–115)
HCT VFR BLD CALC: 24.8 % — LOW (ref 42–52)
HGB BLD-MCNC: 7.5 G/DL — LOW (ref 14–18)
MCHC RBC-ENTMCNC: 25.9 PG — LOW (ref 27–31)
MCHC RBC-ENTMCNC: 30.2 G/DL — LOW (ref 32–36)
MCV RBC AUTO: 85.5 FL — SIGNIFICANT CHANGE UP (ref 80–94)
PLATELET # BLD AUTO: 77 K/UL — LOW (ref 150–400)
POTASSIUM SERPL-MCNC: 4.2 MMOL/L — SIGNIFICANT CHANGE UP (ref 3.5–5.3)
POTASSIUM SERPL-SCNC: 4.2 MMOL/L — SIGNIFICANT CHANGE UP (ref 3.5–5.3)
RBC # BLD: 2.9 M/UL — LOW (ref 4.6–6.2)
RBC # FLD: 21.3 % — HIGH (ref 11–15.6)
SODIUM SERPL-SCNC: 139 MMOL/L — SIGNIFICANT CHANGE UP (ref 135–145)
WBC # BLD: 5 K/UL — SIGNIFICANT CHANGE UP (ref 4.8–10.8)
WBC # FLD AUTO: 5 K/UL — SIGNIFICANT CHANGE UP (ref 4.8–10.8)

## 2017-06-01 PROCEDURE — 99233 SBSQ HOSP IP/OBS HIGH 50: CPT

## 2017-06-01 PROCEDURE — 99232 SBSQ HOSP IP/OBS MODERATE 35: CPT

## 2017-06-01 RX ADMIN — MIDODRINE HYDROCHLORIDE 2.5 MILLIGRAM(S): 2.5 TABLET ORAL at 05:20

## 2017-06-01 RX ADMIN — CARVEDILOL PHOSPHATE 3.12 MILLIGRAM(S): 80 CAPSULE, EXTENDED RELEASE ORAL at 16:32

## 2017-06-01 RX ADMIN — Medication 325 MILLIGRAM(S): at 13:34

## 2017-06-01 RX ADMIN — HYDROMORPHONE HYDROCHLORIDE 0.5 MILLIGRAM(S): 2 INJECTION INTRAMUSCULAR; INTRAVENOUS; SUBCUTANEOUS at 15:24

## 2017-06-01 RX ADMIN — HEPARIN SODIUM 5000 UNIT(S): 5000 INJECTION INTRAVENOUS; SUBCUTANEOUS at 05:24

## 2017-06-01 RX ADMIN — IRON SUCROSE 210 MILLIGRAM(S): 20 INJECTION, SOLUTION INTRAVENOUS at 10:17

## 2017-06-01 RX ADMIN — LISINOPRIL 2.5 MILLIGRAM(S): 2.5 TABLET ORAL at 05:20

## 2017-06-01 RX ADMIN — Medication 1 DROP(S): at 16:32

## 2017-06-01 RX ADMIN — GABAPENTIN 300 MILLIGRAM(S): 400 CAPSULE ORAL at 22:01

## 2017-06-01 RX ADMIN — INSULIN GLARGINE 10 UNIT(S): 100 INJECTION, SOLUTION SUBCUTANEOUS at 22:01

## 2017-06-01 RX ADMIN — Medication 667 MILLIGRAM(S): at 16:32

## 2017-06-01 RX ADMIN — TAMSULOSIN HYDROCHLORIDE 0.4 MILLIGRAM(S): 0.4 CAPSULE ORAL at 22:01

## 2017-06-01 RX ADMIN — HEPARIN SODIUM 5000 UNIT(S): 5000 INJECTION INTRAVENOUS; SUBCUTANEOUS at 16:32

## 2017-06-01 RX ADMIN — CARVEDILOL PHOSPHATE 3.12 MILLIGRAM(S): 80 CAPSULE, EXTENDED RELEASE ORAL at 05:20

## 2017-06-01 RX ADMIN — GABAPENTIN 300 MILLIGRAM(S): 400 CAPSULE ORAL at 13:34

## 2017-06-01 RX ADMIN — PIPERACILLIN AND TAZOBACTAM 200 GRAM(S): 4; .5 INJECTION, POWDER, LYOPHILIZED, FOR SOLUTION INTRAVENOUS at 05:20

## 2017-06-01 RX ADMIN — PIPERACILLIN AND TAZOBACTAM 200 GRAM(S): 4; .5 INJECTION, POWDER, LYOPHILIZED, FOR SOLUTION INTRAVENOUS at 15:16

## 2017-06-01 RX ADMIN — GABAPENTIN 300 MILLIGRAM(S): 400 CAPSULE ORAL at 05:20

## 2017-06-01 RX ADMIN — Medication 667 MILLIGRAM(S): at 13:34

## 2017-06-01 RX ADMIN — PIPERACILLIN AND TAZOBACTAM 200 GRAM(S): 4; .5 INJECTION, POWDER, LYOPHILIZED, FOR SOLUTION INTRAVENOUS at 22:01

## 2017-06-01 NOTE — PROGRESS NOTE ADULT - ATTENDING COMMENTS
COUNSELING:    Face to face meeting to discuss Advanced Care Planning - Time Spent ______ Minutes.  See goals of care note.    More than 50% time spent in counseling and coordinating care. ___25___ Minutes.     Thank you for the opportunity to assist with the care of this patient.   [place] [facility] Consult Service [phone].

## 2017-06-01 NOTE — PROGRESS NOTE ADULT - PROBLEM SELECTOR PLAN 2
Acute on chronic heart failure with reduced EF  ECHO pending  c/w Coreg and lisinopril  Cardiology Consult appreciated

## 2017-06-02 LAB
ANION GAP SERPL CALC-SCNC: 15 MMOL/L — SIGNIFICANT CHANGE UP (ref 5–17)
BUN SERPL-MCNC: 31 MG/DL — HIGH (ref 8–20)
CALCIUM SERPL-MCNC: 8.5 MG/DL — LOW (ref 8.6–10.2)
CHLORIDE SERPL-SCNC: 98 MMOL/L — SIGNIFICANT CHANGE UP (ref 98–107)
CO2 SERPL-SCNC: 29 MMOL/L — SIGNIFICANT CHANGE UP (ref 22–29)
CREAT SERPL-MCNC: 3.16 MG/DL — HIGH (ref 0.5–1.3)
GLUCOSE SERPL-MCNC: 119 MG/DL — HIGH (ref 70–115)
HCT VFR BLD CALC: 26 % — LOW (ref 42–52)
HGB BLD-MCNC: 7.9 G/DL — LOW (ref 14–18)
MCHC RBC-ENTMCNC: 26 PG — LOW (ref 27–31)
MCHC RBC-ENTMCNC: 30.4 G/DL — LOW (ref 32–36)
MCV RBC AUTO: 85.5 FL — SIGNIFICANT CHANGE UP (ref 80–94)
PLATELET # BLD AUTO: 83 K/UL — LOW (ref 150–400)
POTASSIUM SERPL-MCNC: 3.5 MMOL/L — SIGNIFICANT CHANGE UP (ref 3.5–5.3)
POTASSIUM SERPL-SCNC: 3.5 MMOL/L — SIGNIFICANT CHANGE UP (ref 3.5–5.3)
RBC # BLD: 3.04 M/UL — LOW (ref 4.6–6.2)
RBC # FLD: 21.4 % — HIGH (ref 11–15.6)
SODIUM SERPL-SCNC: 142 MMOL/L — SIGNIFICANT CHANGE UP (ref 135–145)
WBC # BLD: 5.2 K/UL — SIGNIFICANT CHANGE UP (ref 4.8–10.8)
WBC # FLD AUTO: 5.2 K/UL — SIGNIFICANT CHANGE UP (ref 4.8–10.8)

## 2017-06-02 PROCEDURE — 99233 SBSQ HOSP IP/OBS HIGH 50: CPT

## 2017-06-02 PROCEDURE — 99232 SBSQ HOSP IP/OBS MODERATE 35: CPT

## 2017-06-02 RX ORDER — POLYETHYLENE GLYCOL 3350 17 G/17G
17 POWDER, FOR SOLUTION ORAL AT BEDTIME
Qty: 0 | Refills: 0 | Status: DISCONTINUED | OUTPATIENT
Start: 2017-06-02 | End: 2017-06-19

## 2017-06-02 RX ORDER — MINERAL OIL
133 OIL (ML) MISCELLANEOUS ONCE
Qty: 0 | Refills: 0 | Status: DISCONTINUED | OUTPATIENT
Start: 2017-06-02 | End: 2017-06-19

## 2017-06-02 RX ORDER — ACETAMINOPHEN 500 MG
650 TABLET ORAL EVERY 8 HOURS
Qty: 0 | Refills: 0 | Status: DISCONTINUED | OUTPATIENT
Start: 2017-06-02 | End: 2017-06-19

## 2017-06-02 RX ADMIN — Medication 650 MILLIGRAM(S): at 23:00

## 2017-06-02 RX ADMIN — GABAPENTIN 300 MILLIGRAM(S): 400 CAPSULE ORAL at 21:22

## 2017-06-02 RX ADMIN — IRON SUCROSE 210 MILLIGRAM(S): 20 INJECTION, SOLUTION INTRAVENOUS at 17:11

## 2017-06-02 RX ADMIN — GABAPENTIN 300 MILLIGRAM(S): 400 CAPSULE ORAL at 05:42

## 2017-06-02 RX ADMIN — CARVEDILOL PHOSPHATE 3.12 MILLIGRAM(S): 80 CAPSULE, EXTENDED RELEASE ORAL at 17:10

## 2017-06-02 RX ADMIN — CARVEDILOL PHOSPHATE 3.12 MILLIGRAM(S): 80 CAPSULE, EXTENDED RELEASE ORAL at 05:42

## 2017-06-02 RX ADMIN — PIPERACILLIN AND TAZOBACTAM 200 GRAM(S): 4; .5 INJECTION, POWDER, LYOPHILIZED, FOR SOLUTION INTRAVENOUS at 05:42

## 2017-06-02 RX ADMIN — TAMSULOSIN HYDROCHLORIDE 0.4 MILLIGRAM(S): 0.4 CAPSULE ORAL at 21:23

## 2017-06-02 RX ADMIN — Medication 2 INCH(S): at 12:20

## 2017-06-02 RX ADMIN — Medication 325 MILLIGRAM(S): at 12:19

## 2017-06-02 RX ADMIN — HEPARIN SODIUM 5000 UNIT(S): 5000 INJECTION INTRAVENOUS; SUBCUTANEOUS at 17:10

## 2017-06-02 RX ADMIN — GABAPENTIN 300 MILLIGRAM(S): 400 CAPSULE ORAL at 15:14

## 2017-06-02 RX ADMIN — Medication 1 DROP(S): at 17:11

## 2017-06-02 RX ADMIN — Medication 667 MILLIGRAM(S): at 17:10

## 2017-06-02 RX ADMIN — LISINOPRIL 2.5 MILLIGRAM(S): 2.5 TABLET ORAL at 05:42

## 2017-06-02 RX ADMIN — HEPARIN SODIUM 5000 UNIT(S): 5000 INJECTION INTRAVENOUS; SUBCUTANEOUS at 05:42

## 2017-06-02 RX ADMIN — Medication 667 MILLIGRAM(S): at 12:19

## 2017-06-02 RX ADMIN — Medication 650 MILLIGRAM(S): at 21:21

## 2017-06-02 RX ADMIN — Medication 1 DROP(S): at 05:42

## 2017-06-02 RX ADMIN — PIPERACILLIN AND TAZOBACTAM 200 GRAM(S): 4; .5 INJECTION, POWDER, LYOPHILIZED, FOR SOLUTION INTRAVENOUS at 17:10

## 2017-06-02 RX ADMIN — INSULIN GLARGINE 10 UNIT(S): 100 INJECTION, SOLUTION SUBCUTANEOUS at 21:22

## 2017-06-02 RX ADMIN — Medication 667 MILLIGRAM(S): at 12:36

## 2017-06-02 RX ADMIN — Medication 4: at 12:35

## 2017-06-02 RX ADMIN — PIPERACILLIN AND TAZOBACTAM 200 GRAM(S): 4; .5 INJECTION, POWDER, LYOPHILIZED, FOR SOLUTION INTRAVENOUS at 21:29

## 2017-06-02 NOTE — DIETITIAN INITIAL EVALUATION ADULT. - OTHER INFO
Pt with good po intake this morning with assist per RN.  Pt with declining po intake x 1 yr per pts wife in EMR and previous nutrition assessments.   Wt loss noted as per nutrition assessments from April 2017.

## 2017-06-02 NOTE — DIETITIAN INITIAL EVALUATION ADULT. - ETIOLOGY
related to gradual decline in energy intake with progressive debility and multiple hospital admissions

## 2017-06-02 NOTE — PROGRESS NOTE ADULT - ATTENDING COMMENTS
COUNSELING:    Face to face meeting to discuss Advanced Care Planning - Time Spent ______ Minutes.  See goals of care note.    More than 50% time spent in counseling and coordinating care. __45____ Minutes.     Thank you for the opportunity to assist with the care of this patient.   [place] [facility] Consult Service [phone].

## 2017-06-02 NOTE — DIETITIAN INITIAL EVALUATION ADULT. - DIET TYPE
dysphagia 3, soft, no liquids/consistent carbohydrate (evening snack)/DASH/TLC (sodium and cholesterol restricted diet)

## 2017-06-02 NOTE — PROGRESS NOTE ADULT - PROBLEM SELECTOR PLAN 2
Acute on chronic heart failure with reduced EF  ECHO pending  c/w Coreg and lisinopril  Cardiology Consult appreciated Acute on chronic heart failure with reduced EF  c/w Coreg and lisinopril  Cardiology Consult appreciated

## 2017-06-02 NOTE — CHART NOTE - NSCHARTNOTEFT_GEN_A_CORE
Upon Nutritional Assessment by the Registered Dietitian your patient was determined to meet criteria / has evidence of the following diagnosis/diagnoses:          [ ]  Mild Protein Calorie Malnutrition        [x ]  Moderate Protein Calorie Malnutrition        [ ] Severe Protein Calorie Malnutrition        [ ] Unspecified Protein Calorie Malnutrition        [ ] Underweight / BMI <19        [ ] Morbid Obesity / BMI > 40      Findings as based on:  •  Comprehensive nutrition assessment and consultation  •  Calorie counts (nutrient intake analysis)  •  Food acceptance and intake status from observations by staff  •  Follow up  •  Patient education  •  Intervention secondary to interdisciplinary rounds  •   concerns      Treatment:    The following diet has been recommended:  Add 4 oz Ensure pudding tid  MVI and Vit C 500mg daily    PROVIDER Section:     By signing this assessment you are acknowledging and agree with the diagnosis/diagnoses assigned by the Registered Dietitian    Comments:

## 2017-06-03 LAB
ANION GAP SERPL CALC-SCNC: 14 MMOL/L — SIGNIFICANT CHANGE UP (ref 5–17)
BUN SERPL-MCNC: 43 MG/DL — HIGH (ref 8–20)
CALCIUM SERPL-MCNC: 8.8 MG/DL — SIGNIFICANT CHANGE UP (ref 8.6–10.2)
CHLORIDE SERPL-SCNC: 100 MMOL/L — SIGNIFICANT CHANGE UP (ref 98–107)
CO2 SERPL-SCNC: 29 MMOL/L — SIGNIFICANT CHANGE UP (ref 22–29)
CREAT SERPL-MCNC: 4.83 MG/DL — HIGH (ref 0.5–1.3)
CULTURE RESULTS: SIGNIFICANT CHANGE UP
CULTURE RESULTS: SIGNIFICANT CHANGE UP
GLUCOSE SERPL-MCNC: 144 MG/DL — HIGH (ref 70–115)
HCT VFR BLD CALC: 26.8 % — LOW (ref 42–52)
HGB BLD-MCNC: 8 G/DL — LOW (ref 14–18)
IRON SATN MFR SERPL: 22 % — SIGNIFICANT CHANGE UP (ref 16–55)
IRON SATN MFR SERPL: 41 UG/DL — LOW (ref 59–158)
MCHC RBC-ENTMCNC: 26 PG — LOW (ref 27–31)
MCHC RBC-ENTMCNC: 29.9 G/DL — LOW (ref 32–36)
MCV RBC AUTO: 87 FL — SIGNIFICANT CHANGE UP (ref 80–94)
PHOSPHATE SERPL-MCNC: 4.1 MG/DL — SIGNIFICANT CHANGE UP (ref 2.4–4.7)
PLATELET # BLD AUTO: 92 K/UL — LOW (ref 150–400)
POTASSIUM SERPL-MCNC: 4 MMOL/L — SIGNIFICANT CHANGE UP (ref 3.5–5.3)
POTASSIUM SERPL-SCNC: 4 MMOL/L — SIGNIFICANT CHANGE UP (ref 3.5–5.3)
RBC # BLD: 3.08 M/UL — LOW (ref 4.6–6.2)
RBC # FLD: 21.3 % — HIGH (ref 11–15.6)
SODIUM SERPL-SCNC: 143 MMOL/L — SIGNIFICANT CHANGE UP (ref 135–145)
SPECIMEN SOURCE: SIGNIFICANT CHANGE UP
SPECIMEN SOURCE: SIGNIFICANT CHANGE UP
TIBC SERPL-MCNC: 189 UG/DL — LOW (ref 220–430)
WBC # BLD: 4.3 K/UL — LOW (ref 4.8–10.8)
WBC # FLD AUTO: 4.3 K/UL — LOW (ref 4.8–10.8)

## 2017-06-03 PROCEDURE — 99232 SBSQ HOSP IP/OBS MODERATE 35: CPT

## 2017-06-03 RX ORDER — DEXTROSE 50 % IN WATER 50 %
1 SYRINGE (ML) INTRAVENOUS ONCE
Qty: 0 | Refills: 0 | Status: COMPLETED | OUTPATIENT
Start: 2017-06-03 | End: 2017-06-03

## 2017-06-03 RX ORDER — ERYTHROPOIETIN 10000 [IU]/ML
10000 INJECTION, SOLUTION INTRAVENOUS; SUBCUTANEOUS
Qty: 0 | Refills: 0 | Status: DISCONTINUED | OUTPATIENT
Start: 2017-06-03 | End: 2017-06-09

## 2017-06-03 RX ORDER — DEXTROSE 50 % IN WATER 50 %
1 SYRINGE (ML) INTRAVENOUS ONCE
Qty: 0 | Refills: 0 | Status: DISCONTINUED | OUTPATIENT
Start: 2017-06-03 | End: 2017-06-19

## 2017-06-03 RX ORDER — INSULIN GLARGINE 100 [IU]/ML
5 INJECTION, SOLUTION SUBCUTANEOUS AT BEDTIME
Qty: 0 | Refills: 0 | Status: DISCONTINUED | OUTPATIENT
Start: 2017-06-03 | End: 2017-06-06

## 2017-06-03 RX ADMIN — Medication 325 MILLIGRAM(S): at 11:58

## 2017-06-03 RX ADMIN — ERYTHROPOIETIN 10000 UNIT(S): 10000 INJECTION, SOLUTION INTRAVENOUS; SUBCUTANEOUS at 13:57

## 2017-06-03 RX ADMIN — Medication 650 MILLIGRAM(S): at 06:00

## 2017-06-03 RX ADMIN — PIPERACILLIN AND TAZOBACTAM 200 GRAM(S): 4; .5 INJECTION, POWDER, LYOPHILIZED, FOR SOLUTION INTRAVENOUS at 21:41

## 2017-06-03 RX ADMIN — Medication 1 DROP(S): at 17:54

## 2017-06-03 RX ADMIN — HEPARIN SODIUM 5000 UNIT(S): 5000 INJECTION INTRAVENOUS; SUBCUTANEOUS at 17:54

## 2017-06-03 RX ADMIN — Medication 2: at 11:58

## 2017-06-03 RX ADMIN — PIPERACILLIN AND TAZOBACTAM 200 GRAM(S): 4; .5 INJECTION, POWDER, LYOPHILIZED, FOR SOLUTION INTRAVENOUS at 05:32

## 2017-06-03 RX ADMIN — Medication 667 MILLIGRAM(S): at 11:58

## 2017-06-03 RX ADMIN — Medication 650 MILLIGRAM(S): at 05:32

## 2017-06-03 RX ADMIN — Medication 667 MILLIGRAM(S): at 08:17

## 2017-06-03 RX ADMIN — LISINOPRIL 2.5 MILLIGRAM(S): 2.5 TABLET ORAL at 05:32

## 2017-06-03 RX ADMIN — CARVEDILOL PHOSPHATE 3.12 MILLIGRAM(S): 80 CAPSULE, EXTENDED RELEASE ORAL at 17:54

## 2017-06-03 RX ADMIN — GABAPENTIN 300 MILLIGRAM(S): 400 CAPSULE ORAL at 21:42

## 2017-06-03 RX ADMIN — CARVEDILOL PHOSPHATE 3.12 MILLIGRAM(S): 80 CAPSULE, EXTENDED RELEASE ORAL at 05:33

## 2017-06-03 RX ADMIN — HEPARIN SODIUM 5000 UNIT(S): 5000 INJECTION INTRAVENOUS; SUBCUTANEOUS at 05:33

## 2017-06-03 RX ADMIN — Medication 1 DROP(S): at 05:34

## 2017-06-03 RX ADMIN — GABAPENTIN 300 MILLIGRAM(S): 400 CAPSULE ORAL at 05:32

## 2017-06-03 RX ADMIN — TAMSULOSIN HYDROCHLORIDE 0.4 MILLIGRAM(S): 0.4 CAPSULE ORAL at 21:42

## 2017-06-03 RX ADMIN — INSULIN GLARGINE 5 UNIT(S): 100 INJECTION, SOLUTION SUBCUTANEOUS at 21:41

## 2017-06-03 RX ADMIN — Medication 667 MILLIGRAM(S): at 17:54

## 2017-06-03 RX ADMIN — Medication 1 DOSE(S): at 07:54

## 2017-06-03 RX ADMIN — Medication 650 MILLIGRAM(S): at 23:00

## 2017-06-03 RX ADMIN — Medication 650 MILLIGRAM(S): at 21:42

## 2017-06-03 RX ADMIN — PIPERACILLIN AND TAZOBACTAM 200 GRAM(S): 4; .5 INJECTION, POWDER, LYOPHILIZED, FOR SOLUTION INTRAVENOUS at 17:54

## 2017-06-03 NOTE — PROGRESS NOTE ADULT - PROBLEM SELECTOR PLAN 2
Acute on chronic heart failure with reduced EF - resolved  c/w Coreg and lisinopril  Cardiology Consult appreciated

## 2017-06-03 NOTE — ADVANCED PRACTICE NURSE CONSULT - ASSESSMENT
pt is 86 y/o male, alert. h/o of CHF, coronary artery disease, DM. Pt needs assistant to turn and reposition. Incontinent with urine and liquid stool. Assessed pt's skin with RN Su, full thickness skin loss noted at coccyx bony prominence area, periwound skin macerated due to incontinence, pt was positioned on the side to offload the coccyx and buttocks, Wound bed with minimal serosanguinous exudate, no odor, no signs and symptoms of wound infection noted, no periwound erythema. Please see flowsheet for detailed wound assessment.

## 2017-06-03 NOTE — ADVANCED PRACTICE NURSE CONSULT - RECOMMEDATIONS
Wound care recommendation for coccyx stage 3 pressure injury:  1. clean the wound with normal saline and pat dry,   2. apply Triad cream to the wound bed and periwound skin,   3. wound care recommended twice a day and PRN after each incontinence,   4. recommended condom catheter for urine diversion,  5. recommended rectal tube if pt's diarrhea continues,  6. continued nutritional support for wound healing - nutritional consult ordered,   7. continued pressure injury prevention protocol.   wound care recommendation discussed with LILLY Blue, please contact wound care nurse if further follow up is needed.

## 2017-06-04 PROCEDURE — 99233 SBSQ HOSP IP/OBS HIGH 50: CPT

## 2017-06-04 RX ADMIN — Medication 650 MILLIGRAM(S): at 05:29

## 2017-06-04 RX ADMIN — PIPERACILLIN AND TAZOBACTAM 200 GRAM(S): 4; .5 INJECTION, POWDER, LYOPHILIZED, FOR SOLUTION INTRAVENOUS at 15:35

## 2017-06-04 RX ADMIN — Medication 1 DROP(S): at 17:20

## 2017-06-04 RX ADMIN — GABAPENTIN 300 MILLIGRAM(S): 400 CAPSULE ORAL at 05:29

## 2017-06-04 RX ADMIN — Medication 650 MILLIGRAM(S): at 16:35

## 2017-06-04 RX ADMIN — HEPARIN SODIUM 5000 UNIT(S): 5000 INJECTION INTRAVENOUS; SUBCUTANEOUS at 17:20

## 2017-06-04 RX ADMIN — Medication 650 MILLIGRAM(S): at 22:14

## 2017-06-04 RX ADMIN — GABAPENTIN 300 MILLIGRAM(S): 400 CAPSULE ORAL at 22:14

## 2017-06-04 RX ADMIN — Medication 667 MILLIGRAM(S): at 08:00

## 2017-06-04 RX ADMIN — CARVEDILOL PHOSPHATE 3.12 MILLIGRAM(S): 80 CAPSULE, EXTENDED RELEASE ORAL at 05:29

## 2017-06-04 RX ADMIN — Medication 2: at 17:22

## 2017-06-04 RX ADMIN — Medication 650 MILLIGRAM(S): at 15:35

## 2017-06-04 RX ADMIN — Medication 667 MILLIGRAM(S): at 17:22

## 2017-06-04 RX ADMIN — Medication 1 DROP(S): at 05:29

## 2017-06-04 RX ADMIN — PIPERACILLIN AND TAZOBACTAM 200 GRAM(S): 4; .5 INJECTION, POWDER, LYOPHILIZED, FOR SOLUTION INTRAVENOUS at 22:14

## 2017-06-04 RX ADMIN — Medication 325 MILLIGRAM(S): at 11:59

## 2017-06-04 RX ADMIN — TAMSULOSIN HYDROCHLORIDE 0.4 MILLIGRAM(S): 0.4 CAPSULE ORAL at 22:14

## 2017-06-04 RX ADMIN — INSULIN GLARGINE 5 UNIT(S): 100 INJECTION, SOLUTION SUBCUTANEOUS at 22:14

## 2017-06-04 RX ADMIN — PIPERACILLIN AND TAZOBACTAM 200 GRAM(S): 4; .5 INJECTION, POWDER, LYOPHILIZED, FOR SOLUTION INTRAVENOUS at 05:30

## 2017-06-04 RX ADMIN — Medication 650 MILLIGRAM(S): at 23:14

## 2017-06-04 RX ADMIN — HEPARIN SODIUM 5000 UNIT(S): 5000 INJECTION INTRAVENOUS; SUBCUTANEOUS at 05:30

## 2017-06-04 RX ADMIN — GABAPENTIN 300 MILLIGRAM(S): 400 CAPSULE ORAL at 15:35

## 2017-06-04 RX ADMIN — Medication 650 MILLIGRAM(S): at 07:21

## 2017-06-04 RX ADMIN — CARVEDILOL PHOSPHATE 3.12 MILLIGRAM(S): 80 CAPSULE, EXTENDED RELEASE ORAL at 17:21

## 2017-06-04 RX ADMIN — LISINOPRIL 2.5 MILLIGRAM(S): 2.5 TABLET ORAL at 05:29

## 2017-06-04 RX ADMIN — Medication 667 MILLIGRAM(S): at 11:59

## 2017-06-05 DIAGNOSIS — E46 UNSPECIFIED PROTEIN-CALORIE MALNUTRITION: ICD-10-CM

## 2017-06-05 DIAGNOSIS — L89.153 PRESSURE ULCER OF SACRAL REGION, STAGE 3: ICD-10-CM

## 2017-06-05 PROCEDURE — 99232 SBSQ HOSP IP/OBS MODERATE 35: CPT

## 2017-06-05 PROCEDURE — 99233 SBSQ HOSP IP/OBS HIGH 50: CPT

## 2017-06-05 RX ORDER — HYDROMORPHONE HYDROCHLORIDE 2 MG/ML
0.5 INJECTION INTRAMUSCULAR; INTRAVENOUS; SUBCUTANEOUS EVERY 4 HOURS
Qty: 0 | Refills: 0 | Status: DISCONTINUED | OUTPATIENT
Start: 2017-06-05 | End: 2017-06-05

## 2017-06-05 RX ORDER — HYDROMORPHONE HYDROCHLORIDE 2 MG/ML
2 INJECTION INTRAMUSCULAR; INTRAVENOUS; SUBCUTANEOUS EVERY 4 HOURS
Qty: 0 | Refills: 0 | Status: DISCONTINUED | OUTPATIENT
Start: 2017-06-05 | End: 2017-06-05

## 2017-06-05 RX ADMIN — PIPERACILLIN AND TAZOBACTAM 200 GRAM(S): 4; .5 INJECTION, POWDER, LYOPHILIZED, FOR SOLUTION INTRAVENOUS at 05:29

## 2017-06-05 RX ADMIN — Medication 325 MILLIGRAM(S): at 14:17

## 2017-06-05 RX ADMIN — Medication 1 DROP(S): at 05:30

## 2017-06-05 RX ADMIN — Medication 650 MILLIGRAM(S): at 22:55

## 2017-06-05 RX ADMIN — PIPERACILLIN AND TAZOBACTAM 200 GRAM(S): 4; .5 INJECTION, POWDER, LYOPHILIZED, FOR SOLUTION INTRAVENOUS at 14:16

## 2017-06-05 RX ADMIN — HEPARIN SODIUM 5000 UNIT(S): 5000 INJECTION INTRAVENOUS; SUBCUTANEOUS at 17:13

## 2017-06-05 RX ADMIN — HEPARIN SODIUM 5000 UNIT(S): 5000 INJECTION INTRAVENOUS; SUBCUTANEOUS at 05:30

## 2017-06-05 RX ADMIN — GABAPENTIN 300 MILLIGRAM(S): 400 CAPSULE ORAL at 22:55

## 2017-06-05 RX ADMIN — Medication 650 MILLIGRAM(S): at 06:29

## 2017-06-05 RX ADMIN — Medication 650 MILLIGRAM(S): at 05:29

## 2017-06-05 RX ADMIN — Medication 667 MILLIGRAM(S): at 17:12

## 2017-06-05 RX ADMIN — GABAPENTIN 300 MILLIGRAM(S): 400 CAPSULE ORAL at 05:29

## 2017-06-05 RX ADMIN — Medication 667 MILLIGRAM(S): at 08:48

## 2017-06-05 RX ADMIN — Medication 1 DROP(S): at 17:11

## 2017-06-05 RX ADMIN — INSULIN GLARGINE 5 UNIT(S): 100 INJECTION, SOLUTION SUBCUTANEOUS at 22:55

## 2017-06-05 RX ADMIN — Medication 667 MILLIGRAM(S): at 14:17

## 2017-06-05 RX ADMIN — Medication 650 MILLIGRAM(S): at 14:17

## 2017-06-05 RX ADMIN — Medication 4: at 17:14

## 2017-06-05 RX ADMIN — CARVEDILOL PHOSPHATE 3.12 MILLIGRAM(S): 80 CAPSULE, EXTENDED RELEASE ORAL at 17:13

## 2017-06-05 RX ADMIN — CARVEDILOL PHOSPHATE 3.12 MILLIGRAM(S): 80 CAPSULE, EXTENDED RELEASE ORAL at 05:29

## 2017-06-05 RX ADMIN — TAMSULOSIN HYDROCHLORIDE 0.4 MILLIGRAM(S): 0.4 CAPSULE ORAL at 22:55

## 2017-06-05 RX ADMIN — GABAPENTIN 300 MILLIGRAM(S): 400 CAPSULE ORAL at 14:17

## 2017-06-05 RX ADMIN — Medication 650 MILLIGRAM(S): at 14:50

## 2017-06-05 RX ADMIN — Medication 650 MILLIGRAM(S): at 23:55

## 2017-06-05 RX ADMIN — LISINOPRIL 2.5 MILLIGRAM(S): 2.5 TABLET ORAL at 05:29

## 2017-06-05 NOTE — PROGRESS NOTE ADULT - ATTENDING COMMENTS
COUNSELING:    Face to face meeting to discuss Advanced Care Planning - Time Spent ______ Minutes.  See goals of care note.    More than 50% time spent in counseling and coordinating care. __20____ Minutes.     Thank you for the opportunity to assist with the care of this patient.   [place] [facility] Consult Service [phone].

## 2017-06-06 DIAGNOSIS — L89.159 PRESSURE ULCER OF SACRAL REGION, UNSPECIFIED STAGE: ICD-10-CM

## 2017-06-06 DIAGNOSIS — F41.9 ANXIETY DISORDER, UNSPECIFIED: ICD-10-CM

## 2017-06-06 LAB
ANION GAP SERPL CALC-SCNC: 11 MMOL/L — SIGNIFICANT CHANGE UP (ref 5–17)
BUN SERPL-MCNC: 42 MG/DL — HIGH (ref 8–20)
CALCIUM SERPL-MCNC: 8.5 MG/DL — LOW (ref 8.6–10.2)
CHLORIDE SERPL-SCNC: 100 MMOL/L — SIGNIFICANT CHANGE UP (ref 98–107)
CO2 SERPL-SCNC: 29 MMOL/L — SIGNIFICANT CHANGE UP (ref 22–29)
CREAT SERPL-MCNC: 5.11 MG/DL — HIGH (ref 0.5–1.3)
GLUCOSE SERPL-MCNC: 70 MG/DL — SIGNIFICANT CHANGE UP (ref 70–115)
HCT VFR BLD CALC: 26.5 % — LOW (ref 42–52)
HGB BLD-MCNC: 8 G/DL — LOW (ref 14–18)
MCHC RBC-ENTMCNC: 25.8 PG — LOW (ref 27–31)
MCHC RBC-ENTMCNC: 30.2 G/DL — LOW (ref 32–36)
MCV RBC AUTO: 85.5 FL — SIGNIFICANT CHANGE UP (ref 80–94)
PHOSPHATE SERPL-MCNC: 3.8 MG/DL — SIGNIFICANT CHANGE UP (ref 2.4–4.7)
PLATELET # BLD AUTO: 83 K/UL — LOW (ref 150–400)
POTASSIUM SERPL-MCNC: 4.6 MMOL/L — SIGNIFICANT CHANGE UP (ref 3.5–5.3)
POTASSIUM SERPL-SCNC: 4.6 MMOL/L — SIGNIFICANT CHANGE UP (ref 3.5–5.3)
RBC # BLD: 3.1 M/UL — LOW (ref 4.6–6.2)
RBC # FLD: 20.8 % — HIGH (ref 11–15.6)
SODIUM SERPL-SCNC: 140 MMOL/L — SIGNIFICANT CHANGE UP (ref 135–145)
WBC # BLD: 3.6 K/UL — LOW (ref 4.8–10.8)
WBC # FLD AUTO: 3.6 K/UL — LOW (ref 4.8–10.8)

## 2017-06-06 PROCEDURE — 99233 SBSQ HOSP IP/OBS HIGH 50: CPT

## 2017-06-06 PROCEDURE — 99232 SBSQ HOSP IP/OBS MODERATE 35: CPT

## 2017-06-06 RX ADMIN — Medication 650 MILLIGRAM(S): at 21:40

## 2017-06-06 RX ADMIN — ERYTHROPOIETIN 10000 UNIT(S): 10000 INJECTION, SOLUTION INTRAVENOUS; SUBCUTANEOUS at 09:54

## 2017-06-06 RX ADMIN — Medication 325 MILLIGRAM(S): at 15:31

## 2017-06-06 RX ADMIN — GABAPENTIN 300 MILLIGRAM(S): 400 CAPSULE ORAL at 06:11

## 2017-06-06 RX ADMIN — CARVEDILOL PHOSPHATE 3.12 MILLIGRAM(S): 80 CAPSULE, EXTENDED RELEASE ORAL at 18:11

## 2017-06-06 RX ADMIN — GABAPENTIN 300 MILLIGRAM(S): 400 CAPSULE ORAL at 15:30

## 2017-06-06 RX ADMIN — TAMSULOSIN HYDROCHLORIDE 0.4 MILLIGRAM(S): 0.4 CAPSULE ORAL at 21:10

## 2017-06-06 RX ADMIN — HEPARIN SODIUM 5000 UNIT(S): 5000 INJECTION INTRAVENOUS; SUBCUTANEOUS at 18:11

## 2017-06-06 RX ADMIN — Medication 650 MILLIGRAM(S): at 16:20

## 2017-06-06 RX ADMIN — GABAPENTIN 300 MILLIGRAM(S): 400 CAPSULE ORAL at 21:10

## 2017-06-06 RX ADMIN — IRON SUCROSE 210 MILLIGRAM(S): 20 INJECTION, SOLUTION INTRAVENOUS at 18:20

## 2017-06-06 RX ADMIN — Medication 650 MILLIGRAM(S): at 21:10

## 2017-06-06 RX ADMIN — Medication 650 MILLIGRAM(S): at 07:01

## 2017-06-06 RX ADMIN — Medication 1 DROP(S): at 18:10

## 2017-06-06 RX ADMIN — Medication 667 MILLIGRAM(S): at 18:14

## 2017-06-06 RX ADMIN — Medication 1 DROP(S): at 06:11

## 2017-06-06 RX ADMIN — Medication 650 MILLIGRAM(S): at 06:15

## 2017-06-06 RX ADMIN — HEPARIN SODIUM 5000 UNIT(S): 5000 INJECTION INTRAVENOUS; SUBCUTANEOUS at 06:11

## 2017-06-06 NOTE — PROGRESS NOTE ADULT - PROBLEM SELECTOR PLAN 2
HD 3x/week- He is debilitated and will need a stretcher bed accomodation for dialysis once discharged to LTC

## 2017-06-07 PROCEDURE — 99497 ADVNCD CARE PLAN 30 MIN: CPT | Mod: 25

## 2017-06-07 PROCEDURE — 74230 X-RAY XM SWLNG FUNCJ C+: CPT | Mod: 26

## 2017-06-07 PROCEDURE — 99233 SBSQ HOSP IP/OBS HIGH 50: CPT

## 2017-06-07 PROCEDURE — 99232 SBSQ HOSP IP/OBS MODERATE 35: CPT

## 2017-06-07 RX ORDER — IRON SUCROSE 20 MG/ML
100 INJECTION, SOLUTION INTRAVENOUS
Qty: 0 | Refills: 0 | Status: DISCONTINUED | OUTPATIENT
Start: 2017-06-07 | End: 2017-06-13

## 2017-06-07 RX ADMIN — GABAPENTIN 300 MILLIGRAM(S): 400 CAPSULE ORAL at 23:10

## 2017-06-07 RX ADMIN — HEPARIN SODIUM 5000 UNIT(S): 5000 INJECTION INTRAVENOUS; SUBCUTANEOUS at 17:33

## 2017-06-07 RX ADMIN — POLYETHYLENE GLYCOL 3350 17 GRAM(S): 17 POWDER, FOR SOLUTION ORAL at 23:10

## 2017-06-07 RX ADMIN — HEPARIN SODIUM 5000 UNIT(S): 5000 INJECTION INTRAVENOUS; SUBCUTANEOUS at 05:13

## 2017-06-07 RX ADMIN — Medication 650 MILLIGRAM(S): at 12:02

## 2017-06-07 RX ADMIN — Medication 667 MILLIGRAM(S): at 12:03

## 2017-06-07 RX ADMIN — Medication 650 MILLIGRAM(S): at 05:13

## 2017-06-07 RX ADMIN — Medication 650 MILLIGRAM(S): at 23:40

## 2017-06-07 RX ADMIN — LISINOPRIL 2.5 MILLIGRAM(S): 2.5 TABLET ORAL at 05:13

## 2017-06-07 RX ADMIN — Medication 667 MILLIGRAM(S): at 08:34

## 2017-06-07 RX ADMIN — CARVEDILOL PHOSPHATE 3.12 MILLIGRAM(S): 80 CAPSULE, EXTENDED RELEASE ORAL at 05:13

## 2017-06-07 RX ADMIN — Medication 650 MILLIGRAM(S): at 23:10

## 2017-06-07 RX ADMIN — Medication 325 MILLIGRAM(S): at 12:02

## 2017-06-07 RX ADMIN — CARVEDILOL PHOSPHATE 3.12 MILLIGRAM(S): 80 CAPSULE, EXTENDED RELEASE ORAL at 17:34

## 2017-06-07 RX ADMIN — TAMSULOSIN HYDROCHLORIDE 0.4 MILLIGRAM(S): 0.4 CAPSULE ORAL at 23:10

## 2017-06-07 RX ADMIN — Medication 2: at 12:02

## 2017-06-07 RX ADMIN — Medication 667 MILLIGRAM(S): at 17:33

## 2017-06-07 RX ADMIN — Medication 1 DROP(S): at 05:12

## 2017-06-07 RX ADMIN — Medication 650 MILLIGRAM(S): at 12:45

## 2017-06-07 RX ADMIN — GABAPENTIN 300 MILLIGRAM(S): 400 CAPSULE ORAL at 15:45

## 2017-06-07 RX ADMIN — Medication 650 MILLIGRAM(S): at 05:19

## 2017-06-07 RX ADMIN — GABAPENTIN 300 MILLIGRAM(S): 400 CAPSULE ORAL at 05:13

## 2017-06-07 RX ADMIN — Medication 1 DROP(S): at 17:34

## 2017-06-07 NOTE — SWALLOW VFSS/MBS ASSESSMENT ADULT - ROSENBEK'S PENETRATION ASPIRATION SCALE
(1) no aspiration, contrast does not enter airway (5) contrast contacts vocal cords, visible residue remains (penetration)

## 2017-06-07 NOTE — GOALS OF CARE CONVERSATION - PERSONAL ADVANCE DIRECTIVE - WHAT MATTERS MOST
Comfort and to preserve his independence, and ability to standifpossible Comfort and to preserve his independence, and ability to stand if possible with PT

## 2017-06-07 NOTE — SWALLOW VFSS/MBS ASSESSMENT ADULT - ADDITIONAL RECOMMENDATIONS
Will follow-up to check PO tolerance & trial period of dysphagia therapy  Avoid dual texture consistencies ( fruit cocktail, etc.)

## 2017-06-07 NOTE — SWALLOW VFSS/MBS ASSESSMENT ADULT - NS SWALLOW VFSS REC ASPIR MON
cough/gurgly voice/throat clearing/upper respiratory infection/change of breathing pattern/oral hygiene/position upright (90Y)/fever/pneumonia

## 2017-06-07 NOTE — SWALLOW VFSS/MBS ASSESSMENT ADULT - SLP PERTINENT HISTORY OF CURRENT PROBLEM
Pt seen bedside for multiple re-assessments, most recently seen 6/4/17, with +suspected pharyngeal dysphagia for all fluids with RX for MBS

## 2017-06-07 NOTE — GOALS OF CARE CONVERSATION - PERSONAL ADVANCE DIRECTIVE - TREATMENT GUIDELINE COMMENT
Still under consideration  He would not want to be kept alive on respirator or have CPR at time of his death.

## 2017-06-07 NOTE — PROGRESS NOTE ADULT - PROBLEM SELECTOR PLAN 2
Acute on chronic heart failure with reduced EF - resolved  c/w Coreg and lisinopril--  Cardiology Consult appreciated

## 2017-06-07 NOTE — SWALLOW VFSS/MBS ASSESSMENT ADULT - SLP GENERAL OBSERVATIONS
Pt received & seen via stretcher in radiology, +awake/alert, +follows commands, +wet gurgly vocal quality upon receipt

## 2017-06-07 NOTE — PROGRESS NOTE ADULT - PROBLEM SELECTOR PLAN 8
Per speech and swallow - unable to swallow any fluids - modified barium swallow pending  PT consult - recommending [facility]  Wound consult-- sacral decubiti
Per speech and swallow - unable to swallow any fluids - modified barium swallow pending  PT consult - recommending [facility]  Wound consult-- sacral decubiti
Per speech and swallow - unable to swallow any fluids.   Multi-organ failure  Palliative Care  PT consult  Wound consult-- sacral decubiti
Per speech and swallow - unable to swallow any fluids.   Multi-organ failure  Palliative Care  PT consult - recommending [facility]  Wound consult-- sacral decubiti
Per speech and swallow - unable to swallow any fluids.   Multi-organ failure  Palliative Care  PT consult - recommending [facility]  Wound consult-- sacral decubiti
Per speech and swallow - unable to swallow any fluids - modified barium swallow pending  PT consult - recommending [facility]  Wound consult-- sacral decubiti
Speech & swallow eval (pt has had numerous admissions within few month)  NPO   Multi-organ failure  Palliative Care  PT consult  Wound consult-- sacral decubiti

## 2017-06-07 NOTE — PROGRESS NOTE ADULT - PROBLEM SELECTOR PLAN 9
Wife and sons will go over MOLST at home. I have discussed with them extensively that his debility, gradual deterioration, and multiple hospital readmissions are poor indicators for recovery. The wife expresses understanding and would like to have a family discussion prior any decisions.   Palliative Consult appreciated
Palliative Consult appreciated

## 2017-06-07 NOTE — GOALS OF CARE CONVERSATION - PERSONAL ADVANCE DIRECTIVE - CONVERSATION DETAILS
Reviewed hospitalcourse, weakened state and prognosis  Encouraged to establish Advance Directives- He will need a facility with stretcher HD bed, and hopefully not return to hospital if he develops another medical complication or infection.  We will meet with two son's and wife tomorrow to clarify wishes and plan Reviewed hospital course, weakened state and prognosis  Encouraged to establish Advance Directives- He will need a facility with stretcher HD bed, and hopefully not return to hospital if he develops another medical complication or infection.  We will meet with two son's and wife tomorrow to clarify wishes and plan

## 2017-06-07 NOTE — SWALLOW VFSS/MBS ASSESSMENT ADULT - DIAGNOSTIC IMPRESSIONS
Mild oral dysphagia marked by reduced lingual control. Mild pharyngeal dysphagia for puree, mech soft & soft solids, honey & nectar thick fluids marked by delayed onset in swallow trigger. Moderate pharyngeal dysphagia for thin fluids with reduced lingual control, reduced laryngeal elevation & upper airway closure, with silent laryngeal penetration.

## 2017-06-07 NOTE — PROGRESS NOTE ADULT - ATTENDING COMMENTS
unable to discuss goals of care with family as unable to get in touch.    asked palliative to d/w family re goals of care given severe debility, multiorgan failure and frequent hospitalizations.

## 2017-06-07 NOTE — PROGRESS NOTE ADULT - PROBLEM SELECTOR PLAN 10
DVT prx- Heparin 5000units SQ Q12H  Flomax-- BPH  Neuropathy-- Neurontin 300mg PO TID  Artificial tears-- cataract/glaucoma  Anemia-- Iron & Epogen--   Supplement-- Ca acetate
DVT prx- Heparin 5000units SQ Q12H  Flomax-- BPH  Neuropathy-- Neurontin 300mg PO TID  Artificial tears-- cataract/glaucoma  Anemia-- Iron & Epogen--   Supplement-- Ca acetate  Palliative Consult-- appreciated

## 2017-06-07 NOTE — SWALLOW VFSS/MBS ASSESSMENT ADULT - RECOMMENDED FEEDING/EATING TECHNIQUES
provide rest periods between swallows/crush medication (when feasible)/small sips/bites/no straws/oral hygiene/position upright (90 degrees)/alternate food with liquid/allow for swallow between intakes

## 2017-06-07 NOTE — SWALLOW VFSS/MBS ASSESSMENT ADULT - MODE OF PRESENTATION
spoon/fed by clinician fed by clinician spoon/fed by clinician/cup fed by clinician/cup/self fed fed by clinician/cup

## 2017-06-07 NOTE — PROGRESS NOTE ADULT - ATTENDING COMMENTS
COUNSELING:    Face to face meeting to discuss Advanced Care Planning - Time Spent ______ Minutes.  See goals of care note.    More than 50% time spent in counseling and coordinating care. 45_____ Minutes.     Thank you for the opportunity to assist with the care of this patient.   [place] [facility] Consult Service [phone].

## 2017-06-08 DIAGNOSIS — D61.818 OTHER PANCYTOPENIA: ICD-10-CM

## 2017-06-08 LAB
ANION GAP SERPL CALC-SCNC: 12 MMOL/L — SIGNIFICANT CHANGE UP (ref 5–17)
BUN SERPL-MCNC: 35 MG/DL — HIGH (ref 8–20)
CALCIUM SERPL-MCNC: 8.2 MG/DL — LOW (ref 8.6–10.2)
CHLORIDE SERPL-SCNC: 98 MMOL/L — SIGNIFICANT CHANGE UP (ref 98–107)
CO2 SERPL-SCNC: 28 MMOL/L — SIGNIFICANT CHANGE UP (ref 22–29)
CREAT SERPL-MCNC: 3.88 MG/DL — HIGH (ref 0.5–1.3)
GLUCOSE SERPL-MCNC: 103 MG/DL — SIGNIFICANT CHANGE UP (ref 70–115)
HCT VFR BLD CALC: 27.3 % — LOW (ref 42–52)
HGB BLD-MCNC: 8.3 G/DL — LOW (ref 14–18)
MCHC RBC-ENTMCNC: 26.2 PG — LOW (ref 27–31)
MCHC RBC-ENTMCNC: 30.4 G/DL — LOW (ref 32–36)
MCV RBC AUTO: 86.1 FL — SIGNIFICANT CHANGE UP (ref 80–94)
PLATELET # BLD AUTO: 87 K/UL — LOW (ref 150–400)
POTASSIUM SERPL-MCNC: 5.1 MMOL/L — SIGNIFICANT CHANGE UP (ref 3.5–5.3)
POTASSIUM SERPL-SCNC: 5.1 MMOL/L — SIGNIFICANT CHANGE UP (ref 3.5–5.3)
RBC # BLD: 3.17 M/UL — LOW (ref 4.6–6.2)
RBC # FLD: 20.4 % — HIGH (ref 11–15.6)
SODIUM SERPL-SCNC: 138 MMOL/L — SIGNIFICANT CHANGE UP (ref 135–145)
TSH SERPL-MCNC: 2.21 UIU/ML — SIGNIFICANT CHANGE UP (ref 0.27–4.2)
WBC # BLD: 3.5 K/UL — LOW (ref 4.8–10.8)
WBC # FLD AUTO: 3.5 K/UL — LOW (ref 4.8–10.8)

## 2017-06-08 PROCEDURE — 99233 SBSQ HOSP IP/OBS HIGH 50: CPT

## 2017-06-08 RX ADMIN — Medication 650 MILLIGRAM(S): at 15:12

## 2017-06-08 RX ADMIN — Medication 667 MILLIGRAM(S): at 17:58

## 2017-06-08 RX ADMIN — HEPARIN SODIUM 5000 UNIT(S): 5000 INJECTION INTRAVENOUS; SUBCUTANEOUS at 17:58

## 2017-06-08 RX ADMIN — LISINOPRIL 2.5 MILLIGRAM(S): 2.5 TABLET ORAL at 07:25

## 2017-06-08 RX ADMIN — HEPARIN SODIUM 5000 UNIT(S): 5000 INJECTION INTRAVENOUS; SUBCUTANEOUS at 07:25

## 2017-06-08 RX ADMIN — TAMSULOSIN HYDROCHLORIDE 0.4 MILLIGRAM(S): 0.4 CAPSULE ORAL at 22:40

## 2017-06-08 RX ADMIN — Medication 650 MILLIGRAM(S): at 14:51

## 2017-06-08 RX ADMIN — CARVEDILOL PHOSPHATE 3.12 MILLIGRAM(S): 80 CAPSULE, EXTENDED RELEASE ORAL at 07:25

## 2017-06-08 RX ADMIN — Medication 2: at 17:58

## 2017-06-08 RX ADMIN — Medication 667 MILLIGRAM(S): at 11:18

## 2017-06-08 RX ADMIN — GABAPENTIN 300 MILLIGRAM(S): 400 CAPSULE ORAL at 22:41

## 2017-06-08 RX ADMIN — Medication 650 MILLIGRAM(S): at 22:41

## 2017-06-08 RX ADMIN — Medication 325 MILLIGRAM(S): at 11:18

## 2017-06-08 RX ADMIN — GABAPENTIN 300 MILLIGRAM(S): 400 CAPSULE ORAL at 07:25

## 2017-06-08 RX ADMIN — Medication 650 MILLIGRAM(S): at 07:24

## 2017-06-08 RX ADMIN — GABAPENTIN 300 MILLIGRAM(S): 400 CAPSULE ORAL at 14:51

## 2017-06-08 RX ADMIN — Medication 1 DROP(S): at 07:24

## 2017-06-08 RX ADMIN — Medication 667 MILLIGRAM(S): at 08:10

## 2017-06-08 NOTE — PROGRESS NOTE ADULT - PROBLEM SELECTOR PLAN 2
Acute on chronic heart failure with reduced EF  intolerant of coreg/lisinopril given hypotension  Cardiology Consult appreciated

## 2017-06-08 NOTE — PROGRESS NOTE ADULT - SUBJECTIVE AND OBJECTIVE BOX
Date of Service: 2017    CHIEF COMPLAINT:  Heavy menstruation followup.    HISTORY OF PRESENT ILLNESS:  Patient is a 38-year-old female,  4, para 3, last menstrual period began 2017.  Patient had laparoscopic bilateral salpingectomy for contraception on 2017.  Patient's menstruation is still very heavy, regular, every 31 days, lasts 7-8 days, 6 days are heavy with clots.  Patient has more cramping on the left side than right side and the cramping will radiate to her leg.  When patient had bilateral salpingectomy, no pelvic abnormalities were found.  Patient is a smoker and is not a good candidate for contraceptive pills and patient does not want to use Depo-Provera because of weight gain.    REVIEW OF SYSTEMS:  Denies any nausea, vomiting, fever or chills.    MEDICATIONS:  Reviewed in Epic.    ALLERGIES:  No known drug allergies.    SOCIAL HISTORY:  Patient is a smoker.    PHYSICAL EXAMINATION:  GENERAL:  Well-nourished, well-developed female, not in acute distress.  VITAL SIGNS:  Blood pressure is 116/80, pulse is 84 per minute, weight is 121 kg.  LUNGS:  Clear to auscultation.  HEART:  Regular rhythm and rate.  PELVIC:  EG/BUS, vagina, cervix within normal limits.  There is no evidence of prolapse.  Endometrial biopsy was performed during today's visit.  Pipelle was inserted into intrauterine cavity without any resistance and endometrial cavity was sounded to 9 cm.  Endometrial biopsy was performed with minimal discomfort and no significant bleeding.    ASSESSMENT:  Menorrhagia.    PLAN:  Pap smear was done, endometrial biopsies were done and sent to pathology.  After discussing treatment options, patient is most comfortable undergoing endometrial ablation.  Procedure was explained to the patient and will schedule the procedure in the near future.      Dictated By: Gurmeet Donato MD  Signing Provider: MD MAGGIE Wadsworth/martin ([id number])  DD: 2017 11:29:17 TD: 2017  04:18:07    Copy Sent To:    Patient is a 87y old  M with CC of SOB and leg swelling     Pt seen at bedside. No events overnight. Pt has no complaints at the moment. Last bowel movement yesterday, voided overnight with total of 700cc throughout the day. Pt c/o decreased appetite but able to tolerate po. Pt denies pain, SOB, chest pain fever, chills. Pt says he feels no pain now and waiting to leave to [facility] pending placement.    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 Mild loss of vision on left eye  NECK: Supple, No JVD, Normal thyroid  CHEST/LUNG: bibasilar crackles noted. more on left  HEART: S1/S2. Pacemaker. no m/r/g   ABDOMEN: Soft, Nontender, Nondistended. voided  EXTREMITIES:  2+ Peripheral Pulses, No clubbing, cyanosis, or edema  SKIN: No rashes or lesions      LABS:                                     8.1    4.8   )-----------( 77       ( 10 Apr 2017 06:24 )             26.1   04-10    142  |  101  |  96.0<H>  ----------------------------<  74  4.5   |  27.0  |  4.60<H>    Ca    8.9      10 Apr 2017 06:24  Phos  4.5     04-10  Mg     2.2     04-10    TPro  7.1  /  Alb  3.5  /  TBili  0.2<L>  /  DBili  x   /  AST  21  /  ALT  16  /  AlkPhos  53  04-10      LIVER FUNCTIONS - ( 08 Apr 2017 07:01 )  Alb: 3.6 g/dL / Pro: 7.1 g/dL / ALK PHOS: 57 U/L / ALT: 14 U/L / AST: 16 U/L / GGT: x           I&O's Detail    I & Os for current day (as of 11 Apr 2017 07:50)  =============================================  IN:    Oral Fluid: 360 ml    Total IN: 360 ml  ---------------------------------------------  OUT:    Voided: 700 ml    Total OUT: 700 ml  ---------------------------------------------  Total NET: -340 ml      MEDICATIONS  (STANDING):  atorvastatin 40milliGRAM(s) Oral at bedtime  folic acid 1milliGRAM(s) Oral daily  dextrose 5%. 1000milliLiter(s) IV Continuous <Continuous>  dextrose 50% Injectable 12.5Gram(s) IV Push once  dextrose 50% Injectable 25Gram(s) IV Push once  dextrose 50% Injectable 25Gram(s) IV Push once  carvedilol 6.25milliGRAM(s) Oral every 12 hours  isosorbide   dinitrate Tablet (ISORDIL) 10milliGRAM(s) Oral three times a day  hydrALAZINE 10milliGRAM(s) Oral every 8 hours  influenza   Vaccine 0.5milliLiter(s) IntraMuscular once  epoetin una Injectable 69926Vusl(s) SubCutaneous <User Schedule>  buMETAnide 2milliGRAM(s) Oral every 12 hours  lisinopril 5milliGRAM(s) Oral daily  saccharomyces boulardii 250milliGRAM(s) Oral two times a day  iron sucrose IVPB 200milliGRAM(s) IV Intermittent daily  artificial  tears Solution 1Drop(s) Both EYES two times a day  insulin lispro (HumaLOG) corrective regimen sliding scale  SubCutaneous Before meals and at bedtime  ciprofloxacin     Tablet 250milliGRAM(s) Oral every 24 hours  aspirin enteric coated 81milliGRAM(s) Oral daily    MEDICATIONS  (PRN):  dextrose Gel 1Dose(s) Oral once PRN Blood Glucose LESS THAN 70 milliGRAM(s)/deciliter  glucagon  Injectable 1milliGRAM(s) IntraMuscular once PRN Glucose LESS THAN 70 milligrams/deciliter  dextrose Gel 1Dose(s) Oral once PRN Blood Glucose LESS THAN 70 milliGRAM(s)/deciliter Patient is a 87y old  M with CC of SOB and leg swelling     Pt seen and examined at bedside. No events overnight. Pt has no complaints at the moment. Last bowel movement yesterday,ocntinues to produce urine. Pt c/o decreased appetite but able to tolerate po. Pt denies pain, SOB, chest pain fever, chills. Pt says he feels no pain now and waiting to leave to [facility] pending placement.    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 Mild loss of vision on left eye  NECK: Supple, No JVD, Normal thyroid  CHEST/LUNG: bibasilar crackles noted. more on left  HEART: S1/S2. Pacemaker. no m/r/g   ABDOMEN: Soft, Nontender, Nondistended. voided  EXTREMITIES:  2+ Peripheral Pulses, No clubbing, cyanosis, or edema  SKIN: No rashes or lesions      LABS:                                     8.1    4.8   )-----------( 77       ( 10 Apr 2017 06:24 )             26.1   04-10    142  |  101  |  96.0<H>  ----------------------------<  74  4.5   |  27.0  |  4.60<H>    Ca    8.9      10 Apr 2017 06:24  Phos  4.5     04-10  Mg     2.2     04-10    TPro  7.1  /  Alb  3.5  /  TBili  0.2<L>  /  DBili  x   /  AST  21  /  ALT  16  /  AlkPhos  53  04-10      LIVER FUNCTIONS - ( 08 Apr 2017 07:01 )  Alb: 3.6 g/dL / Pro: 7.1 g/dL / ALK PHOS: 57 U/L / ALT: 14 U/L / AST: 16 U/L / GGT: x           I&O's Detail    I & Os for current day (as of 11 Apr 2017 07:50)  =============================================  IN:    Oral Fluid: 360 ml    Total IN: 360 ml  ---------------------------------------------  OUT:    Voided: 700 ml    Total OUT: 700 ml  ---------------------------------------------  Total NET: -340 ml      MEDICATIONS  (STANDING):  atorvastatin 40milliGRAM(s) Oral at bedtime  folic acid 1milliGRAM(s) Oral daily  dextrose 5%. 1000milliLiter(s) IV Continuous <Continuous>  dextrose 50% Injectable 12.5Gram(s) IV Push once  dextrose 50% Injectable 25Gram(s) IV Push once  dextrose 50% Injectable 25Gram(s) IV Push once  carvedilol 6.25milliGRAM(s) Oral every 12 hours  isosorbide   dinitrate Tablet (ISORDIL) 10milliGRAM(s) Oral three times a day  hydrALAZINE 10milliGRAM(s) Oral every 8 hours  influenza   Vaccine 0.5milliLiter(s) IntraMuscular once  epoetin una Injectable 94908Usmg(s) SubCutaneous <User Schedule>  buMETAnide 2milliGRAM(s) Oral every 12 hours  lisinopril 5milliGRAM(s) Oral daily  saccharomyces boulardii 250milliGRAM(s) Oral two times a day  iron sucrose IVPB 200milliGRAM(s) IV Intermittent daily  artificial  tears Solution 1Drop(s) Both EYES two times a day  insulin lispro (HumaLOG) corrective regimen sliding scale  SubCutaneous Before meals and at bedtime  ciprofloxacin     Tablet 250milliGRAM(s) Oral every 24 hours  aspirin enteric coated 81milliGRAM(s) Oral daily    MEDICATIONS  (PRN):  dextrose Gel 1Dose(s) Oral once PRN Blood Glucose LESS THAN 70 milliGRAM(s)/deciliter  glucagon  Injectable 1milliGRAM(s) IntraMuscular once PRN Glucose LESS THAN 70 milligrams/deciliter  dextrose Gel 1Dose(s) Oral once PRN Blood Glucose LESS THAN 70 milliGRAM(s)/deciliter

## 2017-06-09 DIAGNOSIS — N18.6 END STAGE RENAL DISEASE: ICD-10-CM

## 2017-06-09 DIAGNOSIS — F32.9 MAJOR DEPRESSIVE DISORDER, SINGLE EPISODE, UNSPECIFIED: ICD-10-CM

## 2017-06-09 PROCEDURE — 99233 SBSQ HOSP IP/OBS HIGH 50: CPT

## 2017-06-09 PROCEDURE — 99232 SBSQ HOSP IP/OBS MODERATE 35: CPT

## 2017-06-09 RX ORDER — ALBUMIN HUMAN 25 %
100 VIAL (ML) INTRAVENOUS ONCE
Qty: 0 | Refills: 0 | Status: COMPLETED | OUTPATIENT
Start: 2017-06-09 | End: 2017-06-09

## 2017-06-09 RX ORDER — MIDODRINE HYDROCHLORIDE 2.5 MG/1
5 TABLET ORAL
Qty: 0 | Refills: 0 | Status: DISCONTINUED | OUTPATIENT
Start: 2017-06-09 | End: 2017-06-16

## 2017-06-09 RX ORDER — ERYTHROPOIETIN 10000 [IU]/ML
10000 INJECTION, SOLUTION INTRAVENOUS; SUBCUTANEOUS EVERY OTHER DAY
Qty: 0 | Refills: 0 | Status: DISCONTINUED | OUTPATIENT
Start: 2017-06-09 | End: 2017-06-13

## 2017-06-09 RX ADMIN — IRON SUCROSE 210 MILLIGRAM(S): 20 INJECTION, SOLUTION INTRAVENOUS at 14:43

## 2017-06-09 RX ADMIN — Medication 650 MILLIGRAM(S): at 22:13

## 2017-06-09 RX ADMIN — Medication 650 MILLIGRAM(S): at 04:29

## 2017-06-09 RX ADMIN — Medication 667 MILLIGRAM(S): at 11:36

## 2017-06-09 RX ADMIN — Medication 1 DROP(S): at 17:46

## 2017-06-09 RX ADMIN — Medication 650 MILLIGRAM(S): at 23:00

## 2017-06-09 RX ADMIN — GABAPENTIN 300 MILLIGRAM(S): 400 CAPSULE ORAL at 07:02

## 2017-06-09 RX ADMIN — GABAPENTIN 300 MILLIGRAM(S): 400 CAPSULE ORAL at 22:13

## 2017-06-09 RX ADMIN — Medication 50 MILLILITER(S): at 13:18

## 2017-06-09 RX ADMIN — Medication 1 DROP(S): at 22:13

## 2017-06-09 RX ADMIN — HEPARIN SODIUM 5000 UNIT(S): 5000 INJECTION INTRAVENOUS; SUBCUTANEOUS at 17:46

## 2017-06-09 RX ADMIN — Medication 650 MILLIGRAM(S): at 17:44

## 2017-06-09 RX ADMIN — Medication 325 MILLIGRAM(S): at 11:36

## 2017-06-09 RX ADMIN — Medication 667 MILLIGRAM(S): at 09:52

## 2017-06-09 RX ADMIN — Medication 650 MILLIGRAM(S): at 07:03

## 2017-06-09 RX ADMIN — ERYTHROPOIETIN 10000 UNIT(S): 10000 INJECTION, SOLUTION INTRAVENOUS; SUBCUTANEOUS at 14:44

## 2017-06-09 RX ADMIN — Medication 1 DROP(S): at 07:04

## 2017-06-09 RX ADMIN — GABAPENTIN 300 MILLIGRAM(S): 400 CAPSULE ORAL at 17:45

## 2017-06-09 RX ADMIN — Medication 650 MILLIGRAM(S): at 18:20

## 2017-06-09 RX ADMIN — HEPARIN SODIUM 5000 UNIT(S): 5000 INJECTION INTRAVENOUS; SUBCUTANEOUS at 07:03

## 2017-06-09 RX ADMIN — TAMSULOSIN HYDROCHLORIDE 0.4 MILLIGRAM(S): 0.4 CAPSULE ORAL at 22:13

## 2017-06-09 RX ADMIN — Medication 667 MILLIGRAM(S): at 17:45

## 2017-06-09 RX ADMIN — POLYETHYLENE GLYCOL 3350 17 GRAM(S): 17 POWDER, FOR SOLUTION ORAL at 22:12

## 2017-06-09 RX ADMIN — Medication 2: at 11:36

## 2017-06-09 RX ADMIN — MIDODRINE HYDROCHLORIDE 5 MILLIGRAM(S): 2.5 TABLET ORAL at 14:41

## 2017-06-09 NOTE — PROGRESS NOTE ADULT - ATTENDING COMMENTS
COUNSELING:    Face to face meeting to discuss Advanced Care Planning - Time Spent _60_____ Minutes.  See goals of care note.    More than 50% time spent in counseling and coordinating care. _15_____ Minutes.     Thank you for the opportunity to assist with the care of this patient.   [facility] [phone].

## 2017-06-09 NOTE — GOALS OF CARE CONVERSATION - PERSONAL ADVANCE DIRECTIVE - TREATMENT GUIDELINES
No artificial nutrition/Do not re-hospitalize/Antibiotic trial/DNR Order Do not re-hospitalize/No artificial nutrition/Antibiotic trial

## 2017-06-09 NOTE — PROGRESS NOTE ADULT - PROBLEM SELECTOR PLAN 7
C/w lantus 10u hospitals  Accuchecks  HISS coverage
Hyperglycemic this morning  Resume lantus 10u qhs  Accuchecks  HISS coverage
Hyperglycemic this morning  Resume lantus 10u qhs  Accuchecks  HISS coverage
Hypoglycemic this morning - had decreased PO intake yesterday  Decrease lantus 5u qhs  Accuchecks  HISS coverage
palliative following  patient with functional decline, multi-organ failure and frequent hospitalizations.  high risk for decompensation.    discussed with son today (rao), plan for meeting today at 3pm to discuss goals of care and possible hospice care. nephrology will discuss with patient whether to continue HD.
palliative following  patient with functional decline, multi-organ failure and frequent hospitalizations.  high risk for decompensation.  updated wife yesterday with palliative NP, discussed all options offered by [facility] form and plan for further discussion today.
waldemar  d/c lantus given poor PO intake
waldemar  d/c lantus given poor PO intake
Accuchecks  HISS coverage

## 2017-06-09 NOTE — PROGRESS NOTE ADULT - PROBLEM SELECTOR PLAN 1
Medical conservative management as tolerated  Maintain BP with Midodrine-aid in tolerating HD treatment

## 2017-06-09 NOTE — PROGRESS NOTE ADULT - PROBLEM SELECTOR PLAN 6
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
PRBC ? with HD today
PRBC ? with HD today
Wound care  Maximize nutritional support
CRF 5 on HD (had it yesterday & today)-- T/Th/Sat usu on HD  Epogen  FeSO4 325mg PO QD  Ca acetate supplement  Anemia work up-- iron studies  Renal Consult appreciated

## 2017-06-09 NOTE — PROGRESS NOTE ADULT - PROBLEM SELECTOR PROBLEM 6
ESRD (end stage renal disease) on dialysis
Pancytopenia
Pancytopenia
Pacemaker
Decubitus ulcer of sacral region, unspecified ulcer stage
ESRD (end stage renal disease) on dialysis

## 2017-06-09 NOTE — GOALS OF CARE CONVERSATION - PERSONAL ADVANCE DIRECTIVE - CONVERSATION/DISCUSSION
Diagnosis/Treatment Options/[facility] Discussed/Prognosis
Diagnosis/Treatment Options/Prognosis/[facility] Discussed
Treatment Options/Diagnosis/[facility] Discussed/Prognosis

## 2017-06-09 NOTE — GOALS OF CARE CONVERSATION - PERSONAL ADVANCE DIRECTIVE - FUTURE HOSPITALIZATION/TRANSFER
Send to hospital, if necessary, based on [facility] orders
Do not send to hospital unless pain or severe symptoms cannot be otherwise controlled

## 2017-06-09 NOTE — GOALS OF CARE CONVERSATION - PERSONAL ADVANCE DIRECTIVE - TREATMENT GUIDELINE COMMENT
Continue to treat acutely, until a decision is made to discontinue HD  'They have the [facility] form-will complete this weekend. Continue to treat acutely, until a decision is made to discontinue HD  'They have the [facility] form-expected to complete this weekend.

## 2017-06-09 NOTE — GOALS OF CARE CONVERSATION - PERSONAL ADVANCE DIRECTIVE - CONVERSATION DETAILS
Dr. Corcoran explained clinical chronicity of ESRD patient, and red flags alerting providers that the patient (Dad) cannot tolerate length of time on HD machine needed to adequately remove enough volume to improve his symptoms-Lethargy increasing, he is depressed, not eating-  They both agreed he should have DNR/DNI status, and will speak to their parents about pro's/con's of continuing HD at this time-  Will decide this weekend- NARINDER vs more Comfort care being most important

## 2017-06-10 PROCEDURE — 99232 SBSQ HOSP IP/OBS MODERATE 35: CPT

## 2017-06-10 RX ADMIN — Medication 325 MILLIGRAM(S): at 12:01

## 2017-06-10 RX ADMIN — HEPARIN SODIUM 5000 UNIT(S): 5000 INJECTION INTRAVENOUS; SUBCUTANEOUS at 17:53

## 2017-06-10 RX ADMIN — Medication 1 DROP(S): at 17:53

## 2017-06-10 RX ADMIN — Medication 1 DROP(S): at 05:43

## 2017-06-10 RX ADMIN — Medication 650 MILLIGRAM(S): at 06:15

## 2017-06-10 RX ADMIN — Medication 650 MILLIGRAM(S): at 17:52

## 2017-06-10 RX ADMIN — Medication 650 MILLIGRAM(S): at 05:44

## 2017-06-10 RX ADMIN — HEPARIN SODIUM 5000 UNIT(S): 5000 INJECTION INTRAVENOUS; SUBCUTANEOUS at 05:43

## 2017-06-10 RX ADMIN — Medication 667 MILLIGRAM(S): at 12:01

## 2017-06-10 RX ADMIN — Medication 2: at 16:42

## 2017-06-10 RX ADMIN — Medication 667 MILLIGRAM(S): at 17:53

## 2017-06-10 RX ADMIN — GABAPENTIN 300 MILLIGRAM(S): 400 CAPSULE ORAL at 16:41

## 2017-06-10 RX ADMIN — Medication 650 MILLIGRAM(S): at 16:41

## 2017-06-10 RX ADMIN — IRON SUCROSE 210 MILLIGRAM(S): 20 INJECTION, SOLUTION INTRAVENOUS at 05:43

## 2017-06-10 RX ADMIN — GABAPENTIN 300 MILLIGRAM(S): 400 CAPSULE ORAL at 05:44

## 2017-06-10 NOTE — PROGRESS NOTE ADULT - PROBLEM SELECTOR PLAN 1
HD as per renal, midodrine on HD days. Renal follow up noted. Patient and family deciding regarding further HD, goals of care.

## 2017-06-11 PROCEDURE — 99232 SBSQ HOSP IP/OBS MODERATE 35: CPT

## 2017-06-11 RX ADMIN — Medication 667 MILLIGRAM(S): at 16:30

## 2017-06-11 RX ADMIN — Medication 1 DROP(S): at 08:03

## 2017-06-11 RX ADMIN — Medication 650 MILLIGRAM(S): at 22:38

## 2017-06-11 RX ADMIN — POLYETHYLENE GLYCOL 3350 17 GRAM(S): 17 POWDER, FOR SOLUTION ORAL at 01:19

## 2017-06-11 RX ADMIN — HEPARIN SODIUM 5000 UNIT(S): 5000 INJECTION INTRAVENOUS; SUBCUTANEOUS at 17:00

## 2017-06-11 RX ADMIN — Medication 650 MILLIGRAM(S): at 08:17

## 2017-06-11 RX ADMIN — GABAPENTIN 300 MILLIGRAM(S): 400 CAPSULE ORAL at 08:03

## 2017-06-11 RX ADMIN — Medication 650 MILLIGRAM(S): at 08:03

## 2017-06-11 RX ADMIN — Medication 650 MILLIGRAM(S): at 14:31

## 2017-06-11 RX ADMIN — Medication 667 MILLIGRAM(S): at 08:05

## 2017-06-11 RX ADMIN — Medication 650 MILLIGRAM(S): at 08:45

## 2017-06-11 RX ADMIN — TAMSULOSIN HYDROCHLORIDE 0.4 MILLIGRAM(S): 0.4 CAPSULE ORAL at 22:38

## 2017-06-11 RX ADMIN — Medication 650 MILLIGRAM(S): at 01:49

## 2017-06-11 RX ADMIN — HEPARIN SODIUM 5000 UNIT(S): 5000 INJECTION INTRAVENOUS; SUBCUTANEOUS at 08:03

## 2017-06-11 RX ADMIN — Medication 650 MILLIGRAM(S): at 01:19

## 2017-06-11 RX ADMIN — Medication 667 MILLIGRAM(S): at 11:30

## 2017-06-11 RX ADMIN — Medication: at 16:31

## 2017-06-11 RX ADMIN — GABAPENTIN 300 MILLIGRAM(S): 400 CAPSULE ORAL at 22:37

## 2017-06-11 RX ADMIN — Medication 325 MILLIGRAM(S): at 11:22

## 2017-06-11 RX ADMIN — TAMSULOSIN HYDROCHLORIDE 0.4 MILLIGRAM(S): 0.4 CAPSULE ORAL at 01:18

## 2017-06-11 RX ADMIN — Medication 650 MILLIGRAM(S): at 15:15

## 2017-06-11 RX ADMIN — GABAPENTIN 300 MILLIGRAM(S): 400 CAPSULE ORAL at 14:31

## 2017-06-11 RX ADMIN — Medication 1 DROP(S): at 17:00

## 2017-06-11 RX ADMIN — GABAPENTIN 300 MILLIGRAM(S): 400 CAPSULE ORAL at 01:18

## 2017-06-12 ENCOUNTER — TRANSCRIPTION ENCOUNTER (OUTPATIENT)
Age: 82
End: 2017-06-12

## 2017-06-12 ENCOUNTER — APPOINTMENT (OUTPATIENT)
Dept: VASCULAR SURGERY | Facility: CLINIC | Age: 82
End: 2017-06-12

## 2017-06-12 LAB
ALBUMIN SERPL ELPH-MCNC: 3.3 G/DL — SIGNIFICANT CHANGE UP (ref 3.3–5.2)
ALP SERPL-CCNC: 98 U/L — SIGNIFICANT CHANGE UP (ref 40–120)
ALT FLD-CCNC: 12 U/L — SIGNIFICANT CHANGE UP
ANION GAP SERPL CALC-SCNC: 11 MMOL/L — SIGNIFICANT CHANGE UP (ref 5–17)
AST SERPL-CCNC: 21 U/L — SIGNIFICANT CHANGE UP
BILIRUB SERPL-MCNC: 0.3 MG/DL — LOW (ref 0.4–2)
BUN SERPL-MCNC: 48 MG/DL — HIGH (ref 8–20)
CALCIUM SERPL-MCNC: 8.9 MG/DL — SIGNIFICANT CHANGE UP (ref 8.6–10.2)
CHLORIDE SERPL-SCNC: 101 MMOL/L — SIGNIFICANT CHANGE UP (ref 98–107)
CO2 SERPL-SCNC: 29 MMOL/L — SIGNIFICANT CHANGE UP (ref 22–29)
CREAT SERPL-MCNC: 4.49 MG/DL — HIGH (ref 0.5–1.3)
GLUCOSE SERPL-MCNC: 118 MG/DL — HIGH (ref 70–115)
HCT VFR BLD CALC: 30.2 % — LOW (ref 42–52)
HGB BLD-MCNC: 8.8 G/DL — LOW (ref 14–18)
MCHC RBC-ENTMCNC: 25.6 PG — LOW (ref 27–31)
MCHC RBC-ENTMCNC: 29.1 G/DL — LOW (ref 32–36)
MCV RBC AUTO: 87.8 FL — SIGNIFICANT CHANGE UP (ref 80–94)
PHOSPHATE SERPL-MCNC: 3.4 MG/DL — SIGNIFICANT CHANGE UP (ref 2.4–4.7)
PLATELET # BLD AUTO: 102 K/UL — LOW (ref 150–400)
POTASSIUM SERPL-MCNC: 5.6 MMOL/L — HIGH (ref 3.5–5.3)
POTASSIUM SERPL-SCNC: 5.6 MMOL/L — HIGH (ref 3.5–5.3)
PROT SERPL-MCNC: 6.8 G/DL — SIGNIFICANT CHANGE UP (ref 6.6–8.7)
RBC # BLD: 3.44 M/UL — LOW (ref 4.6–6.2)
RBC # FLD: 21.1 % — HIGH (ref 11–15.6)
SODIUM SERPL-SCNC: 141 MMOL/L — SIGNIFICANT CHANGE UP (ref 135–145)
WBC # BLD: 4.2 K/UL — LOW (ref 4.8–10.8)
WBC # FLD AUTO: 4.2 K/UL — LOW (ref 4.8–10.8)

## 2017-06-12 PROCEDURE — 99232 SBSQ HOSP IP/OBS MODERATE 35: CPT

## 2017-06-12 RX ORDER — ALBUMIN HUMAN 25 %
100 VIAL (ML) INTRAVENOUS ONCE
Qty: 0 | Refills: 0 | Status: COMPLETED | OUTPATIENT
Start: 2017-06-12 | End: 2017-06-12

## 2017-06-12 RX ADMIN — Medication 50 MILLILITER(S): at 09:52

## 2017-06-12 RX ADMIN — Medication 650 MILLIGRAM(S): at 15:22

## 2017-06-12 RX ADMIN — GABAPENTIN 300 MILLIGRAM(S): 400 CAPSULE ORAL at 05:52

## 2017-06-12 RX ADMIN — Medication 2: at 17:04

## 2017-06-12 RX ADMIN — Medication 650 MILLIGRAM(S): at 05:53

## 2017-06-12 RX ADMIN — Medication 650 MILLIGRAM(S): at 21:30

## 2017-06-12 RX ADMIN — Medication 667 MILLIGRAM(S): at 07:38

## 2017-06-12 RX ADMIN — HEPARIN SODIUM 5000 UNIT(S): 5000 INJECTION INTRAVENOUS; SUBCUTANEOUS at 17:06

## 2017-06-12 RX ADMIN — HEPARIN SODIUM 5000 UNIT(S): 5000 INJECTION INTRAVENOUS; SUBCUTANEOUS at 05:52

## 2017-06-12 RX ADMIN — GABAPENTIN 300 MILLIGRAM(S): 400 CAPSULE ORAL at 21:02

## 2017-06-12 RX ADMIN — GABAPENTIN 300 MILLIGRAM(S): 400 CAPSULE ORAL at 14:37

## 2017-06-12 RX ADMIN — POLYETHYLENE GLYCOL 3350 17 GRAM(S): 17 POWDER, FOR SOLUTION ORAL at 21:03

## 2017-06-12 RX ADMIN — ERYTHROPOIETIN 10000 UNIT(S): 10000 INJECTION, SOLUTION INTRAVENOUS; SUBCUTANEOUS at 11:06

## 2017-06-12 RX ADMIN — Medication 650 MILLIGRAM(S): at 21:03

## 2017-06-12 RX ADMIN — Medication 667 MILLIGRAM(S): at 17:06

## 2017-06-12 RX ADMIN — IRON SUCROSE 210 MILLIGRAM(S): 20 INJECTION, SOLUTION INTRAVENOUS at 10:25

## 2017-06-12 RX ADMIN — Medication 650 MILLIGRAM(S): at 14:36

## 2017-06-12 RX ADMIN — Medication 1 DROP(S): at 17:05

## 2017-06-12 RX ADMIN — Medication 1 DROP(S): at 05:53

## 2017-06-12 RX ADMIN — TAMSULOSIN HYDROCHLORIDE 0.4 MILLIGRAM(S): 0.4 CAPSULE ORAL at 21:03

## 2017-06-12 RX ADMIN — Medication 325 MILLIGRAM(S): at 17:06

## 2017-06-12 NOTE — DISCHARGE NOTE ADULT - SECONDARY DIAGNOSIS.
Hypertension Acute on chronic congestive heart failure, unspecified congestive heart failure type Diabetes Glaucoma Corneal abrasion

## 2017-06-12 NOTE — DISCHARGE NOTE ADULT - MEDICATION SUMMARY - MEDICATIONS TO CHANGE
I will SWITCH the dose or number of times a day I take the medications listed below when I get home from the hospital:  None I will SWITCH the dose or number of times a day I take the medications listed below when I get home from the hospital:    midodrine 2.5 mg oral tablet  -- 1 tab(s) by mouth every 8 hours

## 2017-06-12 NOTE — DISCHARGE NOTE ADULT - PLAN OF CARE
Continue HD as per session. Midodrine on days of HD. Monitor BP. Continue cardiac medications and follow up cardiology. Monitor fingersticks. Use Vigamox in right eye QID and Erythromycin ointment in right eye at bedtime. Follow up with opthalmology in 1 week.

## 2017-06-12 NOTE — DISCHARGE NOTE ADULT - MEDICATION SUMMARY - MEDICATIONS TO TAKE
I will START or STAY ON the medications listed below when I get home from the hospital:    Flomax 0.4 mg oral capsule  -- 1 cap(s) by mouth once a day  -- Indication: For bph    gabapentin 300 mg oral capsule  -- 1 cap(s) by mouth 3 times a day  -- Indication: For Pain    insulin glargine  -- 10 unit(s) subcutaneous once a day  -- Indication: For Diabetes    insulin lispro 100 units/mL subcutaneous solution  -- 1 dose(s) subcutaneous 4 times a day (before meals and at bedtime) ; 2 Unit(s) if Glucose 151 - 200  4 Unit(s) if Glucose 201 - 250  6 Unit(s) if Glucose 251 - 300  8 Unit(s) if Glucose 301 - 350  10 Unit(s) if Glucose 351 - 400  12 Unit(s) if Glucose Greater Than 400  -- Indication: For Diabetes    ferrous sulfate 324 mg oral delayed release tablet  -- 1 tab(s) by mouth once a day  -- Indication: For Anemia    mineral oil rectal enema  -- 133 milliliter(s) rectally once, As needed, persistent constipation  -- Indication: For Constipation    polyethylene glycol 3350 oral powder for reconstitution  -- 17 gram(s) by mouth once a day (at bedtime)  -- Indication: For Constipation    midodrine 5 mg oral tablet  -- 1 tab(s) by mouth   -- Indication: For ESRD on dialysis, on HD days.    Vigamox 0.5% ophthalmic solution  -- 1 drop(s) to each affected eye 4 times a day ( right eye)  -- Indication: For Corneal abrasion    erythromycin 0.5% ophthalmic ointment  -- 1 application to each affected eye once a day (at bedtime) ( right eye)  -- Indication: For Corneal abrasion    ocular lubricant ophthalmic solution  -- 1 drop(s) to each affected eye 2 times a day  -- Indication: For Corneal abrasion    calcium acetate 667 mg oral tablet  -- 1 tab(s) by mouth 3 times a day (with meals)  -- Indication: For ESRD (end stage renal disease) on dialysis I will START or STAY ON the medications listed below when I get home from the hospital:    Flomax 0.4 mg oral capsule  -- 1 cap(s) by mouth once a day  -- Indication: For bph    gabapentin 300 mg oral capsule  -- 1 cap(s) by mouth 3 times a day  -- Indication: For Pain    insulin glargine  -- 10 unit(s) subcutaneous once a day  -- Indication: For Diabetes    insulin lispro 100 units/mL subcutaneous solution  -- 1 dose(s) subcutaneous 4 times a day (before meals and at bedtime) ; 2 Unit(s) if Glucose 151 - 200  4 Unit(s) if Glucose 201 - 250  6 Unit(s) if Glucose 251 - 300  8 Unit(s) if Glucose 301 - 350  10 Unit(s) if Glucose 351 - 400  12 Unit(s) if Glucose Greater Than 400  -- Indication: For Diabetes    ferrous sulfate 324 mg oral delayed release tablet  -- 1 tab(s) by mouth once a day  -- Indication: For Anemia    mineral oil rectal enema  -- 133 milliliter(s) rectally once, As needed, persistent constipation  -- Indication: For Constipation    polyethylene glycol 3350 oral powder for reconstitution  -- 17 gram(s) by mouth once a day (at bedtime)  -- Indication: For Constipation    midodrine 10 mg oral tablet  -- 1 tab(s) by mouth at 8 AM  -- Indication: For low blood pressure    midodrine 5 mg oral tablet  -- 1 tab(s) by mouth once pre dialysis  -- Indication: For low blood pressure    ocular lubricant ophthalmic solution  -- 1 drop(s) to each affected eye 2 times a day  -- Indication: For Corneal abrasion    Vigamox 0.5% ophthalmic solution  -- 1 drop(s) to each affected eye 4 times a day ( right eye)  -- Indication: For Corneal abrasion    erythromycin 0.5% ophthalmic ointment  -- 1 application to each affected eye once a day (at bedtime) ( right eye)  -- Indication: For Corneal abrasion    calcium acetate 667 mg oral tablet  -- 1 tab(s) by mouth 3 times a day (with meals)  -- Indication: For ESRD (end stage renal disease) on dialysis

## 2017-06-12 NOTE — DISCHARGE NOTE ADULT - CARE PROVIDER_API CALL
Live Matson), Internal Medicine; Nephrology  [address] [place] [address]  Phone: [phone]  Fax: [phone] Live Matson), Internal Medicine; Nephrology  [address] [place] [address]  Phone: [phone]  Fax: [phone]    Charline Araya), Ophthalmology  [address] [place] [address]  Phone: [phone]  Fax: [phone]

## 2017-06-12 NOTE — DISCHARGE NOTE ADULT - PATIENT PORTAL LINK FT
“You can access the [employer] Patient Portal, offered by [facility], by registering with the following website: [email or web address]”

## 2017-06-12 NOTE — DISCHARGE NOTE ADULT - MEDICATION SUMMARY - MEDICATIONS TO STOP TAKING
I will STOP taking the medications listed below when I get home from the hospital:    carvedilol 3.125 mg oral tablet  -- 1 tab(s) by mouth every 12 hours    bumetanide 0.5 mg oral tablet  -- 1 tab(s) by mouth 2 times a day

## 2017-06-12 NOTE — DISCHARGE NOTE ADULT - ADDITIONAL INSTRUCTIONS
Continue medications as instructed, next HD session on 6/15/17. Return to ED should symptoms recur. Continue medications as instructed, next HD session on 6/15/17. Return to ED should symptoms recur. Use eye drops as instructed. Opthalmology follow up in 1 week. Continue medications as instructed, next HD session on 6/15/17. Return to ED should symptoms recur. Use eye drops as instructed. Opthalmology follow up in 1 week.  To coccyx and heel decub- cleanse with sterile normal saline and apply allevyn dressing.

## 2017-06-12 NOTE — DISCHARGE NOTE ADULT - CARE PLAN
Principal Discharge DX:	ESRD on dialysis  Secondary Diagnosis:	Hypertension  Secondary Diagnosis:	Acute on chronic congestive heart failure, unspecified congestive heart failure type  Secondary Diagnosis:	Diabetes  Secondary Diagnosis:	Glaucoma Principal Discharge DX:	ESRD on dialysis  Goal:	Continue HD as per session.  Instructions for follow-up, activity and diet:	Midodrine on days of HD.  Secondary Diagnosis:	Hypertension  Instructions for follow-up, activity and diet:	Monitor BP.  Secondary Diagnosis:	Acute on chronic congestive heart failure, unspecified congestive heart failure type  Instructions for follow-up, activity and diet:	Continue cardiac medications and follow up cardiology.  Secondary Diagnosis:	Diabetes  Instructions for follow-up, activity and diet:	Monitor fingersticks.  Secondary Diagnosis:	Corneal abrasion  Instructions for follow-up, activity and diet:	Use Vigamox in right eye QID and Erythromycin ointment in right eye at bedtime. Follow up with opthalmology in 1 week.

## 2017-06-12 NOTE — DISCHARGE NOTE ADULT - HOSPITAL COURSE
87 yr old male with diabetes mellitus, ischemic cardiomyopathy, ESRD on HD was sent in from rehab for labored breathing. He was found to be hypoxic in rehab and required non rebreather mask. Diagnosed with pneumonia and was started on antibiotics on admission. Renal consulted for HD. Cardiology was consulted, given negative stress test a year prior, symptoms attributed to fluid overload, medical management was advised. Palliative care evaluation was requested. His respiratory status improved after HD, CXR improved. Wound care evaluation requested for sacral decubitus. Speech and swallow evaluation was requested, advised soft with nectar was advised. Noted to have episodes of hypotension. Renal discussed long term goals with patient regarding HD given hypotension. Midodrine was added on HD days. He tolerated HD well, stated he wanted to continue HD. His next HD session is on 6/15 as per nephrology. 87 yr old male with diabetes mellitus, ischemic cardiomyopathy, ESRD on HD was sent in from rehab for labored breathing. He was found to be hypoxic in rehab and required non rebreather mask. Diagnosed with pneumonia and was started on antibiotics on admission. Renal consulted for HD. Cardiology was consulted, given negative stress test a year prior, symptoms attributed to fluid overload, medical management was advised. Palliative care evaluation was requested. His respiratory status improved after HD, CXR improved. Wound care evaluation requested for sacral decubitus. Speech and swallow evaluation was requested, advised soft with nectar was advised. Noted to have episodes of hypotension. Renal discussed long term goals with patient regarding HD given hypotension. Midodrine was added on HD days. He tolerated HD well, stated he wanted to continue HD. His next HD session is on 6/15 as per nephrology. He was noted to have a corneal abrasion and was seen by op 87 yr old male with diabetes mellitus, ischemic cardiomyopathy, ESRD on HD was sent in from rehab for labored breathing. He was found to be hypoxic in rehab and required non rebreather mask. Diagnosed with pneumonia and was started on antibiotics on admission. Renal consulted for HD. Cardiology was consulted, given negative stress test a year prior, symptoms attributed to fluid overload, medical management was advised. Palliative care evaluation was requested. His respiratory status improved after HD, CXR improved. Wound care evaluation requested for sacral decubitus. Speech and swallow evaluation was requested, advised soft with nectar was advised. Noted to have episodes of hypotension. Renal discussed long term goals with patient regarding HD given hypotension. Midodrine was added on HD days. He tolerated HD well, stated he wanted to continue HD. His next HD session is on 6/15 as per nephrology. He was noted to have a corneal abrasion and was seen by ophthalmology, started on antibiotics and local care was done. He was advised to follow up with opthalmology in 1 week. He is medically stable for discharge to rehab. 87 yr old male with diabetes mellitus, ischemic cardiomyopathy, ESRD on HD was sent in from rehab for labored breathing. He was found to be hypoxic in rehab and required non rebreather mask. Diagnosed with pneumonia and was started on antibiotics on admission. Renal consulted for HD. Cardiology was consulted, given negative stress test a year prior, symptoms attributed to fluid overload, medical management was advised. Palliative care evaluation was requested. His respiratory status improved after HD, CXR improved. Wound care evaluation requested for sacral decubitus. Speech and swallow evaluation was requested, advised soft with nectar was advised. Noted to have episodes of hypotension. Renal discussed long term goals with patient regarding HD given hypotension. Midodrine was added on HD days. He tolerated HD well, stated he wanted to continue HD. His next HD session is on 6/15 as per nephrology. He was noted to have a corneal abrasion and was seen by ophthalmology, started on antibiotics and local care was done. He was advised to follow up with opthalmology in 1 week. He is medically stable for discharge to rehab      Vital Signs Last 24 Hrs  T(C): 36.9, Max: 37 (06-17 @ 06:39)  T(F): 98.5, Max: 98.6 (06-17 @ 06:39)  HR: 98 (79 - 98)  BP: 100/52 (100/52 - 118/66)  BP(mean): --  RR: 18 (18 - 18)  SpO2: 100% (99% - 100%)    ACCUCHECKS    PHYSICAL EXAM-  GENERAL: not in distress.  Eye: Patient declining full exam, but noted to have significant redness and bulging cornea. Refuses full exam  CHEST/LUNG: b/l air entry  HEART: Regular   ABDOMEN: Soft, BS+  EXTREMITIES:  No edema    LABS:                        9.4    5.6   )-----------( 95       ( 17 Jun 2017 08:15 )             31.1     06-17    138  |  98  |  47.0<H>  ----------------------------<  104  5.1   |  25.0  |  4.92<H>    Ca    8.7      17 Jun 2017 08:15  Phos  2.8     06-17      TIME 45 mins

## 2017-06-13 DIAGNOSIS — S05.00XA INJURY OF CONJUNCTIVA AND CORNEAL ABRASION WITHOUT FOREIGN BODY, UNSPECIFIED EYE, INITIAL ENCOUNTER: ICD-10-CM

## 2017-06-13 PROCEDURE — 99233 SBSQ HOSP IP/OBS HIGH 50: CPT

## 2017-06-13 RX ORDER — ERYTHROPOIETIN 10000 [IU]/ML
10000 INJECTION, SOLUTION INTRAVENOUS; SUBCUTANEOUS
Qty: 0 | Refills: 0 | Status: DISCONTINUED | OUTPATIENT
Start: 2017-06-13 | End: 2017-06-19

## 2017-06-13 RX ORDER — LATANOPROST 0.05 MG/ML
1 SOLUTION/ DROPS OPHTHALMIC; TOPICAL DAILY
Qty: 0 | Refills: 0 | Status: DISCONTINUED | OUTPATIENT
Start: 2017-06-13 | End: 2017-06-13

## 2017-06-13 RX ADMIN — LATANOPROST 1 DROP(S): 0.05 SOLUTION/ DROPS OPHTHALMIC; TOPICAL at 10:30

## 2017-06-13 RX ADMIN — Medication 1 DROP(S): at 17:51

## 2017-06-13 RX ADMIN — Medication 650 MILLIGRAM(S): at 21:07

## 2017-06-13 RX ADMIN — TAMSULOSIN HYDROCHLORIDE 0.4 MILLIGRAM(S): 0.4 CAPSULE ORAL at 21:02

## 2017-06-13 RX ADMIN — Medication 650 MILLIGRAM(S): at 18:30

## 2017-06-13 RX ADMIN — IRON SUCROSE 210 MILLIGRAM(S): 20 INJECTION, SOLUTION INTRAVENOUS at 05:01

## 2017-06-13 RX ADMIN — GABAPENTIN 300 MILLIGRAM(S): 400 CAPSULE ORAL at 21:02

## 2017-06-13 RX ADMIN — Medication 325 MILLIGRAM(S): at 11:23

## 2017-06-13 RX ADMIN — Medication 650 MILLIGRAM(S): at 21:02

## 2017-06-13 RX ADMIN — Medication 650 MILLIGRAM(S): at 05:02

## 2017-06-13 RX ADMIN — HEPARIN SODIUM 5000 UNIT(S): 5000 INJECTION INTRAVENOUS; SUBCUTANEOUS at 17:51

## 2017-06-13 RX ADMIN — Medication 667 MILLIGRAM(S): at 07:36

## 2017-06-13 RX ADMIN — Medication 1 DROP(S): at 05:01

## 2017-06-13 RX ADMIN — GABAPENTIN 300 MILLIGRAM(S): 400 CAPSULE ORAL at 17:51

## 2017-06-13 RX ADMIN — POLYETHYLENE GLYCOL 3350 17 GRAM(S): 17 POWDER, FOR SOLUTION ORAL at 21:02

## 2017-06-13 RX ADMIN — Medication 667 MILLIGRAM(S): at 17:51

## 2017-06-13 RX ADMIN — GABAPENTIN 300 MILLIGRAM(S): 400 CAPSULE ORAL at 05:01

## 2017-06-13 RX ADMIN — Medication 667 MILLIGRAM(S): at 11:24

## 2017-06-13 RX ADMIN — HEPARIN SODIUM 5000 UNIT(S): 5000 INJECTION INTRAVENOUS; SUBCUTANEOUS at 05:02

## 2017-06-13 RX ADMIN — Medication 650 MILLIGRAM(S): at 17:51

## 2017-06-14 LAB
ANION GAP SERPL CALC-SCNC: 12 MMOL/L — SIGNIFICANT CHANGE UP (ref 5–17)
BUN SERPL-MCNC: 36 MG/DL — HIGH (ref 8–20)
CALCIUM SERPL-MCNC: 9.1 MG/DL — SIGNIFICANT CHANGE UP (ref 8.6–10.2)
CHLORIDE SERPL-SCNC: 96 MMOL/L — LOW (ref 98–107)
CO2 SERPL-SCNC: 29 MMOL/L — SIGNIFICANT CHANGE UP (ref 22–29)
CREAT SERPL-MCNC: 4.04 MG/DL — HIGH (ref 0.5–1.3)
GLUCOSE SERPL-MCNC: 100 MG/DL — SIGNIFICANT CHANGE UP (ref 70–115)
HCT VFR BLD CALC: 31.1 % — LOW (ref 42–52)
HGB BLD-MCNC: 9.4 G/DL — LOW (ref 14–18)
MCHC RBC-ENTMCNC: 26.3 PG — LOW (ref 27–31)
MCHC RBC-ENTMCNC: 30.2 G/DL — LOW (ref 32–36)
MCV RBC AUTO: 87.1 FL — SIGNIFICANT CHANGE UP (ref 80–94)
PHOSPHATE SERPL-MCNC: 3 MG/DL — SIGNIFICANT CHANGE UP (ref 2.4–4.7)
PLATELET # BLD AUTO: 101 K/UL — LOW (ref 150–400)
POTASSIUM SERPL-MCNC: 5 MMOL/L — SIGNIFICANT CHANGE UP (ref 3.5–5.3)
POTASSIUM SERPL-SCNC: 5 MMOL/L — SIGNIFICANT CHANGE UP (ref 3.5–5.3)
RBC # BLD: 3.57 M/UL — LOW (ref 4.6–6.2)
RBC # FLD: 21.2 % — HIGH (ref 11–15.6)
SODIUM SERPL-SCNC: 137 MMOL/L — SIGNIFICANT CHANGE UP (ref 135–145)
WBC # BLD: 5 K/UL — SIGNIFICANT CHANGE UP (ref 4.8–10.8)
WBC # FLD AUTO: 5 K/UL — SIGNIFICANT CHANGE UP (ref 4.8–10.8)

## 2017-06-14 PROCEDURE — 99233 SBSQ HOSP IP/OBS HIGH 50: CPT

## 2017-06-14 RX ORDER — SODIUM CHLORIDE 9 MG/ML
500 INJECTION INTRAMUSCULAR; INTRAVENOUS; SUBCUTANEOUS ONCE
Qty: 0 | Refills: 0 | Status: COMPLETED | OUTPATIENT
Start: 2017-06-14 | End: 2017-06-14

## 2017-06-14 RX ORDER — SODIUM CHLORIDE 9 MG/ML
250 INJECTION INTRAMUSCULAR; INTRAVENOUS; SUBCUTANEOUS ONCE
Qty: 0 | Refills: 0 | Status: COMPLETED | OUTPATIENT
Start: 2017-06-14 | End: 2017-06-14

## 2017-06-14 RX ADMIN — Medication 2: at 12:22

## 2017-06-14 RX ADMIN — MIDODRINE HYDROCHLORIDE 5 MILLIGRAM(S): 2.5 TABLET ORAL at 14:19

## 2017-06-14 RX ADMIN — HEPARIN SODIUM 5000 UNIT(S): 5000 INJECTION INTRAVENOUS; SUBCUTANEOUS at 18:06

## 2017-06-14 RX ADMIN — Medication 4: at 18:06

## 2017-06-14 RX ADMIN — Medication 667 MILLIGRAM(S): at 12:21

## 2017-06-14 RX ADMIN — GABAPENTIN 300 MILLIGRAM(S): 400 CAPSULE ORAL at 05:53

## 2017-06-14 RX ADMIN — Medication 650 MILLIGRAM(S): at 05:53

## 2017-06-14 RX ADMIN — HEPARIN SODIUM 5000 UNIT(S): 5000 INJECTION INTRAVENOUS; SUBCUTANEOUS at 05:53

## 2017-06-14 RX ADMIN — Medication 650 MILLIGRAM(S): at 23:48

## 2017-06-14 RX ADMIN — SODIUM CHLORIDE 750 MILLILITER(S): 9 INJECTION INTRAMUSCULAR; INTRAVENOUS; SUBCUTANEOUS at 14:57

## 2017-06-14 RX ADMIN — Medication 667 MILLIGRAM(S): at 18:06

## 2017-06-14 RX ADMIN — GABAPENTIN 300 MILLIGRAM(S): 400 CAPSULE ORAL at 23:08

## 2017-06-14 RX ADMIN — SODIUM CHLORIDE 1000 MILLILITER(S): 9 INJECTION INTRAMUSCULAR; INTRAVENOUS; SUBCUTANEOUS at 19:51

## 2017-06-14 RX ADMIN — Medication 650 MILLIGRAM(S): at 15:22

## 2017-06-14 RX ADMIN — Medication 1 DROP(S): at 18:06

## 2017-06-14 RX ADMIN — Medication 650 MILLIGRAM(S): at 14:19

## 2017-06-14 RX ADMIN — GABAPENTIN 300 MILLIGRAM(S): 400 CAPSULE ORAL at 14:19

## 2017-06-14 RX ADMIN — Medication 650 MILLIGRAM(S): at 23:08

## 2017-06-14 RX ADMIN — Medication 1 DROP(S): at 05:54

## 2017-06-14 RX ADMIN — ERYTHROPOIETIN 10000 UNIT(S): 10000 INJECTION, SOLUTION INTRAVENOUS; SUBCUTANEOUS at 10:25

## 2017-06-14 RX ADMIN — Medication 325 MILLIGRAM(S): at 12:21

## 2017-06-14 RX ADMIN — TAMSULOSIN HYDROCHLORIDE 0.4 MILLIGRAM(S): 0.4 CAPSULE ORAL at 23:08

## 2017-06-15 PROCEDURE — 99232 SBSQ HOSP IP/OBS MODERATE 35: CPT

## 2017-06-15 RX ORDER — MINERAL OIL
133 OIL (ML) MISCELLANEOUS
Qty: 0 | Refills: 0 | COMMUNITY
Start: 2017-06-15

## 2017-06-15 RX ORDER — POLYETHYLENE GLYCOL 3350 17 G/17G
17 POWDER, FOR SOLUTION ORAL
Qty: 0 | Refills: 0 | COMMUNITY
Start: 2017-06-15

## 2017-06-15 RX ORDER — MIDODRINE HYDROCHLORIDE 2.5 MG/1
1 TABLET ORAL
Qty: 0 | Refills: 0 | COMMUNITY
Start: 2017-06-15

## 2017-06-15 RX ORDER — MOXIFLOXACIN HCL 0.5 %
1 DROPS OPHTHALMIC (EYE)
Qty: 0 | Refills: 0 | COMMUNITY

## 2017-06-15 RX ORDER — ERYTHROMYCIN BASE 5 MG/GRAM
1 OINTMENT (GRAM) OPHTHALMIC (EYE)
Qty: 0 | Refills: 0 | COMMUNITY

## 2017-06-15 RX ADMIN — Medication 650 MILLIGRAM(S): at 05:41

## 2017-06-15 RX ADMIN — TAMSULOSIN HYDROCHLORIDE 0.4 MILLIGRAM(S): 0.4 CAPSULE ORAL at 22:37

## 2017-06-15 RX ADMIN — Medication 667 MILLIGRAM(S): at 08:13

## 2017-06-15 RX ADMIN — GABAPENTIN 300 MILLIGRAM(S): 400 CAPSULE ORAL at 05:42

## 2017-06-15 RX ADMIN — GABAPENTIN 300 MILLIGRAM(S): 400 CAPSULE ORAL at 14:37

## 2017-06-15 RX ADMIN — Medication 650 MILLIGRAM(S): at 14:37

## 2017-06-15 RX ADMIN — Medication 1 DROP(S): at 05:42

## 2017-06-15 RX ADMIN — Medication 667 MILLIGRAM(S): at 17:34

## 2017-06-15 RX ADMIN — Medication 650 MILLIGRAM(S): at 15:37

## 2017-06-15 RX ADMIN — Medication 2: at 17:33

## 2017-06-15 RX ADMIN — Medication 325 MILLIGRAM(S): at 12:46

## 2017-06-15 RX ADMIN — Medication 650 MILLIGRAM(S): at 22:37

## 2017-06-15 RX ADMIN — HEPARIN SODIUM 5000 UNIT(S): 5000 INJECTION INTRAVENOUS; SUBCUTANEOUS at 05:42

## 2017-06-15 RX ADMIN — Medication 650 MILLIGRAM(S): at 06:24

## 2017-06-15 RX ADMIN — GABAPENTIN 300 MILLIGRAM(S): 400 CAPSULE ORAL at 22:37

## 2017-06-15 RX ADMIN — Medication 650 MILLIGRAM(S): at 23:17

## 2017-06-15 RX ADMIN — HEPARIN SODIUM 5000 UNIT(S): 5000 INJECTION INTRAVENOUS; SUBCUTANEOUS at 17:34

## 2017-06-15 RX ADMIN — Medication 667 MILLIGRAM(S): at 12:46

## 2017-06-15 RX ADMIN — Medication 1 DROP(S): at 17:33

## 2017-06-16 PROCEDURE — 99232 SBSQ HOSP IP/OBS MODERATE 35: CPT

## 2017-06-16 RX ORDER — MIDODRINE HYDROCHLORIDE 2.5 MG/1
10 TABLET ORAL
Qty: 0 | Refills: 0 | Status: DISCONTINUED | OUTPATIENT
Start: 2017-06-16 | End: 2017-06-16

## 2017-06-16 RX ORDER — MIDODRINE HYDROCHLORIDE 2.5 MG/1
10 TABLET ORAL
Qty: 0 | Refills: 0 | Status: DISCONTINUED | OUTPATIENT
Start: 2017-06-16 | End: 2017-06-19

## 2017-06-16 RX ADMIN — TAMSULOSIN HYDROCHLORIDE 0.4 MILLIGRAM(S): 0.4 CAPSULE ORAL at 21:25

## 2017-06-16 RX ADMIN — Medication 650 MILLIGRAM(S): at 07:02

## 2017-06-16 RX ADMIN — Medication 650 MILLIGRAM(S): at 14:15

## 2017-06-16 RX ADMIN — Medication 1 DROP(S): at 17:52

## 2017-06-16 RX ADMIN — GABAPENTIN 300 MILLIGRAM(S): 400 CAPSULE ORAL at 06:32

## 2017-06-16 RX ADMIN — HEPARIN SODIUM 5000 UNIT(S): 5000 INJECTION INTRAVENOUS; SUBCUTANEOUS at 17:53

## 2017-06-16 RX ADMIN — Medication 325 MILLIGRAM(S): at 14:14

## 2017-06-16 RX ADMIN — Medication 667 MILLIGRAM(S): at 17:53

## 2017-06-16 RX ADMIN — HEPARIN SODIUM 5000 UNIT(S): 5000 INJECTION INTRAVENOUS; SUBCUTANEOUS at 06:32

## 2017-06-16 RX ADMIN — POLYETHYLENE GLYCOL 3350 17 GRAM(S): 17 POWDER, FOR SOLUTION ORAL at 21:25

## 2017-06-16 RX ADMIN — Medication 650 MILLIGRAM(S): at 21:25

## 2017-06-16 RX ADMIN — Medication 650 MILLIGRAM(S): at 06:32

## 2017-06-16 RX ADMIN — GABAPENTIN 300 MILLIGRAM(S): 400 CAPSULE ORAL at 21:25

## 2017-06-16 RX ADMIN — Medication 667 MILLIGRAM(S): at 09:44

## 2017-06-16 RX ADMIN — Medication 650 MILLIGRAM(S): at 22:25

## 2017-06-16 RX ADMIN — MIDODRINE HYDROCHLORIDE 5 MILLIGRAM(S): 2.5 TABLET ORAL at 08:22

## 2017-06-16 RX ADMIN — Medication 1 DROP(S): at 06:32

## 2017-06-16 RX ADMIN — GABAPENTIN 300 MILLIGRAM(S): 400 CAPSULE ORAL at 14:10

## 2017-06-16 RX ADMIN — Medication 650 MILLIGRAM(S): at 15:20

## 2017-06-16 RX ADMIN — Medication 667 MILLIGRAM(S): at 14:10

## 2017-06-17 LAB
ANION GAP SERPL CALC-SCNC: 15 MMOL/L — SIGNIFICANT CHANGE UP (ref 5–17)
BUN SERPL-MCNC: 47 MG/DL — HIGH (ref 8–20)
CALCIUM SERPL-MCNC: 8.7 MG/DL — SIGNIFICANT CHANGE UP (ref 8.6–10.2)
CHLORIDE SERPL-SCNC: 98 MMOL/L — SIGNIFICANT CHANGE UP (ref 98–107)
CO2 SERPL-SCNC: 25 MMOL/L — SIGNIFICANT CHANGE UP (ref 22–29)
CREAT SERPL-MCNC: 4.92 MG/DL — HIGH (ref 0.5–1.3)
GLUCOSE SERPL-MCNC: 104 MG/DL — SIGNIFICANT CHANGE UP (ref 70–115)
HCT VFR BLD CALC: 31.1 % — LOW (ref 42–52)
HGB BLD-MCNC: 9.4 G/DL — LOW (ref 14–18)
MCHC RBC-ENTMCNC: 26.6 PG — LOW (ref 27–31)
MCHC RBC-ENTMCNC: 30.2 G/DL — LOW (ref 32–36)
MCV RBC AUTO: 88.1 FL — SIGNIFICANT CHANGE UP (ref 80–94)
PHOSPHATE SERPL-MCNC: 2.8 MG/DL — SIGNIFICANT CHANGE UP (ref 2.4–4.7)
PLATELET # BLD AUTO: 95 K/UL — LOW (ref 150–400)
POTASSIUM SERPL-MCNC: 5.1 MMOL/L — SIGNIFICANT CHANGE UP (ref 3.5–5.3)
POTASSIUM SERPL-SCNC: 5.1 MMOL/L — SIGNIFICANT CHANGE UP (ref 3.5–5.3)
RBC # BLD: 3.53 M/UL — LOW (ref 4.6–6.2)
RBC # FLD: 21.2 % — HIGH (ref 11–15.6)
SODIUM SERPL-SCNC: 138 MMOL/L — SIGNIFICANT CHANGE UP (ref 135–145)
WBC # BLD: 5.6 K/UL — SIGNIFICANT CHANGE UP (ref 4.8–10.8)
WBC # FLD AUTO: 5.6 K/UL — SIGNIFICANT CHANGE UP (ref 4.8–10.8)

## 2017-06-17 PROCEDURE — 99233 SBSQ HOSP IP/OBS HIGH 50: CPT

## 2017-06-17 RX ORDER — MIDODRINE HYDROCHLORIDE 2.5 MG/1
1 TABLET ORAL
Qty: 0 | Refills: 0 | COMMUNITY
Start: 2017-06-17

## 2017-06-17 RX ADMIN — POLYETHYLENE GLYCOL 3350 17 GRAM(S): 17 POWDER, FOR SOLUTION ORAL at 23:33

## 2017-06-17 RX ADMIN — Medication 667 MILLIGRAM(S): at 13:02

## 2017-06-17 RX ADMIN — HEPARIN SODIUM 5000 UNIT(S): 5000 INJECTION INTRAVENOUS; SUBCUTANEOUS at 04:40

## 2017-06-17 RX ADMIN — Medication 325 MILLIGRAM(S): at 13:04

## 2017-06-17 RX ADMIN — GABAPENTIN 300 MILLIGRAM(S): 400 CAPSULE ORAL at 13:02

## 2017-06-17 RX ADMIN — Medication 667 MILLIGRAM(S): at 18:08

## 2017-06-17 RX ADMIN — Medication 1 DROP(S): at 04:40

## 2017-06-17 RX ADMIN — Medication 1 DROP(S): at 18:08

## 2017-06-17 RX ADMIN — GABAPENTIN 300 MILLIGRAM(S): 400 CAPSULE ORAL at 23:33

## 2017-06-17 RX ADMIN — TAMSULOSIN HYDROCHLORIDE 0.4 MILLIGRAM(S): 0.4 CAPSULE ORAL at 23:33

## 2017-06-17 RX ADMIN — ERYTHROPOIETIN 10000 UNIT(S): 10000 INJECTION, SOLUTION INTRAVENOUS; SUBCUTANEOUS at 10:15

## 2017-06-17 RX ADMIN — Medication 650 MILLIGRAM(S): at 13:04

## 2017-06-17 RX ADMIN — Medication 650 MILLIGRAM(S): at 13:40

## 2017-06-17 RX ADMIN — Medication 650 MILLIGRAM(S): at 04:38

## 2017-06-17 RX ADMIN — Medication 650 MILLIGRAM(S): at 05:38

## 2017-06-17 RX ADMIN — MIDODRINE HYDROCHLORIDE 10 MILLIGRAM(S): 2.5 TABLET ORAL at 12:45

## 2017-06-17 RX ADMIN — HEPARIN SODIUM 5000 UNIT(S): 5000 INJECTION INTRAVENOUS; SUBCUTANEOUS at 18:08

## 2017-06-17 RX ADMIN — Medication 650 MILLIGRAM(S): at 23:33

## 2017-06-17 RX ADMIN — GABAPENTIN 300 MILLIGRAM(S): 400 CAPSULE ORAL at 04:38

## 2017-06-17 NOTE — CHART NOTE - NSCHARTNOTEFT_GEN_A_CORE
HEALTH ISSUES - PROBLEM Dx:  HPI:  86 yo M with h/o DM, HTN, ischemic cardiomyopathy (EF 30-35%), ESRD (HD TTS) presents with labored breathing. Wife and son at bedside. Pt was sent in from [facility] rehab. Per EMS documentation, he was found unresponsive usinge accessory muscles to breathe. He was placed on a nonrebreather which improved his oxygenation to 98%. Pt somewhat uncooperative and unable to provide clear history.   Per wife, pt has had a gradual deterioration over the last year. He has not been eating well, and has had minimal interaction. She states that he has intermittent moments of lucidity. (29 May 2017 15:57)    now  set up for d/c to [facility]    INTERVAL HPI/ OVERNIGHT EVENTS:  no acute issues, psot hd today for d/c  denies chest pain, nausea, vomits, cough, SOB, fever, HA    MEDICATIONS  (STANDING):  calcium acetate 667milliGRAM(s) Oral three times a day with meals  ferrous    sulfate 325milliGRAM(s) Oral daily  tamsulosin 0.4milliGRAM(s) Oral at bedtime  gabapentin 300milliGRAM(s) Oral three times a day  insulin lispro (HumaLOG) corrective regimen sliding scale  SubCutaneous three times a day before meals  dextrose 5%. 1000milliLiter(s) IV Continuous <Continuous>  dextrose 50% Injectable 12.5Gram(s) IV Push once  dextrose 50% Injectable 25Gram(s) IV Push once  dextrose 50% Injectable 25Gram(s) IV Push once  artificial  tears Solution 1Drop(s) Both EYES two times a day  heparin  Injectable 5000Unit(s) SubCutaneous every 12 hours  acetaminophen   Tablet. 650milliGRAM(s) Oral every 8 hours  polyethylene glycol 3350 17Gram(s) Oral at bedtime  epoetin una Injectable 33549Whnl(s) IV Push <User Schedule>  freetext medication  - 1Drop(s) Right EYE four times a day  midodrine 10milliGRAM(s) Oral <User Schedule>    MEDICATIONS  (PRN):  dextrose Gel 1Dose(s) Oral once PRN Blood Glucose LESS THAN 70 milliGRAM(s)/deciliter  glucagon  Injectable 1milliGRAM(s) IntraMuscular once PRN Glucose LESS THAN 70 milligrams/deciliter  atropine 1% Ophthalmic Solution for SubLingual Use 2Drop(s) SubLingual every 4 hours PRN excessive secretions  mineral oil enema 133milliLiter(s) Rectal once PRN persistent constipation  dextrose Gel 1Dose(s) Oral once PRN Blood Glucose LESS THAN 70 milliGRAM(s)/deciliter  dextrose Gel 1Dose(s) Oral once PRN Blood Glucose LESS THAN 70 milliGRAM(s)/deciliter      Allergies    No Known Allergies    Intolerances        Vital Signs Last 24 Hrs  T(C): 36.9, Max: 37 (06-17 @ 06:39)  T(F): 98.5, Max: 98.6 (06-17 @ 06:39)  HR: 98 (79 - 98)  BP: 100/52 (100/52 - 118/66)  BP(mean): --  RR: 18 (18 - 18)  SpO2: 100% (99% - 100%)    ACCUCHECKS    PHYSICAL EXAM-  GENERAL: not in distress.  Eye: Patient declining full exam, but noted to have significant redness and bulging cornea. Refuses full exam  CHEST/LUNG: b/l air entry  HEART: Regular   ABDOMEN: Soft, BS+  EXTREMITIES:  No edema    LABS:                        9.4    5.6   )-----------( 95       ( 17 Jun 2017 08:15 )             31.1     06-17    138  |  98  |  47.0<H>  ----------------------------<  104  5.1   |  25.0  |  4.92<H>    Ca    8.7      17 Jun 2017 08:15  Phos  2.8     06-17            RADIOLOGY & ADDITIONAL TESTS:    Assessment and Plan  1) Pt was all set up for discharge to [facility] today. but since post HD his Bp in the borderline range and transport refused to take him with sbp 88. discharge cancelled. plan to send pt on Monday when it is not his HD day.     DVT Prophylaxis    Discussed with: Patient, family, RN, CM, Consultants  Plan of care/ Discharge planning discussed.    Visit Time:

## 2017-06-18 PROCEDURE — 99232 SBSQ HOSP IP/OBS MODERATE 35: CPT

## 2017-06-18 RX ORDER — HEPARIN SODIUM 5000 [USP'U]/ML
5000 INJECTION INTRAVENOUS; SUBCUTANEOUS EVERY 12 HOURS
Qty: 0 | Refills: 0 | Status: DISCONTINUED | OUTPATIENT
Start: 2017-06-18 | End: 2017-06-19

## 2017-06-18 RX ADMIN — Medication 667 MILLIGRAM(S): at 18:13

## 2017-06-18 RX ADMIN — GABAPENTIN 300 MILLIGRAM(S): 400 CAPSULE ORAL at 14:45

## 2017-06-18 RX ADMIN — Medication 2: at 16:47

## 2017-06-18 RX ADMIN — Medication 650 MILLIGRAM(S): at 14:45

## 2017-06-18 RX ADMIN — Medication 650 MILLIGRAM(S): at 22:25

## 2017-06-18 RX ADMIN — Medication 667 MILLIGRAM(S): at 11:33

## 2017-06-18 RX ADMIN — TAMSULOSIN HYDROCHLORIDE 0.4 MILLIGRAM(S): 0.4 CAPSULE ORAL at 22:24

## 2017-06-18 RX ADMIN — GABAPENTIN 300 MILLIGRAM(S): 400 CAPSULE ORAL at 22:24

## 2017-06-18 RX ADMIN — Medication 2: at 07:51

## 2017-06-18 RX ADMIN — GABAPENTIN 300 MILLIGRAM(S): 400 CAPSULE ORAL at 06:17

## 2017-06-18 RX ADMIN — HEPARIN SODIUM 5000 UNIT(S): 5000 INJECTION INTRAVENOUS; SUBCUTANEOUS at 06:17

## 2017-06-18 RX ADMIN — Medication 667 MILLIGRAM(S): at 07:51

## 2017-06-18 RX ADMIN — Medication 1 DROP(S): at 06:17

## 2017-06-18 RX ADMIN — Medication 1 DROP(S): at 18:13

## 2017-06-18 RX ADMIN — POLYETHYLENE GLYCOL 3350 17 GRAM(S): 17 POWDER, FOR SOLUTION ORAL at 22:25

## 2017-06-18 RX ADMIN — Medication 325 MILLIGRAM(S): at 11:33

## 2017-06-18 RX ADMIN — HEPARIN SODIUM 5000 UNIT(S): 5000 INJECTION INTRAVENOUS; SUBCUTANEOUS at 18:13

## 2017-06-18 RX ADMIN — Medication 650 MILLIGRAM(S): at 06:17

## 2017-06-18 RX ADMIN — Medication 650 MILLIGRAM(S): at 15:51

## 2017-06-19 VITALS
RESPIRATION RATE: 18 BRPM | OXYGEN SATURATION: 98 % | DIASTOLIC BLOOD PRESSURE: 62 MMHG | HEART RATE: 94 BPM | TEMPERATURE: 98 F | SYSTOLIC BLOOD PRESSURE: 116 MMHG

## 2017-06-19 PROCEDURE — 86706 HEP B SURFACE ANTIBODY: CPT

## 2017-06-19 PROCEDURE — 87040 BLOOD CULTURE FOR BACTERIA: CPT

## 2017-06-19 PROCEDURE — 97110 THERAPEUTIC EXERCISES: CPT

## 2017-06-19 PROCEDURE — 74230 X-RAY XM SWLNG FUNCJ C+: CPT

## 2017-06-19 PROCEDURE — 84466 ASSAY OF TRANSFERRIN: CPT

## 2017-06-19 PROCEDURE — 99285 EMERGENCY DEPT VISIT HI MDM: CPT | Mod: 25

## 2017-06-19 PROCEDURE — P9047: CPT

## 2017-06-19 PROCEDURE — 83735 ASSAY OF MAGNESIUM: CPT

## 2017-06-19 PROCEDURE — 92526 ORAL FUNCTION THERAPY: CPT

## 2017-06-19 PROCEDURE — 93005 ELECTROCARDIOGRAM TRACING: CPT

## 2017-06-19 PROCEDURE — 85027 COMPLETE CBC AUTOMATED: CPT

## 2017-06-19 PROCEDURE — 84100 ASSAY OF PHOSPHORUS: CPT

## 2017-06-19 PROCEDURE — 97163 PT EVAL HIGH COMPLEX 45 MIN: CPT

## 2017-06-19 PROCEDURE — 97116 GAIT TRAINING THERAPY: CPT

## 2017-06-19 PROCEDURE — 86803 HEPATITIS C AB TEST: CPT

## 2017-06-19 PROCEDURE — 80048 BASIC METABOLIC PNL TOTAL CA: CPT

## 2017-06-19 PROCEDURE — 84443 ASSAY THYROID STIM HORMONE: CPT

## 2017-06-19 PROCEDURE — 97530 THERAPEUTIC ACTIVITIES: CPT

## 2017-06-19 PROCEDURE — 85730 THROMBOPLASTIN TIME PARTIAL: CPT

## 2017-06-19 PROCEDURE — 85610 PROTHROMBIN TIME: CPT

## 2017-06-19 PROCEDURE — 71045 X-RAY EXAM CHEST 1 VIEW: CPT

## 2017-06-19 PROCEDURE — 36415 COLL VENOUS BLD VENIPUNCTURE: CPT

## 2017-06-19 PROCEDURE — 87340 HEPATITIS B SURFACE AG IA: CPT

## 2017-06-19 PROCEDURE — 92610 EVALUATE SWALLOWING FUNCTION: CPT

## 2017-06-19 PROCEDURE — 80053 COMPREHEN METABOLIC PANEL: CPT

## 2017-06-19 PROCEDURE — 96374 THER/PROPH/DIAG INJ IV PUSH: CPT

## 2017-06-19 PROCEDURE — 92611 MOTION FLUOROSCOPY/SWALLOW: CPT

## 2017-06-19 PROCEDURE — 83550 IRON BINDING TEST: CPT

## 2017-06-19 PROCEDURE — 83880 ASSAY OF NATRIURETIC PEPTIDE: CPT

## 2017-06-19 PROCEDURE — 99239 HOSP IP/OBS DSCHRG MGMT >30: CPT

## 2017-06-19 PROCEDURE — 99261: CPT

## 2017-06-19 RX ADMIN — Medication 650 MILLIGRAM(S): at 06:39

## 2017-06-19 RX ADMIN — Medication 650 MILLIGRAM(S): at 06:38

## 2017-06-19 RX ADMIN — Medication 325 MILLIGRAM(S): at 11:12

## 2017-06-19 RX ADMIN — Medication 650 MILLIGRAM(S): at 07:19

## 2017-06-19 RX ADMIN — Medication 1 DROP(S): at 06:39

## 2017-06-19 RX ADMIN — Medication 650 MILLIGRAM(S): at 15:11

## 2017-06-19 RX ADMIN — GABAPENTIN 300 MILLIGRAM(S): 400 CAPSULE ORAL at 06:39

## 2017-06-19 RX ADMIN — HEPARIN SODIUM 5000 UNIT(S): 5000 INJECTION INTRAVENOUS; SUBCUTANEOUS at 06:39

## 2017-06-19 RX ADMIN — Medication 650 MILLIGRAM(S): at 16:00

## 2017-06-19 RX ADMIN — Medication 667 MILLIGRAM(S): at 11:13

## 2017-06-19 RX ADMIN — GABAPENTIN 300 MILLIGRAM(S): 400 CAPSULE ORAL at 15:11

## 2017-06-19 RX ADMIN — Medication 667 MILLIGRAM(S): at 08:47

## 2017-06-19 NOTE — PROGRESS NOTE ADULT - PROBLEM SELECTOR PLAN 4
Heparin 5000units SQ Q12H
Stable.
compensated and Stable.
compensated and Stable.
waldemar  d/c lantus given poor PO intake
waldemar  d/c lantus given poor PO intake
Heparin 5000units SQ Q12H
Stable.
Supplement,   Needs help eating
Xanax  Added Cymbalta low dose for anxiety, depression and pain
Feed [place] or as simple as possible
Receiving HD T/TH/Sa as long as is tolerated
Will start on Cymbalta at bedtime.  Recruit more family support
Heparin 5000units SQ Q12H

## 2017-06-19 NOTE — PROGRESS NOTE ADULT - ASSESSMENT
1, HFrEF, s/p HD  2. BP allows addition of low dose ACEI  3. Hx of CAD, negative stress test last year  4. Repeat CXR with infiltrates on presentation, most likely pulmonary edema.
86 yo M with h/o DM, HTN, ischemic cardiomyopathy (EF 30-35%), CAD s/p CABG, AICD, ESRD (HD TTS) here with respiratory distress.
86 yo M with h/o DM, HTN, ischemic cardiomyopathy (EF 30-35%), ESRD (HD TTS) here with respiratory distress that is now improving s/p HD and on abx.
86 yo M with h/o DM, HTN, ischemic cardiomyopathy (EF 30-35%), ESRD (HD TTS) here with respiratory distress that is now improving s/p HD and on abx.
86 yo M with h/o DM, HTN, ischemic cardiomyopathy (EF 30-35%), ESRD (HD TTS) here with respiratory distress that is now improving s/p HD and on abx.     Problem/Plan - 1:  ·  Problem: Pneumonia of right lung due to other aerobic Gram-negative bacteria, unspecified part of lung.  Plan: Zosyn 2.25gm IVPB Q8H (Day #4 /7).     Problem/Plan - 2:  ·  Problem: CHF (congestive heart failure).  Plan: Acute on chronic heart failure with reduced EF  ECHO pending  c/w Coreg and lisinopril  Cardiology Consult appreciated.     Problem/Plan - 3:  ESRD on HD Stage 5-support patient after HD treatnents, stay with patient as much as possible   Renal Diet  Monitor blood work more closely.  Problem 4  Debility, Increasing weakness.    Problem Full Code-family needs education and reassurance
86 yo M with h/o DM, HTN, ischemic cardiomyopathy (EF 30-35%), ESRD (HD TTS) here with respiratory distress that is now improving s/p HD and on abx. awaiting palliative discussion as pt may be possible candidate for hospice care.
87 yr old male with diabetes mellitus, ischemic cardiomyopathy, ESRD on HD was sent in from rehab for labored breathing. He was found to be hypoxic in rehab and required non rebreather mask. Diagnosed with pneumonia and was started on antibiotics on admission. Renal consulted for HD. Cardiology was consulted, given negative stress test a year prior, symptoms attributed to fluid overload, medical management was advised. Palliative care evaluation was requested. His respiratory status improved after HD, CXR improved. Wound care evaluation requested for sacral decubitus. Speech and swallow evaluation was requested, advised soft with nectar was advised. Noted to have episodes of hypotension. Renal discussed long term goals with patient regarding HD given hypotension. Midodrine was added on HD days. He tolerated HD on 6/12. He complained of right eye pain and hence opthalmology evaluation requested. He was sent in to the office for evaluation, he was diagnosed with significant sloughing corneal abrasion, local wound care and antibiotics were ordered. He was advised evaluation by corneal specialist.
87 yr old male with diabetes mellitus, ischemic cardiomyopathy, ESRD on HD was sent in from rehab for labored breathing. He was found to be hypoxic in rehab and required non rebreather mask. Diagnosed with pneumonia and was started on antibiotics on admission. Renal consulted for HD. Cardiology was consulted, given negative stress test a year prior, symptoms attributed to fluid overload, medical management was advised. Palliative care evaluation was requested. His respiratory status improved after HD, CXR improved. Wound care evaluation requested for sacral decubitus. Speech and swallow evaluation was requested, advised soft with nectar was advised. Noted to have episodes of hypotension. Renal discussed long term goals with patient regarding HD given hypotension. Midodrine was added on HD days. He tolerated HD on 6/12. He complained of right eye pain and hence opthalmology evaluation requested. He was sent in to the office for evaluation, he was diagnosed with significant sloughing corneal abrasion, local wound care and antibiotics were ordered. He was advised evaluation by corneal specialist.
87 yr old male with diabetes mellitus, ischemic cardiomyopathy, ESRD on HD was sent in from rehab for labored breathing. He was found to be hypoxic in rehab and required non rebreather mask. Diagnosed with pneumonia and was started on antibiotics on admission. Renal consulted for HD. Cardiology was consulted, given negative stress test a year prior, symptoms attributed to fluid overload, medical management was advised. Palliative care evaluation was requested. His respiratory status improved after HD, CXR improved. Wound care evaluation requested for sacral decubitus. Speech and swallow evaluation was requested, advised soft with nectar was advised. Noted to have episodes of hypotension. Renal discussed long term goals with patient regarding HD given hypotension. Midodrine was added on HD days. He tolerated HD on 6/12. He complained of right eye pain and hence opthalmology evaluation requested. He was sent in to the office for evaluation, he was diagnosed with significant sloughing corneal abrasion, local wound care and antibiotics were ordered. He was advised evaluation by corneal specialist. He was started on topical antibiotics and opthalmology follow up in 1 week.
87 yr old male with diabetes mellitus, ischemic cardiomyopathy, ESRD on HD was sent in from rehab for labored breathing. He was found to be hypoxic in rehab and required non rebreather mask. Diagnosed with pneumonia and was started on antibiotics on admission. Renal consulted for HD. Cardiology was consulted, given negative stress test a year prior, symptoms attributed to fluid overload, medical management was advised. Palliative care evaluation was requested. His respiratory status improved after HD, CXR improved. Wound care evaluation requested for sacral decubitus. Speech and swallow evaluation was requested, advised soft with nectar was advised. Noted to have episodes of hypotension. Renal discussed long term goals with patient regarding HD given hypotension. Midodrine was added on HD days. He tolerated HD on 6/12. He complained of right eye pain and hence opthalmology evaluation requested. He was sent in to the office for evaluation, he was diagnosed with significant sloughing corneal abrasion, local wound care and antibiotics were ordered. He was advised evaluation by corneal specialist. He was started on topical antibiotics and opthalmology follow up in 1 week. He had episodes of hypotension and hence Midodrine was adjusted.
87 yr old male with diabetes mellitus, ischemic cardiomyopathy, ESRD on HD was sent in from rehab for labored breathing. He was found to be hypoxic in rehab and required non rebreather mask. Diagnosed with pneumonia and was started on antibiotics on admission. Renal consulted for HD. Cardiology was consulted, given negative stress test a year prior, symptoms attributed to fluid overload, medical management was advised. Palliative care evaluation was requested. His respiratory status improved after HD, CXR improved. Wound care evaluation requested for sacral decubitus. Speech and swallow evaluation was requested, advised soft with nectar was advised. Noted to have episodes of hypotension. Renal discussed long term goals with patient regarding HD given hypotension. Midodrine was added on HD days. Palliative care follow up done, patient is DNR.
88 yo M with h/o DM, HTN, ischemic cardiomyopathy (EF 30-35%), ESRD (HD TTS) here with respiratory distress that is now improving s/p HD and on abx.
88 yo M with h/o DM, HTN, ischemic cardiomyopathy (EF 30-35%), ESRD (HD TTS) here with respiratory distress that is now improving s/p HD and on abx. awaiting palliative discussion as pt may be possible candidate for hospice care.
88 yo M with h/o DM, HTN, ischemic cardiomyopathy (EF 30-35%), ESRD (HD TTS) here with respiratory distress that is now improving s/p HD and on abx. awaiting palliative discussion as pt may be possible candidate for hospice care.
88 yo M with h/o DM, HTN, ischemic cardiomyopathy (EF 30-35%), ESRD (HD TTS) here with respiratory distress that is now improving s/p HD.
Anemia, CRF 5, CAD, Pneumonia.
CRF 5, Anemia, CAD
CRF 5, Anemia, Low platelets, pneumonia, CAD
CRF 5, CAD
DM with CRF 5, Anemia, CAD
DM with CRF 5, Anemia, CAD.
DM with CRF 5, CAD, Anemia, pneumonia
ESRD on HD on TTS schedule next HD candie  PNA on Abx  Anemia will check iron studies  AM labs
ESRD: HD today    Anemia:   - NATHAN at dialysis; cont Fe  - may need PRBCs    RO: phoslo
ESRD: HD tomorrow    Hypotension: better off BP meds  - midodrine on HD days    Anemia:   - NATHAN at dialysis; cont Fe    RO: phoslo
ESRD: HD tomorrow on MWF schedule    Hypotension: better off BP meds  - cont midodrine on HD days    Anemia:   - NATHAN at dialysis; cont Fe    RO: phoslo
ESRD: Next HD Mon on MWF schedule    Hypotension: better off BP meds  - cont midodrine on HD days    Anemia:   - NATHAN at dialysis; cont Fe    RO: cont phoslo
ESRD: Next HD candie/ Mon on MWF schedule  has been tolerating ok    Hypotension: better off BP meds  - cont midodrine on HD days    Anemia:   - NATHAN at dialysis; cont Fe    RO: cont phoslo
ESRD: Next HD candie/ tues on TTS schedule  has been tolerating ok occational low BP after HD    Hypotension: better off BP meds  - cont midodrine 10mg on HD days    Anemia: -cont NATHAN at dialysis; cont Fe    RO: cont phoslo
ESRD: cont HD as scheduled    Hypotension: better post cessation of meds  - midodrine on HD days    Anemia:   - NATHAN at dialysis; cont Fe    RO: phoslo
ESRD: cont HD as scheduled    Hypotension: better post cessation of meds  - midodrine on HD days    Anemia:   - NATHAN at dialysis; cont Fe  - may need PRBCs    RO: phoslo
ESRD: will attempt HD today  I d/w patient difficulties at HD due to low BP; he is going to consider his long term goals of care  - BP meds held  - add Midodrine    Anemia:   - NATHAN at dialysis; cont Fe  - may need PRBCs    RO: phoslo
R Lung Pneumonia-Antibiotics completed-Intermittent cough  ESRD on HD increasing lethargy-functional decline  Anxiety and depression  Sacral Decubiti-prolonged bedrest -malnourished
87yoM with Dementia  CKD with HD 3x weekly  Poof functional status-not able to get up on his own or eat without being fed  Poor nutritional status  Sacral Decubiti
Admitted with Pneumonia- IV ABX completed  Chronic Heart Failure-Hemodynamic Instability  ESRD dependent on HD-not tolerating well lately due to hypotension Postponing/cutting sessions short  Malnourished-Stage IV Decubiti from Prolonged bedrest  Depression-chronic illness prolonged hospitalization-"not getting better"
ESRD on HD  Weakness/SOB- Repeat CXR determine infiltrate vs Pulmonary Edema  Sacral Decubiti-Complete BR-nutritional status declining.
· Assessment		  86 yo M with h/o DM, HTN, ischemic cardiomyopathy (EF 30-35%), ESRD (HD TTS) here with respiratory distress that is now improving s/p HD and on abx.     Problem/Plan - 1:  ·  Problem: Pneumonia of right lung due to other aerobic Gram-negative bacteria,   Problem/Plan - 2:  ·  Problem: CHF (congestive heart failure).  Plan: Acute on chronic heart failure with reduced EF  c/w Coreg and lisinopril    ECHO pending  c/w Coreg and lisinopril  Cardiology Consult appreciatedProblem 3  Fluid overload- SOB  resolving.      Problem/Plan - 4  Problem: ESRD (end stage renal disease) on dialysis. Plan: CRF 5 on HD (T/Th/Sat)   Epogen  FeSO4 325mg PO QD  Ca acetate supplement  Anemia work up-- iron studies  Renal Consult appreciated.      Problem/Plan - 5  ·  Problem: Debility.  Plan: Per speech and swallow - unable to swallow any fluids.   Multi-organ failure  Palliative Care  PT consult - recommending NARINDER  Asked Nursing and PT to get patient OOB/CH  Wound consult-- sacral decubiti.     Needs Advance Directives- Spoke to patient briefly about end of life wishes, and asked him to speak to his wife about resusitation and how aggressive he  wants to be.Wife has MOLST Directive-told me she was speaking to family. Met daughter-in-law a few days earlier in hospitalization also discussed with her.    .
Pneumonia/ Respiratory Failure resolved  CHF controlled  Dysphagia-retested on limited diet  Weak and debilitated-complete care  Discharge planning
86 yo M with h/o DM, HTN, ischemic cardiomyopathy (EF 30-35%), ESRD (HD TTS) here with respiratory distress that is now improving s/p HD.

## 2017-06-19 NOTE — PROGRESS NOTE ADULT - PROBLEM SELECTOR PLAN 5
Continue Besivance as per opthalmology. Scheduled to be evaluated by corneal specialist today.
Continue Besivance, will need outpatient opthalmology follow up in 1 week.
Discussed with Dr Araya, advised Vigamox and Erythromycin topical with 1 week follow up.
Lipitor 40mg PO QHS
Per speech and swallow - unable to swallow any fluids - modified barium swallow done   PT consult - recommending NARINDER  Wound consult-- sacral decubiti  check TSH
Per speech and swallow - unable to swallow any fluids - modified barium swallow done   Wound consult-- sacral decubiti
Continue Besivance as per opthalmology. He is scheduled to follow up with corneal specialist for possible intervention tomorrow.
bring in food from home  Appetite catalyst
medical management
Nutritional supplement  Feed soft mechanical diet, wife tries to visit during mealtime to assist in feeding
wound care
Lipitor 40mg PO QHS

## 2017-06-19 NOTE — PROGRESS NOTE ADULT - PROBLEM SELECTOR PROBLEM 5
Hyperlipemia
Corneal abrasion
Debility
Debility
Hyperlipemia
AICD (automatic cardioverter/defibrillator) present
Acute on chronic congestive heart failure, unspecified congestive heart failure type
Anorexia
Corneal abrasion
Malnourished
Sacral decubitus ulcer, stage III
Hyperlipemia

## 2017-06-19 NOTE — PROGRESS NOTE ADULT - PROBLEM SELECTOR PROBLEM 4
Acute on chronic congestive heart failure, unspecified congestive heart failure type
Coronary artery disease involving autologous vein bypass graft
Diabetes
Diabetes
Hypertension
Acute on chronic congestive heart failure, unspecified congestive heart failure type
Anxiety and depression
Hypertension
Weakness generalized
Depression, unspecified depression type
ESRD (end stage renal disease) on dialysis
Malnourished
Coronary artery disease involving autologous vein bypass graft

## 2017-06-19 NOTE — PROGRESS NOTE ADULT - SUBJECTIVE AND OBJECTIVE BOX
NEPHROLOGY INTERVAL HPI/OVERNIGHT EVENTS:  pt comfortable when seen earlier  no acute distress    MEDICATIONS  (STANDING):  calcium acetate 667milliGRAM(s) Oral three times a day with meals  ferrous    sulfate 325milliGRAM(s) Oral daily  tamsulosin 0.4milliGRAM(s) Oral at bedtime  gabapentin 300milliGRAM(s) Oral three times a day  insulin lispro (HumaLOG) corrective regimen sliding scale  SubCutaneous three times a day before meals  dextrose 5%. 1000milliLiter(s) IV Continuous <Continuous>  dextrose 50% Injectable 12.5Gram(s) IV Push once  dextrose 50% Injectable 25Gram(s) IV Push once  dextrose 50% Injectable 25Gram(s) IV Push once  artificial  tears Solution 1Drop(s) Both EYES two times a day  heparin  Injectable 5000Unit(s) SubCutaneous every 12 hours  acetaminophen   Tablet. 650milliGRAM(s) Oral every 8 hours  polyethylene glycol 3350 17Gram(s) Oral at bedtime  iron sucrose IVPB 100milliGRAM(s) IV Intermittent <User Schedule>  midodrine 5milliGRAM(s) Oral <User Schedule>  epoetin una Injectable 43355Psyw(s) IV Push every other day    MEDICATIONS  (PRN):  dextrose Gel 1Dose(s) Oral once PRN Blood Glucose LESS THAN 70 milliGRAM(s)/deciliter  glucagon  Injectable 1milliGRAM(s) IntraMuscular once PRN Glucose LESS THAN 70 milligrams/deciliter  atropine 1% Ophthalmic Solution for SubLingual Use 2Drop(s) SubLingual every 4 hours PRN excessive secretions  mineral oil enema 133milliLiter(s) Rectal once PRN persistent constipation  dextrose Gel 1Dose(s) Oral once PRN Blood Glucose LESS THAN 70 milliGRAM(s)/deciliter  dextrose Gel 1Dose(s) Oral once PRN Blood Glucose LESS THAN 70 milliGRAM(s)/deciliter  HYDROmorphone  Injectable 0.5milliGRAM(s) IV Push every 4 hours PRN Severe Pain (7 - 10)  HYDROmorphone   Tablet 2milliGRAM(s) Oral every 4 hours PRN Moderate Pain (4 - 6)      Allergies    No Known Allergies          Vital Signs Last 24 Hrs  T(C): 36.7, Max: 37.2 (06-12 @ 00:17)  T(F): 98, Max: 99 (06-12 @ 00:17)  HR: 94 (79 - 94)  BP: 122/56 (87/41 - 122/56)  BP(mean): --  RR: 18 (18 - 18)  SpO2: 93% (93% - 100%)    PHYSICAL EXAM:  Awake/alert; NAD  chest Clear; catheter site clean  No JVD  No rub  abd soft, NT BS +  Tr edema    LABS:                        8.8    4.2   )-----------( 102      ( 12 Jun 2017 12:34 )             30.2                   RADIOLOGY & ADDITIONAL TESTS:
CHIEF COMPLAINT:  Patient is a 87y old  Male who presents with a chief complaint of Labored breathing. Feels better with HD.  [facility] to go back to [employer]. No events over night.     Allergies:  No Known Allergies    	  MEDICATIONS:  buMETAnide 0.5milliGRAM(s) Oral two times a day  carvedilol 3.125milliGRAM(s) Oral every 12 hours  tamsulosin 0.4milliGRAM(s) Oral at bedtime  midodrine 2.5milliGRAM(s) Oral every 8 hours  piperacillin/tazobactam IVPB. 2.25Gram(s) IV Intermittent every 8 hours  gabapentin 300milliGRAM(s) Oral three times a day  insulin lispro (HumaLOG) corrective regimen sliding scale  SubCutaneous three times a day before meals  dextrose Gel 1Dose(s) Oral once PRN  dextrose 50% Injectable 12.5Gram(s) IV Push once  dextrose 50% Injectable 25Gram(s) IV Push once  dextrose 50% Injectable 25Gram(s) IV Push once  glucagon  Injectable 1milliGRAM(s) IntraMuscular once PRN  calcium acetate 667milliGRAM(s) Oral three times a day with meals  ferrous    sulfate 325milliGRAM(s) Oral daily  dextrose 5%. 1000milliLiter(s) IV Continuous <Continuous>  artificial  tears Solution 1Drop(s) Both EYES two times a day  heparin  Injectable 5000Unit(s) SubCutaneous every 12 hours  iron sucrose IVPB 100milliGRAM(s) IV Intermittent every 48 hours    PHYSICAL EXAM:  T(C): 36.6, Max: 36.8 (05-30 @ 09:50)  HR: 94 (85 - 98)  BP: 125/63 (100/55 - 136/90)  RR: 16 (16 - 20)  SpO2: 100% (97% - 100%)  Wt(kg): --    I&O's Summary    I & Os for current day (as of 31 May 2017 09:19)  =============================================  IN: 800 ml / OUT: 1500 ml / NET: -700 ml    Appearance: Normal	  HEENT:   NC/AT  Eye: Pink Conjunctiva  Lungs: CTA B/L  CVS: RRR, Normal S1 and S2, No Edema, 3/6 sm lsb.   Pulses: Normal distal pulses.       LABS:	 	                        8.8    4.8   )-----------( 75       ( 31 May 2017 08:14 )             28.9     05-31    137  |  96<L>  |  41.0<H>  ----------------------------<  280<H>  4.4   |  23.0  |  2.63<H>    Ca    8.6      31 May 2017 08:14  Mg     2.0     05-31    TPro  7.3  /  Alb  3.3  /  TBili  0.6  /  DBili  x   /  AST  40<H>  /  ALT  31  /  AlkPhos  198<H>  05-31        ASSESSMENT/PLAN:
EMILY LITTLEJOHN    [id number]    87y      Male    INTERVAL HPI/OVERNIGHT EVENTS: No events on. S/p HD yesterday. States that he feels tired, but unable to further characterize. States that he wants to sleep.    Hospital course:  88 yo M with h/o DM, HTN, ischemic cardiomyopathy (EF 30-35%), ESRD (HD TTS) presents with labored breathing. Wife and son at bedside. Pt was sent in from [facility] rehab. Per EMS documentation, he was found unresponsive usinge accessory muscles to breathe. He was placed on a nonrebreather which improved his oxygenation to 98%. Pt somewhat uncooperative and unable to provide clear history. Per wife, pt has had a gradual deterioration over the last year. He has not been eating well, and has had minimal interaction. She states that he has intermittent moments of lucidity. Upon presentation to ED, pt was found to have labored breathing with significant volume overload. He had 2 consecutive sessions of hemodialysis with improvement of his respiratory distress. Repeat chest xray showed significant improvement of right opacities. Palliative care discussed with wife regarding advanced directives.         REVIEW OF SYSTEMS:    CONSTITUTIONAL: No fevers  CARDIOVASCULAR: No chest pain   GASTROINTESTINAL: No abdominal or epigastric pain       Vital Signs Last 24 Hrs  T(C): 37, Max: 37.7 (06-01 @ 15:00)  T(F): 98.6, Max: 99.8 (06-01 @ 15:00)  HR: 83 (83 - 92)  BP: 102/46 (102/46 - 115/52)  BP(mean): --  RR: 20 (18 - 20)  SpO2: 95% (93% - 99%) 1LNC    PHYSICAL EXAM:    GENERAL: NAD, frail, deconditioned, minimally interactive, improved respiratory status  HEENT: PERRL, +EOMI, MMM  CHEST/LUNG: Clear to percussion bilaterally  HEART: S1S2+, Regular rate and rhythm   ABDOMEN: Soft, Nontender, Nondistended; Bowel sounds present  EXTREMITIES:  2+ Peripheral Pulses, no edema    LABS:                        7.9    5.2   )-----------( 83       ( 02 Jun 2017 08:08 )             26.0     06-02    142  |  98  |  31.0<H>  ----------------------------<  119<H>  3.5   |  29.0  |  3.16<H>    Ca    8.5<L>      02 Jun 2017 08:08              MEDICATIONS  (STANDING):  piperacillin/tazobactam IVPB. 2.25Gram(s) IV Intermittent every 8 hours  calcium acetate 667milliGRAM(s) Oral three times a day with meals  carvedilol 3.125milliGRAM(s) Oral every 12 hours  ferrous    sulfate 325milliGRAM(s) Oral daily  tamsulosin 0.4milliGRAM(s) Oral at bedtime  gabapentin 300milliGRAM(s) Oral three times a day  insulin lispro (HumaLOG) corrective regimen sliding scale  SubCutaneous three times a day before meals  dextrose 5%. 1000milliLiter(s) IV Continuous <Continuous>  dextrose 50% Injectable 12.5Gram(s) IV Push once  dextrose 50% Injectable 25Gram(s) IV Push once  dextrose 50% Injectable 25Gram(s) IV Push once  artificial  tears Solution 1Drop(s) Both EYES two times a day  heparin  Injectable 5000Unit(s) SubCutaneous every 12 hours  iron sucrose IVPB 100milliGRAM(s) IV Intermittent every 48 hours  lisinopril 2.5milliGRAM(s) Oral daily  insulin glargine Injectable (LANTUS) 10Unit(s) SubCutaneous at bedtime    MEDICATIONS  (PRN):  dextrose Gel 1Dose(s) Oral once PRN Blood Glucose LESS THAN 70 milliGRAM(s)/deciliter  glucagon  Injectable 1milliGRAM(s) IntraMuscular once PRN Glucose LESS THAN 70 milligrams/deciliter  HYDROmorphone  Injectable 0.5milliGRAM(s) IV Push every 4 hours PRN dyspnea  atropine 1% Ophthalmic Solution for SubLingual Use 2Drop(s) SubLingual every 4 hours PRN excessive secretions      RADIOLOGY & ADDITIONAL TESTS:
EMILY LITTLEJOHN    [id number]    87y      Male    INTERVAL HPI/OVERNIGHT EVENTS: No events on. Just finished dialysis without incident. Reports he feels "ok".     Hospital course:  88 yo M with h/o DM, HTN, ischemic cardiomyopathy (EF 30-35%), ESRD (HD TTS) presents with labored breathing. Wife and son at bedside. Pt was sent in from [facility] rehab. Per EMS documentation, he was found unresponsive usinge accessory muscles to breathe. He was placed on a nonrebreather which improved his oxygenation to 98%. Pt somewhat uncooperative and unable to provide clear history. Per wife, pt has had a gradual deterioration over the last year. He has not been eating well, and has had minimal interaction. She states that he has intermittent moments of lucidity. Upon presentation to ED, pt was found to have labored breathing with significant volume overload. He had 2 consecutive sessions of hemodialysis with improvement of his respiratory distress. Repeat chest xray showed significant improvement of right opacities. Palliative care discussed with wife regarding advanced directives.       REVIEW OF SYSTEMS:    CONSTITUTIONAL: No fever, weight loss, or fatigue  RESPIRATORY: No shortness of breath    Vital Signs Last 24 Hrs  T(C): 36.7, Max: 37.2 (06-01 @ 00:15)  T(F): 98, Max: 98.9 (06-01 @ 00:15)  HR: 83 (79 - 89)  BP: 121/51 (98/53 - 124/58)  BP(mean): --  RR: 19 (12 - 19)  SpO2: 100% (98% - 100%)    PHYSICAL EXAM:    GENERAL: NAD, speaking in full sentences  HEENT: PERRL, +EOMI, MMM  CHEST/LUNG: bibasilar fine rales  HEART: S1S2+, Regular rate and rhythm  ABDOMEN: Soft, Nontender, Nondistended; Bowel sounds present    LABS:                        7.5    5.0   )-----------( 77       ( 01 Jun 2017 06:56 )             24.8     06-01    139  |  99  |  51.0<H>  ----------------------------<  165<H>  4.2   |  29.0  |  3.32<H>    Ca    8.6      01 Jun 2017 06:56  Mg     2.0     05-31    TPro  7.3  /  Alb  3.3  /  TBili  0.6  /  DBili  x   /  AST  40<H>  /  ALT  31  /  AlkPhos  198<H>  05-31    PT/INR - ( 31 May 2017 08:14 )   PT: 14.9 sec;   INR: 1.35 ratio                 MEDICATIONS  (STANDING):  piperacillin/tazobactam IVPB. 2.25Gram(s) IV Intermittent every 8 hours  calcium acetate 667milliGRAM(s) Oral three times a day with meals  carvedilol 3.125milliGRAM(s) Oral every 12 hours  ferrous    sulfate 325milliGRAM(s) Oral daily  tamsulosin 0.4milliGRAM(s) Oral at bedtime  gabapentin 300milliGRAM(s) Oral three times a day  insulin lispro (HumaLOG) corrective regimen sliding scale  SubCutaneous three times a day before meals  dextrose 5%. 1000milliLiter(s) IV Continuous <Continuous>  dextrose 50% Injectable 12.5Gram(s) IV Push once  dextrose 50% Injectable 25Gram(s) IV Push once  dextrose 50% Injectable 25Gram(s) IV Push once  midodrine 2.5milliGRAM(s) Oral every 8 hours  artificial  tears Solution 1Drop(s) Both EYES two times a day  heparin  Injectable 5000Unit(s) SubCutaneous every 12 hours  iron sucrose IVPB 100milliGRAM(s) IV Intermittent every 48 hours  lisinopril 2.5milliGRAM(s) Oral daily  insulin glargine Injectable (LANTUS) 10Unit(s) SubCutaneous at bedtime    MEDICATIONS  (PRN):  dextrose Gel 1Dose(s) Oral once PRN Blood Glucose LESS THAN 70 milliGRAM(s)/deciliter  glucagon  Injectable 1milliGRAM(s) IntraMuscular once PRN Glucose LESS THAN 70 milligrams/deciliter  HYDROmorphone  Injectable 0.5milliGRAM(s) IV Push every 4 hours PRN dyspnea  atropine 1% Ophthalmic Solution for SubLingual Use 2Drop(s) SubLingual every 4 hours PRN excessive secretions      RADIOLOGY & ADDITIONAL TESTS:
EMILY LITTLEJOHN    [id number]    87y      Male    INTERVAL HPI/OVERNIGHT EVENTS: No events on. Wife (Maria Esther) at bedside. Pt able to recognize wife and me. "What's up honey?" Denies any pain. States that he has soreness on his buttocks.    Hospital course:  86 yo M with h/o DM, HTN, ischemic cardiomyopathy (EF 30-35%), ESRD (HD TTS) presents with labored breathing. Wife and son at bedside. Pt was sent in from [facility] rehab. Per EMS documentation, he was found unresponsive usinge accessory muscles to breathe. He was placed on a nonrebreather which improved his oxygenation to 98%. Pt somewhat uncooperative and unable to provide clear history. Per wife, pt has had a gradual deterioration over the last year. He has not been eating well, and has had minimal interaction. She states that he has intermittent moments of lucidity. Upon presentation to ED, pt was found to have labored breathing with significant volume overload. He had 2 consecutive sessions of hemodialysis with improvement of his respiratory distress. Repeat chest xray showed significant improvement of right opacities. Palliative care discussed with wife regarding advanced directives.       REVIEW OF SYSTEMS:    CONSTITUTIONAL: No fever  RESPIRATORY: No cough, wheezing, hemoptysis; No shortness of breath  CARDIOVASCULAR: No chest pain, palpitations      Vital Signs Last 24 Hrs  T(C): 36.3, Max: 36.7 (05-30 @ 23:35)  T(F): 97.4, Max: 98 (05-30 @ 23:35)  HR: 88 (86 - 98)  BP: 98/56 (98/56 - 136/90)  BP(mean): --  RR: 15 (15 - 18)  SpO2: 100% (97% - 100%) 2L NC    PHYSICAL EXAM:    GENERAL: mildly labored breathing, nontoxic appearing, continually shifts around for comfort  HEENT: MMM  CHEST/LUNG: improving right sided rales  HEART: S1S2+, Regular rate and rhythm   ABDOMEN: Soft, Nontender, Nondistended; Bowel sounds present     LABS:                        8.8    4.8   )-----------( 75       ( 31 May 2017 08:14 )             28.9     05-31    137  |  96<L>  |  41.0<H>  ----------------------------<  280<H>  4.4   |  23.0  |  2.63<H>    Ca    8.6      31 May 2017 08:14  Mg     2.0     05-31    TPro  7.3  /  Alb  3.3  /  TBili  0.6  /  DBili  x   /  AST  40<H>  /  ALT  31  /  AlkPhos  198<H>  05-31    PT/INR - ( 31 May 2017 08:14 )   PT: 14.9 sec;   INR: 1.35 ratio         PTT - ( 29 May 2017 14:42 )  PTT:32.8 sec        MEDICATIONS  (STANDING):  piperacillin/tazobactam IVPB. 2.25Gram(s) IV Intermittent every 8 hours  buMETAnide 0.5milliGRAM(s) Oral two times a day  calcium acetate 667milliGRAM(s) Oral three times a day with meals  carvedilol 3.125milliGRAM(s) Oral every 12 hours  ferrous    sulfate 325milliGRAM(s) Oral daily  tamsulosin 0.4milliGRAM(s) Oral at bedtime  gabapentin 300milliGRAM(s) Oral three times a day  insulin lispro (HumaLOG) corrective regimen sliding scale  SubCutaneous three times a day before meals  dextrose 5%. 1000milliLiter(s) IV Continuous <Continuous>  dextrose 50% Injectable 12.5Gram(s) IV Push once  dextrose 50% Injectable 25Gram(s) IV Push once  dextrose 50% Injectable 25Gram(s) IV Push once  midodrine 2.5milliGRAM(s) Oral every 8 hours  artificial  tears Solution 1Drop(s) Both EYES two times a day  heparin  Injectable 5000Unit(s) SubCutaneous every 12 hours  iron sucrose IVPB 100milliGRAM(s) IV Intermittent every 48 hours  lisinopril 2.5milliGRAM(s) Oral daily  insulin glargine Injectable (LANTUS) 10Unit(s) SubCutaneous at bedtime    MEDICATIONS  (PRN):  dextrose Gel 1Dose(s) Oral once PRN Blood Glucose LESS THAN 70 milliGRAM(s)/deciliter  glucagon  Injectable 1milliGRAM(s) IntraMuscular once PRN Glucose LESS THAN 70 milligrams/deciliter  HYDROmorphone  Injectable 0.5milliGRAM(s) IV Push every 4 hours PRN dyspnea  atropine 1% Ophthalmic Solution for SubLingual Use 2Drop(s) SubLingual every 4 hours PRN excessive secretions      RADIOLOGY & ADDITIONAL TESTS:
EMILY LITTLEJOHN    [id number]    87y      Male    INTERVAL HPI/OVERNIGHT EVENTS: No events on. Per RN, pt ate all of his breakfast - needed assistance.     Hospital course:  86 yo M with h/o DM, HTN, ischemic cardiomyopathy (EF 30-35%), ESRD (HD TTS) presents with labored breathing. Wife and son at bedside. [facility] Per EMS documentation, he was found unresponsive usinge accessory muscles to breathe. He was placed on a nonrebreather which improved his oxygenation to 98%. Pt somewhat uncooperative and unable to provide clear history. Per wife, pt has had a gradual deterioration over the last year. He has not been eating well, and has had minimal interaction. She states that he has intermittent moments of lucidity. Upon presentation to ED, pt was found to have labored breathing with significant volume overload. He had 2 consecutive sessions of hemodialysis with improvement of his respiratory distress. Repeat chest xray showed significant improvement of right opacities. Palliative care discussed with wife regarding advanced directives.         REVIEW OF SYSTEMS:    CONSTITUTIONAL: No fever   RESPIRATORY: No cough     Vital Signs Last 24 Hrs  T(C): 36.8, Max: 37.2 (06-02 @ 15:00)  T(F): 98.2, Max: 98.9 (06-02 @ 15:00)  HR: 69 (69 - 85)  BP: 100/60 (76/37 - 141/72)  BP(mean): --  RR: 18 (16 - 19)  SpO2: 99% (93% - 99%) 2LNC    PHYSICAL EXAM:    GENERAL: NAD   HEENT: PERRL, +EOMI, MMM  CHEST/LUNG: Clear to percussion bilaterally   HEART: S1S2+, Regular rate and rhythm   ABDOMEN: Soft, Nontender, Nondistended; Bowel sounds present    LABS:                        8.0    4.3   )-----------( 92       ( 03 Jun 2017 10:13 )             26.8     06-03    143  |  100  |  43.0<H>  ----------------------------<  144<H>  4.0   |  29.0  |  4.83<H>    Ca    8.8      03 Jun 2017 10:13              MEDICATIONS  (STANDING):  piperacillin/tazobactam IVPB. 2.25Gram(s) IV Intermittent every 8 hours  calcium acetate 667milliGRAM(s) Oral three times a day with meals  carvedilol 3.125milliGRAM(s) Oral every 12 hours  ferrous    sulfate 325milliGRAM(s) Oral daily  tamsulosin 0.4milliGRAM(s) Oral at bedtime  gabapentin 300milliGRAM(s) Oral three times a day  insulin lispro (HumaLOG) corrective regimen sliding scale  SubCutaneous three times a day before meals  dextrose 5%. 1000milliLiter(s) IV Continuous <Continuous>  dextrose 50% Injectable 12.5Gram(s) IV Push once  dextrose 50% Injectable 25Gram(s) IV Push once  dextrose 50% Injectable 25Gram(s) IV Push once  artificial  tears Solution 1Drop(s) Both EYES two times a day  heparin  Injectable 5000Unit(s) SubCutaneous every 12 hours  iron sucrose IVPB 100milliGRAM(s) IV Intermittent every 48 hours  lisinopril 2.5milliGRAM(s) Oral daily  bisacodyl Suppository 10milliGRAM(s) Rectal once  acetaminophen   Tablet. 650milliGRAM(s) Oral every 8 hours  polyethylene glycol 3350 17Gram(s) Oral at bedtime  insulin glargine Injectable (LANTUS) 5Unit(s) SubCutaneous at bedtime    MEDICATIONS  (PRN):  dextrose Gel 1Dose(s) Oral once PRN Blood Glucose LESS THAN 70 milliGRAM(s)/deciliter  glucagon  Injectable 1milliGRAM(s) IntraMuscular once PRN Glucose LESS THAN 70 milligrams/deciliter  HYDROmorphone  Injectable 0.5milliGRAM(s) IV Push every 4 hours PRN dyspnea  atropine 1% Ophthalmic Solution for SubLingual Use 2Drop(s) SubLingual every 4 hours PRN excessive secretions  mineral oil enema 133milliLiter(s) Rectal once PRN persistent constipation  dextrose Gel 1Dose(s) Oral once PRN Blood Glucose LESS THAN 70 milliGRAM(s)/deciliter  dextrose Gel 1Dose(s) Oral once PRN Blood Glucose LESS THAN 70 milliGRAM(s)/deciliter      RADIOLOGY & ADDITIONAL TESTS:
EMILY LITTLEJOHN    [id number]    87y      Male    INTERVAL HPI/OVERNIGHT EVENTS: No events on. "I feel lonely today. My wife will be coming in later."     Hospital course:  88 yo M with h/o DM, HTN, ischemic cardiomyopathy (EF 30-35%), ESRD (HD TTS) presents with labored breathing. Wife and son at bedside. Pt was sent in from [facility] rehab. Per EMS documentation, he was found unresponsive usinge accessory muscles to breathe. He was placed on a nonrebreather which improved his oxygenation to 98%. Pt somewhat uncooperative and unable to provide clear history. Per wife, pt has had a gradual deterioration over the last year. He has not been eating well, and has had minimal interaction. She states that he has intermittent moments of lucidity. Upon presentation to ED, pt was found to have labored breathing with significant volume overload. He had 2 consecutive sessions of hemodialysis with improvement of his respiratory distress. Repeat chest xray showed significant improvement of right opacities. Palliative care discussed with wife regarding advanced directives.     REVIEW OF SYSTEMS:    CONSTITUTIONAL: No fevers  RESPIRATORY: No shortness of breath  GASTROINTESTINAL: No abdominal or epigastric pain. No nausea, vomiting    Vital Signs Last 24 Hrs  T(C): 37.1, Max: 37.2 (06-03 @ 23:59)  T(F): 98.7, Max: 98.9 (06-03 @ 23:59)  HR: 82 (4 - 92)  BP: 100/70 (96/64 - 114/44)  BP(mean): --  RR: 18 (18 - 18)  SpO2: 97% (97% - 100%) 2LNC    PHYSICAL EXAM:    GENERAL: nontoxic appearing, comfortable, mentating well today  HEENT: MMM  CHEST/LUNG: Clear to percussion bilaterally   HEART: S1S2+, Regular rate and rhythm   ABDOMEN: Soft, Nontender, Nondistended; Bowel sounds present    LABS:                        8.0    4.3   )-----------( 92       ( 03 Jun 2017 10:13 )             26.8     06-03    143  |  100  |  43.0<H>  ----------------------------<  144<H>  4.0   |  29.0  |  4.83<H>    Ca    8.8      03 Jun 2017 10:13  Phos  4.1     06-03              MEDICATIONS  (STANDING):  piperacillin/tazobactam IVPB. 2.25Gram(s) IV Intermittent every 8 hours  calcium acetate 667milliGRAM(s) Oral three times a day with meals  carvedilol 3.125milliGRAM(s) Oral every 12 hours  ferrous    sulfate 325milliGRAM(s) Oral daily  tamsulosin 0.4milliGRAM(s) Oral at bedtime  gabapentin 300milliGRAM(s) Oral three times a day  insulin lispro (HumaLOG) corrective regimen sliding scale  SubCutaneous three times a day before meals  dextrose 5%. 1000milliLiter(s) IV Continuous <Continuous>  dextrose 50% Injectable 12.5Gram(s) IV Push once  dextrose 50% Injectable 25Gram(s) IV Push once  dextrose 50% Injectable 25Gram(s) IV Push once  artificial  tears Solution 1Drop(s) Both EYES two times a day  heparin  Injectable 5000Unit(s) SubCutaneous every 12 hours  iron sucrose IVPB 100milliGRAM(s) IV Intermittent every 48 hours  lisinopril 2.5milliGRAM(s) Oral daily  bisacodyl Suppository 10milliGRAM(s) Rectal once  acetaminophen   Tablet. 650milliGRAM(s) Oral every 8 hours  polyethylene glycol 3350 17Gram(s) Oral at bedtime  insulin glargine Injectable (LANTUS) 5Unit(s) SubCutaneous at bedtime  epoetin una Injectable 52340Jout(s) IV Push <User Schedule>    MEDICATIONS  (PRN):  dextrose Gel 1Dose(s) Oral once PRN Blood Glucose LESS THAN 70 milliGRAM(s)/deciliter  glucagon  Injectable 1milliGRAM(s) IntraMuscular once PRN Glucose LESS THAN 70 milligrams/deciliter  HYDROmorphone  Injectable 0.5milliGRAM(s) IV Push every 4 hours PRN dyspnea  atropine 1% Ophthalmic Solution for SubLingual Use 2Drop(s) SubLingual every 4 hours PRN excessive secretions  mineral oil enema 133milliLiter(s) Rectal once PRN persistent constipation  dextrose Gel 1Dose(s) Oral once PRN Blood Glucose LESS THAN 70 milliGRAM(s)/deciliter  dextrose Gel 1Dose(s) Oral once PRN Blood Glucose LESS THAN 70 milliGRAM(s)/deciliter      RADIOLOGY & ADDITIONAL TESTS:
HEALTH ISSUES - PROBLEM Dx:  HPI:  86 yo M with h/o DM, HTN, ischemic cardiomyopathy (EF 30-35%), ESRD (HD TTS) presents with labored breathing. Wife and son at bedside. Pt was sent in from [facility] rehab. Per EMS documentation, he was found unresponsive usinge accessory muscles to breathe. He was placed on a nonrebreather which improved his oxygenation to 98%. Pt somewhat uncooperative and unable to provide clear history.   Per wife, pt has had a gradual deterioration over the last year. He has not been eating well, and has had minimal interaction. She states that he has intermittent moments of lucidity. (29 May 2017 15:57)    NOW  ESRD on HD, debility, corneal abrasion, hypotension    INTERVAL HPI/ OVERNIGHT EVENTS:  d/c cancelled yest as his Bp drops low post HD. plan to d/c monday. reg schedule TTS. no other issues  denies chest pain, nausea, vomits, cough, SOB, fever, HA    MEDICATIONS  (STANDING):  calcium acetate 667milliGRAM(s) Oral three times a day with meals  ferrous    sulfate 325milliGRAM(s) Oral daily  tamsulosin 0.4milliGRAM(s) Oral at bedtime  gabapentin 300milliGRAM(s) Oral three times a day  insulin lispro (HumaLOG) corrective regimen sliding scale  SubCutaneous three times a day before meals  dextrose 5%. 1000milliLiter(s) IV Continuous <Continuous>  dextrose 50% Injectable 12.5Gram(s) IV Push once  dextrose 50% Injectable 25Gram(s) IV Push once  dextrose 50% Injectable 25Gram(s) IV Push once  artificial  tears Solution 1Drop(s) Both EYES two times a day  heparin  Injectable 5000Unit(s) SubCutaneous every 12 hours  acetaminophen   Tablet. 650milliGRAM(s) Oral every 8 hours  polyethylene glycol 3350 17Gram(s) Oral at bedtime  epoetin una Injectable 20722Hxhc(s) IV Push <User Schedule>  freetext medication  - 1Drop(s) Right EYE four times a day  midodrine 10milliGRAM(s) Oral <User Schedule>    MEDICATIONS  (PRN):  dextrose Gel 1Dose(s) Oral once PRN Blood Glucose LESS THAN 70 milliGRAM(s)/deciliter  glucagon  Injectable 1milliGRAM(s) IntraMuscular once PRN Glucose LESS THAN 70 milligrams/deciliter  atropine 1% Ophthalmic Solution for SubLingual Use 2Drop(s) SubLingual every 4 hours PRN excessive secretions  mineral oil enema 133milliLiter(s) Rectal once PRN persistent constipation  dextrose Gel 1Dose(s) Oral once PRN Blood Glucose LESS THAN 70 milliGRAM(s)/deciliter  dextrose Gel 1Dose(s) Oral once PRN Blood Glucose LESS THAN 70 milliGRAM(s)/deciliter      Allergies    No Known Allergies    Intolerances        Vital Signs Last 24 Hrs  T(C): 36.3, Max: 37.2 (06-18 @ 00:06)  T(F): 97.4, Max: 98.9 (06-18 @ 00:06)  HR: 101 (88 - 101)  BP: 100/62 (88/46 - 118/68)  BP(mean): --  RR: 18 (18 - 18)  SpO2: 91% (91% - 94%)    ACCUCHECKS    PHYSICAL EXAM-  GENERAL: well-groomed, well-developed  HEAD:  Atraumatic, Normocephalic  EYES: conjunctiva and sclera clear  ENMT: No tonsillar erythema, exudates, or enlargement; Moist mucous membranes, Good dentition, No lesions  NECK: Supple, No JVD, Normal thyroid  NERVOUS SYSTEM:  Alert & Oriented X 2, moving all extremities  CHEST/LUNG: Clear to percussion bilaterally; No rales, rhonchi, wheezing, or rubs  HEART: Regular rate and rhythm; No murmurs, rubs, or gallops  ABDOMEN: Soft, Nontender, Nondistended; Bowel sounds present  EXTREMITIES:  2+ Peripheral Pulses, No clubbing, cyanosis, or edema  LYMPH: No lymphadenopathy noted  SKIN: coccyx stg 3 and rt heel DTI     LABS:                        9.4    5.6   )-----------( 95       ( 17 Jun 2017 08:15 )             31.1     06-17    138  |  98  |  47.0<H>  ----------------------------<  104  5.1   |  25.0  |  4.92<H>    Ca    8.7      17 Jun 2017 08:15  Phos  2.8     06-17          RADIOLOGY & ADDITIONAL TESTS:    Assessment and Plan  DVT Prophylaxis    Discussed with: Patient, family, RN, CM, Consultants  Plan of care/ Discharge planning discussed.    Visit Time:
HEALTH ISSUES - PROBLEM Dx:  HPI:  86 yo M with h/o DM, HTN, ischemic cardiomyopathy (EF 30-35%), ESRD (HD TTS) presents with labored breathing. Wife and son at bedside. Pt was sent in from [facility] rehab. Per EMS documentation, he was found unresponsive usinge accessory muscles to breathe. He was placed on a nonrebreather which improved his oxygenation to 98%. Pt somewhat uncooperative and unable to provide clear history.   Per wife, pt has had a gradual deterioration over the last year. He has not been eating well, and has had minimal interaction. She states that he has intermittent moments of lucidity. (29 May 2017 15:57)    NOW  ESRD on HD, debility, corneal abrasion, hypotension    INTERVAL HPI/ OVERNIGHT EVENTS:  d/c cancelled yest as his Bp drops low post HD. plan to d/c today. reg schedule TTS. no other issues  denies chest pain, nausea, vomits, cough, SOB, fever, HA    MEDICATIONS  (STANDING):  calcium acetate 667milliGRAM(s) Oral three times a day with meals  ferrous    sulfate 325milliGRAM(s) Oral daily  tamsulosin 0.4milliGRAM(s) Oral at bedtime  gabapentin 300milliGRAM(s) Oral three times a day  insulin lispro (HumaLOG) corrective regimen sliding scale  SubCutaneous three times a day before meals  dextrose 5%. 1000milliLiter(s) IV Continuous <Continuous>  dextrose 50% Injectable 12.5Gram(s) IV Push once  dextrose 50% Injectable 25Gram(s) IV Push once  dextrose 50% Injectable 25Gram(s) IV Push once  artificial  tears Solution 1Drop(s) Both EYES two times a day  acetaminophen   Tablet. 650milliGRAM(s) Oral every 8 hours  polyethylene glycol 3350 17Gram(s) Oral at bedtime  epoetin una Injectable [id number](s) IV Push <User Schedule>  freetext medication  - 1Drop(s) Right EYE four times a day  midodrine 10milliGRAM(s) Oral <User Schedule>  heparin  Injectable 5000Unit(s) SubCutaneous every 12 hours    MEDICATIONS  (PRN):  dextrose Gel 1Dose(s) Oral once PRN Blood Glucose LESS THAN 70 milliGRAM(s)/deciliter  glucagon  Injectable 1milliGRAM(s) IntraMuscular once PRN Glucose LESS THAN 70 milligrams/deciliter  atropine 1% Ophthalmic Solution for SubLingual Use 2Drop(s) SubLingual every 4 hours PRN excessive secretions  mineral oil enema 133milliLiter(s) Rectal once PRN persistent constipation  dextrose Gel 1Dose(s) Oral once PRN Blood Glucose LESS THAN 70 milliGRAM(s)/deciliter  dextrose Gel 1Dose(s) Oral once PRN Blood Glucose LESS THAN 70 milliGRAM(s)/deciliter      Allergies    No Known Allergies    Intolerances        Vital Signs Last 24 Hrs  T(C): 37, Max: 37.1 (06-18 @ 17:55)  T(F): 98.6, Max: 98.8 (06-18 @ 17:55)  HR: 92 (87 - 92)  BP: 105/55 (92/53 - 111/64)  BP(mean): --  RR: 20 (18 - 20)  SpO2: 99% (98% - 100%)    ACCUCHECKS    PHYSICAL EXAM-  GENERAL: well-groomed, well-developed  HEAD:  Atraumatic, Normocephalic  EYES: conjunctiva and sclera clear  ENMT: No tonsillar erythema, exudates, or enlargement; Moist mucous membranes, Good dentition, No lesions  NECK: Supple, No JVD, Normal thyroid  NERVOUS SYSTEM:  Alert & Oriented X 2, moving all extremities  CHEST/LUNG: Clear to percussion bilaterally; No rales, rhonchi, wheezing, or rubs  HEART: Regular rate and rhythm; No murmurs, rubs, or gallops  ABDOMEN: Soft, Nontender, Nondistended; Bowel sounds present  EXTREMITIES:  2+ Peripheral Pulses, No clubbing, cyanosis, or edema  LYMPH: No lymphadenopathy noted  SKIN: coccyx stg 3 and rt heel DTI       LABS:    RADIOLOGY & ADDITIONAL TESTS:    Assessment and Plan  DVT Prophylaxis    Discussed with: Patient, family, RN, CM, Consultants  Plan of care/ Discharge planning discussed.    Visit Time:
INTERVAL HPI/OVERNIGHT EVENTS:    CC: ESRD on HD, debility    No overnight events, denies complaints. Spoke to patient regarding HD, states he would want to pursue HD Monday thru Friday    Vital Signs Last 24 Hrs  T(C): 36.7, Max: 37.2 (06-12 @ 00:17)  T(F): 98, Max: 99 (06-12 @ 00:17)  HR: 94 (79 - 94)  BP: 122/56 (87/41 - 122/56)  BP(mean): --  RR: 18 (18 - 18)  SpO2: 93% (93% - 100%)    PHYSICAL EXAM:    GENERAL: Not in distress, more alert today  CHEST/LUNG: b/l air entry  HEART: Regular   ABDOMEN: Soft, BS+  EXTREMITIES:  No edema    MEDICATIONS  (STANDING):  calcium acetate 667milliGRAM(s) Oral three times a day with meals  ferrous    sulfate 325milliGRAM(s) Oral daily  tamsulosin 0.4milliGRAM(s) Oral at bedtime  gabapentin 300milliGRAM(s) Oral three times a day  insulin lispro (HumaLOG) corrective regimen sliding scale  SubCutaneous three times a day before meals  dextrose 5%. 1000milliLiter(s) IV Continuous <Continuous>  dextrose 50% Injectable 12.5Gram(s) IV Push once  dextrose 50% Injectable 25Gram(s) IV Push once  dextrose 50% Injectable 25Gram(s) IV Push once  artificial  tears Solution 1Drop(s) Both EYES two times a day  heparin  Injectable 5000Unit(s) SubCutaneous every 12 hours  acetaminophen   Tablet. 650milliGRAM(s) Oral every 8 hours  polyethylene glycol 3350 17Gram(s) Oral at bedtime  iron sucrose IVPB 100milliGRAM(s) IV Intermittent <User Schedule>  midodrine 5milliGRAM(s) Oral <User Schedule>  epoetin una Injectable 19700Azsi(s) IV Push every other day    MEDICATIONS  (PRN):  dextrose Gel 1Dose(s) Oral once PRN Blood Glucose LESS THAN 70 milliGRAM(s)/deciliter  glucagon  Injectable 1milliGRAM(s) IntraMuscular once PRN Glucose LESS THAN 70 milligrams/deciliter  atropine 1% Ophthalmic Solution for SubLingual Use 2Drop(s) SubLingual every 4 hours PRN excessive secretions  mineral oil enema 133milliLiter(s) Rectal once PRN persistent constipation  dextrose Gel 1Dose(s) Oral once PRN Blood Glucose LESS THAN 70 milliGRAM(s)/deciliter  dextrose Gel 1Dose(s) Oral once PRN Blood Glucose LESS THAN 70 milliGRAM(s)/deciliter  HYDROmorphone  Injectable 0.5milliGRAM(s) IV Push every 4 hours PRN Severe Pain (7 - 10)  HYDROmorphone   Tablet 2milliGRAM(s) Oral every 4 hours PRN Moderate Pain (4 - 6)      Allergies    No Known Allergies    Intolerances          LABS:                          8.8    4.2   )-----------( 102      ( 12 Jun 2017 12:34 )             30.2                 RADIOLOGY & ADDITIONAL TESTS:
INTERVAL HPI/OVERNIGHT EVENTS:    CC: ESRD on HD, debility, corneal abrasion, hypotension    No overnight events, denies complaints, no eye pain at this time.    Vital Signs Last 24 Hrs  T(C): 37.1, Max: 37.1 (06-16 @ 07:50)  T(F): 98.8, Max: 98.8 (06-16 @ 07:50)  HR: 87 (87 - 101)  BP: 88/56 (74/36 - 151/81)  BP(mean): --  RR: 18 (18 - 20)  SpO2: 99% (98% - 99%)    PHYSICAL EXAM:    GENERAL: Not in distress, alert  CHEST/LUNG: b/l air entry  HEART: Regular   ABDOMEN: Soft, BS+  EXTREMITIES:  No edema    MEDICATIONS  (STANDING):  calcium acetate 667milliGRAM(s) Oral three times a day with meals  ferrous    sulfate 325milliGRAM(s) Oral daily  tamsulosin 0.4milliGRAM(s) Oral at bedtime  gabapentin 300milliGRAM(s) Oral three times a day  insulin lispro (HumaLOG) corrective regimen sliding scale  SubCutaneous three times a day before meals  dextrose 5%. 1000milliLiter(s) IV Continuous <Continuous>  dextrose 50% Injectable 12.5Gram(s) IV Push once  dextrose 50% Injectable 25Gram(s) IV Push once  dextrose 50% Injectable 25Gram(s) IV Push once  artificial  tears Solution 1Drop(s) Both EYES two times a day  heparin  Injectable 5000Unit(s) SubCutaneous every 12 hours  acetaminophen   Tablet. 650milliGRAM(s) Oral every 8 hours  polyethylene glycol 3350 17Gram(s) Oral at bedtime  midodrine 5milliGRAM(s) Oral <User Schedule>  epoetin una Injectable 92515Tqoz(s) IV Push <User Schedule>  freetext medication  - 1Drop(s) Right EYE four times a day    MEDICATIONS  (PRN):  dextrose Gel 1Dose(s) Oral once PRN Blood Glucose LESS THAN 70 milliGRAM(s)/deciliter  glucagon  Injectable 1milliGRAM(s) IntraMuscular once PRN Glucose LESS THAN 70 milligrams/deciliter  atropine 1% Ophthalmic Solution for SubLingual Use 2Drop(s) SubLingual every 4 hours PRN excessive secretions  mineral oil enema 133milliLiter(s) Rectal once PRN persistent constipation  dextrose Gel 1Dose(s) Oral once PRN Blood Glucose LESS THAN 70 milliGRAM(s)/deciliter  dextrose Gel 1Dose(s) Oral once PRN Blood Glucose LESS THAN 70 milliGRAM(s)/deciliter      Allergies    No Known Allergies    Intolerances          LABS:                  RADIOLOGY & ADDITIONAL TESTS:
INTERVAL HPI/OVERNIGHT EVENTS:    CC: ESRD on HD, debility, s/p pneumonia    Chart and course reviewed. No overnight events, denies complaints.     Vital Signs Last 24 Hrs  T(C): 36.8, Max: 37.3 (06-09 @ 23:42)  T(F): 98.3, Max: 99.2 (06-09 @ 23:42)  HR: 77 (77 - 98)  BP: 104/50 (104/50 - 120/56)  BP(mean): --  RR: 18 (18 - 20)  SpO2: 100% (99% - 100%)    PHYSICAL EXAM:    GENERAL: Not in distress, drowsy but arousable  CHEST/LUNG: b/l air entry  HEART: Regular   ABDOMEN: Soft, BS+  EXTREMITIES:  No edema    MEDICATIONS  (STANDING):  calcium acetate 667milliGRAM(s) Oral three times a day with meals  ferrous    sulfate 325milliGRAM(s) Oral daily  tamsulosin 0.4milliGRAM(s) Oral at bedtime  gabapentin 300milliGRAM(s) Oral three times a day  insulin lispro (HumaLOG) corrective regimen sliding scale  SubCutaneous three times a day before meals  dextrose 5%. 1000milliLiter(s) IV Continuous <Continuous>  dextrose 50% Injectable 12.5Gram(s) IV Push once  dextrose 50% Injectable 25Gram(s) IV Push once  dextrose 50% Injectable 25Gram(s) IV Push once  artificial  tears Solution 1Drop(s) Both EYES two times a day  heparin  Injectable 5000Unit(s) SubCutaneous every 12 hours  bisacodyl Suppository 10milliGRAM(s) Rectal once  acetaminophen   Tablet. 650milliGRAM(s) Oral every 8 hours  polyethylene glycol 3350 17Gram(s) Oral at bedtime  iron sucrose IVPB 100milliGRAM(s) IV Intermittent <User Schedule>  midodrine 5milliGRAM(s) Oral <User Schedule>  epoetin una Injectable [id number](s) IV Push every other day    MEDICATIONS  (PRN):  dextrose Gel 1Dose(s) Oral once PRN Blood Glucose LESS THAN 70 milliGRAM(s)/deciliter  glucagon  Injectable 1milliGRAM(s) IntraMuscular once PRN Glucose LESS THAN 70 milligrams/deciliter  atropine 1% Ophthalmic Solution for SubLingual Use 2Drop(s) SubLingual every 4 hours PRN excessive secretions  mineral oil enema 133milliLiter(s) Rectal once PRN persistent constipation  dextrose Gel 1Dose(s) Oral once PRN Blood Glucose LESS THAN 70 milliGRAM(s)/deciliter  dextrose Gel 1Dose(s) Oral once PRN Blood Glucose LESS THAN 70 milliGRAM(s)/deciliter  HYDROmorphone  Injectable 0.5milliGRAM(s) IV Push every 4 hours PRN Severe Pain (7 - 10)  HYDROmorphone   Tablet 2milliGRAM(s) Oral every 4 hours PRN Moderate Pain (4 - 6)      Allergies    No Known Allergies    Intolerances          LABS:                  RADIOLOGY & ADDITIONAL TESTS:
INTERVAL HPI/OVERNIGHT EVENTS:    CC: ESRD on HD, debility, s/p pneumonia    No overnight events, denies shortness of breath. Asked patient regarding HD, no decision yet.    Vital Signs Last 24 Hrs  T(C): 36.9, Max: 36.9 (06-10 @ 15:28)  T(F): 98.4, Max: 98.5 (06-10 @ 15:28)  HR: 82 (78 - 91)  BP: 136/89 (95/50 - 136/89)  BP(mean): --  RR: 18 (18 - 20)  SpO2: 100% (96% - 100%)    PHYSICAL EXAM:    GENERAL: Not in distress, more alert today  CHEST/LUNG: b/l air entry, coarse  HEART: Regular   ABDOMEN: Soft, Bs+  EXTREMITIES:  No edema    MEDICATIONS  (STANDING):  calcium acetate 667milliGRAM(s) Oral three times a day with meals  ferrous    sulfate 325milliGRAM(s) Oral daily  tamsulosin 0.4milliGRAM(s) Oral at bedtime  gabapentin 300milliGRAM(s) Oral three times a day  insulin lispro (HumaLOG) corrective regimen sliding scale  SubCutaneous three times a day before meals  dextrose 5%. 1000milliLiter(s) IV Continuous <Continuous>  dextrose 50% Injectable 12.5Gram(s) IV Push once  dextrose 50% Injectable 25Gram(s) IV Push once  dextrose 50% Injectable 25Gram(s) IV Push once  artificial  tears Solution 1Drop(s) Both EYES two times a day  heparin  Injectable 5000Unit(s) SubCutaneous every 12 hours  bisacodyl Suppository 10milliGRAM(s) Rectal once  acetaminophen   Tablet. 650milliGRAM(s) Oral every 8 hours  polyethylene glycol 3350 17Gram(s) Oral at bedtime  iron sucrose IVPB 100milliGRAM(s) IV Intermittent <User Schedule>  midodrine 5milliGRAM(s) Oral <User Schedule>  epoetin una Injectable 88548Eang(s) IV Push every other day    MEDICATIONS  (PRN):  dextrose Gel 1Dose(s) Oral once PRN Blood Glucose LESS THAN 70 milliGRAM(s)/deciliter  glucagon  Injectable 1milliGRAM(s) IntraMuscular once PRN Glucose LESS THAN 70 milligrams/deciliter  atropine 1% Ophthalmic Solution for SubLingual Use 2Drop(s) SubLingual every 4 hours PRN excessive secretions  mineral oil enema 133milliLiter(s) Rectal once PRN persistent constipation  dextrose Gel 1Dose(s) Oral once PRN Blood Glucose LESS THAN 70 milliGRAM(s)/deciliter  dextrose Gel 1Dose(s) Oral once PRN Blood Glucose LESS THAN 70 milliGRAM(s)/deciliter  HYDROmorphone  Injectable 0.5milliGRAM(s) IV Push every 4 hours PRN Severe Pain (7 - 10)  HYDROmorphone   Tablet 2milliGRAM(s) Oral every 4 hours PRN Moderate Pain (4 - 6)      Allergies    No Known Allergies    Intolerances          LABS:                  RADIOLOGY & ADDITIONAL TESTS:
INTERVAL HPI/OVERNIGHT EVENTS:    CC: corneal abrasion, ESRD on HD, debility, CAD    No overnight events, seen at bedside during HD. Eye pain better, denies complaints. Scheduled for cornea specialist evaluation today.    Vital Signs Last 24 Hrs  T(C): 37, Max: 37 (06-14 @ 00:48)  T(F): 98.6, Max: 98.6 (06-14 @ 00:48)  HR: 70 (70 - 74)  BP: 110/56 (110/56 - 112/60)  BP(mean): --  RR: 18 (18 - 18)  SpO2: 98% (93% - 98%)    PHYSICAL EXAM:    GENERAL: Not in distress, alert  CHEST/LUNG: b/l air entry  HEART: Regular   ABDOMEN: Soft,BS+  EXTREMITIES:  No edema    MEDICATIONS  (STANDING):  calcium acetate 667milliGRAM(s) Oral three times a day with meals  ferrous    sulfate 325milliGRAM(s) Oral daily  tamsulosin 0.4milliGRAM(s) Oral at bedtime  gabapentin 300milliGRAM(s) Oral three times a day  insulin lispro (HumaLOG) corrective regimen sliding scale  SubCutaneous three times a day before meals  dextrose 5%. 1000milliLiter(s) IV Continuous <Continuous>  dextrose 50% Injectable 12.5Gram(s) IV Push once  dextrose 50% Injectable 25Gram(s) IV Push once  dextrose 50% Injectable 25Gram(s) IV Push once  artificial  tears Solution 1Drop(s) Both EYES two times a day  heparin  Injectable 5000Unit(s) SubCutaneous every 12 hours  acetaminophen   Tablet. 650milliGRAM(s) Oral every 8 hours  polyethylene glycol 3350 17Gram(s) Oral at bedtime  midodrine 5milliGRAM(s) Oral <User Schedule>  epoetin una Injectable 82429Txvr(s) IV Push <User Schedule>  freetext medication  - 1Drop(s) Right EYE four times a day    MEDICATIONS  (PRN):  dextrose Gel 1Dose(s) Oral once PRN Blood Glucose LESS THAN 70 milliGRAM(s)/deciliter  glucagon  Injectable 1milliGRAM(s) IntraMuscular once PRN Glucose LESS THAN 70 milligrams/deciliter  atropine 1% Ophthalmic Solution for SubLingual Use 2Drop(s) SubLingual every 4 hours PRN excessive secretions  mineral oil enema 133milliLiter(s) Rectal once PRN persistent constipation  dextrose Gel 1Dose(s) Oral once PRN Blood Glucose LESS THAN 70 milliGRAM(s)/deciliter  dextrose Gel 1Dose(s) Oral once PRN Blood Glucose LESS THAN 70 milliGRAM(s)/deciliter      Allergies    No Known Allergies    Intolerances          LABS:                          9.4    5.0   )-----------( 101      ( 14 Jun 2017 08:10 )             31.1     06-14    137  |  96<L>  |  36.0<H>  ----------------------------<  100  5.0   |  29.0  |  4.04<H>    Ca    9.1      14 Jun 2017 08:10  Phos  3.0     06-14    TPro  6.8  /  Alb  3.3  /  TBili  0.3<L>  /  DBili  x   /  AST  21  /  ALT  12  /  AlkPhos  98  06-12          RADIOLOGY & ADDITIONAL TESTS:
INTERVAL HPI/OVERNIGHT EVENTS:    CC: corneal abrasion, esrd on hd    Had episodes of hypotension yesterday post HD, asymptomatic and improved with IVF.    Vital Signs Last 24 Hrs  T(C): 36.4, Max: 37.2 (06-14 @ 23:29)  T(F): 97.6, Max: 99 (06-14 @ 23:29)  HR: 78 (78 - 89)  BP: 125/70 (72/46 - 125/70)  BP(mean): --  RR: 18 (18 - 20)  SpO2: 100% (100% - 100%)    PHYSICAL EXAM:    GENERAL: Not in distress, alert  CHEST/LUNG: b/l air entry  HEART: Regular   ABDOMEN: Soft, BS+  EXTREMITIES:  No edema    MEDICATIONS  (STANDING):  calcium acetate 667milliGRAM(s) Oral three times a day with meals  ferrous    sulfate 325milliGRAM(s) Oral daily  tamsulosin 0.4milliGRAM(s) Oral at bedtime  gabapentin 300milliGRAM(s) Oral three times a day  insulin lispro (HumaLOG) corrective regimen sliding scale  SubCutaneous three times a day before meals  dextrose 5%. 1000milliLiter(s) IV Continuous <Continuous>  dextrose 50% Injectable 12.5Gram(s) IV Push once  dextrose 50% Injectable 25Gram(s) IV Push once  dextrose 50% Injectable 25Gram(s) IV Push once  artificial  tears Solution 1Drop(s) Both EYES two times a day  heparin  Injectable 5000Unit(s) SubCutaneous every 12 hours  acetaminophen   Tablet. 650milliGRAM(s) Oral every 8 hours  polyethylene glycol 3350 17Gram(s) Oral at bedtime  midodrine 5milliGRAM(s) Oral <User Schedule>  epoetin una Injectable 51806Ctnv(s) IV Push <User Schedule>  freetext medication  - 1Drop(s) Right EYE four times a day    MEDICATIONS  (PRN):  dextrose Gel 1Dose(s) Oral once PRN Blood Glucose LESS THAN 70 milliGRAM(s)/deciliter  glucagon  Injectable 1milliGRAM(s) IntraMuscular once PRN Glucose LESS THAN 70 milligrams/deciliter  atropine 1% Ophthalmic Solution for SubLingual Use 2Drop(s) SubLingual every 4 hours PRN excessive secretions  mineral oil enema 133milliLiter(s) Rectal once PRN persistent constipation  dextrose Gel 1Dose(s) Oral once PRN Blood Glucose LESS THAN 70 milliGRAM(s)/deciliter  dextrose Gel 1Dose(s) Oral once PRN Blood Glucose LESS THAN 70 milliGRAM(s)/deciliter      Allergies    No Known Allergies    Intolerances          LABS:                          9.4    5.0   )-----------( 101      ( 14 Jun 2017 08:10 )             31.1     06-14    137  |  96<L>  |  36.0<H>  ----------------------------<  100  5.0   |  29.0  |  4.04<H>    Ca    9.1      14 Jun 2017 08:10  Phos  3.0     06-14            RADIOLOGY & ADDITIONAL TESTS:
INTERVAL HPI/OVERNIGHT EVENTS:    CC: eye pain, ESRD on HD, debility    C/o right eye pain. Stated he feels his eye will explode. No nausea.    Vital Signs Last 24 Hrs  T(C): 36.2, Max: 36.9 (06-12 @ 23:31)  T(F): 97.2, Max: 98.5 (06-12 @ 23:31)  HR: 71 (71 - 77)  BP: 101/44 (101/44 - 113/59)  BP(mean): --  RR: 20 (20 - 20)  SpO2: 100% (100% - 100%)    PHYSICAL EXAM:    GENERAL: not in distress.  Eye: Patient declining full exam, but noted to have significant redness and bulging cornea. Refuses full exam  CHEST/LUNG: b/l air entry  HEART: Regular   ABDOMEN: Soft, BS+  EXTREMITIES:  No edema    MEDICATIONS  (STANDING):  calcium acetate 667milliGRAM(s) Oral three times a day with meals  ferrous    sulfate 325milliGRAM(s) Oral daily  tamsulosin 0.4milliGRAM(s) Oral at bedtime  gabapentin 300milliGRAM(s) Oral three times a day  insulin lispro (HumaLOG) corrective regimen sliding scale  SubCutaneous three times a day before meals  dextrose 5%. 1000milliLiter(s) IV Continuous <Continuous>  dextrose 50% Injectable 12.5Gram(s) IV Push once  dextrose 50% Injectable 25Gram(s) IV Push once  dextrose 50% Injectable 25Gram(s) IV Push once  artificial  tears Solution 1Drop(s) Both EYES two times a day  heparin  Injectable 5000Unit(s) SubCutaneous every 12 hours  acetaminophen   Tablet. 650milliGRAM(s) Oral every 8 hours  polyethylene glycol 3350 17Gram(s) Oral at bedtime  midodrine 5milliGRAM(s) Oral <User Schedule>  epoetin una Injectable 16899Pudb(s) IV Push <User Schedule>    MEDICATIONS  (PRN):  dextrose Gel 1Dose(s) Oral once PRN Blood Glucose LESS THAN 70 milliGRAM(s)/deciliter  glucagon  Injectable 1milliGRAM(s) IntraMuscular once PRN Glucose LESS THAN 70 milligrams/deciliter  atropine 1% Ophthalmic Solution for SubLingual Use 2Drop(s) SubLingual every 4 hours PRN excessive secretions  mineral oil enema 133milliLiter(s) Rectal once PRN persistent constipation  dextrose Gel 1Dose(s) Oral once PRN Blood Glucose LESS THAN 70 milliGRAM(s)/deciliter  dextrose Gel 1Dose(s) Oral once PRN Blood Glucose LESS THAN 70 milliGRAM(s)/deciliter      Allergies    No Known Allergies    Intolerances          LABS:                          8.8    4.2   )-----------( 102      ( 12 Jun 2017 12:34 )             30.2     06-12    141  |  101  |  48.0<H>  ----------------------------<  118<H>  5.6<H>   |  29.0  |  4.49<H>    Ca    8.9      12 Jun 2017 12:34  Phos  3.4     06-12    TPro  6.8  /  Alb  3.3  /  TBili  0.3<L>  /  DBili  x   /  AST  21  /  ALT  12  /  AlkPhos  98  06-12          RADIOLOGY & ADDITIONAL TESTS:
INTERVAL HPI/OVERNIGHT EVENTS:  This 86yo M is very weak, lethargic, will respond to questions, Sleeping most of the time. Nephrologist asked patient earlier today if he wanted to continue dialysis treatment-he replied "I'll think about it"  Must be fed, denies pain, is very uncomfortable during wound care sacral decubiti, changing positions.    87y old  Male who presents with a chief complaint of pt got intubated in the ED after HD (31 May 2017 18:33)      Present Symptoms:     Dyspnea:  1    Nausea/Vomiting: No  Anxiety:  Yes   Depression: Yes   Fatigue: Yes   Loss of appetite: Yes     Pain:  sacral low back pain and inflammation            Character-            Duration-            Effect-            Factors-            Frequency-            Location-            Severity-    Review of Systems: Reviewed                 Unable to obtain due to poor mentation       MEDICATIONS  (STANDING):  calcium acetate 667milliGRAM(s) Oral three times a day with meals  ferrous    sulfate 325milliGRAM(s) Oral daily  tamsulosin 0.4milliGRAM(s) Oral at bedtime  gabapentin 300milliGRAM(s) Oral three times a day  insulin lispro (HumaLOG) corrective regimen sliding scale  SubCutaneous three times a day before meals  dextrose 5%. 1000milliLiter(s) IV Continuous <Continuous>  dextrose 50% Injectable 12.5Gram(s) IV Push once  dextrose 50% Injectable 25Gram(s) IV Push once  dextrose 50% Injectable 25Gram(s) IV Push once  artificial  tears Solution 1Drop(s) Both EYES two times a day  heparin  Injectable 5000Unit(s) SubCutaneous every 12 hours  bisacodyl Suppository 10milliGRAM(s) Rectal once  acetaminophen   Tablet. 650milliGRAM(s) Oral every 8 hours  polyethylene glycol 3350 17Gram(s) Oral at bedtime  iron sucrose IVPB 100milliGRAM(s) IV Intermittent <User Schedule>  midodrine 5milliGRAM(s) Oral <User Schedule>  epoetin una Injectable [id number](s) IV Push every other day    MEDICATIONS  (PRN):  dextrose Gel 1Dose(s) Oral once PRN Blood Glucose LESS THAN 70 milliGRAM(s)/deciliter  glucagon  Injectable 1milliGRAM(s) IntraMuscular once PRN Glucose LESS THAN 70 milligrams/deciliter  atropine 1% Ophthalmic Solution for SubLingual Use 2Drop(s) SubLingual every 4 hours PRN excessive secretions  mineral oil enema 133milliLiter(s) Rectal once PRN persistent constipation  dextrose Gel 1Dose(s) Oral once PRN Blood Glucose LESS THAN 70 milliGRAM(s)/deciliter  dextrose Gel 1Dose(s) Oral once PRN Blood Glucose LESS THAN 70 milliGRAM(s)/deciliter  HYDROmorphone  Injectable 0.5milliGRAM(s) IV Push every 4 hours PRN Severe Pain (7 - 10)  HYDROmorphone   Tablet 2milliGRAM(s) Oral every 4 hours PRN Moderate Pain (4 - 6)      PHYSICAL EXAM:    Vital Signs Last 24 Hrs  T(C): 36.3, Max: 36.8 (06-09 @ 12:38)  T(F): 97.4, Max: 98.2 (06-09 @ 12:38)  HR: 89 (77 - 90)  BP: 115/57 (100/54 - 133/68)  BP(mean): --  RR: 18 (18 - 18)  SpO2: 99% (98% - 99%)    General: alert  oriented x _2___ lethargic                         HEENT:  dry mouth      Lungs:     CV: normal      GI:   distended                 constipation  last BM:     :   incontinent  oliguria/anuria    MSK: weakness  edema           bedbound    Skin:  pressure ulcers- Stage_IV  no rash    LABS:                        8.3    3.5   )-----------( 87       ( 08 Jun 2017 10:40 )             27.3     06-08    138  |  98  |  35.0<H>  ----------------------------<  103  5.1   |  28.0  |  3.88<H>    Ca    8.2<L>      08 Jun 2017 10:40          I&O's Summary      RADIOLOGY & ADDITIONAL STUDIES:    ADVANCE DIRECTIVES:   DNR  NO  Completed on:          SEE GOALS OF CARE Discussion  Living Will   NO   Completed on:
INTERVAL HPI/OVERNIGHT EVENTS: This 86yo M has been in bed, very weak;lethargic sleeping most of time. Denies N/V/D CP, Palpitations  unable to feed himself or rise to a sitting position. Mood depressed, misses his wife.   Lower extremity edema,    87y old  Male who presents with a chief complaint of pt got intubated in the ED after HD (31 May 2017 18:33)      Present Symptoms:     Dyspnea: 2 -3   Nausea/Vomiting:  No  Anxiety:  Yes   Depression: Yes   Fatigue: Yes   Loss of appetite: Yes     Pain:  Denies  buttocks sore-breaking down sacral decubitusi            Character-            Duration-            Effect-            Factors-            Frequency-            Location-            Severity-    Review of Systems: Reviewed                All others negative    MEDICATIONS  (STANDING):  calcium acetate 667milliGRAM(s) Oral three times a day with meals  carvedilol 3.125milliGRAM(s) Oral every 12 hours  ferrous    sulfate 325milliGRAM(s) Oral daily  tamsulosin 0.4milliGRAM(s) Oral at bedtime  gabapentin 300milliGRAM(s) Oral three times a day  insulin lispro (HumaLOG) corrective regimen sliding scale  SubCutaneous three times a day before meals  dextrose 5%. 1000milliLiter(s) IV Continuous <Continuous>  dextrose 50% Injectable 12.5Gram(s) IV Push once  dextrose 50% Injectable 25Gram(s) IV Push once  dextrose 50% Injectable 25Gram(s) IV Push once  artificial  tears Solution 1Drop(s) Both EYES two times a day  heparin  Injectable 5000Unit(s) SubCutaneous every 12 hours  iron sucrose IVPB 100milliGRAM(s) IV Intermittent every 48 hours  lisinopril 2.5milliGRAM(s) Oral daily  bisacodyl Suppository 10milliGRAM(s) Rectal once  acetaminophen   Tablet. 650milliGRAM(s) Oral every 8 hours  polyethylene glycol 3350 17Gram(s) Oral at bedtime  insulin glargine Injectable (LANTUS) 5Unit(s) SubCutaneous at bedtime  epoetin una Injectable [id number](s) IV Push <User Schedule>    MEDICATIONS  (PRN):  dextrose Gel 1Dose(s) Oral once PRN Blood Glucose LESS THAN 70 milliGRAM(s)/deciliter  glucagon  Injectable 1milliGRAM(s) IntraMuscular once PRN Glucose LESS THAN 70 milligrams/deciliter  HYDROmorphone  Injectable 0.5milliGRAM(s) IV Push every 4 hours PRN dyspnea  atropine 1% Ophthalmic Solution for SubLingual Use 2Drop(s) SubLingual every 4 hours PRN excessive secretions  mineral oil enema 133milliLiter(s) Rectal once PRN persistent constipation  dextrose Gel 1Dose(s) Oral once PRN Blood Glucose LESS THAN 70 milliGRAM(s)/deciliter  dextrose Gel 1Dose(s) Oral once PRN Blood Glucose LESS THAN 70 milliGRAM(s)/deciliter  HYDROmorphone  Injectable 0.5milliGRAM(s) IV Push every 4 hours PRN Severe Pain (7 - 10)  HYDROmorphone   Tablet 2milliGRAM(s) Oral every 4 hours PRN Moderate Pain (4 - 6)      PHYSICAL EXAM:    Vital Signs Last 24 Hrs  T(C): 36.9, Max: 37.1 (06-04 @ 16:34)  T(F): 98.4, Max: 98.7 (06-04 @ 16:34)  HR: 64 (64 - 79)  BP: 99/56 (99/56 - 115/79)  BP(mean): --  RR: 18 (17 - 18)  SpO2: 97% (97% - 100%)    General: alert  oriented x 1self____ lethargic     HEENT: normal  dry mout    Lungs: comfortable     CV: normal      GI: normal  distended   constipation  last BM:     : l  incontinent  oliguria/anuria      MSK:  weakness  edema               bedbound/wheelchair bound    Skin: normal sacral pressure ulcers- Stage_____  no rash    LABS:              I&O's Summary    I & Os for current day (as of 05 Jun 2017 14:57)  =============================================  IN: 240 ml / OUT: 0 ml / NET: 240 ml      RADIOLOGY & ADDITIONAL STUDIES:    ADVANCE DIRECTIVES:   DNR NO  Completed on:                     MOLST  NO   Completed on:  Living Will   NO   Completed on:
INTERVAL HPI/OVERNIGHT EVENTS: This 86yo M has been sleeping all day since having HD treatment.He has not eaten, refused lunch-then asked for agu[place]era sandwich when daught-in-law was getting ready to leave. Breathing much easier today. Airway remains clear, no cough-comfortable. Legs pulled up flexed under upper body.     87y old  Male who presents with a chief complaint of pt got intubated in the ED after HD (31 May 2017 18:33)    PAST MEDICAL & SURGICAL HISTORY:  Chronic renal failure  Coronary artery disease involving autologous vein bypass graft  CHF (congestive heart failure): FAMILY/PT NOT SURE IF DIAGNOSED WITH CHF OR COPD  Pacemaker  Diabetes  PSA elevation  Hyperlipemia  Hypertension  Cataract  Glaucoma  S/P CABG (coronary artery bypass graft)  S/P prostatectomy: 1992        Present Symptoms:     Dyspnea: 1   Nausea/Vomiting:  No  Anxiety:    Depression: Yes   Fatigue: Yes   Loss of appetite: Yes    Pain: Denies            Character-            Duration-            Effect-            Factors-            Frequency-            Location-            Severity-    Review of Systems: Reviewed                        Unable to obtain due to poor mentation       MEDICATIONS  (STANDING):  piperacillin/tazobactam IVPB. 2.25Gram(s) IV Intermittent every 8 hours  calcium acetate 667milliGRAM(s) Oral three times a day with meals  carvedilol 3.125milliGRAM(s) Oral every 12 hours  ferrous    sulfate 325milliGRAM(s) Oral daily  tamsulosin 0.4milliGRAM(s) Oral at bedtime  gabapentin 300milliGRAM(s) Oral three times a day  insulin lispro (HumaLOG) corrective regimen sliding scale  SubCutaneous three times a day before meals  dextrose 5%. 1000milliLiter(s) IV Continuous <Continuous>  dextrose 50% Injectable 12.5Gram(s) IV Push once  dextrose 50% Injectable 25Gram(s) IV Push once  dextrose 50% Injectable 25Gram(s) IV Push once  artificial  tears Solution 1Drop(s) Both EYES two times a day  heparin  Injectable 5000Unit(s) SubCutaneous every 12 hours  iron sucrose IVPB 100milliGRAM(s) IV Intermittent every 48 hours  lisinopril 2.5milliGRAM(s) Oral daily  insulin glargine Injectable (LANTUS) 10Unit(s) SubCutaneous at bedtime    MEDICATIONS  (PRN):  dextrose Gel 1Dose(s) Oral once PRN Blood Glucose LESS THAN 70 milliGRAM(s)/deciliter  glucagon  Injectable 1milliGRAM(s) IntraMuscular once PRN Glucose LESS THAN 70 milligrams/deciliter  HYDROmorphone  Injectable 0.5milliGRAM(s) IV Push every 4 hours PRN dyspnea  atropine 1% Ophthalmic Solution for SubLingual Use 2Drop(s) SubLingual every 4 hours PRN excessive secretions      PHYSICAL EXAM:    Vital Signs Last 24 Hrs  T(C): 36.7, Max: 37.2 (06-01 @ 00:15)  T(F): 98, Max: 98.9 (06-01 @ 00:15)  HR: 83 (79 - 89)  BP: 121/51 (98/53 - 124/58)  BP(mean): --  RR: 19 (12 - 19)  SpO2: 100% (98% - 100%)    General: alert  oriented x ____ lethargic agitated                  cachexia  nonverbal  coma    Karnofsky:  %    HEENT: normal  dry mouth  ET tube/trach    Lungs: comfortable tachypnea/labored breathing  excessive secretions    CV: normal  tachycardia    GI: normal  distended  tender  no BS               PEG/NG/OG tube  constipation  last BM:     : normal  incontinent  oliguria/anuria  norwood    MSK: normal  weakness  edema             ambulatory  bedbound/wheelchair bound    Skin: normal  pressure ulcers- Stage_____  no rash    LABS:                        7.5    5.0   )-----------( 77       ( 01 Jun 2017 06:56 )             24.8     06-01    139  |  99  |  51.0<H>  ----------------------------<  165<H>  4.2   |  29.0  |  3.32<H>    Ca    8.6      01 Jun 2017 06:56  Mg     2.0     05-31    TPro  7.3  /  Alb  3.3  /  TBili  0.6  /  DBili  x   /  AST  40<H>  /  ALT  31  /  AlkPhos  198<H>  05-31    PT/INR - ( 31 May 2017 08:14 )   PT: 14.9 sec;   INR: 1.35 ratio             I&O's Summary    I & Os for current day (as of 01 Jun 2017 15:46)  =============================================  IN: 0 ml / OUT: 1500 ml / NET: -1500 ml      RADIOLOGY & ADDITIONAL STUDIES:    ADVANCE DIRECTIVES:   DNR YES NO  Completed on:                     MOLST  YES NO   Completed on:  Living Will  YES NO   Completed on:
INTERVAL HPI/OVERNIGHT EVENTS: This 87yoM remains lethargic, full feed, Barium swallow test done today, will be limited to soft diet with nectar thick fluids. He is verbal at times, interacts with his wife, denies pain, expectorating thick mucous, Buttocks sore during wound care or repositioning.. Very weak  87yMalePatient is a 87y old  Male who presents with a chief complaint of pt got intubated in the ED after HD (31 May 2017 18:33)      Present Symptoms:     Dyspnea:  0   Nausea/Vomiting: No  Anxiety:   No  Depression: Yes   Fatigue: Yes   Loss of appetite: Yes     Pain: denies            Character-            Duration-            Effect-            Factors-            Frequency-            Location-            Severity-    Review of Systems: Reviewed                    Unable to obtain due to poor mentation       MEDICATIONS  (STANDING):  calcium acetate 667milliGRAM(s) Oral three times a day with meals  carvedilol 3.125milliGRAM(s) Oral every 12 hours  ferrous    sulfate 325milliGRAM(s) Oral daily  tamsulosin 0.4milliGRAM(s) Oral at bedtime  gabapentin 300milliGRAM(s) Oral three times a day  insulin lispro (HumaLOG) corrective regimen sliding scale  SubCutaneous three times a day before meals  dextrose 5%. 1000milliLiter(s) IV Continuous <Continuous>  dextrose 50% Injectable 12.5Gram(s) IV Push once  dextrose 50% Injectable 25Gram(s) IV Push once  dextrose 50% Injectable 25Gram(s) IV Push once  artificial  tears Solution 1Drop(s) Both EYES two times a day  heparin  Injectable 5000Unit(s) SubCutaneous every 12 hours  lisinopril 2.5milliGRAM(s) Oral daily  bisacodyl Suppository 10milliGRAM(s) Rectal once  acetaminophen   Tablet. 650milliGRAM(s) Oral every 8 hours  polyethylene glycol 3350 17Gram(s) Oral at bedtime  epoetin una Injectable 02844Pjsb(s) IV Push <User Schedule>  iron sucrose IVPB 100milliGRAM(s) IV Intermittent <User Schedule>    MEDICATIONS  (PRN):  dextrose Gel 1Dose(s) Oral once PRN Blood Glucose LESS THAN 70 milliGRAM(s)/deciliter  glucagon  Injectable 1milliGRAM(s) IntraMuscular once PRN Glucose LESS THAN 70 milligrams/deciliter  HYDROmorphone  Injectable 0.5milliGRAM(s) IV Push every 4 hours PRN dyspnea  atropine 1% Ophthalmic Solution for SubLingual Use 2Drop(s) SubLingual every 4 hours PRN excessive secretions  mineral oil enema 133milliLiter(s) Rectal once PRN persistent constipation  dextrose Gel 1Dose(s) Oral once PRN Blood Glucose LESS THAN 70 milliGRAM(s)/deciliter  dextrose Gel 1Dose(s) Oral once PRN Blood Glucose LESS THAN 70 milliGRAM(s)/deciliter  HYDROmorphone  Injectable 0.5milliGRAM(s) IV Push every 4 hours PRN Severe Pain (7 - 10)  HYDROmorphone   Tablet 2milliGRAM(s) Oral every 4 hours PRN Moderate Pain (4 - 6)      PHYSICAL EXAM:    Vital Signs Last 24 Hrs  T(C): 36.9, Max: 36.9 (06-07 @ 00:08)  T(F): 98.4, Max: 98.4 (06-07 @ 00:08)  HR: 72 (72 - 82)  BP: 99/54 (99/54 - 126/64)  BP(mean): --  RR: 18 (18 - 18)  SpO2: 98% (98% - 98%)    General: alert  oriented x 1____ lethargic                         HEENT:  dry mouth    Lungs: comfortable     CV: normal      GI: normal                   constipation  last BM:     : HD  oliguria/anuria      MSK:   weakness                bedbound/wheelchair bound    Skin: normal  sacral pressure ulcer- Stage___?__  no rash    LABS:                        8.0    3.6   )-----------( 83       ( 06 Jun 2017 08:43 )             26.5     06-06    140  |  100  |  42.0<H>  ----------------------------<  70  4.6   |  29.0  |  5.11<H>    Ca    8.5<L>      06 Jun 2017 08:43  Phos  3.8     06-06          I&O's Summary    I & Os for current day (as of 07 Jun 2017 15:55)  =============================================  IN: 0 ml / OUT: 1100 ml / NET: -1100 ml      RADIOLOGY & ADDITIONAL STUDIES:    ADVANCE DIRECTIVES:   Dr. Corcoran  and myself met with wife discussed ADV Directives and goals of care  DNR  NO  Completed on:                     MOLST   NO   Completed on:  Living Will   NO   Completed on:
INTERVAL HPI/OVERNIGHT EVENTS: This is an 86yo M who was sleeping at time of my visit, easily arouseable.  He had coughed and expectorated onto top of gown-thick gray phlem. He wants to get help to get OOB-"I have to get OOB"  Engaging in conversation. I brought up "the importance of speaking to his wife about end of life wishes-to help her know how you feel about  aggressive resusitation-he will do that. Denies N/V/D/  He is constipated Must be fed-has remained on bedrest. No CP Plaitations  87y old  Male who presents with a chief complaint of pt got intubated in the ED after HD (31 May 2017 18:33)      Present Symptoms:     Dyspnea: 0    Nausea/Vomiting:  No  Anxiety: No  Depression: Yes   Fatigue: Yes  Loss of appetite: Yes    Pain: Denies            Character-            Duration-            Effect-            Factors-            Frequency-            Location-            Severity-    Review of Systems: Reviewed                     All others negative    MEDICATIONS  (STANDING):  piperacillin/tazobactam IVPB. 2.25Gram(s) IV Intermittent every 8 hours  calcium acetate 667milliGRAM(s) Oral three times a day with meals  carvedilol 3.125milliGRAM(s) Oral every 12 hours  ferrous    sulfate 325milliGRAM(s) Oral daily  tamsulosin 0.4milliGRAM(s) Oral at bedtime  gabapentin 300milliGRAM(s) Oral three times a day  insulin lispro (HumaLOG) corrective regimen sliding scale  SubCutaneous three times a day before meals  dextrose 5%. 1000milliLiter(s) IV Continuous <Continuous>  dextrose 50% Injectable 12.5Gram(s) IV Push once  dextrose 50% Injectable 25Gram(s) IV Push once  dextrose 50% Injectable 25Gram(s) IV Push once  artificial  tears Solution 1Drop(s) Both EYES two times a day  heparin  Injectable 5000Unit(s) SubCutaneous every 12 hours  iron sucrose IVPB 100milliGRAM(s) IV Intermittent every 48 hours  lisinopril 2.5milliGRAM(s) Oral daily  insulin glargine Injectable (LANTUS) 10Unit(s) SubCutaneous at bedtime  bisacodyl Suppository 10milliGRAM(s) Rectal once  acetaminophen   Tablet. 650milliGRAM(s) Oral every 8 hours  polyethylene glycol 3350 17Gram(s) Oral at bedtime    MEDICATIONS  (PRN):  dextrose Gel 1Dose(s) Oral once PRN Blood Glucose LESS THAN 70 milliGRAM(s)/deciliter  glucagon  Injectable 1milliGRAM(s) IntraMuscular once PRN Glucose LESS THAN 70 milligrams/deciliter  HYDROmorphone  Injectable 0.5milliGRAM(s) IV Push every 4 hours PRN dyspnea  atropine 1% Ophthalmic Solution for SubLingual Use 2Drop(s) SubLingual every 4 hours PRN excessive secretions  mineral oil enema 133milliLiter(s) Rectal once PRN persistent constipation      PHYSICAL EXAM:    Vital Signs Last 24 Hrs  T(C): 37.2, Max: 37.2 (06-02 @ 15:00)  T(F): 98.9, Max: 98.9 (06-02 @ 15:00)  HR: 84 (83 - 92)  BP: 83/42 (83/42 - 115/52)  BP(mean): --  RR: 16 (16 - 20)  SpO2: 95% (95% - 99%)    General: alert  oriented x _2-3_ lethargic                          HEENT: normal  dry mouth     Lungs: comfortable   CV: normal  tachycardia    GI: ssoftly distended  XXX  constipation  last BM: ?    :  incontinent  oliguria/anuria  HD    MSK:  weakness  edema             bedbound/    Skin: normal    LABS:                        7.9    5.2   )-----------( 83       ( 02 Jun 2017 08:08 )             26.0     06-02    142  |  98  |  31.0<H>  ----------------------------<  119<H>  3.5   |  29.0  |  3.16<H>    Ca    8.5<L>      02 Jun 2017 08:08          I&O's Summary    I & Os for current day (as of 02 Jun 2017 18:32)  =============================================  IN: 200 ml / OUT: 1500 ml / NET: -1300 ml      RADIOLOGY & ADDITIONAL STUDIES:    ADVANCE DIRECTIVES:   DNR  NO  Completed on:                     MOLST   NO   Completed on:  Living Will   NO   Completed on:
INTERVAL HPI/OVERNIGHT EVENTS: This is an 88yo M draped over side rail of stretcher lethargic in respiratory distress RR >/=30/min. Speech slow and hard to decifer.  He recognizes his wife at bedside. C/O pain and burning on buttocks (decubiti). No appetite-needs to be fed. Dyspnea, tachypnea at rest. Not fully aware and is not able discuss ROS     87y old  Male who presents with a chief complaint of Labored breathing (29 May 2017 15:57)      Present Symptoms:     Dyspnea:  2- 3   Nausea/Vomiting: No  Anxiety:   No  Depression:  No  Fatigue: Yes   Loss of appetite: Yes     Pain: buttocks-inflammatory decubiti pain            Character-            Duration-            Effect-            Factors-lying flat on stretcher            Frequency-            Location-            Severity-moderate to severe    Review of Systems: Reviewed                                Unable to obtain due to poor mentation       MEDICATIONS  (STANDING):  piperacillin/tazobactam IVPB. 2.25Gram(s) IV Intermittent every 8 hours  calcium acetate 667milliGRAM(s) Oral three times a day with meals  carvedilol 3.125milliGRAM(s) Oral every 12 hours  ferrous    sulfate 325milliGRAM(s) Oral daily  tamsulosin 0.4milliGRAM(s) Oral at bedtime  gabapentin 300milliGRAM(s) Oral three times a day  insulin lispro (HumaLOG) corrective regimen sliding scale  SubCutaneous three times a day before meals  dextrose 5%. 1000milliLiter(s) IV Continuous <Continuous>  dextrose 50% Injectable 12.5Gram(s) IV Push once  dextrose 50% Injectable 25Gram(s) IV Push once  dextrose 50% Injectable 25Gram(s) IV Push once  midodrine 2.5milliGRAM(s) Oral every 8 hours  artificial  tears Solution 1Drop(s) Both EYES two times a day  heparin  Injectable 5000Unit(s) SubCutaneous every 12 hours  iron sucrose IVPB 100milliGRAM(s) IV Intermittent every 48 hours  lisinopril 2.5milliGRAM(s) Oral daily  insulin glargine Injectable (LANTUS) 10Unit(s) SubCutaneous at bedtime    MEDICATIONS  (PRN):  dextrose Gel 1Dose(s) Oral once PRN Blood Glucose LESS THAN 70 milliGRAM(s)/deciliter  glucagon  Injectable 1milliGRAM(s) IntraMuscular once PRN Glucose LESS THAN 70 milligrams/deciliter  HYDROmorphone  Injectable 0.5milliGRAM(s) IV Push every 4 hours PRN dyspnea  atropine 1% Ophthalmic Solution for SubLingual Use 2Drop(s) SubLingual every 4 hours PRN excessive secretions      PHYSICAL EXAM:    Vital Signs Last 24 Hrs  T(C): 36.3, Max: 36.7 (05-30 @ 23:35)  T(F): 97.4, Max: 98 (05-30 @ 23:35)  HR: 88 (86 - 98)  BP: 98/56 (98/56 - 136/90)  BP(mean): --  RR: 15 (15 - 18)  SpO2: 100% (97% - 100%)    General: alert  oriented x _1___ lethargic                          HEENT:  dry mouth      Lungs:  tachypnea/labored breathing    CV:   tachycardia    GI: normal  distended                   constipation  last BM:     : l  incontinent  oliguria/anuria  on HD    MSK:  weakness  edema              bedbound/wheelchair bound    Skin: normal  pressure ulcers-sacral decubiti  no rash    LABS:                        8.8    4.8   )-----------( 75       ( 31 May 2017 08:14 )             28.9     05-31    137  |  96<L>  |  41.0<H>  ----------------------------<  280<H>  4.4   |  23.0  |  2.63<H>    Ca    8.6      31 May 2017 08:14  Mg     2.0     05-31    TPro  7.3  /  Alb  3.3  /  TBili  0.6  /  DBili  x   /  AST  40<H>  /  ALT  31  /  AlkPhos  198<H>  05-31    PT/INR - ( 31 May 2017 08:14 )   PT: 14.9 sec;   INR: 1.35 ratio             I&O's Summary    I & Os for current day (as of 31 May 2017 16:03)  =============================================  IN: 800 ml / OUT: 1500 ml / NET: -700 ml      RADIOLOGY & ADDITIONAL STUDIES:    ADVANCE DIRECTIVES:   DNR  NO  Completed on:            Spoke with wife Iram at length today about establishing Directives -see goals of care conversation         MOLST   NO   Completed on:  Living Will   NO   Completed on:
INTERVAL HPI/OVERNIGHT EVENTS:This 87yoM is getting progressively weaker, sleeping longer intervals, had HD treatment earlier today-Difficulty having conversation especially when roused from sleep.   Less SOB, poor appetite-full feed-bed rest unable to move independently. More withdrawn and depressed.  "Why did they leave me here all alone"?     87yMalePatient is a 87y old  Male who presents with a chief complaint of pt got intubated in the ED after HD (31 May 2017 18:33)    PAST MEDICAL & SURGICAL HISTORY:  Chronic renal failure  Coronary artery disease involving autologous vein bypass graft  CHF (congestive heart failure): FAMILY/PT NOT SURE IF DIAGNOSED WITH CHF OR COPD  Pacemaker  Diabetes  PSA elevation  Hyperlipemia  Hypertension  Cataract  Glaucoma  S/P CABG (coronary artery bypass graft)  S/P prostatectomy: 1992    Present Symptoms:   Dyspnea:  1   Nausea/Vomiting: No  Anxiety:  Yes   Depression: Yes   Fatigue: Yes   Loss of appetite: Yes     Pain: burning buttock pain from skin breakdown            Character-            Duration-            Effect-            Factors-            Frequency-            Location-            Severity-    Review of Systems: Reviewed                     Negative:                     Positive:  Unable to obtain due to poor mentation   All others negative    MEDICATIONS  (STANDING):  calcium acetate 667milliGRAM(s) Oral three times a day with meals  carvedilol 3.125milliGRAM(s) Oral every 12 hours  ferrous    sulfate 325milliGRAM(s) Oral daily  tamsulosin 0.4milliGRAM(s) Oral at bedtime  gabapentin 300milliGRAM(s) Oral three times a day  insulin lispro (HumaLOG) corrective regimen sliding scale  SubCutaneous three times a day before meals  dextrose 5%. 1000milliLiter(s) IV Continuous <Continuous>  dextrose 50% Injectable 12.5Gram(s) IV Push once  dextrose 50% Injectable 25Gram(s) IV Push once  dextrose 50% Injectable 25Gram(s) IV Push once  artificial  tears Solution 1Drop(s) Both EYES two times a day  heparin  Injectable 5000Unit(s) SubCutaneous every 12 hours  iron sucrose IVPB 100milliGRAM(s) IV Intermittent every 48 hours  lisinopril 2.5milliGRAM(s) Oral daily  bisacodyl Suppository 10milliGRAM(s) Rectal once  acetaminophen   Tablet. 650milliGRAM(s) Oral every 8 hours  polyethylene glycol 3350 17Gram(s) Oral at bedtime  epoetin una Injectable 53535Awvw(s) IV Push <User Schedule>    MEDICATIONS  (PRN):  dextrose Gel 1Dose(s) Oral once PRN Blood Glucose LESS THAN 70 milliGRAM(s)/deciliter  glucagon  Injectable 1milliGRAM(s) IntraMuscular once PRN Glucose LESS THAN 70 milligrams/deciliter  HYDROmorphone  Injectable 0.5milliGRAM(s) IV Push every 4 hours PRN dyspnea  atropine 1% Ophthalmic Solution for SubLingual Use 2Drop(s) SubLingual every 4 hours PRN excessive secretions  mineral oil enema 133milliLiter(s) Rectal once PRN persistent constipation  dextrose Gel 1Dose(s) Oral once PRN Blood Glucose LESS THAN 70 milliGRAM(s)/deciliter  dextrose Gel 1Dose(s) Oral once PRN Blood Glucose LESS THAN 70 milliGRAM(s)/deciliter  HYDROmorphone  Injectable 0.5milliGRAM(s) IV Push every 4 hours PRN Severe Pain (7 - 10)  HYDROmorphone   Tablet 2milliGRAM(s) Oral every 4 hours PRN Moderate Pain (4 - 6)      PHYSICAL EXAM:    Vital Signs Last 24 Hrs  T(C): 36.8, Max: 36.8 (06-06 @ 11:40)  T(F): 98.2, Max: 98.2 (06-06 @ 11:40)  HR: 89 (76 - 89)  BP: 133/69 (95/60 - 133/69)  BP(mean): --  RR: 18 (18 - 18)  SpO2: 99% (98% - 100%)    General: alert  oriented x ____ lethargic agitated                  cachexia  nonverbal  coma    Karnofsky:  %    HEENT: normal  dry mouth  ET tube/trach    Lungs: comfortable tachypnea/labored breathing  excessive secretions    CV: normal  tachycardia    GI: normal  distended  tender  no BS               PEG/NG/OG tube  constipation  last BM:     : normal  incontinent  oliguria/anuria  [place]wood    MSK: normal  weakness  edema             ambulatory  bedbound/wheelchair bound    Skin: normal  pressure ulcers- Stage_____  no rash    LABS:                        8.0    3.6   )-----------( 83       ( 06 Jun 2017 08:43 )             26.5     06-06    140  |  100  |  42.0<H>  ----------------------------<  70  4.6   |  29.0  |  5.11<H>    Ca    8.5<L>      06 Jun 2017 08:43  Phos  3.8     06-06          I&O's Summary  I & Os for 24h ending 06 Jun 2017 07:00  =============================================  IN: 240 ml / OUT: 0 ml / NET: 240 ml    I & Os for current day (as of 06 Jun 2017 14:21)  =============================================  IN: 0 ml / OUT: 1100 ml / NET: -1100 ml      RADIOLOGY & ADDITIONAL STUDIES:    ADVANCE DIRECTIVES:   DNR YES NO  Completed on:                     MOLST  YES NO   Completed on:  Living Will  YES NO   Completed on:    COUNSELING:    Face to face meeting to discuss Advanced Care Planning - Time Spent ______ Minutes.  See goals of care note.    More than 50% time spent in counseling and coordinating care. ______ Minutes.     Thank you for the opportunity to assist with the care of this patient.   [facility] [phone].
NEPHROLOGY INTERVAL HPI/OVERNIGHT EVENTS:    Examined earlier  Looks comfortable    MEDICATIONS  (STANDING):  calcium acetate 667milliGRAM(s) Oral three times a day with meals  ferrous    sulfate 325milliGRAM(s) Oral daily  tamsulosin 0.4milliGRAM(s) Oral at bedtime  gabapentin 300milliGRAM(s) Oral three times a day  insulin lispro (HumaLOG) corrective regimen sliding scale  SubCutaneous three times a day before meals  dextrose 5%. 1000milliLiter(s) IV Continuous <Continuous>  dextrose 50% Injectable 12.5Gram(s) IV Push once  dextrose 50% Injectable 25Gram(s) IV Push once  dextrose 50% Injectable 25Gram(s) IV Push once  artificial  tears Solution 1Drop(s) Both EYES two times a day  heparin  Injectable 5000Unit(s) SubCutaneous every 12 hours  acetaminophen   Tablet. 650milliGRAM(s) Oral every 8 hours  polyethylene glycol 3350 17Gram(s) Oral at bedtime  epoetin una Injectable 18729Tmvq(s) IV Push <User Schedule>  freetext medication  - 1Drop(s) Right EYE four times a day  midodrine 10milliGRAM(s) Oral <User Schedule>    MEDICATIONS  (PRN):  dextrose Gel 1Dose(s) Oral once PRN Blood Glucose LESS THAN 70 milliGRAM(s)/deciliter  glucagon  Injectable 1milliGRAM(s) IntraMuscular once PRN Glucose LESS THAN 70 milligrams/deciliter  atropine 1% Ophthalmic Solution for SubLingual Use 2Drop(s) SubLingual every 4 hours PRN excessive secretions  mineral oil enema 133milliLiter(s) Rectal once PRN persistent constipation  dextrose Gel 1Dose(s) Oral once PRN Blood Glucose LESS THAN 70 milliGRAM(s)/deciliter  dextrose Gel 1Dose(s) Oral once PRN Blood Glucose LESS THAN 70 milliGRAM(s)/deciliter      Allergies    No Known Allergies    Intolerances        Vital Signs Last 24 Hrs  T(C): 36.9, Max: 37 (- @ 06:39)  T(F): 98.5, Max: 98.6 (-17 @ 06:39)  HR: 98 (79 - 98)  BP: 100/52 (100/52 - 118/66)  BP(mean): --  RR: 18 (18 - 18)  SpO2: 100% (99% - 100%)  Daily     Daily Weight in k.2 (2017 10:37)    PHYSICAL EXAM:  Awake/alert; NAD  chest Clear; catheter site clean  No JVD  No rub  abd soft, NT BS +  Tr edema      LABS:                        9.4    5.6   )-----------( 95       ( 2017 08:15 )             31.1         138  |  98  |  47.0<H>  ----------------------------<  104  5.1   |  25.0  |  4.92<H>    Ca    8.7      2017 08:15  Phos  2.8               Phosphorus Level, Serum: 2.8 mg/dL ( @ 08:15)          RADIOLOGY & ADDITIONAL TESTS:
NEPHROLOGY INTERVAL HPI/OVERNIGHT EVENTS:    Examined earlier  Looks comfortable    MEDICATIONS  (STANDING):  calcium acetate 667milliGRAM(s) Oral three times a day with meals  ferrous    sulfate 325milliGRAM(s) Oral daily  tamsulosin 0.4milliGRAM(s) Oral at bedtime  gabapentin 300milliGRAM(s) Oral three times a day  insulin lispro (HumaLOG) corrective regimen sliding scale  SubCutaneous three times a day before meals  dextrose 5%. 1000milliLiter(s) IV Continuous <Continuous>  dextrose 50% Injectable 12.5Gram(s) IV Push once  dextrose 50% Injectable 25Gram(s) IV Push once  dextrose 50% Injectable 25Gram(s) IV Push once  artificial  tears Solution 1Drop(s) Both EYES two times a day  heparin  Injectable 5000Unit(s) SubCutaneous every 12 hours  acetaminophen   Tablet. 650milliGRAM(s) Oral every 8 hours  polyethylene glycol 3350 17Gram(s) Oral at bedtime  midodrine 5milliGRAM(s) Oral <User Schedule>  epoetin una Injectable 00097Ohfi(s) IV Push <User Schedule>  freetext medication  - 1Drop(s) Right EYE four times a day    MEDICATIONS  (PRN):  dextrose Gel 1Dose(s) Oral once PRN Blood Glucose LESS THAN 70 milliGRAM(s)/deciliter  glucagon  Injectable 1milliGRAM(s) IntraMuscular once PRN Glucose LESS THAN 70 milligrams/deciliter  atropine 1% Ophthalmic Solution for SubLingual Use 2Drop(s) SubLingual every 4 hours PRN excessive secretions  mineral oil enema 133milliLiter(s) Rectal once PRN persistent constipation  dextrose Gel 1Dose(s) Oral once PRN Blood Glucose LESS THAN 70 milliGRAM(s)/deciliter  dextrose Gel 1Dose(s) Oral once PRN Blood Glucose LESS THAN 70 milliGRAM(s)/deciliter      Allergies    No Known Allergies    Intolerances        Vital Signs Last 24 Hrs  T(C): 36.8, Max: 37.2 (06-14 @ 23:29)  T(F): 98.3, Max: 99 (06-14 @ 23:29)  HR: 94 (78 - 94)  BP: 115/60 (76/54 - 125/70)  BP(mean): --  RR: 18 (18 - 20)  SpO2: 98% (98% - 100%)  Daily     Daily     PHYSICAL EXAM:  Awake/alert; NAD  chest Clear; catheter site clean  No JVD  No rub  abd soft, NT BS +  Tr edema    LABS:                        9.4    5.0   )-----------( 101      ( 14 Jun 2017 08:10 )             31.1     06-14    137  |  96<L>  |  36.0<H>  ----------------------------<  100  5.0   |  29.0  |  4.04<H>    Ca    9.1      14 Jun 2017 08:10  Phos  3.0     06-14                  RADIOLOGY & ADDITIONAL TESTS:
NEPHROLOGY INTERVAL HPI/OVERNIGHT EVENTS:    Examined earlier  Looks comfortable    MEDICATIONS  (STANDING):  piperacillin/tazobactam IVPB. 2.25Gram(s) IV Intermittent every 8 hours  calcium acetate 667milliGRAM(s) Oral three times a day with meals  carvedilol 3.125milliGRAM(s) Oral every 12 hours  ferrous    sulfate 325milliGRAM(s) Oral daily  tamsulosin 0.4milliGRAM(s) Oral at bedtime  gabapentin 300milliGRAM(s) Oral three times a day  insulin lispro (HumaLOG) corrective regimen sliding scale  SubCutaneous three times a day before meals  dextrose 5%. 1000milliLiter(s) IV Continuous <Continuous>  dextrose 50% Injectable 12.5Gram(s) IV Push once  dextrose 50% Injectable 25Gram(s) IV Push once  dextrose 50% Injectable 25Gram(s) IV Push once  artificial  tears Solution 1Drop(s) Both EYES two times a day  heparin  Injectable 5000Unit(s) SubCutaneous every 12 hours  iron sucrose IVPB 100milliGRAM(s) IV Intermittent every 48 hours  lisinopril 2.5milliGRAM(s) Oral daily  insulin glargine Injectable (LANTUS) 10Unit(s) SubCutaneous at bedtime  bisacodyl Suppository 10milliGRAM(s) Rectal once  acetaminophen   Tablet. 650milliGRAM(s) Oral every 8 hours  polyethylene glycol 3350 17Gram(s) Oral at bedtime    MEDICATIONS  (PRN):  dextrose Gel 1Dose(s) Oral once PRN Blood Glucose LESS THAN 70 milliGRAM(s)/deciliter  glucagon  Injectable 1milliGRAM(s) IntraMuscular once PRN Glucose LESS THAN 70 milligrams/deciliter  HYDROmorphone  Injectable 0.5milliGRAM(s) IV Push every 4 hours PRN dyspnea  atropine 1% Ophthalmic Solution for SubLingual Use 2Drop(s) SubLingual every 4 hours PRN excessive secretions  mineral oil enema 133milliLiter(s) Rectal once PRN persistent constipation      Allergies    No Known Allergies    Intolerances        Vital Signs Last 24 Hrs  T(C): 37.2, Max: 37.2 (- @ 15:00)  T(F): 98.9, Max: 98.9 ( @ 15:00)  HR: 84 (83 - 92)  BP: 83/42 (83/42 - 115/52)  BP(mean): --  RR: 16 (16 - 20)  SpO2: 95% (95% - 99%)  Daily     Daily Weight in k.3 (2017 09:18)    PHYSICAL EXAM:  NAD  lungs - CTA ex occ rhonchi  CV gr 1 murmer,  No gallop or rub  Abd : soft, NT BS +, No masses  Ext- No edema  Neuro : Grossly intact, moving extremities       LABS:                        7.9    5.2   )-----------( 83       ( 2017 08:08 )             26.0     06-02    142  |  98  |  31.0<H>  ----------------------------<  119<H>  3.5   |  29.0  |  3.16<H>    Ca    8.5<L>      2017 08:08                  RADIOLOGY & ADDITIONAL TESTS:
NEPHROLOGY INTERVAL HPI/OVERNIGHT EVENTS:    Examined earlier  Looks comfortable   HD candie    MEDICATIONS  (STANDING):  calcium acetate 667milliGRAM(s) Oral three times a day with meals  ferrous    sulfate 325milliGRAM(s) Oral daily  tamsulosin 0.4milliGRAM(s) Oral at bedtime  gabapentin 300milliGRAM(s) Oral three times a day  insulin lispro (HumaLOG) corrective regimen sliding scale  SubCutaneous three times a day before meals  dextrose 5%. 1000milliLiter(s) IV Continuous <Continuous>  dextrose 50% Injectable 12.5Gram(s) IV Push once  dextrose 50% Injectable 25Gram(s) IV Push once  dextrose 50% Injectable 25Gram(s) IV Push once  artificial  tears Solution 1Drop(s) Both EYES two times a day  acetaminophen   Tablet. 650milliGRAM(s) Oral every 8 hours  polyethylene glycol 3350 17Gram(s) Oral at bedtime  epoetin una Injectable 29379Ttko(s) IV Push <User Schedule>  freetext medication  - 1Drop(s) Right EYE four times a day  midodrine 10milliGRAM(s) Oral <User Schedule>  heparin  Injectable 5000Unit(s) SubCutaneous every 12 hours    MEDICATIONS  (PRN):  dextrose Gel 1Dose(s) Oral once PRN Blood Glucose LESS THAN 70 milliGRAM(s)/deciliter  glucagon  Injectable 1milliGRAM(s) IntraMuscular once PRN Glucose LESS THAN 70 milligrams/deciliter  atropine 1% Ophthalmic Solution for SubLingual Use 2Drop(s) SubLingual every 4 hours PRN excessive secretions  mineral oil enema 133milliLiter(s) Rectal once PRN persistent constipation  dextrose Gel 1Dose(s) Oral once PRN Blood Glucose LESS THAN 70 milliGRAM(s)/deciliter  dextrose Gel 1Dose(s) Oral once PRN Blood Glucose LESS THAN 70 milliGRAM(s)/deciliter      Allergies    No Known Allergies    Intolerances        Vital Signs Last 24 Hrs  T(C): 37, Max: 37.1 (06-18 @ 17:55)  T(F): 98.6, Max: 98.8 (06-18 @ 17:55)  HR: 92 (87 - 92)  BP: 105/55 (92/53 - 111/64)  BP(mean): --  RR: 20 (18 - 20)  SpO2: 99% (98% - 100%)  Daily     Daily     PHYSICAL EXAM:  Awake/alert; NAD  chest Clear; catheter site clean  No JVD  No rub  abd soft, NT BS +  Tr edema      LABS:                      RADIOLOGY & ADDITIONAL TESTS:
NEPHROLOGY INTERVAL HPI/OVERNIGHT EVENTS:    Examined earlier  Looks comfortable  HD candie    MEDICATIONS  (STANDING):  calcium acetate 667milliGRAM(s) Oral three times a day with meals  ferrous    sulfate 325milliGRAM(s) Oral daily  tamsulosin 0.4milliGRAM(s) Oral at bedtime  gabapentin 300milliGRAM(s) Oral three times a day  insulin lispro (HumaLOG) corrective regimen sliding scale  SubCutaneous three times a day before meals  dextrose 5%. 1000milliLiter(s) IV Continuous <Continuous>  dextrose 50% Injectable 12.5Gram(s) IV Push once  dextrose 50% Injectable 25Gram(s) IV Push once  dextrose 50% Injectable 25Gram(s) IV Push once  artificial  tears Solution 1Drop(s) Both EYES two times a day  heparin  Injectable 5000Unit(s) SubCutaneous every 12 hours  acetaminophen   Tablet. 650milliGRAM(s) Oral every 8 hours  polyethylene glycol 3350 17Gram(s) Oral at bedtime  epoetin una Injectable 98045Zqiz(s) IV Push <User Schedule>  freetext medication  - 1Drop(s) Right EYE four times a day  midodrine 10milliGRAM(s) Oral <User Schedule>    MEDICATIONS  (PRN):  dextrose Gel 1Dose(s) Oral once PRN Blood Glucose LESS THAN 70 milliGRAM(s)/deciliter  glucagon  Injectable 1milliGRAM(s) IntraMuscular once PRN Glucose LESS THAN 70 milligrams/deciliter  atropine 1% Ophthalmic Solution for SubLingual Use 2Drop(s) SubLingual every 4 hours PRN excessive secretions  mineral oil enema 133milliLiter(s) Rectal once PRN persistent constipation  dextrose Gel 1Dose(s) Oral once PRN Blood Glucose LESS THAN 70 milliGRAM(s)/deciliter  dextrose Gel 1Dose(s) Oral once PRN Blood Glucose LESS THAN 70 milliGRAM(s)/deciliter      Allergies    No Known Allergies    Intolerances        Vital Signs Last 24 Hrs  T(C): 36.3, Max: 37.2 (-18 @ 00:06)  T(F): 97.4, Max: 98.9 (-18 @ 00:06)  HR: 101 (88 - 101)  BP: 100/62 (88/46 - 118/68)  BP(mean): --  RR: 18 (18 - 18)  SpO2: 91% (91% - 100%)  Daily     Daily Weight in k.2 (2017 10:37)    PHYSICAL EXAM:  Awake/alert; NAD  chest Clear; catheter site clean  No JVD  No rub  abd soft, NT BS +  Tr edema    LABS:                        9.4    5.6   )-----------( 95       ( 2017 08:15 )             31.1     06-17    138  |  98  |  47.0<H>  ----------------------------<  104  5.1   |  25.0  |  4.92<H>    Ca    8.7      2017 08:15  Phos  2.8     06-17                  RADIOLOGY & ADDITIONAL TESTS:
NEPHROLOGY INTERVAL HPI/OVERNIGHT EVENTS:    MEDICATIONS  (STANDING):  piperacillin/tazobactam IVPB. 2.25Gram(s) IV Intermittent every 8 hours  buMETAnide 0.5milliGRAM(s) Oral two times a day  calcium acetate 667milliGRAM(s) Oral three times a day with meals  carvedilol 3.125milliGRAM(s) Oral every 12 hours  ferrous    sulfate 325milliGRAM(s) Oral daily  tamsulosin 0.4milliGRAM(s) Oral at bedtime  gabapentin 300milliGRAM(s) Oral three times a day  insulin lispro (HumaLOG) corrective regimen sliding scale  SubCutaneous three times a day before meals  dextrose 5%. 1000milliLiter(s) IV Continuous <Continuous>  dextrose 50% Injectable 12.5Gram(s) IV Push once  dextrose 50% Injectable 25Gram(s) IV Push once  dextrose 50% Injectable 25Gram(s) IV Push once  midodrine 2.5milliGRAM(s) Oral every 8 hours  artificial  tears Solution 1Drop(s) Both EYES two times a day  heparin  Injectable 5000Unit(s) SubCutaneous every 12 hours  iron sucrose IVPB 100milliGRAM(s) IV Intermittent every 48 hours  epoetin una Injectable 32463Cias(s) IV Push once    MEDICATIONS  (PRN):  dextrose Gel 1Dose(s) Oral once PRN Blood Glucose LESS THAN 70 milliGRAM(s)/deciliter  glucagon  Injectable 1milliGRAM(s) IntraMuscular once PRN Glucose LESS THAN 70 milligrams/deciliter      Allergies    No Known Allergies    Intolerances        Vital Signs Last 24 Hrs  T(C): 36.7, Max: 36.8 (05-29 @ 12:54)  T(F): 98, Max: 98.3 (05-29 @ 20:36)  HR: 91 (91 - 102)  BP: 101/52 (101/52 - 149/81)  BP(mean): 86 (86 - 86)  RR: 20 (20 - 26)  SpO2: 99% (93% - 100%)  Daily Height in cm: 180.34 (29 May 2017 12:54)    Daily     PHYSICAL EXAM:    GENERAL:  appears acutely ill  HEAD: wnl   EYES:   ENMT:   NECK:  veins not distended but full  NERVOUS SYSTEM:  awake, verbal  CHEST/LUNG: decreased bs bases  HEART: Pacer left upper chest, no rub.  ABDOMEN: not tender  EXTREMITIES: muscle wasting chronic   LYMPH:   SKIN: no rash    LABS:                        8.7    5.7   )-----------( 85       ( 29 May 2017 13:37 )             29.5     05-30    137  |  94<L>  |  56.0<H>  ----------------------------<  138<H>  5.0   |  29.0  |  2.79<H>    Ca    8.8      30 May 2017 08:45    TPro  7.6  /  Alb  3.4  /  TBili  0.4  /  DBili  x   /  AST  26  /  ALT  19  /  AlkPhos  109  05-29    PT/INR - ( 29 May 2017 14:42 )   PT: 12.7 sec;   INR: 1.15 ratio         PTT - ( 29 May 2017 14:42 )  PTT:32.8 sec            RADIOLOGY & ADDITIONAL TESTS:
NEPHROLOGY INTERVAL HPI/OVERNIGHT EVENTS:  pt awake and comfortable  no acute distress  HD deferred yesterday due to hypotension    MEDICATIONS  (STANDING):  calcium acetate 667milliGRAM(s) Oral three times a day with meals  ferrous    sulfate 325milliGRAM(s) Oral daily  tamsulosin 0.4milliGRAM(s) Oral at bedtime  gabapentin 300milliGRAM(s) Oral three times a day  insulin lispro (HumaLOG) corrective regimen sliding scale  SubCutaneous three times a day before meals  dextrose 5%. 1000milliLiter(s) IV Continuous <Continuous>  dextrose 50% Injectable 12.5Gram(s) IV Push once  dextrose 50% Injectable 25Gram(s) IV Push once  dextrose 50% Injectable 25Gram(s) IV Push once  artificial  tears Solution 1Drop(s) Both EYES two times a day  heparin  Injectable 5000Unit(s) SubCutaneous every 12 hours  bisacodyl Suppository 10milliGRAM(s) Rectal once  acetaminophen   Tablet. 650milliGRAM(s) Oral every 8 hours  polyethylene glycol 3350 17Gram(s) Oral at bedtime  epoetin una Injectable 12145Drvt(s) IV Push <User Schedule>  iron sucrose IVPB 100milliGRAM(s) IV Intermittent <User Schedule>    MEDICATIONS  (PRN):  dextrose Gel 1Dose(s) Oral once PRN Blood Glucose LESS THAN 70 milliGRAM(s)/deciliter  glucagon  Injectable 1milliGRAM(s) IntraMuscular once PRN Glucose LESS THAN 70 milligrams/deciliter  atropine 1% Ophthalmic Solution for SubLingual Use 2Drop(s) SubLingual every 4 hours PRN excessive secretions  mineral oil enema 133milliLiter(s) Rectal once PRN persistent constipation  dextrose Gel 1Dose(s) Oral once PRN Blood Glucose LESS THAN 70 milliGRAM(s)/deciliter  dextrose Gel 1Dose(s) Oral once PRN Blood Glucose LESS THAN 70 milliGRAM(s)/deciliter  HYDROmorphone  Injectable 0.5milliGRAM(s) IV Push every 4 hours PRN Severe Pain (7 - 10)  HYDROmorphone   Tablet 2milliGRAM(s) Oral every 4 hours PRN Moderate Pain (4 - 6)      Allergies    No Known Allergies        Vital Signs Last 24 Hrs  T(C): 36.4, Max: 36.5 (06-08 @ 16:07)  T(F): 97.5, Max: 97.7 (06-08 @ 16:07)  HR: 85 (77 - 88)  BP: 133/68 (100/54 - 133/68)  BP(mean): --  RR: 18 (18 - 18)  SpO2: 99% (99% - 100%)    PHYSICAL EXAM:  Awake/alert; NAD  chest Clear; catheter site clean  No JVD  No rub  abd soft, NT BS +  Tr edema    LABS:                        8.3    3.5   )-----------( 87       ( 08 Jun 2017 10:40 )             27.3     06-08    138  |  98  |  35.0<H>  ----------------------------<  103  5.1   |  28.0  |  3.88<H>    Ca    8.2<L>      08 Jun 2017 10:40              RADIOLOGY & ADDITIONAL TESTS:
NEPHROLOGY INTERVAL HPI/OVERNIGHT EVENTS:  pt clinically stable  no acute issues now    MEDICATIONS  (STANDING):  calcium acetate 667milliGRAM(s) Oral three times a day with meals  ferrous    sulfate 325milliGRAM(s) Oral daily  tamsulosin 0.4milliGRAM(s) Oral at bedtime  gabapentin 300milliGRAM(s) Oral three times a day  insulin lispro (HumaLOG) corrective regimen sliding scale  SubCutaneous three times a day before meals  dextrose 5%. 1000milliLiter(s) IV Continuous <Continuous>  dextrose 50% Injectable 12.5Gram(s) IV Push once  dextrose 50% Injectable 25Gram(s) IV Push once  dextrose 50% Injectable 25Gram(s) IV Push once  artificial  tears Solution 1Drop(s) Both EYES two times a day  heparin  Injectable 5000Unit(s) SubCutaneous every 12 hours  bisacodyl Suppository 10milliGRAM(s) Rectal once  acetaminophen   Tablet. 650milliGRAM(s) Oral every 8 hours  polyethylene glycol 3350 17Gram(s) Oral at bedtime  iron sucrose IVPB 100milliGRAM(s) IV Intermittent <User Schedule>  midodrine 5milliGRAM(s) Oral <User Schedule>  epoetin una Injectable 82287Uenp(s) IV Push every other day    MEDICATIONS  (PRN):  dextrose Gel 1Dose(s) Oral once PRN Blood Glucose LESS THAN 70 milliGRAM(s)/deciliter  glucagon  Injectable 1milliGRAM(s) IntraMuscular once PRN Glucose LESS THAN 70 milligrams/deciliter  atropine 1% Ophthalmic Solution for SubLingual Use 2Drop(s) SubLingual every 4 hours PRN excessive secretions  mineral oil enema 133milliLiter(s) Rectal once PRN persistent constipation  dextrose Gel 1Dose(s) Oral once PRN Blood Glucose LESS THAN 70 milliGRAM(s)/deciliter  dextrose Gel 1Dose(s) Oral once PRN Blood Glucose LESS THAN 70 milliGRAM(s)/deciliter  HYDROmorphone  Injectable 0.5milliGRAM(s) IV Push every 4 hours PRN Severe Pain (7 - 10)  HYDROmorphone   Tablet 2milliGRAM(s) Oral every 4 hours PRN Moderate Pain (4 - 6)      Allergies    No Known Allergies    Intolerances            Vital Signs Last 24 Hrs  T(C): 36.8, Max: 37.3 (06-09 @ 23:42)  T(F): 98.3, Max: 99.2 (06-09 @ 23:42)  HR: 77 (77 - 98)  BP: 104/50 (104/50 - 120/56)  BP(mean): --  RR: 18 (18 - 20)  SpO2: 100% (98% - 100%)    PHYSICAL EXAM:  Awake/alert; NAD  chest Clear; catheter site clean  No JVD  No rub  abd soft, NT BS +  Tr edema    LABS:                        8.3    3.5   )-----------( 87       ( 08 Jun 2017 10:40 )             27.3     06-08    138  |  98  |  35.0<H>  ----------------------------<  103  5.1   |  28.0  |  3.88<H>    Ca    8.2<L>      08 Jun 2017 10:40              RADIOLOGY & ADDITIONAL TESTS:
NEPHROLOGY INTERVAL HPI/OVERNIGHT EVENTS:  pt resting today  no acute distress  tolerated HD yesterday    MEDICATIONS  (STANDING):  calcium acetate 667milliGRAM(s) Oral three times a day with meals  ferrous    sulfate 325milliGRAM(s) Oral daily  tamsulosin 0.4milliGRAM(s) Oral at bedtime  gabapentin 300milliGRAM(s) Oral three times a day  insulin lispro (HumaLOG) corrective regimen sliding scale  SubCutaneous three times a day before meals  dextrose 5%. 1000milliLiter(s) IV Continuous <Continuous>  dextrose 50% Injectable 12.5Gram(s) IV Push once  dextrose 50% Injectable 25Gram(s) IV Push once  dextrose 50% Injectable 25Gram(s) IV Push once  artificial  tears Solution 1Drop(s) Both EYES two times a day  heparin  Injectable 5000Unit(s) SubCutaneous every 12 hours  acetaminophen   Tablet. 650milliGRAM(s) Oral every 8 hours  polyethylene glycol 3350 17Gram(s) Oral at bedtime  iron sucrose IVPB 100milliGRAM(s) IV Intermittent <User Schedule>  midodrine 5milliGRAM(s) Oral <User Schedule>  epoetin una Injectable 87757Sclc(s) IV Push every other day  latanoprost 0.005% Ophthalmic Solution 1Drop(s) Right EYE daily    MEDICATIONS  (PRN):  dextrose Gel 1Dose(s) Oral once PRN Blood Glucose LESS THAN 70 milliGRAM(s)/deciliter  glucagon  Injectable 1milliGRAM(s) IntraMuscular once PRN Glucose LESS THAN 70 milligrams/deciliter  atropine 1% Ophthalmic Solution for SubLingual Use 2Drop(s) SubLingual every 4 hours PRN excessive secretions  mineral oil enema 133milliLiter(s) Rectal once PRN persistent constipation  dextrose Gel 1Dose(s) Oral once PRN Blood Glucose LESS THAN 70 milliGRAM(s)/deciliter  dextrose Gel 1Dose(s) Oral once PRN Blood Glucose LESS THAN 70 milliGRAM(s)/deciliter      Allergies    No Known Allergies        Vital Signs Last 24 Hrs  T(C): 36.2, Max: 36.9 (06-12 @ 23:31)  T(F): 97.2, Max: 98.5 (06-12 @ 23:31)  HR: 71 (71 - 98)  BP: 101/44 (101/44 - 122/56)  BP(mean): --  RR: 20 (18 - 20)  SpO2: 100% (93% - 100%)    PHYSICAL EXAM:  Awake/alert; NAD  chest Clear; catheter site clean  No JVD  No rub  abd soft, NT BS +  Tr edema    LABS:                        8.8    4.2   )-----------( 102      ( 12 Jun 2017 12:34 )             30.2     06-12    141  |  101  |  48.0<H>  ----------------------------<  118<H>  5.6<H>   |  29.0  |  4.49<H>    Ca    8.9      12 Jun 2017 12:34  Phos  3.4     06-12    TPro  6.8  /  Alb  3.3  /  TBili  0.3<L>  /  DBili  x   /  AST  21  /  ALT  12  /  AlkPhos  98  06-12        Phosphorus Level, Serum: 3.4 mg/dL (06-12 @ 12:34)      RADIOLOGY & ADDITIONAL TESTS:
NEPHROLOGY INTERVAL HPI/OVERNIGHT EVENTS:  pt seen earlier  no acute distress    MEDICATIONS  (STANDING):  calcium acetate 667milliGRAM(s) Oral three times a day with meals  carvedilol 3.125milliGRAM(s) Oral every 12 hours  ferrous    sulfate 325milliGRAM(s) Oral daily  tamsulosin 0.4milliGRAM(s) Oral at bedtime  gabapentin 300milliGRAM(s) Oral three times a day  insulin lispro (HumaLOG) corrective regimen sliding scale  SubCutaneous three times a day before meals  dextrose 5%. 1000milliLiter(s) IV Continuous <Continuous>  dextrose 50% Injectable 12.5Gram(s) IV Push once  dextrose 50% Injectable 25Gram(s) IV Push once  dextrose 50% Injectable 25Gram(s) IV Push once  artificial  tears Solution 1Drop(s) Both EYES two times a day  heparin  Injectable 5000Unit(s) SubCutaneous every 12 hours  lisinopril 2.5milliGRAM(s) Oral daily  bisacodyl Suppository 10milliGRAM(s) Rectal once  acetaminophen   Tablet. 650milliGRAM(s) Oral every 8 hours  polyethylene glycol 3350 17Gram(s) Oral at bedtime  epoetin una Injectable [id number](s) IV Push <User Schedule>  iron sucrose IVPB 100milliGRAM(s) IV Intermittent <User Schedule>    MEDICATIONS  (PRN):  dextrose Gel 1Dose(s) Oral once PRN Blood Glucose LESS THAN 70 milliGRAM(s)/deciliter  glucagon  Injectable 1milliGRAM(s) IntraMuscular once PRN Glucose LESS THAN 70 milligrams/deciliter  atropine 1% Ophthalmic Solution for SubLingual Use 2Drop(s) SubLingual every 4 hours PRN excessive secretions  mineral oil enema 133milliLiter(s) Rectal once PRN persistent constipation  dextrose Gel 1Dose(s) Oral once PRN Blood Glucose LESS THAN 70 milliGRAM(s)/deciliter  dextrose Gel 1Dose(s) Oral once PRN Blood Glucose LESS THAN 70 milliGRAM(s)/deciliter  HYDROmorphone  Injectable 0.5milliGRAM(s) IV Push every 4 hours PRN Severe Pain (7 - 10)  HYDROmorphone   Tablet 2milliGRAM(s) Oral every 4 hours PRN Moderate Pain (4 - 6)      Allergies    No Known Allergies        Vital Signs Last 24 Hrs  T(C): 36.8, Max: 36.9 (06-07 @ 23:33)  T(F): 98.2, Max: 98.5 (06-07 @ 23:33)  HR: 78 (74 - 86)  BP: 93/54 (93/54 - 140/76)  BP(mean): --  RR: 18 (18 - 18)  SpO2: 97% (97% - 100%)    PHYSICAL EXAM:  Awake/alert; NAD  chest Clear; catheter site clean  No JVD  No rub  abd soft, NT BS +  Tr edema    LABS:                        8.3    3.5   )-----------( 87       ( 08 Jun 2017 10:40 )             27.3     06-08    138  |  98  |  35.0<H>  ----------------------------<  103  5.1   |  28.0  |  3.88<H>    Ca    8.2<L>      08 Jun 2017 10:40              RADIOLOGY & ADDITIONAL TESTS:
NEPHROLOGY INTERVAL HPI/OVERNIGHT EVENTS: No new events.    MEDICATIONS  (STANDING):  calcium acetate 667milliGRAM(s) Oral three times a day with meals  ferrous    sulfate 325milliGRAM(s) Oral daily  tamsulosin 0.4milliGRAM(s) Oral at bedtime  gabapentin 300milliGRAM(s) Oral three times a day  insulin lispro (HumaLOG) corrective regimen sliding scale  SubCutaneous three times a day before meals  dextrose 5%. 1000milliLiter(s) IV Continuous <Continuous>  dextrose 50% Injectable 12.5Gram(s) IV Push once  dextrose 50% Injectable 25Gram(s) IV Push once  dextrose 50% Injectable 25Gram(s) IV Push once  artificial  tears Solution 1Drop(s) Both EYES two times a day  heparin  Injectable 5000Unit(s) SubCutaneous every 12 hours  acetaminophen   Tablet. 650milliGRAM(s) Oral every 8 hours  polyethylene glycol 3350 17Gram(s) Oral at bedtime  midodrine 5milliGRAM(s) Oral <User Schedule>  epoetin una Injectable 32546Hvhc(s) IV Push <User Schedule>  freetext medication  - 1Drop(s) Right EYE four times a day    MEDICATIONS  (PRN):  dextrose Gel 1Dose(s) Oral once PRN Blood Glucose LESS THAN 70 milliGRAM(s)/deciliter  glucagon  Injectable 1milliGRAM(s) IntraMuscular once PRN Glucose LESS THAN 70 milligrams/deciliter  atropine 1% Ophthalmic Solution for SubLingual Use 2Drop(s) SubLingual every 4 hours PRN excessive secretions  mineral oil enema 133milliLiter(s) Rectal once PRN persistent constipation  dextrose Gel 1Dose(s) Oral once PRN Blood Glucose LESS THAN 70 milliGRAM(s)/deciliter  dextrose Gel 1Dose(s) Oral once PRN Blood Glucose LESS THAN 70 milliGRAM(s)/deciliter      Allergies    No Known Allergies    Intolerances        Vital Signs Last 24 Hrs  T(C): 36.6, Max: 37 (06-14 @ 00:48)  T(F): 97.8, Max: 98.6 (06-14 @ 00:48)  HR: 79 (70 - 79)  BP: 97/50 (97/50 - 112/60)  BP(mean): --  RR: 18 (18 - 18)  SpO2: 100% (93% - 100%)  Daily     Daily Weight in k (2017 07:05)    PHYSICAL EXAM:    GENERAL: appears chronically ill  HEAD:  wnl  EYES:   ENMT:   NECK: permacath site clean  NERVOUS SYSTEM:  same  CHEST/LUNG: clear, no 02  HEART: no rub or  gallop   ABDOMEN: soft with bs  EXTREMITIES: diffuse muscle wasting , no edema  LYMPH:   SKIN: no rash    LABS:                         9.4    5.0   )-----------( 101      ( 2017 08:10 )             31.1         137  |  96<L>  |  36.0<H>  ----------------------------<  100  5.0   |  29.0  |  4.04<H>    Ca    9.1      2017 08:10  Phos  3.0         TPro  6.8  /  Alb  3.3  /  TBili  0.3<L>  /  DBili  x   /  AST  21  /  ALT  12  /  AlkPhos  98  06-12        Phosphorus Level, Serum: 3.0 mg/dL ( @ 08:10)          RADIOLOGY & ADDITIONAL TESTS:
NEPHROLOGY INTERVAL HPI/OVERNIGHT EVENTS: No new events.    MEDICATIONS  (STANDING):  calcium acetate 667milliGRAM(s) Oral three times a day with meals  ferrous    sulfate 325milliGRAM(s) Oral daily  tamsulosin 0.4milliGRAM(s) Oral at bedtime  gabapentin 300milliGRAM(s) Oral three times a day  insulin lispro (HumaLOG) corrective regimen sliding scale  SubCutaneous three times a day before meals  dextrose 5%. 1000milliLiter(s) IV Continuous <Continuous>  dextrose 50% Injectable 12.5Gram(s) IV Push once  dextrose 50% Injectable 25Gram(s) IV Push once  dextrose 50% Injectable 25Gram(s) IV Push once  artificial  tears Solution 1Drop(s) Both EYES two times a day  heparin  Injectable 5000Unit(s) SubCutaneous every 12 hours  acetaminophen   Tablet. 650milliGRAM(s) Oral every 8 hours  polyethylene glycol 3350 17Gram(s) Oral at bedtime  midodrine 5milliGRAM(s) Oral <User Schedule>  epoetin una Injectable 84562Vjms(s) IV Push <User Schedule>  freetext medication  - 1Drop(s) Right EYE four times a day    MEDICATIONS  (PRN):  dextrose Gel 1Dose(s) Oral once PRN Blood Glucose LESS THAN 70 milliGRAM(s)/deciliter  glucagon  Injectable 1milliGRAM(s) IntraMuscular once PRN Glucose LESS THAN 70 milligrams/deciliter  atropine 1% Ophthalmic Solution for SubLingual Use 2Drop(s) SubLingual every 4 hours PRN excessive secretions  mineral oil enema 133milliLiter(s) Rectal once PRN persistent constipation  dextrose Gel 1Dose(s) Oral once PRN Blood Glucose LESS THAN 70 milliGRAM(s)/deciliter  dextrose Gel 1Dose(s) Oral once PRN Blood Glucose LESS THAN 70 milliGRAM(s)/deciliter      Allergies    No Known Allergies    Intolerances        Vital Signs Last 24 Hrs  T(C): 36.6, Max: 37 (06-14 @ 00:48)  T(F): 97.8, Max: 98.6 (06-14 @ 00:48)  HR: 79 (70 - 79)  BP: 97/50 (97/50 - 112/60)  BP(mean): --  RR: 18 (18 - 18)  SpO2: 100% (93% - 100%)  Daily     Daily Weight in k (2017 07:05)    PHYSICAL EXAM:    GENERAL: appears chronically ill  HEAD:  wnl  EYES:   ENMT:   NECK: permacath site clean  NERVOUS SYSTEM:  same  CHEST/LUNG: clear, nasal 02  HEART: no rub or  gallop   ABDOMEN: soft with bs  EXTREMITIES: diffuse muscle wasting   LYMPH:   SKIN: no rash    LABS:                        9.4    5.0   )-----------( 101      ( 2017 08:10 )             31.1         137  |  96<L>  |  36.0<H>  ----------------------------<  100  5.0   |  29.0  |  4.04<H>    Ca    9.1      2017 08:10  Phos  3.0         TPro  6.8  /  Alb  3.3  /  TBili  0.3<L>  /  DBili  x   /  AST  21  /  ALT  12  /  AlkPhos  98  06-12        Phosphorus Level, Serum: 3.0 mg/dL ( @ 08:10)          RADIOLOGY & ADDITIONAL TESTS:
NEPHROLOGY INTERVAL HPI/OVERNIGHT EVENTS: no new events    Examined earlier  Looks comfortable    MEDICATIONS  (STANDING):  piperacillin/tazobactam IVPB. 2.25Gram(s) IV Intermittent every 8 hours  calcium acetate 667milliGRAM(s) Oral three times a day with meals  carvedilol 3.125milliGRAM(s) Oral every 12 hours  ferrous    sulfate 325milliGRAM(s) Oral daily  tamsulosin 0.4milliGRAM(s) Oral at bedtime  gabapentin 300milliGRAM(s) Oral three times a day  insulin lispro (HumaLOG) corrective regimen sliding scale  SubCutaneous three times a day before meals  dextrose 5%. 1000milliLiter(s) IV Continuous <Continuous>  dextrose 50% Injectable 12.5Gram(s) IV Push once  dextrose 50% Injectable 25Gram(s) IV Push once  dextrose 50% Injectable 25Gram(s) IV Push once  artificial  tears Solution 1Drop(s) Both EYES two times a day  heparin  Injectable 5000Unit(s) SubCutaneous every 12 hours  iron sucrose IVPB 100milliGRAM(s) IV Intermittent every 48 hours  lisinopril 2.5milliGRAM(s) Oral daily  insulin glargine Injectable (LANTUS) 10Unit(s) SubCutaneous at bedtime  bisacodyl Suppository 10milliGRAM(s) Rectal once  acetaminophen   Tablet. 650milliGRAM(s) Oral every 8 hours  polyethylene glycol 3350 17Gram(s) Oral at bedtime    MEDICATIONS  (PRN):  dextrose Gel 1Dose(s) Oral once PRN Blood Glucose LESS THAN 70 milliGRAM(s)/deciliter  glucagon  Injectable 1milliGRAM(s) IntraMuscular once PRN Glucose LESS THAN 70 milligrams/deciliter  HYDROmorphone  Injectable 0.5milliGRAM(s) IV Push every 4 hours PRN dyspnea  atropine 1% Ophthalmic Solution for SubLingual Use 2Drop(s) SubLingual every 4 hours PRN excessive secretions  mineral oil enema 133milliLiter(s) Rectal once PRN persistent constipation      Allergies    No Known Allergies    Intolerances        Vital Signs Last 24 Hrs  T(C): 37.2, Max: 37.2 ( @ 15:00)  T(F): 98.9, Max: 98.9 ( @ 15:00)  HR: 84 (83 - 92)  BP: 83/42 (83/42 - 115/52)  BP(mean): --  RR: 16 (16 - 20)  SpO2: 95% (95% - 99%)  Daily     Daily Weight in k.3 (2017 09:18)    PHYSICAL EXAM:  NAD  lungs -  rare rhonchi  CV gr 1  murmur,  No gallop or rub  Abd : soft, NT BS +, No masses  Ext- No edema  Neuro : Grossly intact, moving extremities       LABS:    143  |  100  |  43.0<H>  ----------------------------<  144<H>  4.0   |  29.0  |  4.83<H>    Ca    8.8      2017 10:13  Phos  4.1     06-03                            7.9    5.2   )-----------( 83       ( 2017 08:08 )             26.0     06-02    142  |  98  |  31.0<H>  ----------------------------<  119<H>  3.5   |  29.0  |  3.16<H>    Ca    8.5<L>      2017 08:08                  RADIOLOGY & ADDITIONAL TESTS:
NEPHROLOGY INTERVAL HPI/OVERNIGHT EVENTS: no new events    Examined earlier  Looks comfortable    MEDICATIONS  (STANDING):  piperacillin/tazobactam IVPB. 2.25Gram(s) IV Intermittent every 8 hours  calcium acetate 667milliGRAM(s) Oral three times a day with meals  carvedilol 3.125milliGRAM(s) Oral every 12 hours  ferrous    sulfate 325milliGRAM(s) Oral daily  tamsulosin 0.4milliGRAM(s) Oral at bedtime  gabapentin 300milliGRAM(s) Oral three times a day  insulin lispro (HumaLOG) corrective regimen sliding scale  SubCutaneous three times a day before meals  dextrose 5%. 1000milliLiter(s) IV Continuous <Continuous>  dextrose 50% Injectable 12.5Gram(s) IV Push once  dextrose 50% Injectable 25Gram(s) IV Push once  dextrose 50% Injectable 25Gram(s) IV Push once  artificial  tears Solution 1Drop(s) Both EYES two times a day  heparin  Injectable 5000Unit(s) SubCutaneous every 12 hours  iron sucrose IVPB 100milliGRAM(s) IV Intermittent every 48 hours  lisinopril 2.5milliGRAM(s) Oral daily  insulin glargine Injectable (LANTUS) 10Unit(s) SubCutaneous at bedtime  bisacodyl Suppository 10milliGRAM(s) Rectal once  acetaminophen   Tablet. 650milliGRAM(s) Oral every 8 hours  polyethylene glycol 3350 17Gram(s) Oral at bedtime    MEDICATIONS  (PRN):  dextrose Gel 1Dose(s) Oral once PRN Blood Glucose LESS THAN 70 milliGRAM(s)/deciliter  glucagon  Injectable 1milliGRAM(s) IntraMuscular once PRN Glucose LESS THAN 70 milligrams/deciliter  HYDROmorphone  Injectable 0.5milliGRAM(s) IV Push every 4 hours PRN dyspnea  atropine 1% Ophthalmic Solution for SubLingual Use 2Drop(s) SubLingual every 4 hours PRN excessive secretions  mineral oil enema 133milliLiter(s) Rectal once PRN persistent constipation      Allergies    No Known Allergies    Intolerances        Vital Signs Last 24 Hrs  T(C): 37.2, Max: 37.2 ( @ 15:00)  T(F): 98.9, Max: 98.9 ( @ 15:00)  HR: 84 (83 - 92)  BP: 83/42 (83/42 - 115/52)  BP(mean): --  RR: 16 (16 - 20)  SpO2: 95% (95% - 99%)  Daily     Daily Weight in k.3 (2017 09:18)    PHYSICAL EXAM:  NAD  lungs -  rare rhonchi, nasal 02  CV gr 1  murmur,  No gallop or rub  Abd : soft, NT BS +, No masses  Ext- No edema; diffuse muscle wasting  Neuro : Grossly intact, moving extremities       LABS:    143  |  100  |  43.0<H>  ----------------------------<  144<H>  4.0   |  29.0  |  4.83<H>    Ca    8.8      2017 10:13  Phos  4.1     06-                            7.9    5.2   )-----------( 83       ( 2017 08:08 )             26.0     06-    142  |  98  |  31.0<H>  ----------------------------<  119<H>  3.5   |  29.0  |  3.16<H>    Ca    8.5<L>      2017 08:08                  RADIOLOGY & ADDITIONAL TESTS:
NEPHROLOGY INTERVAL HPI/OVERNIGHT EVENTS: no new events.        MEDICATIONS  (STANDING):  piperacillin/tazobactam IVPB. 2.25Gram(s) IV Intermittent every 8 hours  calcium acetate 667milliGRAM(s) Oral three times a day with meals  carvedilol 3.125milliGRAM(s) Oral every 12 hours  ferrous    sulfate 325milliGRAM(s) Oral daily  tamsulosin 0.4milliGRAM(s) Oral at bedtime  gabapentin 300milliGRAM(s) Oral three times a day  insulin lispro (HumaLOG) corrective regimen sliding scale  SubCutaneous three times a day before meals  dextrose 5%. 1000milliLiter(s) IV Continuous <Continuous>  dextrose 50% Injectable 12.5Gram(s) IV Push once  dextrose 50% Injectable 25Gram(s) IV Push once  dextrose 50% Injectable 25Gram(s) IV Push once  artificial  tears Solution 1Drop(s) Both EYES two times a day  heparin  Injectable 5000Unit(s) SubCutaneous every 12 hours  iron sucrose IVPB 100milliGRAM(s) IV Intermittent every 48 hours  lisinopril 2.5milliGRAM(s) Oral daily  insulin glargine Injectable (LANTUS) 10Unit(s) SubCutaneous at bedtime  bisacodyl Suppository 10milliGRAM(s) Rectal once  acetaminophen   Tablet. 650milliGRAM(s) Oral every 8 hours  polyethylene glycol 3350 17Gram(s) Oral at bedtime    MEDICATIONS  (PRN):  dextrose Gel 1Dose(s) Oral once PRN Blood Glucose LESS THAN 70 milliGRAM(s)/deciliter  glucagon  Injectable 1milliGRAM(s) IntraMuscular once PRN Glucose LESS THAN 70 milligrams/deciliter  HYDROmorphone  Injectable 0.5milliGRAM(s) IV Push every 4 hours PRN dyspnea  atropine 1% Ophthalmic Solution for SubLingual Use 2Drop(s) SubLingual every 4 hours PRN excessive secretions  mineral oil enema 133milliLiter(s) Rectal once PRN persistent constipation      Allergies    No Known Allergies    Intolerances        Vital Signs Last 24 Hrs  T(C): 37.2, Max: 37.2 ( @ 15:00)  T(F): 98.9, Max: 98.9 ( @ 15:00)  HR: 84 (83 - 92)  BP: 83/42 (83/42 - 115/52)  BP(mean): --  RR: 16 (16 - 20)  SpO2: 95% (95% - 99%)  Daily     Daily Weight in k.3 (2017 09:18)    PHYSICAL EXAM:  NAD  lungs -  rare rhonchi, n0  02  CV gr 1  murmur,  No gallop or rub  Abd : soft, NT BS +, No masses  Ext- No edema; diffuse muscle wasting  Neuro : Awake, alert, verbal      LABS:    140  |  100  |  42.0<H>  ----------------------------<  70  4.6   |  29.0  |  5.11<H>    Ca    8.5<L>      2017 08:43  Phos  3.8             143  |  100  |  43.0<H>  ----------------------------<  144<H>  4.0   |  29.0  |  4.83<H>    Ca    8.8      2017 10:13  Phos  4.1     06-03                            7.9    5.2   )-----------( 83       ( 2017 08:08 )             26.0         142  |  98  |  31.0<H>  ----------------------------<  119<H>  3.5   |  29.0  |  3.16<H>    Ca    8.5<L>      2017 08:08                  RADIOLOGY & ADDITIONAL TESTS:
NEPHROLOGY INTERVAL HPI/OVERNIGHT EVENTS: no new events.        MEDICATIONS  (STANDING):  piperacillin/tazobactam IVPB. 2.25Gram(s) IV Intermittent every 8 hours  calcium acetate 667milliGRAM(s) Oral three times a day with meals  carvedilol 3.125milliGRAM(s) Oral every 12 hours  ferrous    sulfate 325milliGRAM(s) Oral daily  tamsulosin 0.4milliGRAM(s) Oral at bedtime  gabapentin 300milliGRAM(s) Oral three times a day  insulin lispro (HumaLOG) corrective regimen sliding scale  SubCutaneous three times a day before meals  dextrose 5%. 1000milliLiter(s) IV Continuous <Continuous>  dextrose 50% Injectable 12.5Gram(s) IV Push once  dextrose 50% Injectable 25Gram(s) IV Push once  dextrose 50% Injectable 25Gram(s) IV Push once  artificial  tears Solution 1Drop(s) Both EYES two times a day  heparin  Injectable 5000Unit(s) SubCutaneous every 12 hours  iron sucrose IVPB 100milliGRAM(s) IV Intermittent every 48 hours  lisinopril 2.5milliGRAM(s) Oral daily  insulin glargine Injectable (LANTUS) 10Unit(s) SubCutaneous at bedtime  bisacodyl Suppository 10milliGRAM(s) Rectal once  acetaminophen   Tablet. 650milliGRAM(s) Oral every 8 hours  polyethylene glycol 3350 17Gram(s) Oral at bedtime    MEDICATIONS  (PRN):  dextrose Gel 1Dose(s) Oral once PRN Blood Glucose LESS THAN 70 milliGRAM(s)/deciliter  glucagon  Injectable 1milliGRAM(s) IntraMuscular once PRN Glucose LESS THAN 70 milligrams/deciliter  HYDROmorphone  Injectable 0.5milliGRAM(s) IV Push every 4 hours PRN dyspnea  atropine 1% Ophthalmic Solution for SubLingual Use 2Drop(s) SubLingual every 4 hours PRN excessive secretions  mineral oil enema 133milliLiter(s) Rectal once PRN persistent constipation      Allergies    No Known Allergies    Intolerances        Vital Signs Last 24 Hrs  T(C): 37.2, Max: 37.2 ( @ 15:00)  T(F): 98.9, Max: 98.9 ( @ 15:00)  HR: 84 (83 - 92)  BP: 83/42 (83/42 - 115/52)  BP(mean): --  RR: 16 (16 - 20)  SpO2: 95% (95% - 99%)  Daily     Daily Weight in k.3 (2017 09:18)    PHYSICAL EXAM:  NAD  lungs -  rare rhonchi, nasal 02  CV gr 1  murmur,  No gallop or rub  Abd : soft, NT BS +, No masses  Ext- No edema; diffuse muscle wasting  Neuro : Awake, alert, verbal      LABS:    143  |  100  |  43.0<H>  ----------------------------<  144<H>  4.0   |  29.0  |  4.83<H>    Ca    8.8      2017 10:13  Phos  4.1     06-03                            7.9    5.2   )-----------( 83       ( 2017 08:08 )             26.0     06-02    142  |  98  |  31.0<H>  ----------------------------<  119<H>  3.5   |  29.0  |  3.16<H>    Ca    8.5<L>      2017 08:08                  RADIOLOGY & ADDITIONAL TESTS:
Patient is a 87y old  Male who presents with a chief complaint of Labored breathing (29 May 2017 15:57)      HPI:  86 yo M with h/o DM, HTN, ischemic cardiomyopathy (EF 30-35%), ESRD (HD TTS) presents with labored breathing. Wife and son at bedside. Pt was sent in from [facility] rehab. Per EMS documentation, he was found unresponsive usinge accessory muscles to breathe. He was placed on a nonrebreather which improved his oxygenation to 98%. Pt somewhat uncooperative and unable to provide clear history.   Per wife, pt has had a gradual deterioration over the last year. He has not been eating well, and has had minimal interaction. She states that he has intermittent moments of lucidity.     FAMILY HISTORY:  No pertinent family history in first degree relatives      INTERVAL HPI/OVERNIGHT EVENTS:  Pt. states cough, denies chills/fever, SOB, CP, palpitations  Pt. able to answer few questions, more alert today.        PAST MEDICAL & SURGICAL HISTORY:  Chronic renal failure  Coronary artery disease involving autologous vein bypass graft  CHF (congestive heart failure): FAMILY/PT NOT SURE IF DIAGNOSED WITH CHF OR COPD  Pacemaker  Diabetes  PSA elevation  Hyperlipemia  Hypertension  Cataract  Glaucoma  S/P CABG (coronary artery bypass graft)  S/P prostatectomy: 1992      Allergies    No Known Allergies    Intolerances        Vital Signs Last 24 Hrs  T(C): 36.8, Max: 36.8 (05-29 @ 15:57)  T(F): 98.3, Max: 98.3 (05-29 @ 20:36)  HR: 85 (84 - 102)  BP: 102/53 (101/52 - 130/63)  BP(mean): 86 (86 - 86)  RR: 20 (20 - 24)  SpO2: 99% (93% - 99%)      LABS:                          8.7    6.2   )-----------( 82       ( 30 May 2017 08:45 )             28.5     LIVER FUNCTIONS - ( 29 May 2017 14:42 )  Alb: 3.4 g/dL / Pro: 7.6 g/dL / ALK PHOS: 109 U/L / ALT: 19 U/L / AST: 26 U/L / GGT: x           PT/INR - ( 29 May 2017 14:42 )   PT: 12.7 sec;   INR: 1.15 ratio         PTT - ( 29 May 2017 14:42 )  PTT:32.8 sec      RADIOLOGY & ADDITIONAL STUDIES:    RIGHT upper and lower lobe infiltrates.        MEDICATIONS:  piperacillin/tazobactam IVPB. 2.25Gram(s) IV Intermittent every 8 hours  buMETAnide 0.5milliGRAM(s) Oral two times a day  calcium acetate 667milliGRAM(s) Oral three times a day with meals  carvedilol 3.125milliGRAM(s) Oral every 12 hours  ferrous    sulfate 325milliGRAM(s) Oral daily  tamsulosin 0.4milliGRAM(s) Oral at bedtime  gabapentin 300milliGRAM(s) Oral three times a day  insulin lispro (HumaLOG) corrective regimen sliding scale  SubCutaneous three times a day before meals  dextrose 5%. 1000milliLiter(s) IV Continuous <Continuous>  dextrose Gel 1Dose(s) Oral once PRN  dextrose 50% Injectable 12.5Gram(s) IV Push once  dextrose 50% Injectable 25Gram(s) IV Push once  dextrose 50% Injectable 25Gram(s) IV Push once  glucagon  Injectable 1milliGRAM(s) IntraMuscular once PRN  midodrine 2.5milliGRAM(s) Oral every 8 hours  artificial  tears Solution 1Drop(s) Both EYES two times a day  heparin  Injectable 5000Unit(s) SubCutaneous every 12 hours  iron sucrose IVPB 100milliGRAM(s) IV Intermittent every 48 hours
Patient is a 87y old  Male who presents with a chief complaint of Labored breathing (29 May 2017 15:57)      HPI:  88 yo M with h/o DM, HTN, ischemic cardiomyopathy (EF 30-35%), ESRD (HD TTS) presents with labored breathing. Wife and son at bedside. Pt was sent in from [facility] rehab. Per EMS documentation, he was found unresponsive usinge accessory muscles to breathe. He was placed on a nonrebreather which improved his oxygenation to 98%. Pt somewhat uncooperative and unable to provide clear history.   Per wife, pt has had a gradual deterioration over the last year. He has not been eating well, and has had minimal interaction. She states that he has intermittent moments of lucidity. (29 May 2017 15:57)      PAST MEDICAL & SURGICAL HISTORY:  Chronic renal failure  Coronary artery disease involving autologous vein bypass graft  CHF (congestive heart failure): FAMILY/PT NOT SURE IF DIAGNOSED WITH CHF OR COPD  Pacemaker/ICD  Diabetes  PSA elevation  Hyperlipemia  Hypertension  Cataract  Glaucoma  S/P CABG (coronary artery bypass graft)  S/P prostatectomy: 1992      PREVIOUS DIAGNOSTIC TESTING:      ECHO  FINDINGS: 4/6/17  Summary:   1. The left atrium is normal in size.   2. Global diffuse hypokinesis with akinesis in the anterolateral wall   with prominent trebeculations. Normal perfusion on definity contrast   suggest nonischemic cardiomyopathy or resting ishemia.   3. Left ventricular ejection fraction, by visual estimation, is 30 to   35%.   4. Elevated left atrial and left ventricular end-diastolic pressures.   (LAP = 27 mm Hg)   5. The right atrium is normal in size.   6. Normal right ventricular size and function.   7. The Dimesionless Index value is 0.36. Moderate aortic valve stenosis.   Cannot rule out pseudoaortic stenosis.   8. There is no evidence of pericardial effusion.    STRESS  FINDINGS: 3/16  IMPRESSIONS:  * Review of raw data shows: The study is of good technical  quality.  * There are small, moderate defects in apex and apical  septal walls that are fixed consistent with a prominent  apical slit. There is no evidence of ischemia  * The left ventricle is dilated (OKI=543 ml). LVH is  present. There is paradoxical septal motion. Overall LVEF  is 28% with diffuse hypokinesis.  * Chest Pain: No chest pain with administration of  Regadenoson.  * Symptom: No Symptom.  * HR Response: Appropriate.  * BP Response: Appropriate.  * Heart Rhythm: Normal Sinus Rhythm - 89 BPM.  * ECG Abnormalities: ECG changes could not be interpreted  due to bundle branch block.  * Arrhythmia: Occasional PVC's.          Allergies    No Known Allergies    Intolerances        MEDICATIONS  (STANDING):  piperacillin/tazobactam IVPB. 2.25Gram(s) IV Intermittent every 8 hours  buMETAnide 0.5milliGRAM(s) Oral two times a day  calcium acetate 667milliGRAM(s) Oral three times a day with meals  carvedilol 3.125milliGRAM(s) Oral every 12 hours  ferrous    sulfate 325milliGRAM(s) Oral daily  tamsulosin 0.4milliGRAM(s) Oral at bedtime  gabapentin 300milliGRAM(s) Oral three times a day  insulin lispro (HumaLOG) corrective regimen sliding scale  SubCutaneous three times a day before meals  dextrose 5%. 1000milliLiter(s) IV Continuous <Continuous>  dextrose 50% Injectable 12.5Gram(s) IV Push once  dextrose 50% Injectable 25Gram(s) IV Push once  dextrose 50% Injectable 25Gram(s) IV Push once  midodrine 2.5milliGRAM(s) Oral every 8 hours  artificial  tears Solution 1Drop(s) Both EYES two times a day  heparin  Injectable 5000Unit(s) SubCutaneous every 12 hours  iron sucrose IVPB 100milliGRAM(s) IV Intermittent every 48 hours    MEDICATIONS  (PRN):  dextrose Gel 1Dose(s) Oral once PRN Blood Glucose LESS THAN 70 milliGRAM(s)/deciliter  glucagon  Injectable 1milliGRAM(s) IntraMuscular once PRN Glucose LESS THAN 70 milligrams/deciliter      FAMILY HISTORY:  No pertinent family history in first degree relatives      SOCIAL HISTORY:    CIGARETTES:    ALCOHOL:    REVIEW OF SYSTEMS:  CONSTITUTIONAL: No fever, weight loss, or fatigue  EYES: No eye pain, visual disturbances, or discharge  ENMT:  No difficulty hearing, tinnitus, vertigo; No sinus or throat pain  NECK: No pain or stiffness  RESPIRATORY: No cough, wheezing, chills or hemoptysis; No Shortness of Breath  CARDIOVASCULAR: No chest pain, palpitations, passing out, dizziness, or leg swelling  GASTROINTESTINAL: No abdominal or epigastric pain. No nausea, vomiting, or hematemesis; No diarrhea or constipation. No melena or hematochezia.  GENITOURINARY: No dysuria, frequency, hematuria, or incontinence  NEUROLOGICAL: No headaches, memory loss, loss of strength, numbness, or tremors  SKIN: No itching, burning, rashes, or lesions   LYMPH Nodes: No enlarged glands  ENDOCRINE: No heat or cold intolerance; No hair loss  MUSCULOSKELETAL: No joint pain or swelling; No muscle, back, or extremity pain  PSYCHIATRIC: No depression, anxiety, mood swings, or difficulty sleeping  HEME/LYMPH: No easy bruising, or bleeding gums  ALLERY AND IMMUNOLOGIC: No hives or eczema	    Vital Signs Last 24 Hrs  T(C): 36.8, Max: 36.8 (05-29 @ 15:57)  T(F): 98.3, Max: 98.3 (05-29 @ 20:36)  HR: 85 (84 - 102)  BP: 102/53 (101/52 - 130/63)  BP(mean): 86 (86 - 86)  RR: 20 (20 - 24)  SpO2: 99% (93% - 99%)    Daily     Daily     I&O's Detail    I & Os for current day (as of 30 May 2017 13:08)  =============================================  IN:    Total IN: 0 ml  ---------------------------------------------  OUT:    Other: 1400 ml    Total OUT: 1400 ml  ---------------------------------------------  Total NET: -1400 ml      PHYSICAL EXAM:  Appearance: Normal, well nourished	  HEENT:   Normal oral mucosa, PERRL, EOMI, sclera non-icteric	  Lymphatic: No cervical lymphadenopathy  Cardiovascular: Normal S1 S2, No JVD, No cardiac murmurs, No carotid bruits, No peripheral edema  Respiratory: Lungs clear to auscultation	  Psychiatry: A & O x 3, Mood & affect appropriate  Gastrointestinal:  Soft, Non-tender, + BS, no bruits	  Skin: No rashes, No ecchymoses, No cyanosis  Neurologic: Grossly non-focal with full strength in all four extremities  Extremities: Normal range of motion, No clubbing, cyanosis or edema  Vascular: Peripheral pulses palpable 2+ bilaterally      INTERPRETATION OF TELEMETRY:    ECG:    LABS:                        8.7    6.2   )-----------( 82       ( 30 May 2017 08:45 )             28.5     05-30    137  |  94<L>  |  56.0<H>  ----------------------------<  138<H>  5.0   |  29.0  |  2.79<H>    Ca    8.8      30 May 2017 08:45    TPro  7.6  /  Alb  3.4  /  TBili  0.4  /  DBili  x   /  AST  26  /  ALT  19  /  AlkPhos  109  05-29        PT/INR - ( 29 May 2017 14:42 )   PT: 12.7 sec;   INR: 1.15 ratio         PTT - ( 29 May 2017 14:42 )  PTT:32.8 sec    I&O's Summary    I & Os for current day (as of 30 May 2017 13:08)  =============================================  IN: 0 ml / OUT: 1400 ml / NET: -1400 ml    BNPSerum Pro-Brain Natriuretic Peptide: 22883 pg/mL (05-29 @ 13:37)    Serum Pro-Brain Natriuretic Peptide: 71687 pg/mL (05-29 @ 13:37)    RADIOLOGY & ADDITIONAL STUDIES:
Patient is a 87y old  Male who presents with a chief complaint of Labored breathing (29 May 2017 15:57)      HPI:  88 yo M with h/o DM, HTN, ischemic cardiomyopathy (EF 30-35%), ESRD (HD TTS) presents with labored breathing. Wife and son at bedside. Pt was sent in from [facility] rehab. Per EMS documentation, he was found unresponsive usinge accessory muscles to breathe. He was placed on a nonrebreather which improved his oxygenation to 98%. Pt somewhat uncooperative and unable to provide clear history.   Per wife, pt has had a gradual deterioration over the last year. He has not been eating well, and has had minimal interaction. She states that he has intermittent moments of lucidity. (29 May 2017 15:57)      PAST MEDICAL & SURGICAL HISTORY:  Chronic renal failure  Coronary artery disease involving autologous vein bypass graft  CHF (congestive heart failure): FAMILY/PT NOT SURE IF DIAGNOSED WITH CHF OR COPD  Pacemaker  Diabetes  PSA elevation  Hyperlipemia  Hypertension  Cataract  Glaucoma  S/P CABG (coronary artery bypass graft)  S/P prostatectomy: 1992      PREVIOUS DIAGNOSTIC TESTING:      ECHO  FINDINGS: 4/6/17  Summary:   1. The left atrium is normal in size.   2. Global diffuse hypokinesis with akinesis in the anterolateral wall   with prominent trebeculations. Normal perfusion on definity contrast   suggest nonischemic cardiomyopathy or resting ishemia.   3. Left ventricular ejection fraction, by visual estimation, is 30 to   35%.   4. Elevated left atrial and left ventricular end-diastolic pressures.   (LAP = 27 mm Hg)   5. The right atrium is normal in size.   6. Normal right ventricular size and function.   7. The Dimesionless Index value is 0.36. Moderate aortic valve stenosis.   Cannot rule out pseudoaortic stenosis.   8. There is no evidence of pericardial effusion.          Allergies    No Known Allergies    Intolerances        MEDICATIONS  (STANDING):  piperacillin/tazobactam IVPB. 2.25Gram(s) IV Intermittent every 8 hours  buMETAnide 0.5milliGRAM(s) Oral two times a day  calcium acetate 667milliGRAM(s) Oral three times a day with meals  carvedilol 3.125milliGRAM(s) Oral every 12 hours  ferrous    sulfate 325milliGRAM(s) Oral daily  tamsulosin 0.4milliGRAM(s) Oral at bedtime  gabapentin 300milliGRAM(s) Oral three times a day  insulin lispro (HumaLOG) corrective regimen sliding scale  SubCutaneous three times a day before meals  dextrose 5%. 1000milliLiter(s) IV Continuous <Continuous>  dextrose 50% Injectable 12.5Gram(s) IV Push once  dextrose 50% Injectable 25Gram(s) IV Push once  dextrose 50% Injectable 25Gram(s) IV Push once  midodrine 2.5milliGRAM(s) Oral every 8 hours  artificial  tears Solution 1Drop(s) Both EYES two times a day  heparin  Injectable 5000Unit(s) SubCutaneous every 12 hours  iron sucrose IVPB 100milliGRAM(s) IV Intermittent every 48 hours  epoetin una Injectable 06466Xplh(s) IV Push once    MEDICATIONS  (PRN):  dextrose Gel 1Dose(s) Oral once PRN Blood Glucose LESS THAN 70 milliGRAM(s)/deciliter  glucagon  Injectable 1milliGRAM(s) IntraMuscular once PRN Glucose LESS THAN 70 milligrams/deciliter      FAMILY HISTORY:  No pertinent family history in first degree relatives      SOCIAL HISTORY:    CIGARETTES:    ALCOHOL:    REVIEW OF SYSTEMS:  CONSTITUTIONAL: No fever, weight loss, or fatigue  EYES: No eye pain, visual disturbances, or discharge  ENMT:  No difficulty hearing, tinnitus, vertigo; No sinus or throat pain  NECK: No pain or stiffness  RESPIRATORY: No cough, wheezing, chills or hemoptysis; (+)Shortness of Breath  CARDIOVASCULAR: No chest pain, palpitations, passing out, dizziness, or leg swelling  GASTROINTESTINAL: No abdominal or epigastric pain. No nausea, vomiting, or hematemesis; No diarrhea or constipation. No melena or hematochezia.  GENITOURINARY: No dysuria, frequency, hematuria, or incontinence  NEUROLOGICAL: No headaches, memory loss, loss of strength, numbness, or tremors  SKIN: No itching, burning, rashes, or lesions   LYMPH Nodes: No enlarged glands  ENDOCRINE: No heat or cold intolerance; No hair loss  MUSCULOSKELETAL: No joint pain or swelling; No muscle, back, or extremity pain  PSYCHIATRIC: No depression, anxiety, mood swings, or difficulty sleeping  HEME/LYMPH: No easy bruising, or bleeding gums  ALLERY AND IMMUNOLOGIC: No hives or eczema	    Vital Signs Last 24 Hrs  T(C): 36.8, Max: 36.8 (05-29 @ 12:54)  T(F): 98.3, Max: 98.3 (05-29 @ 20:36)  HR: 85 (84 - 102)  BP: 102/53 (101/52 - 149/81)  BP(mean): 86 (86 - 86)  RR: 20 (20 - 26)  SpO2: 99% (93% - 100%)    Daily Height in cm: 180.34 (29 May 2017 12:54)    Daily     I&O's Detail    I & Os for current day (as of 30 May 2017 10:34)  =============================================  IN:    Total IN: 0 ml  ---------------------------------------------  OUT:    Other: 1400 ml    Total OUT: 1400 ml  ---------------------------------------------  Total NET: -1400 ml      PHYSICAL EXAM:  Appearance: Normal, well nourished	  HEENT:   Normal oral mucosa, PERRL, EOMI, sclera non-icteric	  Lymphatic: No cervical lymphadenopathy  Cardiovascular: Normal S1 S2, No JVD, No cardiac murmurs, No carotid bruits, No peripheral edema  Respiratory: Lungs clear to auscultation	  Psychiatry: A & O x 3, Mood & affect appropriate  Gastrointestinal:  Soft, Non-tender, + BS, no bruits	  Skin: No rashes, No ecchymoses, No cyanosis  Neurologic: Grossly non-focal with full strength in all four extremities  Extremities: Normal range of motion, No clubbing, cyanosis or edema  Vascular: Peripheral pulses palpable 2+ bilaterally      INTERPRETATION OF TELEMETRY:    ECG:    LABS:                        8.7    6.2   )-----------( 82       ( 30 May 2017 08:45 )             28.5     05-30    137  |  94<L>  |  56.0<H>  ----------------------------<  138<H>  5.0   |  29.0  |  2.79<H>    Ca    8.8      30 May 2017 08:45    TPro  7.6  /  Alb  3.4  /  TBili  0.4  /  DBili  x   /  AST  26  /  ALT  19  /  AlkPhos  109  05-29        PT/INR - ( 29 May 2017 14:42 )   PT: 12.7 sec;   INR: 1.15 ratio         PTT - ( 29 May 2017 14:42 )  PTT:32.8 sec    I&O's Summary    I & Os for current day (as of 30 May 2017 10:34)  =============================================  IN: 0 ml / OUT: 1400 ml / NET: -1400 ml    BNPSerum Pro-Brain Natriuretic Peptide: 52418 pg/mL (05-29 @ 13:37)    Serum Pro-Brain Natriuretic Peptide: 77298 pg/mL (05-29 @ 13:37)    RADIOLOGY & ADDITIONAL STUDIES:
Patient seen and examined    Awake and follows VC but didn't recognize wife or myself  no c/o CP SOB NV f/c  Mild swelling feet  no specific c/o    Vital Signs Last 24 Hrs  T(C): 36.3, Max: 36.7 (05-30 @ 23:35)  T(F): 97.4, Max: 98 (05-30 @ 23:35)  HR: 88 (86 - 98)  BP: 98/56 (98/56 - 136/90)  BP(mean): --  RR: 15 (15 - 18)  SpO2: 100% (97% - 100%)    Awake/alert; NAD  chest Clear ex few rhonchi  No JVD  No murmer  abd soft, NT BS +  tr edema  Moving all extr when asked; power 4/5; good squeeze      31 May 2017 08:14    137    |  96     |  41.0   ----------------------------<  280    4.4     |  23.0   |  2.63     Ca    8.6        31 May 2017 08:14  Mg     2.0       31 May 2017 08:14    TPro  7.3    /  Alb  3.3    /  TBili  0.6    /  DBili  x      /  AST  40     /  ALT  31     /  AlkPhos  198    31 May 2017 08:14                          8.8    4.8   )-----------( 75       ( 31 May 2017 08:14 )             28.9     cxr Bilat infiltrates    Impression  patient with Pna, on Zosyn; sl better  Continue same treatment  HD am
Patient was seen and evaluated on dialysis.   More awake and replying better  No c/o CP SOB NV occ cough  no F/C  no swelling    T(C): 37.7, Max: 37.7 (06-01 @ 15:00)  HR: 86 (79 - 88)  BP: 114/72 (99/58 - 124/58)  Wt(kg): --  PE ;  NAD  lungs - CTA ex occ rhonchi  CV gr 1 murmer,  No gallop or rub  Abd : soft, NT BS +, No masses  Ext- No edema  Neuro : Grossly intact, moving extremities                             7.5    5.0   )-----------( 77       ( 01 Jun 2017 06:56 )             24.8        06-01    139  |  99  |  51.0<H>  ----------------------------<  165<H>  4.2   |  29.0  |  3.32<H>    Ca    8.6      01 Jun 2017 06:56  Mg     2.0     05-31    TPro  7.3  /  Alb  3.3  /  TBili  0.6  /  DBili  x   /  AST  40<H>  /  ALT  31  /  AlkPhos  198<H>  05-31      MEDICATIONS  (STANDING):  piperacillin/tazobactam IVPB.  calcium acetate  carvedilol  ferrous    sulfate  tamsulosin  gabapentin  insulin lispro (HumaLOG) corrective regimen sliding scale  dextrose 5%.  dextrose Gel PRN  dextrose 50% Injectable  dextrose 50% Injectable  dextrose 50% Injectable  glucagon  Injectable PRN  artificial  tears Solution  heparin  Injectable  iron sucrose IVPB  lisinopril  HYDROmorphone  Injectable PRN  atropine 1% Ophthalmic Solution for SubLingual Use PRN  insulin glargine Injectable (LANTUS)      Patient stable  Teena HD easily  On Abx for ?PNa  check iron panel  Continue
seen for esrd, aspiration pna,dysphagia, functional decline    "im sleepy"  denies acute complaints.     MEDICATIONS  (STANDING):  calcium acetate 667milliGRAM(s) Oral three times a day with meals  ferrous    sulfate 325milliGRAM(s) Oral daily  tamsulosin 0.4milliGRAM(s) Oral at bedtime  gabapentin 300milliGRAM(s) Oral three times a day  insulin lispro (HumaLOG) corrective regimen sliding scale  SubCutaneous three times a day before meals  dextrose 5%. 1000milliLiter(s) IV Continuous <Continuous>  dextrose 50% Injectable 12.5Gram(s) IV Push once  dextrose 50% Injectable 25Gram(s) IV Push once  dextrose 50% Injectable 25Gram(s) IV Push once  artificial  tears Solution 1Drop(s) Both EYES two times a day  heparin  Injectable 5000Unit(s) SubCutaneous every 12 hours  bisacodyl Suppository 10milliGRAM(s) Rectal once  acetaminophen   Tablet. 650milliGRAM(s) Oral every 8 hours  polyethylene glycol 3350 17Gram(s) Oral at bedtime  epoetin una Injectable 81418Fcfp(s) IV Push <User Schedule>  iron sucrose IVPB 100milliGRAM(s) IV Intermittent <User Schedule>    MEDICATIONS  (PRN):  dextrose Gel 1Dose(s) Oral once PRN Blood Glucose LESS THAN 70 milliGRAM(s)/deciliter  glucagon  Injectable 1milliGRAM(s) IntraMuscular once PRN Glucose LESS THAN 70 milligrams/deciliter  atropine 1% Ophthalmic Solution for SubLingual Use 2Drop(s) SubLingual every 4 hours PRN excessive secretions  mineral oil enema 133milliLiter(s) Rectal once PRN persistent constipation  dextrose Gel 1Dose(s) Oral once PRN Blood Glucose LESS THAN 70 milliGRAM(s)/deciliter  dextrose Gel 1Dose(s) Oral once PRN Blood Glucose LESS THAN 70 milliGRAM(s)/deciliter  HYDROmorphone  Injectable 0.5milliGRAM(s) IV Push every 4 hours PRN Severe Pain (7 - 10)  HYDROmorphone   Tablet 2milliGRAM(s) Oral every 4 hours PRN Moderate Pain (4 - 6)      Allergies    No Known Allergies    Vital Signs Last 24 Hrs  T(C): 36.8, Max: 36.9 (06-07 @ 23:33)  T(F): 98.2, Max: 98.5 (06-07 @ 23:33)  HR: 78 (74 - 86)  BP: 93/54 (93/54 - 140/76)  BP(mean): --  RR: 18 (18 - 18)  SpO2: 97% (97% - 100%)    PHYSICAL EXAM:    GENERAL: NAD  CHEST/LUNG: Clear to percussion bilaterally  HEART: Regular rate and rhythm; S1 S2  ABDOMEN: Soft, Nontender, Nondistended; Bowel sounds present  EXTREMITIES: no edema.    LABS:                        8.3    3.5   )-----------( 87       ( 08 Jun 2017 10:40 )             27.3     06-08    138  |  98  |  35.0<H>  ----------------------------<  103  5.1   |  28.0  |  3.88<H>    Ca    8.2<L>      08 Jun 2017 10:40            CAPILLARY BLOOD GLUCOSE  115 (08 Jun 2017 11:04)  145 (07 Jun 2017 23:06)        RADIOLOGY & ADDITIONAL TESTS:
seen for esrd, ulcers, functional decline and pna  noacute complaints.:    MEDICATIONS  (STANDING):  calcium acetate 667milliGRAM(s) Oral three times a day with meals  carvedilol 3.125milliGRAM(s) Oral every 12 hours  ferrous    sulfate 325milliGRAM(s) Oral daily  tamsulosin 0.4milliGRAM(s) Oral at bedtime  gabapentin 300milliGRAM(s) Oral three times a day  insulin lispro (HumaLOG) corrective regimen sliding scale  SubCutaneous three times a day before meals  dextrose 5%. 1000milliLiter(s) IV Continuous <Continuous>  dextrose 50% Injectable 12.5Gram(s) IV Push once  dextrose 50% Injectable 25Gram(s) IV Push once  dextrose 50% Injectable 25Gram(s) IV Push once  artificial  tears Solution 1Drop(s) Both EYES two times a day  heparin  Injectable 5000Unit(s) SubCutaneous every 12 hours  iron sucrose IVPB 100milliGRAM(s) IV Intermittent every 48 hours  lisinopril 2.5milliGRAM(s) Oral daily  bisacodyl Suppository 10milliGRAM(s) Rectal once  acetaminophen   Tablet. 650milliGRAM(s) Oral every 8 hours  polyethylene glycol 3350 17Gram(s) Oral at bedtime  insulin glargine Injectable (LANTUS) 5Unit(s) SubCutaneous at bedtime  epoetin una Injectable 23426Wjip(s) IV Push <User Schedule>    MEDICATIONS  (PRN):  dextrose Gel 1Dose(s) Oral once PRN Blood Glucose LESS THAN 70 milliGRAM(s)/deciliter  glucagon  Injectable 1milliGRAM(s) IntraMuscular once PRN Glucose LESS THAN 70 milligrams/deciliter  HYDROmorphone  Injectable 0.5milliGRAM(s) IV Push every 4 hours PRN dyspnea  atropine 1% Ophthalmic Solution for SubLingual Use 2Drop(s) SubLingual every 4 hours PRN excessive secretions  mineral oil enema 133milliLiter(s) Rectal once PRN persistent constipation  dextrose Gel 1Dose(s) Oral once PRN Blood Glucose LESS THAN 70 milliGRAM(s)/deciliter  dextrose Gel 1Dose(s) Oral once PRN Blood Glucose LESS THAN 70 milliGRAM(s)/deciliter  HYDROmorphone  Injectable 0.5milliGRAM(s) IV Push every 4 hours PRN Severe Pain (7 - 10)  HYDROmorphone   Tablet 2milliGRAM(s) Oral every 4 hours PRN Moderate Pain (4 - 6)      Allergies    No Known Allergies    Vital Signs Last 24 Hrs  T(C): 36.7, Max: 37.1 (06-04 @ 16:34)  T(F): 98.1, Max: 98.7 (06-04 @ 16:34)  HR: 84 (64 - 84)  BP: 106/58 (99/56 - 115/79)  BP(mean): --  RR: 18 (17 - 18)  SpO2: 97% (97% - 100%)    PHYSICAL EXAM:    GENERAL: NAD  CHEST/LUNG: ctab anteriorly  HEART: Regular rate and rhythm; S1 S2; no murmurs noted  ABDOMEN: Soft, Nontender, Nondistended; Bowel sounds present  EXTREMITIES:  no edema.  NERVOUS SYSTEM:  Alert & Oriented X1 self nonfocal but generalized weakness  LABS:                CAPILLARY BLOOD GLUCOSE  114 (05 Jun 2017 11:14)  142 (05 Jun 2017 08:07)  235 (04 Jun 2017 22:20)  164 (04 Jun 2017 16:56)        RADIOLOGY & ADDITIONAL TESTS:
seen for pna, chf and esrd, debility and ulcers    complaining of cold food and poor nursing aide help with ADLs  no cp/sob  ROS negative otherwise.       MEDICATIONS  (STANDING):  calcium acetate 667milliGRAM(s) Oral three times a day with meals  carvedilol 3.125milliGRAM(s) Oral every 12 hours  ferrous    sulfate 325milliGRAM(s) Oral daily  tamsulosin 0.4milliGRAM(s) Oral at bedtime  gabapentin 300milliGRAM(s) Oral three times a day  insulin lispro (HumaLOG) corrective regimen sliding scale  SubCutaneous three times a day before meals  dextrose 5%. 1000milliLiter(s) IV Continuous <Continuous>  dextrose 50% Injectable 12.5Gram(s) IV Push once  dextrose 50% Injectable 25Gram(s) IV Push once  dextrose 50% Injectable 25Gram(s) IV Push once  artificial  tears Solution 1Drop(s) Both EYES two times a day  heparin  Injectable 5000Unit(s) SubCutaneous every 12 hours  iron sucrose IVPB 100milliGRAM(s) IV Intermittent every 48 hours  lisinopril 2.5milliGRAM(s) Oral daily  bisacodyl Suppository 10milliGRAM(s) Rectal once  acetaminophen   Tablet. 650milliGRAM(s) Oral every 8 hours  polyethylene glycol 3350 17Gram(s) Oral at bedtime  epoetin una Injectable 58191Aezj(s) IV Push <User Schedule>    MEDICATIONS  (PRN):  dextrose Gel 1Dose(s) Oral once PRN Blood Glucose LESS THAN 70 milliGRAM(s)/deciliter  glucagon  Injectable 1milliGRAM(s) IntraMuscular once PRN Glucose LESS THAN 70 milligrams/deciliter  HYDROmorphone  Injectable 0.5milliGRAM(s) IV Push every 4 hours PRN dyspnea  atropine 1% Ophthalmic Solution for SubLingual Use 2Drop(s) SubLingual every 4 hours PRN excessive secretions  mineral oil enema 133milliLiter(s) Rectal once PRN persistent constipation  dextrose Gel 1Dose(s) Oral once PRN Blood Glucose LESS THAN 70 milliGRAM(s)/deciliter  dextrose Gel 1Dose(s) Oral once PRN Blood Glucose LESS THAN 70 milliGRAM(s)/deciliter  HYDROmorphone  Injectable 0.5milliGRAM(s) IV Push every 4 hours PRN Severe Pain (7 - 10)  HYDROmorphone   Tablet 2milliGRAM(s) Oral every 4 hours PRN Moderate Pain (4 - 6)      Allergies    No Known Allergies    Vital Signs Last 24 Hrs  T(C): 36.8, Max: 36.8 (06-06 @ 11:40)  T(F): 98.2, Max: 98.2 (06-06 @ 11:40)  HR: 89 (76 - 89)  BP: 133/69 (95/60 - 133/69)  BP(mean): --  RR: 18 (18 - 18)  SpO2: 99% (98% - 100%)    PHYSICAL EXAM:    GENERAL: NAD  CHEST/LUNG:ctab  HEART: Regular rate and rhythm; S1 S2; no murmurs noted  ABDOMEN: Soft, Nontender, Nondistended; Bowel sounds present  EXTREMITIES: no edema.  NERVOUS SYSTEM:  nonfocal. alert and awake, responsive       LABS:                        8.0    3.6   )-----------( 83       ( 06 Jun 2017 08:43 )             26.5     06-06    140  |  100  |  42.0<H>  ----------------------------<  70  4.6   |  29.0  |  5.11<H>    Ca    8.5<L>      06 Jun 2017 08:43  Phos  3.8     06-06            CAPILLARY BLOOD GLUCOSE  151 (05 Jun 2017 22:47)        RADIOLOGY & ADDITIONAL TESTS:
seen for pna, esrd on hd    sleeping a lot  no acute complaints.  s/p MBS    MEDICATIONS  (STANDING):  calcium acetate 667milliGRAM(s) Oral three times a day with meals  carvedilol 3.125milliGRAM(s) Oral every 12 hours  ferrous    sulfate 325milliGRAM(s) Oral daily  tamsulosin 0.4milliGRAM(s) Oral at bedtime  gabapentin 300milliGRAM(s) Oral three times a day  insulin lispro (HumaLOG) corrective regimen sliding scale  SubCutaneous three times a day before meals  dextrose 5%. 1000milliLiter(s) IV Continuous <Continuous>  dextrose 50% Injectable 12.5Gram(s) IV Push once  dextrose 50% Injectable 25Gram(s) IV Push once  dextrose 50% Injectable 25Gram(s) IV Push once  artificial  tears Solution 1Drop(s) Both EYES two times a day  heparin  Injectable 5000Unit(s) SubCutaneous every 12 hours  iron sucrose IVPB 100milliGRAM(s) IV Intermittent every 48 hours  lisinopril 2.5milliGRAM(s) Oral daily  bisacodyl Suppository 10milliGRAM(s) Rectal once  acetaminophen   Tablet. 650milliGRAM(s) Oral every 8 hours  polyethylene glycol 3350 17Gram(s) Oral at bedtime  epoetin una Injectable 32967Bkba(s) IV Push <User Schedule>    MEDICATIONS  (PRN):  dextrose Gel 1Dose(s) Oral once PRN Blood Glucose LESS THAN 70 milliGRAM(s)/deciliter  glucagon  Injectable 1milliGRAM(s) IntraMuscular once PRN Glucose LESS THAN 70 milligrams/deciliter  HYDROmorphone  Injectable 0.5milliGRAM(s) IV Push every 4 hours PRN dyspnea  atropine 1% Ophthalmic Solution for SubLingual Use 2Drop(s) SubLingual every 4 hours PRN excessive secretions  mineral oil enema 133milliLiter(s) Rectal once PRN persistent constipation  dextrose Gel 1Dose(s) Oral once PRN Blood Glucose LESS THAN 70 milliGRAM(s)/deciliter  dextrose Gel 1Dose(s) Oral once PRN Blood Glucose LESS THAN 70 milliGRAM(s)/deciliter  HYDROmorphone  Injectable 0.5milliGRAM(s) IV Push every 4 hours PRN Severe Pain (7 - 10)  HYDROmorphone   Tablet 2milliGRAM(s) Oral every 4 hours PRN Moderate Pain (4 - 6)      Allergies    No Known Allergies    Vital Signs Last 24 Hrs  T(C): 36.9, Max: 36.9 (06-06 @ 15:47)  T(F): 98.4, Max: 98.5 (06-06 @ 15:47)  HR: 72 (72 - 91)  BP: 99/54 (99/54 - 142/73)  BP(mean): --  RR: 18 (18 - 18)  SpO2: 98% (98% - 98%)    PHYSICAL EXAM:    GENERAL: NAD  CHEST/LUNG: ctab  HEART: Regular rate and rhythm; S1 S2  ABDOMEN: Soft, Nontender, Nondistended; Bowel sounds present  EXTREMITIES: no edema    LABS:                        8.0    3.6   )-----------( 83       ( 06 Jun 2017 08:43 )             26.5     06-06    140  |  100  |  42.0<H>  ----------------------------<  70  4.6   |  29.0  |  5.11<H>    Ca    8.5<L>      06 Jun 2017 08:43  Phos  3.8     06-06            CAPILLARY BLOOD GLUCOSE  197 (07 Jun 2017 11:21)  84 (07 Jun 2017 08:33)  153 (06 Jun 2017 21:22)        RADIOLOGY & ADDITIONAL TESTS:
seen for pneumonia, functional decline and HD    sleeping a lot, poor tolerance of HD per renal.  patient states "im fine".     MEDICATIONS  (STANDING):  calcium acetate 667milliGRAM(s) Oral three times a day with meals  ferrous    sulfate 325milliGRAM(s) Oral daily  tamsulosin 0.4milliGRAM(s) Oral at bedtime  gabapentin 300milliGRAM(s) Oral three times a day  insulin lispro (HumaLOG) corrective regimen sliding scale  SubCutaneous three times a day before meals  dextrose 5%. 1000milliLiter(s) IV Continuous <Continuous>  dextrose 50% Injectable 12.5Gram(s) IV Push once  dextrose 50% Injectable 25Gram(s) IV Push once  dextrose 50% Injectable 25Gram(s) IV Push once  artificial  tears Solution 1Drop(s) Both EYES two times a day  heparin  Injectable 5000Unit(s) SubCutaneous every 12 hours  bisacodyl Suppository 10milliGRAM(s) Rectal once  acetaminophen   Tablet. 650milliGRAM(s) Oral every 8 hours  polyethylene glycol 3350 17Gram(s) Oral at bedtime  epoetin una Injectable 46307Heka(s) IV Push <User Schedule>  iron sucrose IVPB 100milliGRAM(s) IV Intermittent <User Schedule>    MEDICATIONS  (PRN):  dextrose Gel 1Dose(s) Oral once PRN Blood Glucose LESS THAN 70 milliGRAM(s)/deciliter  glucagon  Injectable 1milliGRAM(s) IntraMuscular once PRN Glucose LESS THAN 70 milligrams/deciliter  atropine 1% Ophthalmic Solution for SubLingual Use 2Drop(s) SubLingual every 4 hours PRN excessive secretions  mineral oil enema 133milliLiter(s) Rectal once PRN persistent constipation  dextrose Gel 1Dose(s) Oral once PRN Blood Glucose LESS THAN 70 milliGRAM(s)/deciliter  dextrose Gel 1Dose(s) Oral once PRN Blood Glucose LESS THAN 70 milliGRAM(s)/deciliter  HYDROmorphone  Injectable 0.5milliGRAM(s) IV Push every 4 hours PRN Severe Pain (7 - 10)  HYDROmorphone   Tablet 2milliGRAM(s) Oral every 4 hours PRN Moderate Pain (4 - 6)      Allergies    No Known Allergies    Vital Signs Last 24 Hrs  T(C): 36.4, Max: 36.8 (06-08 @ 11:04)  T(F): 97.5, Max: 98.2 (06-08 @ 11:04)  HR: 85 (77 - 88)  BP: 133/68 (93/54 - 133/68)  BP(mean): --  RR: 18 (18 - 18)  SpO2: 99% (97% - 100%)    PHYSICAL EXAM:    GENERAL: NAD  CHEST/LUNG: ctab anteriorly  HEART: Regular rate and rhythm; S1 S2  ABDOMEN: Soft, Nontender, Nondistended; Bowel sounds present  EXTREMITIES:  no edema.  NERVOUS SYSTEM: nonfocal exam    LABS:                        8.3    3.5   )-----------( 87       ( 08 Jun 2017 10:40 )             27.3     06-08    138  |  98  |  35.0<H>  ----------------------------<  103  5.1   |  28.0  |  3.88<H>    Ca    8.2<L>      08 Jun 2017 10:40            CAPILLARY BLOOD GLUCOSE  175 (08 Jun 2017 17:55)  115 (08 Jun 2017 11:04)        RADIOLOGY & ADDITIONAL TESTS:

## 2017-06-19 NOTE — PROGRESS NOTE ADULT - PROBLEM SELECTOR PROBLEM 2
CHF (congestive heart failure)
Diabetes
CHF (congestive heart failure)
ESRD (end stage renal disease) on dialysis
Diabetes
Diabetes
ESRD (end stage renal disease) on dialysis
Pneumonia of right lung due to other aerobic Gram-negative bacteria, unspecified part of lung
Weakness generalized
CHF (congestive heart failure)

## 2017-06-19 NOTE — PROGRESS NOTE ADULT - PROBLEM SELECTOR PROBLEM 3
ESRD (end stage renal disease) on dialysis
ESRD (end stage renal disease) on dialysis
Hypertension
Diabetes
Acute on chronic systolic congestive heart failure
Hyperlipemia
Hypertension
Weakness generalized
Dyspnea and respiratory abnormalities
Dyspnea and respiratory abnormalities
ESRD on dialysis
Hypertension

## 2017-06-19 NOTE — PROGRESS NOTE ADULT - PROBLEM SELECTOR PROBLEM 1
ESRD (end stage renal disease) on dialysis
Pneumonia of right lung due to other aerobic Gram-negative bacteria, unspecified part of lung
Acute on chronic congestive heart failure, unspecified congestive heart failure type
Acute on chronic congestive heart failure, unspecified congestive heart failure type
CHF (congestive heart failure)
ESRD (end stage renal disease) on dialysis
Pneumonia of right lung due to other aerobic Gram-negative bacteria, unspecified part of lung
Acute on chronic congestive heart failure, unspecified congestive heart failure type
Pneumonia of right lung due to other aerobic Gram-negative bacteria, unspecified part of lung

## 2017-06-19 NOTE — PROGRESS NOTE ADULT - PROBLEM SELECTOR PLAN 3
Normotensive  C/w coreg
Normotensive  C/w midodrine and coreg
Off medications given hypotension. BP improved with midodrine. post HD drop in BP noted. asymptomatic. to give midodrine and no other measures as it normalizes.
Off medications given hypotension. BP improved with midodrine. post HD drop in BP noted. asymptomatic. to give midodrine and no other measures as it normalizes.
Off medications given hypotension. BP improved.
on HD per renal
on HD per renal
Appetite poor  No thin liquids  Aspiration precautions  Will not be returning home, will need LT placement
Medication strategies
Off medications given hypotension.
Not tolerating full sessions of HD, or postponements due to hemodynamic instability.  We are asking family after todays meeting, to talk with their Dad, give him a chance to make an informed decision about Quality of life benefits of continuing
Oxygen,  Added Opioid Dilaudid 0.5mg IVP Stat then prn
Oxygen, pace activities
Coreg  Bumex  Midodrine
Lipitor 40 mg ordered

## 2017-07-23 ENCOUNTER — INPATIENT (INPATIENT)
Facility: HOSPITAL | Age: 82
LOS: 9 days | Discharge: NURSING, REHAB OR LONG-TERM CARE | DRG: 314 | End: 2017-08-02
Attending: HOSPITALIST | Admitting: HOSPITALIST
Payer: COMMERCIAL

## 2017-07-23 VITALS
WEIGHT: 149.91 LBS | SYSTOLIC BLOOD PRESSURE: 103 MMHG | DIASTOLIC BLOOD PRESSURE: 64 MMHG | HEIGHT: 64 IN | RESPIRATION RATE: 16 BRPM | HEART RATE: 118 BPM | OXYGEN SATURATION: 95 % | TEMPERATURE: 102 F

## 2017-07-23 DIAGNOSIS — A41.9 SEPSIS, UNSPECIFIED ORGANISM: ICD-10-CM

## 2017-07-23 LAB
ALBUMIN SERPL ELPH-MCNC: 3.6 G/DL — SIGNIFICANT CHANGE UP (ref 3.3–5.2)
ALP SERPL-CCNC: 212 U/L — HIGH (ref 40–120)
ALT FLD-CCNC: 584 U/L — HIGH
ANION GAP SERPL CALC-SCNC: 21 MMOL/L — HIGH (ref 5–17)
AST SERPL-CCNC: 847 U/L — HIGH
BASOPHILS # BLD AUTO: 0 K/UL — SIGNIFICANT CHANGE UP (ref 0–0.2)
BASOPHILS NFR BLD AUTO: 0.3 % — SIGNIFICANT CHANGE UP (ref 0–2)
BILIRUB SERPL-MCNC: 0.8 MG/DL — SIGNIFICANT CHANGE UP (ref 0.4–2)
BUN SERPL-MCNC: 43 MG/DL — HIGH (ref 8–20)
CALCIUM SERPL-MCNC: 9.3 MG/DL — SIGNIFICANT CHANGE UP (ref 8.6–10.2)
CHLORIDE SERPL-SCNC: 88 MMOL/L — LOW (ref 98–107)
CO2 SERPL-SCNC: 25 MMOL/L — SIGNIFICANT CHANGE UP (ref 22–29)
CREAT SERPL-MCNC: 3.89 MG/DL — HIGH (ref 0.5–1.3)
EOSINOPHIL # BLD AUTO: 0 K/UL — SIGNIFICANT CHANGE UP (ref 0–0.5)
EOSINOPHIL NFR BLD AUTO: 0.4 % — SIGNIFICANT CHANGE UP (ref 0–5)
GLUCOSE SERPL-MCNC: 169 MG/DL — HIGH (ref 70–115)
HCT VFR BLD CALC: 30.9 % — LOW (ref 42–52)
HGB BLD-MCNC: 9.5 G/DL — LOW (ref 14–18)
INR BLD: 1.48 RATIO — HIGH (ref 0.88–1.16)
LACTATE BLDV-MCNC: 3.9 MMOL/L — HIGH (ref 0.5–2)
LIDOCAIN IGE QN: 21 U/L — LOW (ref 22–51)
LYMPHOCYTES # BLD AUTO: 1.2 K/UL — SIGNIFICANT CHANGE UP (ref 1–4.8)
LYMPHOCYTES # BLD AUTO: 16.9 % — LOW (ref 20–55)
MCHC RBC-ENTMCNC: 26 PG — LOW (ref 27–31)
MCHC RBC-ENTMCNC: 30.7 G/DL — LOW (ref 32–36)
MCV RBC AUTO: 84.4 FL — SIGNIFICANT CHANGE UP (ref 80–94)
MONOCYTES # BLD AUTO: 0.8 K/UL — SIGNIFICANT CHANGE UP (ref 0–0.8)
MONOCYTES NFR BLD AUTO: 10.4 % — HIGH (ref 3–10)
NEUTROPHILS # BLD AUTO: 5.2 K/UL — SIGNIFICANT CHANGE UP (ref 1.8–8)
NEUTROPHILS NFR BLD AUTO: 71.7 % — SIGNIFICANT CHANGE UP (ref 37–73)
PLATELET # BLD AUTO: 111 K/UL — LOW (ref 150–400)
POTASSIUM SERPL-MCNC: 5.4 MMOL/L — HIGH (ref 3.5–5.3)
POTASSIUM SERPL-SCNC: 5.4 MMOL/L — HIGH (ref 3.5–5.3)
PROT SERPL-MCNC: 8 G/DL — SIGNIFICANT CHANGE UP (ref 6.6–8.7)
PROTHROM AB SERPL-ACNC: 16.4 SEC — HIGH (ref 9.8–12.7)
RBC # BLD: 3.66 M/UL — LOW (ref 4.6–6.2)
RBC # FLD: 17.5 % — HIGH (ref 11–15.6)
SODIUM SERPL-SCNC: 134 MMOL/L — LOW (ref 135–145)
WBC # BLD: 7.2 K/UL — SIGNIFICANT CHANGE UP (ref 4.8–10.8)
WBC # FLD AUTO: 7.2 K/UL — SIGNIFICANT CHANGE UP (ref 4.8–10.8)

## 2017-07-23 PROCEDURE — 99223 1ST HOSP IP/OBS HIGH 75: CPT

## 2017-07-23 PROCEDURE — 99285 EMERGENCY DEPT VISIT HI MDM: CPT

## 2017-07-23 PROCEDURE — 71010: CPT | Mod: 26

## 2017-07-23 PROCEDURE — 93010 ELECTROCARDIOGRAM REPORT: CPT

## 2017-07-23 RX ORDER — GLUCAGON INJECTION, SOLUTION 0.5 MG/.1ML
1 INJECTION, SOLUTION SUBCUTANEOUS ONCE
Qty: 0 | Refills: 0 | Status: DISCONTINUED | OUTPATIENT
Start: 2017-07-23 | End: 2017-07-23

## 2017-07-23 RX ORDER — DEXTROSE 50 % IN WATER 50 %
25 SYRINGE (ML) INTRAVENOUS ONCE
Qty: 0 | Refills: 0 | Status: DISCONTINUED | OUTPATIENT
Start: 2017-07-23 | End: 2017-07-23

## 2017-07-23 RX ORDER — SODIUM CHLORIDE 9 MG/ML
1000 INJECTION, SOLUTION INTRAVENOUS
Qty: 0 | Refills: 0 | Status: DISCONTINUED | OUTPATIENT
Start: 2017-07-23 | End: 2017-07-23

## 2017-07-23 RX ORDER — INSULIN LISPRO 100/ML
VIAL (ML) SUBCUTANEOUS
Qty: 0 | Refills: 0 | Status: DISCONTINUED | OUTPATIENT
Start: 2017-07-23 | End: 2017-07-24

## 2017-07-23 RX ORDER — PIPERACILLIN AND TAZOBACTAM 4; .5 G/20ML; G/20ML
2.25 INJECTION, POWDER, LYOPHILIZED, FOR SOLUTION INTRAVENOUS EVERY 8 HOURS
Qty: 0 | Refills: 0 | Status: DISCONTINUED | OUTPATIENT
Start: 2017-07-23 | End: 2017-07-25

## 2017-07-23 RX ORDER — GABAPENTIN 400 MG/1
300 CAPSULE ORAL THREE TIMES A DAY
Qty: 0 | Refills: 0 | Status: DISCONTINUED | OUTPATIENT
Start: 2017-07-23 | End: 2017-07-23

## 2017-07-23 RX ORDER — SODIUM CHLORIDE 9 MG/ML
3 INJECTION INTRAMUSCULAR; INTRAVENOUS; SUBCUTANEOUS ONCE
Qty: 0 | Refills: 0 | Status: COMPLETED | OUTPATIENT
Start: 2017-07-23 | End: 2017-07-23

## 2017-07-23 RX ORDER — DEXTROSE 50 % IN WATER 50 %
1 SYRINGE (ML) INTRAVENOUS ONCE
Qty: 0 | Refills: 0 | Status: DISCONTINUED | OUTPATIENT
Start: 2017-07-23 | End: 2017-07-23

## 2017-07-23 RX ORDER — DEXTROSE 50 % IN WATER 50 %
12.5 SYRINGE (ML) INTRAVENOUS ONCE
Qty: 0 | Refills: 0 | Status: DISCONTINUED | OUTPATIENT
Start: 2017-07-23 | End: 2017-07-23

## 2017-07-23 RX ORDER — HEPARIN SODIUM 5000 [USP'U]/ML
5000 INJECTION INTRAVENOUS; SUBCUTANEOUS EVERY 12 HOURS
Qty: 0 | Refills: 0 | Status: DISCONTINUED | OUTPATIENT
Start: 2017-07-23 | End: 2017-07-23

## 2017-07-23 RX ORDER — SODIUM CHLORIDE 9 MG/ML
500 INJECTION INTRAMUSCULAR; INTRAVENOUS; SUBCUTANEOUS
Qty: 0 | Refills: 0 | Status: COMPLETED | OUTPATIENT
Start: 2017-07-23 | End: 2017-07-23

## 2017-07-23 RX ORDER — MIDODRINE HYDROCHLORIDE 2.5 MG/1
5 TABLET ORAL DAILY
Qty: 0 | Refills: 0 | Status: DISCONTINUED | OUTPATIENT
Start: 2017-07-23 | End: 2017-07-29

## 2017-07-23 RX ORDER — VANCOMYCIN HCL 1 G
1000 VIAL (EA) INTRAVENOUS ONCE
Qty: 0 | Refills: 0 | Status: COMPLETED | OUTPATIENT
Start: 2017-07-23 | End: 2017-07-23

## 2017-07-23 RX ORDER — POLYETHYLENE GLYCOL 3350 17 G/17G
17 POWDER, FOR SOLUTION ORAL AT BEDTIME
Qty: 0 | Refills: 0 | Status: DISCONTINUED | OUTPATIENT
Start: 2017-07-23 | End: 2017-07-23

## 2017-07-23 RX ORDER — PIPERACILLIN AND TAZOBACTAM 4; .5 G/20ML; G/20ML
3.38 INJECTION, POWDER, LYOPHILIZED, FOR SOLUTION INTRAVENOUS ONCE
Qty: 0 | Refills: 0 | Status: COMPLETED | OUTPATIENT
Start: 2017-07-23 | End: 2017-07-23

## 2017-07-23 RX ORDER — TAMSULOSIN HYDROCHLORIDE 0.4 MG/1
0.4 CAPSULE ORAL AT BEDTIME
Qty: 0 | Refills: 0 | Status: DISCONTINUED | OUTPATIENT
Start: 2017-07-23 | End: 2017-07-23

## 2017-07-23 RX ORDER — SODIUM CHLORIDE 9 MG/ML
250 INJECTION INTRAMUSCULAR; INTRAVENOUS; SUBCUTANEOUS ONCE
Qty: 0 | Refills: 0 | Status: DISCONTINUED | OUTPATIENT
Start: 2017-07-23 | End: 2017-07-23

## 2017-07-23 RX ORDER — ERYTHROMYCIN BASE 5 MG/GRAM
1 OINTMENT (GRAM) OPHTHALMIC (EYE) AT BEDTIME
Qty: 0 | Refills: 0 | Status: DISCONTINUED | OUTPATIENT
Start: 2017-07-23 | End: 2017-07-23

## 2017-07-23 RX ORDER — ACETAMINOPHEN 500 MG
650 TABLET ORAL ONCE
Qty: 0 | Refills: 0 | Status: COMPLETED | OUTPATIENT
Start: 2017-07-23 | End: 2017-07-23

## 2017-07-23 RX ADMIN — SODIUM CHLORIDE 2000 MILLILITER(S): 9 INJECTION INTRAMUSCULAR; INTRAVENOUS; SUBCUTANEOUS at 10:45

## 2017-07-23 RX ADMIN — Medication 1 DROP(S): at 18:29

## 2017-07-23 RX ADMIN — SODIUM CHLORIDE 2000 MILLILITER(S): 9 INJECTION INTRAMUSCULAR; INTRAVENOUS; SUBCUTANEOUS at 11:59

## 2017-07-23 RX ADMIN — Medication 250 MILLIGRAM(S): at 11:18

## 2017-07-23 RX ADMIN — POLYETHYLENE GLYCOL 3350 17 GRAM(S): 17 POWDER, FOR SOLUTION ORAL at 23:18

## 2017-07-23 RX ADMIN — Medication 650 MILLIGRAM(S): at 10:44

## 2017-07-23 RX ADMIN — PIPERACILLIN AND TAZOBACTAM 200 GRAM(S): 4; .5 INJECTION, POWDER, LYOPHILIZED, FOR SOLUTION INTRAVENOUS at 15:39

## 2017-07-23 RX ADMIN — SODIUM CHLORIDE 2000 MILLILITER(S): 9 INJECTION INTRAMUSCULAR; INTRAVENOUS; SUBCUTANEOUS at 10:30

## 2017-07-23 RX ADMIN — Medication 1: at 17:57

## 2017-07-23 RX ADMIN — PIPERACILLIN AND TAZOBACTAM 200 GRAM(S): 4; .5 INJECTION, POWDER, LYOPHILIZED, FOR SOLUTION INTRAVENOUS at 10:44

## 2017-07-23 RX ADMIN — SODIUM CHLORIDE 2000 MILLILITER(S): 9 INJECTION INTRAMUSCULAR; INTRAVENOUS; SUBCUTANEOUS at 10:22

## 2017-07-23 RX ADMIN — TAMSULOSIN HYDROCHLORIDE 0.4 MILLIGRAM(S): 0.4 CAPSULE ORAL at 23:18

## 2017-07-23 RX ADMIN — HEPARIN SODIUM 5000 UNIT(S): 5000 INJECTION INTRAVENOUS; SUBCUTANEOUS at 17:57

## 2017-07-23 RX ADMIN — SODIUM CHLORIDE 3 MILLILITER(S): 9 INJECTION INTRAMUSCULAR; INTRAVENOUS; SUBCUTANEOUS at 10:32

## 2017-07-23 RX ADMIN — SODIUM CHLORIDE 2000 MILLILITER(S): 9 INJECTION INTRAMUSCULAR; INTRAVENOUS; SUBCUTANEOUS at 10:55

## 2017-07-23 RX ADMIN — MIDODRINE HYDROCHLORIDE 5 MILLIGRAM(S): 2.5 TABLET ORAL at 23:33

## 2017-07-23 RX ADMIN — GABAPENTIN 300 MILLIGRAM(S): 400 CAPSULE ORAL at 23:18

## 2017-07-23 RX ADMIN — PIPERACILLIN AND TAZOBACTAM 200 GRAM(S): 4; .5 INJECTION, POWDER, LYOPHILIZED, FOR SOLUTION INTRAVENOUS at 23:18

## 2017-07-23 RX ADMIN — Medication 1 APPLICATION(S): at 23:19

## 2017-07-23 NOTE — ED PROVIDER NOTE - PROGRESS NOTE DETAILS
Dr Hampton renal called and will see for nephology he knows pt Dr Llanos recalled with test results and he will tx for renal Dr Murali Mack called for admission and he will admit and direct further care 4 fabrice

## 2017-07-23 NOTE — H&P ADULT - ASSESSMENT
87M with a recent discharge from the rehabilitation facility presenting with weakness and cough found to have fever and tachycardia.    SIRS - Antibiotics administered in the emergency department. No clear source of infection. Continue to monitor clinically. Culture results to be followed when available.    ESRD - Nephrology consultation requested for continuation of hemodialysis. Repeat laboratory studies ordered to monitor hyperkalemia.    Diabetes - Insulin coverage, close monitoring of blood glucose levels.    Chronic systolic heart failure - Close monitoring of fluid status.    Transaminitis - Unclear etiology, possibly from heart failure.    Discussed with the patient's wife at the bedside.

## 2017-07-23 NOTE — ED PROVIDER NOTE - SKIN, MLM
Skin normal color for race, warm, dry and intact. No evidence of rash.has chest wall catherter no evidence of infection no redness

## 2017-07-23 NOTE — ED PROVIDER NOTE - CONSTITUTIONAL, MLM
normal... Ill  appearing, well nourished, awake, alert, oriented to person, place,  and in no apparent distress.

## 2017-07-23 NOTE — ED PROVIDER NOTE - PMH
Dementia    Diabetes mellitus    Hypertension    Muscle weakness (generalized)    Renal failure    Ventricular tachycardia

## 2017-07-23 NOTE — ED PROVIDER NOTE - OBJECTIVE STATEMENT
86 y/o male with a h/o dm and renal failure on dialysis he says jonelle gonsalez and sat and he thinks he missed it yesterday and he was sent from [facility] for fever today and he says he has had a nonproductive cough for a few days

## 2017-07-23 NOTE — H&P ADULT - HISTORY OF PRESENT ILLNESS
87M presented from home after being discharged from [facility] the day prior with weakness and poor oral intake. The patient was lethargic on interview and additional information was obtained from the patient's wife at the bedside. The patient was noted to have a nonproductive cough but no fevers or chills at home. The patient was able to attend his hemodialysis session yesterday but had remained weak. There was no complaints of chest pain of palpitations. The patient is noted to have had prior admissions to the hospital with the most recent admission for congestive heart failure and pneumonia with similar symptoms. There are no known exacerbating or relieving factors. Further information is limited due to the patient's medical condition. The patient was noted to be febrile in the emergency department and received intravenous antibiotics and fluids.

## 2017-07-23 NOTE — CHART NOTE - NSCHARTNOTEFT_GEN_A_CORE
PA event note:    PA called by RN due to low BP reading of 84/58. All other vitals stable,  pt is asymptomatic. Pt BP normally runs in low 100's/60. Med rec reviewed, instructed RN to give Midodrine as already ordered (pt did not receive his daily dose for unknown reason). Recheck BP in 30 mins - 1 hr. Call with results. If recheck BP is still elevated, will give 250 ml bolus.     Judith JEFFERY

## 2017-07-23 NOTE — H&P ADULT - NSHPPHYSICALEXAM_GEN_ALL_CORE
General appearance: NAD, Lethargic  HEENT: NCAT, Conjunctiva clear, Right eye opaque  Neck: Supple, No JVD, No tenderness  Lungs: Clear to auscultation, Breath sound equal bilaterally, No wheezes, No rales  Cardiovascular: S1S2, Regular rhythm  Abdomen: Soft, Nontender, Nondistended, No guarding/rebound, Positive bowel sounds  Extremities: No clubbing, No cyanosis, No edema, No calf tenderness  Neuro: Strength equal bilaterally, No tremors  Psychiatric: Lethargic    Vital Signs Last 24 Hrs  T(C): 38.4 (23 Jul 2017 10:41), Max: 38.8 (23 Jul 2017 09:56)  T(F): 101.2 (23 Jul 2017 10:41), Max: 101.8 (23 Jul 2017 09:56)  HR: 96 (23 Jul 2017 10:41) (96 - 118)  BP: 102/64 (23 Jul 2017 10:41) (102/64 - 103/64)  BP(mean): --  RR: 18 (23 Jul 2017 10:41) (16 - 18)  SpO2: 96% (23 Jul 2017 10:41) (95% - 96%)

## 2017-07-23 NOTE — ED ADULT NURSE NOTE - OBJECTIVE STATEMENT
PT BIBA s/p fall. Pt states he fell today. Pt states he was discharged yesterday unclear from where. pt's family arrived and states he was discharged from [facility] yesterday where pt was for rehab for lower leg weakness. Per wife pt was unable to walk when he arrived from [facility]. Wife attempted to protest discharge however was unsuccessful, pt was sent home where he fell today, pt also presents with fever.  Pt seen & evaluated by MD waiting for test results.

## 2017-07-24 DIAGNOSIS — Z29.9 ENCOUNTER FOR PROPHYLACTIC MEASURES, UNSPECIFIED: ICD-10-CM

## 2017-07-24 DIAGNOSIS — I50.20 UNSPECIFIED SYSTOLIC (CONGESTIVE) HEART FAILURE: ICD-10-CM

## 2017-07-24 DIAGNOSIS — I10 ESSENTIAL (PRIMARY) HYPERTENSION: ICD-10-CM

## 2017-07-24 DIAGNOSIS — F03.90 UNSPECIFIED DEMENTIA, UNSPECIFIED SEVERITY, WITHOUT BEHAVIORAL DISTURBANCE, PSYCHOTIC DISTURBANCE, MOOD DISTURBANCE, AND ANXIETY: ICD-10-CM

## 2017-07-24 DIAGNOSIS — N19 UNSPECIFIED KIDNEY FAILURE: ICD-10-CM

## 2017-07-24 DIAGNOSIS — E11.9 TYPE 2 DIABETES MELLITUS WITHOUT COMPLICATIONS: ICD-10-CM

## 2017-07-24 DIAGNOSIS — A41.9 SEPSIS, UNSPECIFIED ORGANISM: ICD-10-CM

## 2017-07-24 DIAGNOSIS — M62.81 MUSCLE WEAKNESS (GENERALIZED): ICD-10-CM

## 2017-07-24 LAB
-  CANDIDA ALBICANS: SIGNIFICANT CHANGE UP
-  CANDIDA GLABRATA: SIGNIFICANT CHANGE UP
-  CANDIDA KRUSEI: SIGNIFICANT CHANGE UP
-  CANDIDA PARAPSILOSIS: SIGNIFICANT CHANGE UP
-  CANDIDA TROPICALIS: SIGNIFICANT CHANGE UP
-  COAGULASE NEGATIVE STAPHYLOCOCCUS: SIGNIFICANT CHANGE UP
-  K. PNEUMONIAE GROUP: SIGNIFICANT CHANGE UP
-  KPC RESISTANCE GENE: SIGNIFICANT CHANGE UP
-  STREPTOCOCCUS SP. (NOT GRP A, B OR S PNEUMONIAE): SIGNIFICANT CHANGE UP
A BAUMANNII DNA SPEC QL NAA+PROBE: SIGNIFICANT CHANGE UP
ANION GAP SERPL CALC-SCNC: 16 MMOL/L — SIGNIFICANT CHANGE UP (ref 5–17)
BUN SERPL-MCNC: 58 MG/DL — HIGH (ref 8–20)
CALCIUM SERPL-MCNC: 8.9 MG/DL — SIGNIFICANT CHANGE UP (ref 8.6–10.2)
CHLORIDE SERPL-SCNC: 89 MMOL/L — LOW (ref 98–107)
CO2 SERPL-SCNC: 27 MMOL/L — SIGNIFICANT CHANGE UP (ref 22–29)
CREAT SERPL-MCNC: 4.81 MG/DL — HIGH (ref 0.5–1.3)
E CLOAC COMP DNA BLD POS QL NAA+PROBE: SIGNIFICANT CHANGE UP
E COLI DNA BLD POS QL NAA+NON-PROBE: SIGNIFICANT CHANGE UP
ENTEROCOC DNA BLD POS QL NAA+NON-PROBE: SIGNIFICANT CHANGE UP
ENTEROCOC DNA BLD POS QL NAA+NON-PROBE: SIGNIFICANT CHANGE UP
GLUCOSE SERPL-MCNC: 176 MG/DL — HIGH (ref 70–115)
GP B STREP DNA BLD POS QL NAA+NON-PROBE: SIGNIFICANT CHANGE UP
HAEM INFLU DNA BLD POS QL NAA+NON-PROBE: SIGNIFICANT CHANGE UP
HBA1C BLD-MCNC: 6.2 % — HIGH (ref 4–5.6)
HCT VFR BLD CALC: 28.6 % — LOW (ref 42–52)
HGB BLD-MCNC: 8.7 G/DL — LOW (ref 14–18)
K OXYTOCA DNA BLD POS QL NAA+NON-PROBE: SIGNIFICANT CHANGE UP
L MONOCYTOG DNA BLD POS QL NAA+NON-PROBE: SIGNIFICANT CHANGE UP
MCHC RBC-ENTMCNC: 25.9 PG — LOW (ref 27–31)
MCHC RBC-ENTMCNC: 30.4 G/DL — LOW (ref 32–36)
MCV RBC AUTO: 85.1 FL — SIGNIFICANT CHANGE UP (ref 80–94)
METHOD TYPE: SIGNIFICANT CHANGE UP
MRSA SPEC QL CULT: SIGNIFICANT CHANGE UP
MSSA DNA SPEC QL NAA+PROBE: SIGNIFICANT CHANGE UP
N MEN ISLT CULT: SIGNIFICANT CHANGE UP
P AERUGINOSA DNA BLD POS NAA+NON-PROBE: SIGNIFICANT CHANGE UP
PLATELET # BLD AUTO: 90 K/UL — LOW (ref 150–400)
POTASSIUM SERPL-MCNC: 4.8 MMOL/L — SIGNIFICANT CHANGE UP (ref 3.5–5.3)
POTASSIUM SERPL-SCNC: 4.8 MMOL/L — SIGNIFICANT CHANGE UP (ref 3.5–5.3)
PROTEUS SP DNA BLD POS QL NAA+NON-PROBE: SIGNIFICANT CHANGE UP
RBC # BLD: 3.36 M/UL — LOW (ref 4.6–6.2)
RBC # FLD: 17.2 % — HIGH (ref 11–15.6)
S MARCESCENS DNA BLD POS NAA+NON-PROBE: SIGNIFICANT CHANGE UP
S PNEUM DNA BLD POS QL NAA+NON-PROBE: SIGNIFICANT CHANGE UP
S PYO DNA BLD POS QL NAA+NON-PROBE: SIGNIFICANT CHANGE UP
SODIUM SERPL-SCNC: 132 MMOL/L — LOW (ref 135–145)
WBC # BLD: 5 K/UL — SIGNIFICANT CHANGE UP (ref 4.8–10.8)
WBC # FLD AUTO: 5 K/UL — SIGNIFICANT CHANGE UP (ref 4.8–10.8)

## 2017-07-24 PROCEDURE — 99233 SBSQ HOSP IP/OBS HIGH 50: CPT

## 2017-07-24 RX ORDER — ERYTHROPOIETIN 10000 [IU]/ML
10000 INJECTION, SOLUTION INTRAVENOUS; SUBCUTANEOUS
Qty: 0 | Refills: 0 | Status: DISCONTINUED | OUTPATIENT
Start: 2017-07-25 | End: 2017-07-23

## 2017-07-24 RX ORDER — INSULIN LISPRO 100/ML
VIAL (ML) SUBCUTANEOUS
Qty: 0 | Refills: 0 | Status: DISCONTINUED | OUTPATIENT
Start: 2017-07-24 | End: 2017-07-23

## 2017-07-24 RX ORDER — INSULIN LISPRO 100/ML
2 VIAL (ML) SUBCUTANEOUS ONCE
Qty: 0 | Refills: 0 | Status: COMPLETED | OUTPATIENT
Start: 2017-07-24 | End: 2017-07-24

## 2017-07-24 RX ORDER — FOLIC ACID 0.8 MG
1 TABLET ORAL DAILY
Qty: 0 | Refills: 0 | Status: DISCONTINUED | OUTPATIENT
Start: 2017-07-24 | End: 2017-07-23

## 2017-07-24 RX ADMIN — Medication 1 DROP(S): at 06:17

## 2017-07-24 RX ADMIN — HEPARIN SODIUM 5000 UNIT(S): 5000 INJECTION INTRAVENOUS; SUBCUTANEOUS at 18:02

## 2017-07-24 RX ADMIN — Medication 2 UNIT(S): at 23:15

## 2017-07-24 RX ADMIN — GABAPENTIN 300 MILLIGRAM(S): 400 CAPSULE ORAL at 06:17

## 2017-07-24 RX ADMIN — PIPERACILLIN AND TAZOBACTAM 200 GRAM(S): 4; .5 INJECTION, POWDER, LYOPHILIZED, FOR SOLUTION INTRAVENOUS at 18:01

## 2017-07-24 RX ADMIN — TAMSULOSIN HYDROCHLORIDE 0.4 MILLIGRAM(S): 0.4 CAPSULE ORAL at 23:15

## 2017-07-24 RX ADMIN — GABAPENTIN 300 MILLIGRAM(S): 400 CAPSULE ORAL at 18:01

## 2017-07-24 RX ADMIN — HEPARIN SODIUM 5000 UNIT(S): 5000 INJECTION INTRAVENOUS; SUBCUTANEOUS at 06:16

## 2017-07-24 RX ADMIN — Medication 1 DROP(S): at 18:02

## 2017-07-24 RX ADMIN — Medication 1 APPLICATION(S): at 23:09

## 2017-07-24 RX ADMIN — MIDODRINE HYDROCHLORIDE 5 MILLIGRAM(S): 2.5 TABLET ORAL at 11:44

## 2017-07-24 RX ADMIN — Medication 100 MILLIGRAM(S): at 23:09

## 2017-07-24 RX ADMIN — PIPERACILLIN AND TAZOBACTAM 200 GRAM(S): 4; .5 INJECTION, POWDER, LYOPHILIZED, FOR SOLUTION INTRAVENOUS at 06:16

## 2017-07-24 RX ADMIN — GABAPENTIN 300 MILLIGRAM(S): 400 CAPSULE ORAL at 23:09

## 2017-07-24 NOTE — ED ADULT NURSE REASSESSMENT NOTE - GENERAL PATIENT STATE
comfortable appearance
comfortable appearance/resting/sleeping/smiling/interactive
smiling/interactive/resting/sleeping/comfortable appearance/improvement verbalized
comfortable appearance
comfortable appearance
resting/sleeping
resting/sleeping/comfortable appearance/family/SO at bedside

## 2017-07-24 NOTE — PROVIDER CONTACT NOTE (CHANGE IN STATUS NOTIFICATION) - BACKGROUND
afmit dx sepsis chf. mews 7 r/t age rr and sbp. unabl to obtain urine sample as pt oliguric and incontinent

## 2017-07-24 NOTE — PROGRESS NOTE ADULT - ASSESSMENT
This 87 yp male with a pmh of dementia, DM, HTN, ESRD on HD, Chronic Systolic CHF presented after being DC'd from [facility] with weakness, poor po intake, fever, cough, tachycardia, and lethargy.  He had a recent admit for CHF, PNEU.  He had a Nephrology consult and had HD yesterday.  He was started on Zosyn 2.25g q 12 and blood cultures were positive for gram + cocci in clusters.  Pt c/o a cough and was given Robitussin cough syrup.  The patient is now alert and communicating free.  He reports he does not feel well but is feeling a little bit better.  A Pallitive Consult is Pending.  A PT Eval was ordered. This 87 yp male with a pmh of dementia, DM, HTN, ESRD on HD, Chronic Systolic CHF presented after being DC'd from [facility] with weakness, poor po intake, fever, cough, tachycardia, and lethargy.  He had a recent admit for CHF, PNEU.  He had a Nephrology consult and had HD yesterday.  He was started on Zosyn 2.25g q 12 and blood cultures were positive for gram + cocci in clusters.  Pt c/o a cough and was given Robitussin cough syrup.  The patient is now alert and communicating freely.  He reports he does not feel well but is feeling a little bit better.  A Pallitive Consult is Pending.  A PT Eval was ordered.

## 2017-07-24 NOTE — CONSULT NOTE ADULT - SUBJECTIVE AND OBJECTIVE BOX
Patient is a 87y old  Male who presents with a chief complaint of Weakness (23 Jul 2017 12:44)      HPI:  87M presented from home after being discharged from [facility] the day prior with weakness and poor oral intake. The patient was lethargic on interview and additional information was obtained from the patient's wife at the bedside. The patient was noted to have a nonproductive cough but no fevers or chills at home. The patient was able to attend his hemodialysis session yesterday but had remained weak. There was no complaints of chest pain of palpitations. The patient is noted to have had prior admissions to the hospital with the most recent admission for congestive heart failure and pneumonia with similar symptoms. There are no known exacerbating or relieving factors. Further information is limited due to the patient's medical condition. The patient was noted to be febrile in the emergency department and received intravenous antibiotics and fluids. (23 Jul 2017 12:44)    Above noted . + right sided permacath   Satrted on IV VAnco and Zosyn in the ER  CXR CATHERINE  Blood Cx coag neg staph      PAST MEDICAL & SURGICAL HISTORY:  Dementia  Muscle weakness (generalized)  Ventricular tachycardia  Diabetes mellitus  Renal failure  Hypertension  No significant past surgical history      FAMILY HISTORY:  No pertinent family history in first degree relatives      Social History:    MEDICATIONS  (STANDING):  heparin  Injectable 5000 Unit(s) SubCutaneous every 12 hours  piperacillin/tazobactam IVPB. 2.25 Gram(s) IV Intermittent every 8 hours  tamsulosin 0.4 milliGRAM(s) Oral at bedtime  gabapentin 300 milliGRAM(s) Oral three times a day  insulin lispro (HumaLOG) corrective regimen sliding scale   SubCutaneous three times a day before meals  dextrose 5%. 1000 milliLiter(s) (50 mL/Hr) IV Continuous <Continuous>  dextrose 50% Injectable 12.5 Gram(s) IV Push once  dextrose 50% Injectable 25 Gram(s) IV Push once  dextrose 50% Injectable 25 Gram(s) IV Push once  polyethylene glycol 3350 17 Gram(s) Oral at bedtime  midodrine 5 milliGRAM(s) Oral daily  artificial  tears Solution 1 Drop(s) Right EYE two times a day  erythromycin   Ointment 1 Application(s) Right EYE at bedtime    MEDICATIONS  (PRN):  dextrose Gel 1 Dose(s) Oral once PRN Blood Glucose LESS THAN 70 milliGRAM(s)/deciliter  glucagon  Injectable 1 milliGRAM(s) IntraMuscular once PRN Glucose LESS THAN 70 milligrams/deciliter  guaiFENesin    Syrup 100 milliGRAM(s) Oral every 6 hours PRN Cough      Allergies    No Known Allergies    Intolerances        Vital Signs Last 24 Hrs  T(C): 37.1 (24 Jul 2017 11:38), Max: 37.1 (24 Jul 2017 11:38)  T(F): 98.7 (24 Jul 2017 11:38), Max: 98.7 (24 Jul 2017 11:38)  HR: 97 (24 Jul 2017 13:59) (68 - 97)  BP: 105/49 (24 Jul 2017 13:59) (82/48 - 110/68)  BP(mean): --  RR: 20 (24 Jul 2017 11:38) (18 - 20)  SpO2: 96% (24 Jul 2017 11:38) (96% - 100%)  Daily     Daily   I&O's Detail    I&O's Summary      PHYSICAL EXAM:    GENERAL: NAD,   HEAD:  NO edema   NECK: Supple, No JVD,, + right permacath   CHEST/LUNG:EAE , few basilar crackles   HEART: Regular rate and rhythm; No rub   ABDOMEN: Soft, Nontender,   EXTREMITIES: decreased edema           LABS:                        8.7    5.0   )-----------( 90       ( 24 Jul 2017 06:23 )             28.6     07-24    132<L>  |  89<L>  |  58.0<H>  ----------------------------<  176<H>  4.8   |  27.0  |  4.81<H>    Ca    8.9      24 Jul 2017 06:20    TPro  8.0  /  Alb  3.6  /  TBili  0.8  /  DBili  x   /  AST  847<H>  /  ALT  584<H>  /  AlkPhos  212<H>  07-23    PT/INR - ( 23 Jul 2017 10:36 )   PT: 16.4 sec;   INR: 1.48 ratio                     RADIOLOGY & ADDITIONAL TESTS:

## 2017-07-24 NOTE — PROGRESS NOTE ADULT - ATTENDING COMMENTS
87M with a recent discharge from the rehabilitation facility with weakness and sepsis. On intravenous antibiotics and afebrile without leukocytosis. Awaiting sensitivities from the blood culture. Discussed with Nephrology and to continue hemodialysis as scheduled. Midodrine dose adjusted for hypotension. Insulin coverage for diabetes with close glucose monitoring. No evidence of fluid overload on examination. To continue on eye drops for corneal abrasion.

## 2017-07-24 NOTE — PROGRESS NOTE ADULT - SUBJECTIVE AND OBJECTIVE BOX
INTERVAL HPI/OVERNIGHT EVENTS:  HPI:  87M presented from home after being discharged from [facility] the day prior with weakness and poor oral intake. The patient was lethargic on interview and additional information was obtained from the patient's wife at the bedside. The patient was noted to have a nonproductive cough but no fevers or chills at home. The patient was able to attend his hemodialysis session yesterday but had remained weak. There was no complaints of chest pain of palpitations. The patient is noted to have had prior admissions to the hospital with the most recent admission for congestive heart failure and pneumonia with similar symptoms. There are no known exacerbating or relieving factors. Further information is limited due to the patient's medical condition. The patient was noted to be febrile in the emergency department and received intravenous antibiotics and fluids. (23 Jul 2017 12:44)    The patient was seen and examined.  He is A&O in NAD conversing freely.  He reports that he is still not feeling well but is feeling a little bettter than when he was admitted.  He is still feels weak and is a little short of breath with a cough.  Robitussin cough medicine was ordered.   He denies any new complaints.  Test  results, consultant recommendations and the Plan of Care were reviewed with the patient.          MEDICATIONS  (STANDING):  heparin  Injectable 5000 Unit(s) SubCutaneous every 12 hours  piperacillin/tazobactam IVPB. 2.25 Gram(s) IV Intermittent every 8 hours  tamsulosin 0.4 milliGRAM(s) Oral at bedtime  gabapentin 300 milliGRAM(s) Oral three times a day  insulin lispro (HumaLOG) corrective regimen sliding scale   SubCutaneous three times a day before meals  dextrose 5%. 1000 milliLiter(s) (50 mL/Hr) IV Continuous <Continuous>  dextrose 50% Injectable 12.5 Gram(s) IV Push once  dextrose 50% Injectable 25 Gram(s) IV Push once  dextrose 50% Injectable 25 Gram(s) IV Push once  polyethylene glycol 3350 17 Gram(s) Oral at bedtime  midodrine 5 milliGRAM(s) Oral daily  artificial  tears Solution 1 Drop(s) Right EYE two times a day  erythromycin   Ointment 1 Application(s) Right EYE at bedtime    MEDICATIONS  (PRN):  dextrose Gel 1 Dose(s) Oral once PRN Blood Glucose LESS THAN 70 milliGRAM(s)/deciliter  glucagon  Injectable 1 milliGRAM(s) IntraMuscular once PRN Glucose LESS THAN 70 milligrams/deciliter  guaiFENesin    Syrup 100 milliGRAM(s) Oral every 6 hours PRN Cough      Allergies  No Known Allergies      Vital Signs Last 24 Hrs  T(C): 37.1 (24 Jul 2017 11:38), Max: 37.1 (24 Jul 2017 11:38)  T(F): 98.7 (24 Jul 2017 11:38), Max: 98.7 (24 Jul 2017 11:38)  HR: 97 (24 Jul 2017 13:59) (68 - 97)  BP: 105/49 (24 Jul 2017 13:59) (82/48 - 110/68)  BP(mean): --  RR: 20 (24 Jul 2017 11:38) (18 - 20)  SpO2: 96% (24 Jul 2017 11:38) (96% - 100%)    General: Pt A&O in NAD, speaking freely and appropriately.  HEENT:  NCAT, Right Eye Opaque with Blindness, EOMI, Nares Patent, Pharynx Clear, Uvula midline,   Neck: no lymphadenopathy, no JVD,   Lungs: Clear to auscultation, no wheezes, no rhonchi, no rales b/l.  CV: RRR,  S1,S2,  no Murmur  ABD: +BS x 4; soft,  nontender, no distension,  No guarding,   MS: FROM throughout.  Muscle tone and strength equal b/l ,  no deformity  EXT:  No cyanosis, no clubbing, no edema b/l    Neuro: A&O x 3; CN II- XII grossly intact.  No focal deficits,  No sensory or motor deficits.    LABS:                          8.7    5.0   )-----------( 90       ( 24 Jul 2017 06:23 )             28.6     07-24    132<L>  |  89<L>  |  58.0<H>  ----------------------------<  176<H>  4.8   |  27.0  |  4.81<H>    Ca    8.9      24 Jul 2017 06:20    TPro  8.0  /  Alb  3.6  /  TBili  0.8  /  DBili  x   /  AST  847<H>  /  ALT  584<H>  /  AlkPhos  212<H>  07-23    PT/INR - ( 23 Jul 2017 10:36 )   PT: 16.4 sec;   INR: 1.48 ratio         CULTURES:  Blood Cultures:  Gram +  Cocci in Clusters.    RADIOLOGY & ADDITIONAL TESTS: INTERVAL HPI/OVERNIGHT EVENTS:  HPI:  87M presented from home after being discharged from [facility] the day prior with weakness and poor oral intake. The patient was lethargic on interview and additional information was obtained from the patient's wife at the bedside. The patient was noted to have a nonproductive cough but no fevers or chills at home. The patient was able to attend his hemodialysis session yesterday but had remained weak. There was no complaints of chest pain of palpitations. The patient is noted to have had prior admissions to the hospital with the most recent admission for congestive heart failure and pneumonia with similar symptoms. There are no known exacerbating or relieving factors. Further information is limited due to the patient's medical condition. The patient was noted to be febrile in the emergency department and received intravenous antibiotics and fluids. (23 Jul 2017 12:44)    The patient was seen and examined.  He is A&O in NAD conversing freely.  He reports that he is still not feeling well but is feeling a little bettter than when he was admitted.  He is still feels weak and is a little short of breath with a cough.  Robitussin cough medicine was ordered.   He denies any new complaints.  Test  results, consultant recommendations and the Plan of Care were reviewed with the patient.          MEDICATIONS  (STANDING):  heparin  Injectable 5000 Unit(s) SubCutaneous every 12 hours  piperacillin/tazobactam IVPB. 2.25 Gram(s) IV Intermittent every 8 hours  tamsulosin 0.4 milliGRAM(s) Oral at bedtime  gabapentin 300 milliGRAM(s) Oral three times a day  insulin lispro (HumaLOG) corrective regimen sliding scale   SubCutaneous three times a day before meals  dextrose 5%. 1000 milliLiter(s) (50 mL/Hr) IV Continuous <Continuous>  dextrose 50% Injectable 12.5 Gram(s) IV Push once  dextrose 50% Injectable 25 Gram(s) IV Push once  dextrose 50% Injectable 25 Gram(s) IV Push once  polyethylene glycol 3350 17 Gram(s) Oral at bedtime  midodrine 5 milliGRAM(s) Oral daily  artificial  tears Solution 1 Drop(s) Right EYE two times a day  erythromycin   Ointment 1 Application(s) Right EYE at bedtime    MEDICATIONS  (PRN):  dextrose Gel 1 Dose(s) Oral once PRN Blood Glucose LESS THAN 70 milliGRAM(s)/deciliter  glucagon  Injectable 1 milliGRAM(s) IntraMuscular once PRN Glucose LESS THAN 70 milligrams/deciliter  guaiFENesin    Syrup 100 milliGRAM(s) Oral every 6 hours PRN Cough      Allergies  No Known Allergies      Vital Signs Last 24 Hrs  T(C): 37.1 (24 Jul 2017 11:38), Max: 37.1 (24 Jul 2017 11:38)  T(F): 98.7 (24 Jul 2017 11:38), Max: 98.7 (24 Jul 2017 11:38)  HR: 97 (24 Jul 2017 13:59) (68 - 97)  BP: 105/49 (24 Jul 2017 13:59) (82/48 - 110/68)  BP(mean): --  RR: 20 (24 Jul 2017 11:38) (18 - 20)  SpO2: 96% (24 Jul 2017 11:38) (96% - 100%)    General: Pt A&O in NAD, speaking freely and appropriately.  HEENT:  NCAT, Right Eye Opaque with Blindness, EOMI, Nares Patent, Pharynx Clear, Uvula midline,   Neck: no lymphadenopathy, no JVD,   Lungs: Clear to auscultation, no wheezes, no rhonchi, no rales b/l.  CV: RRR,  S1,S2,  no Murmur  ABD: +BS x 4; soft,  nontender, no distension,  No guarding,   MS: FROM throughout.  Muscle tone and strength equal b/l ,  no deformity  EXT:  No cyanosis, no clubbing, no edema b/l    Neuro: A&O x 3; CN II- XII grossly intact.  No focal deficits,  No sensory or motor deficits.    LABS:                          8.7    5.0   )-----------( 90       ( 24 Jul 2017 06:23 )             28.6     07-24    132<L>  |  89<L>  |  58.0<H>  ----------------------------<  176<H>  4.8   |  27.0  |  4.81<H>    Ca    8.9      24 Jul 2017 06:20    TPro  8.0  /  Alb  3.6  /  TBili  0.8  /  DBili  x   /  AST  847<H>  /  ALT  584<H>  /  AlkPhos  212<H>  07-23    PT/INR - ( 23 Jul 2017 10:36 )   PT: 16.4 sec;   INR: 1.48 ratio         CULTURES:  Blood Cultures:  Gram +  Cocci in Clusters.    RADIOLOGY & ADDITIONAL TESTS:  CXR:  Mild vvascular congestion, no focal lung consolidation. INTERVAL HPI/OVERNIGHT EVENTS:  HPI:  87M presented from home after being discharged from [facility] the day prior with weakness and poor oral intake. The patient was lethargic on interview and additional information was obtained from the patient's wife at the bedside. The patient was noted to have a nonproductive cough but no fevers or chills at home. The patient was able to attend his hemodialysis session yesterday but had remained weak. There was no complaints of chest pain of palpitations. The patient is noted to have had prior admissions to the hospital with the most recent admission for congestive heart failure and pneumonia with similar symptoms. There are no known exacerbating or relieving factors. Further information is limited due to the patient's medical condition. The patient was noted to be febrile in the emergency department and received intravenous antibiotics and fluids. (23 Jul 2017 12:44)    The patient was seen and examined.  He is A&O in NAD conversing freely.  He reports that he is still not feeling well but is feeling a little bettter than when he was admitted.  He still feels weak and is a little short of breath with a cough.  Robitussin cough medicine was ordered.   He denies any new complaints.  Test  results, consultant recommendations and the Plan of Care were reviewed with the patient.          MEDICATIONS  (STANDING):  heparin  Injectable 5000 Unit(s) SubCutaneous every 12 hours  piperacillin/tazobactam IVPB. 2.25 Gram(s) IV Intermittent every 8 hours  tamsulosin 0.4 milliGRAM(s) Oral at bedtime  gabapentin 300 milliGRAM(s) Oral three times a day  insulin lispro (HumaLOG) corrective regimen sliding scale   SubCutaneous three times a day before meals  dextrose 5%. 1000 milliLiter(s) (50 mL/Hr) IV Continuous <Continuous>  dextrose 50% Injectable 12.5 Gram(s) IV Push once  dextrose 50% Injectable 25 Gram(s) IV Push once  dextrose 50% Injectable 25 Gram(s) IV Push once  polyethylene glycol 3350 17 Gram(s) Oral at bedtime  midodrine 5 milliGRAM(s) Oral daily  artificial  tears Solution 1 Drop(s) Right EYE two times a day  erythromycin   Ointment 1 Application(s) Right EYE at bedtime    MEDICATIONS  (PRN):  dextrose Gel 1 Dose(s) Oral once PRN Blood Glucose LESS THAN 70 milliGRAM(s)/deciliter  glucagon  Injectable 1 milliGRAM(s) IntraMuscular once PRN Glucose LESS THAN 70 milligrams/deciliter  guaiFENesin    Syrup 100 milliGRAM(s) Oral every 6 hours PRN Cough      Allergies  No Known Allergies      Vital Signs Last 24 Hrs  T(C): 37.1 (24 Jul 2017 11:38), Max: 37.1 (24 Jul 2017 11:38)  T(F): 98.7 (24 Jul 2017 11:38), Max: 98.7 (24 Jul 2017 11:38)  HR: 97 (24 Jul 2017 13:59) (68 - 97)  BP: 105/49 (24 Jul 2017 13:59) (82/48 - 110/68)  BP(mean): --  RR: 20 (24 Jul 2017 11:38) (18 - 20)  SpO2: 96% (24 Jul 2017 11:38) (96% - 100%)    General: Pt A&O in NAD, speaking freely and appropriately.  HEENT:  NCAT, Right Eye Opaque with Blindness, EOMI, Nares Patent, Pharynx Clear, Uvula midline,   Neck: no lymphadenopathy, no JVD,   Lungs: Clear to auscultation, no wheezes, no rhonchi, no rales b/l.  CV: RRR,  S1,S2,  no Murmur  ABD: +BS x 4; soft,  nontender, no distension,  No guarding,   MS: FROM throughout.  Muscle tone and strength equal b/l ,  no deformity  EXT:  No cyanosis, no clubbing, no edema b/l    Neuro: A&O x 3; CN II- XII grossly intact.  No focal deficits,  No sensory or motor deficits.    LABS:                          8.7    5.0   )-----------( 90       ( 24 Jul 2017 06:23 )             28.6     07-24    132<L>  |  89<L>  |  58.0<H>  ----------------------------<  176<H>  4.8   |  27.0  |  4.81<H>    Ca    8.9      24 Jul 2017 06:20    TPro  8.0  /  Alb  3.6  /  TBili  0.8  /  DBili  x   /  AST  847<H>  /  ALT  584<H>  /  AlkPhos  212<H>  07-23    PT/INR - ( 23 Jul 2017 10:36 )   PT: 16.4 sec;   INR: 1.48 ratio         CULTURES:  Blood Cultures:  Gram +  Cocci in Clusters.    RADIOLOGY & ADDITIONAL TESTS:  CXR:  Mild vvascular congestion, no focal lung consolidation.

## 2017-07-24 NOTE — PROGRESS NOTE ADULT - PROBLEM SELECTOR PLAN 1
Blood Cultures positive for gram + cocci in clusters.  Continue Zosyn 2.25 gm q 8 hrs.  Follow Culture.  Possible future ID Consult.

## 2017-07-24 NOTE — PROGRESS NOTE ADULT - PROBLEM SELECTOR PLAN 4
Patient still c/o weakness but imporving.  PT eval ordered for ambulating, building muscle strength. Patient still c/o weakness but improving.  PT eval ordered for ambulating, building muscle strength.

## 2017-07-24 NOTE — ED ADULT NURSE REASSESSMENT NOTE - NS ED NURSE REASSESS COMMENT FT1
Pt more alert and awake, medicated with 6PM daily meds, tolerated well.
Received pt from day RN. pt awake and alert, confused as to year. pt denies any chest pain or SOB. pt denies pain or discomfort. bilat wheezes present, resp even and unlabored. pt on supplemental 02 @ 2L NC, maintaining 02 @ 99%. pt reports productive cough. pt incontinent, wearing depends. skin intact, no tears or decubiti noted on assessment. pt awaiting bed, will continue to monitor.
Pt BP 84/48, Even PA called and informed. Per PA, midodrine 5mg administered. pt denies any chest pain or SOB. pt denies any pain or discomfort. pt NSR on monitor, awaiting bed, will continue to monitor.
Pt changed and cleaned, repositioned in bed for comfort
Pt provided dinner tray and nursing assistant helping pt eat meal, pt tolerating well.
pt changed and repositioned in bed for comfort. pt denies chest pain or chest discomfort. pt states he is "cold", skin hot to touch. rectal temp completed, no temp.

## 2017-07-25 DIAGNOSIS — R13.10 DYSPHAGIA, UNSPECIFIED: ICD-10-CM

## 2017-07-25 DIAGNOSIS — N18.6 END STAGE RENAL DISEASE: ICD-10-CM

## 2017-07-25 DIAGNOSIS — R65.10 SYSTEMIC INFLAMMATORY RESPONSE SYNDROME (SIRS) OF NON-INFECTIOUS ORIGIN WITHOUT ACUTE ORGAN DYSFUNCTION: ICD-10-CM

## 2017-07-25 DIAGNOSIS — I50.22 CHRONIC SYSTOLIC (CONGESTIVE) HEART FAILURE: ICD-10-CM

## 2017-07-25 DIAGNOSIS — G89.29 OTHER CHRONIC PAIN: ICD-10-CM

## 2017-07-25 LAB
-  AMPICILLIN/SULBACTAM: SIGNIFICANT CHANGE UP
-  AMPICILLIN: SIGNIFICANT CHANGE UP
-  CEFAZOLIN: SIGNIFICANT CHANGE UP
-  CIPROFLOXACIN: SIGNIFICANT CHANGE UP
-  CLINDAMYCIN: SIGNIFICANT CHANGE UP
-  ERYTHROMYCIN: SIGNIFICANT CHANGE UP
-  GENTAMICIN: SIGNIFICANT CHANGE UP
-  LEVOFLOXACIN: SIGNIFICANT CHANGE UP
-  LINEZOLID: SIGNIFICANT CHANGE UP
-  MOXIFLOXACIN(AEROBIC): SIGNIFICANT CHANGE UP
-  OXACILLIN: SIGNIFICANT CHANGE UP
-  PENICILLIN: SIGNIFICANT CHANGE UP
-  RIFAMPIN: SIGNIFICANT CHANGE UP
-  TETRACYCLINE: SIGNIFICANT CHANGE UP
-  TRIMETHOPRIM/SULFAMETHOXAZOLE: SIGNIFICANT CHANGE UP
-  VANCOMYCIN: SIGNIFICANT CHANGE UP
ANION GAP SERPL CALC-SCNC: 17 MMOL/L — SIGNIFICANT CHANGE UP (ref 5–17)
BUN SERPL-MCNC: 73 MG/DL — HIGH (ref 8–20)
CALCIUM SERPL-MCNC: 8.9 MG/DL — SIGNIFICANT CHANGE UP (ref 8.6–10.2)
CHLORIDE SERPL-SCNC: 88 MMOL/L — LOW (ref 98–107)
CO2 SERPL-SCNC: 27 MMOL/L — SIGNIFICANT CHANGE UP (ref 22–29)
CREAT SERPL-MCNC: 6.25 MG/DL — HIGH (ref 0.5–1.3)
FERRITIN SERPL-MCNC: SIGNIFICANT CHANGE UP NG/ML (ref 30–400)
GLUCOSE SERPL-MCNC: 170 MG/DL — HIGH (ref 70–115)
HCT VFR BLD CALC: 24.6 % — LOW (ref 42–52)
HGB BLD-MCNC: 7.8 G/DL — LOW (ref 14–18)
IRON SATN MFR SERPL: 29 UG/DL — LOW (ref 59–158)
LACTATE BLDV-MCNC: 1.6 MMOL/L — SIGNIFICANT CHANGE UP (ref 0.5–2)
MCHC RBC-ENTMCNC: 26.2 PG — LOW (ref 27–31)
MCHC RBC-ENTMCNC: 31.7 G/DL — LOW (ref 32–36)
MCV RBC AUTO: 82.6 FL — SIGNIFICANT CHANGE UP (ref 80–94)
METHOD TYPE: SIGNIFICANT CHANGE UP
PLATELET # BLD AUTO: 93 K/UL — LOW (ref 150–400)
POTASSIUM SERPL-MCNC: 4.8 MMOL/L — SIGNIFICANT CHANGE UP (ref 3.5–5.3)
POTASSIUM SERPL-SCNC: 4.8 MMOL/L — SIGNIFICANT CHANGE UP (ref 3.5–5.3)
RBC # BLD: 2.98 M/UL — LOW (ref 4.6–6.2)
RBC # FLD: 17.2 % — HIGH (ref 11–15.6)
SODIUM SERPL-SCNC: 132 MMOL/L — LOW (ref 135–145)
TIBC SERPL-MCNC: 207 UG/DL — LOW (ref 220–430)
TRANSFERRIN SERPL-MCNC: 145 MG/DL — LOW (ref 180–329)
VANCOMYCIN TROUGH SERPL-MCNC: 12.8 UG/ML — SIGNIFICANT CHANGE UP (ref 10–20)
WBC # BLD: 4.9 K/UL — SIGNIFICANT CHANGE UP (ref 4.8–10.8)
WBC # FLD AUTO: 4.9 K/UL — SIGNIFICANT CHANGE UP (ref 4.8–10.8)

## 2017-07-25 PROCEDURE — 99223 1ST HOSP IP/OBS HIGH 75: CPT

## 2017-07-25 PROCEDURE — 99233 SBSQ HOSP IP/OBS HIGH 50: CPT

## 2017-07-25 RX ADMIN — Medication: at 17:15

## 2017-07-25 RX ADMIN — HEPARIN SODIUM 5000 UNIT(S): 5000 INJECTION INTRAVENOUS; SUBCUTANEOUS at 05:49

## 2017-07-25 RX ADMIN — HEPARIN SODIUM 5000 UNIT(S): 5000 INJECTION INTRAVENOUS; SUBCUTANEOUS at 17:00

## 2017-07-25 RX ADMIN — Medication 1 APPLICATION(S): at 22:47

## 2017-07-25 RX ADMIN — Medication 1 MILLIGRAM(S): at 12:08

## 2017-07-25 RX ADMIN — GABAPENTIN 300 MILLIGRAM(S): 400 CAPSULE ORAL at 22:47

## 2017-07-25 RX ADMIN — TAMSULOSIN HYDROCHLORIDE 0.4 MILLIGRAM(S): 0.4 CAPSULE ORAL at 22:47

## 2017-07-25 RX ADMIN — Medication 100 MILLIGRAM(S): at 22:54

## 2017-07-25 RX ADMIN — GABAPENTIN 300 MILLIGRAM(S): 400 CAPSULE ORAL at 13:18

## 2017-07-25 RX ADMIN — Medication 1 DROP(S): at 05:49

## 2017-07-25 RX ADMIN — Medication 1: at 08:17

## 2017-07-25 RX ADMIN — ERYTHROPOIETIN 10000 UNIT(S): 10000 INJECTION, SOLUTION INTRAVENOUS; SUBCUTANEOUS at 19:35

## 2017-07-25 RX ADMIN — Medication 1 DROP(S): at 16:59

## 2017-07-25 RX ADMIN — MIDODRINE HYDROCHLORIDE 5 MILLIGRAM(S): 2.5 TABLET ORAL at 13:18

## 2017-07-25 RX ADMIN — GABAPENTIN 300 MILLIGRAM(S): 400 CAPSULE ORAL at 05:49

## 2017-07-25 RX ADMIN — POLYETHYLENE GLYCOL 3350 17 GRAM(S): 17 POWDER, FOR SOLUTION ORAL at 22:47

## 2017-07-25 NOTE — CHART NOTE - NSCHARTNOTEFT_GEN_A_CORE
PA made three attempts w US guidance at peripheral line access. Each time good blood return, when flushed veins blow. Pt refusing further attempt. Will likely need PICC or midline for IV Zosyn.

## 2017-07-25 NOTE — PROGRESS NOTE ADULT - SUBJECTIVE AND OBJECTIVE BOX
EMILY LITTLEJOHN   ------------------------------  The patient was seen and evaluated for SIRS.  The patient is in no acute distress.  Denied any chest pain, palpitations, shortness of breath, or abdominal pain.  Awake and alert, reports that he is not feeling well but is not able to provide any specific symptoms. Reports generalized weakness.    Vital Signs Last 24 Hrs  T(C): 36.9 (24 Jul 2017 23:28), Max: 37.4 (24 Jul 2017 22:34)  T(F): 98.5 (24 Jul 2017 23:28), Max: 99.3 (24 Jul 2017 22:34)  HR: 107 (24 Jul 2017 23:28) (68 - 108)  BP: 108/59 (24 Jul 2017 23:28) (82/48 - 114/66)  BP(mean): --  RR: 20 (24 Jul 2017 23:28) (19 - 22)  SpO2: 95% (24 Jul 2017 23:28) (94% - 96%)    PHYSICAL EXAMINATION:  ----------------------------------------  General appearance: NAD, Awake, Alert  HEENT: NCAT, Conjunctiva clear, Right eye opaque  Neck: Supple, No JVD, No tenderness  Lungs: Clear to auscultation, Breath sound equal bilaterally, No wheezes, No rales  Cardiovascular: S1S2, Regular rhythm  Abdomen: Soft, Nontender, Nondistended, No guarding/rebound, Positive bowel sounds  Extremities: No clubbing, No cyanosis, No edema, No calf tenderness  Neuro: Strength equal bilaterally, No tremors  Psychiatric: Appropriate mood, Normal affect    CAPILLARY BLOOD GLUCOSE  160 (25 Jul 2017 08:20)  229 (24 Jul 2017 22:06)  180 (24 Jul 2017 11:49)    LABS:  ------------------------------             7.8    4.9   )-----------( 93       ( 25 Jul 2017 07:53 )             24.6     07-25    132<L>  |  88<L>  |  73.0<H>  ----------------------------<  170<H>  4.8   |  27.0  |  6.25<H>    Ca    8.9      25 Jul 2017 07:53    TPro  8.0  /  Alb  3.6  /  TBili  0.8  /  DBili  x   /  AST  847<H>  /  ALT  584<H>  /  AlkPhos  212<H>  07-23    LIVER FUNCTIONS - ( 23 Jul 2017 10:36 )  Alb: 3.6 g/dL / Pro: 8.0 g/dL / ALK PHOS: 212 U/L / ALT: 584 U/L / AST: 847 U/L / GGT: x           PT/INR - ( 23 Jul 2017 10:36 )   PT: 16.4 sec;   INR: 1.48 ratio      MEDICATIONS  (STANDING):  heparin  Injectable 5000 Unit(s) SubCutaneous every 12 hours  piperacillin/tazobactam IVPB. 2.25 Gram(s) IV Intermittent every 8 hours  tamsulosin 0.4 milliGRAM(s) Oral at bedtime  gabapentin 300 milliGRAM(s) Oral three times a day  dextrose 5%. 1000 milliLiter(s) (50 mL/Hr) IV Continuous <Continuous>  dextrose 50% Injectable 12.5 Gram(s) IV Push once  dextrose 50% Injectable 25 Gram(s) IV Push once  dextrose 50% Injectable 25 Gram(s) IV Push once  polyethylene glycol 3350 17 Gram(s) Oral at bedtime  midodrine 5 milliGRAM(s) Oral daily  artificial  tears Solution 1 Drop(s) Right EYE two times a day  erythromycin   Ointment 1 Application(s) Right EYE at bedtime  epoetin una Injectable 36339 Unit(s) IV Push <User Schedule>  folic acid 1 milliGRAM(s) Oral daily  insulin lispro (HumaLOG) corrective regimen sliding scale   SubCutaneous Before meals and at bedtime    MEDICATIONS  (PRN):  dextrose Gel 1 Dose(s) Oral once PRN Blood Glucose LESS THAN 70 milliGRAM(s)/deciliter  glucagon  Injectable 1 milliGRAM(s) IntraMuscular once PRN Glucose LESS THAN 70 milligrams/deciliter  guaiFENesin    Syrup 100 milliGRAM(s) Oral every 6 hours PRN Cough      ASSESSMENT / PLAN:  -----------------------------------  SIRS - Antibiotics administered in the emergency department. No clear source of infection thus far. Afebrile. Continue to monitor clinically of antibiotics. Blood culture with coagulase negative staph in one out of four bottles, most likely contaminant.    ESRD - On hemodialysis as scheduled. Hyperkalemia resolved.    Anemia - Most likely due chronic disease.    Diabetes - Insulin coverage, close monitoring of blood glucose levels.    Chronic systolic heart failure - Close monitoring of fluid status.    Transaminitis - Unclear etiology, possibly from heart failure.    The patient has multiple chronic medical issues as well as advanced age. Palliative Care consultation to see the patient. EMILY LITTLEJOHN   ------------------------------  The patient was seen and evaluated for SIRS.  The patient is in no acute distress.  Denied any chest pain, palpitations, shortness of breath, or abdominal pain.  Awake and alert, reports that he is not feeling well but is not able to provide any specific symptoms. Reports generalized weakness.    Vital Signs Last 24 Hrs  T(C): 36.9 (24 Jul 2017 23:28), Max: 37.4 (24 Jul 2017 22:34)  T(F): 98.5 (24 Jul 2017 23:28), Max: 99.3 (24 Jul 2017 22:34)  HR: 107 (24 Jul 2017 23:28) (68 - 108)  BP: 108/59 (24 Jul 2017 23:28) (82/48 - 114/66)  BP(mean): --  RR: 20 (24 Jul 2017 23:28) (19 - 22)  SpO2: 95% (24 Jul 2017 23:28) (94% - 96%)    PHYSICAL EXAMINATION:  ----------------------------------------  General appearance: NAD, Awake, Alert  HEENT: NCAT, Conjunctiva clear, Right eye opaque  Neck: Supple, No JVD, No tenderness  Lungs: Clear to auscultation, Breath sound equal bilaterally, No wheezes, No rales  Cardiovascular: S1S2, Regular rhythm  Abdomen: Soft, Nontender, Nondistended, No guarding/rebound, Positive bowel sounds  Extremities: No clubbing, No cyanosis, No edema, No calf tenderness  Neuro: Strength equal bilaterally, No tremors  Psychiatric: Appropriate mood, Normal affect    CAPILLARY BLOOD GLUCOSE  160 (25 Jul 2017 08:20)  229 (24 Jul 2017 22:06)  180 (24 Jul 2017 11:49)    LABS:  ------------------------------             7.8    4.9   )-----------( 93       ( 25 Jul 2017 07:53 )             24.6     07-25    132<L>  |  88<L>  |  73.0<H>  ----------------------------<  170<H>  4.8   |  27.0  |  6.25<H>    Ca    8.9      25 Jul 2017 07:53    TPro  8.0  /  Alb  3.6  /  TBili  0.8  /  DBili  x   /  AST  847<H>  /  ALT  584<H>  /  AlkPhos  212<H>  07-23    LIVER FUNCTIONS - ( 23 Jul 2017 10:36 )  Alb: 3.6 g/dL / Pro: 8.0 g/dL / ALK PHOS: 212 U/L / ALT: 584 U/L / AST: 847 U/L / GGT: x           PT/INR - ( 23 Jul 2017 10:36 )   PT: 16.4 sec;   INR: 1.48 ratio      MEDICATIONS  (STANDING):  heparin  Injectable 5000 Unit(s) SubCutaneous every 12 hours  piperacillin/tazobactam IVPB. 2.25 Gram(s) IV Intermittent every 8 hours  tamsulosin 0.4 milliGRAM(s) Oral at bedtime  gabapentin 300 milliGRAM(s) Oral three times a day  dextrose 5%. 1000 milliLiter(s) (50 mL/Hr) IV Continuous <Continuous>  dextrose 50% Injectable 12.5 Gram(s) IV Push once  dextrose 50% Injectable 25 Gram(s) IV Push once  dextrose 50% Injectable 25 Gram(s) IV Push once  polyethylene glycol 3350 17 Gram(s) Oral at bedtime  midodrine 5 milliGRAM(s) Oral daily  artificial  tears Solution 1 Drop(s) Right EYE two times a day  erythromycin   Ointment 1 Application(s) Right EYE at bedtime  epoetin una Injectable 19562 Unit(s) IV Push <User Schedule>  folic acid 1 milliGRAM(s) Oral daily  insulin lispro (HumaLOG) corrective regimen sliding scale   SubCutaneous Before meals and at bedtime    MEDICATIONS  (PRN):  dextrose Gel 1 Dose(s) Oral once PRN Blood Glucose LESS THAN 70 milliGRAM(s)/deciliter  glucagon  Injectable 1 milliGRAM(s) IntraMuscular once PRN Glucose LESS THAN 70 milligrams/deciliter  guaiFENesin    Syrup 100 milliGRAM(s) Oral every 6 hours PRN Cough      ASSESSMENT / PLAN:  -----------------------------------  SIRS - Antibiotics administered in the emergency department. No clear source of infection thus far. Afebrile. Chest Xray was without infiltrates. Continue to monitor clinically of antibiotics. Blood culture with coagulase negative staph in one out of four bottles, most likely contaminant.    ESRD - On hemodialysis as scheduled. Hyperkalemia resolved.    Anemia - Most likely due chronic disease.    Diabetes - Insulin coverage, close monitoring of blood glucose levels.    Chronic systolic heart failure - Close monitoring of fluid status.    Transaminitis - Unclear etiology, possibly from heart failure.    The patient has multiple chronic medical issues as well as advanced age. Palliative Care consultation to see the patient.

## 2017-07-25 NOTE — PROGRESS NOTE ADULT - SUBJECTIVE AND OBJECTIVE BOX
NEPHROLOGY INTERVAL HPI/OVERNIGHT EVENTS:     No new events.    MEDICATIONS  (STANDING):  heparin  Injectable 5000 Unit(s) SubCutaneous every 12 hours  piperacillin/tazobactam IVPB. 2.25 Gram(s) IV Intermittent every 8 hours  tamsulosin 0.4 milliGRAM(s) Oral at bedtime  gabapentin 300 milliGRAM(s) Oral three times a day  dextrose 5%. 1000 milliLiter(s) (50 mL/Hr) IV Continuous <Continuous>  dextrose 50% Injectable 12.5 Gram(s) IV Push once  dextrose 50% Injectable 25 Gram(s) IV Push once  dextrose 50% Injectable 25 Gram(s) IV Push once  polyethylene glycol 3350 17 Gram(s) Oral at bedtime  midodrine 5 milliGRAM(s) Oral daily  artificial  tears Solution 1 Drop(s) Right EYE two times a day  erythromycin   Ointment 1 Application(s) Right EYE at bedtime  epoetin una Injectable 40457 Unit(s) IV Push <User Schedule>  folic acid 1 milliGRAM(s) Oral daily  insulin lispro (HumaLOG) corrective regimen sliding scale   SubCutaneous Before meals and at bedtime    MEDICATIONS  (PRN):  dextrose Gel 1 Dose(s) Oral once PRN Blood Glucose LESS THAN 70 milliGRAM(s)/deciliter  glucagon  Injectable 1 milliGRAM(s) IntraMuscular once PRN Glucose LESS THAN 70 milligrams/deciliter  guaiFENesin    Syrup 100 milliGRAM(s) Oral every 6 hours PRN Cough      Allergies    No Known Allergies          Vital Signs Last 24 Hrs  T(C): 36.9 (24 Jul 2017 23:28), Max: 37.4 (24 Jul 2017 22:34)  T(F): 98.5 (24 Jul 2017 23:28), Max: 99.3 (24 Jul 2017 22:34)  HR: 107 (24 Jul 2017 23:28) (68 - 108)  BP: 108/59 (24 Jul 2017 23:28) (82/48 - 114/66)  BP(mean): --  RR: 20 (24 Jul 2017 23:28) (18 - 22)  SpO2: 95% (24 Jul 2017 23:28) (94% - 97%)  Daily     Daily     PHYSICAL EXAM:    GENERAL:  appears chronically ill.  HEAD:    EYES: wnl  ENMT:   NECK: right permacath site clean  NERVOUS SYSTEM:  awake, verbal  CHEST/LUNG: decreased bs bases  HEART: no rub  ABDOMEN: soft, not tender  EXTREMITIES:  dressings right foot plantar aspect  LYMPH:   SKIN: no rash    LABS:                        8.7    5.0   )-----------( 90       ( 24 Jul 2017 06:23 )             28.6     07-24    132<L>  |  89<L>  |  58.0<H>  ----------------------------<  176<H>  4.8   |  27.0  |  4.81<H>    Ca    8.9      24 Jul 2017 06:20    TPro  8.0  /  Alb  3.6  /  TBili  0.8  /  DBili  x   /  AST  847<H>  /  ALT  584<H>  /  AlkPhos  212<H>  07-23    PT/INR - ( 23 Jul 2017 10:36 )   PT: 16.4 sec;   INR: 1.48 ratio                     RADIOLOGY & ADDITIONAL TESTS:

## 2017-07-25 NOTE — CONSULT NOTE ADULT - SUBJECTIVE AND OBJECTIVE BOX
HPI: This is an 86yo M admitted one day after his discharge home from [facility], with a fever, increasing weakness, unproductive cough.   At time of my visit he was somnolent, sleeping heavily and non verbal. Difficult to arouse.On Nasal O2 at 4L.     87M presented from home after being discharged from [facility] the day prior with weakness and poor oral intake. The patient was lethargic on interview and additional information was obtained from the patient's wife at the bedside. The patient was noted to have a nonproductive cough but no fevers or chills at home. The patient was able to attend his hemodialysis session yesterday but had remained weak. There was no complaints of chest pain of palpitations. The patient is noted to have had prior admissions to the hospital with the most recent admission for congestive heart failure and pneumonia with similar symptoms. There are no known exacerbating or relieving factors. Further information is limited due to the patient's medical condition. The patient was noted to be febrile in the emergency department and received intravenous antibiotics and fluids. (23 Jul 2017 12:44)      PERTINENT PMH REVIEWED: Yes     PAST MEDICAL & SURGICAL HISTORY:  Dementia  Muscle weakness (generalized)  Ventricular tachycardia  Diabetes mellitus  Renal failure  Hypertension  No significant past surgical history      SOCIAL HISTORY:  EtOH    No                                    Drugs    No                                   nonsmoker                                    Admitted from: home wife: Iram Reyes [phone]  FAMILY HISTORY:  No pertinent family history in first degree relatives    No Known Allergies    Baseline ADLs (prior to admission):   Dependent      Present Symptoms:     Dyspnea: 0 -1    Nausea/Vomiting:  No  Anxiety:   No  Depression: Yes??  Fatigue: Yes   Loss of appetite: Yes     Pain:  Cannot self report at this time            Character-            Duration-            Effect-            Factors-            Frequency-            Location-            Severity-    Review of Systems: Reviewed                    Unable to obtain due to poor mentation       MEDICATIONS  (STANDING):  heparin  Injectable 5000 Unit(s) SubCutaneous every 12 hours  tamsulosin 0.4 milliGRAM(s) Oral at bedtime  gabapentin 300 milliGRAM(s) Oral three times a day  dextrose 5%. 1000 milliLiter(s) (50 mL/Hr) IV Continuous <Continuous>  dextrose 50% Injectable 12.5 Gram(s) IV Push once  dextrose 50% Injectable 25 Gram(s) IV Push once  dextrose 50% Injectable 25 Gram(s) IV Push once  polyethylene glycol 3350 17 Gram(s) Oral at bedtime  midodrine 5 milliGRAM(s) Oral daily  artificial  tears Solution 1 Drop(s) Right EYE two times a day  erythromycin   Ointment 1 Application(s) Right EYE at bedtime  epoetin una Injectable 06588 Unit(s) IV Push <User Schedule>  folic acid 1 milliGRAM(s) Oral daily  insulin lispro (HumaLOG) corrective regimen sliding scale   SubCutaneous Before meals and at bedtime    MEDICATIONS  (PRN):  dextrose Gel 1 Dose(s) Oral once PRN Blood Glucose LESS THAN 70 milliGRAM(s)/deciliter  glucagon  Injectable 1 milliGRAM(s) IntraMuscular once PRN Glucose LESS THAN 70 milligrams/deciliter  guaiFENesin    Syrup 100 milliGRAM(s) Oral every 6 hours PRN Cough      PHYSICAL EXAM:    Vital Signs Last 24 Hrs  T(C): 37.1 (25 Jul 2017 10:28), Max: 37.4 (24 Jul 2017 22:34)  T(F): 98.7 (25 Jul 2017 10:28), Max: 99.3 (24 Jul 2017 22:34)  HR: 93 (25 Jul 2017 10:28) (69 - 108)  BP: 91/57 (25 Jul 2017 10:28) (91/57 - 114/66)  BP(mean): --  RR: 18 (25 Jul 2017 10:28) (18 - 22)  SpO2: 100% (25 Jul 2017 10:28) (94% - 100%)    General: lethargic                   cachexia  nonverbal      HEENT: normal  dry mouth    Lungs: comfortable labored breathing  on exertion     CV: normal      GI: normal      constipation  ??last BM:     :   incontinent  oliguria/anuria      MSK:   weakness  edema             bedbound/wheelchair bound    Skin: normal  pressure ulcers- B/L Heels  LABS:                        7.8    4.9   )-----------( 93       ( 25 Jul 2017 07:53 )             24.6     07-25    132<L>  |  88<L>  |  73.0<H>  ----------------------------<  170<H>  4.8   |  27.0  |  6.25<H>    Ca    8.9      25 Jul 2017 07:53          I&O's Summary      RADIOLOGY & ADDITIONAL STUDIES:< from: Xray Chest 1 View AP/PA. (07.23.17 @ 10:48) >  mpression:    Mild pulmonary vascular congestion. No focal lung consolidation.        < end of copied text >      ADVANCE DIRECTIVES:   DNR  NO  Completed on:                     MOLST  NO   Completed on:  Living Will   NO   Completed on:

## 2017-07-26 DIAGNOSIS — R78.81 BACTEREMIA: ICD-10-CM

## 2017-07-26 DIAGNOSIS — D63.8 ANEMIA IN OTHER CHRONIC DISEASES CLASSIFIED ELSEWHERE: ICD-10-CM

## 2017-07-26 DIAGNOSIS — H10.30 UNSPECIFIED ACUTE CONJUNCTIVITIS, UNSPECIFIED EYE: ICD-10-CM

## 2017-07-26 DIAGNOSIS — R74.0 NONSPECIFIC ELEVATION OF LEVELS OF TRANSAMINASE AND LACTIC ACID DEHYDROGENASE [LDH]: ICD-10-CM

## 2017-07-26 DIAGNOSIS — G20 PARKINSON'S DISEASE: ICD-10-CM

## 2017-07-26 LAB
-  AMPICILLIN/SULBACTAM: SIGNIFICANT CHANGE UP
-  CEFAZOLIN: SIGNIFICANT CHANGE UP
-  CIPROFLOXACIN: SIGNIFICANT CHANGE UP
-  CLINDAMYCIN: SIGNIFICANT CHANGE UP
-  ERYTHROMYCIN: SIGNIFICANT CHANGE UP
-  GENTAMICIN: SIGNIFICANT CHANGE UP
-  LEVOFLOXACIN: SIGNIFICANT CHANGE UP
-  MOXIFLOXACIN(AEROBIC): SIGNIFICANT CHANGE UP
-  OXACILLIN: SIGNIFICANT CHANGE UP
-  PENICILLIN: SIGNIFICANT CHANGE UP
-  RIFAMPIN: SIGNIFICANT CHANGE UP
-  TETRACYCLINE: SIGNIFICANT CHANGE UP
-  TRIMETHOPRIM/SULFAMETHOXAZOLE: SIGNIFICANT CHANGE UP
-  VANCOMYCIN: SIGNIFICANT CHANGE UP
HCT VFR BLD CALC: 26.1 % — LOW (ref 42–52)
HGB BLD-MCNC: 8 G/DL — LOW (ref 14–18)
LACTATE SERPL-SCNC: 1.5 MMOL/L — SIGNIFICANT CHANGE UP (ref 0.5–2)
MCHC RBC-ENTMCNC: 26.7 PG — LOW (ref 27–31)
MCHC RBC-ENTMCNC: 30.7 G/DL — LOW (ref 32–36)
MCV RBC AUTO: 87 FL — SIGNIFICANT CHANGE UP (ref 80–94)
METHOD TYPE: SIGNIFICANT CHANGE UP
PLATELET # BLD AUTO: 88 K/UL — LOW (ref 150–400)
RBC # BLD: 3 M/UL — LOW (ref 4.6–6.2)
RBC # FLD: 17.7 % — HIGH (ref 11–15.6)
WBC # BLD: 4.4 K/UL — LOW (ref 4.8–10.8)
WBC # FLD AUTO: 4.4 K/UL — LOW (ref 4.8–10.8)

## 2017-07-26 PROCEDURE — 99232 SBSQ HOSP IP/OBS MODERATE 35: CPT

## 2017-07-26 PROCEDURE — 99233 SBSQ HOSP IP/OBS HIGH 50: CPT

## 2017-07-26 PROCEDURE — 99223 1ST HOSP IP/OBS HIGH 75: CPT

## 2017-07-26 RX ORDER — FERROUS SULFATE 325(65) MG
325 TABLET ORAL DAILY
Qty: 0 | Refills: 0 | Status: DISCONTINUED | OUTPATIENT
Start: 2017-07-26 | End: 2017-07-23

## 2017-07-26 RX ORDER — VANCOMYCIN HCL 1 G
1000 VIAL (EA) INTRAVENOUS
Qty: 0 | Refills: 0 | Status: DISCONTINUED | OUTPATIENT
Start: 2017-07-27 | End: 2017-08-01

## 2017-07-26 RX ORDER — OFLOXACIN 0.3 %
1 DROPS OPHTHALMIC (EYE)
Qty: 0 | Refills: 0 | Status: DISCONTINUED | OUTPATIENT
Start: 2017-07-26 | End: 2017-07-23

## 2017-07-26 RX ADMIN — Medication 1 TABLET(S): at 13:45

## 2017-07-26 RX ADMIN — GABAPENTIN 300 MILLIGRAM(S): 400 CAPSULE ORAL at 21:28

## 2017-07-26 RX ADMIN — Medication 1 MILLIGRAM(S): at 13:46

## 2017-07-26 RX ADMIN — GABAPENTIN 300 MILLIGRAM(S): 400 CAPSULE ORAL at 13:46

## 2017-07-26 RX ADMIN — Medication 1 APPLICATION(S): at 21:28

## 2017-07-26 RX ADMIN — Medication 325 MILLIGRAM(S): at 13:46

## 2017-07-26 RX ADMIN — Medication 2: at 21:28

## 2017-07-26 RX ADMIN — Medication 100 MILLIGRAM(S): at 23:09

## 2017-07-26 RX ADMIN — GABAPENTIN 300 MILLIGRAM(S): 400 CAPSULE ORAL at 05:31

## 2017-07-26 RX ADMIN — HEPARIN SODIUM 5000 UNIT(S): 5000 INJECTION INTRAVENOUS; SUBCUTANEOUS at 17:35

## 2017-07-26 RX ADMIN — Medication 100 MILLIGRAM(S): at 13:54

## 2017-07-26 RX ADMIN — Medication 1 DROP(S): at 05:32

## 2017-07-26 RX ADMIN — Medication 3: at 18:10

## 2017-07-26 RX ADMIN — HEPARIN SODIUM 5000 UNIT(S): 5000 INJECTION INTRAVENOUS; SUBCUTANEOUS at 05:31

## 2017-07-26 RX ADMIN — Medication 1 DROP(S): at 19:03

## 2017-07-26 RX ADMIN — TAMSULOSIN HYDROCHLORIDE 0.4 MILLIGRAM(S): 0.4 CAPSULE ORAL at 21:28

## 2017-07-26 RX ADMIN — Medication 1 DROP(S): at 23:09

## 2017-07-26 RX ADMIN — Medication 1 DROP(S): at 17:35

## 2017-07-26 RX ADMIN — MIDODRINE HYDROCHLORIDE 5 MILLIGRAM(S): 2.5 TABLET ORAL at 13:46

## 2017-07-26 RX ADMIN — POLYETHYLENE GLYCOL 3350 17 GRAM(S): 17 POWDER, FOR SOLUTION ORAL at 21:28

## 2017-07-26 NOTE — CONSULT NOTE ADULT - ATTENDING COMMENTS
COUNSELING:    Face to face meeting to discuss Advanced Care Planning - Time Spent ______ Minutes.  See goals of care note.    More than 50% time spent in counseling and coordinating care. __40____ Minutes.     Thank you for the opportunity to assist with the care of this patient.   [facility] [phone].
Patient seen and examined with Dr Guaman    Will follow

## 2017-07-26 NOTE — PROGRESS NOTE ADULT - ASSESSMENT
ESRD -  HD tomorrow     Anemia -   - epogen with HD   - avoid Fe due high ferritin         RO - Check Phos     Coag neg staph in blood culture:   - continue antibiotics  - check repeat cultures    Chronic hypotension:  Increased Midodrine to 5 tid

## 2017-07-26 NOTE — CONSULT NOTE ADULT - NSHPATTENDINGPLANDISCUSS_GEN_ALL_CORE
Left message on wife's voicemail-with my number to call back-arrange meeting on goals of care
Dr Kandi MCGRAW

## 2017-07-26 NOTE — PROGRESS NOTE ADULT - ASSESSMENT
Patient is weak and debilitated  Bacteremia-site unknown. On two Antibiotics  Dementia-Will only speak a few words-  ESRD on HD with ongoing Hypotension  Multiple readmissions; only home one day after [facility] discharge and readmitted  Failure to Thrive

## 2017-07-26 NOTE — PROGRESS NOTE ADULT - PROBLEM SELECTOR PLAN 1
ID consulted - started on Vancomycin in HD.  Follow up repeat blood cultures. ID consulted, patient has been started on Vancomycin in HD, TTE order along with repeat cultures, patient 2X2 bottles are postive for coag negative staph, no sure the source. ID consulted, patient has been started on Vancomycin in HD, TTE order along with repeat cultures, patient 2X2 bottles are postive for coag negative staph, no sure the source, likely source is IJ, ID consult appreciated.

## 2017-07-26 NOTE — PROGRESS NOTE ADULT - ATTENDING COMMENTS
Palliative care consult appreciated.   Patient is seen and evaluated, case discussed with NP, agree with assessment and plan.

## 2017-07-26 NOTE — PHYSICAL THERAPY INITIAL EVALUATION ADULT - ADDITIONAL COMMENTS
pt. is a poor historian; pt.'s social history is obtain from the care coordination document; unsure of pt.'s PLOF PTA.

## 2017-07-26 NOTE — CONSULT NOTE ADULT - ASSESSMENT
ESRD - Will arrange HD tomorrow     Anemia - CBC in am . Check iron stores. Start epogen with HD .     RO - Check Phos     Coag neg staph in blood culture   Repeat cultures with HD tomorrow   Check Vanco level in am  + Zosyn     Chronic hypotension   Increase Midodrine to 5 tid
SIRS ---Fever of Unknown Origin  ESRD on HD  Dysphagia- Mechanical Soft Diet  Anorexia  Acute on Chronic Systolic Heart  Failure  Chronic Pain
86 yo males with ESRD on HD via right IJ permacath, Dementia, sCHF, DM, HTN recently discharged from Rehab facility where he was discharged after being treated for CHF and pneumonia. Patient admitted with SIRS and now found to have positive blood cultures (2/4 bottles).

## 2017-07-26 NOTE — PROGRESS NOTE ADULT - SUBJECTIVE AND OBJECTIVE BOX
CC: F/u Bacteremia, Weakness     HPI:  87M presented from home after being discharged from [facility] the day prior with weakness and poor oral intake. The patient was lethargic on interview and additional information was obtained from the patient's wife at the bedside. The patient was noted to have a nonproductive cough but no fevers or chills at home. The patient was able to attend his hemodialysis session the day before but had remained weak. There was no complaints of chest pain of palpitations. The patient is noted to have had prior admissions to the hospital with the most recent admission for congestive heart failure and pneumonia with similar symptoms. There are no known exacerbating or relieving factors. Further information is limited due to the patient's medical condition. The patient was noted to be febrile in the emergency department and received intravenous antibiotics and fluids.     INTERVAL HPI/OVERNIGHT EVENTS: Patient seen and examined lying in bed.  Patient denies any headache, dizziness, SOB, CP, abdominal pain, nausea, vomiting, dysuria.  Other ROS reviewed and are negative.    Vital Signs Last 24 Hrs  T(C): 37.2 (26 Jul 2017 08:13), Max: 37.3 (25 Jul 2017 23:46)  T(F): 98.9 (26 Jul 2017 08:13), Max: 99.1 (25 Jul 2017 23:46)  HR: 86 (26 Jul 2017 10:27) (84 - 99)  BP: 105/58 (26 Jul 2017 10:27) (88/49 - 106/63)  BP(mean): --  RR: 16 (26 Jul 2017 10:27) (16 - 18)  SpO2: 100% (26 Jul 2017 08:13) (97% - 100%)  I&O's Detail    25 Jul 2017 07:01  -  26 Jul 2017 07:00  --------------------------------------------------------  IN:  Total IN: 0 mL    OUT:    Other: 1700 mL  Total OUT: 1700 mL    Total NET: -1700 mL        PHYSICAL EXAM:    GENERAL: NAD, well-groomed, well-developed  HEAD:  Atraumatic, Normocephalic  EYES: EOMI, PERRLA, conjunctiva and sclera clear  NECK: Supple, No JVD, Normal thyroid  NERVOUS SYSTEM:  Alert, Good concentration; generalized weakness  CHEST/LUNG: Clear to auscultation bilaterally; No rales, rhonchi, wheezing, or rubs  HEART: Regular rate and rhythm; No murmurs, rubs, or gallops  ABDOMEN: Soft, Nontender, Nondistended; Bowel sounds present  EXTREMITIES:  2+ Peripheral Pulses, No clubbing, cyanosis, or edema  SKIN: No rashes or lesions                              7.8    4.9   )-----------( 93       ( 25 Jul 2017 07:53 )             24.6     25 Jul 2017 07:53    132    |  88     |  73.0   ----------------------------<  170    4.8     |  27.0   |  6.25     Ca    8.9        25 Jul 2017 07:53        CAPILLARY BLOOD GLUCOSE  112 (25 Jul 2017 22:30)  178 (25 Jul 2017 17:13)      Culture - Blood (07.23.17 @ 10:39)    -  Candida albicans: Nondet    -  Multidrug (KPC pos) resistant organism: Nondet    -  Proteus species: Nondet    -  Streptococcus pyogenes (Group A): Nondet    -  Candida glabrata: Nondet    -  Candida parapsilosis: Nondet    -  Candida tropicalis: Nondet    -  Ciprofloxacin: R >2    -  Clindamycin: S <=0.5    -  Enterobacter cloacae complex: Nondet    -  Enterococcus species: Nondet    -  Escherichia coli: Nondet    -  Haemophilus influenzae: Nondet    -  Klebsiella oxytoca: Nondet    -  Linezolid: S <=1    -  Listeria monocytogenes: Nondet    -  Neisseria meningitidis: Nondet    -  Pseudomonas aeruginosa: Nondet    -  RIF- Rifampin: S <=1    -  Streptococcus agalactiae (Group B): Nondet    -  Vancomycin: S 2    -  Ampicillin: R >8    -  Ampicillin/Sulbactam: R <=8/4    -  Candida krusei: Nondet    -  Cefazolin: R <=4    -  Erythromycin: R >4    -  Gentamicin: S <=4    -  Klebsiella pneumoniae: Nondet    -  Levofloxacin: I 4    -  Methicillin resistant Staphylococcus aureus (MRSA): Nondet    -  Moxifloxacin(Aerobic): I 1    -  Penicillin: R >8    -  Serratia marcescens: Nondet    -  Staphylococcus aureus: Nondet    -  Streptococcus pneumoniae: Nondet    -  Streptococcus sp. (Not Grp A, B or S pneumoniae): Nondet    -  Trimethoprim/Sulfamethoxazole: S <=0.5/9.5    -  Vancomycin resistant Enterococcus sp.: Nondet    -  Acinetobacter baumanii: Nondet    -  Coagulase negative Staphylococcus: Detec    -  Oxacillin: R >2    -  Tetra/Doxy: S <=4    Specimen Source: .Blood Blood-Peripheral    Organism: Blood Culture PCR    Organism: Coag Negative Staphylococcus    Culture Results:   Growth in anaerobic bottle: Coag Negative Staphylococcus  Anaerobic Bottle: 19:04 Hours to positivity  Aerobic Bottle: No growth to date  ,  TYPE: (C=Critical, N=Notification, A=Abnormal) c  TESTS:  _   DATE/TIME CALLED: _ 07/24/2017 10:07:43  CALLED TO: Khalif odell rn  READ BACK (2 Patient Identifiers)(Y/N): _ y  READ BACK VALUES (Y/N): _ y  CALLED BY: Khalif zelaya    Organism Identification: Coag Negative Staphylococcus  Blood Culture PCR    Method Type: МАРИЯ    Method Type: PCR    Culture - Blood (07.23.17 @ 10:38)    -  Cefazolin: R >16    -  Erythromycin: R >4    -  Oxacillin: R >2    -  Penicillin: R 8    -  Tetra/Doxy: S <=4    -  Trimethoprim/Sulfamethoxazole: R >2/38    -  Levofloxacin: R >4    -  Moxifloxacin(Aerobic): R 2    -  Ciprofloxacin: R >2    -  RIF- Rifampin: S <=1    -  Ampicillin/Sulbactam: R <=8/4    -  Clindamycin: R >4    -  Gentamicin: S <=4    -  Vancomycin: S 1    Specimen Source: .Blood Blood-Peripheral    Organism: Coag Negative Staphylococcus    Culture Results:   Growth in anaerobic bottle: Coag Negative Staphylococcus  Anaerobic Bottle: 2 days; 05:01 Hours to positivity  Aerobic Bottle: No growth to date  .  TYPE: (C=Critical, N=Notification, A=Abnormal) c  TESTS:  _ Bld   DATE/TIME CALLED: _ 07/25/2017 17:01:46  CALLED TO: Khalif Ríos RN  READ BACK (2 Patient Identifiers)(Y/N): _ Y  READ BACK VALUES (Y/N): _ Y  CALLED BY: Khalif grier    Organism Identification: Coag Negative Staphylococcus    Method Type: МАРИЯ          Hemoglobin A1C, Whole Blood: 6.2 % (24 Jul 2017 06:22)    MEDICATIONS  (STANDING):  heparin  Injectable 5000 Unit(s) SubCutaneous every 12 hours  tamsulosin 0.4 milliGRAM(s) Oral at bedtime  gabapentin 300 milliGRAM(s) Oral three times a day  dextrose 5%. 1000 milliLiter(s) (50 mL/Hr) IV Continuous <Continuous>  dextrose 50% Injectable 12.5 Gram(s) IV Push once  dextrose 50% Injectable 25 Gram(s) IV Push once  dextrose 50% Injectable 25 Gram(s) IV Push once  polyethylene glycol 3350 17 Gram(s) Oral at bedtime  midodrine 5 milliGRAM(s) Oral daily  artificial  tears Solution 1 Drop(s) Right EYE two times a day  erythromycin   Ointment 1 Application(s) Right EYE at bedtime  epoetin una Injectable 86549 Unit(s) IV Push <User Schedule>  folic acid 1 milliGRAM(s) Oral daily  insulin lispro (HumaLOG) corrective regimen sliding scale   SubCutaneous Before meals and at bedtime  Nephro-viviana 1 Tablet(s) Oral daily  ferrous    sulfate 325 milliGRAM(s) Oral daily  ofloxacin 0.3% Solution 1 Drop(s) Right EYE <User Schedule>  vancomycin  IVPB 1000 milliGRAM(s) IV Intermittent <User Schedule>    MEDICATIONS  (PRN):  dextrose Gel 1 Dose(s) Oral once PRN Blood Glucose LESS THAN 70 milliGRAM(s)/deciliter  glucagon  Injectable 1 milliGRAM(s) IntraMuscular once PRN Glucose LESS THAN 70 milligrams/deciliter  guaiFENesin    Syrup 100 milliGRAM(s) Oral every 6 hours PRN Cough      RADIOLOGY & ADDITIONAL TESTS: CC: F/u Bacteremia, Weakness     HPI:  87M presented from home after being discharged from [facility] the day prior with weakness and poor oral intake. The patient was lethargic on interview and additional information was obtained from the patient's wife at the bedside. The patient was noted to have a nonproductive cough but no fevers or chills at home. The patient was able to attend his hemodialysis session the day before but had remained weak. There was no complaints of chest pain of palpitations. The patient is noted to have had prior admissions to the hospital with the most recent admission for congestive heart failure and pneumonia with similar symptoms. There are no known exacerbating or relieving factors. Further information is limited due to the patient's medical condition. The patient was noted to be febrile in the emergency department and received intravenous antibiotics and fluids.     INTERVAL HPI/OVERNIGHT EVENTS:  Patient seen and examined lying in bed.  Patient denies any headache, dizziness, SOB, CP, abdominal pain, nausea, vomiting, dysuria.  Other ROS reviewed and are negative.    Vital Signs Last 24 Hrs  T(C): 37.2 (26 Jul 2017 08:13), Max: 37.3 (25 Jul 2017 23:46)  T(F): 98.9 (26 Jul 2017 08:13), Max: 99.1 (25 Jul 2017 23:46)  HR: 86 (26 Jul 2017 10:27) (84 - 99)  BP: 105/58 (26 Jul 2017 10:27) (88/49 - 106/63)  BP(mean): --  RR: 16 (26 Jul 2017 10:27) (16 - 18)  SpO2: 100% (26 Jul 2017 08:13) (97% - 100%)  I&O's Detail    25 Jul 2017 07:01  -  26 Jul 2017 07:00  --------------------------------------------------------  IN:  Total IN: 0 mL    OUT:    Other: 1700 mL  Total OUT: 1700 mL    Total NET: -1700 mL        PHYSICAL EXAM:    GENERAL: NAD, well-groomed, well-developed  HEAD:  Atraumatic, Normocephalic  EYES: right eye conjunctival injection with haziness of the cornea.   NECK: Supple, No JVD, Normal thyroid  NERVOUS SYSTEM: AOX2  CHEST/LUNG: Clear to auscultation bilaterally; No rales, rhonchi  HEART: Regular rate and rhythm; No murmurs  ABDOMEN: Soft, Nontender, Nondistended; Bowel sounds present  EXTREMITIES:  1+ Peripheral Pulses, No edema  SKIN: some chronic skin changes with healing ulcer of the big toe.                               7.8    4.9   )-----------( 93       ( 25 Jul 2017 07:53 )             24.6     25 Jul 2017 07:53    132    |  88     |  73.0   ----------------------------<  170    4.8     |  27.0   |  6.25     Ca    8.9        25 Jul 2017 07:53        CAPILLARY BLOOD GLUCOSE  112 (25 Jul 2017 22:30)  178 (25 Jul 2017 17:13)      Culture - Blood (07.23.17 @ 10:39)    -  Candida albicans: Nondet    -  Multidrug (KPC pos) resistant organism: Nondet    -  Proteus species: Nondet    -  Streptococcus pyogenes (Group A): Nondet    -  Candida glabrata: Nondet    -  Candida parapsilosis: Nondet    -  Candida tropicalis: Nondet    -  Ciprofloxacin: R >2    -  Clindamycin: S <=0.5    -  Enterobacter cloacae complex: Nondet    -  Enterococcus species: Nondet    -  Escherichia coli: Nondet    -  Haemophilus influenzae: Nondet    -  Klebsiella oxytoca: Nondet    -  Linezolid: S <=1    -  Listeria monocytogenes: Nondet    -  Neisseria meningitidis: Nondet    -  Pseudomonas aeruginosa: Nondet    -  RIF- Rifampin: S <=1    -  Streptococcus agalactiae (Group B): Nondet    -  Vancomycin: S 2    -  Ampicillin: R >8    -  Ampicillin/Sulbactam: R <=8/4    -  Candida krusei: Nondet    -  Cefazolin: R <=4    -  Erythromycin: R >4    -  Gentamicin: S <=4    -  Klebsiella pneumoniae: Nondet    -  Levofloxacin: I 4    -  Methicillin resistant Staphylococcus aureus (MRSA): Nondet    -  Moxifloxacin(Aerobic): I 1    -  Penicillin: R >8    -  Serratia marcescens: Nondet    -  Staphylococcus aureus: Nondet    -  Streptococcus pneumoniae: Nondet    -  Streptococcus sp. (Not Grp A, B or S pneumoniae): Nondet    -  Trimethoprim/Sulfamethoxazole: S <=0.5/9.5    -  Vancomycin resistant Enterococcus sp.: Nondet    -  Acinetobacter baumanii: Nondet    -  Coagulase negative Staphylococcus: Detec    -  Oxacillin: R >2    -  Tetra/Doxy: S <=4    Specimen Source: .Blood Blood-Peripheral    Organism: Blood Culture PCR    Organism: Coag Negative Staphylococcus    Culture Results:   Growth in anaerobic bottle: Coag Negative Staphylococcus  Anaerobic Bottle: 19:04 Hours to positivity  Aerobic Bottle: No growth to date  ,  TYPE: (C=Critical, N=Notification, A=Abnormal) c  TESTS:  _ gs  DATE/TIME CALLED: _ 07/24/2017 10:07:43  CALLED TO: Khalif odell rn  READ BACK (2 Patient Identifiers)(Y/N): _ y  READ BACK VALUES (Y/N): _ y  CALLED BY: Khalif zelaya    Organism Identification: Coag Negative Staphylococcus  Blood Culture PCR    Method Type: МАРИЯ    Method Type: PCR    Culture - Blood (07.23.17 @ 10:38)    -  Cefazolin: R >16    -  Erythromycin: R >4    -  Oxacillin: R >2    -  Penicillin: R 8    -  Tetra/Doxy: S <=4    -  Trimethoprim/Sulfamethoxazole: R >2/38    -  Levofloxacin: R >4    -  Moxifloxacin(Aerobic): R 2    -  Ciprofloxacin: R >2    -  RIF- Rifampin: S <=1    -  Ampicillin/Sulbactam: R <=8/4    -  Clindamycin: R >4    -  Gentamicin: S <=4    -  Vancomycin: S 1    Specimen Source: .Blood Blood-Peripheral    Organism: Coag Negative Staphylococcus    Culture Results:   Growth in anaerobic bottle: Coag Negative Staphylococcus  Anaerobic Bottle: 2 days; 05:01 Hours to positivity  Aerobic Bottle: No growth to date  .  TYPE: (C=Critical, N=Notification, A=Abnormal) c  TESTS:  _ Bld   DATE/TIME CALLED: _ 07/25/2017 17:01:46  CALLED TO: Khalif Ríos RN  READ BACK (2 Patient Identifiers)(Y/N): _ Y  READ BACK VALUES (Y/N): _ Y  CALLED BY: Khalif grier    Organism Identification: Coag Negative Staphylococcus    Method Type: МАРИЯ          Hemoglobin A1C, Whole Blood: 6.2 % (24 Jul 2017 06:22)    MEDICATIONS  (STANDING):  heparin  Injectable 5000 Unit(s) SubCutaneous every 12 hours  tamsulosin 0.4 milliGRAM(s) Oral at bedtime  gabapentin 300 milliGRAM(s) Oral three times a day  dextrose 5%. 1000 milliLiter(s) (50 mL/Hr) IV Continuous <Continuous>  dextrose 50% Injectable 12.5 Gram(s) IV Push once  dextrose 50% Injectable 25 Gram(s) IV Push once  dextrose 50% Injectable 25 Gram(s) IV Push once  polyethylene glycol 3350 17 Gram(s) Oral at bedtime  midodrine 5 milliGRAM(s) Oral daily  artificial  tears Solution 1 Drop(s) Right EYE two times a day  erythromycin   Ointment 1 Application(s) Right EYE at bedtime  epoetin una Injectable 29444 Unit(s) IV Push <User Schedule>  folic acid 1 milliGRAM(s) Oral daily  insulin lispro (HumaLOG) corrective regimen sliding scale   SubCutaneous Before meals and at bedtime  Nephro-viviana 1 Tablet(s) Oral daily  ferrous    sulfate 325 milliGRAM(s) Oral daily  ofloxacin 0.3% Solution 1 Drop(s) Right EYE <User Schedule>  vancomycin  IVPB 1000 milliGRAM(s) IV Intermittent <User Schedule>    MEDICATIONS  (PRN):  dextrose Gel 1 Dose(s) Oral once PRN Blood Glucose LESS THAN 70 milliGRAM(s)/deciliter  glucagon  Injectable 1 milliGRAM(s) IntraMuscular once PRN Glucose LESS THAN 70 milligrams/deciliter  guaiFENesin    Syrup 100 milliGRAM(s) Oral every 6 hours PRN Cough      RADIOLOGY & ADDITIONAL TESTS:

## 2017-07-26 NOTE — PROGRESS NOTE ADULT - SUBJECTIVE AND OBJECTIVE BOX
NEPHROLOGY INTERVAL HPI/OVERNIGHT EVENTS:  pt clinically stable  no acute distress  tolerated HD yesterday    MEDICATIONS  (STANDING):  heparin  Injectable 5000 Unit(s) SubCutaneous every 12 hours  tamsulosin 0.4 milliGRAM(s) Oral at bedtime  gabapentin 300 milliGRAM(s) Oral three times a day  dextrose 5%. 1000 milliLiter(s) (50 mL/Hr) IV Continuous <Continuous>  dextrose 50% Injectable 12.5 Gram(s) IV Push once  dextrose 50% Injectable 25 Gram(s) IV Push once  dextrose 50% Injectable 25 Gram(s) IV Push once  polyethylene glycol 3350 17 Gram(s) Oral at bedtime  midodrine 5 milliGRAM(s) Oral daily  artificial  tears Solution 1 Drop(s) Right EYE two times a day  erythromycin   Ointment 1 Application(s) Right EYE at bedtime  epoetin una Injectable 18814 Unit(s) IV Push <User Schedule>  folic acid 1 milliGRAM(s) Oral daily  insulin lispro (HumaLOG) corrective regimen sliding scale   SubCutaneous Before meals and at bedtime    MEDICATIONS  (PRN):  dextrose Gel 1 Dose(s) Oral once PRN Blood Glucose LESS THAN 70 milliGRAM(s)/deciliter  glucagon  Injectable 1 milliGRAM(s) IntraMuscular once PRN Glucose LESS THAN 70 milligrams/deciliter  guaiFENesin    Syrup 100 milliGRAM(s) Oral every 6 hours PRN Cough      Allergies    No Known Allergies          Vital Signs Last 24 Hrs  T(C): 37.2 (26 Jul 2017 08:13), Max: 37.3 (25 Jul 2017 23:46)  T(F): 98.9 (26 Jul 2017 08:13), Max: 99.1 (25 Jul 2017 23:46)  HR: 84 (26 Jul 2017 08:13) (84 - 99)  BP: 88/49 (26 Jul 2017 08:13) (88/49 - 106/63)  BP(mean): --  RR: 18 (26 Jul 2017 08:13) (18 - 18)  SpO2: 100% (26 Jul 2017 08:13) (97% - 100%)    PHYSICAL EXAM:    GENERAL: NAD,   HEAD:  NO edema   NECK: Supple, No JVD,, + right permacath   CHEST/LUNG:EAE , few basilar crackles   HEART: Regular rate and rhythm; No rub   ABDOMEN: Soft, Nontender,   EXTREMITIES: decreased edema    LABS:                        7.8    4.9   )-----------( 93       ( 25 Jul 2017 07:53 )             24.6     07-25    132<L>  |  88<L>  |  73.0<H>  ----------------------------<  170<H>  4.8   |  27.0  |  6.25<H>    Ca    8.9      25 Jul 2017 07:53    Ferritin, Serum: 48277 ng/mL (07.25.17 @ 07:53)              RADIOLOGY & ADDITIONAL TESTS:

## 2017-07-26 NOTE — PROGRESS NOTE ADULT - SUBJECTIVE AND OBJECTIVE BOX
INTERVAL HPI/OVERNIGHT EVENTS: This 88yo M is more awake and alert today, not speaking much-cannot hold a conversation. Wife has not returned my call, and was not at his bedside at time of my visit. He is on CBR and was incontinent of stool. Tolerated HD treatment yesterday, BP remains low and Midodrine has anthony increased to 3x/d.  Full feed. No fever or events overnight. Very weak and debilitated     87y old  Male who presents with a chief complaint of Weakness (23 Jul 2017 12:44)      Present Symptoms:     Dyspnea: 0 -1    Nausea/Vomiting:  No  Anxiety:  No  Depression: Yes ??  Fatigue: Yes   Loss of appetite: Yes     Pain: denies            Character-            Duration-            Effect-            Factors-            Frequency-            Location-            Severity-    Review of Systems: Reviewed                   Unable to obtain due to poor mentation       MEDICATIONS  (STANDING):  heparin  Injectable 5000 Unit(s) SubCutaneous every 12 hours  tamsulosin 0.4 milliGRAM(s) Oral at bedtime  gabapentin 300 milliGRAM(s) Oral three times a day  dextrose 5%. 1000 milliLiter(s) (50 mL/Hr) IV Continuous <Continuous>  dextrose 50% Injectable 12.5 Gram(s) IV Push once  dextrose 50% Injectable 25 Gram(s) IV Push once  dextrose 50% Injectable 25 Gram(s) IV Push once  polyethylene glycol 3350 17 Gram(s) Oral at bedtime  midodrine 5 milliGRAM(s) Oral daily  artificial  tears Solution 1 Drop(s) Right EYE two times a day  erythromycin   Ointment 1 Application(s) Right EYE at bedtime  epoetin una Injectable 70653 Unit(s) IV Push <User Schedule>  folic acid 1 milliGRAM(s) Oral daily  insulin lispro (HumaLOG) corrective regimen sliding scale   SubCutaneous Before meals and at bedtime  Nephro-viviana 1 Tablet(s) Oral daily  ferrous    sulfate 325 milliGRAM(s) Oral daily  ofloxacin 0.3% Solution 1 Drop(s) Right EYE <User Schedule>  vancomycin  IVPB 1000 milliGRAM(s) IV Intermittent <User Schedule>    MEDICATIONS  (PRN):  dextrose Gel 1 Dose(s) Oral once PRN Blood Glucose LESS THAN 70 milliGRAM(s)/deciliter  glucagon  Injectable 1 milliGRAM(s) IntraMuscular once PRN Glucose LESS THAN 70 milligrams/deciliter  guaiFENesin    Syrup 100 milliGRAM(s) Oral every 6 hours PRN Cough      PHYSICAL EXAM:    Vital Signs Last 24 Hrs  T(C): 37.1 (26 Jul 2017 15:21), Max: 37.3 (25 Jul 2017 23:46)  T(F): 98.7 (26 Jul 2017 15:21), Max: 99.1 (25 Jul 2017 23:46)  HR: 98 (26 Jul 2017 15:21) (84 - 99)  BP: 115/69 (26 Jul 2017 15:21) (88/49 - 115/69)  BP(mean): --  RR: 18 (26 Jul 2017 15:21) (16 - 18)  SpO2: 97% (26 Jul 2017 15:21) (97% - 100%)    General: alert  responds to his name___ lethargic                   cachexia very few words     HEENT:  dry mouth     Lungs: comfortable dry cough    CV: normal      GI: normal                 PEG/NG/OG tube  :     :  incontinent  oliguria/anuria  HD    MSK:   weakness              bedbound/wheelchair bound    Skin: normal  pressure ulcers- B/L Heels    LABS:                        7.8    4.9   )-----------( 93       ( 25 Jul 2017 07:53 )             24.6     07-25    132<L>  |  88<L>  |  73.0<H>  ----------------------------<  170<H>  4.8   |  27.0  |  6.25<H>    Ca    8.9      25 Jul 2017 07:53          I&O's Summary    25 Jul 2017 07:01  -  26 Jul 2017 07:00  --------------------------------------------------------  IN: 0 mL / OUT: 1700 mL / NET: -1700 mL        RADIOLOGY & ADDITIONAL STUDIES:    ADVANCE DIRECTIVES:   DNR  NO  Completed on:                     MOLST   NO   Completed on:  Living Will   NO   Completed on:

## 2017-07-26 NOTE — CONSULT NOTE ADULT - SUBJECTIVE AND OBJECTIVE BOX
[facility]  INFECTIOUS DISEASES AND INTERNAL MEDICINE OF [place]  =======================================================  Scotty Pennington MD  Diplomates American Board of Internal Medicine and Infectious Diseases  =======================================================      [id number]  EMILY LITTLEJOHN    CC: Patient is a 87y old  Male who presents with a chief complaint of Weakness (23 Jul 2017 12:44)    HPI:  87M presented from home after being discharged from [facility] the day prior with weakness and poor oral intake. The patient was lethargic on interview and additional information was obtained from the patient's wife at the bedside. The patient was noted to have a nonproductive cough but no fevers or chills at home. The patient was able to attend his hemodialysis session yesterday but had remained weak. There was no complaints of chest pain of palpitations. The patient is noted to have had prior admissions to the hospital with the most recent admission for congestive heart failure and pneumonia with similar symptoms. There are no known exacerbating or relieving factors. Further information is limited due to the patient's medical condition. The patient was noted to be febrile in the emergency department and received intravenous antibiotics and fluids. In ED received Vanco/Zosyn x1     Past Medical & Surgical Hx:  Dementia  Muscle weakness (generalized)  Ventricular tachycardia  Diabetes mellitus  Renal failure  Hypertension    Problem List:  Other chronic pain: Other chronic pain  Dysphagia, unspecified type  Systolic heart failure, chronic  ESRD on hemodialysis  SIRS (systemic inflammatory response syndrome)  Systolic heart failure  Hypertension  Diabetes mellitus  Muscle weakness (generalized)  Dementia  Sepsis    Social Hx: Former smoker, No alcohol use, No illicit substance use  Lives at home with wife    FAMILY HISTORY:  No pertinent family history in first degree relatives    Allergies  No Known Allergies    MEDICATIONS  (STANDING):  heparin  Injectable 5000 Unit(s) SubCutaneous every 12 hours  tamsulosin 0.4 milliGRAM(s) Oral at bedtime  gabapentin 300 milliGRAM(s) Oral three times a day  polyethylene glycol 3350 17 Gram(s) Oral at bedtime  midodrine 5 milliGRAM(s) Oral daily  artificial  tears Solution 1 Drop(s) Right EYE two times a day  erythromycin   Ointment 1 Application(s) Right EYE at bedtime  epoetin una Injectable 42980 Unit(s) IV Push <User Schedule>  folic acid 1 milliGRAM(s) Oral daily  insulin lispro (HumaLOG) corrective regimen sliding scale   SubCutaneous Before meals and at bedtime  Nephro-viviana 1 Tablet(s) Oral daily  ferrous    sulfate 325 milliGRAM(s) Oral daily  ofloxacin 0.3% Solution 1 Drop(s) Right EYE <User Schedule>    MEDICATIONS  (PRN):  dextrose Gel 1 Dose(s) Oral once PRN Blood Glucose LESS THAN 70 milliGRAM(s)/deciliter  glucagon  Injectable 1 milliGRAM(s) IntraMuscular once PRN Glucose LESS THAN 70 milligrams/deciliter  guaiFENesin    Syrup 100 milliGRAM(s) Oral every 6 hours PRN Cough      REVIEW OF SYSTEMS:  CONSTITUTIONAL:  No Fever or chills  HEENT: Poor vision at baseline.  No earache, sore throat or runny nose.  CARDIOVASCULAR:  No pressure, squeezing, strangling, tightness, heaviness or aching about the chest, neck, axilla or epigastrium.  RESPIRATORY: + cough, No shortness of breath, PND or orthopnea.  GASTROINTESTINAL:  No nausea, vomiting or diarrhea.  GENITOURINARY:  No dysuria, frequency or urgency. No Blood in urine  MUSCULOSKELETAL:  no joint aches, no muscle pain  SKIN:  No change in skin, hair or nails.  NEUROLOGIC:  No paresthesias, fasciculations, seizures or weakness.  PSYCHIATRIC:  No disorder of thought or mood.  ENDOCRINE:  No heat or cold intolerance, polyuria or polydipsia.  HEMATOLOGICAL:  No easy bruising or bleeding.     Total IN: 0  Total OUT: 1700 mL    Total NET: -1700 mL    Physical Exam:  Vital Signs Last 24 Hrs  T(C): 37.2 (26 Jul 2017 08:13), Max: 37.3 (25 Jul 2017 23:46)  T(F): 98.9 (26 Jul 2017 08:13), Max: 99.1 (25 Jul 2017 23:46)  HR: 86 (26 Jul 2017 10:27) (84 - 99)  BP: 105/58 (26 Jul 2017 10:27) (88/49 - 106/63)  RR: 16 (26 Jul 2017 10:27) (16 - 18)  SpO2: 100% (26 Jul 2017 08:13) (97% - 100%)    GEN: Elderly, male, NAD, pleasant  HEENT: normocephalic and atraumatic. EOMI. Ptosis of right eye    NECK: Supple. No carotid bruits.  LUNGS: Good respiratory effort, + diffuse mild expiratory wheezing  HEART: Regular rate and rhythm without murmur. +Right IJ permacath  ABDOMEN: Soft, nontender, and nondistended.  Positive bowel sounds.    : No CVA tenderness  EXTREMITIES: Without any cyanosis, clubbing  MSK: No joint swelling  NEUROLOGIC: Cranial nerves II through XII are grossly intact.  PSYCHIATRIC: Appropriate affect .  SKIN: + ulceration  left hallux, No induration present.    Labs:  07-25    132<L>  |  88<L>  |  73.0<H>  ----------------------------<  170<H>  4.8   |  27.0  |  6.25<H>    Ca    8.9      25 Jul 2017 07:53                        7.8    4.9   )-----------( 93       ( 25 Jul 2017 07:53 )             24.6       RECENT CULTURES:    Culture - Blood (07.23.17 @ 10:39)  Growth in anaerobic bottle: Coag Negative Staphylococcus  Anaerobic Bottle: 19:04 Hours to positivity  Aerobic Bottle: No growth to date    Culture - Blood (07.23.17 @ 10:38)  Specimen Source: .Blood Blood-Peripheral  Growth in anaerobic bottle: Gram Positive Cocci in Clusters  Anaerobic Bottle: 53:01 Hours to positivity  Aerobic Bottle: No growth to date    IMAGING:    Xray Chest 1 View AP/PA. (07.23.17 @ 10:48) >  EXAM:  CHEST SINGLE VIEW FRONTAL                        PROCEDURE DATE:  07/23/2017    INTERPRETATION:  History: Weakness.    Findings: Frontal chest.  Comparison: None.    Patient is rotated towards the right.    Left chest wall implanted multilead AICD, sternotomy sutures, mediastinal   clips, aortic valve prosthesis and right internal jugular hemodialysis   catheter noted.     Heart size and mediastinum are unremarkable. Calcified uncoiled aorta.   Mild pulmonary vascular congestion. No focal lung consolidation.     Degenerative changes of the spine.    Impression:    Mild pulmonary vascular congestion. No focal lung consolidation.    < end of copied text > [facility]  INFECTIOUS DISEASES AND INTERNAL MEDICINE OF [place]  =======================================================  Scotty Pennington MD  Diplomates American Board of Internal Medicine and Infectious Diseases  =======================================================      [id number]  EMILY LITTLEJOHN    CC: Patient is a 87y old  Male who presents with a chief complaint of Weakness (23 Jul 2017 12:44)    HPI:  87M presented from home after being discharged from [facility] the day prior with weakness and poor oral intake. The patient was lethargic on interview and additional information was obtained from the patient's wife at the bedside. The patient was noted to have a nonproductive cough but no fevers or chills at home. The patient was able to attend his hemodialysis session yesterday but had remained weak. There was no complaints of chest pain of palpitations. The patient is noted to have had prior admissions to the hospital with the most recent admission for congestive heart failure and pneumonia with similar symptoms. There are no known exacerbating or relieving factors. Further information is limited due to the patient's medical condition. The patient was noted to be febrile in the emergency department, with elevated Lactate of 3.9. He subsequently received intravenous antibiotics (Vanc/Zosyn) and fluids. Patient admitted for SIRS. ID consulted for positive blood cultures.     Past Medical & Surgical Hx:  Dementia  Muscle weakness (generalized)  Ventricular tachycardia  Diabetes mellitus  Renal failure  Hypertension    Problem List:  Other chronic pain: Other chronic pain  Dysphagia, unspecified type  Systolic heart failure, chronic  ESRD on hemodialysis  SIRS (systemic inflammatory response syndrome)  Systolic heart failure  Hypertension  Diabetes mellitus  Muscle weakness (generalized)  Dementia  Sepsis    Social Hx: Former smoker, No alcohol use, No illicit substance use  Lives at home with wife    FAMILY HISTORY:  No pertinent family history in first degree relatives    Allergies  No Known Allergies    MEDICATIONS  (STANDING):  heparin  Injectable 5000 Unit(s) SubCutaneous every 12 hours  tamsulosin 0.4 milliGRAM(s) Oral at bedtime  gabapentin 300 milliGRAM(s) Oral three times a day  polyethylene glycol 3350 17 Gram(s) Oral at bedtime  midodrine 5 milliGRAM(s) Oral daily  artificial  tears Solution 1 Drop(s) Right EYE two times a day  erythromycin   Ointment 1 Application(s) Right EYE at bedtime  epoetin una Injectable 82292 Unit(s) IV Push <User Schedule>  folic acid 1 milliGRAM(s) Oral daily  insulin lispro (HumaLOG) corrective regimen sliding scale   SubCutaneous Before meals and at bedtime  Nephro-viviana 1 Tablet(s) Oral daily  ferrous    sulfate 325 milliGRAM(s) Oral daily  ofloxacin 0.3% Solution 1 Drop(s) Right EYE <User Schedule>    MEDICATIONS  (PRN):  dextrose Gel 1 Dose(s) Oral once PRN Blood Glucose LESS THAN 70 milliGRAM(s)/deciliter  glucagon  Injectable 1 milliGRAM(s) IntraMuscular once PRN Glucose LESS THAN 70 milligrams/deciliter  guaiFENesin    Syrup 100 milliGRAM(s) Oral every 6 hours PRN Cough      REVIEW OF SYSTEMS:  CONSTITUTIONAL:  No Fever or chills  HEENT: Poor vision at baseline.  No earache, sore throat or runny nose.  CARDIOVASCULAR:  No pressure, squeezing, strangling, tightness, heaviness or aching about the chest, neck, axilla or epigastrium.  RESPIRATORY: + cough, No shortness of breath, PND or orthopnea.  GASTROINTESTINAL:  No nausea, vomiting or diarrhea.  GENITOURINARY:  No dysuria, frequency or urgency. No Blood in urine  MUSCULOSKELETAL:  no joint aches, no muscle pain  SKIN:  No change in skin, hair or nails.  NEUROLOGIC:  No paresthesias, fasciculations, seizures or weakness.  PSYCHIATRIC:  No disorder of thought or mood.  ENDOCRINE:  No heat or cold intolerance, polyuria or polydipsia.  HEMATOLOGICAL:  No easy bruising or bleeding.     Total IN: 0  Total OUT: 1700 mL    Total NET: -1700 mL    Physical Exam:  Vital Signs Last 24 Hrs  T(C): 37.2 (26 Jul 2017 08:13), Max: 37.3 (25 Jul 2017 23:46)  T(F): 98.9 (26 Jul 2017 08:13), Max: 99.1 (25 Jul 2017 23:46)  HR: 86 (26 Jul 2017 10:27) (84 - 99)  BP: 105/58 (26 Jul 2017 10:27) (88/49 - 106/63)  RR: 16 (26 Jul 2017 10:27) (16 - 18)  SpO2: 100% (26 Jul 2017 08:13) (97% - 100%)    GEN: Elderly, male, NAD, pleasant  HEENT: normocephalic and atraumatic. EOMI. Ptosis of right eye    NECK: Supple. No carotid bruits.  LUNGS: Good respiratory effort, + diffuse mild expiratory wheezing  HEART: Regular rate and rhythm without murmur. +Right IJ permacath  ABDOMEN: Soft, nontender, and nondistended.  Positive bowel sounds.    : No CVA tenderness  EXTREMITIES: Without any cyanosis, clubbing  MSK: No joint swelling  NEUROLOGIC: Cranial nerves II through XII are grossly intact.  PSYCHIATRIC: Appropriate affect .  SKIN: + ulceration  left hallux, No induration present.    Labs:  07-25    132<L>  |  88<L>  |  73.0<H>  ----------------------------<  170<H>  4.8   |  27.0  |  6.25<H>    Ca    8.9      25 Jul 2017 07:53                        7.8    4.9   )-----------( 93       ( 25 Jul 2017 07:53 )             24.6     Blood Gas Venous - Lactate (07.24.17 @ 23:58)  Blood Gas Venous - Lactate: 1.6mmoL/L  Blood Gas Venous - Lactate (07.23.17 @ 10:35)  Blood Gas Venous - Lactate: 3.9 mmoL/L    RECENT CULTURES:    Culture - Blood (07.23.17 @ 10:39)  Growth in anaerobic bottle: Coag Negative Staphylococcus  Anaerobic Bottle: 19:04 Hours to positivity  Aerobic Bottle: No growth to date    Culture - Blood (07.23.17 @ 10:38)  Specimen Source: .Blood Blood-Peripheral  Growth in anaerobic bottle: Gram Positive Cocci in Clusters  Anaerobic Bottle: 53:01 Hours to positivity  Aerobic Bottle: No growth to date        IMAGING:  Xray Chest 1 View AP/PA. (07.23.17 @ 10:48) >  EXAM:  CHEST SINGLE VIEW FRONTAL                        PROCEDURE DATE:  07/23/2017    INTERPRETATION:  History: Weakness.    Findings: Frontal chest.  Comparison: None.  Patient is rotated towards the right.  Left chest wall implanted multilead AICD, sternotomy sutures, mediastinal   clips, aortic valve prosthesis and right internal jugular hemodialysis   catheter noted.   Heart size and mediastinum are unremarkable. Calcified uncoiled aorta.   Mild pulmonary vascular congestion. No focal lung consolidation.   Degenerative changes of the spine.  Impression:  Mild pulmonary vascular congestion. No focal lung consolidation.  < end of copied text > [facility]  INFECTIOUS DISEASES AND INTERNAL MEDICINE OF [place]  =======================================================  Scotty Pennington MD  Diplomates American Board of Internal Medicine and Infectious Diseases  =======================================================      N-[id number]  EMILY LITTLEJOHN    CC: Patient is a 87y old  Male who presents with a chief complaint of Weakness (23 Jul 2017 12:44)    HPI:  87M presented from home after being discharged from [facility] the day prior with weakness and poor oral intake. The patient was lethargic on interview and additional information was obtained from the patient's wife at the bedside. The patient was noted to have a nonproductive cough but no fevers or chills at home. The patient was able to attend his hemodialysis session yesterday but had remained weak. There was no complaints of chest pain of palpitations. The patient is noted to have had prior admissions to the hospital with the most recent admission for congestive heart failure and pneumonia with similar symptoms. There are no known exacerbating or relieving factors. Further information is limited due to the patient's medical condition. The patient was noted to be febrile in the emergency department, with elevated Lactate of 3.9, was a code sepsis.  He subsequently received intravenous antibiotics (Vanc/Zosyn) and fluids. Patient admitted for SIRS. ID consulted for positive blood cultures.     Past Medical & Surgical Hx:  Dementia  Muscle weakness (generalized)  Ventricular tachycardia  Diabetes mellitus  Renal failure  Hypertension    Problem List:  Other chronic pain: Other chronic pain  Dysphagia, unspecified type  Systolic heart failure, chronic  ESRD on hemodialysis  SIRS (systemic inflammatory response syndrome)  Systolic heart failure  Hypertension  Diabetes mellitus  Muscle weakness (generalized)  Dementia  Sepsis    Social Hx: Former smoker, No alcohol use, No illicit substance use  Lives at home with wife    FAMILY HISTORY:  No pertinent family history in first degree relatives    Allergies  No Known Allergies    MEDICATIONS  (STANDING):  heparin  Injectable 5000 Unit(s) SubCutaneous every 12 hours  tamsulosin 0.4 milliGRAM(s) Oral at bedtime  gabapentin 300 milliGRAM(s) Oral three times a day  polyethylene glycol 3350 17 Gram(s) Oral at bedtime  midodrine 5 milliGRAM(s) Oral daily  artificial  tears Solution 1 Drop(s) Right EYE two times a day  erythromycin   Ointment 1 Application(s) Right EYE at bedtime  epoetin una Injectable 91652 Unit(s) IV Push <User Schedule>  folic acid 1 milliGRAM(s) Oral daily  insulin lispro (HumaLOG) corrective regimen sliding scale   SubCutaneous Before meals and at bedtime  Nephro-viviana 1 Tablet(s) Oral daily  ferrous    sulfate 325 milliGRAM(s) Oral daily  ofloxacin 0.3% Solution 1 Drop(s) Right EYE <User Schedule>    MEDICATIONS  (PRN):  dextrose Gel 1 Dose(s) Oral once PRN Blood Glucose LESS THAN 70 milliGRAM(s)/deciliter  glucagon  Injectable 1 milliGRAM(s) IntraMuscular once PRN Glucose LESS THAN 70 milligrams/deciliter  guaiFENesin    Syrup 100 milliGRAM(s) Oral every 6 hours PRN Cough      REVIEW OF SYSTEMS:  CONSTITUTIONAL:  No Fever or chills  HEENT: Poor vision at baseline.  No earache, sore throat or runny nose.  CARDIOVASCULAR:  No pressure, squeezing, strangling, tightness, heaviness or aching about the chest, neck, axilla or epigastrium.  RESPIRATORY: + cough, No shortness of breath, PND or orthopnea.  GASTROINTESTINAL:  No nausea, vomiting or diarrhea.  GENITOURINARY:  No dysuria, frequency or urgency. No Blood in urine  MUSCULOSKELETAL:  no joint aches, no muscle pain  SKIN:  No change in skin, hair or nails.  NEUROLOGIC:  No paresthesias, fasciculations, seizures or weakness.  PSYCHIATRIC:  No disorder of thought or mood.  ENDOCRINE:  No heat or cold intolerance, polyuria or polydipsia.  HEMATOLOGICAL:  No easy bruising or bleeding.     Total IN: 0  Total OUT: 1700 mL    Total NET: -1700 mL    Physical Exam:  Vital Signs Last 24 Hrs  T(C): 37.2 (26 Jul 2017 08:13), Max: 37.3 (25 Jul 2017 23:46)  T(F): 98.9 (26 Jul 2017 08:13), Max: 99.1 (25 Jul 2017 23:46)  HR: 86 (26 Jul 2017 10:27) (84 - 99)  BP: 105/58 (26 Jul 2017 10:27) (88/49 - 106/63)  RR: 16 (26 Jul 2017 10:27) (16 - 18)  SpO2: 100% (26 Jul 2017 08:13) (97% - 100%)    GEN: Elderly, male, NAD, pleasant  HEENT: normocephalic and atraumatic. EOMI. Ptosis of right eye    NECK: Supple. No carotid bruits.  LUNGS: Good respiratory effort, + diffuse mild expiratory wheezing  HEART: Regular rate and rhythm without murmur. +Right IJ permacath  ABDOMEN: Soft, nontender, and nondistended.  Positive bowel sounds.    : No CVA tenderness  EXTREMITIES: Without any cyanosis, clubbing  MSK: No joint swelling  NEUROLOGIC: Cranial nerves II through XII are grossly intact.  PSYCHIATRIC: Appropriate affect .  SKIN: + ulceration  left hallux, No induration present.    Labs:  07-25    132<L>  |  88<L>  |  73.0<H>  ----------------------------<  170<H>  4.8   |  27.0  |  6.25<H>    Ca    8.9      25 Jul 2017 07:53                        7.8    4.9   )-----------( 93       ( 25 Jul 2017 07:53 )             24.6     Blood Gas Venous - Lactate (07.24.17 @ 23:58)  Blood Gas Venous - Lactate: 1.6mmoL/L  Blood Gas Venous - Lactate (07.23.17 @ 10:35)  Blood Gas Venous - Lactate: 3.9 mmoL/L    RECENT CULTURES:    Culture - Blood (07.23.17 @ 10:39)  Growth in anaerobic bottle: Coag Negative Staphylococcus  Anaerobic Bottle: 19:04 Hours to positivity  Aerobic Bottle: No growth to date    Culture - Blood (07.23.17 @ 10:38)  Specimen Source: .Blood Blood-Peripheral  Growth in anaerobic bottle: Gram Positive Cocci in Clusters  Anaerobic Bottle: 53:01 Hours to positivity  Aerobic Bottle: No growth to date        IMAGING:  Xray Chest 1 View AP/PA. (07.23.17 @ 10:48) >  EXAM:  CHEST SINGLE VIEW FRONTAL                        PROCEDURE DATE:  07/23/2017    INTERPRETATION:  History: Weakness.    Findings: Frontal chest.  Comparison: None.  Patient is rotated towards the right.  Left chest wall implanted multilead AICD, sternotomy sutures, mediastinal   clips, aortic valve prosthesis and right internal jugular hemodialysis   catheter noted.   Heart size and mediastinum are unremarkable. Calcified uncoiled aorta.   Mild pulmonary vascular congestion. No focal lung consolidation.   Degenerative changes of the spine.  Impression:  Mild pulmonary vascular congestion. No focal lung consolidation.  < end of copied text > [facility]  INFECTIOUS DISEASES AND INTERNAL MEDICINE OF [place]  =======================================================  Scotty Pennington MD  Diplomates American Board of Internal Medicine and Infectious Diseases  =======================================================      [id number]  EMILY LITTLEJOHN    CC: Patient is a 87y old  Male who presents with a chief complaint of Weakness (23 Jul 2017 12:44)    87M presented from home after being discharged from [facility] the day prior with weakness and poor oral intake. The patient was lethargic on interview and additional information was obtained from the patient's wife at the bedside. The patient was noted to have a nonproductive cough but no fevers or chills at home. The patient was able to attend his hemodialysis session yesterday but had remained weak. There was no complaints of chest pain of palpitations. The patient is noted to have had prior admissions to the hospital with the most recent admission for congestive heart failure and pneumonia with similar symptoms. There are no known exacerbating or relieving factors. Further information is limited due to the patient's medical condition. The patient was noted to be febrile in the emergency department, with elevated Lactate of 3.9, was a code sepsis.  He subsequently received intravenous antibiotics (Vanc/Zosyn) and fluids. Patient admitted for SIRS. ID consulted for positive blood cultures.       Past Medical & Surgical Hx:  Dementia  Muscle weakness (generalized)  Ventricular tachycardia  Diabetes mellitus  Renal failure  Hypertension      Social Hx: Former smoker, No alcohol use, No illicit substance use  Lives at home with wife      FAMILY HISTORY:  No pertinent family history in first degree relatives      Allergies  No Known Allergies      Antibiotics:  vancomycin  IVPB 1000 milliGRAM(s) IV Intermittent <User Schedule>      REVIEW OF SYSTEMS:  CONSTITUTIONAL:  No Fever or chills  HEENT: Poor vision at baseline.  No earache, sore throat or runny nose.  CARDIOVASCULAR:  No pressure, squeezing, strangling, tightness, heaviness or aching about the chest, neck, axilla or epigastrium.  RESPIRATORY: + cough, No shortness of breath  GASTROINTESTINAL:  No nausea, vomiting or diarrhea.  GENITOURINARY:  No dysuria, frequency or urgency. No Blood in urine  MUSCULOSKELETAL:  no joint aches, no muscle pain  SKIN:  No change in skin, hair or nails.  NEUROLOGIC:  No paresthesias, fasciculations  PSYCHIATRIC:  No disorder of thought or mood.  ENDOCRINE:  No heat or cold intolerance  HEMATOLOGICAL:  No easy bruising or bleeding.       Physical Exam:  Vital Signs Last 24 Hrs  T(C): 37.2 (26 Jul 2017 08:13), Max: 37.3 (25 Jul 2017 23:46)  T(F): 98.9 (26 Jul 2017 08:13), Max: 99.1 (25 Jul 2017 23:46)  HR: 86 (26 Jul 2017 10:27) (84 - 99)  BP: 105/58 (26 Jul 2017 10:27) (88/49 - 106/63)  RR: 16 (26 Jul 2017 10:27) (16 - 18)  SpO2: 100% (26 Jul 2017 08:13) (97% - 100%)      GEN: Elderly, male, NAD, pleasant  HEENT: normocephalic and atraumatic. EOMI. Ptosis of right eye    NECK: Supple. No carotid bruits.  LUNGS: Good respiratory effort, + diffuse mild expiratory wheezing  HEART: Regular rate and rhythm without murmur. +Right IJ permacath  ABDOMEN: Soft, nontender, and nondistended.  Positive bowel sounds.    : No CVA tenderness  EXTREMITIES: Without any cyanosis, clubbing  MSK: No joint swelling  NEUROLOGIC: Cranial nerves II through XII are grossly intact.  PSYCHIATRIC: Appropriate affect .  SKIN: + ulceration  left hallux, No induration present.      Labs:  07-25    132<L>  |  88<L>  |  73.0<H>  ----------------------------<  170<H>  4.8   |  27.0  |  6.25<H>    Ca    8.9      25 Jul 2017 07:53                        7.8    4.9   )-----------( 93       ( 25 Jul 2017 07:53 )             24.6     Blood Gas Venous - Lactate (07.24.17 @ 23:58)  Blood Gas Venous - Lactate: 1.6mmoL/L  Blood Gas Venous - Lactate (07.23.17 @ 10:35)  Blood Gas Venous - Lactate: 3.9 mmoL/L      RECENT CULTURES:    Culture - Blood (07.23.17 @ 10:39)  Growth in anaerobic bottle: Coag Negative Staphylococcus  Anaerobic Bottle: 19:04 Hours to positivity  Aerobic Bottle: No growth to date    Culture - Blood (07.23.17 @ 10:38)  Specimen Source: .Blood Blood-Peripheral  Growth in anaerobic bottle: Gram Positive Cocci in Clusters  Anaerobic Bottle: 53:01 Hours to positivity  Aerobic Bottle: No growth to date        IMAGING:  Xray Chest 1 View AP/PA. (07.23.17 @ 10:48) >  EXAM:  CHEST SINGLE VIEW FRONTAL                        PROCEDURE DATE:  07/23/2017    INTERPRETATION:  History: Weakness.    Findings: Frontal chest.  Comparison: None.  Patient is rotated towards the right.  Left chest wall implanted multilead AICD, sternotomy sutures, mediastinal   clips, aortic valve prosthesis and right internal jugular hemodialysis   catheter noted.   Heart size and mediastinum are unremarkable. Calcified uncoiled aorta.   Mild pulmonary vascular congestion. No focal lung consolidation.   Degenerative changes of the spine.  Impression:  Mild pulmonary vascular congestion. No focal lung consolidation.  < end of copied text >

## 2017-07-26 NOTE — PROGRESS NOTE ADULT - ASSESSMENT
87M presented from home after being discharged from [facility] the day prior with weakness and poor oral intake.  Patient found to have Coag negative Staph bacteremia.  Patient started on antibiotics per ID.

## 2017-07-26 NOTE — CONSULT NOTE ADULT - PROBLEM SELECTOR RECOMMENDATION 9
2/4 blood cultures positive  F/u Repeat Blood cultures  Start vancomycin post HD 2/4 blood cultures positive  F/u Repeat Blood cultures  Start vancomycin post HD  No fevers since ER  No leukocytosis  Possible source is IJ   Will do TTE  Follow up cultures

## 2017-07-27 LAB
ALBUMIN SERPL ELPH-MCNC: 3 G/DL — LOW (ref 3.3–5.2)
ALP SERPL-CCNC: 103 U/L — SIGNIFICANT CHANGE UP (ref 40–120)
ALT FLD-CCNC: 300 U/L — HIGH
ANION GAP SERPL CALC-SCNC: 18 MMOL/L — HIGH (ref 5–17)
AST SERPL-CCNC: 164 U/L — HIGH
BILIRUB SERPL-MCNC: 0.5 MG/DL — SIGNIFICANT CHANGE UP (ref 0.4–2)
BUN SERPL-MCNC: 47 MG/DL — HIGH (ref 8–20)
CALCIUM SERPL-MCNC: 8.2 MG/DL — LOW (ref 8.6–10.2)
CHLORIDE SERPL-SCNC: 97 MMOL/L — LOW (ref 98–107)
CO2 SERPL-SCNC: 25 MMOL/L — SIGNIFICANT CHANGE UP (ref 22–29)
CREAT SERPL-MCNC: 5.51 MG/DL — HIGH (ref 0.5–1.3)
GLUCOSE SERPL-MCNC: 85 MG/DL — SIGNIFICANT CHANGE UP (ref 70–115)
HAV IGM SER-ACNC: SIGNIFICANT CHANGE UP
HBV CORE IGM SER-ACNC: SIGNIFICANT CHANGE UP
HBV SURFACE AG SER-ACNC: SIGNIFICANT CHANGE UP
HCT VFR BLD CALC: 25.3 % — LOW (ref 42–52)
HCV AB S/CO SERPL IA: 0.23 S/CO — SIGNIFICANT CHANGE UP
HCV AB SERPL-IMP: SIGNIFICANT CHANGE UP
HGB BLD-MCNC: 7.5 G/DL — LOW (ref 14–18)
MCHC RBC-ENTMCNC: 26 PG — LOW (ref 27–31)
MCHC RBC-ENTMCNC: 29.6 G/DL — LOW (ref 32–36)
MCV RBC AUTO: 87.8 FL — SIGNIFICANT CHANGE UP (ref 80–94)
PLATELET # BLD AUTO: 84 K/UL — LOW (ref 150–400)
POTASSIUM SERPL-MCNC: 4.4 MMOL/L — SIGNIFICANT CHANGE UP (ref 3.5–5.3)
POTASSIUM SERPL-SCNC: 4.4 MMOL/L — SIGNIFICANT CHANGE UP (ref 3.5–5.3)
PROT SERPL-MCNC: 6.6 G/DL — SIGNIFICANT CHANGE UP (ref 6.6–8.7)
RBC # BLD: 2.88 M/UL — LOW (ref 4.6–6.2)
RBC # FLD: 17 % — HIGH (ref 11–15.6)
SODIUM SERPL-SCNC: 140 MMOL/L — SIGNIFICANT CHANGE UP (ref 135–145)
VANCOMYCIN TROUGH SERPL-MCNC: 8 UG/ML — LOW (ref 10–20)
WBC # BLD: 4.8 K/UL — SIGNIFICANT CHANGE UP (ref 4.8–10.8)
WBC # FLD AUTO: 4.8 K/UL — SIGNIFICANT CHANGE UP (ref 4.8–10.8)

## 2017-07-27 PROCEDURE — 99233 SBSQ HOSP IP/OBS HIGH 50: CPT

## 2017-07-27 PROCEDURE — 93306 TTE W/DOPPLER COMPLETE: CPT | Mod: 26

## 2017-07-27 PROCEDURE — 76705 ECHO EXAM OF ABDOMEN: CPT | Mod: 26

## 2017-07-27 RX ORDER — TRAMADOL HYDROCHLORIDE 50 MG/1
25 TABLET ORAL EVERY 6 HOURS
Qty: 0 | Refills: 0 | Status: DISCONTINUED | OUTPATIENT
Start: 2017-07-27 | End: 2017-07-23

## 2017-07-27 RX ORDER — ACETAMINOPHEN 500 MG
650 TABLET ORAL EVERY 8 HOURS
Qty: 0 | Refills: 0 | Status: DISCONTINUED | OUTPATIENT
Start: 2017-07-27 | End: 2017-07-23

## 2017-07-27 RX ADMIN — HEPARIN SODIUM 5000 UNIT(S): 5000 INJECTION INTRAVENOUS; SUBCUTANEOUS at 05:26

## 2017-07-27 RX ADMIN — Medication 1 DROP(S): at 17:41

## 2017-07-27 RX ADMIN — HEPARIN SODIUM 5000 UNIT(S): 5000 INJECTION INTRAVENOUS; SUBCUTANEOUS at 17:41

## 2017-07-27 RX ADMIN — Medication 650 MILLIGRAM(S): at 23:00

## 2017-07-27 RX ADMIN — GABAPENTIN 300 MILLIGRAM(S): 400 CAPSULE ORAL at 05:26

## 2017-07-27 RX ADMIN — Medication 325 MILLIGRAM(S): at 12:35

## 2017-07-27 RX ADMIN — GABAPENTIN 300 MILLIGRAM(S): 400 CAPSULE ORAL at 23:00

## 2017-07-27 RX ADMIN — Medication 250 MILLIGRAM(S): at 17:45

## 2017-07-27 RX ADMIN — Medication 100 MILLIGRAM(S): at 10:15

## 2017-07-27 RX ADMIN — GABAPENTIN 300 MILLIGRAM(S): 400 CAPSULE ORAL at 17:40

## 2017-07-27 RX ADMIN — Medication 100 MILLIGRAM(S): at 17:55

## 2017-07-27 RX ADMIN — Medication 1 DROP(S): at 12:34

## 2017-07-27 RX ADMIN — Medication 1 DROP(S): at 23:00

## 2017-07-27 RX ADMIN — POLYETHYLENE GLYCOL 3350 17 GRAM(S): 17 POWDER, FOR SOLUTION ORAL at 23:00

## 2017-07-27 RX ADMIN — Medication 1 DROP(S): at 05:26

## 2017-07-27 RX ADMIN — Medication 1 MILLIGRAM(S): at 12:35

## 2017-07-27 RX ADMIN — TAMSULOSIN HYDROCHLORIDE 0.4 MILLIGRAM(S): 0.4 CAPSULE ORAL at 23:00

## 2017-07-27 RX ADMIN — MIDODRINE HYDROCHLORIDE 5 MILLIGRAM(S): 2.5 TABLET ORAL at 12:35

## 2017-07-27 RX ADMIN — Medication 1 APPLICATION(S): at 23:00

## 2017-07-27 RX ADMIN — Medication 1 TABLET(S): at 12:35

## 2017-07-27 RX ADMIN — Medication 1: at 22:59

## 2017-07-27 RX ADMIN — ERYTHROPOIETIN 10000 UNIT(S): 10000 INJECTION, SOLUTION INTRAVENOUS; SUBCUTANEOUS at 10:15

## 2017-07-27 NOTE — PROVIDER CONTACT NOTE (CRITICAL VALUE NOTIFICATION) - TEST AND RESULT REPORTED:
+blood cultures: gram +cocci in clusters, anerobic bottle
Blood culture 2nd bottle + (gram stain + cocci in cluster).

## 2017-07-27 NOTE — PROGRESS NOTE ADULT - PROBLEM SELECTOR PLAN 1
ID consulted, patient has been started on Vancomycin in HD, TTE order along with repeat cultures, patient 2X2 bottles are postive for coag negative staph, no sure the source, likely source is IJ, ID consult appreciated. ID consulted, patient has been started on Vancomycin in HD, TTE done showed vegetation, EF is low, patient 2X2 bottles are postive for coag negative staph, no sure the source, likely source is IJ, ID consult appreciated, repeat culture are negative.

## 2017-07-27 NOTE — PROGRESS NOTE ADULT - ASSESSMENT
ESRD -  HD now    Anemia -   - epogen with HD   - avoid Fe due high ferritin  - may need PRBCs       RO - Check Phos     Coag neg staph in blood culture:   - continue antibiotics  - check repeat cultures    Chronic hypotension:  - Midodrine to 5 tid

## 2017-07-27 NOTE — ADVANCED PRACTICE NURSE CONSULT - RECOMMEDATIONS
Please follow [place] pressure injury treatment guideline for stage 2 pressure injury treatment and contact podiatry team for diabetic foot ulcer wound care recommendation.

## 2017-07-27 NOTE — PROGRESS NOTE ADULT - SUBJECTIVE AND OBJECTIVE BOX
NEPHROLOGY INTERVAL HPI/OVERNIGHT EVENTS:  pt seen at dialysis  eating well  no acute distress and tolerating treatment    MEDICATIONS  (STANDING):  heparin  Injectable 5000 Unit(s) SubCutaneous every 12 hours  tamsulosin 0.4 milliGRAM(s) Oral at bedtime  gabapentin 300 milliGRAM(s) Oral three times a day  dextrose 5%. 1000 milliLiter(s) (50 mL/Hr) IV Continuous <Continuous>  dextrose 50% Injectable 12.5 Gram(s) IV Push once  dextrose 50% Injectable 25 Gram(s) IV Push once  dextrose 50% Injectable 25 Gram(s) IV Push once  polyethylene glycol 3350 17 Gram(s) Oral at bedtime  midodrine 5 milliGRAM(s) Oral daily  artificial  tears Solution 1 Drop(s) Right EYE two times a day  erythromycin   Ointment 1 Application(s) Right EYE at bedtime  epoetin una Injectable 42985 Unit(s) IV Push <User Schedule>  folic acid 1 milliGRAM(s) Oral daily  insulin lispro (HumaLOG) corrective regimen sliding scale   SubCutaneous Before meals and at bedtime  Nephro-viviana 1 Tablet(s) Oral daily  ferrous    sulfate 325 milliGRAM(s) Oral daily  ofloxacin 0.3% Solution 1 Drop(s) Right EYE <User Schedule>  vancomycin  IVPB 1000 milliGRAM(s) IV Intermittent <User Schedule>    MEDICATIONS  (PRN):  dextrose Gel 1 Dose(s) Oral once PRN Blood Glucose LESS THAN 70 milliGRAM(s)/deciliter  glucagon  Injectable 1 milliGRAM(s) IntraMuscular once PRN Glucose LESS THAN 70 milligrams/deciliter  guaiFENesin    Syrup 100 milliGRAM(s) Oral every 6 hours PRN Cough      Allergies    No Known Allergies    Intolerances    Vital Signs Last 24 Hrs  T(C): 36.8 (27 Jul 2017 07:15), Max: 37.3 (26 Jul 2017 17:09)  T(F): 98.2 (27 Jul 2017 07:15), Max: 99.1 (26 Jul 2017 17:09)  HR: 85 (27 Jul 2017 07:15) (82 - 98)  BP: 99/49 (27 Jul 2017 07:15) (96/47 - 115/69)  BP(mean): --  RR: 18 (27 Jul 2017 07:15) (16 - 18)  SpO2: 100% (27 Jul 2017 07:15) (95% - 100%)    PHYSICAL EXAM:  GENERAL: NAD,   HEAD:  NO edema   NECK: Supple, No JVD,, + right permacath   CHEST/LUNG:EAE , few basilar crackles   HEART: Regular rate and rhythm; No rub   ABDOMEN: Soft, Nontender,   EXTREMITIES: decreased edema    LABS:                        8.0    4.4   )-----------( 88       ( 26 Jul 2017 20:43 )             26.1     07-27    140  |  97<L>  |  47.0<H>  ----------------------------<  85  4.4   |  25.0  |  5.51<H>    Ca    8.2<L>      27 Jul 2017 08:54    TPro  6.6  /  Alb  3.0<L>  /  TBili  0.5  /  DBili  x   /  AST  164<H>  /  ALT  300<H>  /  AlkPhos  103  07-27    Iron Total, Serum: 29 ug/dL (07.25.17 @ 07:53)    Ferritin, Serum: 74005 ng/mL (07.25.17 @ 07:53)            RADIOLOGY & ADDITIONAL TESTS:

## 2017-07-27 NOTE — ADVANCED PRACTICE NURSE CONSULT - REASON FOR CONSULT
Received wound care referral for stage 2 pressure injury at bilateral heels and diabetic foot ulcer at right first toe.

## 2017-07-27 NOTE — PROGRESS NOTE ADULT - ASSESSMENT
87M presented from home after being discharged from [facility] the day prior with weakness and poor oral intake.  Patient found to have Coag negative Staph bacteremia.  Patient started on antibiotics per ID.

## 2017-07-27 NOTE — PROGRESS NOTE ADULT - SUBJECTIVE AND OBJECTIVE BOX
EMILY Lansing    [id number]    87y      Male    Patient is a 87y old  Male who presents with a chief complaint of Weakness (23 Jul 2017 12:44)      INTERVAL HPI/OVERNIGHT EVENTS:    REVIEW OF SYSTEMS:    CONSTITUTIONAL: No fever, weight loss, or fatigue  RESPIRATORY: No cough, wheezing, hemoptysis; No shortness of breath  CARDIOVASCULAR: No chest pain, palpitations  GASTROINTESTINAL: No abdominal or epigastric pain. No nausea, vomiting  NEUROLOGICAL: No headaches,  loss of strength.  MISCELLANEOUS: No joint swelling or pain       Vital Signs Last 24 Hrs  T(C): 36.7 (27 Jul 2017 10:55), Max: 37.3 (26 Jul 2017 17:09)  T(F): 98 (27 Jul 2017 10:55), Max: 99.1 (26 Jul 2017 17:09)  HR: 90 (27 Jul 2017 10:55) (82 - 94)  BP: 108/53 (27 Jul 2017 10:55) (96/47 - 108/53)  BP(mean): --  RR: 18 (27 Jul 2017 10:55) (16 - 18)  SpO2: 98% (27 Jul 2017 10:55) (95% - 100%)    PHYSICAL EXAM:    GENERAL: Elderly male looking   HEENT: PERRL, +EOMI  NECK: soft, Supple, No JVD,   CHEST/LUNG: Clear to auscultate bilaterally; No wheezing  HEART: S1S2+, Regular rate and rhythm; No murmurs, rubs, or gallops  ABDOMEN: Soft, Nontender, Nondistended; Bowel sounds present  EXTREMITIES:  2+ Peripheral Pulses, No clubbing, cyanosis, or edema  SKIN: No rashes or lesions  NEURO: AAOX3, no focal deficits, no motor r sensory loss  PSYCH: normal mood      LABS:                        7.5    4.8   )-----------( 84       ( 27 Jul 2017 08:54 )             25.3     07-27    140  |  97<L>  |  47.0<H>  ----------------------------<  85  4.4   |  25.0  |  5.51<H>    Ca    8.2<L>      27 Jul 2017 08:54    TPro  6.6  /  Alb  3.0<L>  /  TBili  0.5  /  DBili  x   /  AST  164<H>  /  ALT  300<H>  /  AlkPhos  103  07-27            I&O's Summary    27 Jul 2017 07:01  -  27 Jul 2017 16:31  --------------------------------------------------------  IN: 0 mL / OUT: 1600 mL / NET: -1600 mL        MEDICATIONS  (STANDING):  heparin  Injectable 5000 Unit(s) SubCutaneous every 12 hours  tamsulosin 0.4 milliGRAM(s) Oral at bedtime  gabapentin 300 milliGRAM(s) Oral three times a day  dextrose 5%. 1000 milliLiter(s) (50 mL/Hr) IV Continuous <Continuous>  dextrose 50% Injectable 12.5 Gram(s) IV Push once  dextrose 50% Injectable 25 Gram(s) IV Push once  dextrose 50% Injectable 25 Gram(s) IV Push once  polyethylene glycol 3350 17 Gram(s) Oral at bedtime  midodrine 5 milliGRAM(s) Oral daily  artificial  tears Solution 1 Drop(s) Right EYE two times a day  erythromycin   Ointment 1 Application(s) Right EYE at bedtime  epoetin una Injectable 69627 Unit(s) IV Push <User Schedule>  folic acid 1 milliGRAM(s) Oral daily  insulin lispro (HumaLOG) corrective regimen sliding scale   SubCutaneous Before meals and at bedtime  Nephro-viviana 1 Tablet(s) Oral daily  ferrous    sulfate 325 milliGRAM(s) Oral daily  ofloxacin 0.3% Solution 1 Drop(s) Right EYE <User Schedule>  vancomycin  IVPB 1000 milliGRAM(s) IV Intermittent <User Schedule>    MEDICATIONS  (PRN):  dextrose Gel 1 Dose(s) Oral once PRN Blood Glucose LESS THAN 70 milliGRAM(s)/deciliter  glucagon  Injectable 1 milliGRAM(s) IntraMuscular once PRN Glucose LESS THAN 70 milligrams/deciliter  guaiFENesin    Syrup 100 milliGRAM(s) Oral every 6 hours PRN Cough EMILY LITTLEJOHN    [id number]    87y      Male    Patient is a 87y old  Male who presents with a chief complaint of Weakness (23 Jul 2017 12:44)      INTERVAL HPI/OVERNIGHT EVENTS:    Patient is having some RUQ pain, denies having any fever, chills, chest pain, nausea, vomiting, dizziness.     REVIEW OF SYSTEMS:    CONSTITUTIONAL: No fever, weight loss, or fatigue  RESPIRATORY: No cough; No shortness of breath  CARDIOVASCULAR: No chest pain, palpitations  GASTROINTESTINAL: No abdominal or epigastric pain. No nausea, vomiting  NEUROLOGICAL: No headaches,  loss of strength.  MISCELLANEOUS: No joint swelling or pain       Vital Signs Last 24 Hrs  T(C): 36.7 (27 Jul 2017 10:55), Max: 37.3 (26 Jul 2017 17:09)  T(F): 98 (27 Jul 2017 10:55), Max: 99.1 (26 Jul 2017 17:09)  HR: 90 (27 Jul 2017 10:55) (82 - 94)  BP: 108/53 (27 Jul 2017 10:55) (96/47 - 108/53)  BP(mean): --  RR: 18 (27 Jul 2017 10:55) (16 - 18)  SpO2: 98% (27 Jul 2017 10:55) (95% - 100%)    Physical exam.   GENERAL: NAD, well-groomed, well-developed  HEAD:  Atraumatic, Normocephalic  EYES: right eye conjunctival injection with haziness of the cornea.   NECK: Supple, No JVD, Normal thyroid  NERVOUS SYSTEM: AOX2  CHEST/LUNG: Clear to auscultation bilaterally; No, rhonchi, right upper chest permacath  HEART: Regular rate and rhythm; No murmurs  ABDOMEN: Soft, Nontender, Nondistended; Bowel sounds present  EXTREMITIES:  1+ Peripheral Pulses, No edema  SKIN: some chronic skin changes with healing ulcer of the big toe      LABS:                        7.5    4.8   )-----------( 84       ( 27 Jul 2017 08:54 )             25.3     07-27    140  |  97<L>  |  47.0<H>  ----------------------------<  85  4.4   |  25.0  |  5.51<H>    Ca    8.2<L>      27 Jul 2017 08:54    TPro  6.6  /  Alb  3.0<L>  /  TBili  0.5  /  DBili  x   /  AST  164<H>  /  ALT  300<H>  /  AlkPhos  103  07-27            I&O's Summary    27 Jul 2017 07:01  -  27 Jul 2017 16:31  --------------------------------------------------------  IN: 0 mL / OUT: 1600 mL / NET: -1600 mL        MEDICATIONS  (STANDING):  heparin  Injectable 5000 Unit(s) SubCutaneous every 12 hours  tamsulosin 0.4 milliGRAM(s) Oral at bedtime  gabapentin 300 milliGRAM(s) Oral three times a day  dextrose 5%. 1000 milliLiter(s) (50 mL/Hr) IV Continuous <Continuous>  dextrose 50% Injectable 12.5 Gram(s) IV Push once  dextrose 50% Injectable 25 Gram(s) IV Push once  dextrose 50% Injectable 25 Gram(s) IV Push once  polyethylene glycol 3350 17 Gram(s) Oral at bedtime  midodrine 5 milliGRAM(s) Oral daily  artificial  tears Solution 1 Drop(s) Right EYE two times a day  erythromycin   Ointment 1 Application(s) Right EYE at bedtime  epoetin una Injectable 60899 Unit(s) IV Push <User Schedule>  folic acid 1 milliGRAM(s) Oral daily  insulin lispro (HumaLOG) corrective regimen sliding scale   SubCutaneous Before meals and at bedtime  Nephro-viviana 1 Tablet(s) Oral daily  ferrous    sulfate 325 milliGRAM(s) Oral daily  ofloxacin 0.3% Solution 1 Drop(s) Right EYE <User Schedule>  vancomycin  IVPB 1000 milliGRAM(s) IV Intermittent <User Schedule>    MEDICATIONS  (PRN):  dextrose Gel 1 Dose(s) Oral once PRN Blood Glucose LESS THAN 70 milliGRAM(s)/deciliter  glucagon  Injectable 1 milliGRAM(s) IntraMuscular once PRN Glucose LESS THAN 70 milligrams/deciliter  guaiFENesin    Syrup 100 milliGRAM(s) Oral every 6 hours PRN Cough

## 2017-07-27 NOTE — PROGRESS NOTE ADULT - ATTENDING COMMENTS
Palliative care consult appreciated.   Patient is seen and evaluated, case discussed with NP, agree with assessment and plan. low ef on echo  with aortic stenosis, will talk with pcp and will get more info, will get further work up if this new.   Palliative care consult appreciated.

## 2017-07-28 DIAGNOSIS — I25.5 ISCHEMIC CARDIOMYOPATHY: ICD-10-CM

## 2017-07-28 DIAGNOSIS — I50.22 CHRONIC SYSTOLIC (CONGESTIVE) HEART FAILURE: ICD-10-CM

## 2017-07-28 LAB
ALBUMIN SERPL ELPH-MCNC: 3.3 G/DL — SIGNIFICANT CHANGE UP (ref 3.3–5.2)
ALP SERPL-CCNC: 101 U/L — SIGNIFICANT CHANGE UP (ref 40–120)
ALT FLD-CCNC: 232 U/L — HIGH
ANION GAP SERPL CALC-SCNC: 16 MMOL/L — SIGNIFICANT CHANGE UP (ref 5–17)
AST SERPL-CCNC: 98 U/L — HIGH
BILIRUB SERPL-MCNC: 0.4 MG/DL — SIGNIFICANT CHANGE UP (ref 0.4–2)
BUN SERPL-MCNC: 28 MG/DL — HIGH (ref 8–20)
CALCIUM SERPL-MCNC: 8.5 MG/DL — LOW (ref 8.6–10.2)
CHLORIDE SERPL-SCNC: 96 MMOL/L — LOW (ref 98–107)
CO2 SERPL-SCNC: 25 MMOL/L — SIGNIFICANT CHANGE UP (ref 22–29)
CREAT SERPL-MCNC: 3.93 MG/DL — HIGH (ref 0.5–1.3)
CULTURE RESULTS: SIGNIFICANT CHANGE UP
GLUCOSE SERPL-MCNC: 106 MG/DL — SIGNIFICANT CHANGE UP (ref 70–115)
HCT VFR BLD CALC: 28 % — LOW (ref 42–52)
HGB BLD-MCNC: 8.4 G/DL — LOW (ref 14–18)
MCHC RBC-ENTMCNC: 26 PG — LOW (ref 27–31)
MCHC RBC-ENTMCNC: 30 G/DL — LOW (ref 32–36)
MCV RBC AUTO: 86.7 FL — SIGNIFICANT CHANGE UP (ref 80–94)
ORGANISM # SPEC MICROSCOPIC CNT: SIGNIFICANT CHANGE UP
ORGANISM # SPEC MICROSCOPIC CNT: SIGNIFICANT CHANGE UP
PLATELET # BLD AUTO: 79 K/UL — LOW (ref 150–400)
POTASSIUM SERPL-MCNC: 4.2 MMOL/L — SIGNIFICANT CHANGE UP (ref 3.5–5.3)
POTASSIUM SERPL-SCNC: 4.2 MMOL/L — SIGNIFICANT CHANGE UP (ref 3.5–5.3)
PROT SERPL-MCNC: 6.8 G/DL — SIGNIFICANT CHANGE UP (ref 6.6–8.7)
RBC # BLD: 3.23 M/UL — LOW (ref 4.6–6.2)
RBC # FLD: 17.4 % — HIGH (ref 11–15.6)
SODIUM SERPL-SCNC: 137 MMOL/L — SIGNIFICANT CHANGE UP (ref 135–145)
SPECIMEN SOURCE: SIGNIFICANT CHANGE UP
WBC # BLD: 5.2 K/UL — SIGNIFICANT CHANGE UP (ref 4.8–10.8)
WBC # FLD AUTO: 5.2 K/UL — SIGNIFICANT CHANGE UP (ref 4.8–10.8)

## 2017-07-28 PROCEDURE — 99232 SBSQ HOSP IP/OBS MODERATE 35: CPT

## 2017-07-28 PROCEDURE — 99233 SBSQ HOSP IP/OBS HIGH 50: CPT

## 2017-07-28 RX ORDER — LIDOCAINE 4 G/100G
1 CREAM TOPICAL DAILY
Qty: 0 | Refills: 0 | Status: DISCONTINUED | OUTPATIENT
Start: 2017-07-28 | End: 2017-07-23

## 2017-07-28 RX ADMIN — Medication 3: at 17:36

## 2017-07-28 RX ADMIN — Medication 1 APPLICATION(S): at 22:07

## 2017-07-28 RX ADMIN — Medication 1 DROP(S): at 17:37

## 2017-07-28 RX ADMIN — HEPARIN SODIUM 5000 UNIT(S): 5000 INJECTION INTRAVENOUS; SUBCUTANEOUS at 17:38

## 2017-07-28 RX ADMIN — Medication 650 MILLIGRAM(S): at 22:41

## 2017-07-28 RX ADMIN — MIDODRINE HYDROCHLORIDE 5 MILLIGRAM(S): 2.5 TABLET ORAL at 11:09

## 2017-07-28 RX ADMIN — Medication 100 MILLIGRAM(S): at 22:05

## 2017-07-28 RX ADMIN — Medication 650 MILLIGRAM(S): at 05:02

## 2017-07-28 RX ADMIN — Medication 1 DROP(S): at 05:02

## 2017-07-28 RX ADMIN — GABAPENTIN 300 MILLIGRAM(S): 400 CAPSULE ORAL at 22:06

## 2017-07-28 RX ADMIN — Medication 650 MILLIGRAM(S): at 22:07

## 2017-07-28 RX ADMIN — Medication 1 DROP(S): at 22:40

## 2017-07-28 RX ADMIN — HEPARIN SODIUM 5000 UNIT(S): 5000 INJECTION INTRAVENOUS; SUBCUTANEOUS at 05:02

## 2017-07-28 RX ADMIN — GABAPENTIN 300 MILLIGRAM(S): 400 CAPSULE ORAL at 13:19

## 2017-07-28 RX ADMIN — Medication 1 TABLET(S): at 11:09

## 2017-07-28 RX ADMIN — Medication 1: at 13:15

## 2017-07-28 RX ADMIN — Medication 1 MILLIGRAM(S): at 11:09

## 2017-07-28 RX ADMIN — Medication 1 DROP(S): at 11:09

## 2017-07-28 RX ADMIN — GABAPENTIN 300 MILLIGRAM(S): 400 CAPSULE ORAL at 05:03

## 2017-07-28 RX ADMIN — Medication 325 MILLIGRAM(S): at 11:09

## 2017-07-28 RX ADMIN — Medication 650 MILLIGRAM(S): at 00:00

## 2017-07-28 RX ADMIN — Medication 1: at 22:07

## 2017-07-28 RX ADMIN — TAMSULOSIN HYDROCHLORIDE 0.4 MILLIGRAM(S): 0.4 CAPSULE ORAL at 22:06

## 2017-07-28 RX ADMIN — LIDOCAINE 1 PATCH: 4 CREAM TOPICAL at 22:07

## 2017-07-28 NOTE — PROGRESS NOTE ADULT - PROBLEM SELECTOR PLAN 7
Monitor fluids.
patient Echo done showed low ef on echo  with aortic stenosis, patient has two MR# [id number] and [id number], he had and Echo done in april 2017 showed:     1. The left atrium is normal in size.   2. Global diffuse hypokinesis with akinesis in the anterolateral wall   with prominent trebeculations. Normal perfusion on definity contrast   suggest nonischemic cardiomyopathy or resting ishemia.   3. Left ventricular ejection fraction, by visual estimation, is 30 to   35%.   4. Elevated left atrial and left ventricular end-diastolic pressures.   (LAP = 27 mm Hg)   5. The right atrium is normal in size.   6. Normal right ventricular size and function.   7. The Dimesionless Index value is 0.36. Moderate aortic valve stenosis.   Cannot rule out pseudoaortic stenosis.   8. There is no evidence of pericardial effusion.  new echo looks like pretty much the same, he will be following with cardiologist as out patient.
Speech and swallow eval.

## 2017-07-28 NOTE — PROGRESS NOTE ADULT - ASSESSMENT
87M presented from home after being discharged from [facility] the day prior with weakness and poor oral intake.  Patient found to have Coag negative Staph bacteremia.  Patient started on antibiotics per ID.

## 2017-07-28 NOTE — PROGRESS NOTE ADULT - ATTENDING COMMENTS
COUNSELING:    Face to face meeting to discuss Advanced Care Planning - Time Spent ______ Minutes.  See goals of care note.    More than 50% time spent in counseling and coordinating care. ______ Minutes.     Thank you for the opportunity to assist with the care of this patient.   [facility] [phone].

## 2017-07-28 NOTE — PROGRESS NOTE ADULT - ASSESSMENT
88 yo males with ESRD on HD via right IJ permacath, Dementia, sCHF, DM, HTN recently discharged from Rehab facility where he was discharged after being treated for CHF and pneumonia. Patient admitted with SIRS and now found to have CoNS bacteremia

## 2017-07-28 NOTE — PROGRESS NOTE ADULT - PROBLEM SELECTOR PROBLEM 5
Conjunctivitis, acute
Diabetes mellitus
Dementia due to Parkinson's disease without behavioral disturbance
Other chronic pain

## 2017-07-28 NOTE — SWALLOW BEDSIDE ASSESSMENT ADULT - ASR SWALLOW ASPIRATION MONITOR
oral hygiene/fever/cough/upper respiratory infection/pneumonia/gurgly voice/change of breathing pattern/position upright (90Y)/throat clearing

## 2017-07-28 NOTE — SWALLOW BEDSIDE ASSESSMENT ADULT - ADDITIONAL RECOMMENDATIONS
Will follow x1, to check PO tolerance  Consider GI consult if coughing after meal completion & expectoration continues to be observed

## 2017-07-28 NOTE — SWALLOW BEDSIDE ASSESSMENT ADULT - SPECIFY REASON(S)
As per RN report, pt with coughing at breakfast this date: on pudding & chopped pancakes: coughing was noted ~5 minutes after eating with some expectoration of food

## 2017-07-28 NOTE — PROGRESS NOTE ADULT - ASSESSMENT
Fever Sepsis Origin unknown  ESRD on HD  Debilitated-confined to bed  sacral wound from prolonged bedrest and declining nutritional status

## 2017-07-28 NOTE — PROGRESS NOTE ADULT - ASSESSMENT
ESRD -  HD on TTS schedule HD candie  Anemia -   - epogen with HD   - avoid Fe due high ferritin  - may need PRBCs    RO - Check Phos   ID: Coag neg staph in blood culture afebrile no WBC elevation clinically looks better   - continue antibiotics for now ID consult noted  - check repeat cultures    Chronic hypotension:  - Midodrine to 5 tid

## 2017-07-28 NOTE — PROGRESS NOTE ADULT - PROBLEM SELECTOR PLAN 6
Will monitor LFTs, will get US RUQ, will get hepatitis panel.
Will monitor LFTs, will get US RUQ, will get hepatitis panel.
Continue Home Regimen
Will monitor LFTs, will get US RUQ, will get hepatitis panel.

## 2017-07-28 NOTE — PROGRESS NOTE ADULT - PROBLEM SELECTOR PLAN 1
patient has been on Vancomycin after each HD, TTE done showed no vegetation, EF is low, patient 2X2 bottles are positive for coag negative staph, no sure the source, likely source is IJ, ID consult appreciated, repeat culture are negative.

## 2017-07-28 NOTE — PROGRESS NOTE ADULT - ATTENDING COMMENTS
low ef on echo  with aortic stenosis, will talk with pcp and will get more info, will get further work up if this new.   Palliative care consult appreciated. Palliative care consult appreciated.

## 2017-07-28 NOTE — PROGRESS NOTE ADULT - PROBLEM SELECTOR PLAN 1
Blood culture with CoNS  Repeat cultures no growth 7/25  Continue Vancomycin post HD  No fever or chills  No leukocytosis  Possible source is IJ   D/W Nephrology, will see how long IJ has been present and if we need to replace it

## 2017-07-28 NOTE — PROGRESS NOTE ADULT - SUBJECTIVE AND OBJECTIVE BOX
[facility]  INFECTIOUS DISEASES AND INTERNAL MEDICINE OF [place]  =======================================================  Scotty Pennington MD  Diplomates American Board of Internal Medicine and Infectious Diseases  =======================================================    EMILY LITTLEJOHN [id number]    Follow up: Bacteremia    No complaints    Allergies:  No Known Allergies      Antibiotics:  vancomycin  IVPB 1000 milliGRAM(s) IV Intermittent <User Schedule>      REVIEW OF SYSTEMS:  CONSTITUTIONAL:  No Fever or chills  HEENT: Poor vision at baseline.  No earache, sore throat or runny nose.  CARDIOVASCULAR:  No pressure, squeezing, strangling, tightness, heaviness or aching about the chest, neck, axilla or epigastrium.  RESPIRATORY: + cough, No shortness of breath  GASTROINTESTINAL:  No nausea, vomiting or diarrhea.  GENITOURINARY:  No dysuria, frequency or urgency. No Blood in urine  MUSCULOSKELETAL:  no joint aches, no muscle pain  SKIN:  No change in skin, hair or nails.  NEUROLOGIC:  No paresthesias, fasciculations  PSYCHIATRIC:  No disorder of thought or mood.  ENDOCRINE:  No heat or cold intolerance  HEMATOLOGICAL:  No easy bruising or bleeding.       Physical Exam:  Vital Signs Last 24 Hrs  T(C): 36 (28 Jul 2017 07:34), Max: 37.5 (28 Jul 2017 00:13)  T(F): 96.8 (28 Jul 2017 07:34), Max: 99.5 (28 Jul 2017 00:13)  HR: 84 (28 Jul 2017 07:34) (84 - 91)  BP: 90/42 (28 Jul 2017 07:34) (90/42 - 108/56)  RR: 18 (28 Jul 2017 00:13) (18 - 18)  SpO2: 98% (28 Jul 2017 07:34) (98% - 100%)      GEN: Elderly, male, NAD, pleasant  HEENT: normocephalic and atraumatic. EOMI. Ptosis of right eye    NECK: Supple. No carotid bruits.  LUNGS: Good respiratory effort, + diffuse mild expiratory wheezing  HEART: Regular rate and rhythm without murmur. +Right IJ permacath  ABDOMEN: Soft, nontender, and nondistended.  Positive bowel sounds.    : No CVA tenderness  EXTREMITIES: Without any cyanosis, clubbing  MSK: No joint swelling  NEUROLOGIC: Alert and oriented x 3  PSYCHIATRIC: Appropriate affect .  SKIN: + ulceration  left hallux, No induration present.      Labs:  07-28    137  |  96<L>  |  28.0<H>  ----------------------------<  106  4.2   |  25.0  |  3.93<H>    Ca    8.5<L>      28 Jul 2017 08:03    TPro  6.8  /  Alb  3.3  /  TBili  0.4  /  DBili  x   /  AST  98<H>  /  ALT  232<H>  /  AlkPhos  101  07-28                          8.4    5.2   )-----------( 79       ( 28 Jul 2017 08:03 )             28.0       LIVER FUNCTIONS - ( 28 Jul 2017 08:03 )  Alb: 3.3 g/dL / Pro: 6.8 g/dL / ALK PHOS: 101 U/L / ALT: 232 U/L / AST: 98 U/L / GGT: x             RECENT CULTURES:  Culture - Blood in AM (07.25.17 @ 07:53)    Specimen Source: .Blood Blood-Catheter    Culture Results:   No growth at 48 hours      Culture - Blood in AM (07.25.17 @ 07:52)    Specimen Source: .Blood Blood-Catheter    Culture Results:   No growth at 48 hours      Culture - Blood (07.23.17 @ 10:39)    -  Ciprofloxacin: R >2    -  Clindamycin: S <=0.5    -  Linezolid: S <=1    -  RIF- Rifampin: S <=1    -  Vancomycin: S 2    -  Ampicillin: R >8    -  Ampicillin/Sulbactam: R <=8/4    -  Cefazolin: R <=4    -  Erythromycin: R >4    -  Gentamicin: S <=4    -  Levofloxacin: I 4    -  Moxifloxacin(Aerobic): I 1    -  Penicillin: R >8    -  Trimethoprim/Sulfamethoxazole: S <=0.5/9.5    -  Coagulase negative Staphylococcus: Detec    -  Oxacillin: R >2    -  Tetra/Doxy: S <=4    Specimen Source: .Blood Blood-Peripheral    Organism: Blood Culture PCR    Organism: Coag Negative Staphylococcus    Culture Results:   Growth in anaerobic bottle: Coag Negative Staphylococcus  Anaerobic Bottle: 19:04 Hours to positivity  Aerobic Bottle: No growth to date      Culture - Blood (07.23.17 @ 10:38)    -  Cefazolin: R >16    -  Erythromycin: R >4    -  Oxacillin: R >2    -  Penicillin: R 8    -  Tetra/Doxy: S <=4    -  Trimethoprim/Sulfamethoxazole: R >2/38    -  Levofloxacin: R >4    -  Moxifloxacin(Aerobic): R 2    -  Ciprofloxacin: R >2    -  RIF- Rifampin: S <=1    -  Ampicillin/Sulbactam: R <=8/4    -  Clindamycin: R >4    -  Gentamicin: S <=4    -  Vancomycin: S 1    Specimen Source: .Blood Blood-Peripheral    Organism: Coag Negative Staphylococcus    Culture Results:   Growth in aerobic and anaerobic bottles: Coag Negative Staphylococcus  Anaerobic Bottle: 2 days; 05:01 Hours to positivity  Aerobic Bottle: 3 days 21.07 Hours to positivity

## 2017-07-28 NOTE — SWALLOW BEDSIDE ASSESSMENT ADULT - COMMENTS
No coughing was observed post intake of above trials (waited ~5 minutes to observe for delayed coughing). Pt reports if coughing does occur, it occurs only after a complete meal, followed by expectoration.  Spoke with LILLY Hernandez: will monitor & consult GI if again observed

## 2017-07-28 NOTE — PROGRESS NOTE ADULT - SUBJECTIVE AND OBJECTIVE BOX
NEPHROLOGY INTERVAL HPI/OVERNIGHT EVENTS:  Examined earlier  Looks comfortable    MEDICATIONS  (STANDING):  heparin  Injectable 5000 Unit(s) SubCutaneous every 12 hours  tamsulosin 0.4 milliGRAM(s) Oral at bedtime  gabapentin 300 milliGRAM(s) Oral three times a day  dextrose 5%. 1000 milliLiter(s) (50 mL/Hr) IV Continuous <Continuous>  dextrose 50% Injectable 12.5 Gram(s) IV Push once  dextrose 50% Injectable 25 Gram(s) IV Push once  dextrose 50% Injectable 25 Gram(s) IV Push once  polyethylene glycol 3350 17 Gram(s) Oral at bedtime  midodrine 5 milliGRAM(s) Oral daily  artificial  tears Solution 1 Drop(s) Right EYE two times a day  erythromycin   Ointment 1 Application(s) Right EYE at bedtime  epoetin una Injectable 66121 Unit(s) IV Push <User Schedule>  folic acid 1 milliGRAM(s) Oral daily  insulin lispro (HumaLOG) corrective regimen sliding scale   SubCutaneous Before meals and at bedtime  Nephro-viviana 1 Tablet(s) Oral daily  ferrous    sulfate 325 milliGRAM(s) Oral daily  ofloxacin 0.3% Solution 1 Drop(s) Right EYE <User Schedule>  vancomycin  IVPB 1000 milliGRAM(s) IV Intermittent <User Schedule>  acetaminophen   Tablet. 650 milliGRAM(s) Oral every 8 hours    MEDICATIONS  (PRN):  dextrose Gel 1 Dose(s) Oral once PRN Blood Glucose LESS THAN 70 milliGRAM(s)/deciliter  glucagon  Injectable 1 milliGRAM(s) IntraMuscular once PRN Glucose LESS THAN 70 milligrams/deciliter  guaiFENesin    Syrup 100 milliGRAM(s) Oral every 6 hours PRN Cough  traMADol 25 milliGRAM(s) Oral every 6 hours PRN moderate to severe pain      Allergies    No Known Allergies    Intolerances        Vital Signs Last 24 Hrs  T(C): 36 (28 Jul 2017 07:34), Max: 37.5 (28 Jul 2017 00:13)  T(F): 96.8 (28 Jul 2017 07:34), Max: 99.5 (28 Jul 2017 00:13)  HR: 84 (28 Jul 2017 07:34) (84 - 91)  BP: 100/60 (28 Jul 2017 14:45) (90/42 - 108/56)  BP(mean): --  RR: 18 (28 Jul 2017 00:13) (18 - 18)  SpO2: 98% (28 Jul 2017 07:34) (98% - 100%)  Daily     Daily     PHYSICAL EXAM:  GENERAL: NAD,   HEAD:  NO edema   NECK: Supple, No JVD,, + right permacath   CHEST/LUNG:EAE , few basilar crackles   HEART: Regular rate and rhythm; No rub   ABDOMEN: Soft, Nontender,   EXTREMITIES: decreased edema    LABS:                        8.4    5.2   )-----------( 79       ( 28 Jul 2017 08:03 )             28.0     07-28    137  |  96<L>  |  28.0<H>  ----------------------------<  106  4.2   |  25.0  |  3.93<H>    Ca    8.5<L>      28 Jul 2017 08:03    TPro  6.8  /  Alb  3.3  /  TBili  0.4  /  DBili  x   /  AST  98<H>  /  ALT  232<H>  /  AlkPhos  101  07-28                RADIOLOGY & ADDITIONAL TESTS:

## 2017-07-28 NOTE — PROGRESS NOTE ADULT - SUBJECTIVE AND OBJECTIVE BOX
INTERVAL HPI/OVERNIGHT EVENTS: 88yo Male is awake, oriented x2. Able to carry on short conversation about his back pain. Recalled and provided   his wife's phone number. [phone]  Full feed-intake ?No SOB CP or palpitations, denies N/V/D.   CC Lower back sacral soreness-Stage II sacral breakdown.    87y old  Male who presents with a chief complaint of Weakness (23 Jul 2017 12:44)      Present Symptoms:     Dyspnea: 0   Nausea/Vomiting: No  Anxiety:  Yes  Depression: No  Fatigue: Yes   Loss of appetite: Yes    Pain: LBP aching soreness            Character-            Duration-            Effect-            Factors-            Frequency-            Location-            Severity-    Review of Systems: Reviewed                       All others negative    MEDICATIONS  (STANDING):  heparin  Injectable 5000 Unit(s) SubCutaneous every 12 hours  tamsulosin 0.4 milliGRAM(s) Oral at bedtime  gabapentin 300 milliGRAM(s) Oral three times a day  dextrose 5%. 1000 milliLiter(s) (50 mL/Hr) IV Continuous <Continuous>  dextrose 50% Injectable 12.5 Gram(s) IV Push once  dextrose 50% Injectable 25 Gram(s) IV Push once  dextrose 50% Injectable 25 Gram(s) IV Push once  polyethylene glycol 3350 17 Gram(s) Oral at bedtime  midodrine 5 milliGRAM(s) Oral daily  artificial  tears Solution 1 Drop(s) Right EYE two times a day  erythromycin   Ointment 1 Application(s) Right EYE at bedtime  epoetin una Injectable 77361 Unit(s) IV Push <User Schedule>  folic acid 1 milliGRAM(s) Oral daily  insulin lispro (HumaLOG) corrective regimen sliding scale   SubCutaneous Before meals and at bedtime  Nephro-viviana 1 Tablet(s) Oral daily  ferrous    sulfate 325 milliGRAM(s) Oral daily  ofloxacin 0.3% Solution 1 Drop(s) Right EYE <User Schedule>  vancomycin  IVPB 1000 milliGRAM(s) IV Intermittent <User Schedule>  acetaminophen   Tablet. 650 milliGRAM(s) Oral every 8 hours  lidocaine   Patch 1 Patch Transdermal daily    MEDICATIONS  (PRN):  dextrose Gel 1 Dose(s) Oral once PRN Blood Glucose LESS THAN 70 milliGRAM(s)/deciliter  glucagon  Injectable 1 milliGRAM(s) IntraMuscular once PRN Glucose LESS THAN 70 milligrams/deciliter  guaiFENesin    Syrup 100 milliGRAM(s) Oral every 6 hours PRN Cough  traMADol 25 milliGRAM(s) Oral every 6 hours PRN moderate to severe pain      PHYSICAL EXAM:    Vital Signs Last 24 Hrs  T(C): 36 (28 Jul 2017 07:34), Max: 37.5 (28 Jul 2017 00:13)  T(F): 96.8 (28 Jul 2017 07:34), Max: 99.5 (28 Jul 2017 00:13)  HR: 84 (28 Jul 2017 07:34) (84 - 91)  BP: 100/60 (28 Jul 2017 14:45) (90/42 - 108/56)  BP(mean): --  RR: 18 (28 Jul 2017 00:13) (18 - 18)  SpO2: 98% (28 Jul 2017 07:34) (98% - 100%)    General: alert  oriented x 2____                  cachexia  verbally responsive sleeping more    HEENT:   dry mouth      Lungs: comfortable    CV: normal   GI: normal               last BM:     : normal  HD    MSK: weakness            bedbound/wheelchair bound    Skin:  pressure ulcers- Stage_____  no rash    LABS:                        8.4    5.2   )-----------( 79       ( 28 Jul 2017 08:03 )             28.0     07-28    137  |  96<L>  |  28.0<H>  ----------------------------<  106  4.2   |  25.0  |  3.93<H>    Ca    8.5<L>      28 Jul 2017 08:03    TPro  6.8  /  Alb  3.3  /  TBili  0.4  /  DBili  x   /  AST  98<H>  /  ALT  232<H>  /  AlkPhos  101  07-28        I&O's Summary    27 Jul 2017 07:01  -  28 Jul 2017 07:00  --------------------------------------------------------  IN: 0 mL / OUT: 1600 mL / NET: -1600 mL        RADIOLOGY & ADDITIONAL STUDIES:    ADVANCE DIRECTIVES:   DNR  NO  Completed on:                     MOLST   NO   Completed on:  Living Will   NO   Completed on:

## 2017-07-29 LAB
CULTURE RESULTS: SIGNIFICANT CHANGE UP
ORGANISM # SPEC MICROSCOPIC CNT: SIGNIFICANT CHANGE UP
SPECIMEN SOURCE: SIGNIFICANT CHANGE UP
VANCOMYCIN TROUGH SERPL-MCNC: 18.3 UG/ML — SIGNIFICANT CHANGE UP (ref 10–20)

## 2017-07-29 PROCEDURE — 99233 SBSQ HOSP IP/OBS HIGH 50: CPT

## 2017-07-29 RX ORDER — MIDODRINE HYDROCHLORIDE 2.5 MG/1
5 TABLET ORAL THREE TIMES A DAY
Qty: 0 | Refills: 0 | Status: DISCONTINUED | OUTPATIENT
Start: 2017-07-29 | End: 2017-07-23

## 2017-07-29 RX ADMIN — Medication 1 DROP(S): at 17:21

## 2017-07-29 RX ADMIN — Medication 1 DROP(S): at 05:03

## 2017-07-29 RX ADMIN — Medication 100 MILLIGRAM(S): at 14:35

## 2017-07-29 RX ADMIN — Medication 1 APPLICATION(S): at 22:24

## 2017-07-29 RX ADMIN — MIDODRINE HYDROCHLORIDE 5 MILLIGRAM(S): 2.5 TABLET ORAL at 22:24

## 2017-07-29 RX ADMIN — ERYTHROPOIETIN 10000 UNIT(S): 10000 INJECTION, SOLUTION INTRAVENOUS; SUBCUTANEOUS at 11:29

## 2017-07-29 RX ADMIN — Medication 1: at 14:34

## 2017-07-29 RX ADMIN — Medication 1 TABLET(S): at 14:33

## 2017-07-29 RX ADMIN — LIDOCAINE 1 PATCH: 4 CREAM TOPICAL at 14:28

## 2017-07-29 RX ADMIN — Medication 650 MILLIGRAM(S): at 05:03

## 2017-07-29 RX ADMIN — TAMSULOSIN HYDROCHLORIDE 0.4 MILLIGRAM(S): 0.4 CAPSULE ORAL at 22:24

## 2017-07-29 RX ADMIN — GABAPENTIN 300 MILLIGRAM(S): 400 CAPSULE ORAL at 05:03

## 2017-07-29 RX ADMIN — LIDOCAINE 1 PATCH: 4 CREAM TOPICAL at 17:20

## 2017-07-29 RX ADMIN — Medication 325 MILLIGRAM(S): at 14:33

## 2017-07-29 RX ADMIN — Medication 650 MILLIGRAM(S): at 14:33

## 2017-07-29 RX ADMIN — Medication 2: at 22:27

## 2017-07-29 RX ADMIN — Medication 650 MILLIGRAM(S): at 14:30

## 2017-07-29 RX ADMIN — MIDODRINE HYDROCHLORIDE 5 MILLIGRAM(S): 2.5 TABLET ORAL at 14:35

## 2017-07-29 RX ADMIN — GABAPENTIN 300 MILLIGRAM(S): 400 CAPSULE ORAL at 14:33

## 2017-07-29 RX ADMIN — Medication 1 MILLIGRAM(S): at 14:33

## 2017-07-29 RX ADMIN — Medication 250 MILLIGRAM(S): at 18:10

## 2017-07-29 RX ADMIN — Medication 1 DROP(S): at 22:24

## 2017-07-29 RX ADMIN — Medication 650 MILLIGRAM(S): at 05:45

## 2017-07-29 RX ADMIN — Medication 1 DROP(S): at 14:33

## 2017-07-29 RX ADMIN — Medication 650 MILLIGRAM(S): at 23:00

## 2017-07-29 RX ADMIN — HEPARIN SODIUM 5000 UNIT(S): 5000 INJECTION INTRAVENOUS; SUBCUTANEOUS at 17:21

## 2017-07-29 RX ADMIN — GABAPENTIN 300 MILLIGRAM(S): 400 CAPSULE ORAL at 22:24

## 2017-07-29 RX ADMIN — Medication 650 MILLIGRAM(S): at 22:24

## 2017-07-29 RX ADMIN — Medication 1 DROP(S): at 05:02

## 2017-07-29 RX ADMIN — HEPARIN SODIUM 5000 UNIT(S): 5000 INJECTION INTRAVENOUS; SUBCUTANEOUS at 05:03

## 2017-07-29 NOTE — DIETITIAN INITIAL EVALUATION ADULT. - DIET TYPE
dysphagia 2, mechanical soft, nectar consistency fld/renal replacement pts:no protein restr,no conc K & phos, low sodium

## 2017-07-29 NOTE — PROGRESS NOTE ADULT - SUBJECTIVE AND OBJECTIVE BOX
CC: Fever , sepsis resolving. Anemia Hb 8.4  ESRD on HD via [facility] [facility].    HPI:  87M presented from home after being discharged from [facility] the day prior with weakness and poor oral intake. The patient was lethargic on interview and additional information was obtained from the patient's wife at the bedside. The patient was noted to have a nonproductive cough but no fevers or chills at home. The patient was able to attend his hemodialysis session yesterday but had remained weak. There was no complaints of chest pain of palpitations. The patient is noted to have had prior admissions to the hospital with the most recent admission for congestive heart failure and pneumonia with similar symptoms. There are no known exacerbating or relieving factors. Further information is limited due to the patient's medical condition. The patient was noted to be febrile in the emergency department and received intravenous antibiotics and fluids. (23 Jul 2017 12:44)    REVIEW OF SYSTEMS:    Patient denied fever, chills, abdominal pain, nausea, vomiting, cough, shortness of breath, chest pain or palpitations    Vital Signs Last 24 Hrs  T(C): 36.6 (29 Jul 2017 13:05), Max: 37.2 (28 Jul 2017 17:16)  T(F): 97.9 (29 Jul 2017 13:05), Max: 98.9 (28 Jul 2017 17:16)  HR: 99 (29 Jul 2017 13:05) (84 - 99)  BP: 108/52 (29 Jul 2017 13:05) (85/42 - 113/70)  BP(mean): --  RR: 18 (29 Jul 2017 13:05) (18 - 20)  SpO2: 100% (29 Jul 2017 13:05) (99% - 100%)I&O's Summary    29 Jul 2017 07:01  -  29 Jul 2017 14:54  --------------------------------------------------------  IN: 0 mL / OUT: 900 mL / NET: -900 mL      PHYSICAL EXAM:  GENERAL: Lethargy   HEENT: PERRL, +EOMI, anicteric, no [place]  NECK: Supple, No JVD   CHEST/LUNG: CTA bilaterally;  HEART: S1S2 Normal intensity, no murmurs, gallops or rubs noted  ABDOMEN: Soft, BS Normoactive, NT, ND, no HSM noted  EXTREMITIES:  No cyanosis, Limitation of movement.    SKIN: No rashes or lesions noted  NEURO: Lethargy , no focal deficits noted, CN II-XII intact  PSYCH: depressed mood and affect; insight/judgement appropriate  LABS:                        8.4    5.2   )-----------( 79       ( 28 Jul 2017 08:03 )             28.0     07-28    137  |  96<L>  |  28.0<H>  ----------------------------<  106  4.2   |  25.0  |  3.93<H>    Ca    8.5<L>      28 Jul 2017 08:03    TPro  6.8  /  Alb  3.3  /  TBili  0.4  /  DBili  x   /  AST  98<H>  /  ALT  232<H>  /  AlkPhos  101  07-28        RADIOLOGY & ADDITIONAL TESTS:    MEDICATIONS:  MEDICATIONS  (STANDING):  heparin  Injectable 5000 Unit(s) SubCutaneous every 12 hours  tamsulosin 0.4 milliGRAM(s) Oral at bedtime  gabapentin 300 milliGRAM(s) Oral three times a day  dextrose 5%. 1000 milliLiter(s) (50 mL/Hr) IV Continuous <Continuous>  dextrose 50% Injectable 12.5 Gram(s) IV Push once  dextrose 50% Injectable 25 Gram(s) IV Push once  dextrose 50% Injectable 25 Gram(s) IV Push once  polyethylene glycol 3350 17 Gram(s) Oral at bedtime  artificial  tears Solution 1 Drop(s) Right EYE two times a day  erythromycin   Ointment 1 Application(s) Right EYE at bedtime  epoetin una Injectable 62557 Unit(s) IV Push <User Schedule>  folic acid 1 milliGRAM(s) Oral daily  insulin lispro (HumaLOG) corrective regimen sliding scale   SubCutaneous Before meals and at bedtime  Nephro-viviana 1 Tablet(s) Oral daily  ferrous    sulfate 325 milliGRAM(s) Oral daily  ofloxacin 0.3% Solution 1 Drop(s) Right EYE <User Schedule>  vancomycin  IVPB 1000 milliGRAM(s) IV Intermittent <User Schedule>  acetaminophen   Tablet. 650 milliGRAM(s) Oral every 8 hours  lidocaine   Patch 1 Patch Transdermal daily  midodrine 5 milliGRAM(s) Oral three times a day    MEDICATIONS  (PRN):  dextrose Gel 1 Dose(s) Oral once PRN Blood Glucose LESS THAN 70 milliGRAM(s)/deciliter  glucagon  Injectable 1 milliGRAM(s) IntraMuscular once PRN Glucose LESS THAN 70 milligrams/deciliter  guaiFENesin    Syrup 100 milliGRAM(s) Oral every 6 hours PRN Cough  traMADol 25 milliGRAM(s) Oral every 6 hours PRN moderate to severe pain

## 2017-07-29 NOTE — PROGRESS NOTE ADULT - SUBJECTIVE AND OBJECTIVE BOX
NEPHROLOGY INTERVAL HPI/OVERNIGHT EVENTS:    Seen on HD   Comfortable   ID follow up noted   Vanco TIW   Level today 18    MEDICATIONS  (STANDING):  heparin  Injectable 5000 Unit(s) SubCutaneous every 12 hours  tamsulosin 0.4 milliGRAM(s) Oral at bedtime  gabapentin 300 milliGRAM(s) Oral three times a day  dextrose 5%. 1000 milliLiter(s) (50 mL/Hr) IV Continuous <Continuous>  dextrose 50% Injectable 12.5 Gram(s) IV Push once  dextrose 50% Injectable 25 Gram(s) IV Push once  dextrose 50% Injectable 25 Gram(s) IV Push once  polyethylene glycol 3350 17 Gram(s) Oral at bedtime  midodrine 5 milliGRAM(s) Oral daily  artificial  tears Solution 1 Drop(s) Right EYE two times a day  erythromycin   Ointment 1 Application(s) Right EYE at bedtime  epoetin una Injectable 97721 Unit(s) IV Push <User Schedule>  folic acid 1 milliGRAM(s) Oral daily  insulin lispro (HumaLOG) corrective regimen sliding scale   SubCutaneous Before meals and at bedtime  Nephro-viviana 1 Tablet(s) Oral daily  ferrous    sulfate 325 milliGRAM(s) Oral daily  ofloxacin 0.3% Solution 1 Drop(s) Right EYE <User Schedule>  vancomycin  IVPB 1000 milliGRAM(s) IV Intermittent <User Schedule>  acetaminophen   Tablet. 650 milliGRAM(s) Oral every 8 hours  lidocaine   Patch 1 Patch Transdermal daily    MEDICATIONS  (PRN):  dextrose Gel 1 Dose(s) Oral once PRN Blood Glucose LESS THAN 70 milliGRAM(s)/deciliter  glucagon  Injectable 1 milliGRAM(s) IntraMuscular once PRN Glucose LESS THAN 70 milligrams/deciliter  guaiFENesin    Syrup 100 milliGRAM(s) Oral every 6 hours PRN Cough  traMADol 25 milliGRAM(s) Oral every 6 hours PRN moderate to severe pain      Allergies    No Known Allergies    Intolerances      Vital Signs Last 24 Hrs  T(C): 36.7 (2017 08:35), Max: 37.2 (2017 17:16)  T(F): 98.1 (2017 08:35), Max: 98.9 (2017 17:16)  HR: 84 (2017 08:35) (84 - 86)  BP: 86/43 (2017 08:35) (85/42 - 113/70)  BP(mean): --  RR: 18 (2017 08:35) (18 - 20)  SpO2: 100% (2017 08:35) (99% - 100%)  Daily     Daily Weight in k.8 (2017 08:35)  I&O's Detail    I&O's Summary      PHYSICAL EXAM:  GENERAL: NAD,   HEAD:  NO edema   NECK: Supple, No JVD,, + right permacath   CHEST/LUNG:EAE , few basilar crackles   HEART: Regular rate and rhythm; No rub   ABDOMEN: Soft, Nontender,   EXTREMITIES: decreased edema  LABS:                        8.4    5.2   )-----------( 79       ( 2017 08:03 )             28.0         137  |  96<L>  |  28.0<H>  ----------------------------<  106  4.2   |  25.0  |  3.93<H>    Ca    8.5<L>      2017 08:03    TPro  6.8  /  Alb  3.3  /  TBili  0.4  /  DBili  x   /  AST  98<H>  /  ALT  232<H>  /  AlkPhos  101                  RADIOLOGY & ADDITIONAL TESTS:

## 2017-07-29 NOTE — DIETITIAN INITIAL EVALUATION ADULT. - FACTORS AFF FOOD INTAKE
Per flowsheets, ~25-75% PO intake. Pt just passed SLP eval-> diet now advancing to regular, thins (from mech soft, nectar)

## 2017-07-29 NOTE — DIETITIAN INITIAL EVALUATION ADULT. - OTHER INFO
Pt admitted for sepsis. ESRD on HD. Pt tolerating diet, just upgraded. Has not yet tried regular consistency meal yet. RD to f/u to monitor diet tolerance/intake. Per EMR, stage II buttock. Pt debilitated.

## 2017-07-29 NOTE — PROGRESS NOTE ADULT - ASSESSMENT
87M presented from home after being discharged from [facility] the day prior with weakness and poor oral intake.  Patient found to have Coag negative Staph bacteremia.  Patient started on antibiotics per ID.    Problem/Plan - 1:  ·  Problem: Bacteremia.  Plan: patient has been on Vancomycin after each HD, TTE done showed no vegetation, EF is low, patient 2X2 bottles are positive for coag negative staph, no sure the source, likely source is IJ, ID consult appreciated, repeat culture are negative.     Problem/Plan - 2:  ·  Problem: ESRD on hemodialysis.  Plan: HD as per nephrology.     Problem/Plan - 3:  ·  Problem: Diabetes mellitus.  Plan: Continue sliding scale coverage.     Problem/Plan - 4:  ·  Problem: Anemia, chronic disease.  Plan: Continue Epogen and Iron, looks like anemia of chronic disease.     Problem/Plan - 5:  ·  Problem: Conjunctivitis, acute.  Plan: with corneal haziness, Continue antibiotics drops, and eye care.     Problem/Plan - 6:  Problem: Transaminitis. Plan: Will monitor LFTs, will get US RUQ, will get hepatitis panel.    Problem/Plan - 7:  ·  Problem: Ischemic cardiomyopathy.  Plan: patient Echo done showed low ef on echo  with aortic stenosis, patient has two MR# [id number] and [id number], he had and Echo done in april 2017 showed:     1. The left atrium is normal in size.   2. Global diffuse hypokinesis with akinesis in the anterolateral wall   with prominent trebeculations. Normal perfusion on definity contrast   suggest nonischemic cardiomyopathy or resting ishemia.   3. Left ventricular ejection fraction, by visual estimation, is 30 to   35%.   4. Elevated left atrial and left ventricular end-diastolic pressures.   (LAP = 27 mm Hg)   5. The right atrium is normal in size.   6. Normal right ventricular size and function.   7. The Dimesionless Index value is 0.36. Moderate aortic valve stenosis.   Cannot rule out pseudoaortic stenosis.   8. There is no evidence of pericardial effusion.  new echo looks like pretty much the same, he will be following with cardiologist as out patient..     Problem/Plan - 8:  ·  Problem: Chronic systolic congestive heart failure.  Plan: stable, continue with current meds..

## 2017-07-29 NOTE — PROGRESS NOTE ADULT - ASSESSMENT
ESRD - Svere CM on ECHO 7-27  HD today    Anemia - Epogen with HD     Rseolved coag negative bacteremia  Follow up blood cultures 7/25,26,27 negative  vancomycin as per ID     Hypotension - Change Midodrine to 5 tid   TFT's and Cortisol in am

## 2017-07-30 LAB
ANION GAP SERPL CALC-SCNC: 11 MMOL/L — SIGNIFICANT CHANGE UP (ref 5–17)
BUN SERPL-MCNC: 22 MG/DL — HIGH (ref 8–20)
CALCIUM SERPL-MCNC: 8.6 MG/DL — SIGNIFICANT CHANGE UP (ref 8.6–10.2)
CHLORIDE SERPL-SCNC: 97 MMOL/L — LOW (ref 98–107)
CO2 SERPL-SCNC: 29 MMOL/L — SIGNIFICANT CHANGE UP (ref 22–29)
CORTIS AM PEAK SERPL-MCNC: 4.6 UG/DL — LOW (ref 6–18.4)
CREAT SERPL-MCNC: 3.3 MG/DL — HIGH (ref 0.5–1.3)
CULTURE RESULTS: SIGNIFICANT CHANGE UP
CULTURE RESULTS: SIGNIFICANT CHANGE UP
GLUCOSE SERPL-MCNC: 93 MG/DL — SIGNIFICANT CHANGE UP (ref 70–115)
HCT VFR BLD CALC: 29.1 % — LOW (ref 42–52)
HGB BLD-MCNC: 8.6 G/DL — LOW (ref 14–18)
MCHC RBC-ENTMCNC: 26.2 PG — LOW (ref 27–31)
MCHC RBC-ENTMCNC: 29.6 G/DL — LOW (ref 32–36)
MCV RBC AUTO: 88.7 FL — SIGNIFICANT CHANGE UP (ref 80–94)
PHOSPHATE SERPL-MCNC: 3.4 MG/DL — SIGNIFICANT CHANGE UP (ref 2.4–4.7)
PLATELET # BLD AUTO: 87 K/UL — LOW (ref 150–400)
POTASSIUM SERPL-MCNC: 4.3 MMOL/L — SIGNIFICANT CHANGE UP (ref 3.5–5.3)
POTASSIUM SERPL-SCNC: 4.3 MMOL/L — SIGNIFICANT CHANGE UP (ref 3.5–5.3)
RBC # BLD: 3.28 M/UL — LOW (ref 4.6–6.2)
RBC # FLD: 17.2 % — HIGH (ref 11–15.6)
SODIUM SERPL-SCNC: 137 MMOL/L — SIGNIFICANT CHANGE UP (ref 135–145)
SPECIMEN SOURCE: SIGNIFICANT CHANGE UP
SPECIMEN SOURCE: SIGNIFICANT CHANGE UP
T4 FREE SERPL-MCNC: 1.2 NG/DL — SIGNIFICANT CHANGE UP (ref 0.9–1.8)
TSH SERPL-MCNC: 2.06 UIU/ML — SIGNIFICANT CHANGE UP (ref 0.27–4.2)
WBC # BLD: 4.8 K/UL — SIGNIFICANT CHANGE UP (ref 4.8–10.8)
WBC # FLD AUTO: 4.8 K/UL — SIGNIFICANT CHANGE UP (ref 4.8–10.8)

## 2017-07-30 PROCEDURE — 99233 SBSQ HOSP IP/OBS HIGH 50: CPT

## 2017-07-30 RX ORDER — COSYNTROPIN 0.25 MG/ML
0.25 INJECTION, SOLUTION INTRAVENOUS ONCE
Qty: 0 | Refills: 0 | Status: DISCONTINUED | OUTPATIENT
Start: 2017-07-30 | End: 2017-07-23

## 2017-07-30 RX ADMIN — LIDOCAINE 1 PATCH: 4 CREAM TOPICAL at 05:33

## 2017-07-30 RX ADMIN — GABAPENTIN 300 MILLIGRAM(S): 400 CAPSULE ORAL at 05:35

## 2017-07-30 RX ADMIN — Medication 1 APPLICATION(S): at 21:54

## 2017-07-30 RX ADMIN — Medication 1 DROP(S): at 12:49

## 2017-07-30 RX ADMIN — GABAPENTIN 300 MILLIGRAM(S): 400 CAPSULE ORAL at 21:54

## 2017-07-30 RX ADMIN — Medication 650 MILLIGRAM(S): at 21:54

## 2017-07-30 RX ADMIN — GABAPENTIN 300 MILLIGRAM(S): 400 CAPSULE ORAL at 13:53

## 2017-07-30 RX ADMIN — Medication 1 DROP(S): at 16:41

## 2017-07-30 RX ADMIN — Medication 1 DROP(S): at 05:35

## 2017-07-30 RX ADMIN — POLYETHYLENE GLYCOL 3350 17 GRAM(S): 17 POWDER, FOR SOLUTION ORAL at 21:54

## 2017-07-30 RX ADMIN — LIDOCAINE 1 PATCH: 4 CREAM TOPICAL at 12:53

## 2017-07-30 RX ADMIN — Medication 1 DROP(S): at 22:00

## 2017-07-30 RX ADMIN — MIDODRINE HYDROCHLORIDE 5 MILLIGRAM(S): 2.5 TABLET ORAL at 21:54

## 2017-07-30 RX ADMIN — Medication 1 DROP(S): at 17:30

## 2017-07-30 RX ADMIN — HEPARIN SODIUM 5000 UNIT(S): 5000 INJECTION INTRAVENOUS; SUBCUTANEOUS at 05:35

## 2017-07-30 RX ADMIN — TAMSULOSIN HYDROCHLORIDE 0.4 MILLIGRAM(S): 0.4 CAPSULE ORAL at 21:55

## 2017-07-30 RX ADMIN — Medication 1 TABLET(S): at 12:49

## 2017-07-30 RX ADMIN — HEPARIN SODIUM 5000 UNIT(S): 5000 INJECTION INTRAVENOUS; SUBCUTANEOUS at 17:30

## 2017-07-30 RX ADMIN — MIDODRINE HYDROCHLORIDE 5 MILLIGRAM(S): 2.5 TABLET ORAL at 05:33

## 2017-07-30 RX ADMIN — Medication 325 MILLIGRAM(S): at 12:48

## 2017-07-30 RX ADMIN — Medication 650 MILLIGRAM(S): at 05:33

## 2017-07-30 RX ADMIN — Medication 650 MILLIGRAM(S): at 14:36

## 2017-07-30 RX ADMIN — Medication 650 MILLIGRAM(S): at 00:00

## 2017-07-30 RX ADMIN — Medication 1 MILLIGRAM(S): at 12:52

## 2017-07-30 RX ADMIN — Medication 650 MILLIGRAM(S): at 06:10

## 2017-07-30 RX ADMIN — MIDODRINE HYDROCHLORIDE 5 MILLIGRAM(S): 2.5 TABLET ORAL at 13:53

## 2017-07-30 RX ADMIN — Medication 3: at 16:39

## 2017-07-30 RX ADMIN — Medication 650 MILLIGRAM(S): at 13:53

## 2017-07-30 NOTE — PROGRESS NOTE ADULT - SUBJECTIVE AND OBJECTIVE BOX
NEPHROLOGY INTERVAL HPI/OVERNIGHT EVENTS:    Feels better   No new events   Afebrile   Uneventful HD yesterday    MEDICATIONS  (STANDING):  heparin  Injectable 5000 Unit(s) SubCutaneous every 12 hours  tamsulosin 0.4 milliGRAM(s) Oral at bedtime  gabapentin 300 milliGRAM(s) Oral three times a day  dextrose 5%. 1000 milliLiter(s) (50 mL/Hr) IV Continuous <Continuous>  dextrose 50% Injectable 12.5 Gram(s) IV Push once  dextrose 50% Injectable 25 Gram(s) IV Push once  dextrose 50% Injectable 25 Gram(s) IV Push once  polyethylene glycol 3350 17 Gram(s) Oral at bedtime  artificial  tears Solution 1 Drop(s) Right EYE two times a day  erythromycin   Ointment 1 Application(s) Right EYE at bedtime  epoetin una Injectable 59271 Unit(s) IV Push <User Schedule>  folic acid 1 milliGRAM(s) Oral daily  insulin lispro (HumaLOG) corrective regimen sliding scale   SubCutaneous Before meals and at bedtime  Nephro-viviana 1 Tablet(s) Oral daily  ferrous    sulfate 325 milliGRAM(s) Oral daily  ofloxacin 0.3% Solution 1 Drop(s) Right EYE <User Schedule>  vancomycin  IVPB 1000 milliGRAM(s) IV Intermittent <User Schedule>  acetaminophen   Tablet. 650 milliGRAM(s) Oral every 8 hours  lidocaine   Patch 1 Patch Transdermal daily  midodrine 5 milliGRAM(s) Oral three times a day    MEDICATIONS  (PRN):  dextrose Gel 1 Dose(s) Oral once PRN Blood Glucose LESS THAN 70 milliGRAM(s)/deciliter  glucagon  Injectable 1 milliGRAM(s) IntraMuscular once PRN Glucose LESS THAN 70 milligrams/deciliter  guaiFENesin    Syrup 100 milliGRAM(s) Oral every 6 hours PRN Cough  traMADol 25 milliGRAM(s) Oral every 6 hours PRN moderate to severe pain      Allergies    No Known Allergies    Intolerances  Vital Signs Last 24 Hrs  T(C): 36.5 (30 Jul 2017 07:54), Max: 37.2 (29 Jul 2017 16:40)  T(F): 97.7 (30 Jul 2017 07:54), Max: 99 (29 Jul 2017 16:40)  HR: 82 (30 Jul 2017 07:54) (80 - 94)  BP: 104/49 (30 Jul 2017 07:54) (88/47 - 104/49)  BP(mean): --  RR: 18 (30 Jul 2017 07:54) (16 - 18)  SpO2: 100% (30 Jul 2017 07:54) (97% - 100%)  Daily     Daily   I&O's Detail    29 Jul 2017 07:01  -  30 Jul 2017 07:00  --------------------------------------------------------  IN:  Total IN: 0 mL    OUT:    Other: 900 mL  Total OUT: 900 mL    Total NET: -900 mL        I&O's Summary    29 Jul 2017 07:01  -  30 Jul 2017 07:00  --------------------------------------------------------  IN: 0 mL / OUT: 900 mL / NET: -900 mL        PHYSICAL EXAM:  PHYSICAL EXAM:  GENERAL: NAD,   HEAD:  NO edema   NECK: Supple, No JVD,, + right permacath   CHEST/LUNG:EAE , few basilar crackles   HEART: Regular rate and rhythm; No rub   ABDOMEN: Soft, Nontender,   EXTREMITIES: decreased edema    LABS:                        8.6    4.8   )-----------( 87       ( 30 Jul 2017 07:14 )             29.1     07-30    137  |  97<L>  |  22.0<H>  ----------------------------<  93  4.3   |  29.0  |  3.30<H>    Ca    8.6      30 Jul 2017 07:14  Phos  3.4     07-30          Phosphorus Level, Serum: 3.4 mg/dL (07-30 @ 07:14)          RADIOLOGY & ADDITIONAL TESTS:

## 2017-07-30 NOTE — PROGRESS NOTE ADULT - SUBJECTIVE AND OBJECTIVE BOX
CC: ESRD on HD.  Sepsis from gram negative staph is resolving. Possible source of infection is R IJ HD catheter.   HPI:  87M presented from home after being discharged from [facility] the day prior with weakness and poor oral intake. The patient was lethargic on interview and additional information was obtained from the patient's wife at the bedside. The patient was noted to have a nonproductive cough but no fevers or chills at home. The patient was able to attend his hemodialysis session yesterday but had remained weak. There was no complaints of chest pain of palpitations. The patient is noted to have had prior admissions to the hospital with the most recent admission for congestive heart failure and pneumonia with similar symptoms. There are no known exacerbating or relieving factors. Further information is limited due to the patient's medical condition. The patient was noted to be febrile in the emergency department and received intravenous antibiotics and fluids. (23 Jul 2017 12:44)    REVIEW OF SYSTEMS:    Patient denied fever, chills, abdominal pain, nausea, vomiting, cough, shortness of breath, chest pain or palpitations    Vital Signs Last 24 Hrs  T(C): 36.5 (30 Jul 2017 07:54), Max: 37.2 (29 Jul 2017 16:40)  T(F): 97.7 (30 Jul 2017 07:54), Max: 99 (29 Jul 2017 16:40)  HR: 82 (30 Jul 2017 07:54) (80 - 94)  BP: 104/49 (30 Jul 2017 07:54) (88/47 - 104/49)  BP(mean): --  RR: 18 (30 Jul 2017 07:54) (16 - 18)  SpO2: 100% (30 Jul 2017 07:54) (97% - 100%)I&O's Summary    29 Jul 2017 07:01  -  30 Jul 2017 07:00  --------------------------------------------------------  IN: 0 mL / OUT: 900 mL / NET: -900 mL      PHYSICAL EXAM:  GENERAL: ill looking   HEENT: PERRL, +EOMI, anicteric, no [place]  NECK: Supple, No JVD   CHEST/LUNG: CTA bilaterally; Normal effort  HEART: S1S2 Normal intensity, no murmurs, gallops or rubs noted  ABDOMEN: Soft, BS Normoactive, NT, ND, no HSM noted  EXTREMITIES: No clubbing, cyanosis, or edema noted, limitation of movement.   SKIN: No rashes or lesions noted  NEURO: A&Ox3,  CN II-XII intact  PSYCH: Depressed mood and affect; insight/judgement appropriate  LABS:                        8.6    4.8   )-----------( 87       ( 30 Jul 2017 07:14 )             29.1     07-30    137  |  97<L>  |  22.0<H>  ----------------------------<  93  4.3   |  29.0  |  3.30<H>    Ca    8.6      30 Jul 2017 07:14  Phos  3.4     07-30          RADIOLOGY & ADDITIONAL TESTS:    MEDICATIONS:  MEDICATIONS  (STANDING):  heparin  Injectable 5000 Unit(s) SubCutaneous every 12 hours  tamsulosin 0.4 milliGRAM(s) Oral at bedtime  gabapentin 300 milliGRAM(s) Oral three times a day  dextrose 5%. 1000 milliLiter(s) (50 mL/Hr) IV Continuous <Continuous>  dextrose 50% Injectable 12.5 Gram(s) IV Push once  dextrose 50% Injectable 25 Gram(s) IV Push once  dextrose 50% Injectable 25 Gram(s) IV Push once  polyethylene glycol 3350 17 Gram(s) Oral at bedtime  artificial  tears Solution 1 Drop(s) Right EYE two times a day  erythromycin   Ointment 1 Application(s) Right EYE at bedtime  epoetin una Injectable 06275 Unit(s) IV Push <User Schedule>  folic acid 1 milliGRAM(s) Oral daily  insulin lispro (HumaLOG) corrective regimen sliding scale   SubCutaneous Before meals and at bedtime  Nephro-viviana 1 Tablet(s) Oral daily  ferrous    sulfate 325 milliGRAM(s) Oral daily  ofloxacin 0.3% Solution 1 Drop(s) Right EYE <User Schedule>  vancomycin  IVPB 1000 milliGRAM(s) IV Intermittent <User Schedule>  acetaminophen   Tablet. 650 milliGRAM(s) Oral every 8 hours  lidocaine   Patch 1 Patch Transdermal daily  midodrine 5 milliGRAM(s) Oral three times a day  cosyntropin Injectable 0.25 milliGRAM(s) IV Push once    MEDICATIONS  (PRN):  dextrose Gel 1 Dose(s) Oral once PRN Blood Glucose LESS THAN 70 milliGRAM(s)/deciliter  glucagon  Injectable 1 milliGRAM(s) IntraMuscular once PRN Glucose LESS THAN 70 milligrams/deciliter  guaiFENesin    Syrup 100 milliGRAM(s) Oral every 6 hours PRN Cough  traMADol 25 milliGRAM(s) Oral every 6 hours PRN moderate to severe pain

## 2017-07-30 NOTE — PROGRESS NOTE ADULT - ASSESSMENT
87M presented from home after being discharged from [facility] the day prior with weakness and poor oral intake.  Patient found to have Coag negative Staph bacteremia.  Patient started on antibiotics per ID.    Problem/Plan - 1:  ·  Problem: Bacteremia.  Plan: patient has been on Vancomycin after each HD, TTE done showed no vegetation, EF is low , Blood cx  are positive for coag negative staph. , likely source is IJ, ID consult appreciated, repeat cultures are negative. Removal and replace of IJ hemolysis catheter per Nephrology determinations.     Problem/Plan - 2:  ·  Problem: ESRD on hemodialysis.  Plan: HD as per nephrology.     Problem/Plan - 3:  ·  Problem: Diabetes mellitus.  Plan: Continue sliding scale coverage.     Problem/Plan - 4:  ·  Problem: Anemia, chronic disease.  Plan: Continue Epogen and Iron, looks like anemia of chronic disease.     Problem/Plan - 5:  ·  Problem: Conjunctivitis, acute.  Plan: with corneal haziness, Continue antibiotics drops, and eye care.     Problem/Plan - 6:  Problem: Transaminitis. Plan: Will monitor LFTs, will get US RUQ, will get hepatitis panel.    Problem/Plan - 7:  ·  Problem: Ischemic cardiomyopathy.  Plan: patient Echo done showed low ef on echo  with aortic stenosis, patient has two MR# [id number] and [id number], he had and Echo done in april 2017 showed:     1. The left atrium is normal in size.   2. Global diffuse hypokinesis with akinesis in the anterolateral wall   with prominent trebeculations. Normal perfusion on definity contrast   suggest nonischemic cardiomyopathy or resting ishemia.   3. Left ventricular ejection fraction, by visual estimation, is 30 to   35%.   4. Elevated left atrial and left ventricular end-diastolic pressures.   (LAP = 27 mm Hg)   5. The right atrium is normal in size.   6. Normal right ventricular size and function.   7. The Dimesionless Index value is 0.36. Moderate aortic valve stenosis.   Cannot rule out pseudoaortic stenosis.   8. There is no evidence of pericardial effusion.  new echo looks like pretty much the same, he will be following with cardiologist as out patient..     Problem/Plan - 8:  ·  Problem: Chronic systolic congestive heart failure.  Plan: stable, continue with current meds..

## 2017-07-30 NOTE — PROGRESS NOTE ADULT - ASSESSMENT
· Assessment		  ESRD - Severe CM on ECHO 7-27  HD Tuesday    Anemia - Epogen with HD     Rseolved coag negative bacteremia  Follow up blood cultures 7/25,26,27 negative  vancomycin as per ID     Hypotension - Changed Midodrine to 5 tid , watch BP   TFT's and Cortisol noted   T4 OK   Cortisol level 4.6   Check Cosyntropin test

## 2017-07-31 LAB
ANION GAP SERPL CALC-SCNC: 15 MMOL/L — SIGNIFICANT CHANGE UP (ref 5–17)
BUN SERPL-MCNC: 31 MG/DL — HIGH (ref 8–20)
CALCIUM SERPL-MCNC: 8.5 MG/DL — LOW (ref 8.6–10.2)
CHLORIDE SERPL-SCNC: 94 MMOL/L — LOW (ref 98–107)
CO2 SERPL-SCNC: 26 MMOL/L — SIGNIFICANT CHANGE UP (ref 22–29)
CORTIS AM PEAK SERPL-MCNC: 7.5 UG/DL — SIGNIFICANT CHANGE UP (ref 6–18.4)
CREAT SERPL-MCNC: 4.41 MG/DL — HIGH (ref 0.5–1.3)
CULTURE RESULTS: SIGNIFICANT CHANGE UP
CULTURE RESULTS: SIGNIFICANT CHANGE UP
GLUCOSE SERPL-MCNC: 119 MG/DL — HIGH (ref 70–115)
HCT VFR BLD CALC: 27.7 % — LOW (ref 42–52)
HGB BLD-MCNC: 8.3 G/DL — LOW (ref 14–18)
MCHC RBC-ENTMCNC: 26 PG — LOW (ref 27–31)
MCHC RBC-ENTMCNC: 30 G/DL — LOW (ref 32–36)
MCV RBC AUTO: 86.8 FL — SIGNIFICANT CHANGE UP (ref 80–94)
PLATELET # BLD AUTO: 93 K/UL — LOW (ref 150–400)
POTASSIUM SERPL-MCNC: 4.3 MMOL/L — SIGNIFICANT CHANGE UP (ref 3.5–5.3)
POTASSIUM SERPL-SCNC: 4.3 MMOL/L — SIGNIFICANT CHANGE UP (ref 3.5–5.3)
RBC # BLD: 3.19 M/UL — LOW (ref 4.6–6.2)
RBC # FLD: 17.5 % — HIGH (ref 11–15.6)
SODIUM SERPL-SCNC: 135 MMOL/L — SIGNIFICANT CHANGE UP (ref 135–145)
SPECIMEN SOURCE: SIGNIFICANT CHANGE UP
SPECIMEN SOURCE: SIGNIFICANT CHANGE UP
WBC # BLD: 4.5 K/UL — LOW (ref 4.8–10.8)
WBC # FLD AUTO: 4.5 K/UL — LOW (ref 4.8–10.8)

## 2017-07-31 PROCEDURE — 99233 SBSQ HOSP IP/OBS HIGH 50: CPT

## 2017-07-31 PROCEDURE — 99232 SBSQ HOSP IP/OBS MODERATE 35: CPT

## 2017-07-31 RX ADMIN — POLYETHYLENE GLYCOL 3350 17 GRAM(S): 17 POWDER, FOR SOLUTION ORAL at 21:45

## 2017-07-31 RX ADMIN — Medication 650 MILLIGRAM(S): at 05:08

## 2017-07-31 RX ADMIN — GABAPENTIN 300 MILLIGRAM(S): 400 CAPSULE ORAL at 21:44

## 2017-07-31 RX ADMIN — Medication 1 DROP(S): at 05:09

## 2017-07-31 RX ADMIN — Medication 1 DROP(S): at 21:46

## 2017-07-31 RX ADMIN — TRAMADOL HYDROCHLORIDE 25 MILLIGRAM(S): 50 TABLET ORAL at 12:18

## 2017-07-31 RX ADMIN — Medication 2: at 18:05

## 2017-07-31 RX ADMIN — MIDODRINE HYDROCHLORIDE 5 MILLIGRAM(S): 2.5 TABLET ORAL at 21:44

## 2017-07-31 RX ADMIN — Medication 650 MILLIGRAM(S): at 07:15

## 2017-07-31 RX ADMIN — Medication 1 DROP(S): at 18:05

## 2017-07-31 RX ADMIN — TAMSULOSIN HYDROCHLORIDE 0.4 MILLIGRAM(S): 0.4 CAPSULE ORAL at 21:44

## 2017-07-31 RX ADMIN — Medication 1 MILLIGRAM(S): at 12:02

## 2017-07-31 RX ADMIN — Medication 325 MILLIGRAM(S): at 12:01

## 2017-07-31 RX ADMIN — Medication 1: at 21:45

## 2017-07-31 RX ADMIN — Medication 1 TABLET(S): at 12:02

## 2017-07-31 RX ADMIN — Medication 4: at 12:02

## 2017-07-31 RX ADMIN — Medication 1 DROP(S): at 12:07

## 2017-07-31 RX ADMIN — GABAPENTIN 300 MILLIGRAM(S): 400 CAPSULE ORAL at 14:05

## 2017-07-31 RX ADMIN — HEPARIN SODIUM 5000 UNIT(S): 5000 INJECTION INTRAVENOUS; SUBCUTANEOUS at 18:06

## 2017-07-31 RX ADMIN — Medication 650 MILLIGRAM(S): at 22:00

## 2017-07-31 RX ADMIN — MIDODRINE HYDROCHLORIDE 5 MILLIGRAM(S): 2.5 TABLET ORAL at 05:08

## 2017-07-31 RX ADMIN — Medication 1 APPLICATION(S): at 21:44

## 2017-07-31 RX ADMIN — MIDODRINE HYDROCHLORIDE 5 MILLIGRAM(S): 2.5 TABLET ORAL at 14:05

## 2017-07-31 RX ADMIN — TRAMADOL HYDROCHLORIDE 25 MILLIGRAM(S): 50 TABLET ORAL at 12:50

## 2017-07-31 RX ADMIN — HEPARIN SODIUM 5000 UNIT(S): 5000 INJECTION INTRAVENOUS; SUBCUTANEOUS at 05:09

## 2017-07-31 RX ADMIN — Medication 650 MILLIGRAM(S): at 15:49

## 2017-07-31 RX ADMIN — Medication 650 MILLIGRAM(S): at 14:06

## 2017-07-31 RX ADMIN — Medication 650 MILLIGRAM(S): at 21:44

## 2017-07-31 RX ADMIN — LIDOCAINE 1 PATCH: 4 CREAM TOPICAL at 00:00

## 2017-07-31 RX ADMIN — Medication 1 DROP(S): at 18:06

## 2017-07-31 RX ADMIN — GABAPENTIN 300 MILLIGRAM(S): 400 CAPSULE ORAL at 05:08

## 2017-07-31 RX ADMIN — LIDOCAINE 1 PATCH: 4 CREAM TOPICAL at 12:02

## 2017-07-31 NOTE — PROGRESS NOTE ADULT - SUBJECTIVE AND OBJECTIVE BOX
Patient seen and examined    Feels fine  no c/o CP SOB NV   No swelling feet    Patient without any significant change  no specific c/o    Vital Signs Last 24 Hrs  T(C): 36.2 (07-31-17 @ 15:12), Max: 36.7 (07-30-17 @ 22:57)  T(F): 97.2 (07-31-17 @ 15:12), Max: 98 (07-30-17 @ 22:57)  HR: 93 (07-31-17 @ 15:12) (83 - 93)  BP: 109/58 (07-31-17 @ 15:12) (96/45 - 111/56)  BP(mean): --  RR: 18 (07-31-17 @ 15:12) (17 - 18)  SpO2: 99% (07-31-17 @ 15:12) (98% - 100%)    Awake/alert; NAD  Eyes- vision v poor  chest Clear  No JVD  No murmer  abd soft, NT BS +  No edema  Neuro - a/a, voice stronger, moving extr      31 Jul 2017 07:24    135    |  94     |  31.0   ----------------------------<  119    4.3     |  26.0   |  4.41     Ca    8.5        31 Jul 2017 07:24  Phos  3.4       30 Jul 2017 07:14                            8.3    4.5   )-----------( 93       ( 31 Jul 2017 07:24 )             27.7       Impression  patient stable  HD am  Continue same treatment

## 2017-07-31 NOTE — PROGRESS NOTE ADULT - PROBLEM SELECTOR PROBLEM 1
Bacteremia
Sepsis
SIRS (systemic inflammatory response syndrome)
SIRS (systemic inflammatory response syndrome)

## 2017-07-31 NOTE — PROGRESS NOTE ADULT - PROBLEM SELECTOR PLAN 3
Continue sliding scale coverage.
Continue sliding scale coverage.
LFT's trending down  Viral hepatitis profile negative
Continue sliding scale coverage.
Cultures  IV ABX  Site of infection unknown RIJ suspected
LFT's trending down  Viral hepatitis profile negative
Nephrology Consult appreciated.  Continue HD.  Monitor hyperkalemia , labs.
Tolerated yesterdays treatment  Peristent Hypotension requiring increase in Midodrine to 3X/D

## 2017-07-31 NOTE — PROGRESS NOTE ADULT - PROBLEM SELECTOR PROBLEM 3
Diabetes mellitus
Diabetes mellitus
Transaminitis
Diabetes mellitus
Renal failure
Transaminitis
ESRD on hemodialysis
Bacteremia

## 2017-07-31 NOTE — PROGRESS NOTE ADULT - SUBJECTIVE AND OBJECTIVE BOX
CC:   HPI:  87M presented from home after being discharged from [facility] the day prior with weakness and poor oral intake. The patient was lethargic on interview and additional information was obtained from the patient's wife at the bedside. The patient was noted to have a nonproductive cough but no fevers or chills at home. The patient was able to attend his hemodialysis session yesterday but had remained weak. There was no complaints of chest pain of palpitations. The patient is noted to have had prior admissions to the hospital with the most recent admission for congestive heart failure and pneumonia with similar symptoms. There are no known exacerbating or relieving factors. Further information is limited due to the patient's medical condition. The patient was noted to be febrile in the emergency department and received intravenous antibiotics and fluids. (23 Jul 2017 12:44)    REVIEW OF SYSTEMS:    Patient denied fever, chills, abdominal pain, nausea, vomiting, cough, shortness of breath, chest pain or palpitations    Vital Signs Last 24 Hrs  T(C): 36.5 (31 Jul 2017 07:36), Max: 36.7 (30 Jul 2017 22:57)  T(F): 97.7 (31 Jul 2017 07:36), Max: 98 (30 Jul 2017 22:57)  HR: 84 (31 Jul 2017 07:36) (83 - 98)  BP: 111/56 (31 Jul 2017 07:36) (96/45 - 120/52)  BP(mean): --  RR: 18 (31 Jul 2017 07:36) (17 - 18)  SpO2: 100% (31 Jul 2017 07:36) (97% - 100%)I&O's Summary    30 Jul 2017 07:01  -  31 Jul 2017 07:00  --------------------------------------------------------  IN: 237 mL / OUT: 0 mL / NET: 237 mL      PHYSICAL EXAM:  GENERAL: NAD, well-groomed  HEENT: PERRL, +EOMI, anicteric, no [place]  NECK: Supple, No JVD   CHEST/LUNG: CTA bilaterally; Normal effort  HEART: S1S2 Normal intensity, no murmurs, gallops or rubs noted  ABDOMEN: Soft, BS Normoactive, NT, ND, no HSM noted  EXTREMITIES:  2+ radial and DP pulses noted, no clubbing, cyanosis, or edema noted, FROM x 4  SKIN: No rashes or lesions noted  NEURO: A&Ox3, no focal deficits noted, CN II-XII intact  PSYCH: normal mood and affect; insight/judgement appropriate  LABS:                        8.3    4.5   )-----------( 93       ( 31 Jul 2017 07:24 )             27.7     07-31    135  |  94<L>  |  31.0<H>  ----------------------------<  119<H>  4.3   |  26.0  |  4.41<H>    Ca    8.5<L>      31 Jul 2017 07:24  Phos  3.4     07-30          RADIOLOGY & ADDITIONAL TESTS:    MEDICATIONS:  MEDICATIONS  (STANDING):  heparin  Injectable 5000 Unit(s) SubCutaneous every 12 hours  tamsulosin 0.4 milliGRAM(s) Oral at bedtime  gabapentin 300 milliGRAM(s) Oral three times a day  dextrose 5%. 1000 milliLiter(s) (50 mL/Hr) IV Continuous <Continuous>  dextrose 50% Injectable 12.5 Gram(s) IV Push once  dextrose 50% Injectable 25 Gram(s) IV Push once  dextrose 50% Injectable 25 Gram(s) IV Push once  polyethylene glycol 3350 17 Gram(s) Oral at bedtime  artificial  tears Solution 1 Drop(s) Right EYE two times a day  erythromycin   Ointment 1 Application(s) Right EYE at bedtime  epoetin una Injectable 89247 Unit(s) IV Push <User Schedule>  folic acid 1 milliGRAM(s) Oral daily  insulin lispro (HumaLOG) corrective regimen sliding scale   SubCutaneous Before meals and at bedtime  Nephro-viviana 1 Tablet(s) Oral daily  ferrous    sulfate 325 milliGRAM(s) Oral daily  ofloxacin 0.3% Solution 1 Drop(s) Right EYE <User Schedule>  vancomycin  IVPB 1000 milliGRAM(s) IV Intermittent <User Schedule>  acetaminophen   Tablet. 650 milliGRAM(s) Oral every 8 hours  lidocaine   Patch 1 Patch Transdermal daily  midodrine 5 milliGRAM(s) Oral three times a day  cosyntropin Injectable 0.25 milliGRAM(s) IV Push once    MEDICATIONS  (PRN):  dextrose Gel 1 Dose(s) Oral once PRN Blood Glucose LESS THAN 70 milliGRAM(s)/deciliter  glucagon  Injectable 1 milliGRAM(s) IntraMuscular once PRN Glucose LESS THAN 70 milligrams/deciliter  guaiFENesin    Syrup 100 milliGRAM(s) Oral every 6 hours PRN Cough  traMADol 25 milliGRAM(s) Oral every 6 hours PRN moderate to severe pain CC: ESRD on HD. R IJ hemodialysis catheter.  Sepsis/bacteremia resolved .  Patient sitting up in chair alert, feeding self.    HPI:  87M presented from home after being discharged from [facility] the day prior with weakness and poor oral intake. The patient was lethargic on interview and additional information was obtained from the patient's wife at the bedside. The patient was noted to have a nonproductive cough but no fevers or chills at home. The patient was able to attend his hemodialysis session yesterday but had remained weak. There was no complaints of chest pain of palpitations. The patient is noted to have had prior admissions to the hospital with the most recent admission for congestive heart failure and pneumonia with similar symptoms. There are no known exacerbating or relieving factors. Further information is limited due to the patient's medical condition. The patient was noted to be febrile in the emergency department and received intravenous antibiotics and fluids. (23 Jul 2017 12:44)    REVIEW OF SYSTEMS:    Patient denied fever, chills, abdominal pain, nausea, vomiting, cough, shortness of breath, chest pain or palpitations    Vital Signs Last 24 Hrs  T(C): 36.5 (31 Jul 2017 07:36), Max: 36.7 (30 Jul 2017 22:57)  T(F): 97.7 (31 Jul 2017 07:36), Max: 98 (30 Jul 2017 22:57)  HR: 84 (31 Jul 2017 07:36) (83 - 98)  BP: 111/56 (31 Jul 2017 07:36) (96/45 - 120/52)  BP(mean): --  RR: 18 (31 Jul 2017 07:36) (17 - 18)  SpO2: 100% (31 Jul 2017 07:36) (97% - 100%)I&O's Summary    30 Jul 2017 07:01  -  31 Jul 2017 07:00  --------------------------------------------------------  IN: 237 mL / OUT: 0 mL / NET: 237 mL      PHYSICAL EXAM:  GENERAL: NAD, well-groomed  HEENT: Low vision , +EOMI, anicteric,   NECK: Supple, No JVD   CHEST/LUNG: CTA bilaterally; Normal effort  HEART: S1S2 Normal intensity, no murmurs, gallops or rubs noted  ABDOMEN: Soft, BS Normoactive, NT, ND, no HSM noted  EXTREMITIES:  No  cyanosis, or edema noted, Limited mobility   SKIN: No rashes or lesions noted  NEURO: A&Ox3,  CN II-XII intact  PSYCH: depressed  mood and affect; insight/judgement appropriate  LABS:                        8.3    4.5   )-----------( 93       ( 31 Jul 2017 07:24 )             27.7     07-31    135  |  94<L>  |  31.0<H>  ----------------------------<  119<H>  4.3   |  26.0  |  4.41<H>    Ca    8.5<L>      31 Jul 2017 07:24  Phos  3.4     07-30          RADIOLOGY & ADDITIONAL TESTS:    MEDICATIONS:  MEDICATIONS  (STANDING):  heparin  Injectable 5000 Unit(s) SubCutaneous every 12 hours  tamsulosin 0.4 milliGRAM(s) Oral at bedtime  gabapentin 300 milliGRAM(s) Oral three times a day  dextrose 5%. 1000 milliLiter(s) (50 mL/Hr) IV Continuous <Continuous>  dextrose 50% Injectable 12.5 Gram(s) IV Push once  dextrose 50% Injectable 25 Gram(s) IV Push once  dextrose 50% Injectable 25 Gram(s) IV Push once  polyethylene glycol 3350 17 Gram(s) Oral at bedtime  artificial  tears Solution 1 Drop(s) Right EYE two times a day  erythromycin   Ointment 1 Application(s) Right EYE at bedtime  epoetin una Injectable 18210 Unit(s) IV Push <User Schedule>  folic acid 1 milliGRAM(s) Oral daily  insulin lispro (HumaLOG) corrective regimen sliding scale   SubCutaneous Before meals and at bedtime  Nephro-viviana 1 Tablet(s) Oral daily  ferrous    sulfate 325 milliGRAM(s) Oral daily  ofloxacin 0.3% Solution 1 Drop(s) Right EYE <User Schedule>  vancomycin  IVPB 1000 milliGRAM(s) IV Intermittent <User Schedule>  acetaminophen   Tablet. 650 milliGRAM(s) Oral every 8 hours  lidocaine   Patch 1 Patch Transdermal daily  midodrine 5 milliGRAM(s) Oral three times a day  cosyntropin Injectable 0.25 milliGRAM(s) IV Push once    MEDICATIONS  (PRN):  dextrose Gel 1 Dose(s) Oral once PRN Blood Glucose LESS THAN 70 milliGRAM(s)/deciliter  glucagon  Injectable 1 milliGRAM(s) IntraMuscular once PRN Glucose LESS THAN 70 milligrams/deciliter  guaiFENesin    Syrup 100 milliGRAM(s) Oral every 6 hours PRN Cough  traMADol 25 milliGRAM(s) Oral every 6 hours PRN moderate to severe pain

## 2017-07-31 NOTE — PROGRESS NOTE ADULT - PROBLEM SELECTOR PLAN 2
HD as per nephrology.
HD as per nephrology.
Resolved
Continue eye drops
HD as per nephrology.
Stable.  Pt is not lethargic. He is A&O, responding appropriately. Continue Gabapentin 300 mg tid.
2/4 + BC's  Site unknown, suspect HD access catheter  Code sepsis followed
Medical management

## 2017-07-31 NOTE — PROGRESS NOTE ADULT - PROBLEM SELECTOR PROBLEM 4
Anemia, chronic disease
Anemia, chronic disease
ESRD on hemodialysis
Anemia, chronic disease
ESRD on hemodialysis
Muscle weakness (generalized)
Dysphagia, unspecified type
Dysphagia, unspecified type

## 2017-07-31 NOTE — PROGRESS NOTE ADULT - SUBJECTIVE AND OBJECTIVE BOX
[facility]  INFECTIOUS DISEASES AND INTERNAL MEDICINE OF [place]  =======================================================  Scotty Pennington MD  Diplomates American Board of Internal Medicine and Infectious Diseases  =======================================================    EMILY LITTLEJOHN [id number]    Follow up: Bacteremia    No complaints    Allergies:  No Known Allergies      Antibiotics:  vancomycin  IVPB 1000 milliGRAM(s) IV Intermittent <User Schedule>      REVIEW OF SYSTEMS:  CONSTITUTIONAL:  No Fever or chills  HEENT: Poor vision at baseline.  No earache, sore throat or runny nose.  CARDIOVASCULAR:  No pressure, squeezing, strangling, tightness, heaviness or aching about the chest, neck, axilla or epigastrium.  RESPIRATORY: + cough, No shortness of breath  GASTROINTESTINAL:  No nausea, vomiting or diarrhea.  GENITOURINARY:  No dysuria, frequency or urgency. No Blood in urine  MUSCULOSKELETAL:  no joint aches, no muscle pain  SKIN:  No change in skin, hair or nails.  NEUROLOGIC:  No paresthesias, fasciculations  PSYCHIATRIC:  No disorder of thought or mood.  ENDOCRINE:  No heat or cold intolerance  HEMATOLOGICAL:  No easy bruising or bleeding.       Physical Exam:  Vital Signs Last 24 Hrs  T(C): 36.5 (31 Jul 2017 07:36), Max: 36.7 (30 Jul 2017 22:57)  T(F): 97.7 (31 Jul 2017 07:36), Max: 98 (30 Jul 2017 22:57)  HR: 84 (31 Jul 2017 07:36) (83 - 98)  BP: 111/56 (31 Jul 2017 07:36) (96/45 - 120/52)  RR: 18 (31 Jul 2017 07:36) (17 - 18)  SpO2: 100% (31 Jul 2017 07:36) (97% - 100%)      GEN: Elderly, male, NAD, pleasant  HEENT: normocephalic and atraumatic. EOMI. Ptosis of right eye    NECK: Supple. No carotid bruits.  LUNGS: Good respiratory effort, + diffuse mild expiratory wheezing  HEART: Regular rate and rhythm without murmur. +Right IJ permacath  ABDOMEN: Soft, nontender, and nondistended.  Positive bowel sounds.    : No CVA tenderness  EXTREMITIES: Without any cyanosis, clubbing  MSK: No joint swelling  NEUROLOGIC: Alert and oriented x 3  PSYCHIATRIC: Appropriate affect .  SKIN: + ulceration  left hallux, No induration present.      Labs:  07-31    135  |  94<L>  |  31.0<H>  ----------------------------<  119<H>  4.3   |  26.0  |  4.41<H>    Ca    8.5<L>      31 Jul 2017 07:24  Phos  3.4     07-30                            8.3    4.5   )-----------( 93       ( 31 Jul 2017 07:24 )             27.7           RECENT CULTURES:  Culture - Blood in AM (07.27.17 @ 07:40)    Specimen Source: .Blood Blood-Peripheral    Culture Results:   No growth at 48 hours      Culture - Blood (07.26.17 @ 20:43)    Specimen Source: .Blood Blood-Peripheral    Culture Results:   No growth at 48 hours      Culture - Blood (07.26.17 @ 15:52)    Specimen Source: .Blood Blood    Culture Results:   No growth at 48 hours      Culture - Blood in AM (07.25.17 @ 07:53)    Specimen Source: .Blood Blood-Catheter    Culture Results:   No growth at 5 days.      Culture - Blood in AM (07.25.17 @ 07:52)    Specimen Source: .Blood Blood-Catheter    Culture Results:   No growth at 5 days.      Culture - Blood (07.23.17 @ 10:39)    -  Ciprofloxacin: R >2    -  Clindamycin: S <=0.5    -  Linezolid: S <=1    -  RIF- Rifampin: S <=1    -  Vancomycin: S 2    -  Ampicillin: R >8    -  Ampicillin/Sulbactam: R <=8/4    -  Cefazolin: R <=4    -  Erythromycin: R >4    -  Gentamicin: S <=4    -  Levofloxacin: I 4    -  Moxifloxacin(Aerobic): I 1    -  Penicillin: R >8    -  Trimethoprim/Sulfamethoxazole: S <=0.5/9.5    -  Coagulase negative Staphylococcus: Detec    -  Oxacillin: R >2    -  Tetra/Doxy: S <=4    Specimen Source: .Blood Blood-Peripheral    Organism: Blood Culture PCR    Organism: Coag Negative Staphylococcus    Culture Results:   Growth in anaerobic bottle: Coag Negative Staphylococcus  Anaerobic Bottle: 19:04 Hours to positivity  Aerobic Bottle: No growth to date      Culture - Blood (07.23.17 @ 10:38)    -  Cefazolin: R >16    -  Erythromycin: R >4    -  Oxacillin: R >2    -  Penicillin: R 8    -  Tetra/Doxy: S <=4    -  Trimethoprim/Sulfamethoxazole: R >2/38    -  Levofloxacin: R >4    -  Moxifloxacin(Aerobic): R 2    -  Ciprofloxacin: R >2    -  RIF- Rifampin: S <=1    -  Ampicillin/Sulbactam: R <=8/4    -  Clindamycin: R >4    -  Gentamicin: S <=4    -  Vancomycin: S 1    Specimen Source: .Blood Blood-Peripheral    Organism: Coag Negative Staphylococcus    Culture Results:   Growth in aerobic and anaerobic bottles: Coag Negative Staphylococcus  Anaerobic Bottle: 2 days; 05:01 Hours to positivity  Aerobic Bottle: 3 days 21.07 Hours to positivity

## 2017-07-31 NOTE — PROGRESS NOTE ADULT - ASSESSMENT
86 yo males with ESRD on HD via right IJ permacath, Dementia, sCHF, DM, HTN recently discharged from Rehab facility where he was discharged after being treated for CHF and pneumonia. Patient admitted with SIRS and now found to have CoNS bacteremia

## 2017-07-31 NOTE — PROGRESS NOTE ADULT - PROBLEM SELECTOR PLAN 1
Blood culture with CoNS  Repeat cultures no growth 7/25  Continue Vancomycin post HD  No fever or chills  No leukocytosis  Possible source is IJ, will try to salvage line  Continue Vancomycin post HD  End Date 8/19/17  Repeat blood cultures post treatment Blood culture with CoNS  Repeat cultures no growth 7/25  Continue Vancomycin post HD  No fever or chills  No leukocytosis  Possible source is IJ, will try to salvage line  Continue Vancomycin post HD  End Date 9/2/17  Repeat blood cultures post treatment

## 2017-07-31 NOTE — PROGRESS NOTE ADULT - ASSESSMENT
87M presented from home after being discharged from [facility] the day prior with weakness and poor oral intake.  Patient found to have Coag negative Staph bacteremia.  Patient started on antibiotics per ID.    Problem/Plan - 1:  ·  Problem: Bacteremia.  Plan: patient has been on Vancomycin after each HD, TTE done showed no vegetation, EF is low , Initial Blood cx  are positive for coag negative staph. Repeat blood cx are negative. Sepsis is resolved.  likely source may be  IJ HD catheter. , ID consult appreciated, repeat cultures are negative. Removal and replace of IJ hemolysis catheter per Nephrology determination  Discharge planning. May d/c home tomorrow .     Problem/Plan - 2:  ·  Problem: ESRD on hemodialysis.  Plan: HD as per nephrology.     Problem/Plan - 3:  ·  Problem: Diabetes mellitus.  Plan: Continue sliding scale coverage.     Problem/Plan - 4:  ·  Problem: Anemia, chronic disease.  Plan: Continue Epogen and Iron, looks like anemia of chronic disease.     Problem/Plan - 5:  ·  Problem: Conjunctivitis, acute.  Plan: with corneal haziness, Continue antibiotics drops, and eye care.     Problem/Plan - 6:  Problem: Transaminitis. Plan:  US RUQ unremarkable study. Hepatitis panel is negative .    Problem/Plan - 7:  ·  Problem: Ischemic cardiomyopathy.  Plan: patient Echo done showed low ef on echo  with aortic stenosis, patient has two MR# [id number] and [id number], he had and Echo done in april 2017 showed:     1. The left atrium is normal in size.   2. Global diffuse hypokinesis with akinesis in the anterolateral wall   with prominent trebeculations. Normal perfusion on definity contrast   suggest nonischemic cardiomyopathy or resting ishemia.   3. Left ventricular ejection fraction, by visual estimation, is 30 to   35%.   4. Elevated left atrial and left ventricular end-diastolic pressures.   (LAP = 27 mm Hg)   5. The right atrium is normal in size.   6. Normal right ventricular size and function.   7. The Dimensionless Index value is 0.36. Moderate aortic valve stenosis.   Cannot rule out pseudoaortic stenosis.   8. There is no evidence of pericardial effusion.  new echo looks like pretty much the same, he will be following with cardiologist as out patient..     Problem/Plan - 8:  ·  Problem: Chronic systolic congestive heart failure.  Plan: stable, continue with current meds.

## 2017-07-31 NOTE — PROGRESS NOTE ADULT - PROBLEM SELECTOR PROBLEM 2
Conjunctivitis, acute
ESRD on hemodialysis
ESRD on hemodialysis
Conjunctivitis, acute
Dementia
ESRD on hemodialysis
Bacteremia
Ischemic cardiomyopathy

## 2017-08-01 LAB
ANION GAP SERPL CALC-SCNC: 15 MMOL/L — SIGNIFICANT CHANGE UP (ref 5–17)
BUN SERPL-MCNC: 40 MG/DL — HIGH (ref 8–20)
CALCIUM SERPL-MCNC: 8.6 MG/DL — SIGNIFICANT CHANGE UP (ref 8.6–10.2)
CHLORIDE SERPL-SCNC: 96 MMOL/L — LOW (ref 98–107)
CO2 SERPL-SCNC: 25 MMOL/L — SIGNIFICANT CHANGE UP (ref 22–29)
CREAT SERPL-MCNC: 5.28 MG/DL — HIGH (ref 0.5–1.3)
GLUCOSE SERPL-MCNC: 121 MG/DL — HIGH (ref 70–115)
HCT VFR BLD CALC: 28.6 % — LOW (ref 42–52)
HGB BLD-MCNC: 8.6 G/DL — LOW (ref 14–18)
MCHC RBC-ENTMCNC: 26.2 PG — LOW (ref 27–31)
MCHC RBC-ENTMCNC: 30.1 G/DL — LOW (ref 32–36)
MCV RBC AUTO: 87.2 FL — SIGNIFICANT CHANGE UP (ref 80–94)
PLATELET # BLD AUTO: 96 K/UL — LOW (ref 150–400)
POTASSIUM SERPL-MCNC: 4.7 MMOL/L — SIGNIFICANT CHANGE UP (ref 3.5–5.3)
POTASSIUM SERPL-SCNC: 4.7 MMOL/L — SIGNIFICANT CHANGE UP (ref 3.5–5.3)
RBC # BLD: 3.28 M/UL — LOW (ref 4.6–6.2)
RBC # FLD: 17.6 % — HIGH (ref 11–15.6)
SODIUM SERPL-SCNC: 136 MMOL/L — SIGNIFICANT CHANGE UP (ref 135–145)
VANCOMYCIN TROUGH SERPL-MCNC: 20 UG/ML — SIGNIFICANT CHANGE UP (ref 10–20)
WBC # BLD: 5 K/UL — SIGNIFICANT CHANGE UP (ref 4.8–10.8)
WBC # FLD AUTO: 5 K/UL — SIGNIFICANT CHANGE UP (ref 4.8–10.8)

## 2017-08-01 PROCEDURE — 99233 SBSQ HOSP IP/OBS HIGH 50: CPT

## 2017-08-01 RX ORDER — VANCOMYCIN HCL 1 G
500 VIAL (EA) INTRAVENOUS
Qty: 0 | Refills: 0 | Status: COMPLETED | OUTPATIENT
Start: 2017-08-01 | End: 2017-08-01

## 2017-08-01 RX ORDER — VANCOMYCIN HCL 1 G
500 VIAL (EA) INTRAVENOUS
Qty: 0 | Refills: 0 | Status: CANCELLED | OUTPATIENT
Start: 2017-08-03 | End: 2017-07-23

## 2017-08-01 RX ADMIN — GABAPENTIN 300 MILLIGRAM(S): 400 CAPSULE ORAL at 23:32

## 2017-08-01 RX ADMIN — LIDOCAINE 1 PATCH: 4 CREAM TOPICAL at 12:44

## 2017-08-01 RX ADMIN — TAMSULOSIN HYDROCHLORIDE 0.4 MILLIGRAM(S): 0.4 CAPSULE ORAL at 22:25

## 2017-08-01 RX ADMIN — GABAPENTIN 300 MILLIGRAM(S): 400 CAPSULE ORAL at 15:04

## 2017-08-01 RX ADMIN — Medication 1 DROP(S): at 05:49

## 2017-08-01 RX ADMIN — Medication 650 MILLIGRAM(S): at 05:49

## 2017-08-01 RX ADMIN — Medication 325 MILLIGRAM(S): at 12:43

## 2017-08-01 RX ADMIN — Medication 1 DROP(S): at 18:39

## 2017-08-01 RX ADMIN — HEPARIN SODIUM 5000 UNIT(S): 5000 INJECTION INTRAVENOUS; SUBCUTANEOUS at 05:49

## 2017-08-01 RX ADMIN — HEPARIN SODIUM 5000 UNIT(S): 5000 INJECTION INTRAVENOUS; SUBCUTANEOUS at 18:45

## 2017-08-01 RX ADMIN — Medication 1: at 12:43

## 2017-08-01 RX ADMIN — MIDODRINE HYDROCHLORIDE 5 MILLIGRAM(S): 2.5 TABLET ORAL at 22:26

## 2017-08-01 RX ADMIN — ERYTHROPOIETIN 10000 UNIT(S): 10000 INJECTION, SOLUTION INTRAVENOUS; SUBCUTANEOUS at 10:00

## 2017-08-01 RX ADMIN — LIDOCAINE 1 PATCH: 4 CREAM TOPICAL at 00:00

## 2017-08-01 RX ADMIN — Medication 1 DROP(S): at 22:26

## 2017-08-01 RX ADMIN — Medication 1 MILLIGRAM(S): at 12:43

## 2017-08-01 RX ADMIN — MIDODRINE HYDROCHLORIDE 5 MILLIGRAM(S): 2.5 TABLET ORAL at 15:03

## 2017-08-01 RX ADMIN — Medication 650 MILLIGRAM(S): at 22:25

## 2017-08-01 RX ADMIN — Medication 1 DROP(S): at 18:45

## 2017-08-01 RX ADMIN — Medication 650 MILLIGRAM(S): at 15:04

## 2017-08-01 RX ADMIN — TRAMADOL HYDROCHLORIDE 25 MILLIGRAM(S): 50 TABLET ORAL at 13:55

## 2017-08-01 RX ADMIN — MIDODRINE HYDROCHLORIDE 5 MILLIGRAM(S): 2.5 TABLET ORAL at 05:49

## 2017-08-01 RX ADMIN — Medication 1 DROP(S): at 12:44

## 2017-08-01 RX ADMIN — Medication 1 APPLICATION(S): at 22:25

## 2017-08-01 RX ADMIN — Medication 650 MILLIGRAM(S): at 16:05

## 2017-08-01 RX ADMIN — Medication 650 MILLIGRAM(S): at 23:00

## 2017-08-01 RX ADMIN — Medication 1 TABLET(S): at 12:43

## 2017-08-01 RX ADMIN — Medication 100 MILLIGRAM(S): at 18:39

## 2017-08-01 RX ADMIN — GABAPENTIN 300 MILLIGRAM(S): 400 CAPSULE ORAL at 05:49

## 2017-08-01 RX ADMIN — TRAMADOL HYDROCHLORIDE 25 MILLIGRAM(S): 50 TABLET ORAL at 12:55

## 2017-08-01 RX ADMIN — Medication 1: at 22:26

## 2017-08-01 NOTE — PROGRESS NOTE ADULT - SUBJECTIVE AND OBJECTIVE BOX
Patient was seen and evaluated on dialysis.   No c/o CP SOB NV  no F/C, no swelling    T(C): 36.6 (08-01-17 @ 11:07), Max: 36.8 (07-31-17 @ 23:20)  HR: 91 (08-01-17 @ 11:07) (88 - 93)  BP: 126/55 (08-01-17 @ 11:07) (97/56 - 126/55)  Wt(kg): --  PE ;  NAD; more awake and interactrive  lungs - CTA  CV gr 1 murmer,  No gallop or rub  Abd : soft, NT BS +, No masses  Ext- No edema  Neuro : Grossly intact, moving extremities                             8.6    5.0   )-----------( 96       ( 01 Aug 2017 06:36 )             28.6        08-01    136  |  96<L>  |  40.0<H>  ----------------------------<  121<H>  4.7   |  25.0  |  5.28<H>    Ca    8.6      01 Aug 2017 06:36        MEDICATIONS  (STANDING):  heparin  Injectable  tamsulosin  gabapentin  dextrose 5%.  dextrose Gel PRN  dextrose 50% Injectable  dextrose 50% Injectable  dextrose 50% Injectable  glucagon  Injectable PRN  polyethylene glycol 3350  artificial  tears Solution  erythromycin   Ointment  guaiFENesin    Syrup PRN  epoetin una Injectable  folic acid  insulin lispro (HumaLOG) corrective regimen sliding scale  Nephro-viviana  ferrous    sulfate  ofloxacin 0.3% Solution  acetaminophen   Tablet.  traMADol PRN  lidocaine   Patch  midodrine  cosyntropin Injectable  vancomycin  IVPB      Patient stable  Teena HD easily  Continue

## 2017-08-01 NOTE — PROGRESS NOTE ADULT - SUBJECTIVE AND OBJECTIVE BOX
CC: ESRD on HD. R IJ HD catheter in place.  Sepsis is resolving .  Awake alert orientated.  Requiring long term 6 weeks abx to be done with dialysis .     HPI:  87M presented from home after being discharged from [facility] the day prior with weakness and poor oral intake. The patient was lethargic on interview and additional information was obtained from the patient's wife at the bedside. The patient was noted to have a nonproductive cough but no fevers or chills at home. The patient was able to attend his hemodialysis session yesterday but had remained weak. There was no complaints of chest pain of palpitations. The patient is noted to have had prior admissions to the hospital with the most recent admission for congestive heart failure and pneumonia with similar symptoms. There are no known exacerbating or relieving factors. Further information is limited due to the patient's medical condition. The patient was noted to be febrile in the emergency department and received intravenous antibiotics and fluids. (23 Jul 2017 12:44)    REVIEW OF SYSTEMS:    Patient denied fever, chills, abdominal pain, nausea, vomiting, cough, shortness of breath, chest pain or palpitations    Vital Signs Last 24 Hrs  T(C): 36.6 (01 Aug 2017 11:07), Max: 36.8 (31 Jul 2017 23:20)  T(F): 97.9 (01 Aug 2017 11:07), Max: 98.3 (31 Jul 2017 23:20)  HR: 91 (01 Aug 2017 11:07) (88 - 93)  BP: 126/55 (01 Aug 2017 11:07) (97/56 - 126/55)  BP(mean): --  RR: 18 (01 Aug 2017 11:07) (18 - 20)  SpO2: 100% (01 Aug 2017 11:07) (94% - 100%)I&O's Summary    01 Aug 2017 07:01  -  01 Aug 2017 13:15  --------------------------------------------------------  IN: 0 mL / OUT: 1600 mL / NET: -1600 mL      PHYSICAL EXAM:  GENERAL: Well-groomed  HEENT: PERRL, +EOMI, anicteric, no [place]  NECK: Supple, No JVD   CHEST/LUNG: CTA bilaterally  HEART: S1S2 Normal intensity, no murmurs, gallops or rubs noted  ABDOMEN: Soft, BS Normoactive, NT, ND, no HSM noted  EXTREMITIES:  2+ radial and DP pulses noted, no clubbing, cyanosis, or edema noted, FROM x 4  SKIN: No rashes or lesions noted  NEURO: A&Ox3, no focal deficits noted, CN II-XII intact  PSYCH: normal mood and affect; insight/judgement appropriate  LABS:                        8.6    5.0   )-----------( 96       ( 01 Aug 2017 06:36 )             28.6     08-01    136  |  96<L>  |  40.0<H>  ----------------------------<  121<H>  4.7   |  25.0  |  5.28<H>    Ca    8.6      01 Aug 2017 06:36          RADIOLOGY & ADDITIONAL TESTS:    MEDICATIONS:  MEDICATIONS  (STANDING):  heparin  Injectable 5000 Unit(s) SubCutaneous every 12 hours  tamsulosin 0.4 milliGRAM(s) Oral at bedtime  gabapentin 300 milliGRAM(s) Oral three times a day  dextrose 5%. 1000 milliLiter(s) (50 mL/Hr) IV Continuous <Continuous>  dextrose 50% Injectable 12.5 Gram(s) IV Push once  dextrose 50% Injectable 25 Gram(s) IV Push once  dextrose 50% Injectable 25 Gram(s) IV Push once  polyethylene glycol 3350 17 Gram(s) Oral at bedtime  artificial  tears Solution 1 Drop(s) Right EYE two times a day  erythromycin   Ointment 1 Application(s) Right EYE at bedtime  epoetin una Injectable 32421 Unit(s) IV Push <User Schedule>  folic acid 1 milliGRAM(s) Oral daily  insulin lispro (HumaLOG) corrective regimen sliding scale   SubCutaneous Before meals and at bedtime  Nephro-viviana 1 Tablet(s) Oral daily  ferrous    sulfate 325 milliGRAM(s) Oral daily  ofloxacin 0.3% Solution 1 Drop(s) Right EYE <User Schedule>  acetaminophen   Tablet. 650 milliGRAM(s) Oral every 8 hours  lidocaine   Patch 1 Patch Transdermal daily  midodrine 5 milliGRAM(s) Oral three times a day  cosyntropin Injectable 0.25 milliGRAM(s) IV Push once  vancomycin  IVPB 500 milliGRAM(s) IV Intermittent <User Schedule>    MEDICATIONS  (PRN):  dextrose Gel 1 Dose(s) Oral once PRN Blood Glucose LESS THAN 70 milliGRAM(s)/deciliter  glucagon  Injectable 1 milliGRAM(s) IntraMuscular once PRN Glucose LESS THAN 70 milligrams/deciliter  guaiFENesin    Syrup 100 milliGRAM(s) Oral every 6 hours PRN Cough  traMADol 25 milliGRAM(s) Oral every 6 hours PRN moderate to severe pain

## 2017-08-01 NOTE — PROGRESS NOTE ADULT - ASSESSMENT
87M presented from home after being discharged from [facility] the day prior with weakness and poor oral intake.  Patient found to have Coag negative Staph bacteremia.  Patient started on antibiotics per ID.    Problem/Plan - 1:  ·  Problem: Bacteremia.  Plan: patient has been on Vancomycin after each HD, TTE done showed no vegetation, EF is low , Initial Blood cx  are positive for coag negative staph. Repeat blood cx are negative. Sepsis is resolved.  likely source may be  IJ HD catheter. Repeat cultures are negative. ID recommend 6 weeks of IV abx.  Removal and replace of IJ hemolysis catheter per Nephrology determination  Discharge planning. May d/c home tomorrow .     Problem/Plan - 2:  ·  Problem: ESRD on hemodialysis.  Plan: HD as per nephrology.     Problem/Plan - 3:  ·  Problem: Diabetes mellitus.  Plan: Continue sliding scale coverage.     Problem/Plan - 4:  ·  Problem: Anemia, chronic disease.  Plan: Continue Epogen and Iron, looks like anemia of chronic disease.     Problem/Plan - 5:  ·  Problem: Conjunctivitis, acute.  Plan: with corneal haziness, Continue antibiotics drops, and eye care.     Problem/Plan - 6:  Problem: Transaminitis. Plan:  US RUQ unremarkable study. Hepatitis panel is negative .    Problem/Plan - 7:  ·  Problem: Ischemic cardiomyopathy.  Plan: patient Echo done showed low ef on echo  with aortic stenosis, patient has two MR# [id number] and [id number], he had and Echo done in april 2017 showed:     Global diffuse hypokinesis with akinesis in the anterolateral wall   with prominent trebeculations. Normal perfusion on definity contrast   suggest nonischemic cardiomyopathy or resting ishemia.   3. Left ventricular ejection fraction, by visual estimation, is 30 to   35%.  new echo looks like pretty much the same, he will be following with cardiologist as out patient..     Problem/Plan - 8:  ·  Problem: Chronic systolic congestive heart failure.  Plan: stable, continue with current meds.

## 2017-08-02 ENCOUNTER — TRANSCRIPTION ENCOUNTER (OUTPATIENT)
Age: 82
End: 2017-08-02

## 2017-08-02 VITALS
OXYGEN SATURATION: 95 % | DIASTOLIC BLOOD PRESSURE: 74 MMHG | HEART RATE: 90 BPM | SYSTOLIC BLOOD PRESSURE: 128 MMHG | TEMPERATURE: 80 F | RESPIRATION RATE: 19 BRPM

## 2017-08-02 LAB
CULTURE RESULTS: SIGNIFICANT CHANGE UP
HBV SURFACE AB SER-ACNC: <3 MIU/ML — LOW
SPECIMEN SOURCE: SIGNIFICANT CHANGE UP

## 2017-08-02 PROCEDURE — 99233 SBSQ HOSP IP/OBS HIGH 50: CPT

## 2017-08-02 RX ORDER — VANCOMYCIN HCL 1 G
500 VIAL (EA) INTRAVENOUS
Qty: 0 | Refills: 0 | COMMUNITY
Start: 2017-08-02

## 2017-08-02 RX ORDER — FOLIC ACID 0.8 MG
1 TABLET ORAL
Qty: 0 | Refills: 0 | COMMUNITY
Start: 2017-08-02

## 2017-08-02 RX ORDER — LIDOCAINE 4 G/100G
1 CREAM TOPICAL
Qty: 0 | Refills: 0 | COMMUNITY
Start: 2017-08-02

## 2017-08-02 RX ORDER — OFLOXACIN 0.3 %
1 DROPS OPHTHALMIC (EYE)
Qty: 0 | Refills: 0 | COMMUNITY
Start: 2017-08-02

## 2017-08-02 RX ADMIN — Medication 1 DROP(S): at 16:22

## 2017-08-02 RX ADMIN — Medication 650 MILLIGRAM(S): at 14:30

## 2017-08-02 RX ADMIN — Medication 325 MILLIGRAM(S): at 12:32

## 2017-08-02 RX ADMIN — TRAMADOL HYDROCHLORIDE 25 MILLIGRAM(S): 50 TABLET ORAL at 13:32

## 2017-08-02 RX ADMIN — TRAMADOL HYDROCHLORIDE 25 MILLIGRAM(S): 50 TABLET ORAL at 12:32

## 2017-08-02 RX ADMIN — HEPARIN SODIUM 5000 UNIT(S): 5000 INJECTION INTRAVENOUS; SUBCUTANEOUS at 06:16

## 2017-08-02 RX ADMIN — Medication 1 DROP(S): at 06:16

## 2017-08-02 RX ADMIN — Medication 100 MILLIGRAM(S): at 10:45

## 2017-08-02 RX ADMIN — MIDODRINE HYDROCHLORIDE 5 MILLIGRAM(S): 2.5 TABLET ORAL at 06:16

## 2017-08-02 RX ADMIN — Medication 1 DROP(S): at 10:45

## 2017-08-02 RX ADMIN — Medication 650 MILLIGRAM(S): at 14:36

## 2017-08-02 RX ADMIN — Medication 1 MILLIGRAM(S): at 12:32

## 2017-08-02 RX ADMIN — MIDODRINE HYDROCHLORIDE 5 MILLIGRAM(S): 2.5 TABLET ORAL at 14:37

## 2017-08-02 RX ADMIN — GABAPENTIN 300 MILLIGRAM(S): 400 CAPSULE ORAL at 06:16

## 2017-08-02 RX ADMIN — Medication 650 MILLIGRAM(S): at 06:50

## 2017-08-02 RX ADMIN — Medication 1 TABLET(S): at 12:32

## 2017-08-02 RX ADMIN — HEPARIN SODIUM 5000 UNIT(S): 5000 INJECTION INTRAVENOUS; SUBCUTANEOUS at 16:22

## 2017-08-02 RX ADMIN — GABAPENTIN 300 MILLIGRAM(S): 400 CAPSULE ORAL at 16:22

## 2017-08-02 RX ADMIN — Medication 3: at 12:31

## 2017-08-02 RX ADMIN — LIDOCAINE 1 PATCH: 4 CREAM TOPICAL at 00:40

## 2017-08-02 RX ADMIN — Medication 650 MILLIGRAM(S): at 06:16

## 2017-08-02 NOTE — PROGRESS NOTE ADULT - ASSESSMENT
87M presented from home after being discharged from [facility] the day prior with weakness and poor oral intake.  Patient found to have Coag negative Staph bacteremia.  Patient started on antibiotics per ID.    Problem/Plan - 1:  ·  Problem: Bacteremia.  Plan: patient has been on Vancomycin after each HD, TTE done showed no vegetation, EF is low , Initial Blood cx  are positive for coag negative staph. Repeat blood cx are negative. Sepsis is resolved.  likely source may be  IJ HD catheter. ID recommend 6 weeks of IV abx.  Discharge planning.     Problem/Plan - 2:  ·  Problem: ESRD on hemodialysis.  Plan: HD as per nephrology.     Problem/Plan - 3:  ·  Problem: Diabetes mellitus.  Plan: Continue sliding scale coverage.     Problem/Plan - 4:  ·  Problem: Anemia, chronic disease.  Plan: Continue Epogen and Iron, looks like anemia of chronic disease.     Problem/Plan - 5:  ·  Problem: Conjunctivitis, acute.  Plan: with corneal haziness, Continue antibiotics drops, and eye care.     Problem/Plan - 6:  Problem: Transaminitis. Plan:  US RUQ unremarkable study. Hepatitis panel is negative .    Problem/Plan - 7:  ·  Problem: Ischemic cardiomyopathy.  Plan: patient Echo done showed low ef on echo  with aortic stenosis, patient has two MR# [id number] and [id number], he had and Echo done in april 2017 showed:     Global diffuse hypokinesis with akinesis in the anterolateral wall   with prominent trebeculations. Normal perfusion on definity contrast   suggest nonischemic cardiomyopathy or resting ishemia.   3. Left ventricular ejection fraction, by visual estimation, is 30 to   35%.  new echo looks like pretty much the same, he will be following with cardiologist as out patient..     Problem/Plan - 8:  ·  Problem: Chronic systolic congestive heart failure.  Plan: stable, continue with current meds.

## 2017-08-02 NOTE — DISCHARGE NOTE ADULT - SECONDARY DIAGNOSIS.
Chronic systolic congestive heart failure Conjunctivitis, acute Dementia due to Parkinson's disease without behavioral disturbance Anemia, chronic disease Diabetes mellitus ESRD on hemodialysis

## 2017-08-02 NOTE — DISCHARGE NOTE ADULT - CARE PROVIDER_API CALL
Jimmie Alvarez), Internal Medicine; Nephrology  [address]  [place], [place] [id number]  Phone: [phone]  Fax: [phone]    Ken Pennington), Infectious Disease; Internal Medicine  [address] [place] [address]  Phone: [phone]  Fax: [phone]

## 2017-08-02 NOTE — DISCHARGE NOTE ADULT - PLAN OF CARE
Improving Complete antibiotic course (6weeks)  Follow up with ID. Continue current medications as prescribed.  Follow up with cardiologist. Complete medications. Continue current medications as prescribed.

## 2017-08-02 NOTE — DISCHARGE NOTE ADULT - HOSPITAL COURSE
87M presented from home after being discharged from [facility] the day prior with weakness and poor oral intake.  Patient found to have Coag negative Staph bacteremia.  Patient started on antibiotics per ID.  Patient improved.  Social work following regarding returning to rehab.  Patient stable for discharge to rehab with outpatient HD to complete Vancomycin during HD.  Patient to follow up with ID and nephrology.

## 2017-08-02 NOTE — DISCHARGE NOTE ADULT - MEDICATION SUMMARY - MEDICATIONS TO TAKE
I will START or STAY ON the medications listed below when I get home from the hospital:    Flomax 0.4 mg oral capsule  -- 1 cap(s) by mouth once a day (at bedtime)  -- Indication: For BPH    Neurontin 300 mg oral capsule  -- 1 cap(s) by mouth 3 times a day  -- Indication: For Neuropathy    HumaLOG  --  sliding scale subcutaneous 3 times a day  -- Indication: For Diabetes mellitus    lidocaine 5% topical film  -- Apply on skin to affected area once a day  -- Indication: For Pain    vancomycin 500 mg intravenous injection  -- 500 milligram(s) intravenous 3 xweek in HD  -- Indication: For Bacteremia    ferrous sulfate 325 mg (65 mg elemental iron) oral delayed release tablet  -- 1 tab(s) by mouth once a day  -- Indication: For Anemia, chronic disease    MiraLax oral powder for reconstitution  -- 17 gram(s) by mouth once a day (at bedtime), As Needed  -- Indication: For Constipation    midodrine 5 mg oral tablet  -- 1 tab(s) by mouth once a day  -- Indication: For ESRD on hemodialysis    Natural Tears  -- 1 drop(s) in the right eye 2 times a day  -- Indication: For Conjunctivitis, acute    erythromycin 0.5% ophthalmic ointment  -- 1 application in the right eye once a day (at bedtime)  -- Indication: For Conjunctivitis, acute    ofloxacin 0.3% ophthalmic solution  -- 1 drop(s) to each affected eye 4 times a day x2 more days  -- Indication: For Conjunctivitis, acute    calcium acetate 667 mg oral capsule  -- 1 cap(s) by mouth 3 times a day (with meals)  -- Indication: For ESRD on hemodialysis    Nephro-Zina oral tablet  -- 1 tab(s) by mouth once a day  -- Indication: For Supplement    folic acid 1 mg oral tablet  -- 1 tab(s) by mouth once a day  -- Indication: For Supplement

## 2017-08-02 NOTE — PROGRESS NOTE ADULT - SUBJECTIVE AND OBJECTIVE BOX
NEPHROLOGY INTERVAL HPI/OVERNIGHT EVENTS:  In bed comfortable    MEDICATIONS  (STANDING):  heparin  Injectable 5000 Unit(s) SubCutaneous every 12 hours  tamsulosin 0.4 milliGRAM(s) Oral at bedtime  gabapentin 300 milliGRAM(s) Oral three times a day  dextrose 5%. 1000 milliLiter(s) (50 mL/Hr) IV Continuous <Continuous>  dextrose 50% Injectable 12.5 Gram(s) IV Push once  dextrose 50% Injectable 25 Gram(s) IV Push once  dextrose 50% Injectable 25 Gram(s) IV Push once  polyethylene glycol 3350 17 Gram(s) Oral at bedtime  midodrine 5 milliGRAM(s) Oral daily  artificial  tears Solution 1 Drop(s) Right EYE two times a day  erythromycin   Ointment 1 Application(s) Right EYE at bedtime  epoetin una Injectable 71808 Unit(s) IV Push <User Schedule>  folic acid 1 milliGRAM(s) Oral daily  insulin lispro (HumaLOG) corrective regimen sliding scale   SubCutaneous Before meals and at bedtime  Nephro-viviana 1 Tablet(s) Oral daily  ferrous    sulfate 325 milliGRAM(s) Oral daily  ofloxacin 0.3% Solution 1 Drop(s) Right EYE <User Schedule>  vancomycin  IVPB 1000 milliGRAM(s) IV Intermittent <User Schedule>  acetaminophen   Tablet. 650 milliGRAM(s) Oral every 8 hours    MEDICATIONS  (PRN):  dextrose Gel 1 Dose(s) Oral once PRN Blood Glucose LESS THAN 70 milliGRAM(s)/deciliter  glucagon  Injectable 1 milliGRAM(s) IntraMuscular once PRN Glucose LESS THAN 70 milligrams/deciliter  guaiFENesin    Syrup 100 milliGRAM(s) Oral every 6 hours PRN Cough  traMADol 25 milliGRAM(s) Oral every 6 hours PRN moderate to severe pain      Allergies    No Known Allergies    Intolerances        Vital Signs Last 24 Hrs  T(C): 36 (28 Jul 2017 07:34), Max: 37.5 (28 Jul 2017 00:13)  T(F): 96.8 (28 Jul 2017 07:34), Max: 99.5 (28 Jul 2017 00:13)  HR: 84 (28 Jul 2017 07:34) (84 - 91)  BP: 100/60 (28 Jul 2017 14:45) (90/42 - 108/56)  BP(mean): --  RR: 18 (28 Jul 2017 00:13) (18 - 18)  SpO2: 98% (28 Jul 2017 07:34) (98% - 100%)  Daily     Daily     PHYSICAL EXAM:  GENERAL: NAD,   HEAD:  NO edema   NECK: Supple, No JVD,, + right permacath   CHEST/LUNG:EAE , few basilar crackles   HEART: Regular rate and rhythm; No rub   ABDOMEN: Soft, Nontender,   EXTREMITIES: decreased edema    LABS: 08-01    136  |  96<L>  |  40.0<H>  ----------------------------<  121<H>  4.7   |  25.0  |  5.28<H>    Ca    8.6      01 Aug 2017 06:36                            8.4    5.2   )-----------( 79       ( 28 Jul 2017 08:03 )             28.0     07-28    137  |  96<L>  |  28.0<H>  ----------------------------<  106  4.2   |  25.0  |  3.93<H>    Ca    8.5<L>      28 Jul 2017 08:03    TPro  6.8  /  Alb  3.3  /  TBili  0.4  /  DBili  x   /  AST  98<H>  /  ALT  232<H>  /  AlkPhos  101  07-28                RADIOLOGY & ADDITIONAL TESTS:

## 2017-08-02 NOTE — PROGRESS NOTE ADULT - SUBJECTIVE AND OBJECTIVE BOX
CC: F/u Weakness, Bacteremia    HPI:  87M presented from home after being discharged from [facility] the day prior with weakness and poor oral intake.  The patient was noted to be febrile in the emergency department and received intravenous antibiotics and fluids.     INTERVAL HPI/OVERNIGHT EVENTS: Patient seen and examined.  Patient denies any headache, dizziness, SOB, CP, abdominal pain, nausea, vomiting.  Patient forgetful and ROS limited.  No events reported from overnight.    Vital Signs Last 24 Hrs  T(C): 36.7 (02 Aug 2017 09:54), Max: 36.8 (02 Aug 2017 00:43)  T(F): 98 (02 Aug 2017 09:54), Max: 98.3 (02 Aug 2017 00:43)  HR: 82 (02 Aug 2017 09:54) (82 - 86)  BP: 110/53 (02 Aug 2017 09:54) (96/50 - 120/63)  BP(mean): --  RR: 18 (02 Aug 2017 09:54) (18 - 18)  SpO2: 99% (02 Aug 2017 09:54) (99% - 99%)  I&O's Detail    01 Aug 2017 07:01  -  02 Aug 2017 07:00  --------------------------------------------------------  IN:  Total IN: 0 mL    OUT:    Other: 1600 mL  Total OUT: 1600 mL    Total NET: -1600 mL      PHYSICAL EXAM:  GENERAL: NAD  HEAD:  Atraumatic, Normocephalic  EYES: EOMI, PERRLA, conjunctiva and sclera clear  NECK: Supple, No JVD, Normal thyroid  NERVOUS SYSTEM:  Alert, Good concentration; generalized weakness  CHEST/LUNG: Clear to auscultation bilaterally; No rales, rhonchi, wheezing, or rubs  HEART: Regular rate and rhythm; No murmurs, rubs, or gallops  ABDOMEN: Soft, Nontender, Nondistended; Bowel sounds present  EXTREMITIES:  2+ Peripheral Pulses, No clubbing, cyanosis, or edema  SKIN: No rashes or lesions                                8.6    5.0   )-----------( 96       ( 01 Aug 2017 06:36 )             28.6     01 Aug 2017 06:36    136    |  96     |  40.0   ----------------------------<  121    4.7     |  25.0   |  5.28     Ca    8.6        01 Aug 2017 06:36        CAPILLARY BLOOD GLUCOSE  162 (01 Aug 2017 22:20)  107 (01 Aug 2017 17:44)              MEDICATIONS  (STANDING):  heparin  Injectable 5000 Unit(s) SubCutaneous every 12 hours  tamsulosin 0.4 milliGRAM(s) Oral at bedtime  gabapentin 300 milliGRAM(s) Oral three times a day  dextrose 5%. 1000 milliLiter(s) (50 mL/Hr) IV Continuous <Continuous>  dextrose 50% Injectable 12.5 Gram(s) IV Push once  dextrose 50% Injectable 25 Gram(s) IV Push once  dextrose 50% Injectable 25 Gram(s) IV Push once  polyethylene glycol 3350 17 Gram(s) Oral at bedtime  artificial  tears Solution 1 Drop(s) Right EYE two times a day  erythromycin   Ointment 1 Application(s) Right EYE at bedtime  epoetin una Injectable 20042 Unit(s) IV Push <User Schedule>  folic acid 1 milliGRAM(s) Oral daily  insulin lispro (HumaLOG) corrective regimen sliding scale   SubCutaneous Before meals and at bedtime  Nephro-viviana 1 Tablet(s) Oral daily  ferrous    sulfate 325 milliGRAM(s) Oral daily  ofloxacin 0.3% Solution 1 Drop(s) Right EYE <User Schedule>  acetaminophen   Tablet. 650 milliGRAM(s) Oral every 8 hours  lidocaine   Patch 1 Patch Transdermal daily  midodrine 5 milliGRAM(s) Oral three times a day  cosyntropin Injectable 0.25 milliGRAM(s) IV Push once    MEDICATIONS  (PRN):  dextrose Gel 1 Dose(s) Oral once PRN Blood Glucose LESS THAN 70 milliGRAM(s)/deciliter  glucagon  Injectable 1 milliGRAM(s) IntraMuscular once PRN Glucose LESS THAN 70 milligrams/deciliter  guaiFENesin    Syrup 100 milliGRAM(s) Oral every 6 hours PRN Cough  traMADol 25 milliGRAM(s) Oral every 6 hours PRN moderate to severe pain

## 2017-08-02 NOTE — DISCHARGE NOTE ADULT - MEDICATION SUMMARY - MEDICATIONS TO STOP TAKING
I will STOP taking the medications listed below when I get home from the hospital:    Vigamox 0.5% ophthalmic solution  -- 1 drop(s) in the right eye every 6 hours    Lantus 100 units/mL subcutaneous solution  -- 5 unit(s) subcutaneous once a day (at bedtime)

## 2017-08-02 NOTE — DISCHARGE NOTE ADULT - CARE PLAN
Principal Discharge DX:	Bacteremia  Goal:	Improving  Instructions for follow-up, activity and diet:	Complete antibiotic course (6weeks)  Follow up with ID.  Secondary Diagnosis:	Chronic systolic congestive heart failure  Instructions for follow-up, activity and diet:	Continue current medications as prescribed.  Follow up with cardiologist.  Secondary Diagnosis:	Conjunctivitis, acute  Instructions for follow-up, activity and diet:	Complete medications.  Secondary Diagnosis:	Dementia due to Parkinson's disease without behavioral disturbance  Instructions for follow-up, activity and diet:	Continue current medications as prescribed.  Secondary Diagnosis:	Anemia, chronic disease  Instructions for follow-up, activity and diet:	Continue current medications as prescribed.  Secondary Diagnosis:	Diabetes mellitus  Instructions for follow-up, activity and diet:	Continue current medications as prescribed.  Secondary Diagnosis:	ESRD on hemodialysis  Instructions for follow-up, activity and diet:	Continue current medications as prescribed.

## 2017-08-03 ENCOUNTER — INPATIENT (INPATIENT)
Facility: HOSPITAL | Age: 82
LOS: 5 days | Discharge: NURSING, REHAB OR LONG-TERM CARE | DRG: 551 | End: 2017-08-09
Attending: SURGERY | Admitting: SURGERY
Payer: COMMERCIAL

## 2017-08-03 VITALS
OXYGEN SATURATION: 99 % | DIASTOLIC BLOOD PRESSURE: 61 MMHG | RESPIRATION RATE: 19 BRPM | SYSTOLIC BLOOD PRESSURE: 123 MMHG | TEMPERATURE: 98 F | HEART RATE: 98 BPM

## 2017-08-03 DIAGNOSIS — S09.90XA UNSPECIFIED INJURY OF HEAD, INITIAL ENCOUNTER: ICD-10-CM

## 2017-08-03 DIAGNOSIS — S12.301A UNSPECIFIED NONDISPLACED FRACTURE OF FOURTH CERVICAL VERTEBRA, INITIAL ENCOUNTER FOR CLOSED FRACTURE: ICD-10-CM

## 2017-08-03 LAB
ABO RH CONFIRMATION: SIGNIFICANT CHANGE UP
ALBUMIN SERPL ELPH-MCNC: 3.5 G/DL — SIGNIFICANT CHANGE UP (ref 3.3–5.2)
ALP SERPL-CCNC: 99 U/L — SIGNIFICANT CHANGE UP (ref 40–120)
ALT FLD-CCNC: 65 U/L — HIGH
ANION GAP SERPL CALC-SCNC: 14 MMOL/L — SIGNIFICANT CHANGE UP (ref 5–17)
ANISOCYTOSIS BLD QL: SLIGHT — SIGNIFICANT CHANGE UP
APTT BLD: 32.8 SEC — SIGNIFICANT CHANGE UP (ref 27.5–37.4)
AST SERPL-CCNC: 49 U/L — HIGH
BASE EXCESS BLDV CALC-SCNC: 4.1 MMOL/L — HIGH (ref -3–3)
BASOPHILS # BLD AUTO: 0 K/UL — SIGNIFICANT CHANGE UP (ref 0–0.2)
BASOPHILS NFR BLD AUTO: 1 % — SIGNIFICANT CHANGE UP (ref 0–2)
BILIRUB SERPL-MCNC: 0.4 MG/DL — SIGNIFICANT CHANGE UP (ref 0.4–2)
BLD GP AB SCN SERPL QL: SIGNIFICANT CHANGE UP
BUN SERPL-MCNC: 10 MG/DL — SIGNIFICANT CHANGE UP (ref 8–20)
CA-I SERPL-SCNC: 1.09 MMOL/L — LOW (ref 1.12–1.3)
CALCIUM SERPL-MCNC: 8.4 MG/DL — LOW (ref 8.6–10.2)
CHLORIDE BLDV-SCNC: 98 MMOL/L — SIGNIFICANT CHANGE UP (ref 98–106)
CHLORIDE SERPL-SCNC: 92 MMOL/L — LOW (ref 98–107)
CO2 SERPL-SCNC: 27 MMOL/L — SIGNIFICANT CHANGE UP (ref 22–29)
CREAT SERPL-MCNC: 1.89 MG/DL — HIGH (ref 0.5–1.3)
ELLIPTOCYTES BLD QL SMEAR: SLIGHT — SIGNIFICANT CHANGE UP
EOSINOPHIL # BLD AUTO: 0.1 K/UL — SIGNIFICANT CHANGE UP (ref 0–0.5)
EOSINOPHIL NFR BLD AUTO: 3 % — SIGNIFICANT CHANGE UP (ref 0–6)
GAS PNL BLDV: 133 MMOL/L — LOW (ref 136–146)
GAS PNL BLDV: SIGNIFICANT CHANGE UP
GAS PNL BLDV: SIGNIFICANT CHANGE UP
GLUCOSE BLDV-MCNC: 91 MG/DL — SIGNIFICANT CHANGE UP (ref 70–99)
GLUCOSE SERPL-MCNC: 108 MG/DL — SIGNIFICANT CHANGE UP (ref 70–115)
HBA1C BLD-MCNC: 7.2 % — HIGH (ref 4–5.6)
HCO3 BLDV-SCNC: 28 MMOL/L — HIGH (ref 20–26)
HCT VFR BLD CALC: 31.1 % — LOW (ref 42–52)
HCT VFR BLDA CALC: 27 — LOW (ref 39–50)
HGB BLD CALC-MCNC: 8.6 G/DL — LOW (ref 13–17)
HGB BLD-MCNC: 9.7 G/DL — LOW (ref 14–18)
INR BLD: 1.09 RATIO — SIGNIFICANT CHANGE UP (ref 0.88–1.16)
LACTATE BLDV-MCNC: 0.9 MMOL/L — SIGNIFICANT CHANGE UP (ref 0.5–2)
LYMPHOCYTES # BLD AUTO: 1.6 K/UL — SIGNIFICANT CHANGE UP (ref 1–4.8)
LYMPHOCYTES # BLD AUTO: 31 % — SIGNIFICANT CHANGE UP (ref 20–55)
MACROCYTES BLD QL: SLIGHT — SIGNIFICANT CHANGE UP
MCHC RBC-ENTMCNC: 27 PG — SIGNIFICANT CHANGE UP (ref 27–31)
MCHC RBC-ENTMCNC: 31.2 G/DL — LOW (ref 32–36)
MCV RBC AUTO: 86.6 FL — SIGNIFICANT CHANGE UP (ref 80–94)
MICROCYTES BLD QL: SLIGHT — SIGNIFICANT CHANGE UP
MONOCYTES # BLD AUTO: 0.5 K/UL — SIGNIFICANT CHANGE UP (ref 0–0.8)
MONOCYTES NFR BLD AUTO: 11 % — HIGH (ref 3–10)
NEUTROPHILS # BLD AUTO: 2.8 K/UL — SIGNIFICANT CHANGE UP (ref 1.8–8)
NEUTROPHILS NFR BLD AUTO: 54 % — SIGNIFICANT CHANGE UP (ref 37–73)
OTHER CELLS CSF MANUAL: 12 ML/DL — LOW (ref 18–22)
OVALOCYTES BLD QL SMEAR: SLIGHT — SIGNIFICANT CHANGE UP
PCO2 BLDV: 47 MMHG — SIGNIFICANT CHANGE UP (ref 35–50)
PH BLDV: 7.41 — SIGNIFICANT CHANGE UP (ref 7.35–7.45)
PLAT MORPH BLD: ABNORMAL
PLATELET # BLD AUTO: 121 K/UL — LOW (ref 150–400)
PO2 BLDV: 122 MMHG — HIGH (ref 25–45)
POIKILOCYTOSIS BLD QL AUTO: SLIGHT — SIGNIFICANT CHANGE UP
POTASSIUM BLDV-SCNC: 4 MMOL/L — SIGNIFICANT CHANGE UP (ref 3.4–4.5)
POTASSIUM SERPL-MCNC: 4.3 MMOL/L — SIGNIFICANT CHANGE UP (ref 3.5–5.3)
POTASSIUM SERPL-SCNC: 4.3 MMOL/L — SIGNIFICANT CHANGE UP (ref 3.5–5.3)
PROT SERPL-MCNC: 7.4 G/DL — SIGNIFICANT CHANGE UP (ref 6.6–8.7)
PROTHROM AB SERPL-ACNC: 12 SEC — SIGNIFICANT CHANGE UP (ref 9.8–12.7)
RBC # BLD: 3.59 M/UL — LOW (ref 4.6–6.2)
RBC # FLD: 18.1 % — HIGH (ref 11–15.6)
RBC BLD AUTO: ABNORMAL
SAO2 % BLDV: 99 % — HIGH (ref 67–88)
SCHISTOCYTES BLD QL AUTO: SLIGHT — SIGNIFICANT CHANGE UP
SODIUM SERPL-SCNC: 133 MMOL/L — LOW (ref 135–145)
TYPE + AB SCN PNL BLD: SIGNIFICANT CHANGE UP
WBC # BLD: 5 K/UL — SIGNIFICANT CHANGE UP (ref 4.8–10.8)
WBC # FLD AUTO: 5 K/UL — SIGNIFICANT CHANGE UP (ref 4.8–10.8)

## 2017-08-03 PROCEDURE — 99231 SBSQ HOSP IP/OBS SF/LOW 25: CPT

## 2017-08-03 PROCEDURE — 70450 CT HEAD/BRAIN W/O DYE: CPT | Mod: 26

## 2017-08-03 PROCEDURE — 99285 EMERGENCY DEPT VISIT HI MDM: CPT

## 2017-08-03 PROCEDURE — 71010: CPT | Mod: 26

## 2017-08-03 PROCEDURE — 72125 CT NECK SPINE W/O DYE: CPT | Mod: 26

## 2017-08-03 PROCEDURE — 70498 CT ANGIOGRAPHY NECK: CPT | Mod: 26

## 2017-08-03 RX ORDER — HEPARIN SODIUM 5000 [USP'U]/ML
5000 INJECTION INTRAVENOUS; SUBCUTANEOUS EVERY 8 HOURS
Qty: 0 | Refills: 0 | Status: DISCONTINUED | OUTPATIENT
Start: 2017-08-03 | End: 2017-08-09

## 2017-08-03 RX ORDER — MIDODRINE HYDROCHLORIDE 2.5 MG/1
5 TABLET ORAL ONCE
Qty: 0 | Refills: 0 | Status: COMPLETED | OUTPATIENT
Start: 2017-08-03 | End: 2017-08-04

## 2017-08-03 RX ORDER — GABAPENTIN 400 MG/1
300 CAPSULE ORAL THREE TIMES A DAY
Qty: 0 | Refills: 0 | Status: DISCONTINUED | OUTPATIENT
Start: 2017-08-03 | End: 2017-08-09

## 2017-08-03 RX ORDER — ACETAMINOPHEN 500 MG
1000 TABLET ORAL ONCE
Qty: 0 | Refills: 0 | Status: COMPLETED | OUTPATIENT
Start: 2017-08-03 | End: 2017-08-03

## 2017-08-03 RX ORDER — INSULIN LISPRO 100/ML
VIAL (ML) SUBCUTANEOUS EVERY 6 HOURS
Qty: 0 | Refills: 0 | Status: DISCONTINUED | OUTPATIENT
Start: 2017-08-03 | End: 2017-08-09

## 2017-08-03 RX ORDER — ERYTHROMYCIN BASE 5 MG/GRAM
1 OINTMENT (GRAM) OPHTHALMIC (EYE) AT BEDTIME
Qty: 0 | Refills: 0 | Status: DISCONTINUED | OUTPATIENT
Start: 2017-08-03 | End: 2017-08-09

## 2017-08-03 RX ORDER — TAMSULOSIN HYDROCHLORIDE 0.4 MG/1
0.4 CAPSULE ORAL AT BEDTIME
Qty: 0 | Refills: 0 | Status: DISCONTINUED | OUTPATIENT
Start: 2017-08-03 | End: 2017-08-09

## 2017-08-03 RX ORDER — CALCIUM ACETATE 667 MG
667 TABLET ORAL
Qty: 0 | Refills: 0 | Status: DISCONTINUED | OUTPATIENT
Start: 2017-08-03 | End: 2017-08-09

## 2017-08-03 RX ORDER — FERROUS SULFATE 325(65) MG
325 TABLET ORAL ONCE
Qty: 0 | Refills: 0 | Status: COMPLETED | OUTPATIENT
Start: 2017-08-03 | End: 2017-08-04

## 2017-08-03 RX ADMIN — GABAPENTIN 300 MILLIGRAM(S): 400 CAPSULE ORAL at 22:01

## 2017-08-03 RX ADMIN — Medication 1 APPLICATION(S): at 22:02

## 2017-08-03 RX ADMIN — Medication 400 MILLIGRAM(S): at 14:11

## 2017-08-03 RX ADMIN — TAMSULOSIN HYDROCHLORIDE 0.4 MILLIGRAM(S): 0.4 CAPSULE ORAL at 22:01

## 2017-08-03 RX ADMIN — HEPARIN SODIUM 5000 UNIT(S): 5000 INJECTION INTRAVENOUS; SUBCUTANEOUS at 18:50

## 2017-08-03 RX ADMIN — Medication 1000 MILLIGRAM(S): at 17:40

## 2017-08-03 NOTE — H&P ADULT - ATTENDING COMMENTS
The patient was seen and examined  Details per the PA's H&P  If tertiary survey is negative can consider discharge home

## 2017-08-03 NOTE — CONSULT NOTE ADULT - SUBJECTIVE AND OBJECTIVE BOX
HISTORY OF PRESENT ILLNESS:   88 y/o male PMH ESRD on dialysis, DM, HTN, h/o heart disease w/ pacemaker placement, presents to ED s/p fall from wheelchair earlier this AM at dialysis center. Patient states the aide that was helping him was not holding him so he fell. Attests to hitting the back of his head, but denies loss of consciousness. C/o mild headache and cervical neck pain, worse with palpation/movement. Denies new paresthesias (has baseline diabetic neuropathy in b/l lower extremities), weakness, dizziness, chest pain, palpitations, SOB, abdominal pain, n/v/d.     PAST MEDICAL & SURGICAL HISTORY:  Diabetes  Essential hypertension  ESRD on hemodialysis    FAMILY HISTORY:  No pertinent family history in first degree relatives    SOCIAL HISTORY:  Tobacco Use: 2-3 PPD x ~ 40 years (states he quit in 1987 and started smoking around 19 y/o)  EtOH use: h/o alcohol abuse per patient  Substance: h/o "some drug use, but never regularly" per patient    Allergies No Known Allergies    REVIEW OF SYSTEMS negative except as noted in HPI    MEDICATIONS:  acetaminophen  IVPB. 1000 milliGRAM(s) IV Intermittent once    Vital Signs Last 24 Hrs  T(C): --  T(F): --  HR: --  BP: --  BP(mean): --  RR: --  SpO2: --    PHYSICAL EXAM:  GENERAL: NAD, well-groomed, well-developed  HEAD:  Atraumatic, normocephalic  EYES: + cataract R eye. L eye sclera clear  NECK: + tenderness to palpation ~ C6/C7. Marilin collar in place.  MENTAL STATUS: AAO x3; Awake; Opens eyes spontaneously; Appropriately conversant without aphasia; following commands  CRANIAL NERVES: R pupil unable to assess/ L pupil deformed shape, difficult to assess reactivity; EOMI L pupil; no facial asymmetry appreciated; facial sensation grossly intact to light touch b/l;  tongue midline  MOTOR: strength 5/5 B/L upper extremities. Lower extremities limited d/t pain from buttock sore, grossly at least 3-4/5  COORDINATION: No pronator drift; rapid alternating movements intact b/l upper extremities  SENSATION: grossly intact to light touch all extremities  MUSCLE STRETCH REFLEXES: no Rubin's b/l  CHEST/LUNG: Clear to auscultation bilaterally; no rales, rhonchi, wheezing, or rubs  HEART: +S1/+S2; Regular rate and rhythm  ABDOMEN: Soft, nontender, nondistended;  EXTREMITIES:  No edema    LABS:                        9.7    5.0   )-----------( x        ( 03 Aug 2017 11:03 )             31.1     08-03    133<L>  |  92<L>  |  10.0  ----------------------------<  108  4.3   |  27.0  |  1.89<H>    Ca    8.4<L>      03 Aug 2017 11:03    TPro  7.4  /  Alb  3.5  /  TBili  0.4  /  DBili  x   /  AST  49<H>  /  ALT  65<H>  /  AlkPhos  99  08-03    RADIOLOGY & ADDITIONAL STUDIES:  < from: CT Cervical Spine No Cont (08.03.17 @ 11:19) >  IMPRESSION:    Acute mild C4 inferior endplate compression fracture with extension to   the anterior inferior corner of the C4 vertebral body. No evidence of   fracture fragment retropulsion. Findings discussed with trauma team at   11:05 AM    < end of copied text >

## 2017-08-03 NOTE — H&P ADULT - ASSESSMENT
87M s/p fall from wheelchair at HD center. Dialyzed anyway and transferred to [facility] as trauma B activation for AMS in field. Will image Head and C Spine. If negative findings patient will be discharged.

## 2017-08-03 NOTE — ED PROVIDER NOTE - OBJECTIVE STATEMENT
88 yo bm pmh crf on dialysis, heparin comes to ed s/p fall from wheelchair  approximately 5 hours ago with head injury and neck pain; denies loc, n/v ;

## 2017-08-03 NOTE — ED ADULT TRIAGE NOTE - CHIEF COMPLAINT QUOTE
pt alert and awake, + AMS, as per ems, pt fell out wheel chair lift at 545 am, received dialysis and dialysis nurse and Dr noticed pt was confused, LAC to back of head, code trauma B called by hospital staff at 1037

## 2017-08-03 NOTE — CONSULT NOTE ADULT - ASSESSMENT
A/P: 86 y/o male s/p fall found with C4 inferior endplate compression fx extending to anterior inferior corner of C4 body seen on CT. MRI unable to obtain due to patient hardware  - Case discussed with Dr. Austin  - No neurosurgical intervention warranted at this time  - Continue c-collar- Orthotist called to measure for fitted collar  - CTA pending as per [facility] attending A/P: 86 y/o male s/p fall found with C4 inferior endplate compression fx extending to anterior inferior corner of C4 body seen on CT. MRI unable to obtain due to patient hardware. Patient seen in ED c/o neck pain, and has h/o baseline diabetic neuropathy in b/l lower extremities; otherwise offers no other acute focal neurologic deficit.  - Case discussed with Dr. Austin  - No neurosurgical intervention warranted at this time  - Continue c-collar- Orthotist called to measure for fitted collar  - CTA pending as per [facility] attending A/P: 86 y/o male s/p fall found with C4 inferior endplate compression fx extending to anterior inferior corner of C4 body seen on CT. MRI unable to obtain due to patient hardware. Patient seen in ED c/o neck pain, and has h/o baseline diabetic neuropathy in b/l lower extremities; otherwise offers no other acute focal neurologic deficit.  - Case discussed with Dr. Austin  - No acute neurosurgical intervention warranted at this time  - Continue c-collar- Orthotist called to measure for fitted collar  - CTA pending as per [facility] attending

## 2017-08-03 NOTE — ED ADULT NURSE NOTE - OBJECTIVE STATEMENT
87 yom BIBA AFTER dialysis. pt fell upon arrival to dialysis today hit head denies head pain at this time. c/o neck "ache" 6/10 airway patent, r chest wall dialysis access. as per ems pt did not receive heparin while in dialysis due to fall but patient did receive dialysis. moves all

## 2017-08-03 NOTE — H&P ADULT - PROBLEM SELECTOR PROBLEM 1
Closed nondisplaced fracture of fourth cervical vertebra, unspecified fracture morphology, initial encounter

## 2017-08-04 LAB
ANION GAP SERPL CALC-SCNC: 18 MMOL/L — HIGH (ref 5–17)
BASOPHILS # BLD AUTO: 0 K/UL — SIGNIFICANT CHANGE UP (ref 0–0.2)
BASOPHILS NFR BLD AUTO: 0.5 % — SIGNIFICANT CHANGE UP (ref 0–2)
BUN SERPL-MCNC: 18 MG/DL — SIGNIFICANT CHANGE UP (ref 8–20)
CALCIUM SERPL-MCNC: 8.8 MG/DL — SIGNIFICANT CHANGE UP (ref 8.6–10.2)
CHLORIDE SERPL-SCNC: 90 MMOL/L — LOW (ref 98–107)
CO2 SERPL-SCNC: 25 MMOL/L — SIGNIFICANT CHANGE UP (ref 22–29)
CREAT SERPL-MCNC: 3.28 MG/DL — HIGH (ref 0.5–1.3)
EOSINOPHIL # BLD AUTO: 0.1 K/UL — SIGNIFICANT CHANGE UP (ref 0–0.5)
EOSINOPHIL NFR BLD AUTO: 2.6 % — SIGNIFICANT CHANGE UP (ref 0–5)
GLUCOSE SERPL-MCNC: 84 MG/DL — SIGNIFICANT CHANGE UP (ref 70–115)
HCT VFR BLD CALC: 29.7 % — LOW (ref 42–52)
HGB BLD-MCNC: 8.9 G/DL — LOW (ref 14–18)
LYMPHOCYTES # BLD AUTO: 1.6 K/UL — SIGNIFICANT CHANGE UP (ref 1–4.8)
LYMPHOCYTES # BLD AUTO: 37.2 % — SIGNIFICANT CHANGE UP (ref 20–55)
MAGNESIUM SERPL-MCNC: 2 MG/DL — SIGNIFICANT CHANGE UP (ref 1.8–2.6)
MCHC RBC-ENTMCNC: 26.3 PG — LOW (ref 27–31)
MCHC RBC-ENTMCNC: 30 G/DL — LOW (ref 32–36)
MCV RBC AUTO: 87.9 FL — SIGNIFICANT CHANGE UP (ref 80–94)
MONOCYTES # BLD AUTO: 0.5 K/UL — SIGNIFICANT CHANGE UP (ref 0–0.8)
MONOCYTES NFR BLD AUTO: 11.6 % — HIGH (ref 3–10)
NEUTROPHILS # BLD AUTO: 2.1 K/UL — SIGNIFICANT CHANGE UP (ref 1.8–8)
NEUTROPHILS NFR BLD AUTO: 47.9 % — SIGNIFICANT CHANGE UP (ref 37–73)
PHOSPHATE SERPL-MCNC: 3.9 MG/DL — SIGNIFICANT CHANGE UP (ref 2.4–4.7)
PLATELET # BLD AUTO: 91 K/UL — LOW (ref 150–400)
POTASSIUM SERPL-MCNC: 4.2 MMOL/L — SIGNIFICANT CHANGE UP (ref 3.5–5.3)
POTASSIUM SERPL-SCNC: 4.2 MMOL/L — SIGNIFICANT CHANGE UP (ref 3.5–5.3)
RBC # BLD: 3.38 M/UL — LOW (ref 4.6–6.2)
RBC # FLD: 18.4 % — HIGH (ref 11–15.6)
SODIUM SERPL-SCNC: 133 MMOL/L — LOW (ref 135–145)
WBC # BLD: 4.3 K/UL — LOW (ref 4.8–10.8)
WBC # FLD AUTO: 4.3 K/UL — LOW (ref 4.8–10.8)

## 2017-08-04 PROCEDURE — 99232 SBSQ HOSP IP/OBS MODERATE 35: CPT

## 2017-08-04 RX ORDER — SODIUM CHLORIDE 9 MG/ML
1000 INJECTION, SOLUTION INTRAVENOUS
Qty: 0 | Refills: 0 | Status: DISCONTINUED | OUTPATIENT
Start: 2017-08-04 | End: 2017-08-09

## 2017-08-04 RX ORDER — DEXTROSE 50 % IN WATER 50 %
1 SYRINGE (ML) INTRAVENOUS ONCE
Qty: 0 | Refills: 0 | Status: DISCONTINUED | OUTPATIENT
Start: 2017-08-04 | End: 2017-08-09

## 2017-08-04 RX ORDER — MIDODRINE HYDROCHLORIDE 2.5 MG/1
5 TABLET ORAL DAILY
Qty: 0 | Refills: 0 | Status: COMPLETED | OUTPATIENT
Start: 2017-08-04 | End: 2017-08-04

## 2017-08-04 RX ORDER — DEXTROSE 50 % IN WATER 50 %
25 SYRINGE (ML) INTRAVENOUS ONCE
Qty: 0 | Refills: 0 | Status: DISCONTINUED | OUTPATIENT
Start: 2017-08-04 | End: 2017-08-09

## 2017-08-04 RX ORDER — DEXTROSE 50 % IN WATER 50 %
12.5 SYRINGE (ML) INTRAVENOUS ONCE
Qty: 0 | Refills: 0 | Status: DISCONTINUED | OUTPATIENT
Start: 2017-08-04 | End: 2017-08-04

## 2017-08-04 RX ORDER — DEXTROSE 50 % IN WATER 50 %
50 SYRINGE (ML) INTRAVENOUS ONCE
Qty: 0 | Refills: 0 | Status: COMPLETED | OUTPATIENT
Start: 2017-08-04 | End: 2017-08-04

## 2017-08-04 RX ORDER — GLUCAGON INJECTION, SOLUTION 0.5 MG/.1ML
1 INJECTION, SOLUTION SUBCUTANEOUS ONCE
Qty: 0 | Refills: 0 | Status: DISCONTINUED | OUTPATIENT
Start: 2017-08-04 | End: 2017-08-09

## 2017-08-04 RX ORDER — DEXTROSE 50 % IN WATER 50 %
12.5 SYRINGE (ML) INTRAVENOUS ONCE
Qty: 0 | Refills: 0 | Status: DISCONTINUED | OUTPATIENT
Start: 2017-08-04 | End: 2017-08-09

## 2017-08-04 RX ORDER — MIDODRINE HYDROCHLORIDE 2.5 MG/1
5 TABLET ORAL DAILY
Qty: 0 | Refills: 0 | Status: DISCONTINUED | OUTPATIENT
Start: 2017-08-04 | End: 2017-08-09

## 2017-08-04 RX ADMIN — HEPARIN SODIUM 5000 UNIT(S): 5000 INJECTION INTRAVENOUS; SUBCUTANEOUS at 22:28

## 2017-08-04 RX ADMIN — Medication 50 MILLILITER(S): at 09:09

## 2017-08-04 RX ADMIN — Medication 1 APPLICATION(S): at 22:29

## 2017-08-04 RX ADMIN — TAMSULOSIN HYDROCHLORIDE 0.4 MILLIGRAM(S): 0.4 CAPSULE ORAL at 22:28

## 2017-08-04 RX ADMIN — HEPARIN SODIUM 5000 UNIT(S): 5000 INJECTION INTRAVENOUS; SUBCUTANEOUS at 13:27

## 2017-08-04 RX ADMIN — GABAPENTIN 300 MILLIGRAM(S): 400 CAPSULE ORAL at 05:40

## 2017-08-04 RX ADMIN — MIDODRINE HYDROCHLORIDE 5 MILLIGRAM(S): 2.5 TABLET ORAL at 11:39

## 2017-08-04 RX ADMIN — GABAPENTIN 300 MILLIGRAM(S): 400 CAPSULE ORAL at 13:26

## 2017-08-04 RX ADMIN — Medication 325 MILLIGRAM(S): at 16:35

## 2017-08-04 RX ADMIN — Medication 667 MILLIGRAM(S): at 11:39

## 2017-08-04 RX ADMIN — MIDODRINE HYDROCHLORIDE 5 MILLIGRAM(S): 2.5 TABLET ORAL at 05:39

## 2017-08-04 RX ADMIN — Medication 667 MILLIGRAM(S): at 16:35

## 2017-08-04 RX ADMIN — Medication 4: at 11:39

## 2017-08-04 RX ADMIN — GABAPENTIN 300 MILLIGRAM(S): 400 CAPSULE ORAL at 22:28

## 2017-08-04 NOTE — SWALLOW BEDSIDE ASSESSMENT ADULT - SLP GENERAL OBSERVATIONS
Pt received & seen seated upright in bed, +eating breakfast, +awake/alert, +oriented, +son/daughter present

## 2017-08-04 NOTE — PHYSICAL THERAPY INITIAL EVALUATION ADULT - ADDITIONAL COMMENTS
Pt using a RW, lives with his wife and reports 1 single step to enter. However, pt has been in and our of rehabs and hospitals and has not been home in months per patient.

## 2017-08-04 NOTE — PROGRESS NOTE ADULT - ATTENDING COMMENTS
The patient was seen and examined  Appreciate spine consult  Need to put patient back on his home medications  Nephrology follow up for HD  PT  Discharge planning

## 2017-08-04 NOTE — PROGRESS NOTE ADULT - ATTENDING COMMENTS
MARANDA Attg:    see above  patient refusing collar MARANDA Attg:    see above  patient refusing collar  I have explained to the patient and his family that there is increase risk of fracture progression and deformity/cord compression with associated neurologic deficit (numbness, tingling, weakness, sensory changes, paralysis) if collar is not worn.  The patient and his family verbalize their understanding.

## 2017-08-04 NOTE — PROGRESS NOTE ADULT - ASSESSMENT
A/P: 87y Male s/p fall found with C4 compression fracture. Patient refusing to wear c-collar even with discussion of the risks of not wearing collar.  - Discussed w/ Dr. Austin  - Continue c- collar at all times if patient complies  - No neurosurgical intervention warranted at this time  - F/u with Dr. Austin outpatient in 2 weeks

## 2017-08-04 NOTE — SWALLOW BEDSIDE ASSESSMENT ADULT - SLP PERTINENT HISTORY OF CURRENT PROBLEM
Pt known to this service & was seen for bedside swallow eval last admission (July 2017), in which a regular diet & thin fluids was RX. Pt known to this service & was seen for bedside swallow eval last admission (July 28, 2017), in which a regular diet & thin fluids was RX.

## 2017-08-04 NOTE — PROGRESS NOTE ADULT - SUBJECTIVE AND OBJECTIVE BOX
INTERVAL HPI/OVERNIGHT EVENTS:  87y Male s/p fall found with C4 compression fracture. Patient seen sleeping comfortably in bed, awoke to voice. States he's "fine." Denies headache, neck pain, paresthesias in his upper extremities, chest pain, SOB, palpitations.    Overnight patient refused to continue wearing collar. Continues to refuse collar although re-seen with Dr. Austin later in the morning, was reinforced the safety of the collar and patient states he will "think about it."    Vital Signs Last 24 Hrs  T(C): 37.4 (04 Aug 2017 08:10), Max: 37.4 (04 Aug 2017 08:10)  T(F): 99.3 (04 Aug 2017 08:10), Max: 99.3 (04 Aug 2017 08:10)  HR: 94 (04 Aug 2017 08:27) (92 - 103)  BP: 98/60 (04 Aug 2017 08:27) (95/56 - 123/61)  BP(mean): --  RR: 18 (04 Aug 2017 08:27) (18 - 19)  SpO2: 96% (04 Aug 2017 08:27) (90% - 99%)    PHYSICAL EXAM:  GENERAL: NAD, well-groomed, well-developed  HEAD:  Atraumatic, normocephalic  EYES: + cataract R eye. L eye sclera clear  NECK: Denies pain  MENTAL STATUS: AAO x3; Awake; Opens eyes spontaneously; Appropriately conversant without aphasia; following commands  CRANIAL NERVES: R pupil unable to assess/ L pupil deformed shape, difficult to assess reactivity; EOMI L pupil; no facial asymmetry appreciated; facial sensation grossly intact to light touch b/l;  tongue midline  MOTOR: strength 5/5 B/L upper extremities. Lower extremities limited d/t pain from buttock sore, grossly 3-4/5  SENSATION: grossly intact to light touch all extremities  MUSCLE STRETCH REFLEXES: no Rubin's b/l  CHEST/LUNG: Clear to auscultation bilaterally; no rales, rhonchi, wheezing, or rubs  HEART: +S1/+S2; Regular rate and rhythm  ABDOMEN: Soft, nontender, nondistended;  EXTREMITIES:  No edema    LABS:                        8.9    4.3   )-----------( 91       ( 04 Aug 2017 08:37 )             29.7     08-04    133<L>  |  90<L>  |  18.0  ----------------------------<  84  4.2   |  25.0  |  3.28<H>    Ca    8.8      04 Aug 2017 08:37  Phos  3.9     08-04  Mg     2.0     08-04    TPro  7.4  /  Alb  3.5  /  TBili  0.4  /  DBili  x   /  AST  49<H>  /  ALT  65<H>  /  AlkPhos  99  08-03    PT/INR - ( 03 Aug 2017 14:56 )   PT: 12.0 sec;   INR: 1.09 ratio         PTT - ( 03 Aug 2017 14:56 )  PTT:32.8 sec        RADIOLOGY & ADDITIONAL TESTS:    CTA neck pending final read. Prelim: "No evidence of trauma to the carotid or vertebral arteries bilaterally"

## 2017-08-04 NOTE — CONSULT NOTE ADULT - ASSESSMENT
ESRD on HD TTS no urgent need for HD currently  Next HD tommorow  BP on lw side will be cautious with UF removal  Electrolytes volume status acceptable  Cervical Fx pt wearing collar

## 2017-08-04 NOTE — ED BEHAVIORAL HEALTH NOTE - BEHAVIORAL HEALTH NOTE
Telepsychiatry contacted regarding education for how to document capacity that patient is refusing to wear C-collar. Writer provided education to resident about documentation regarding capacity to refuse a procedure. Team should contact [facility] psychiat[facility] service for psychiatric needs as patient is admitted to medical/surgical unit.

## 2017-08-04 NOTE — SWALLOW BEDSIDE ASSESSMENT ADULT - ASR SWALLOW ASPIRATION MONITOR
change of breathing pattern/cough/gurgly voice/fever/pneumonia/oral hygiene/position upright (90Y)/throat clearing/upper respiratory infection

## 2017-08-04 NOTE — PROGRESS NOTE ADULT - ASSESSMENT
88 y/o M s/p fall with C4 superior endplate fracture with ESRD on dialysis, with hypoglycemia and refusing to wear c-collar

## 2017-08-04 NOTE — CHART NOTE - NSCHARTNOTEFT_GEN_A_CORE
Patient has been reminded several times that he needs to wear a cervical collar at all times due to his cervical spine fracture. He continues to take it off whenever it is put back on. He refuses to put it on at this time despite having the risks of not wearing the cervical collar explained to him. Patient is currently laying in bed with no cervical collar on.

## 2017-08-04 NOTE — PROGRESS NOTE ADULT - PROBLEM SELECTOR PLAN 1
- Diabetic diet  - f/u FS   - ISS, with hypoglycemic protocol.   - Recommend C-Collar but pt refusing explained risk   - f/u With nephrology for dialysis   - PT/OT  - DVT prophylaxis   - Pt seen and examined with attending

## 2017-08-05 PROCEDURE — 72170 X-RAY EXAM OF PELVIS: CPT | Mod: 26

## 2017-08-05 RX ORDER — ACETAMINOPHEN 500 MG
650 TABLET ORAL EVERY 6 HOURS
Qty: 0 | Refills: 0 | Status: DISCONTINUED | OUTPATIENT
Start: 2017-08-05 | End: 2017-08-09

## 2017-08-05 RX ORDER — ERYTHROPOIETIN 10000 [IU]/ML
10000 INJECTION, SOLUTION INTRAVENOUS; SUBCUTANEOUS ONCE
Qty: 0 | Refills: 0 | Status: COMPLETED | OUTPATIENT
Start: 2017-08-05 | End: 2017-08-05

## 2017-08-05 RX ADMIN — Medication 667 MILLIGRAM(S): at 11:29

## 2017-08-05 RX ADMIN — ERYTHROPOIETIN 10000 UNIT(S): 10000 INJECTION, SOLUTION INTRAVENOUS; SUBCUTANEOUS at 15:48

## 2017-08-05 RX ADMIN — HEPARIN SODIUM 5000 UNIT(S): 5000 INJECTION INTRAVENOUS; SUBCUTANEOUS at 23:52

## 2017-08-05 RX ADMIN — Medication 667 MILLIGRAM(S): at 17:39

## 2017-08-05 RX ADMIN — GABAPENTIN 300 MILLIGRAM(S): 400 CAPSULE ORAL at 23:52

## 2017-08-05 RX ADMIN — MIDODRINE HYDROCHLORIDE 5 MILLIGRAM(S): 2.5 TABLET ORAL at 11:28

## 2017-08-05 RX ADMIN — TAMSULOSIN HYDROCHLORIDE 0.4 MILLIGRAM(S): 0.4 CAPSULE ORAL at 23:51

## 2017-08-05 RX ADMIN — Medication 650 MILLIGRAM(S): at 23:51

## 2017-08-05 RX ADMIN — HEPARIN SODIUM 5000 UNIT(S): 5000 INJECTION INTRAVENOUS; SUBCUTANEOUS at 06:33

## 2017-08-05 RX ADMIN — Medication 1 APPLICATION(S): at 23:52

## 2017-08-05 RX ADMIN — Medication 667 MILLIGRAM(S): at 08:08

## 2017-08-05 RX ADMIN — GABAPENTIN 300 MILLIGRAM(S): 400 CAPSULE ORAL at 06:33

## 2017-08-05 NOTE — CONSULT NOTE ADULT - SUBJECTIVE AND OBJECTIVE BOX
Asked to evaluate an 87 year old male who is complaining of right thigh pain for approximately 2 days.  Patient is found laying in his bed in his hospital bed comfortable with his wife at bedside.  History was obtained from the patient as well as from the wife and also the chart.  It was reported that the patient was going for dialysis when he fell from his wheel chair prior to getting to dialysis.  He went on to have his dialysis treatment however, he still had some thigh pain and was brought to the ED to be evaluated.  X-rays of the pelvis showed that the patient sustained a Right Pubic Rami Fracture.  He was admitted to the hospital for observation.      PMHx:  Diabetes    ESRD on hemodialysis    Essential hypertension    PSHx:  non contributory    Allergies:  NKDA    Review of Systems:  Muscular Skeletal: Right Thigh Pain  Remaining systems were reveiwed and found to be negative from the history obtained    Physical  Vital Signs Last 24 Hrs  T(C): 37.1 (05 Aug 2017 17:20), Max: 37.1 (05 Aug 2017 17:20)  T(F): 98.7 (05 Aug 2017 17:20), Max: 98.7 (05 Aug 2017 17:20)  HR: 103 (05 Aug 2017 17:20) (77 - 104)  BP: 134/66 (05 Aug 2017 17:20) (82/46 - 134/67)  BP(mean): --  RR: 18 (05 Aug 2017 17:20) (18 - 20)  SpO2: 100% (05 Aug 2017 17:20) (97% - 100%)    Right Lower Extremity  Decreased ROM of hip secondary to pain  + ROM of toes, + Dorsal flexion of foot  + Sensation  Calfs soft, non-tender  Compartments soft non-tender  brisk capillary refill    X-ray Pelvis:   EXAM:  PELVIS                          PROCEDURE DATE:  08/05/2017          INTERPRETATION:  Radiograph of the pelvis         CLINICAL INFORMATION: Injury with Pain.    TECHNIQUE:  A single frontal view of the pelvis was obtained.    FINDINGS:   No prior similar studies are available for review.  There is a hairline fracture of the right superior pubic ramus,   nondisplaced. No other fracture deformity identified.    The femoral heads lie within acetabulum. Osseous pelvis is otherwise   intact. Vasculature arterial calcifications noted.  Bowel overlies and obscures portions of the sacrum and iliac bone.    IMPRESSION:   Fracture right superior pubic ramus.                     SHERIE APPIAH M.D., ATTENDING RADIOLOGIST  This document has been electronically signed. Aug  5 2017  3:52PM    A/P: Pubic Rami Fracture  - OOB, PT, WBAT  - Pain medication as needed  - continue care per ACS protocol  - non operative interventions required  - f/u with Dr. Padron as needed when discharged from hospital  - Discussed plan with patient and family  - Discussed with Dr. Padron

## 2017-08-05 NOTE — PROGRESS NOTE ADULT - SUBJECTIVE AND OBJECTIVE BOX
INTERVAL HPI/OVERNIGHT EVENTS:  Patient was seen and examined at bedside this AM.  Bp was low overnight at 82/46 which as since resolved and is 119/58.  Patient continues to refuse C-collar against advise   Denies neck pain, numbness, tingling, neurological deficits in all four extremities      MEDICATIONS  (STANDING):  heparin  Injectable 5000 Unit(s) SubCutaneous every 8 hours  insulin lispro (HumaLOG) corrective regimen sliding scale   SubCutaneous every 6 hours  calcium acetate 667 milliGRAM(s) Oral three times a day with meals  tamsulosin 0.4 milliGRAM(s) Oral at bedtime  gabapentin 300 milliGRAM(s) Oral three times a day  erythromycin   Ointment 1 Application(s) Right EYE at bedtime  dextrose 5%. 1000 milliLiter(s) (50 mL/Hr) IV Continuous <Continuous>  dextrose 50% Injectable 12.5 Gram(s) IV Push once  dextrose 50% Injectable 25 Gram(s) IV Push once  dextrose 50% Injectable 25 Gram(s) IV Push once  midodrine 5 milliGRAM(s) Oral daily    MEDICATIONS  (PRN):  dextrose Gel 1 Dose(s) Oral once PRN Blood Glucose LESS THAN 70 milliGRAM(s)/deciliter  glucagon  Injectable 1 milliGRAM(s) IntraMuscular once PRN Glucose LESS THAN 70 milligrams/deciliter      Vital Signs Last 24 Hrs  T(C): 36.7 (05 Aug 2017 07:13), Max: 37 (04 Aug 2017 23:35)  T(F): 98 (05 Aug 2017 07:13), Max: 98.6 (04 Aug 2017 23:35)  HR: 97 (05 Aug 2017 07:13) (77 - 97)  BP: 119/58 (05 Aug 2017 07:13) (82/46 - 119/58)  BP(mean): --  RR: 18 (05 Aug 2017 07:13) (18 - 20)  SpO2: 99% (05 Aug 2017 07:13) (92% - 100%)    Physical Exam:  Gen: NAD, somnolent  Neurological:  No sensory/motor deficits  HEENT: PERRLA, EOMI  Respiratory: Breath Sounds equal & CTA bilaterally, no accessory muscle use  Cardiovascular: Regular rate & rhythm, normal S1, S2; no murmurs, gallops or rubs  Gastrointestinal: Soft, non-tender, nondistended  Vascular: Equal and normal pulses: 2+ peripheral pulses throughout  Musculoskeletal: No joint pain, swelling or deformity; no limitation of movement  Skin: No rashes      I&O's Detail      LABS:                        8.9    4.3   )-----------( 91       ( 04 Aug 2017 08:37 )             29.7     08-04    133<L>  |  90<L>  |  18.0  ----------------------------<  84  4.2   |  25.0  |  3.28<H>    Ca    8.8      04 Aug 2017 08:37  Phos  3.9     08-04  Mg     2.0     08-04    TPro  7.4  /  Alb  3.5  /  TBili  0.4  /  DBili  x   /  AST  49<H>  /  ALT  65<H>  /  AlkPhos  99  08-03    PT/INR - ( 03 Aug 2017 14:56 )   PT: 12.0 sec;   INR: 1.09 ratio         PTT - ( 03 Aug 2017 14:56 )  PTT:32.8 sec      RADIOLOGY & ADDITIONAL STUDIES:

## 2017-08-05 NOTE — CONSULT NOTE ADULT - CONSULT REASON
ESRD
Right Thigh Pain
Acute mild C4 inferior endplate compression fracture extending to anterior inferior corner of C4 vertebral body, without fragment retropulsion

## 2017-08-05 NOTE — PROGRESS NOTE ADULT - ASSESSMENT
Pt is a 87M s/p fall from wheelchair with a C4 superior endplate fx. Patient continues to refuse c-collar but denies any cervical pain. Hypoglycemia resolved (FS: 92). D/c planning today.  - nephro: HD today  - neuro: c-collar but refusing  - continue diet  - monitor blood sugar  - monitor BP  - d/c planning

## 2017-08-05 NOTE — PROGRESS NOTE ADULT - ASSESSMENT
ESRD on HD TTS will HD today  Next HD tommorow  BP on low side will be cautious with UF removal  Electrolytes volume status acceptable  Cervical Fx pt wearing collar ESRD on HD TTS will HD today  BP on low side will be cautious with UF removal  Electrolytes volume status acceptable  Cervical Fx pt wearing collar

## 2017-08-05 NOTE — PROGRESS NOTE ADULT - SUBJECTIVE AND OBJECTIVE BOX
NEPHROLOGY INTERVAL HPI/OVERNIGHT EVENTS:    Examined earlier  HD today    MEDICATIONS  (STANDING):  heparin  Injectable 5000 Unit(s) SubCutaneous every 8 hours  insulin lispro (HumaLOG) corrective regimen sliding scale   SubCutaneous every 6 hours  calcium acetate 667 milliGRAM(s) Oral three times a day with meals  tamsulosin 0.4 milliGRAM(s) Oral at bedtime  gabapentin 300 milliGRAM(s) Oral three times a day  erythromycin   Ointment 1 Application(s) Right EYE at bedtime  dextrose 5%. 1000 milliLiter(s) (50 mL/Hr) IV Continuous <Continuous>  dextrose 50% Injectable 12.5 Gram(s) IV Push once  dextrose 50% Injectable 25 Gram(s) IV Push once  dextrose 50% Injectable 25 Gram(s) IV Push once  midodrine 5 milliGRAM(s) Oral daily    MEDICATIONS  (PRN):  dextrose Gel 1 Dose(s) Oral once PRN Blood Glucose LESS THAN 70 milliGRAM(s)/deciliter  glucagon  Injectable 1 milliGRAM(s) IntraMuscular once PRN Glucose LESS THAN 70 milligrams/deciliter  acetaminophen   Tablet. 650 milliGRAM(s) Oral every 6 hours PRN Mild Pain (1 - 3)      Allergies    No Known Allergies    Intolerances        Vital Signs Last 24 Hrs  T(C): 36.6 (05 Aug 2017 12:52), Max: 37 (04 Aug 2017 23:35)  T(F): 97.8 (05 Aug 2017 12:52), Max: 98.6 (04 Aug 2017 23:35)  HR: 94 (05 Aug 2017 12:52) (77 - 97)  BP: 134/67 (05 Aug 2017 12:52) (82/46 - 134/67)  BP(mean): --  RR: 18 (05 Aug 2017 12:52) (18 - 20)  SpO2: 97% (05 Aug 2017 12:52) (92% - 100%)  Daily     Daily     PHYSICAL EXAM:    GENERAL: appears chronically ill  HEAD:  Atraumatic, Normocephalic  NECK: Supple  NERVOUS SYSTEM:  Alert & Oriented X2 person place  CHEST/LUNG: Cdec BS B/L  HEART: Regular rate and rhythm; No rub  ABDOMEN: Soft, Nontender, Nondistended; Bowel sounds present  EXTREMITIES:  No edema        LABS:                        8.9    4.3   )-----------( 91       ( 04 Aug 2017 08:37 )             29.7     08-04    133<L>  |  90<L>  |  18.0  ----------------------------<  84  4.2   |  25.0  |  3.28<H>    Ca    8.8      04 Aug 2017 08:37  Phos  3.9     08-04  Mg     2.0     08-04      PT/INR - ( 03 Aug 2017 14:56 )   PT: 12.0 sec;   INR: 1.09 ratio         PTT - ( 03 Aug 2017 14:56 )  PTT:32.8 sec            RADIOLOGY & ADDITIONAL TESTS:

## 2017-08-06 LAB
ANION GAP SERPL CALC-SCNC: 13 MMOL/L — SIGNIFICANT CHANGE UP (ref 5–17)
BASOPHILS # BLD AUTO: 0 K/UL — SIGNIFICANT CHANGE UP (ref 0–0.2)
BASOPHILS NFR BLD AUTO: 0.5 % — SIGNIFICANT CHANGE UP (ref 0–2)
BUN SERPL-MCNC: 17 MG/DL — SIGNIFICANT CHANGE UP (ref 8–20)
CALCIUM SERPL-MCNC: 8.3 MG/DL — LOW (ref 8.6–10.2)
CHLORIDE SERPL-SCNC: 97 MMOL/L — LOW (ref 98–107)
CO2 SERPL-SCNC: 27 MMOL/L — SIGNIFICANT CHANGE UP (ref 22–29)
CREAT SERPL-MCNC: 3.89 MG/DL — HIGH (ref 0.5–1.3)
EOSINOPHIL # BLD AUTO: 0.2 K/UL — SIGNIFICANT CHANGE UP (ref 0–0.5)
EOSINOPHIL NFR BLD AUTO: 4.6 % — SIGNIFICANT CHANGE UP (ref 0–5)
GLUCOSE SERPL-MCNC: 92 MG/DL — SIGNIFICANT CHANGE UP (ref 70–115)
HBV CORE AB SER-ACNC: SIGNIFICANT CHANGE UP
HBV SURFACE AB SER-ACNC: SIGNIFICANT CHANGE UP
HBV SURFACE AG SER-ACNC: SIGNIFICANT CHANGE UP
HCT VFR BLD CALC: 28.3 % — LOW (ref 42–52)
HCV AB S/CO SERPL IA: 0.24 S/CO — SIGNIFICANT CHANGE UP
HCV AB SERPL-IMP: SIGNIFICANT CHANGE UP
HGB BLD-MCNC: 8.4 G/DL — LOW (ref 14–18)
LYMPHOCYTES # BLD AUTO: 1.3 K/UL — SIGNIFICANT CHANGE UP (ref 1–4.8)
LYMPHOCYTES # BLD AUTO: 30.5 % — SIGNIFICANT CHANGE UP (ref 20–55)
MCHC RBC-ENTMCNC: 26.3 PG — LOW (ref 27–31)
MCHC RBC-ENTMCNC: 29.7 G/DL — LOW (ref 32–36)
MCV RBC AUTO: 88.7 FL — SIGNIFICANT CHANGE UP (ref 80–94)
MONOCYTES # BLD AUTO: 0.6 K/UL — SIGNIFICANT CHANGE UP (ref 0–0.8)
MONOCYTES NFR BLD AUTO: 13.4 % — HIGH (ref 3–10)
NEUTROPHILS # BLD AUTO: 2.2 K/UL — SIGNIFICANT CHANGE UP (ref 1.8–8)
NEUTROPHILS NFR BLD AUTO: 51 % — SIGNIFICANT CHANGE UP (ref 37–73)
PLATELET # BLD AUTO: 87 K/UL — LOW (ref 150–400)
POTASSIUM SERPL-MCNC: 4.1 MMOL/L — SIGNIFICANT CHANGE UP (ref 3.5–5.3)
POTASSIUM SERPL-SCNC: 4.1 MMOL/L — SIGNIFICANT CHANGE UP (ref 3.5–5.3)
RBC # BLD: 3.19 M/UL — LOW (ref 4.6–6.2)
RBC # FLD: 17.6 % — HIGH (ref 11–15.6)
SODIUM SERPL-SCNC: 137 MMOL/L — SIGNIFICANT CHANGE UP (ref 135–145)
WBC # BLD: 4.4 K/UL — LOW (ref 4.8–10.8)
WBC # FLD AUTO: 4.4 K/UL — LOW (ref 4.8–10.8)

## 2017-08-06 RX ADMIN — HEPARIN SODIUM 5000 UNIT(S): 5000 INJECTION INTRAVENOUS; SUBCUTANEOUS at 14:17

## 2017-08-06 RX ADMIN — HEPARIN SODIUM 5000 UNIT(S): 5000 INJECTION INTRAVENOUS; SUBCUTANEOUS at 21:52

## 2017-08-06 RX ADMIN — Medication 1 APPLICATION(S): at 21:52

## 2017-08-06 RX ADMIN — GABAPENTIN 300 MILLIGRAM(S): 400 CAPSULE ORAL at 06:39

## 2017-08-06 RX ADMIN — Medication 650 MILLIGRAM(S): at 21:54

## 2017-08-06 RX ADMIN — Medication 667 MILLIGRAM(S): at 12:25

## 2017-08-06 RX ADMIN — HEPARIN SODIUM 5000 UNIT(S): 5000 INJECTION INTRAVENOUS; SUBCUTANEOUS at 06:39

## 2017-08-06 RX ADMIN — Medication 650 MILLIGRAM(S): at 22:24

## 2017-08-06 RX ADMIN — Medication 667 MILLIGRAM(S): at 16:56

## 2017-08-06 RX ADMIN — GABAPENTIN 300 MILLIGRAM(S): 400 CAPSULE ORAL at 14:17

## 2017-08-06 RX ADMIN — GABAPENTIN 300 MILLIGRAM(S): 400 CAPSULE ORAL at 21:52

## 2017-08-06 RX ADMIN — MIDODRINE HYDROCHLORIDE 5 MILLIGRAM(S): 2.5 TABLET ORAL at 12:24

## 2017-08-06 RX ADMIN — TAMSULOSIN HYDROCHLORIDE 0.4 MILLIGRAM(S): 0.4 CAPSULE ORAL at 21:52

## 2017-08-06 RX ADMIN — Medication 667 MILLIGRAM(S): at 08:14

## 2017-08-06 NOTE — PROGRESS NOTE ADULT - SUBJECTIVE AND OBJECTIVE BOX
INTERVAL HPI/OVERNIGHT EVENTS:  Patient was seen and examined at bedside this AM.  No acute events overnight.   Continues to complain of R sided hip pain  Reports not having a bowel movement in a few days  Refuses c-collar.  Denies HA, fever, chills, nausea, vomiting, shortness of breath, chest pain, dysuria.          MEDICATIONS  (STANDING):  heparin  Injectable 5000 Unit(s) SubCutaneous every 8 hours  insulin lispro (HumaLOG) corrective regimen sliding scale   SubCutaneous every 6 hours  calcium acetate 667 milliGRAM(s) Oral three times a day with meals  tamsulosin 0.4 milliGRAM(s) Oral at bedtime  gabapentin 300 milliGRAM(s) Oral three times a day  erythromycin   Ointment 1 Application(s) Right EYE at bedtime  dextrose 5%. 1000 milliLiter(s) (50 mL/Hr) IV Continuous <Continuous>  dextrose 50% Injectable 12.5 Gram(s) IV Push once  dextrose 50% Injectable 25 Gram(s) IV Push once  dextrose 50% Injectable 25 Gram(s) IV Push once  midodrine 5 milliGRAM(s) Oral daily    MEDICATIONS  (PRN):  dextrose Gel 1 Dose(s) Oral once PRN Blood Glucose LESS THAN 70 milliGRAM(s)/deciliter  glucagon  Injectable 1 milliGRAM(s) IntraMuscular once PRN Glucose LESS THAN 70 milligrams/deciliter  acetaminophen   Tablet. 650 milliGRAM(s) Oral every 6 hours PRN Mild Pain (1 - 3)      Vital Signs Last 24 Hrs  T(C): 37.1 (06 Aug 2017 07:18), Max: 37.9 (05 Aug 2017 23:33)  T(F): 98.7 (06 Aug 2017 07:18), Max: 100.2 (05 Aug 2017 23:33)  HR: 92 (06 Aug 2017 07:18) (91 - 104)  BP: 98/53 (06 Aug 2017 07:18) (88/57 - 134/67)  BP(mean): --  RR: 18 (06 Aug 2017 07:18) (18 - 20)  SpO2: 100% (06 Aug 2017 07:18) (97% - 100%)    Gen: NAD, somnolent  Neurological:  No sensory/motor deficits  HEENT: PERRLA, EOMI  Respiratory: Breath Sounds equal & CTA bilaterally, no accessory muscle use  Cardiovascular: Regular rate & rhythm, normal S1, S2; no murmurs, gallops or rubs  Gastrointestinal: Soft, non-tender, nondistended  Vascular: Equal and normal pulses: 2+ peripheral pulses throughout  Musculoskeletal: Tenderness in R pelvic area, swelling or deformity; no limitation of movement      I&O's Detail    05 Aug 2017 07:01  -  06 Aug 2017 07:00  --------------------------------------------------------  IN:  Total IN: 0 mL    OUT:    Other: 1100 mL  Total OUT: 1100 mL    Total NET: -1100 mL          LABS:                RADIOLOGY & ADDITIONAL STUDIES:

## 2017-08-07 ENCOUNTER — TRANSCRIPTION ENCOUNTER (OUTPATIENT)
Age: 82
End: 2017-08-07

## 2017-08-07 DIAGNOSIS — N18.6 END STAGE RENAL DISEASE: ICD-10-CM

## 2017-08-07 PROCEDURE — 99223 1ST HOSP IP/OBS HIGH 75: CPT | Mod: GC

## 2017-08-07 RX ORDER — ACETAMINOPHEN 500 MG
2 TABLET ORAL
Qty: 0 | Refills: 0 | COMMUNITY
Start: 2017-08-07

## 2017-08-07 RX ORDER — GABAPENTIN 400 MG/1
1 CAPSULE ORAL
Qty: 0 | Refills: 0 | COMMUNITY
Start: 2017-08-07

## 2017-08-07 RX ORDER — LIDOCAINE 4 G/100G
1 CREAM TOPICAL DAILY
Qty: 0 | Refills: 0 | Status: DISCONTINUED | OUTPATIENT
Start: 2017-08-07 | End: 2017-08-09

## 2017-08-07 RX ADMIN — Medication 667 MILLIGRAM(S): at 11:54

## 2017-08-07 RX ADMIN — TAMSULOSIN HYDROCHLORIDE 0.4 MILLIGRAM(S): 0.4 CAPSULE ORAL at 22:54

## 2017-08-07 RX ADMIN — MIDODRINE HYDROCHLORIDE 5 MILLIGRAM(S): 2.5 TABLET ORAL at 11:55

## 2017-08-07 RX ADMIN — Medication 2: at 11:58

## 2017-08-07 RX ADMIN — Medication 1 APPLICATION(S): at 22:54

## 2017-08-07 RX ADMIN — HEPARIN SODIUM 5000 UNIT(S): 5000 INJECTION INTRAVENOUS; SUBCUTANEOUS at 06:19

## 2017-08-07 RX ADMIN — GABAPENTIN 300 MILLIGRAM(S): 400 CAPSULE ORAL at 14:48

## 2017-08-07 RX ADMIN — HEPARIN SODIUM 5000 UNIT(S): 5000 INJECTION INTRAVENOUS; SUBCUTANEOUS at 22:54

## 2017-08-07 RX ADMIN — GABAPENTIN 300 MILLIGRAM(S): 400 CAPSULE ORAL at 06:19

## 2017-08-07 RX ADMIN — HEPARIN SODIUM 5000 UNIT(S): 5000 INJECTION INTRAVENOUS; SUBCUTANEOUS at 14:48

## 2017-08-07 RX ADMIN — Medication 4: at 17:39

## 2017-08-07 RX ADMIN — GABAPENTIN 300 MILLIGRAM(S): 400 CAPSULE ORAL at 22:54

## 2017-08-07 RX ADMIN — Medication 667 MILLIGRAM(S): at 17:39

## 2017-08-07 RX ADMIN — LIDOCAINE 1 PATCH: 4 CREAM TOPICAL at 14:48

## 2017-08-07 NOTE — OCCUPATIONAL THERAPY INITIAL EVALUATION ADULT - DIAGNOSIS, OT EVAL
Moderate to large right pleural effusion; Mild C4 inferior endplate compression fx; Right superior pubic ramus fx

## 2017-08-07 NOTE — DISCHARGE NOTE ADULT - PATIENT PORTAL LINK FT
“You can access the [employer] Patient Portal, offered by [facility], by registering with the following website: [email or web address]”

## 2017-08-07 NOTE — DISCHARGE NOTE ADULT - PLAN OF CARE
return to baseline function Pt may resume regular diet as tolerated.  Pt is instructed to wear cervical collar at all times as discussed with neurosx.  Pt had been refusing to wear collar in hospital.  pt and family understand risk of progression of fracture and neurologic symptoms.  Follow UP Dr rodriguez 2 weeks from discharge. Return Patient to prior level of activity prior to fall ORTHOPEDIC DISCHARGE PLAN    - Patient can be weight bearing on Right Lower Extremity  - Recommend gait training  - Pain medication as needed for comfort  - Patient can follow up with Dr. Padron once discharged from hospital in 14 -21 days if needed Please continue hemodialysis as per your usual schedule (tuesday, thursday, saturday) with administration of epogen at the discretion of your nephrologist. Continue regular follow-up with your nephrologist as per your usual schedule. Alleviation of pain and symptoms Follow up: Please call and make an appointment with DR. AUSTIN (neurosurgeon) 2 weeks after discharge. Also, please call and make an appointment with your primary care physician as per your usual schedule.   Activity: Patient has been instructed that the cervical collar must remain in place at ALL TIMES. It has been explained to the patient and his family by Dr. Austin that there is increase risk of fracture progression and deformity/cord compression with associated neurologic deficit (numbness, tingling, weakness, sensory changes, paralysis) if collar is not worn. The patient and his family have verbalized their understanding.  Diet: May continue regular diet.  Medications: Please take all home medications as prescribed by your primary care doctor. You may take over-the-counter tylenol for pain relief as needed, and lidoderm patches to thigh.  Patient is advised to RETURN TO THE EMERGENCY DEPARTMENT for any of the following - worsening pain, fever/chills, nausea/vomiting, altered mental status, chest pain, shortness of breath, or any other new / worsening symptom. ORTHOPEDIC DISCHARGE PLAN  - Patient can be weight bearing on Right Lower Extremity  - Recommend gait training  - Pain medication as needed for comfort  - Patient can follow up with Dr. Padron once discharged from hospital in 14 -21 days if needed (contact information provided below) Please resume all home diabetes medications at current doses, monitor blood sugar at home, limit your intake of carbohydrates and sugars, and follow-up with your endocrinologist and/or your primary care provider as per your usual schedule.

## 2017-08-07 NOTE — PROGRESS NOTE ADULT - SUBJECTIVE AND OBJECTIVE BOX
NEPHROLOGY INTERVAL HPI/OVERNIGHT EVENTS:    Examined earlier  Looks comfortable    MEDICATIONS  (STANDING):  heparin  Injectable 5000 Unit(s) SubCutaneous every 8 hours  insulin lispro (HumaLOG) corrective regimen sliding scale   SubCutaneous every 6 hours  calcium acetate 667 milliGRAM(s) Oral three times a day with meals  tamsulosin 0.4 milliGRAM(s) Oral at bedtime  gabapentin 300 milliGRAM(s) Oral three times a day  erythromycin   Ointment 1 Application(s) Right EYE at bedtime  dextrose 5%. 1000 milliLiter(s) (50 mL/Hr) IV Continuous <Continuous>  dextrose 50% Injectable 12.5 Gram(s) IV Push once  dextrose 50% Injectable 25 Gram(s) IV Push once  dextrose 50% Injectable 25 Gram(s) IV Push once  midodrine 5 milliGRAM(s) Oral daily  lidocaine   Patch 1 Patch Transdermal daily    MEDICATIONS  (PRN):  dextrose Gel 1 Dose(s) Oral once PRN Blood Glucose LESS THAN 70 milliGRAM(s)/deciliter  glucagon  Injectable 1 milliGRAM(s) IntraMuscular once PRN Glucose LESS THAN 70 milligrams/deciliter  acetaminophen   Tablet. 650 milliGRAM(s) Oral every 6 hours PRN Mild Pain (1 - 3)      Allergies    No Known Allergies    Intolerances        Vital Signs Last 24 Hrs  T(C): 36.9 (07 Aug 2017 08:00), Max: 36.9 (07 Aug 2017 08:00)  T(F): 98.4 (07 Aug 2017 08:00), Max: 98.4 (07 Aug 2017 08:00)  HR: 91 (07 Aug 2017 08:00) (91 - 97)  BP: 107/51 (07 Aug 2017 08:00) (97/55 - 107/51)  BP(mean): --  RR: 18 (07 Aug 2017 08:00) (18 - 20)  SpO2: 99% (07 Aug 2017 08:00) (99% - 99%)  Daily     Daily     PHYSICAL EXAM:  GENERAL: appears chronically ill  HEAD:  Atraumatic, Normocephalic  NECK: Supple  NERVOUS SYSTEM:  Alert & Oriented X2 person place  CHEST/LUNG: Cdec BS B/L  HEART: Regular rate and rhythm; No rub  ABDOMEN: Soft, Nontender, Nondistended; Bowel sounds present  EXTREMITIES:  No edema    LABS:                        8.4    4.4   )-----------( 87       ( 06 Aug 2017 10:52 )             28.3     08-06    137  |  97<L>  |  17.0  ----------------------------<  92  4.1   |  27.0  |  3.89<H>    Ca    8.3<L>      06 Aug 2017 10:52                  RADIOLOGY & ADDITIONAL TESTS:

## 2017-08-07 NOTE — PROGRESS NOTE ADULT - SUBJECTIVE AND OBJECTIVE BOX
INTERVAL HPI/OVERNIGHT EVENTS: Patient seen and examined at bedside. No acute events overnight. Patient reports pain in his right hip that has been unchanged from previous days. He does not wear his c collar. He has not been eating much food due to dislike, not due to a decreased appetite. He is passing flatus but has not had a bowel movement in a couple days.       MEDICATIONS  (STANDING):  heparin  Injectable 5000 Unit(s) SubCutaneous every 8 hours  insulin lispro (HumaLOG) corrective regimen sliding scale   SubCutaneous every 6 hours  calcium acetate 667 milliGRAM(s) Oral three times a day with meals  tamsulosin 0.4 milliGRAM(s) Oral at bedtime  gabapentin 300 milliGRAM(s) Oral three times a day  erythromycin   Ointment 1 Application(s) Right EYE at bedtime  dextrose 5%. 1000 milliLiter(s) (50 mL/Hr) IV Continuous <Continuous>  dextrose 50% Injectable 12.5 Gram(s) IV Push once  dextrose 50% Injectable 25 Gram(s) IV Push once  dextrose 50% Injectable 25 Gram(s) IV Push once  midodrine 5 milliGRAM(s) Oral daily    MEDICATIONS  (PRN):  dextrose Gel 1 Dose(s) Oral once PRN Blood Glucose LESS THAN 70 milliGRAM(s)/deciliter  glucagon  Injectable 1 milliGRAM(s) IntraMuscular once PRN Glucose LESS THAN 70 milligrams/deciliter  acetaminophen   Tablet. 650 milliGRAM(s) Oral every 6 hours PRN Mild Pain (1 - 3)      Vital Signs Last 24 Hrs  T(C): 36.9 (07 Aug 2017 08:00), Max: 36.9 (06 Aug 2017 15:55)  T(F): 98.4 (07 Aug 2017 08:00), Max: 98.5 (06 Aug 2017 15:55)  HR: 91 (07 Aug 2017 08:00) (91 - 97)  BP: 107/51 (07 Aug 2017 08:00) (97/55 - 116/66)  BP(mean): --  RR: 18 (07 Aug 2017 08:00) (18 - 20)  SpO2: 99% (07 Aug 2017 08:00) (95% - 99%)    Physical Exam:  General: resting in bed comfortably  Respiratory: Breath Sounds equal & clear to auscultation, no accessory muscle use  Cardiovascular: Regular rate & rhythm, normal S1, S2; no murmurs, gallops or rubs  Gastrointestinal: Soft, non-tender, non distended, normal bowel sounds  Extremities: pain at the right hip  Vascular: Equal and normal pulses: 2+ peripheral pulses throughout      I&O's Detail      LABS:                        8.4    4.4   )-----------( 87       ( 06 Aug 2017 10:52 )             28.3     08-06    137  |  97<L>  |  17.0  ----------------------------<  92  4.1   |  27.0  |  3.89<H>    Ca    8.3<L>      06 Aug 2017 10:52            RADIOLOGY & ADDITIONAL STUDIES:

## 2017-08-07 NOTE — DISCHARGE NOTE ADULT - CARE PLAN
Principal Discharge DX:	Closed nondisplaced fracture of fourth cervical vertebra, unspecified fracture morphology, initial encounter  Goal:	return to baseline function  Instructions for follow-up, activity and diet:	Pt may resume regular diet as tolerated.  Pt is instructed to wear cervical collar at all times as discussed with neurosx.  Pt had been refusing to wear collar in hospital.  pt and family understand risk of progression of fracture and neurologic symptoms.  Follow UP Dr rodriguez 2 weeks from discharge.  Secondary Diagnosis:	ESRD on hemodialysis  Secondary Diagnosis:	Injury of head, initial encounter Principal Discharge DX:	Closed nondisplaced fracture of fourth cervical vertebra, unspecified fracture morphology, initial encounter  Goal:	return to baseline function  Instructions for follow-up, activity and diet:	Pt may resume regular diet as tolerated.  Pt is instructed to wear cervical collar at all times as discussed with neurosx.  Pt had been refusing to wear collar in hospital.  pt and family understand risk of progression of fracture and neurologic symptoms.  Follow UP Dr rodriguez 2 weeks from discharge.  Secondary Diagnosis:	ESRD on hemodialysis  Secondary Diagnosis:	Injury of head, initial encounter  Secondary Diagnosis:	Closed fracture of multiple pubic rami, right, initial encounter  Goal:	Return Patient to prior level of activity prior to fall  Instructions for follow-up, activity and diet:	ORTHOPEDIC DISCHARGE PLAN    - Patient can be weight bearing on Right Lower Extremity  - Recommend gait training  - Pain medication as needed for comfort  - Patient can follow up with Dr. Padron once discharged from hospital in 14 -21 days if needed Principal Discharge DX:	Closed nondisplaced fracture of fourth cervical vertebra, unspecified fracture morphology, initial encounter  Goal:	Alleviation of pain and symptoms  Instructions for follow-up, activity and diet:	Follow up: Please call and make an appointment with DR. AUSTIN (neurosurgeon) 2 weeks after discharge. Also, please call and make an appointment with your primary care physician as per your usual schedule.   Activity: Patient has been instructed that the cervical collar must remain in place at ALL TIMES. It has been explained to the patient and his family by Dr. Austin that there is increase risk of fracture progression and deformity/cord compression with associated neurologic deficit (numbness, tingling, weakness, sensory changes, paralysis) if collar is not worn. The patient and his family have verbalized their understanding.  Diet: May continue regular diet.  Medications: Please take all home medications as prescribed by your primary care doctor. You may take over-the-counter tylenol for pain relief as needed, and lidoderm patches to thigh.  Patient is advised to RETURN TO THE EMERGENCY DEPARTMENT for any of the following - worsening pain, fever/chills, nausea/vomiting, altered mental status, chest pain, shortness of breath, or any other new / worsening symptom.  Secondary Diagnosis:	ESRD on hemodialysis  Instructions for follow-up, activity and diet:	Please continue hemodialysis as per your usual schedule (tuesday, thursday, saturday) with administration of epogen at the discretion of your nephrologist. Continue regular follow-up with your nephrologist as per your usual schedule.  Secondary Diagnosis:	Injury of head, initial encounter  Secondary Diagnosis:	Closed fracture of multiple pubic rami, right, initial encounter  Goal:	Return Patient to prior level of activity prior to fall  Instructions for follow-up, activity and diet:	ORTHOPEDIC DISCHARGE PLAN  - Patient can be weight bearing on Right Lower Extremity  - Recommend gait training  - Pain medication as needed for comfort  - Patient can follow up with Dr. Padron once discharged from hospital in 14 -21 days if needed (contact information provided below) Principal Discharge DX:	Closed nondisplaced fracture of fourth cervical vertebra, unspecified fracture morphology, initial encounter  Goal:	Alleviation of pain and symptoms  Instructions for follow-up, activity and diet:	Follow up: Please call and make an appointment with DR. AUSTIN (neurosurgeon) 2 weeks after discharge. Also, please call and make an appointment with your primary care physician as per your usual schedule.   Activity: Patient has been instructed that the cervical collar must remain in place at ALL TIMES. It has been explained to the patient and his family by Dr. Austin that there is increase risk of fracture progression and deformity/cord compression with associated neurologic deficit (numbness, tingling, weakness, sensory changes, paralysis) if collar is not worn. The patient and his family have verbalized their understanding.  Diet: May continue regular diet.  Medications: Please take all home medications as prescribed by your primary care doctor. You may take over-the-counter tylenol for pain relief as needed, and lidoderm patches to thigh.  Patient is advised to RETURN TO THE EMERGENCY DEPARTMENT for any of the following - worsening pain, fever/chills, nausea/vomiting, altered mental status, chest pain, shortness of breath, or any other new / worsening symptom.  Secondary Diagnosis:	ESRD on hemodialysis  Instructions for follow-up, activity and diet:	Please continue hemodialysis as per your usual schedule (tuesday, thursday, saturday) with administration of epogen at the discretion of your nephrologist. Continue regular follow-up with your nephrologist as per your usual schedule.  Secondary Diagnosis:	Injury of head, initial encounter  Secondary Diagnosis:	Closed fracture of multiple pubic rami, right, initial encounter  Goal:	Return Patient to prior level of activity prior to fall  Instructions for follow-up, activity and diet:	ORTHOPEDIC DISCHARGE PLAN  - Patient can be weight bearing on Right Lower Extremity  - Recommend gait training  - Pain medication as needed for comfort  - Patient can follow up with Dr. Padron once discharged from hospital in 14 -21 days if needed (contact information provided below)  Secondary Diagnosis:	Diabetes  Instructions for follow-up, activity and diet:	Please resume all home diabetes medications at current doses, monitor blood sugar at home, limit your intake of carbohydrates and sugars, and follow-up with your endocrinologist and/or your primary care provider as per your usual schedule.

## 2017-08-07 NOTE — DISCHARGE NOTE ADULT - CARE PROVIDER_API CALL
Cayetano Austin), Neurosurgery  [address] [place] [address]  Phone: [phone]  Fax: [phone] Cayetano Austin), Neurosurgery  [address] [facility]  [address]  [place] [address]  Phone: [phone]  Fax: [phone]    Anirudh Padron), Orthopaedic Surgery  [address] [place] [address]  Phone: [phone]  Fax: [phone]

## 2017-08-07 NOTE — PROGRESS NOTE ADULT - PROBLEM SELECTOR PLAN 1
-c collar to be worn  -non operative management  -PT recommends discharge to [facility]  -f/u PMR recommendations

## 2017-08-07 NOTE — DISCHARGE NOTE ADULT - SECONDARY DIAGNOSIS.
ESRD on hemodialysis Injury of head, initial encounter Closed fracture of multiple pubic rami, right, initial encounter Diabetes

## 2017-08-07 NOTE — CONSULT NOTE ADULT - SUBJECTIVE AND OBJECTIVE BOX
Patient is a 87y old  Male s/p fall at dialysis center found to have C4 fracture and pubic ramus fracture.      HPI:  88yo male admitted to [facility] on 8/3 after fall out of WC at dialysis center, underwent HD session then sent to ED for AMS. In ED GCS=15, with notable cervical pain found to have acute mild C4 inferior endplate compression fracture extending to anterior inferior corner of C4 vertebral body, without fragment retropulsion; CTA negative for trauma to carotid or vertebral arteries, no acute neurosurgical intervention - C-collar fitted by orthotist however patient non-compliant despite discussion of risk by multiple providers. Also found to have nondisplaced right superior pubic ramus fracture - non-operative and pain control per ortho.    Hospital course notable for lethargy 2/2 hypoglycemia, hypotension, SLP eval recommend regular/this, CTA also showed moderate to large right pleural effusion, possibly hemothorax given history of trauma.       REVIEW OF SYSTEMS  Constitutional - No fever, No weight loss, No fatigue  HEENT - No eye pain, No visual disturbances, No difficulty hearing, No tinnitus, No vertigo, No neck pain  Respiratory - No cough, No wheezing, No shortness of breath  Cardiovascular - No chest pain, No palpitations  Gastrointestinal - No abdominal pain, No nausea, No vomiting, No diarrhea, No constipation  Genitourinary - No dysuria, No frequency, No hematuria, No incontinence  Neurological - No headaches, No memory loss, No loss of strength, No numbness, No tremors  Skin - No itching, No rashes, No lesions   Endocrine - No temperature intolerance  Musculoskeletal - No joint pain, No joint swelling, No muscle pain  Psychiatric - No depression, No anxiety    PAST MEDICAL & SURGICAL HISTORY  Diabetes with neuropathy  Essential hypertension  ESRD on hemodialysis      SOCIAL HISTORY  Smoking - Quit in 1987  EtOH - History of EtOH abuse   Drugs - Denied    FUNCTIONAL HISTORY  . Lives with wife in house with 1 HERMAN, has been in and out of rehabs/hospitals and not home for months per patient.     Independent    CURRENT FUNCTIONAL STATUS  8/4  Bed mobility- min assist  Sit-stand - mod assist  Gait- min assist RW 5'    FAMILY HISTORY   No pertinent family history in first degree relatives    RECENT LABS/IMAGING  CBC Full  -  ( 06 Aug 2017 10:52 )  WBC Count : 4.4 K/uL  Hemoglobin : 8.4 g/dL  Hematocrit : 28.3 %  Platelet Count - Automated : 87 K/uL    08-06    137  |  97<L>  |  17.0  ----------------------------<  92  4.1   |  27.0  |  3.89<H>    Ca    8.3<L>      06 Aug 2017 10:52      VITALS  T(C): 36.9 (08-07-17 @ 08:00), Max: 36.9 (08-06-17 @ 15:55)  HR: 91 (08-07-17 @ 08:00) (91 - 97)  BP: 107/51 (08-07-17 @ 08:00) (97/55 - 116/66)  RR: 18 (08-07-17 @ 08:00) (18 - 20)  SpO2: 99% (08-07-17 @ 08:00) (95% - 99%)  Wt(kg): --    ALLERGIES  No Known Allergies      MEDICATIONS   heparin  Injectable 5000 Unit(s) SubCutaneous every 8 hours  insulin lispro (HumaLOG) corrective regimen sliding scale   SubCutaneous every 6 hours  calcium acetate 667 milliGRAM(s) Oral three times a day with meals  tamsulosin 0.4 milliGRAM(s) Oral at bedtime  gabapentin 300 milliGRAM(s) Oral three times a day  erythromycin   Ointment 1 Application(s) Right EYE at bedtime  dextrose 5%. 1000 milliLiter(s) IV Continuous <Continuous>  dextrose Gel 1 Dose(s) Oral once PRN  dextrose 50% Injectable 12.5 Gram(s) IV Push once  dextrose 50% Injectable 25 Gram(s) IV Push once  dextrose 50% Injectable 25 Gram(s) IV Push once  glucagon  Injectable 1 milliGRAM(s) IntraMuscular once PRN  midodrine 5 milliGRAM(s) Oral daily  acetaminophen   Tablet. 650 milliGRAM(s) Oral every 6 hours PRN Patient is a 87y old  Male s/p fall at dialysis center found to have C4 fracture and pubic ramus fracture.      HPI:  88yo male admitted to [facility] on 8/3 after fall out of WC during transfer at dialysis center with + Head trauma, underwent HD session then sent to ED for AMS. In ED GCS=15, with notable cervical pain found to have acute mild C4 inferior endplate compression fracture extending to anterior inferior corner of C4 vertebral body, without fragment retropulsion; CTA negative for trauma to carotid or vertebral arteries, no acute neurosurgical intervention - C-collar fitted by orthotist however patient non-compliant despite discussion of risk by multiple providers. Also found to have nondisplaced right superior pubic ramus fracture - non-operative and pain control per ortho.    Hospital course notable for lethargy 2/2 hypoglycemia, hypotension, SLP eval recommend regular/this, CTA also showed moderate to large right pleural effusion, possibly hemothorax given history of trauma.     Patient wearing cervical collar and states he isnt sure its useful because he does not believe that he has a fracture in the neck, he is agreeable to wearing collar right now but not sure about later and not when in bed, discussed risks of removing collar, patient remains hesitant. Only reports right sided pain and otherwise feels well.       REVIEW OF SYSTEMS  Constitutional - No fever, No weight loss, No fatigue  HEENT - No neck pain, right eye blindness and poor vision in left eye  Respiratory - No cough, No wheezing, No shortness of breath  Cardiovascular - No chest pain, No palpitations  Gastrointestinal - No abdominal pain, No nausea, No vomiting, No diarrhea, No constipation  Genitourinary - No dysuria, No frequency, No hematuria, No incontinence  Neurological - No headaches, No memory loss, No loss of strength, No numbness, No tremors  Skin - No itching, No rashes, No lesions   Endocrine - No temperature intolerance  Musculoskeletal - No joint pain, No joint swelling, right hip pain  Psychiatric - No depression, No anxiety    PAST MEDICAL & SURGICAL HISTORY  Diabetes with neuropathy  Essential hypertension  ESRD on hemodialysis  Right eye blindness       SOCIAL HISTORY  Smoking - Quit in 1987  EtOH - History of EtOH abuse, quit in 80-90's  Drugs - Denied    FUNCTIONAL HISTORY  . Lives with wife in house with 3 HERMAN (1 HR), has been in and out of rehabs/hospitals and not home for months per patient.     Ambulates with RW, does not require assistance with ADLs.     CURRENT FUNCTIONAL STATUS  8/4  Bed mobility- min assist  Sit-stand - mod assist  Gait- min assist RW 5'    FAMILY HISTORY   No pertinent family history in first degree relatives    RECENT LABS/IMAGING  CBC Full  -  ( 06 Aug 2017 10:52 )  WBC Count : 4.4 K/uL  Hemoglobin : 8.4 g/dL  Hematocrit : 28.3 %  Platelet Count - Automated : 87 K/uL    08-06    137  |  97<L>  |  17.0  ----------------------------<  92  4.1   |  27.0  |  3.89<H>    Ca    8.3<L>      06 Aug 2017 10:52      VITALS  T(C): 36.9 (08-07-17 @ 08:00), Max: 36.9 (08-06-17 @ 15:55)  HR: 91 (08-07-17 @ 08:00) (91 - 97)  BP: 107/51 (08-07-17 @ 08:00) (97/55 - 116/66)  RR: 18 (08-07-17 @ 08:00) (18 - 20)  SpO2: 99% (08-07-17 @ 08:00) (95% - 99%)  Wt(kg): --    ALLERGIES  No Known Allergies      MEDICATIONS   heparin  Injectable 5000 Unit(s) SubCutaneous every 8 hours  insulin lispro (HumaLOG) corrective regimen sliding scale   SubCutaneous every 6 hours  calcium acetate 667 milliGRAM(s) Oral three times a day with meals  tamsulosin 0.4 milliGRAM(s) Oral at bedtime  gabapentin 300 milliGRAM(s) Oral three times a day  erythromycin   Ointment 1 Application(s) Right EYE at bedtime  dextrose 5%. 1000 milliLiter(s) IV Continuous <Continuous>  dextrose Gel 1 Dose(s) Oral once PRN  dextrose 50% Injectable 12.5 Gram(s) IV Push once  dextrose 50% Injectable 25 Gram(s) IV Push once  dextrose 50% Injectable 25 Gram(s) IV Push once  glucagon  Injectable 1 milliGRAM(s) IntraMuscular once PRN  midodrine 5 milliGRAM(s) Oral daily  acetaminophen   Tablet. 650 milliGRAM(s) Oral every 6 hours PRN  ----------------------------------------------------------------------------------------  PHYSICAL EXAM  Constitutional - NAD, Comfortable  HEENT - NCAT, EOM limited 2/2 poor vision  Neck - + Cervical collar  Chest - no distress, symmetrical chest expansion  Cardiovascular - RRR, S1S2  Abdomen -  Soft, NTND  Extremities - No C/C/E, No calf tenderness   Neurologic Exam -                    Cognitive - Awake, Alert, AAO to self, place, August 2017, situation     Communication - Fluent, No dysarthria, naming and repetition intact     Cranial Nerves - Tongue midline, pt deferred smile, EOM exam limited 2/2 poor vision but appear to be intact     Motor - No focal deficits      Sensory - Intact to LT, no extinction     Memory - Delayed recall impaired  Psychiatric - Flat affect  ----------------------------------------------------------------------------------------  ASSESSMENT/PLAN- 87M s/p fall with C4 endplate and vertebral body fracture and right pubic ramus fracture with functional impairments.    Hypotension- Midodrine  Pain - Tylenol PRN and gabapentin  DVT PPX - HSQ Patient is a 87y old  Male s/p fall at dialysis center found to have C4 fracture and pubic ramus fracture.      HPI:  88yo male admitted to [facility] on 8/3 after fall out of WC during transfer at dialysis center with + Head trauma, underwent HD session then sent to ED for AMS. In ED GCS=15, with notable cervical pain found to have acute mild C4 inferior endplate compression fracture extending to anterior inferior corner of C4 vertebral body, without fragment retropulsion; CTA negative for trauma to carotid or vertebral arteries, no acute neurosurgical intervention - C-collar fitted by orthotist however patient non-compliant despite discussion of risk by multiple providers. Also found to have nondisplaced right superior pubic ramus fracture - non-operative and pain control per ortho.    Hospital course notable for lethargy 2/2 hypoglycemia, hypotension, SLP eval recommend regular/this, CTA also showed moderate to large right pleural effusion, possibly hemothorax given history of trauma.     Patient wearing cervical collar and states he isnt sure its useful because he does not believe that he has a fracture in the neck, he is agreeable to wearing collar right now but not sure about later and not when in bed, discussed risks of removing collar, patient remains hesitant. Only reports right sided pain and otherwise feels well.     REVIEW OF SYSTEMS  Constitutional - No fever, No weight loss, No fatigue  HEENT - No neck pain, right eye blindness and poor vision in left eye  Respiratory - No cough, No wheezing, No shortness of breath  Cardiovascular - No chest pain, No palpitations  Gastrointestinal - No abdominal pain, No nausea, No vomiting, No diarrhea, No constipation  Genitourinary - No dysuria, No frequency, No hematuria, No incontinence  Neurological - No headaches, No memory loss, No loss of strength, No numbness, No tremors  Skin - No itching, No rashes, No lesions   Endocrine - No temperature intolerance  Musculoskeletal - No joint pain, No joint swelling, right hip pain  Psychiatric - No depression, No anxiety    PAST MEDICAL & SURGICAL HISTORY  Diabetes with neuropathy  Essential hypertension  ESRD on hemodialysis  Right eye blindness       SOCIAL HISTORY  Smoking - Quit in 1987  EtOH - History of EtOH abuse, quit in 80-90's  Drugs - Denied    FUNCTIONAL HISTORY  . Lives with wife in house with 3 HERMAN (1 HR), has been in and out of rehabs/hospitals and not home for months per patient.     Ambulates with RW, does not require assistance with ADLs.     CURRENT FUNCTIONAL STATUS  8/4  Bed mobility- min assist  Sit-stand - mod assist  Gait- min assist RW 5'    FAMILY HISTORY   No pertinent family history in first degree relatives    RECENT LABS/IMAGING  CBC Full  -  ( 06 Aug 2017 10:52 )  WBC Count : 4.4 K/uL  Hemoglobin : 8.4 g/dL  Hematocrit : 28.3 %  Platelet Count - Automated : 87 K/uL    08-06    137  |  97<L>  |  17.0  ----------------------------<  92  4.1   |  27.0  |  3.89<H>    Ca    8.3<L>      06 Aug 2017 10:52      VITALS  T(C): 36.9 (08-07-17 @ 08:00), Max: 36.9 (08-06-17 @ 15:55)  HR: 91 (08-07-17 @ 08:00) (91 - 97)  BP: 107/51 (08-07-17 @ 08:00) (97/55 - 116/66)  RR: 18 (08-07-17 @ 08:00) (18 - 20)  SpO2: 99% (08-07-17 @ 08:00) (95% - 99%)  Wt(kg): --    ALLERGIES  No Known Allergies      MEDICATIONS   heparin  Injectable 5000 Unit(s) SubCutaneous every 8 hours  insulin lispro (HumaLOG) corrective regimen sliding scale   SubCutaneous every 6 hours  calcium acetate 667 milliGRAM(s) Oral three times a day with meals  tamsulosin 0.4 milliGRAM(s) Oral at bedtime  gabapentin 300 milliGRAM(s) Oral three times a day  erythromycin   Ointment 1 Application(s) Right EYE at bedtime  dextrose 5%. 1000 milliLiter(s) IV Continuous <Continuous>  dextrose Gel 1 Dose(s) Oral once PRN  dextrose 50% Injectable 12.5 Gram(s) IV Push once  dextrose 50% Injectable 25 Gram(s) IV Push once  dextrose 50% Injectable 25 Gram(s) IV Push once  glucagon  Injectable 1 milliGRAM(s) IntraMuscular once PRN  midodrine 5 milliGRAM(s) Oral daily  acetaminophen   Tablet. 650 milliGRAM(s) Oral every 6 hours PRN    ----------------------------------------------------------------------------------------  PHYSICAL EXAM  Constitutional - NAD, Comfortable  HEENT - NCAT, EOM limited 2/2 poor vision  Neck - + Cervical collar  Chest - no distress, symmetrical chest expansion  Cardiovascular - RRR, S1S2  Abdomen -  Soft, NTND  Extremities - No C/C/E, No calf tenderness   Neurologic Exam -                    Cognitive - Awake, Alert, AAO to self, place, August 2017, situation     Communication - Fluent, No dysarthria, naming and repetition intact     Cranial Nerves - Tongue midline, pt deferred smile, EOM exam limited 2/2 poor vision but appear to be intact     Motor - No focal deficits      Sensory - Intact to LT, no extinction     Memory - Delayed recall impaired  Psychiatric - Flat affect  ----------------------------------------------------------------------------------------  ASSESSMENT/PLAN- 87M s/p fall with C4 endplate and vertebral body fracture and right pubic ramus fracture with functional impairments.  Hypotension - Midodrine  Pain - Tylenol PRN and gabapentin  DVT PPX - HSQ  Rehab - Home is not a safe option. Patient is noncompliant with collar and has functional deficits. Patient has been in and out of SARs in the past, unclear what his true funcitonal ability was prior if indeed was home. At this time, recommend NARINDER, patient DOES NOT meet acute inpatient rehabilitation criteria

## 2017-08-07 NOTE — PROGRESS NOTE ADULT - ASSESSMENT
ESRD on HD TTS will HD tommorow  BP on low side will be cautious with UF removal  Electrolytes volume status acceptable  Cervical Fx pt wearing collar

## 2017-08-07 NOTE — DISCHARGE NOTE ADULT - CARE PROVIDERS DIRECT ADDRESSES
[email or web address]

## 2017-08-07 NOTE — DISCHARGE NOTE ADULT - MEDICATION SUMMARY - MEDICATIONS TO TAKE
I will START or STAY ON the medications listed below when I get home from the hospital:    acetaminophen 325 mg oral tablet  -- 2 tab(s) by mouth every 6 hours, As needed, Mild Pain (1 - 3)  -- Indication: For pain    Flomax 0.4 mg oral capsule  -- 1 cap(s) by mouth once a day  -- Indication: For home med    gabapentin 300 mg oral capsule  -- 1 cap(s) by mouth 3 times a day  -- Indication: For home med    HumaLOG 100 units/mL subcutaneous solution  --  subcutaneous   -- Indication: For home med    ferrous sulfate 325 mg (65 mg elemental iron) oral tablet  -- 1 tab(s) by mouth 3 times a day  -- Indication: For home med    MiraLax  --  by mouth   -- Indication: For home med    midodrine 5 mg oral tablet  -- 2 tab(s) by mouth 3 times a day  -- Indication: For home med    erythromycin 0.5% ophthalmic ointment  --  to each affected eye   -- Indication: For home med    calcium acetate 667 mg oral tablet  -- 3 tab(s) by mouth 3 times a day  -- Indication: For home med I will START or STAY ON the medications listed below when I get home from the hospital:    acetaminophen 325 mg oral tablet  -- 2 tab(s) by mouth every 6 hours, As needed, Mild Pain (1 - 3)  -- Indication: For pain    Flomax 0.4 mg oral capsule  -- 1 cap(s) by mouth once a day  -- Indication: For home med    gabapentin 300 mg oral capsule  -- 1 cap(s) by mouth 3 times a day  -- Indication: For home med    HumaLOG 100 units/mL subcutaneous solution  --  subcutaneous   -- Indication: For home med    lidocaine 5% topical film  -- Apply on skin to right thigh once a day.  Patch may remain in place for up to 12 hours in any 24-hour period.  * External Use Only *  -- Indication: For pain    epoetin una  -- 51975 unit(s) IV push every other day. To be given on HD 3x a week [ tues thurs sat ]  -- Indication: For ESRD on hemodialysis    ferrous sulfate 325 mg (65 mg elemental iron) oral tablet  -- 1 tab(s) by mouth 3 times a day  -- Indication: For home med    MiraLax  --  by mouth   -- Indication: For home med    midodrine 5 mg oral tablet  -- 2 tab(s) by mouth 3 times a day  -- Indication: For home med    erythromycin 0.5% ophthalmic ointment  --  to each affected eye   -- Indication: For home med    calcium acetate 667 mg oral tablet  -- 3 tab(s) by mouth 3 times a day  -- Indication: For home med

## 2017-08-07 NOTE — CONSULT NOTE ADULT - ATTENDING COMMENTS
Agree with resident above.   Patient likely was not independent at home prior.   Rec NARINDER.
NSGY Attg:    see above  maintain patient in c-collar  patient unable to obtain MRI  no evidence of traumatic malalignment/dislocation on CT

## 2017-08-07 NOTE — OCCUPATIONAL THERAPY INITIAL EVALUATION ADULT - PLANNED THERAPY INTERVENTIONS, OT EVAL
motor coordination training/ROM/ADL retraining/IADL retraining/balance training/bed mobility training/transfer training

## 2017-08-07 NOTE — PROGRESS NOTE ADULT - ASSESSMENT
87 year old male admitted for cervical spine fracture sustained from a fall from a wheelchair. Also found to have a right superior pubic rami fracture that is non-operative per ortho.

## 2017-08-07 NOTE — DISCHARGE NOTE ADULT - HOSPITAL COURSE
86yo male with ESRD fell out of wheelchair at 525am (5 hrs prior to arrival) at dialysis center. Dialysis center dialyzed patient despite fall (no heparin) and then called EMS for AMS. On arrival patient primary survey intact. GCS 15. Complaining of mid cervical pain around C7. Marilin collar applied. No other signs of external trauma. No complaints of SOB, Abdominal pain, Chest pain. Awaiting CT head/C Spine.  CT Head NEG.  CT Cervical spine with C4 superior endplate fx.  Pt admitted, neurologically intact without deficit 86yo male with ESRD fell out of wheelchair at 525am (5 hrs prior to arrival) at dialysis center. Dialysis center dialyzed patient despite fall (no heparin) and then called EMS for AMS. On arrival patient primary survey intact. GCS 15. Complaining of mid cervical pain around C7. Marilin collar applied. No other signs of external trauma. No complaints of SOB, Abdominal pain, Chest pain. Awaiting CT head/C Spine.  CT Head NEG.  CT Cervical spine with C4 superior endplate fx.  Pt admitted, neurologically intact without deficit.  Eval by Neurosx recs for cervical collar at all times.  Eval by PT recs for d/c back to [facility].  Pt refusing to wear cervical collar on this admission.  Pt and family understands without collar, fx may progress.  Eval by Speech and Swallow clears pt for regular diet w/ thins.  Pt stable for d/c today. 88yo male with ESRD fell out of wheelchair at 525am (5 hrs prior to arrival) at dialysis center. Dialysis center dialyzed patient despite fall (no heparin) and then called EMS for AMS. On arrival patient primary survey intact. GCS 15. Complaining of mid cervical pain around C7. Marilin collar applied. No other signs of external trauma. No complaints of SOB, Abdominal pain, Chest pain. Awaiting CT head/C Spine.  CT Head NEG.  CT Cervical spine with C4 superior endplate fx.  Pt admitted, neurologically intact without deficit.  Eval by Neurosx recs for cervical collar at all times.  Eval by PT recs for d/c back to [facility].  Pt refusing to wear cervical collar on this admission.  Pt and family understands without collar, fx may progress. Nephrology followed patient during his admission for scheduling of HD which he receives on tuesday, thurdsay, and saturday. Ortho consulted for leg pain - patient found to have acute right superior pubic ramus fx. Ortho stated non-op intervention, WBAT, pain control. Eval by Speech and Swallow clears pt for regular diet w/ thins.  Pt stable for d/c to [facility] at this time - tolerating diet, OOB with assistance, pain controlled.    Patient is advised to RETURN TO THE EMERGENCY DEPARTMENT for any of the following - worsening pain, fever/chills, nausea/vomiting, altered mental status, chest pain, shortness of breath, or any other new / worsening symptom.

## 2017-08-08 DIAGNOSIS — S32.591A OTHER SPECIFIED FRACTURE OF RIGHT PUBIS, INITIAL ENCOUNTER FOR CLOSED FRACTURE: ICD-10-CM

## 2017-08-08 RX ORDER — ERYTHROPOIETIN 10000 [IU]/ML
10000 INJECTION, SOLUTION INTRAVENOUS; SUBCUTANEOUS EVERY OTHER DAY
Qty: 0 | Refills: 0 | Status: DISCONTINUED | OUTPATIENT
Start: 2017-08-08 | End: 2017-08-09

## 2017-08-08 RX ADMIN — Medication 667 MILLIGRAM(S): at 09:00

## 2017-08-08 RX ADMIN — LIDOCAINE 1 PATCH: 4 CREAM TOPICAL at 02:45

## 2017-08-08 RX ADMIN — Medication 1 APPLICATION(S): at 21:58

## 2017-08-08 RX ADMIN — MIDODRINE HYDROCHLORIDE 5 MILLIGRAM(S): 2.5 TABLET ORAL at 13:13

## 2017-08-08 RX ADMIN — HEPARIN SODIUM 5000 UNIT(S): 5000 INJECTION INTRAVENOUS; SUBCUTANEOUS at 06:26

## 2017-08-08 RX ADMIN — GABAPENTIN 300 MILLIGRAM(S): 400 CAPSULE ORAL at 13:13

## 2017-08-08 RX ADMIN — Medication 667 MILLIGRAM(S): at 13:13

## 2017-08-08 RX ADMIN — LIDOCAINE 1 PATCH: 4 CREAM TOPICAL at 13:13

## 2017-08-08 RX ADMIN — GABAPENTIN 300 MILLIGRAM(S): 400 CAPSULE ORAL at 21:58

## 2017-08-08 RX ADMIN — HEPARIN SODIUM 5000 UNIT(S): 5000 INJECTION INTRAVENOUS; SUBCUTANEOUS at 21:58

## 2017-08-08 RX ADMIN — Medication 650 MILLIGRAM(S): at 22:29

## 2017-08-08 RX ADMIN — GABAPENTIN 300 MILLIGRAM(S): 400 CAPSULE ORAL at 06:26

## 2017-08-08 RX ADMIN — ERYTHROPOIETIN 10000 UNIT(S): 10000 INJECTION, SOLUTION INTRAVENOUS; SUBCUTANEOUS at 18:49

## 2017-08-08 RX ADMIN — TAMSULOSIN HYDROCHLORIDE 0.4 MILLIGRAM(S): 0.4 CAPSULE ORAL at 21:58

## 2017-08-08 RX ADMIN — Medication 650 MILLIGRAM(S): at 21:59

## 2017-08-08 RX ADMIN — Medication 2: at 21:58

## 2017-08-08 RX ADMIN — HEPARIN SODIUM 5000 UNIT(S): 5000 INJECTION INTRAVENOUS; SUBCUTANEOUS at 13:13

## 2017-08-08 NOTE — PROGRESS NOTE ADULT - ASSESSMENT
87 year old male s/p fall from wheelchair sustaining C4 endplate fracture that was treated non-operatively. Also sustained a right superior pubic rami fracture that is non-operative per ortho

## 2017-08-08 NOTE — PROGRESS NOTE ADULT - SUBJECTIVE AND OBJECTIVE BOX
INTERVAL HPI/OVERNIGHT EVENTS: Patient seen and examined at bedside. No acute events overnight. Patient is complaining of right sided hip pain that has been constant since his admission. It is neither getting better nor worse. He is tolerating his regular diet, but has not eaten much due to disliking the food. He denies fevers, chills, nausea, vomiting, chest pain, neck pain, or shortness of breath. He is not wearing his c collar.     MEDICATIONS  (STANDING):  heparin  Injectable 5000 Unit(s) SubCutaneous every 8 hours  insulin lispro (HumaLOG) corrective regimen sliding scale   SubCutaneous every 6 hours  calcium acetate 667 milliGRAM(s) Oral three times a day with meals  tamsulosin 0.4 milliGRAM(s) Oral at bedtime  gabapentin 300 milliGRAM(s) Oral three times a day  erythromycin   Ointment 1 Application(s) Right EYE at bedtime  dextrose 5%. 1000 milliLiter(s) (50 mL/Hr) IV Continuous <Continuous>  dextrose 50% Injectable 12.5 Gram(s) IV Push once  dextrose 50% Injectable 25 Gram(s) IV Push once  dextrose 50% Injectable 25 Gram(s) IV Push once  midodrine 5 milliGRAM(s) Oral daily  lidocaine   Patch 1 Patch Transdermal daily    MEDICATIONS  (PRN):  dextrose Gel 1 Dose(s) Oral once PRN Blood Glucose LESS THAN 70 milliGRAM(s)/deciliter  glucagon  Injectable 1 milliGRAM(s) IntraMuscular once PRN Glucose LESS THAN 70 milligrams/deciliter  acetaminophen   Tablet. 650 milliGRAM(s) Oral every 6 hours PRN Mild Pain (1 - 3)      Vital Signs Last 24 Hrs  T(C): 36.8 (08 Aug 2017 09:00), Max: 37.5 (07 Aug 2017 23:27)  T(F): 98.2 (08 Aug 2017 09:00), Max: 99.5 (07 Aug 2017 23:27)  HR: 88 (08 Aug 2017 09:00) (88 - 102)  BP: 92/58 (08 Aug 2017 09:00) (92/58 - 100/57)  BP(mean): --  RR: 18 (08 Aug 2017 09:00) (18 - 19)  SpO2: 99% (08 Aug 2017 09:00) (98% - 100%)    Physical Exam:    General: resting in bed comfortably  Neck: no cervical neck tenderness   Respiratory: Breath Sounds equal & clear to auscultation, no accessory muscle use  Cardiovascular: Regular rate & rhythm, normal S1, S2; no murmurs, gallops or rubs  Gastrointestinal: Soft, non-tender, non distended, normal bowel sounds  Extremities: pain at the right hip, no swelling      I&O's Detail      LABS:                        8.4    4.4   )-----------( 87       ( 06 Aug 2017 10:52 )             28.3     08-06    137  |  97<L>  |  17.0  ----------------------------<  92  4.1   |  27.0  |  3.89<H>    Ca    8.3<L>      06 Aug 2017 10:52            RADIOLOGY & ADDITIONAL STUDIES:

## 2017-08-08 NOTE — PROGRESS NOTE ADULT - PROBLEM SELECTOR PLAN 1
-c collar  -non operative management  -encourage IS use  -continue diet  -pain management  -dispo planning to [facility] per PMR/OT/PT

## 2017-08-08 NOTE — PROGRESS NOTE ADULT - SUBJECTIVE AND OBJECTIVE BOX
NEPHROLOGY INTERVAL HPI/OVERNIGHT EVENTS:  No new events. Comfortable in bed with nasal 02.      MEDICATIONS  (STANDING):  heparin  Injectable 5000 Unit(s) SubCutaneous every 8 hours  insulin lispro (HumaLOG) corrective regimen sliding scale   SubCutaneous every 6 hours  calcium acetate 667 milliGRAM(s) Oral three times a day with meals  tamsulosin 0.4 milliGRAM(s) Oral at bedtime  gabapentin 300 milliGRAM(s) Oral three times a day  erythromycin   Ointment 1 Application(s) Right EYE at bedtime  dextrose 5%. 1000 milliLiter(s) (50 mL/Hr) IV Continuous <Continuous>  dextrose 50% Injectable 12.5 Gram(s) IV Push once  dextrose 50% Injectable 25 Gram(s) IV Push once  dextrose 50% Injectable 25 Gram(s) IV Push once  midodrine 5 milliGRAM(s) Oral daily  lidocaine   Patch 1 Patch Transdermal daily    MEDICATIONS  (PRN):  dextrose Gel 1 Dose(s) Oral once PRN Blood Glucose LESS THAN 70 milliGRAM(s)/deciliter  glucagon  Injectable 1 milliGRAM(s) IntraMuscular once PRN Glucose LESS THAN 70 milligrams/deciliter  acetaminophen   Tablet. 650 milliGRAM(s) Oral every 6 hours PRN Mild Pain (1 - 3)      Allergies    No Known Allergies    Intolerances        Vital Signs Last 24 Hrs  T(C): 36.9 (07 Aug 2017 08:00), Max: 36.9 (07 Aug 2017 08:00)  T(F): 98.4 (07 Aug 2017 08:00), Max: 98.4 (07 Aug 2017 08:00)  HR: 91 (07 Aug 2017 08:00) (91 - 97)  BP: 107/51 (07 Aug 2017 08:00) (97/55 - 107/51)  BP(mean): --  RR: 18 (07 Aug 2017 08:00) (18 - 20)  SpO2: 99% (07 Aug 2017 08:00) (99% - 99%)  Daily     Daily     PHYSICAL EXAM:  GENERAL: appears chronically ill  HEAD:  Atraumatic, Normocephalic  NECK: Supple, veins slightly full  NERVOUS SYSTEM:  Same  CHEST/LUNG: Clear   HEART: Regular rate and rhythm; No rub  ABDOMEN: Soft, Nontender, Nondistended; Bowel sounds present  EXTREMITIES:  No edema    LABS:                            8.4    4.4   )-----------( 87       ( 06 Aug 2017 10:52 )             28.3     08-06    137  |  97<L>  |  17.0  ----------------------------<  92  4.1   |  27.0  |  3.89<H>    Ca    8.3<L>      06 Aug 2017 10:52                  RADIOLOGY & ADDITIONAL TESTS:

## 2017-08-09 VITALS
TEMPERATURE: 98 F | DIASTOLIC BLOOD PRESSURE: 64 MMHG | SYSTOLIC BLOOD PRESSURE: 113 MMHG | RESPIRATION RATE: 18 BRPM | HEART RATE: 95 BPM

## 2017-08-09 RX ORDER — ERYTHROPOIETIN 10000 [IU]/ML
10000 INJECTION, SOLUTION INTRAVENOUS; SUBCUTANEOUS
Qty: 0 | Refills: 0 | COMMUNITY
Start: 2017-08-09

## 2017-08-09 RX ORDER — ACETAMINOPHEN 500 MG
650 TABLET ORAL EVERY 6 HOURS
Qty: 0 | Refills: 0 | Status: DISCONTINUED | OUTPATIENT
Start: 2017-08-09 | End: 2017-08-09

## 2017-08-09 RX ORDER — LIDOCAINE 4 G/100G
1 CREAM TOPICAL
Qty: 0 | Refills: 0 | COMMUNITY
Start: 2017-08-09

## 2017-08-09 RX ADMIN — GABAPENTIN 300 MILLIGRAM(S): 400 CAPSULE ORAL at 06:29

## 2017-08-09 RX ADMIN — Medication 650 MILLIGRAM(S): at 17:21

## 2017-08-09 RX ADMIN — Medication 667 MILLIGRAM(S): at 17:21

## 2017-08-09 RX ADMIN — Medication 667 MILLIGRAM(S): at 12:40

## 2017-08-09 RX ADMIN — MIDODRINE HYDROCHLORIDE 5 MILLIGRAM(S): 2.5 TABLET ORAL at 12:42

## 2017-08-09 RX ADMIN — LIDOCAINE 1 PATCH: 4 CREAM TOPICAL at 01:44

## 2017-08-09 RX ADMIN — HEPARIN SODIUM 5000 UNIT(S): 5000 INJECTION INTRAVENOUS; SUBCUTANEOUS at 06:29

## 2017-08-09 RX ADMIN — HEPARIN SODIUM 5000 UNIT(S): 5000 INJECTION INTRAVENOUS; SUBCUTANEOUS at 14:07

## 2017-08-09 RX ADMIN — Medication 667 MILLIGRAM(S): at 08:34

## 2017-08-09 NOTE — PROGRESS NOTE ADULT - ASSESSMENT
87 year old male admitted for cervical spine fracture sustained from a fall from a wheelchair. Also found to have a right superior pubic rami fracture that is non-operative per ortho.  - continue pain control  - continue regular diet, encourage increased PO intake   - encourage deep breathing, IS  - awaiting authorization for [facility]  - DVT ppx with Lovenox

## 2017-08-09 NOTE — PROGRESS NOTE ADULT - SUBJECTIVE AND OBJECTIVE BOX
NEPHROLOGY INTERVAL HPI/OVERNIGHT EVENTS:    Examined earlier  HD candie    MEDICATIONS  (STANDING):  heparin  Injectable 5000 Unit(s) SubCutaneous every 8 hours  insulin lispro (HumaLOG) corrective regimen sliding scale   SubCutaneous every 6 hours  calcium acetate 667 milliGRAM(s) Oral three times a day with meals  tamsulosin 0.4 milliGRAM(s) Oral at bedtime  gabapentin 300 milliGRAM(s) Oral three times a day  erythromycin   Ointment 1 Application(s) Right EYE at bedtime  dextrose 5%. 1000 milliLiter(s) (50 mL/Hr) IV Continuous <Continuous>  dextrose 50% Injectable 12.5 Gram(s) IV Push once  dextrose 50% Injectable 25 Gram(s) IV Push once  dextrose 50% Injectable 25 Gram(s) IV Push once  midodrine 5 milliGRAM(s) Oral daily  lidocaine   Patch 1 Patch Transdermal daily  epoetin una Injectable 76885 Unit(s) IV Push every other day  acetaminophen   Tablet. 650 milliGRAM(s) Oral every 6 hours    MEDICATIONS  (PRN):  dextrose Gel 1 Dose(s) Oral once PRN Blood Glucose LESS THAN 70 milliGRAM(s)/deciliter  glucagon  Injectable 1 milliGRAM(s) IntraMuscular once PRN Glucose LESS THAN 70 milligrams/deciliter      Allergies    No Known Allergies    Intolerances        Vital Signs Last 24 Hrs  T(C): 37.1 (09 Aug 2017 08:54), Max: 37.3 (08 Aug 2017 23:30)  T(F): 98.8 (09 Aug 2017 08:54), Max: 99.2 (08 Aug 2017 23:30)  HR: 95 (09 Aug 2017 08:54) (88 - 100)  BP: 121/73 (09 Aug 2017 08:54) (98/58 - 122/62)  BP(mean): --  RR: 20 (09 Aug 2017 08:54) (18 - 20)  SpO2: 99% (09 Aug 2017 08:54) (99% - 100%)  Daily     Daily Weight in k.6 (08 Aug 2017 20:15)    PHYSICAL EXAM:  GENERAL: appears chronically ill  HEAD:  Atraumatic, Normocephalic  NECK: Supple  NERVOUS SYSTEM:  Alert & Oriented X2 person place  CHEST/LUNG: Cdec BS B/L  HEART: Regular rate and rhythm; No rub  ABDOMEN: Soft, Nontender, Nondistended; Bowel sounds present  EXTREMITIES:  No edema      LABS:                      RADIOLOGY & ADDITIONAL TESTS:

## 2017-08-09 NOTE — PROGRESS NOTE ADULT - ASSESSMENT
ESRD on HD TTS will HD tommorow  BP had been on low side now better will be cautious with UF removal  Electrolytes volume status acceptable  Cervical Fx pt wearing collar

## 2017-08-09 NOTE — PROGRESS NOTE ADULT - SUBJECTIVE AND OBJECTIVE BOX
INTERVAL HPI/OVERNIGHT EVENTS: Patient seen and examined, no acute events overnight. Patient continues to complain of pain in his right hip which is not new. Patient continues to refuse to wear his c-collar. Patient is tolerating diet, though isn't eating much because he doesn't like the food. Patient is awaiting authorization to go to [facility]. Denies n/v/d, palpitations, cp, sob, headache.    MEDICATIONS  (STANDING):  heparin  Injectable 5000 Unit(s) SubCutaneous every 8 hours  insulin lispro (HumaLOG) corrective regimen sliding scale   SubCutaneous every 6 hours  calcium acetate 667 milliGRAM(s) Oral three times a day with meals  tamsulosin 0.4 milliGRAM(s) Oral at bedtime  gabapentin 300 milliGRAM(s) Oral three times a day  erythromycin   Ointment 1 Application(s) Right EYE at bedtime  dextrose 5%. 1000 milliLiter(s) (50 mL/Hr) IV Continuous <Continuous>  dextrose 50% Injectable 12.5 Gram(s) IV Push once  dextrose 50% Injectable 25 Gram(s) IV Push once  dextrose 50% Injectable 25 Gram(s) IV Push once  midodrine 5 milliGRAM(s) Oral daily  lidocaine   Patch 1 Patch Transdermal daily  epoetin una Injectable 49071 Unit(s) IV Push every other day  acetaminophen   Tablet. 650 milliGRAM(s) Oral every 6 hours    MEDICATIONS  (PRN):  dextrose Gel 1 Dose(s) Oral once PRN Blood Glucose LESS THAN 70 milliGRAM(s)/deciliter  glucagon  Injectable 1 milliGRAM(s) IntraMuscular once PRN Glucose LESS THAN 70 milligrams/deciliter      Vital Signs Last 24 Hrs  T(C): 37.1 (09 Aug 2017 08:54), Max: 37.3 (08 Aug 2017 23:30)  T(F): 98.8 (09 Aug 2017 08:54), Max: 99.2 (08 Aug 2017 23:30)  HR: 95 (09 Aug 2017 08:54) (88 - 100)  BP: 121/73 (09 Aug 2017 08:54) (98/58 - 122/62)  BP(mean): --  RR: 20 (09 Aug 2017 08:54) (18 - 20)  SpO2: 99% (09 Aug 2017 08:54) (99% - 100%)    PHYSICAL EXAM:      Constitutional: NAD  Eyes: EOMI  Respiratory: breathing comfortably on room air, no accessory muscle use  Cardiovascular: RRR  Gastrointestinal: abdomen soft, ND, NT, no rebound, no guarding  Genitourinary: voiding  Extremities: tenderness to right hip  Neurological: A&Ox3  Skin: warm and dry      I&O's Detail    08 Aug 2017 07:01  -  09 Aug 2017 07:00  --------------------------------------------------------  IN:  Total IN: 0 mL    OUT:    Other: 1000 mL  Total OUT: 1000 mL    Total NET: -1000 mL          LABS:                RADIOLOGY & ADDITIONAL STUDIES:

## 2017-08-10 PROBLEM — Z00.00 ENCOUNTER FOR PREVENTIVE HEALTH EXAMINATION: Noted: 2017-08-10

## 2017-08-12 ENCOUNTER — INPATIENT (INPATIENT)
Facility: HOSPITAL | Age: 82
LOS: 11 days | DRG: 260 | End: 2017-08-24
Attending: INTERNAL MEDICINE | Admitting: INTERNAL MEDICINE
Payer: COMMERCIAL

## 2017-08-12 VITALS
HEIGHT: 67 IN | HEART RATE: 92 BPM | DIASTOLIC BLOOD PRESSURE: 63 MMHG | OXYGEN SATURATION: 100 % | TEMPERATURE: 100 F | WEIGHT: 149.91 LBS | SYSTOLIC BLOOD PRESSURE: 102 MMHG | RESPIRATION RATE: 18 BRPM

## 2017-08-12 DIAGNOSIS — E87.70 FLUID OVERLOAD, UNSPECIFIED: ICD-10-CM

## 2017-08-12 LAB
ALBUMIN SERPL ELPH-MCNC: 3.3 G/DL — SIGNIFICANT CHANGE UP (ref 3.3–5.2)
ALBUMIN SERPL ELPH-MCNC: 3.4 G/DL — SIGNIFICANT CHANGE UP (ref 3.3–5.2)
ALP SERPL-CCNC: 79 U/L — SIGNIFICANT CHANGE UP (ref 40–120)
ALP SERPL-CCNC: 86 U/L — SIGNIFICANT CHANGE UP (ref 40–120)
ALT FLD-CCNC: 15 U/L — SIGNIFICANT CHANGE UP
ALT FLD-CCNC: 16 U/L — SIGNIFICANT CHANGE UP
ANION GAP SERPL CALC-SCNC: 14 MMOL/L — SIGNIFICANT CHANGE UP (ref 5–17)
ANION GAP SERPL CALC-SCNC: 15 MMOL/L — SIGNIFICANT CHANGE UP (ref 5–17)
AST SERPL-CCNC: 16 U/L — SIGNIFICANT CHANGE UP
AST SERPL-CCNC: 20 U/L — SIGNIFICANT CHANGE UP
BASE EXCESS BLDV CALC-SCNC: 1.6 MMOL/L — SIGNIFICANT CHANGE UP (ref -3–3)
BASOPHILS # BLD AUTO: 0 K/UL — SIGNIFICANT CHANGE UP (ref 0–0.2)
BASOPHILS NFR BLD AUTO: 0.5 % — SIGNIFICANT CHANGE UP (ref 0–2)
BILIRUB SERPL-MCNC: 0.3 MG/DL — LOW (ref 0.4–2)
BILIRUB SERPL-MCNC: 0.4 MG/DL — SIGNIFICANT CHANGE UP (ref 0.4–2)
BUN SERPL-MCNC: 18 MG/DL — SIGNIFICANT CHANGE UP (ref 8–20)
BUN SERPL-MCNC: 38 MG/DL — HIGH (ref 8–20)
CALCIUM SERPL-MCNC: 8.8 MG/DL — SIGNIFICANT CHANGE UP (ref 8.6–10.2)
CALCIUM SERPL-MCNC: 9.2 MG/DL — SIGNIFICANT CHANGE UP (ref 8.6–10.2)
CHLORIDE SERPL-SCNC: 95 MMOL/L — LOW (ref 98–107)
CHLORIDE SERPL-SCNC: 96 MMOL/L — LOW (ref 98–107)
CO2 SERPL-SCNC: 24 MMOL/L — SIGNIFICANT CHANGE UP (ref 22–29)
CO2 SERPL-SCNC: 26 MMOL/L — SIGNIFICANT CHANGE UP (ref 22–29)
CREAT SERPL-MCNC: 4.23 MG/DL — HIGH (ref 0.5–1.3)
CREAT SERPL-MCNC: 7.4 MG/DL — HIGH (ref 0.5–1.3)
EOSINOPHIL # BLD AUTO: 0.2 K/UL — SIGNIFICANT CHANGE UP (ref 0–0.5)
EOSINOPHIL NFR BLD AUTO: 3.4 % — SIGNIFICANT CHANGE UP (ref 0–5)
GAS PNL BLDV: SIGNIFICANT CHANGE UP
GLUCOSE SERPL-MCNC: 113 MG/DL — SIGNIFICANT CHANGE UP (ref 70–115)
GLUCOSE SERPL-MCNC: 143 MG/DL — HIGH (ref 70–115)
HCO3 BLDV-SCNC: 26 MMOL/L — SIGNIFICANT CHANGE UP (ref 20–26)
HCT VFR BLD CALC: 29.7 % — LOW (ref 42–52)
HCT VFR BLD CALC: 31.1 % — LOW (ref 42–52)
HGB BLD-MCNC: 9 G/DL — LOW (ref 14–18)
HGB BLD-MCNC: 9.5 G/DL — LOW (ref 14–18)
LYMPHOCYTES # BLD AUTO: 2 K/UL — SIGNIFICANT CHANGE UP (ref 1–4.8)
LYMPHOCYTES # BLD AUTO: 32.9 % — SIGNIFICANT CHANGE UP (ref 20–55)
MCHC RBC-ENTMCNC: 26.5 PG — LOW (ref 27–31)
MCHC RBC-ENTMCNC: 26.7 PG — LOW (ref 27–31)
MCHC RBC-ENTMCNC: 30.3 G/DL — LOW (ref 32–36)
MCHC RBC-ENTMCNC: 30.5 G/DL — LOW (ref 32–36)
MCV RBC AUTO: 87.4 FL — SIGNIFICANT CHANGE UP (ref 80–94)
MCV RBC AUTO: 87.4 FL — SIGNIFICANT CHANGE UP (ref 80–94)
MONOCYTES # BLD AUTO: 0.6 K/UL — SIGNIFICANT CHANGE UP (ref 0–0.8)
MONOCYTES NFR BLD AUTO: 9.8 % — SIGNIFICANT CHANGE UP (ref 3–10)
NEUTROPHILS # BLD AUTO: 3.2 K/UL — SIGNIFICANT CHANGE UP (ref 1.8–8)
NEUTROPHILS NFR BLD AUTO: 53.2 % — SIGNIFICANT CHANGE UP (ref 37–73)
PCO2 BLDV: 60 MMHG — HIGH (ref 35–50)
PH BLDV: 7.29 — LOW (ref 7.35–7.45)
PLATELET # BLD AUTO: 87 K/UL — LOW (ref 150–400)
PO2 BLDV: 45 MMHG — SIGNIFICANT CHANGE UP (ref 25–45)
POTASSIUM SERPL-MCNC: 4.5 MMOL/L — SIGNIFICANT CHANGE UP (ref 3.5–5.3)
POTASSIUM SERPL-MCNC: 5.3 MMOL/L — SIGNIFICANT CHANGE UP (ref 3.5–5.3)
POTASSIUM SERPL-SCNC: 4.5 MMOL/L — SIGNIFICANT CHANGE UP (ref 3.5–5.3)
POTASSIUM SERPL-SCNC: 5.3 MMOL/L — SIGNIFICANT CHANGE UP (ref 3.5–5.3)
PROT SERPL-MCNC: 7.1 G/DL — SIGNIFICANT CHANGE UP (ref 6.6–8.7)
PROT SERPL-MCNC: 7.4 G/DL — SIGNIFICANT CHANGE UP (ref 6.6–8.7)
RBC # BLD: 3.4 M/UL — LOW (ref 4.6–6.2)
RBC # BLD: 3.56 M/UL — LOW (ref 4.6–6.2)
RBC # FLD: 17.2 % — HIGH (ref 11–15.6)
RBC # FLD: 17.4 % — HIGH (ref 11–15.6)
SAO2 % BLDV: 75 % — SIGNIFICANT CHANGE UP (ref 67–88)
SODIUM SERPL-SCNC: 134 MMOL/L — LOW (ref 135–145)
SODIUM SERPL-SCNC: 136 MMOL/L — SIGNIFICANT CHANGE UP (ref 135–145)
WBC # BLD: 5.7 K/UL — SIGNIFICANT CHANGE UP (ref 4.8–10.8)
WBC # BLD: 6.1 K/UL — SIGNIFICANT CHANGE UP (ref 4.8–10.8)
WBC # FLD AUTO: 5.7 K/UL — SIGNIFICANT CHANGE UP (ref 4.8–10.8)
WBC # FLD AUTO: 6.1 K/UL — SIGNIFICANT CHANGE UP (ref 4.8–10.8)

## 2017-08-12 PROCEDURE — 71010: CPT | Mod: 26

## 2017-08-12 PROCEDURE — 99285 EMERGENCY DEPT VISIT HI MDM: CPT | Mod: 25

## 2017-08-12 PROCEDURE — 99223 1ST HOSP IP/OBS HIGH 75: CPT

## 2017-08-12 PROCEDURE — 93010 ELECTROCARDIOGRAM REPORT: CPT

## 2017-08-12 RX ORDER — INSULIN LISPRO 100/ML
VIAL (ML) SUBCUTANEOUS
Qty: 0 | Refills: 0 | Status: DISCONTINUED | OUTPATIENT
Start: 2017-08-12 | End: 2017-08-17

## 2017-08-12 RX ORDER — ERYTHROPOIETIN 10000 [IU]/ML
10000 INJECTION, SOLUTION INTRAVENOUS; SUBCUTANEOUS
Qty: 0 | Refills: 0 | Status: DISCONTINUED | OUTPATIENT
Start: 2017-08-12 | End: 2017-08-17

## 2017-08-12 RX ORDER — DEXTROSE 50 % IN WATER 50 %
25 SYRINGE (ML) INTRAVENOUS ONCE
Qty: 0 | Refills: 0 | Status: DISCONTINUED | OUTPATIENT
Start: 2017-08-12 | End: 2017-08-17

## 2017-08-12 RX ORDER — GABAPENTIN 400 MG/1
300 CAPSULE ORAL THREE TIMES A DAY
Qty: 0 | Refills: 0 | Status: DISCONTINUED | OUTPATIENT
Start: 2017-08-12 | End: 2017-08-17

## 2017-08-12 RX ORDER — GLUCAGON INJECTION, SOLUTION 0.5 MG/.1ML
1 INJECTION, SOLUTION SUBCUTANEOUS ONCE
Qty: 0 | Refills: 0 | Status: DISCONTINUED | OUTPATIENT
Start: 2017-08-12 | End: 2017-08-17

## 2017-08-12 RX ORDER — MIDODRINE HYDROCHLORIDE 2.5 MG/1
10 TABLET ORAL
Qty: 0 | Refills: 0 | Status: DISCONTINUED | OUTPATIENT
Start: 2017-08-12 | End: 2017-08-17

## 2017-08-12 RX ORDER — DEXTROSE 50 % IN WATER 50 %
12.5 SYRINGE (ML) INTRAVENOUS ONCE
Qty: 0 | Refills: 0 | Status: DISCONTINUED | OUTPATIENT
Start: 2017-08-12 | End: 2017-08-17

## 2017-08-12 RX ORDER — DEXTROSE 50 % IN WATER 50 %
1 SYRINGE (ML) INTRAVENOUS ONCE
Qty: 0 | Refills: 0 | Status: DISCONTINUED | OUTPATIENT
Start: 2017-08-12 | End: 2017-08-17

## 2017-08-12 RX ORDER — VANCOMYCIN HCL 1 G
500 VIAL (EA) INTRAVENOUS
Qty: 0 | Refills: 0 | Status: DISCONTINUED | OUTPATIENT
Start: 2017-08-12 | End: 2017-08-13

## 2017-08-12 RX ORDER — ACETAMINOPHEN 500 MG
650 TABLET ORAL EVERY 6 HOURS
Qty: 0 | Refills: 0 | Status: DISCONTINUED | OUTPATIENT
Start: 2017-08-12 | End: 2017-08-17

## 2017-08-12 RX ORDER — SODIUM CHLORIDE 9 MG/ML
1000 INJECTION, SOLUTION INTRAVENOUS
Qty: 0 | Refills: 0 | Status: DISCONTINUED | OUTPATIENT
Start: 2017-08-12 | End: 2017-08-17

## 2017-08-12 RX ORDER — ERYTHROMYCIN BASE 5 MG/GRAM
1 OINTMENT (GRAM) OPHTHALMIC (EYE) DAILY
Qty: 0 | Refills: 0 | Status: COMPLETED | OUTPATIENT
Start: 2017-08-12 | End: 2017-08-15

## 2017-08-12 RX ORDER — CALCIUM ACETATE 667 MG
667 TABLET ORAL
Qty: 0 | Refills: 0 | Status: DISCONTINUED | OUTPATIENT
Start: 2017-08-12 | End: 2017-08-17

## 2017-08-12 RX ORDER — FERROUS SULFATE 325(65) MG
325 TABLET ORAL DAILY
Qty: 0 | Refills: 0 | Status: DISCONTINUED | OUTPATIENT
Start: 2017-08-13 | End: 2017-08-17

## 2017-08-12 RX ORDER — FOLIC ACID 0.8 MG
1 TABLET ORAL DAILY
Qty: 0 | Refills: 0 | Status: DISCONTINUED | OUTPATIENT
Start: 2017-08-13 | End: 2017-08-17

## 2017-08-12 RX ORDER — TAMSULOSIN HYDROCHLORIDE 0.4 MG/1
0.4 CAPSULE ORAL AT BEDTIME
Qty: 0 | Refills: 0 | Status: DISCONTINUED | OUTPATIENT
Start: 2017-08-12 | End: 2017-08-17

## 2017-08-12 RX ORDER — POLYETHYLENE GLYCOL 3350 17 G/17G
17 POWDER, FOR SOLUTION ORAL DAILY
Qty: 0 | Refills: 0 | Status: DISCONTINUED | OUTPATIENT
Start: 2017-08-12 | End: 2017-08-17

## 2017-08-12 RX ADMIN — TAMSULOSIN HYDROCHLORIDE 0.4 MILLIGRAM(S): 0.4 CAPSULE ORAL at 23:39

## 2017-08-12 RX ADMIN — MIDODRINE HYDROCHLORIDE 10 MILLIGRAM(S): 2.5 TABLET ORAL at 17:32

## 2017-08-12 RX ADMIN — ERYTHROPOIETIN 10000 UNIT(S): 10000 INJECTION, SOLUTION INTRAVENOUS; SUBCUTANEOUS at 16:22

## 2017-08-12 RX ADMIN — GABAPENTIN 300 MILLIGRAM(S): 400 CAPSULE ORAL at 23:38

## 2017-08-12 NOTE — H&P ADULT - PROBLEM SELECTOR PLAN 9
DVT PPx with scd boots b/l and PT encouraging ambulation. No chemical AC given unclear etiology of thrombocytopenia.

## 2017-08-12 NOTE — ED PROVIDER NOTE - OBJECTIVE STATEMENT
87M with ESRD on HD, CHF, DM, HTN, sent from [place] for 2 days of missed dialysis since Thursday. Pt is s/p C4 compression fx ( with C collar in place) and R pubic ramus fracture s/p being dropped during a transfer last week. Since then pt has been trying to find a new transfer service ? unsuccessfully and was thus brought to ER for dialysis today. Pt c/o chronic R abdominal pain since prior to fall, but denies any other acute complaints, including chest pain/ palpitations/ shortness of breath. Pt anuric.

## 2017-08-12 NOTE — ED ADULT NURSE NOTE - CHIEF COMPLAINT QUOTE
Pt sent from [facility] for "emergent dialysis due to not having transport because patient was dropped by previous transport and will need to have new transport set up and has missed his Thursday dialysis and is due for another dialysis today" as per nursing supervision at [facility], pt with c-collar in place, offers no complaints, denies pain

## 2017-08-12 NOTE — H&P ADULT - ASSESSMENT
87 [facility] with PMHx dementia, HTN, HLD, DM, chronic systolic HFrEF:20-25% (July 2017), BPH and ESRD on HD tu th sat, was d/c from [facility] on 8/2/17 after being tx for coag negative staph bacteremia with vancomycin during HD to end on 9/2/17; pt was sent to the hospital for emergent HD secondary to transportation issue, b/c last transport team dropped the pt and he obtained a c4 compression fx and pubic ramus fx. Pt with cngestion on exam, mildly fluid overloaded. No electrolyte derangements. Nephrology consulted and pt S/p emergent HD now doing well. 87 M from [facility] with PMHx dementia, HTN, HLD, DM, chronic systolic HFrEF:20-25% (July 2017), ICM s/p bi vent ICD (medtronic), BPH and ESRD on HD tu th sat, was d/c from [facility] on 8/2/17 after being tx for coag negative staph bacteremia with vancomycin during HD to end on 9/2/17; pt was sent to the hospital for emergent HD secondary to transportation issue, b/c last transport team dropped the pt and he obtained a c4 compression fx and pubic ramus fx. Pt with cngestion on exam, mildly fluid overloaded. No electrolyte derangements. Nephrology consulted and pt S/p emergent HD now doing well.

## 2017-08-12 NOTE — ED ADULT NURSE NOTE - OBJECTIVE STATEMENT
Patient is alert with confusion, complaining of pain to right side abdomen. Denies any chest pain, shortness of breath, nausea or dizziness. Respirations even & unlabored, denies any numbness or tingling. C-collar in place. Report from triage nurse is that patient is in ED for dialysis. Last dialyzed on Thursday. Patient does not have transport to dialysis at this time secondary to accident with previous transport company.

## 2017-08-12 NOTE — H&P ADULT - PSH
No significant past surgical history ICD (implantable cardioverter-defibrillator), biventricular, in situ

## 2017-08-12 NOTE — H&P ADULT - HISTORY OF PRESENT ILLNESS
87 M from [facility] with PMHx dementia, HTN, HLD, DM, chronic systolic HFrEF:20-25% (July 2017), BPH and ESRD on HD tu th sat, was d/c from [facility] ON 8/2/17 after being tx for coag negative staph bacteremia with vancomycin during HD to end on 9/2/17; pt was sent to the hospital for emergent HD. As per the nursing supervisor at [facility] last thursday the pt had an accident during transport to the HD facility and was dropped and obtained a c4 fracture and pt was placed in a c collar along with a right pubic ramus fx; currently was sent from the facility for emergent dialysis due to not having transport because the patient was dropped by previous transport and will need to have new transport set up. Pt missed his Thursday dialysis and is due for another dialysis today as per nursing supervision at [facility]. Patient is anuric. Patient states that he feels well, he wants to go back to the facility and is tired of this issue he is having in regards to the transportation. He is AAox2, very lethargic but is able to answer all questions appropriately. Patient denies chest pain, SOB, abd pain, N/V, fever, chills, headache or any other complaints. All remainder ROS negative. 87 M from [facility] with PMHx dementia, HTN, HLD, DM, chronic systolic HFrEF:20-25% (July 2017), ICM s/p bi vent ICD (medtronic), BPH and ESRD on HD tu th sat, was d/c from [facility] ON 8/2/17 after being tx for coag negative staph bacteremia with vancomycin during HD to end on 9/2/17; pt was sent to the hospital for emergent HD. As per the nursing supervisor at [facility] last thursday the pt had an accident during transport to the HD facility and was dropped and obtained a c4 fracture and pt was placed in a c collar along with a right pubic ramus fx; currently was sent from the facility for emergent dialysis due to not having transport because the patient was dropped by previous transport and will need to have new transport set up. Pt missed his Thursday dialysis and is due for another dialysis today as per nursing supervision at [facility]. Patient is anuric. Patient states that he feels well, he wants to go back to the facility and is tired of this issue he is having in regards to the transportation. He is AAox2, very lethargic but is able to answer all questions appropriately. Patient denies chest pain, SOB, abd pain, N/V, fever, chills, headache or any other complaints. All remainder ROS negative.

## 2017-08-12 NOTE — ED ADULT NURSE REASSESSMENT NOTE - NS ED NURSE REASSESS COMMENT FT1
verbal report given to LILLY hansen in holding area. pt resting comfortably in stretcher. breathing si even and unlabored. pt admitted. pt awaiting bed availability on floor. will continue to monitor and reassess

## 2017-08-12 NOTE — H&P ADULT - PMH
Compression fracture of C-spine  c4  Dementia    Diabetes mellitus    ESRD on dialysis    Hyperlipidemia    Hypertension    Muscle weakness (generalized)    Pubic ramus fracture, right, sequela

## 2017-08-12 NOTE — CONSULT NOTE ADULT - SUBJECTIVE AND OBJECTIVE BOX
Patient is a 87y old  Male who presents with a chief complaint of some SOB, missed HD    HPI:was recently d/c from [facility] when admitted with Wkness      PAST MEDICAL & SURGICAL HISTORY:  Dementia  Muscle weakness (generalized)  Ventricular tachycardia  Diabetes mellitus  Renal failure  Hypertension  No significant past surgical history      FAMILY HISTORY:  No pertinent family history in first degree relatives      Social History:    Allergies    No Known Allergies    Intolerances        REVIEW OF SYSTEMS:    CONSTITUTIONAL: No fever, weight loss, or fatigue  EYES: No eye pain or visual disturbances,   RESPIRATORY: No cough, hemoptysis; or SOB  CARDIOVASCULAR: No chest pain, palpitations,  leg swelling  GASTROINTESTINAL: No abdominal , NVD or GIB  GENITOURINARY: No UTI sx  NEUROLOGICAL: No headaches/wk/numbness  MUSCULOSKELETAL: No joint pain or swelling; No muscle, back, or extremity pain      Vital Signs Last 24 Hrs  T(C): 36.9 (12 Aug 2017 11:11), Max: 37.8 (12 Aug 2017 05:41)  T(F): 98.4 (12 Aug 2017 11:11), Max: 100 (12 Aug 2017 05:41)  HR: 71 (12 Aug 2017 11:11) (71 - 98)  BP: 105/56 (12 Aug 2017 11:11) (102/63 - 116/67)  BP(mean): --  RR: 20 (12 Aug 2017 11:11) (16 - 20)  SpO2: 98% (12 Aug 2017 11:11) (98% - 100%)    PHYSICAL EXAM:    GENERAL: NAD,   EYES:  legally blind  NECK: In brace  NERVOUS SYSTEM:  A/O x3,   CHEST/LUNG:CTA No rales, rhonchi,  or rubs  HEART: Regular rate and rhythm; No murmurs, rubs, or gallops  ABDOMEN: Soft, NT/ND BS+  EXTREMITIES:  + Peripheral Pulses, No C/C/E  SKIN: No rashes or lesions      LABS:                        9.0    6.1   )-----------( 87       ( 12 Aug 2017 07:38 )             29.7     08-12    136  |  95<L>  |  38.0<H>  ----------------------------<  113  5.3   |  26.0  |  7.40<H>    Ca    9.2      12 Aug 2017 07:38    TPro  7.1  /  Alb  3.3  /  TBili  0.4  /  DBili  x   /  AST  16  /  ALT  15  /  AlkPhos  79  08-12            RADIOLOGY & ADDITIONAL TESTS:    MEDICATIONS  (STANDING):  epoetin una Injectable  insulin lispro (HumaLOG) corrective regimen sliding scale  dextrose 5%.  dextrose Gel PRN  dextrose 50% Injectable  dextrose 50% Injectable  dextrose 50% Injectable  glucagon  Injectable PRN  artificial  tears Solution PRN    Assesment/Plan : ESRD- missed HD  labs evaluated  Shall arrange HD  D/w Dr. Jimenez

## 2017-08-12 NOTE — H&P ADULT - PROBLEM SELECTOR PLAN 5
Anemia with iron deficiency   -c/w ferrous sulfate po qd  -c/w folic acid 1mg po qd     Thromboctopenia   -As per prior labs at baseline unclear etiology  -will c/w monitoring    -wbc at this time is wnl   -f/u vitamin b12 level to rule out b12 deficiency

## 2017-08-12 NOTE — H&P ADULT - MENTAL STATUS
AAOX2 Person and place not time/date  intermittently lethargic but follows commands and answers questions appropriately

## 2017-08-12 NOTE — H&P ADULT - ATTENDING COMMENTS
Dispo: D/c planning back to the facility will d/w SW and CM in regards to transportation issue and re-instating HD.

## 2017-08-12 NOTE — H&P ADULT - PROBLEM SELECTOR PLAN 1
Missed HD secondary to transport issue  -fluid overloaded on exam and on cxr  -s/p emergent HD now doing well   -c/w regularly scheduled HD   -d/w Dr. Chambers Nephrology  -will d/w SW and CM in regards to transportation issue.

## 2017-08-12 NOTE — ED PROVIDER NOTE - MEDICAL DECISION MAKING DETAILS
87M with ESRD on HD here for missed HD since Thursday.  No resp distress/ cardiac symptoms. Need labs, K, d/w renal

## 2017-08-12 NOTE — H&P ADULT - NSHPSOCIALHISTORY_GEN_ALL_CORE
Resides in [facility]  Former smoker, no alcohol, drug use or herbal supplement use  retired  Wife Iram

## 2017-08-12 NOTE — ED ADULT TRIAGE NOTE - CHIEF COMPLAINT QUOTE
Pt sent from [place] for "emergent dialysis due to not having transport because patient was dropped by previous transport and will need to have new transport set up and has missed his Thursday dialysis and is due for another dialysis today" as per nursing supervision at [place], pt with c-collar in place, offers no complaints, denies pain

## 2017-08-12 NOTE — H&P ADULT - PROBLEM SELECTOR PLAN 4
-Pt was d/c from [facility] on 8/2/17 and at that time ID recommended for the patient to c/w vancomycin IVPB post HD Tu Th Sat until 9/2/17.   Tried to call the HD center to see if the patient was continued on the vancomycin b/c [facility] has no documentation that the pt is receiving Vanco post HD   -will c/w vancomycin 500mg OVPB q tu th sat post HD  -D/w Dr. Anaya the plan of care will re instate with HD center and ID

## 2017-08-12 NOTE — H&P ADULT - PROBLEM SELECTOR PLAN 2
-Patient was evaluated by neurosurgery Dr. Austin and had c collar placed. D/w neurosurgery NP who d/w Dr. Austin and patient is to remain in the c collar and follow up with Dr. Austin in 2 weeks in the office. Patient is able to do activity as tolerated which includes walking, sitting in a wheel chair as long as collar stays in place there is no contraindication to activity.

## 2017-08-13 DIAGNOSIS — S32.591S OTHER SPECIFIED FRACTURE OF RIGHT PUBIS, SEQUELA: ICD-10-CM

## 2017-08-13 DIAGNOSIS — I95.9 HYPOTENSION, UNSPECIFIED: ICD-10-CM

## 2017-08-13 DIAGNOSIS — H10.31 UNSPECIFIED ACUTE CONJUNCTIVITIS, RIGHT EYE: ICD-10-CM

## 2017-08-13 DIAGNOSIS — Z29.9 ENCOUNTER FOR PROPHYLACTIC MEASURES, UNSPECIFIED: ICD-10-CM

## 2017-08-13 DIAGNOSIS — R74.8 ABNORMAL LEVELS OF OTHER SERUM ENZYMES: ICD-10-CM

## 2017-08-13 DIAGNOSIS — D61.818 OTHER PANCYTOPENIA: ICD-10-CM

## 2017-08-13 DIAGNOSIS — E11.9 TYPE 2 DIABETES MELLITUS WITHOUT COMPLICATIONS: ICD-10-CM

## 2017-08-13 DIAGNOSIS — N18.6 END STAGE RENAL DISEASE: ICD-10-CM

## 2017-08-13 DIAGNOSIS — R78.81 BACTEREMIA: ICD-10-CM

## 2017-08-13 DIAGNOSIS — Z95.810 PRESENCE OF AUTOMATIC (IMPLANTABLE) CARDIAC DEFIBRILLATOR: Chronic | ICD-10-CM

## 2017-08-13 DIAGNOSIS — M48.52XA COLLAPSED VERTEBRA, NOT ELSEWHERE CLASSIFIED, CERVICAL REGION, INITIAL ENCOUNTER FOR FRACTURE: ICD-10-CM

## 2017-08-13 DIAGNOSIS — I50.22 CHRONIC SYSTOLIC (CONGESTIVE) HEART FAILURE: ICD-10-CM

## 2017-08-13 DIAGNOSIS — R65.10 SYSTEMIC INFLAMMATORY RESPONSE SYNDROME (SIRS) OF NON-INFECTIOUS ORIGIN WITHOUT ACUTE ORGAN DYSFUNCTION: ICD-10-CM

## 2017-08-13 LAB
ALBUMIN SERPL ELPH-MCNC: 3.1 G/DL — LOW (ref 3.3–5.2)
ALP SERPL-CCNC: 70 U/L — SIGNIFICANT CHANGE UP (ref 40–120)
ALT FLD-CCNC: 11 U/L — SIGNIFICANT CHANGE UP
ANION GAP SERPL CALC-SCNC: 13 MMOL/L — SIGNIFICANT CHANGE UP (ref 5–17)
AST SERPL-CCNC: 14 U/L — SIGNIFICANT CHANGE UP
BASE EXCESS BLDA CALC-SCNC: 2.5 MMOL/L — SIGNIFICANT CHANGE UP (ref -3–3)
BASOPHILS # BLD AUTO: 0 K/UL — SIGNIFICANT CHANGE UP (ref 0–0.2)
BASOPHILS NFR BLD AUTO: 0.4 % — SIGNIFICANT CHANGE UP (ref 0–2)
BILIRUB SERPL-MCNC: 0.3 MG/DL — LOW (ref 0.4–2)
BUN SERPL-MCNC: 19 MG/DL — SIGNIFICANT CHANGE UP (ref 8–20)
CALCIUM SERPL-MCNC: 8 MG/DL — LOW (ref 8.6–10.2)
CHLORIDE SERPL-SCNC: 98 MMOL/L — SIGNIFICANT CHANGE UP (ref 98–107)
CHOLEST SERPL-MCNC: 148 MG/DL — SIGNIFICANT CHANGE UP (ref 110–199)
CK SERPL-CCNC: 137 U/L — SIGNIFICANT CHANGE UP (ref 30–200)
CO2 SERPL-SCNC: 26 MMOL/L — SIGNIFICANT CHANGE UP (ref 22–29)
CREAT SERPL-MCNC: 4.55 MG/DL — HIGH (ref 0.5–1.3)
EOSINOPHIL # BLD AUTO: 0.1 K/UL — SIGNIFICANT CHANGE UP (ref 0–0.5)
EOSINOPHIL NFR BLD AUTO: 2.9 % — SIGNIFICANT CHANGE UP (ref 0–5)
GAS PNL BLDA: SIGNIFICANT CHANGE UP
GLUCOSE SERPL-MCNC: 114 MG/DL — SIGNIFICANT CHANGE UP (ref 70–115)
HBA1C BLD-MCNC: 6.1 % — HIGH (ref 4–5.6)
HCO3 BLDA-SCNC: 27 MMOL/L — HIGH (ref 20–26)
HCT VFR BLD CALC: 27.3 % — LOW (ref 42–52)
HDLC SERPL-MCNC: 37 MG/DL — LOW
HGB BLD-MCNC: 8.3 G/DL — LOW (ref 14–18)
HOROWITZ INDEX BLDA+IHG-RTO: SIGNIFICANT CHANGE UP
LACTATE BLDV-MCNC: 1.1 MMOL/L — SIGNIFICANT CHANGE UP (ref 0.5–2)
LIPID PNL WITH DIRECT LDL SERPL: 75 MG/DL — SIGNIFICANT CHANGE UP
LYMPHOCYTES # BLD AUTO: 1.4 K/UL — SIGNIFICANT CHANGE UP (ref 1–4.8)
LYMPHOCYTES # BLD AUTO: 28.5 % — SIGNIFICANT CHANGE UP (ref 20–55)
MAGNESIUM SERPL-MCNC: 1.8 MG/DL — SIGNIFICANT CHANGE UP (ref 1.6–2.6)
MCHC RBC-ENTMCNC: 26.5 PG — LOW (ref 27–31)
MCHC RBC-ENTMCNC: 30.4 G/DL — LOW (ref 32–36)
MCV RBC AUTO: 87.2 FL — SIGNIFICANT CHANGE UP (ref 80–94)
MONOCYTES # BLD AUTO: 0.9 K/UL — HIGH (ref 0–0.8)
MONOCYTES NFR BLD AUTO: 18.2 % — HIGH (ref 3–10)
NEUTROPHILS # BLD AUTO: 2.4 K/UL — SIGNIFICANT CHANGE UP (ref 1.8–8)
NEUTROPHILS NFR BLD AUTO: 49.8 % — SIGNIFICANT CHANGE UP (ref 37–73)
PCO2 BLDA: 50 MMHG — HIGH (ref 35–45)
PH BLDA: 7.36 — SIGNIFICANT CHANGE UP (ref 7.35–7.45)
PHOSPHATE SERPL-MCNC: 2.8 MG/DL — SIGNIFICANT CHANGE UP (ref 2.4–4.7)
PLATELET # BLD AUTO: 69 K/UL — LOW (ref 150–400)
PLATELET # BLD AUTO: 71 K/UL — LOW (ref 150–400)
PO2 BLDA: 152 MMHG — HIGH (ref 83–108)
POTASSIUM SERPL-MCNC: 4.2 MMOL/L — SIGNIFICANT CHANGE UP (ref 3.5–5.3)
POTASSIUM SERPL-SCNC: 4.2 MMOL/L — SIGNIFICANT CHANGE UP (ref 3.5–5.3)
PROT SERPL-MCNC: 6.4 G/DL — LOW (ref 6.6–8.7)
RBC # BLD: 3.13 M/UL — LOW (ref 4.6–6.2)
RBC # FLD: 17.3 % — HIGH (ref 11–15.6)
SAO2 % BLDA: 100 % — HIGH (ref 95–99)
SODIUM SERPL-SCNC: 137 MMOL/L — SIGNIFICANT CHANGE UP (ref 135–145)
TOTAL CHOLESTEROL/HDL RATIO MEASUREMENT: 4 RATIO — SIGNIFICANT CHANGE UP (ref 3.4–9.6)
TRIGL SERPL-MCNC: 179 MG/DL — SIGNIFICANT CHANGE UP (ref 10–200)
TROPONIN T SERPL-MCNC: 0.3 NG/ML — HIGH (ref 0–0.06)
TROPONIN T SERPL-MCNC: 0.32 NG/ML — HIGH (ref 0–0.06)
TROPONIN T SERPL-MCNC: 0.32 NG/ML — HIGH (ref 0–0.06)
TROPONIN T SERPL-MCNC: 0.33 NG/ML — HIGH (ref 0–0.06)
TSH SERPL-MCNC: 5.33 UIU/ML — HIGH (ref 0.27–4.2)
VIT B12 SERPL-MCNC: 1423 PG/ML — HIGH (ref 180–914)
WBC # BLD: 4.8 K/UL — SIGNIFICANT CHANGE UP (ref 4.8–10.8)
WBC # FLD AUTO: 4.8 K/UL — SIGNIFICANT CHANGE UP (ref 4.8–10.8)

## 2017-08-13 PROCEDURE — 99222 1ST HOSP IP/OBS MODERATE 55: CPT

## 2017-08-13 PROCEDURE — 99223 1ST HOSP IP/OBS HIGH 75: CPT

## 2017-08-13 PROCEDURE — 99233 SBSQ HOSP IP/OBS HIGH 50: CPT

## 2017-08-13 PROCEDURE — 93010 ELECTROCARDIOGRAM REPORT: CPT

## 2017-08-13 RX ORDER — ACETAMINOPHEN 500 MG
650 TABLET ORAL EVERY 6 HOURS
Qty: 0 | Refills: 0 | Status: DISCONTINUED | OUTPATIENT
Start: 2017-08-13 | End: 2017-08-17

## 2017-08-13 RX ORDER — ACETAMINOPHEN 500 MG
650 TABLET ORAL EVERY 6 HOURS
Qty: 0 | Refills: 0 | Status: DISCONTINUED | OUTPATIENT
Start: 2017-08-13 | End: 2017-08-13

## 2017-08-13 RX ORDER — SODIUM CHLORIDE 9 MG/ML
250 INJECTION INTRAMUSCULAR; INTRAVENOUS; SUBCUTANEOUS ONCE
Qty: 0 | Refills: 0 | Status: COMPLETED | OUTPATIENT
Start: 2017-08-13 | End: 2017-08-13

## 2017-08-13 RX ORDER — PIPERACILLIN AND TAZOBACTAM 4; .5 G/20ML; G/20ML
INJECTION, POWDER, LYOPHILIZED, FOR SOLUTION INTRAVENOUS
Qty: 0 | Refills: 0 | Status: DISCONTINUED | OUTPATIENT
Start: 2017-08-13 | End: 2017-08-16

## 2017-08-13 RX ORDER — SODIUM CHLORIDE 9 MG/ML
500 INJECTION INTRAMUSCULAR; INTRAVENOUS; SUBCUTANEOUS ONCE
Qty: 0 | Refills: 0 | Status: COMPLETED | OUTPATIENT
Start: 2017-08-13 | End: 2017-08-13

## 2017-08-13 RX ORDER — VANCOMYCIN HCL 1 G
750 VIAL (EA) INTRAVENOUS ONCE
Qty: 0 | Refills: 0 | Status: COMPLETED | OUTPATIENT
Start: 2017-08-13 | End: 2017-08-13

## 2017-08-13 RX ORDER — VANCOMYCIN HCL 1 G
500 VIAL (EA) INTRAVENOUS
Qty: 0 | Refills: 0 | Status: DISCONTINUED | OUTPATIENT
Start: 2017-08-13 | End: 2017-08-17

## 2017-08-13 RX ORDER — PIPERACILLIN AND TAZOBACTAM 4; .5 G/20ML; G/20ML
2.25 INJECTION, POWDER, LYOPHILIZED, FOR SOLUTION INTRAVENOUS ONCE
Qty: 0 | Refills: 0 | Status: COMPLETED | OUTPATIENT
Start: 2017-08-13 | End: 2017-08-13

## 2017-08-13 RX ORDER — ACETAMINOPHEN 500 MG
1000 TABLET ORAL ONCE
Qty: 0 | Refills: 0 | Status: COMPLETED | OUTPATIENT
Start: 2017-08-13 | End: 2017-08-13

## 2017-08-13 RX ORDER — NYSTATIN CREAM 100000 [USP'U]/G
1 CREAM TOPICAL
Qty: 0 | Refills: 0 | Status: DISCONTINUED | OUTPATIENT
Start: 2017-08-13 | End: 2017-08-17

## 2017-08-13 RX ORDER — PIPERACILLIN AND TAZOBACTAM 4; .5 G/20ML; G/20ML
2.25 INJECTION, POWDER, LYOPHILIZED, FOR SOLUTION INTRAVENOUS EVERY 12 HOURS
Qty: 0 | Refills: 0 | Status: DISCONTINUED | OUTPATIENT
Start: 2017-08-13 | End: 2017-08-16

## 2017-08-13 RX ADMIN — Medication 150 MILLIGRAM(S): at 10:05

## 2017-08-13 RX ADMIN — SODIUM CHLORIDE 1000 MILLILITER(S): 9 INJECTION INTRAMUSCULAR; INTRAVENOUS; SUBCUTANEOUS at 02:30

## 2017-08-13 RX ADMIN — Medication 1 MILLIGRAM(S): at 11:32

## 2017-08-13 RX ADMIN — Medication 650 MILLIGRAM(S): at 20:30

## 2017-08-13 RX ADMIN — MIDODRINE HYDROCHLORIDE 10 MILLIGRAM(S): 2.5 TABLET ORAL at 09:43

## 2017-08-13 RX ADMIN — GABAPENTIN 300 MILLIGRAM(S): 400 CAPSULE ORAL at 22:08

## 2017-08-13 RX ADMIN — NYSTATIN CREAM 1 APPLICATION(S): 100000 CREAM TOPICAL at 17:17

## 2017-08-13 RX ADMIN — GABAPENTIN 300 MILLIGRAM(S): 400 CAPSULE ORAL at 13:07

## 2017-08-13 RX ADMIN — SODIUM CHLORIDE 750 MILLILITER(S): 9 INJECTION INTRAMUSCULAR; INTRAVENOUS; SUBCUTANEOUS at 06:30

## 2017-08-13 RX ADMIN — Medication 325 MILLIGRAM(S): at 11:32

## 2017-08-13 RX ADMIN — Medication 650 MILLIGRAM(S): at 19:56

## 2017-08-13 RX ADMIN — Medication 667 MILLIGRAM(S): at 11:32

## 2017-08-13 RX ADMIN — Medication 1 APPLICATION(S): at 11:32

## 2017-08-13 RX ADMIN — MIDODRINE HYDROCHLORIDE 10 MILLIGRAM(S): 2.5 TABLET ORAL at 13:07

## 2017-08-13 RX ADMIN — Medication 667 MILLIGRAM(S): at 17:17

## 2017-08-13 RX ADMIN — GABAPENTIN 300 MILLIGRAM(S): 400 CAPSULE ORAL at 05:35

## 2017-08-13 RX ADMIN — Medication 400 MILLIGRAM(S): at 04:32

## 2017-08-13 RX ADMIN — PIPERACILLIN AND TAZOBACTAM 200 GRAM(S): 4; .5 INJECTION, POWDER, LYOPHILIZED, FOR SOLUTION INTRAVENOUS at 17:42

## 2017-08-13 RX ADMIN — TAMSULOSIN HYDROCHLORIDE 0.4 MILLIGRAM(S): 0.4 CAPSULE ORAL at 22:08

## 2017-08-13 RX ADMIN — MIDODRINE HYDROCHLORIDE 10 MILLIGRAM(S): 2.5 TABLET ORAL at 17:17

## 2017-08-13 RX ADMIN — PIPERACILLIN AND TAZOBACTAM 200 GRAM(S): 4; .5 INJECTION, POWDER, LYOPHILIZED, FOR SOLUTION INTRAVENOUS at 04:33

## 2017-08-13 RX ADMIN — Medication 667 MILLIGRAM(S): at 09:43

## 2017-08-13 RX ADMIN — SODIUM CHLORIDE 666.67 MILLILITER(S): 9 INJECTION INTRAMUSCULAR; INTRAVENOUS; SUBCUTANEOUS at 05:05

## 2017-08-13 NOTE — PROGRESS NOTE ADULT - PROBLEM SELECTOR PLAN 8
hx of orthostatic hypotenssion  -currently bp stable at baseline   -c/w midodrine 10 mg po tid -c/w erythromycin ointment as per the facility for 1 more days

## 2017-08-13 NOTE — PROGRESS NOTE ADULT - PROBLEM SELECTOR PLAN 10
DVT PPx with scd boots b/l and PT encouraging ambulation. No chemical AC given unclear etiology of thrombocytopenia. fs stable  -c/w HSS   -c/w low carb diet  -c/w hypoglycemia protocol    DVT PPX

## 2017-08-13 NOTE — PROGRESS NOTE ADULT - PROBLEM SELECTOR PLAN 2
Missed HD secondary to transport issue  -fluid overload improved on exam   -s/p emergent HD8/12/17   -c/w regularly scheduled HD   -d/w Dr. Chambers Nephrology  -will d/w SW and CM in regards to transportation issue. likely secondary to SIRS   - will c/w monitoring unchanged ekg   -pt did have beats of v tach   -unable to add rate control agent at this time given hypotension  -cardiology consulted

## 2017-08-13 NOTE — CHART NOTE - NSCHARTNOTEFT_GEN_A_CORE
88y/o male with history of ESRD, DM, HTN, Compression fracture of c-spine, and recent bacteremia on IV vancomycin until 8/19 88y/o male with history of ESRD, DM, HTN, Compression fracture of c-spine, and recent bacteremia on IV vancomycin until 9/2/17 presents to [facility] because of missed HD appointments, fluid overload, and SOB. RN called because patient is tachycardic to 116-120 post hemodialysis. 86y/o male with history of ESRD, DM, HTN, Compression fracture of c-spine, PPM, and recent bacteremia on IV vancomycin until 17 presents to [facility] because of missed HD appointments, fluid overload, and SOB. RN called because patient is tachycardic to 116-120 post hemodialysis. At this time patient was in the ERHR. Patient seen and examined at the bedside NAD, lying comfortably in bed. He has no complaints, does not complain of pain or discomfort. He denies CP, SOB, nausea, vomiting, diarrhea, constipation, fevers, chills, abdominal pain, headache, or blurred vision. Patient is anuric. He is from [facility]. He was recently admitted to [facility] less than a month ago and found to be bacteremic with coag negative staph aureus, at that time he was seen by ID and placed on vancomycin 500mg IV post dialysis. There is a question wether or not he was getting the vancomycin after discharge. Suspected source at the time was his right IJ PermCath which was never removed or replaced. I tried to obtain information on his pacemaker however Bryn did not have information, nor were there cardiology notes from previous admissions.     A few hours later patient received a bed on 5T and developed a fever of 101.4. At this time a code sepsis was called. EICU was contacted.       PHYSICAL EXAM: @ time of code sepsis  Vital Signs Last 24 Hrs  T(C): 38.6 (13 Aug 2017 01:00), Max: 38.6 (13 Aug 2017 01:00)  T(F): 101.4 (13 Aug 2017 01:00), Max: 101.4 (13 Aug 2017 01:00)  HR: 100 (13 Aug 2017 01:00) (71 - 116)  BP: 100/58 (13 Aug 2017 01:00) (94/53 - 121/47)  RR: 20 (13 Aug 2017 01:) (15 - 20)  SpO2: 100% (13 Aug 2017 01:) (97% - 100%)  GEN: NAD, lying in bed, responses appropriately to my questions  HEENT: right eye with conjunctivitis, PERRLA, moist mucus membranes, pink  HEART: S1, S2 no MRG  LUNGS: rales bilateral bases, good air movement  ABDOMEN: soft, nontender, non-distended, bowel sounds present  EXTREMITIES: no edema noted  SKIN: very warm to the touch, 2cm sacral ulcer, with good granulation tissue, no surrounding erythema, no pus drainage, no fluctuance                          9.5    5.7   )-----------( 69       ( 12 Aug 2017 23:42 )             31.1     Comprehensive Metabolic Panel (17 @ 23:42)    Sodium, Serum: 134 mmol/L    Potassium, Serum: 4.5 mmol/L    Chloride, Serum: 96 mmol/L    Carbon Dioxide, Serum: 24.0 mmol/L    Anion Gap, Serum: 14 mmol/L    Blood Urea Nitrogen, Serum: 18.0 mg/dL    Creatinine, Serum: 4.23 mg/dL    Glucose, Serum: 143 mg/dL    Calcium, Total Serum: 8.8 mg/dL    Protein Total, Serum: 7.4 g/dL    Albumin, Serum: 3.4 g/dL    Bilirubin Total, Serum: 0.3 mg/dL    Alkaline Phosphatase, Serum: 86 U/L    Aspartate Aminotransferase (AST/SGOT): 20 U/L    Alanine Aminotransferase (ALT/SGPT): 16 U/L    eGFR if Non : 12: Interpretative comment    Troponin T, Serum: 0.33    Blood Gas Venous - Lactate (17 @ 02:58)    Blood Gas Venous - Lactate: 1.1:     CXR: 17 pulmonary congestion, trace right plural effusion     EK17: Paced ventricular rhythm @ 116    A&P:  86y/o male with history of ESRD, DM, HTN, Compression fracture of c-spine, PPM, and recent bacteremia on IV vancomycin until 17 presents to [facility] because of missed HD appointments, fluid overload, and SOB. RN called because patient is tachycardic to 116-120 post hemodialysis. Now febrile with rectal temp of 101.7. I suspect the source bacteremia from either right IJ PermCath or PPM. Will continue with IV vanco post dialysis, and add renally dosed zoysn 2.25g IV q 12. Will bolus 250cc of fluid slowly as patient has CHF with EF of 20% and ESRD with anuria. Tylenol PRN fever. Stat lactate, BCx2 CBC and CMP.     1) Sepsis  tachycardic, febrile, source of infection presumably bacteremia from right IJ PermCath or PPM  WBC 5.7  Lactate 1.1  -Vanco 500mg IV post dialysis  -Add zoysn 2.25g q12  -Tylenol PRN fever  -CBC, CMP, stat lactate, BCx2  -250cc bolus, reevaluate before administering anymore fluid  -Trend troponin given initial tachycardiac without fever to r/o NSTEMI  -Will need cardiac eval for information on PPM and Pacemaker interrogation  -Will need ID consult   -BP during code sepsis was 88/56, during fluid bolus it kristy to 90s systolic, will reevaluate BP again after bolus is finished, will contact ICU if BP does not improve 88y/o male with history of ESRD, DM, HTN, Compression fracture of c-spine, PPM, and recent bacteremia on IV vancomycin until 17 presents to [facility] because of missed HD appointments, fluid overload, and SOB. RN called because patient is tachycardic to 116-120 post hemodialysis. At this time patient was in the ERHR. Patient seen and examined at the bedside NAD, lying comfortably in bed. He has no complaints, does not complain of pain or discomfort. He denies CP, SOB, nausea, vomiting, diarrhea, constipation, fevers, chills, abdominal pain, headache, or blurred vision. Patient is anuric. He is from [facility]. He was recently admitted to [facility] less than a month ago and found to be bacteremic with coag negative staph aureus, at that time he was seen by ID and placed on vancomycin 500mg IV post dialysis. There is a question wether or not he was getting the vancomycin after discharge. Suspected source at the time was his right IJ PermCath which was never removed or replaced. I tried to obtain information on his pacemaker however Bryn did not have information, nor were there cardiology notes from previous admissions.     A few hours later patient received a bed on 5T and developed a fever of 101.4. At this time a code sepsis was called. EICU was contacted.       PHYSICAL EXAM: @ time of code sepsis  Vital Signs Last 24 Hrs  T(C): 38.6 (13 Aug 2017 01:00), Max: 38.6 (13 Aug 2017 01:00)  T(F): 101.4 (13 Aug 2017 01:00), Max: 101.4 (13 Aug 2017 01:00)  HR: 100 (13 Aug 2017 01:00) (71 - 116)  BP: 100/58 (13 Aug 2017 01:00) (94/53 - 121/47)  RR: 20 (13 Aug 2017 01:) (15 - 20)  SpO2: 100% (13 Aug 2017 01:) (97% - 100%)  GEN: NAD, lying in bed, responses appropriately to my questions  HEENT: right eye with conjunctivitis, PERRLA, moist mucus membranes, pink  HEART: S1, S2 no MRG  LUNGS: rales bilateral bases, good air movement  ABDOMEN: soft, nontender, non-distended, bowel sounds present  EXTREMITIES: no edema noted  SKIN: very warm to the touch, 2cm sacral ulcer, with good granulation tissue, no surrounding erythema, no pus drainage, no fluctuance                          9.5    5.7   )-----------( 69       ( 12 Aug 2017 23:42 )             31.1     Comprehensive Metabolic Panel (17 @ 23:42)    Sodium, Serum: 134 mmol/L    Potassium, Serum: 4.5 mmol/L    Chloride, Serum: 96 mmol/L    Carbon Dioxide, Serum: 24.0 mmol/L    Anion Gap, Serum: 14 mmol/L    Blood Urea Nitrogen, Serum: 18.0 mg/dL    Creatinine, Serum: 4.23 mg/dL    Glucose, Serum: 143 mg/dL    Calcium, Total Serum: 8.8 mg/dL    Protein Total, Serum: 7.4 g/dL    Albumin, Serum: 3.4 g/dL    Bilirubin Total, Serum: 0.3 mg/dL    Alkaline Phosphatase, Serum: 86 U/L    Aspartate Aminotransferase (AST/SGOT): 20 U/L    Alanine Aminotransferase (ALT/SGPT): 16 U/L    eGFR if Non : 12: Interpretative comment    Troponin T, Serum: 0.33    Blood Gas Venous - Lactate (17 @ 02:58)    Blood Gas Venous - Lactate: 1.1:     CXR: 17 pulmonary congestion, trace right plural effusion     EK17: Paced ventricular rhythm @ 116    A&P:  88y/o male with history of ESRD, DM, HTN, Compression fracture of c-spine, PPM, and recent bacteremia on IV vancomycin until 17 presents to [facility] because of missed HD appointments, fluid overload, and SOB. RN called because patient is tachycardic to 116-120 post hemodialysis. Now febrile with rectal temp of 101.7. I suspect the source bacteremia from either right IJ PermCath or PPM. Will continue with IV vanco post dialysis, and add renally dosed zoysn 2.25g IV q 12. Will bolus 250cc of fluid slowly as patient has CHF with EF of 20% and ESRD with anuria. Tylenol PRN fever. Stat lactate, BCx2 CBC and CMP.     1) Sepsis  tachycardic, febrile, source of infection presumably bacteremia from right IJ PermCath or PPM  WBC 5.7  Lactate 1.1  -Vanco 500mg IV post dialysis  -Add zoysn 2.25g q12  -Tylenol PRN fever  -CBC, CMP, stat lactate, BCx2  -250cc bolus, reevaluate before administering anymore fluid  -Trend troponin given initial tachycardiac without fever to r/o NSTEMI  -Will need cardiac eval for information on PPM and Pacemaker interrogation  -Will need ID consult   -BP during code sepsis was 88/56, during fluid bolus it kristy to 90s systolic, will reevaluate BP again after bolus is finished, will contact ICU if BP does not improve    Case discussed with ADOLFO Pratt, Family medicine residents.  Case also discussed prior to code sepsis for tachycardia with Cardiology PA, and Dr. Cerna

## 2017-08-13 NOTE — CONSULT NOTE ADULT - ASSESSMENT
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

## 2017-08-13 NOTE — PROGRESS NOTE ADULT - SUBJECTIVE AND OBJECTIVE BOX
Patient seen and examined    Feels fine; cheerful today  no c/o CP SOB NV   No swelling feet    Patient without any significant change  no specific c/o    Vital Signs Last 24 Hrs  T(C): 37.1 (08-13-17 @ 20:09), Max: 38.7 (08-13-17 @ 02:13)  T(F): 98.7 (08-13-17 @ 20:09), Max: 101.7 (08-13-17 @ 02:13)  HR: 100 (08-13-17 @ 20:09) (91 - 110)  BP: 131/69 (08-13-17 @ 20:09) (87/55 - 131/69)  BP(mean): --  RR: 16 (08-13-17 @ 17:15) (16 - 20)  SpO2: 96% (08-13-17 @ 20:09) (94% - 100%)  HEENT - legally blind  Chest   clear  CV   no murmur  Abd   soft, NT BS+  Extr   No edema  Neuro  grossly iintact motor        13 Aug 2017 08:28    137    |  98     |  19.0   ----------------------------<  114    4.2     |  26.0   |  4.55     Ca    8.0        13 Aug 2017 08:28  Phos  2.8       13 Aug 2017 08:28  Mg     1.8       13 Aug 2017 08:28    TPro  6.4    /  Alb  3.1    /  TBili  0.3    /  DBili  x      /  AST  14     /  ALT  11     /  AlkPhos  70     13 Aug 2017 08:28                          8.3    4.8   )-----------( 71       ( 13 Aug 2017 08:28 )             27.3     [facility] HD well yesterday  stable  cont to watch

## 2017-08-13 NOTE — CHART NOTE - NSCHARTNOTEFT_GEN_A_CORE
Code Sepsis PGY 3 Note    [id number]  EMILY LITTLEJOHN    Code Sepsis was called on a 87 year old Male patient for elevated rectal temp of 101, BP of 87/54 and     86y/o male with history of ESRD, DM, HTN, Compression fracture of c-spine, and recent bacteremia on IV vancomycin until 9/2/17 presents to [facility] because of missed HD appointments, fluid overload, and SOB.   Patient was found to be in in acute distress. As per MARILUZ Jean Baptiste patient is anuric, with minimal cough.  Patient denying chest pain, difficulty breathing or pain.    Patient was seen and examined at the bedside by the rapid response team.    Allergies    No Known Allergies    Intolerances        PAST MEDICAL & SURGICAL HISTORY:  Pubic ramus fracture, right, sequela  Compression fracture of C-spine: c4  Hyperlipidemia  ESRD on dialysis  Dementia  Muscle weakness (generalized)  Diabetes mellitus  Hypertension  No significant past surgical history      Vital Signs Last 24 Hrs  T(C): 38.6 (13 Aug 2017 01:00), Max: 38.6 (13 Aug 2017 01:00)  T(F): 101.4 (13 Aug 2017 01:00), Max: 101.4 (13 Aug 2017 01:00)  HR: 100 (13 Aug 2017 01:00) (71 - 116)  BP: 100/58 (13 Aug 2017 01:00) (94/53 - 121/47)  BP(mean): --  RR: 20 (13 Aug 2017 01:00) (15 - 20)  SpO2: 100% (13 Aug 2017 01:00) (97% - 100%)          PHYSICAL EXAM:    GENERAL: NAD,   HEAD:  Atraumatic, Normocephalic  EYES: EOMI, PERRLA, conjunctiva and sclera clear  NECK: Supple, No JVD, Normal thyroid, patient in collar brace for fracture of C4 s/p previous injury  NERVOUS SYSTEM:  Alert & Oriented X3, Good concentration; Motor Strength 5/5 B/L upper and lower extremities  CHEST/LUNG: Clear to percussion bilaterally with minimal bibasilar crackles; No rales, rhonchi, wheezing, or rubs  HEART: Tachycardiac; No murmurs, rubs, or gallops  ABDOMEN: Soft, Nontender, Nondistended; Bowel sounds present  EXTREMITIES:  2+ Peripheral Pulses, No clubbing, cyanosis, or edema  SKIN: No rashes or lesions      08-12 @ 07:01 - 08-13 @ 02:51  --------------------------------------------------------  IN: 0 mL / OUT: 1500 mL / NET: -1500 mL                              9.5    5.7   )-----------( 69       ( 12 Aug 2017 23:42 )             31.1     08-12    134<L>  |  96<L>  |  18.0  ----------------------------<  143<H>  4.5   |  24.0  |  4.23<H>    Ca    8.8      12 Aug 2017 23:42    TPro  7.4  /  Alb  3.4  /  TBili  0.3<L>  /  DBili  x   /  AST  20  /  ALT  16  /  AlkPhos  86  08-12         LIVER FUNCTIONS - ( 12 Aug 2017 23:42 )  Alb: 3.4 g/dL / Pro: 7.4 g/dL / ALK PHOS: 86 U/L / ALT: 16 U/L / AST: 20 U/L / GGT: x                  Vital Signs Last 24 Hrs*       Assessment-   Code Sepsis was called on a 87 year old Male patient for elevated rectal temp of 101, BP of 87/54 and   Plan to rule out bacteremia    Plan-Rule out sepsis  1. CBC, CMP, lactate, blood cultures X 2 ordered by Markel Guerrier just prior to code sepsis being called  Plan to continue vancomycin  Add renally dosed zosyn  250 cc NS ordered due to poor kidney function and low EF of 20%  Tylenol 1 gram IV    2. Low BP of 87/54 with minimal improvement to 90's/60's  250 cc NS ordered due to poor kidney function and low EF of 20%  Will consider ICU consult     Case discussed with Jones Franco by Markel Matthew Code Sepsis PGY 3 Note    [id number]  EMILY LITTLEJOHN    Code Sepsis was called on a 87 year old Male patient for elevated rectal temp of 101, BP of 87/54 and     88y/o male with history of ESRD, DM, HTN, Compression fracture of c-spine, and recent bacteremia on IV vancomycin until 9/2/17 presents to [facility] because of missed HD appointments, fluid overload, and SOB.   Patient was found to be in in acute distress. As per MARILUZ Jean Baptiste patient is anuric, with minimal cough.  Patient denying chest pain, difficulty breathing or pain.    Patient was seen and examined at the bedside by the rapid response team.    Allergies    No Known Allergies    Intolerances        PAST MEDICAL & SURGICAL HISTORY:  Pubic ramus fracture, right, sequela  Compression fracture of C-spine: c4  Hyperlipidemia  ESRD on dialysis  Dementia  Muscle weakness (generalized)  Diabetes mellitus  Hypertension  No significant past surgical history      Vital Signs Last 24 Hrs  T(C): 38.6 (13 Aug 2017 01:00), Max: 38.6 (13 Aug 2017 01:00)  T(F): 101.4 (13 Aug 2017 01:00), Max: 101.4 (13 Aug 2017 01:00)  HR: 100 (13 Aug 2017 01:00) (71 - 116)  BP: 100/58 (13 Aug 2017 01:00) (94/53 - 121/47)  BP(mean): --  RR: 20 (13 Aug 2017 01:00) (15 - 20)  SpO2: 100% (13 Aug 2017 01:00) (97% - 100%)          PHYSICAL EXAM:    GENERAL: NAD,   HEAD:  Atraumatic, Normocephalic  EYES: EOMI, PERRLA, conjunctiva and sclera clear  NECK: Supple, No JVD, Normal thyroid, patient in collar brace for fracture of C4 s/p previous injury  NERVOUS SYSTEM:  Alert & Oriented X3, Good concentration; Motor Strength 5/5 B/L upper and lower extremities  CHEST/LUNG: Clear to percussion bilaterally with minimal bibasilar crackles; No rales, rhonchi, wheezing, or rubs  HEART: Tachycardiac; No murmurs, rubs, or gallops  ABDOMEN: Soft, Nontender, Nondistended; Bowel sounds present  EXTREMITIES:  2+ Peripheral Pulses, No clubbing, cyanosis, or edema  SKIN: No rashes or lesions      08-12 @ 07:01 - 08-13 @ 02:51  --------------------------------------------------------  IN: 0 mL / OUT: 1500 mL / NET: -1500 mL                              9.5    5.7   )-----------( 69       ( 12 Aug 2017 23:42 )             31.1     08-12    134<L>  |  96<L>  |  18.0  ----------------------------<  143<H>  4.5   |  24.0  |  4.23<H>    Ca    8.8      12 Aug 2017 23:42    TPro  7.4  /  Alb  3.4  /  TBili  0.3<L>  /  DBili  x   /  AST  20  /  ALT  16  /  AlkPhos  86  08-12         LIVER FUNCTIONS - ( 12 Aug 2017 23:42 )  Alb: 3.4 g/dL / Pro: 7.4 g/dL / ALK PHOS: 86 U/L / ALT: 16 U/L / AST: 20 U/L / GGT: x                Vital Signs Last 24 Hrs*       Assessment-   Code Sepsis was called on a 87 year old Male patient for elevated rectal temp of 101, BP of 87/54 and   Plan to rule out bacteremia    Plan-Rule out sepsis  1. CBC, CMP, lactate, blood cultures X 2 ordered by Markel Guerrier just prior to code sepsis being called  Plan to continue vancomycin  Add renally dosed zosyn for gram negative coverage  250 cc NS ordered due to poor kidney function and low EF of 20%  Tylenol 1 gram IV    2. Low BP of 87/54 with minimal improvement to 90's/60's  250 cc NS ordered due to poor kidney function and low EF of 20%  will add another 250 cc if no improvement in bp status  Will consider ICU consult     Case discussed with Jones Franco by Markel Matthew Code Sepsis PGY 3 Note    [id number]  EMILY LITTLEJOHN    Code Sepsis was called on a 87 year old Male patient for elevated rectal temp of 101, BP of 87/54 and     88y/o male with history of ESRD, DM, HTN, Compression fracture of c-spine, and recent bacteremia on IV vancomycin until 9/2/17 presents to [facility] because of missed HD appointments, fluid overload, and SOB.   Patient was found to be in in acute distress. As per MARILUZ Jean Baptiste patient is anuric, with minimal cough.  Patient denying chest pain, difficulty breathing or pain.    Patient was seen and examined at the bedside by the rapid response team.    Allergies    No Known Allergies    Intolerances        PAST MEDICAL & SURGICAL HISTORY:  Pubic ramus fracture, right, sequela  Compression fracture of C-spine: c4  Hyperlipidemia  ESRD on dialysis  Dementia  Muscle weakness (generalized)  Diabetes mellitus  Hypertension  No significant past surgical history      Vital Signs Last 24 Hrs  T(C): 38.6 (13 Aug 2017 01:00), Max: 38.6 (13 Aug 2017 01:00)  T(F): 101.4 (13 Aug 2017 01:00), Max: 101.4 (13 Aug 2017 01:00)  HR: 100 (13 Aug 2017 01:00) (71 - 116)  BP: 100/58 (13 Aug 2017 01:00) (94/53 - 121/47)  BP(mean): --  RR: 20 (13 Aug 2017 01:00) (15 - 20)  SpO2: 100% (13 Aug 2017 01:00) (97% - 100%)          PHYSICAL EXAM:    GENERAL: NAD,   HEAD:  Atraumatic, Normocephalic  EYES: EOMI, PERRLA, conjunctiva and sclera clear  NECK: Supple, No JVD, Normal thyroid, patient in collar brace for fracture of C4 s/p previous injury  NERVOUS SYSTEM:  Alert & Oriented X3, Good concentration; Motor Strength 5/5 B/L upper and lower extremities  CHEST/LUNG: Clear to percussion bilaterally with minimal bibasilar crackles; No rales, rhonchi, wheezing, or rubs  HEART: Tachycardiac; No murmurs, rubs, or gallops  ABDOMEN: Soft, Nontender, Nondistended; Bowel sounds present  EXTREMITIES:  2+ Peripheral Pulses, No clubbing, cyanosis, or edema  SKIN: No rashes or lesions      08-12 @ 07:01 - 08-13 @ 02:51  --------------------------------------------------------  IN: 0 mL / OUT: 1500 mL / NET: -1500 mL                              9.5    5.7   )-----------( 69       ( 12 Aug 2017 23:42 )             31.1     08-12    134<L>  |  96<L>  |  18.0  ----------------------------<  143<H>  4.5   |  24.0  |  4.23<H>    Ca    8.8      12 Aug 2017 23:42    TPro  7.4  /  Alb  3.4  /  TBili  0.3<L>  /  DBili  x   /  AST  20  /  ALT  16  /  AlkPhos  86  08-12         LIVER FUNCTIONS - ( 12 Aug 2017 23:42 )  Alb: 3.4 g/dL / Pro: 7.4 g/dL / ALK PHOS: 86 U/L / ALT: 16 U/L / AST: 20 U/L / GGT: x                Vital Signs Last 24 Hrs*       Assessment-   Code Sepsis was called on a 87 year old Male patient for elevated rectal temp of 101, BP of 87/54 and   Plan to rule out bacteremia    Plan-Rule out sepsis  1. CBC, CMP, lactate, blood cultures X 2 ordered by Markel Guerrier just prior to code sepsis being called  Plan to continue vancomycin  Add renally dosed zosyn for gram negative coverage  250 cc NS ordered due to poor kidney function and low EF of 20%  Tylenol 1 gram IV    2. Low BP of 87/54 with minimal improvement to 90's/60's  250 cc NS ordered due to poor kidney function and low EF of 20%  will add another 250 cc if no improvement in bp status  Will consider ICU consult     Case discussed with Jones Franco by Markel Matthew        Addendum- repeat lactate 1.1  Will continue fluids and monitor BP

## 2017-08-13 NOTE — CONSULT NOTE ADULT - SUBJECTIVE AND OBJECTIVE BOX
CARDIOLOGY CONSULTATION NOTE ([facility])  Consult requested by:      Reason for Consultation:     History obtained by: Patient and medical record     obtained: No    Chief complaint:    Patient is a 87y old  Male who presents with a chief complaint of hypervolemia (13 Aug 2017 07:37)      HPI: (patient is a poor historian and history obtained from chart and primary attending )   87 [facility] with PMHx dementia, HTN, HLD, DM, chronic systolic HFrEF:20-25% (July 2017), ICM s/p bi vent ICD (medtronic), BPH and ESRD on HD tu th sat, was d/c from [facility] ON 8/2/17 after being tx for coag negative staph bacteremia with vancomycin during HD to end on 9/2/17; pt was sent to the hospital for emergent HD. after being volume overloaded from missing dialysis ( patient fell during transport and had C4 fx and collar , which delayed his regular transport and he presented urgently to [facility] for dialysis . Cardiology was consulted due to NSVT on telemetry. He denies chest pain , SOB palpitations, dizziness or syncope on presentation  He c/o now of left sided extremity pain        REVIEW OF SYMPTOMS: Cardiovascular:  as per HPI;  Respiratory:  as per HPI, MSK: C4 Fx , C-collar  Genitourinary:  No dysuria, no hematuria; Gastrointestinal:  No nausea, no vomiting. No diarrhea.  No abdominal pain. No dark color stool, no melena ; Neurological: No headache, no dizziness, no slurred speech;  Psychiatric: No agitation, no anxiety.  ALL OTHER REVIEW OF SYSTEMS ARE NEGATIVE.    ALLERGIES: Allergies    No Known Allergies    Intolerances        MEDICATIONS  (STANDING):  epoetin una Injectable 37994 Unit(s) IV Push <User Schedule>  folic acid 1 milliGRAM(s) Oral daily  ferrous    sulfate 325 milliGRAM(s) Oral daily  insulin lispro (HumaLOG) corrective regimen sliding scale   SubCutaneous three times a day before meals  dextrose 5%. 1000 milliLiter(s) (50 mL/Hr) IV Continuous <Continuous>  dextrose 50% Injectable 12.5 Gram(s) IV Push once  dextrose 50% Injectable 25 Gram(s) IV Push once  dextrose 50% Injectable 25 Gram(s) IV Push once  erythromycin   Ointment 1 Application(s) Right EYE daily  midodrine 10 milliGRAM(s) Oral <User Schedule>  calcium acetate 667 milliGRAM(s) Oral three times a day with meals  tamsulosin 0.4 milliGRAM(s) Oral at bedtime  gabapentin 300 milliGRAM(s) Oral three times a day  piperacillin/tazobactam IVPB.   IV Intermittent   piperacillin/tazobactam IVPB. 2.25 Gram(s) IV Intermittent every 12 hours  vancomycin  IVPB 500 milliGRAM(s) IV Intermittent <User Schedule>  nystatin Powder 1 Application(s) Topical two times a day    MEDICATIONS  (PRN):  dextrose Gel 1 Dose(s) Oral once PRN Blood Glucose LESS THAN 70 milliGRAM(s)/deciliter  glucagon  Injectable 1 milliGRAM(s) IntraMuscular once PRN Glucose LESS THAN 70 milligrams/deciliter  artificial  tears Solution 1 Drop(s) Both EYES three times a day PRN Dry Eyes  acetaminophen   Tablet. 650 milliGRAM(s) Oral every 6 hours PRN Mild Pain (1 - 3)  polyethylene glycol 3350 17 Gram(s) Oral daily PRN Constipation  acetaminophen   Tablet 650 milliGRAM(s) Oral every 6 hours PRN For Temp greater than 38 C (100.4 F)            PAST MEDICAL HISTORY  Pubic ramus fracture, right, sequela  Compression fracture of C-spine  Hyperlipidemia  ESRD on dialysis  Dementia  Muscle weakness (generalized)  Ventricular tachycardia  Diabetes mellitus  Renal failure  Hypertension  Cardiomyopathy      PAST SURGICAL HISTORY  ICD (implantable cardioverter-defibrillator), biventricular, in situ  No significant past surgical history      FAMILY HISTORY:  No pertinent family history in first degree relatives      SOCIAL HISTORY:  Denies smoking/alcohol/drugs      Vital Signs Last 24 Hrs  T(C): 36.8 (13 Aug 2017 17:15), Max: 38.7 (13 Aug 2017 02:13)  T(F): 98.3 (13 Aug 2017 17:15), Max: 101.7 (13 Aug 2017 02:13)  HR: 103 (13 Aug 2017 17:15) (91 - 116)  BP: 114/60 (13 Aug 2017 17:15) (87/55 - 116/65)  BP(mean): --  RR: 16 (13 Aug 2017 17:15) (15 - 20)  SpO2: 97% (13 Aug 2017 17:15) (94% - 100%)      PHYSICAL EXAM:  Constitutional: C-collar, comfortable  HEENT: Atraumatic and normcephalic , neck is supple . no JVD. No carotid bruit.   CNS: A&Ox3. No focal deficits.  Lymph Nodes: Cervical : Not palpable.  Respiratory: CTAB anteriorly  Cardiovascular: S1S2 RRR. No murmur/rubs or gallop.  Gastrointestinal: Soft non-tender and non distended . +Bowel sounds, no HSM  Extremities: No edema , DP and PT +2   Psychiatric: Calm . no agitation., mood appropriate  Skin: No skin lesions    Intake and output:   08-12 @ 07:01  -  08-13 @ 07:00  --------------------------------------------------------  IN: 250 mL / OUT: 1500 mL / NET: -1250 mL        LABS:                        8.3    4.8   )-----------( 71       ( 13 Aug 2017 08:28 )             27.3     08-13    137  |  98  |  19.0  ----------------------------<  114  4.2   |  26.0  |  4.55<H>    Ca    8.0<L>      13 Aug 2017 08:28  Phos  2.8     08-13  Mg     1.8     08-13    TPro  6.4<L>  /  Alb  3.1<L>  /  TBili  0.3<L>  /  DBili  x   /  AST  14  /  ALT  11  /  AlkPhos  70  08-13    CARDIAC MARKERS ( 13 Aug 2017 13:14 )  x     / 0.32 ng/mL / x     / x     / x      CARDIAC MARKERS ( 13 Aug 2017 08:28 )  x     / 0.32 ng/mL / x     / x     / x      CARDIAC MARKERS ( 13 Aug 2017 00:46 )  x     / 0.33 ng/mL / x     / x     / x              INTERPRETATION OF TELEMETRY: slow VT 5-6 beats   ECG: NSR, BiV paced (reviewed by me)    RADIOLOGY & ADDITIONAL STUDIES:    X-ray:  mild pulmonary congestion    ECHO FINDINGS:      Summary:   1. Technically limited study.   2. Severely decreased global left ventricular systolic function. Left   ventricular ejection fraction, by visual estimation, is 30-35%.   Endocardial border definition is suboptimal, cannot exclude an LV   thrombus. RV systolic function is normal.   3. Moderate mitral annular calcification.   4. Mild tricuspid regurgitation.   5. Aortic stenosis with unknown severity. Dimensionless index = 0.26.   Suboptimal study to accurately assess for severity of aortic stenosis.   Consider additional imaging to assess for aortic stenosis severity.   6. No pericardial effusion.   7. ** No prior echocardiograms available for comparison. If clinical   suspicion for endocarditis is high, consider NANETTE for more sensitive   assesment. Use echo contrast for future studies for improved endocardial   border definition.\     Summary:   1. Left ventricular ejection fraction, by visual estimation, is 20 to   25%.   2. Severely decreased global left ventricular systolic function.   3. Basal inferoseptal segment, apical lateral segment, apex, and mid and   apical inferior septum are abnormal as described above.

## 2017-08-13 NOTE — PROGRESS NOTE ADULT - PROBLEM SELECTOR PLAN 7
-c/w erythromycin ointment as per the facility for 1 more days Anemia with iron deficiency   -c/w ferrous sulfate po qd  -c/w folic acid 1mg po qd     Thromboctopenia   -As per prior labs at baseline but worsened today  possibly from sepsis   -will c/w monitoring     -wbc at this time is wnl   -f/u vitamin b12 level to rule out b12 deficiency

## 2017-08-13 NOTE — PROGRESS NOTE ADULT - ASSESSMENT
87 M from [facility] with PMHx dementia, HTN, HLD, DM, chronic systolic HFrEF:20-25% (July 2017), ICM s/p bi vent ICD (medtronic), BPH and ESRD on HD tu th sat, was d/c from [facility] on 8/2/17 after being tx for coag negative staph bacteremia with vancomycin during HD to end on 9/2/17; pt was sent to the hospital for emergent HD secondary to transportation issue, b/c last transport team dropped the pt and he obtained a c4 compression fx and pubic ramus fx. Pt with congestion on exam, mildly fluid overloaded. No electrolyte derangements. Nephrology consulted and pt S/p emergent HD on admission. Thereafter course complicated by code sepsis, prior presumed infectious source was PC and pt was positive for coag neg bacteremia. Repeat pan cx sent.

## 2017-08-13 NOTE — CONSULT NOTE ADULT - SUBJECTIVE AND OBJECTIVE BOX
[facility] MD FACP   JOEL SIM MD  Diplomates American Board of Internal Medicine and Infecctious Diseases  [phone]  [phone]      EMILY [id number]      HPI:  87 M from [facility] with PMHx dementia, HTN, HLD, DM, chronic systolic HFrEF:20-25% (July 2017), ICM s/p bi vent ICD (medtronic), BPH and ESRD on HD tu th sat, was d/c from [facility] ON 8/2/17 after being tx for coag negative staph bacteremia with vancomycin during HD to end on 9/2/17; pt was sent to the hospital for emergent HD. As per the nursing supervisor at [facility] last thursday the pt had an accident during transport to the HD facility and was dropped and obtained a c4 fracture and pt was placed in a c collar along with a right pubic ramus fx; currently was sent from the facility for emergent dialysis due to not having transport because the patient was dropped by previous transport and will need to have new transport set up. Pt missed his Thursday dialysis and is due for another dialysis today as per nursing supervision at [facility]. Patient is anuric. Patient states that he feels well, he wants to go back to the facility and is tired of this issue he is having in regards to the transportation. He is AAox2, very lethargic but is able to answer all questions appropriately. Patient denies chest pain, SOB, abd pain, N/V, fever, chills, headache or any other complaints. All remainder ROS negative.    PT WAS RECENTLY HOSPITALIZED  AND PLAN WAS TO CONTINUE   IV VANCO AT  DIALYSIS BUT IS UNCLEAR IF RECEIVED VANCO  REPEAT BLOOD CX  SO FAR NEG   ASKED TO EVALUATE FROM ID STANDPOINT       PAST MEDICAL & SURGICAL HISTORY:  Pubic ramus fracture, right, sequela  Compression fracture of C-spine: c4  Hyperlipidemia  ESRD on dialysis  Dementia  Muscle weakness (generalized)  Diabetes mellitus  Hypertension  ICD (implantable cardioverter-defibrillator), biventricular, in situ      ANTIBIOTICS  piperacillin/tazobactam IVPB.   IV Intermittent   piperacillin/tazobactam IVPB. 2.25 Gram(s) IV Intermittent every 12 hours  vancomycin  IVPB 500 milliGRAM(s) IV Intermittent <User Schedule>      Allergies    No Known Allergies    Intolerances        SOCIAL HISTORY:    FAMILY HISTORY:  No pertinent family history in first degree relatives      Vital Signs Last 24 Hrs  T(C): 36.8 (13 Aug 2017 09:39), Max: 38.7 (13 Aug 2017 02:13)  T(F): 98.2 (13 Aug 2017 09:39), Max: 101.7 (13 Aug 2017 02:13)  HR: 92 (13 Aug 2017 09:39) (91 - 116)  BP: 88/56 (13 Aug 2017 09:39) (87/55 - 121/47)  BP(mean): --  RR: 16 (13 Aug 2017 09:39) (15 - 20)  SpO2: 94% (13 Aug 2017 09:39) (94% - 100%)  Drug Dosing Weight  Height (cm): 170.18 (12 Aug 2017 05:41)  Weight (kg): 68 (12 Aug 2017 05:41)  BMI (kg/m2): 23.5 (12 Aug 2017 05:41)  BSA (m2): 1.79 (12 Aug 2017 05:41)      REVIEW OF SYSTEMS:    CONSTITUTIONAL:  As per HPI.    HEENT:  Eyes:  No diplopia or blurred vision. ENT:  No earache, sore throat or runny nose.    CARDIOVASCULAR:  No pressure, squeezing, strangling, tightness, heaviness or aching about the chest, neck, axilla or epigastrium.    RESPIRATORY:  No cough, shortness of breath, PND or orthopnea.    GASTROINTESTINAL:  No nausea, vomiting or diarrhea.    GENITOURINARY:  No dysuria, frequency or urgency.    MUSCULOSKELETAL:  As per HPI.    SKIN:  No change in skin, hair or nails.    NEUROLOGIC:  No paresthesias, fasciculations, seizures or weakness.                  PHYSICAL EXAMINATION:    GENERAL: The patient is a well-developed, well-nourished _ IN NAD     VITAL SIGNS: T(C): 36.8 (13 Aug 2017 09:39), Max: 38.7 (13 Aug 2017 02:13)  HR: 92 (13 Aug 2017 09:39) (91 - 116)  BP: 88/56 (13 Aug 2017 09:39) (87/55 - 121/47)  RR: 16 (13 Aug 2017 09:39) (15 - 20)  SpO2: 94% (13 Aug 2017 09:39) (94% - 100%)  Wt(kg): --    HEENT: Head is normocephalic and atraumatic.  ANICTERIC  NECK: Supple. No carotid bruits.  No lymphadenopathy or thyromegaly.    LUNGS:COARSE BREATH SOUNDS    HEART: Regular rate and rhythm without murmur.    ABDOMEN: Soft, nontender, and nondistended.  Positive bowel sounds.  No hepatosplenomegaly was noted. NO REBOUND NO GUARDING    EXTREMITIES: NO EDEMA NO ERYTHEMA    NEUROLOGIC: NON FOCAL      SKIN: No ulceration or induration present. NO RASH        BLOOD CULTURES       URINE CX          LABS:                        8.3    4.8   )-----------( 71       ( 13 Aug 2017 08:28 )             27.3     08-13    137  |  98  |  19.0  ----------------------------<  114  4.2   |  26.0  |  4.55<H>    Ca    8.0<L>      13 Aug 2017 08:28  Phos  2.8     08-13  Mg     1.8     08-13    TPro  6.4<L>  /  Alb  3.1<L>  /  TBili  0.3<L>  /  DBili  x   /  AST  14  /  ALT  11  /  AlkPhos  70  08-13          RADIOLOGY & ADDITIONAL STUDIES:      ASSESSMENT/PLAN  87 M from [facility] with PMHx dementia, HTN, HLD, DM, chronic systolic HFrEF:20-25% (July 2017), ICM s/p bi vent ICD (medtronic), BPH and ESRD on HD tu th sat, was d/c from [facility] ON 8/2/17 after being tx for coag negative staph bacteremia with vancomycin during HD to end on 9/2/17; pt was sent to the hospital for emergent HD. As per the nursing supervisor at [facility] last thursday the pt had an accident during transport to the HD facility and was dropped and obtained a c4 fracture and pt was placed in a c collar along with a right pubic ramus fx; currently was sent from the facility for emergent dialysis due to not having transport because the patient was dropped by previous transport and will need to have new transport set up. Pt missed his Thursday dialysis and is due for another dialysis today as per nursing supervision at [facility]. Patient is anuric. Patient states that he feels well, he wants to go back to the facility and is tired of this issue he is having in regards to the transportation. He is AAox2, very lethargic but is able to answer all questions appropriately. Patient denies chest pain, SOB, abd pain, N/V, fever, chills, headache or any other complaints. All remainder ROS negative.    PT WAS RECENTLY HOSPITALIZED  AND PLAN WAS TO CONTINUE   IV VANCO AT  DIALYSIS BUT IS UNCLEAR IF RECEIVED VANCO  REPEAT BLOOD CX  SO FAR NEG  HAD FEVERS ZOSYN ADDED EMPIRICALLY  WILL AWAIT CX WILL FOLLOW UP                  JOEL GERBER MD

## 2017-08-13 NOTE — PROGRESS NOTE ADULT - PROBLEM SELECTOR PLAN 3
-Patient was evaluated by neurosurgery Dr. Austin and had c collar placed. D/w neurosurgery NP who d/w Dr. Austin and patient is to remain in the c collar and follow up with Dr. Austin in 2 weeks in the office. Patient is able to do activity as tolerated which includes walking, sitting in a wheel chair as long as collar stays in place there is no contraindication to activity. Missed HD secondary to transport issue  -fluid overload improved on exam   -s/p emergent HD8/12/17   -c/w regularly scheduled HD   -d/w Dr. Chambers Nephrology  -will d/w SW and CM in regards to transportation issue.

## 2017-08-13 NOTE — PROGRESS NOTE ADULT - PROBLEM SELECTOR PLAN 4
-c/w supportive care and patient to c/w PT at the facility -Patient was evaluated by neurosurgery Dr. Austin and had c collar placed. D/w neurosurgery NP who d/w Dr. Austin and patient is to remain in the c collar and follow up with Dr. Austin in 2 weeks in the office. Patient is able to do activity as tolerated which includes walking, sitting in a wheel chair as long as collar stays in place there is no contraindication to activity.    R pubic ramus fx -c/w supportive care and patient to c/w PT at the facility

## 2017-08-13 NOTE — PROGRESS NOTE ADULT - PROBLEM SELECTOR PLAN 1
secondary to unclear source but prior coag negative bacteremia and presumed source could be the permacath. Pt also has Biv ICD in place.   -pt s/p 500cc bolus overnight but at baseline has hypotension for which he is on middrine 10mg po tid will not give further IVF   -pt clinically stable, has a dry cough and no pain otherwise  -f/u Bcx to evaluate further for bacteremia  -cxr shows pulm vascular congestion no evidence of pna  -f/u ct chest   -pt was to receive vancomycin 500mg ivpb post HD on tu th sat but unsure if HD facility was giving the pt the abx. [facility] [phone] and left 2 vms for call back to confirm. [facility].   -ID consulted and d/w Dr. Anaya who will evaluate the pt   -empiric coverage with renally adjusted zosyn and vanco post HD   -lactate wnl   -f/u procalcitonin

## 2017-08-13 NOTE — PROVIDER CONTACT NOTE (EICU) - RECOMMENDATIONS
Continue vanco post dialysis, add zosyn for gram negative coverage for possible new infectoion, judicious fluids. 1500mL taken off at dialysis, would give 250-500 mL bolus. Appropriate labs sent, follow up if signs of severe sepsis and no improvement with fluids may require vasopressors

## 2017-08-13 NOTE — PROGRESS NOTE ADULT - PROBLEM SELECTOR PLAN 6
Anemia with iron deficiency   -c/w ferrous sulfate po qd  -c/w folic acid 1mg po qd     Thromboctopenia   -As per prior labs at baseline but worsened today  possibly from sepsis   -will c/w monitoring     -wbc at this time is wnl   -f/u vitamin b12 level to rule out b12 deficiency HFrEF:20-25% NICM with biv ICD  - pt was fluid overloaded secondary to missed HD now improving fluid status  -pt had beats of v tach on telemetry but has ICD in place   - not on ace inhibitor given renal function and hypotension   -cardiology consulted

## 2017-08-13 NOTE — PROGRESS NOTE ADULT - PROBLEM SELECTOR PLAN 9
fs stable  -c/w HSS   -c/w low carb diet  -c/w hypoglycemia protocol hx of orthostatic hypotenssion  -currently bp stable at baseline   -c/w midodrine 10 mg po tid

## 2017-08-13 NOTE — PROGRESS NOTE ADULT - PROBLEM SELECTOR PLAN 5
-Pt was d/c from [facility] on 8/2/17 and at that time ID recommended for the patient to c/w vancomycin IVPB post HD Tu Th Sat until 9/2/17.   Tried to call the HD center to see if the patient was continued on the vancomycin b/c [facility] has no documentation that the pt is receiving Vanco post HD   -will c/w vancomycin 500mg OVPB q tu th sat post HD  -D/w Dr. Anaya ID the plan of care and awaiting consultation

## 2017-08-13 NOTE — PROGRESS NOTE ADULT - SUBJECTIVE AND OBJECTIVE BOX
Patient is a 87y old  Male who presents with a chief complaint of hypervolemia (13 Aug 2017 07:37)      HEALTH ISSUES - PROBLEM Dx:  Prophylactic measure: Prophylactic measure  Diabetes mellitus: Diabetes mellitus  Hypotension: Hypotension  Conjunctivitis, acute, right eye: Conjunctivitis, acute, right eye  Pancytopenia: Pancytopenia  Bacteremia due to coagulase-negative Staphylococcus: Bacteremia due to coagulase-negative Staphylococcus  Pubic ramus fracture, right, sequela: Pubic ramus fracture, right, sequela  Compression fracture of C-spine: Compression fracture of C-spine  ESRD on dialysis: ESRD on dialysis          INTERVAL HPI/OVERNIGHT EVENTS:  Patient seen and examined at bedside. Overnight the patient had a code sepsis with tmax: 101, hypotension and tachycardia, pt given 500cc bolus total overnight and pan cx was sent. Patient states he is feeling well, when asked if anything bothers him he denies and states he has a dry cough, no abdominal pain and does not make any urine but has no penile pain. Patient denies chest pain, SOB, abd pain, N/V, fever, chills, dysuria or any other complaints. All remainder ROS negative.     Vital Signs Last 24 Hrs  T(C): 36.8 (13 Aug 2017 09:39), Max: 38.7 (13 Aug 2017 02:13)  T(F): 98.2 (13 Aug 2017 09:39), Max: 101.7 (13 Aug 2017 02:13)  HR: 92 (13 Aug 2017 09:39) (91 - 116)  BP: 88/56 (13 Aug 2017 09:39) (87/55 - 121/47)  BP(mean): --  RR: 16 (13 Aug 2017 09:39) (15 - 20)  SpO2: 94% (13 Aug 2017 09:39) (94% - 100%)    CAPILLARY BLOOD GLUCOSE  124 (13 Aug 2017 11:35)  116 (13 Aug 2017 07:57)  140 (13 Aug 2017 02:13)  139 (12 Aug 2017 17:44)          I&O's Summary    12 Aug 2017 07:01  -  13 Aug 2017 07:00  --------------------------------------------------------  IN: 250 mL / OUT: 1500 mL / NET: -1250 mL        CONSTITUTIONAL: Well appearing, well nourished, awake, alert and in no apparent distress  ENMT: C Collar in place   CARDIAC: Normal rate, regular rhythm.  Heart sounds S1, S2.  No murmurs, rubs or gallops   RESPIRATORY: Decreased air entry b/l, no WRR, bibasilar crackles   GASTROENTEROLOGY: Soft, NT ND BS + normoactive   EXTREMITIES: peripheral edema +1, no cyanosis or deformity   NEUROLOGICAL: Alert and oriented x 2 person and place not the date, no focal deficits, no motor or sensory deficits.  SKIN: No rash, skin turgor wnl   R PC C/D/I no evidence of infection  and Left chest ICD C/D/I no evidence of infection     MEDICATIONS  (STANDING):  epoetin una Injectable 04256 Unit(s) IV Push <User Schedule>  folic acid 1 milliGRAM(s) Oral daily  ferrous    sulfate 325 milliGRAM(s) Oral daily  insulin lispro (HumaLOG) corrective regimen sliding scale   SubCutaneous three times a day before meals  dextrose 5%. 1000 milliLiter(s) (50 mL/Hr) IV Continuous <Continuous>  dextrose 50% Injectable 12.5 Gram(s) IV Push once  dextrose 50% Injectable 25 Gram(s) IV Push once  dextrose 50% Injectable 25 Gram(s) IV Push once  erythromycin   Ointment 1 Application(s) Right EYE daily  midodrine 10 milliGRAM(s) Oral <User Schedule>  calcium acetate 667 milliGRAM(s) Oral three times a day with meals  tamsulosin 0.4 milliGRAM(s) Oral at bedtime  gabapentin 300 milliGRAM(s) Oral three times a day  piperacillin/tazobactam IVPB.   IV Intermittent   piperacillin/tazobactam IVPB. 2.25 Gram(s) IV Intermittent every 12 hours  vancomycin  IVPB 500 milliGRAM(s) IV Intermittent <User Schedule>  nystatin Powder 1 Application(s) Topical two times a day    MEDICATIONS  (PRN):  dextrose Gel 1 Dose(s) Oral once PRN Blood Glucose LESS THAN 70 milliGRAM(s)/deciliter  glucagon  Injectable 1 milliGRAM(s) IntraMuscular once PRN Glucose LESS THAN 70 milligrams/deciliter  artificial  tears Solution 1 Drop(s) Both EYES three times a day PRN Dry Eyes  acetaminophen   Tablet. 650 milliGRAM(s) Oral every 6 hours PRN Mild Pain (1 - 3)  polyethylene glycol 3350 17 Gram(s) Oral daily PRN Constipation  acetaminophen   Tablet 650 milliGRAM(s) Oral every 6 hours PRN For Temp greater than 38 C (100.4 F)      LABS:                        8.3    4.8   )-----------( 71       ( 13 Aug 2017 08:28 )             27.3     08-13    137  |  98  |  19.0  ----------------------------<  114  4.2   |  26.0  |  4.55<H>    Ca    8.0<L>      13 Aug 2017 08:28  Phos  2.8     08-13  Mg     1.8     08-13    TPro  6.4<L>  /  Alb  3.1<L>  /  TBili  0.3<L>  /  DBili  x   /  AST  14  /  ALT  11  /  AlkPhos  70  08-13        LIVER FUNCTIONS - ( 13 Aug 2017 08:28 )  Alb: 3.1 g/dL / Pro: 6.4 g/dL / ALK PHOS: 70 U/L / ALT: 11 U/L / AST: 14 U/L / GGT: x             RADIOLOGY & ADDITIONAL TESTS:  No new imaging studies

## 2017-08-14 LAB
24R-OH-CALCIDIOL SERPL-MCNC: 26.3 NG/ML — LOW (ref 30–100)
ALBUMIN SERPL ELPH-MCNC: 3.1 G/DL — LOW (ref 3.3–5.2)
ALP SERPL-CCNC: 73 U/L — SIGNIFICANT CHANGE UP (ref 40–120)
ALT FLD-CCNC: 11 U/L — SIGNIFICANT CHANGE UP
ANION GAP SERPL CALC-SCNC: 14 MMOL/L — SIGNIFICANT CHANGE UP (ref 5–17)
AST SERPL-CCNC: 15 U/L — SIGNIFICANT CHANGE UP
BASOPHILS # BLD AUTO: 0 K/UL — SIGNIFICANT CHANGE UP (ref 0–0.2)
BASOPHILS NFR BLD AUTO: 0.4 % — SIGNIFICANT CHANGE UP (ref 0–2)
BILIRUB SERPL-MCNC: 0.4 MG/DL — SIGNIFICANT CHANGE UP (ref 0.4–2)
BUN SERPL-MCNC: 25 MG/DL — HIGH (ref 8–20)
CALCIUM SERPL-MCNC: 8.7 MG/DL — SIGNIFICANT CHANGE UP (ref 8.6–10.2)
CHLORIDE SERPL-SCNC: 98 MMOL/L — SIGNIFICANT CHANGE UP (ref 98–107)
CK SERPL-CCNC: 83 U/L — SIGNIFICANT CHANGE UP (ref 30–200)
CO2 SERPL-SCNC: 25 MMOL/L — SIGNIFICANT CHANGE UP (ref 22–29)
CREAT SERPL-MCNC: 5.41 MG/DL — HIGH (ref 0.5–1.3)
EOSINOPHIL # BLD AUTO: 0.3 K/UL — SIGNIFICANT CHANGE UP (ref 0–0.5)
EOSINOPHIL NFR BLD AUTO: 5.5 % — HIGH (ref 0–5)
GLUCOSE SERPL-MCNC: 87 MG/DL — SIGNIFICANT CHANGE UP (ref 70–115)
HCT VFR BLD CALC: 29.4 % — LOW (ref 42–52)
HGB BLD-MCNC: 9 G/DL — LOW (ref 14–18)
LYMPHOCYTES # BLD AUTO: 1.2 K/UL — SIGNIFICANT CHANGE UP (ref 1–4.8)
LYMPHOCYTES # BLD AUTO: 23.5 % — SIGNIFICANT CHANGE UP (ref 20–55)
MCHC RBC-ENTMCNC: 26.7 PG — LOW (ref 27–31)
MCHC RBC-ENTMCNC: 30.6 G/DL — LOW (ref 32–36)
MCV RBC AUTO: 87.2 FL — SIGNIFICANT CHANGE UP (ref 80–94)
MONOCYTES # BLD AUTO: 0.7 K/UL — SIGNIFICANT CHANGE UP (ref 0–0.8)
MONOCYTES NFR BLD AUTO: 12.8 % — HIGH (ref 3–10)
NEUTROPHILS # BLD AUTO: 3.1 K/UL — SIGNIFICANT CHANGE UP (ref 1.8–8)
NEUTROPHILS NFR BLD AUTO: 57.6 % — SIGNIFICANT CHANGE UP (ref 37–73)
PLATELET # BLD AUTO: 79 K/UL — LOW (ref 150–400)
POTASSIUM SERPL-MCNC: 4.4 MMOL/L — SIGNIFICANT CHANGE UP (ref 3.5–5.3)
POTASSIUM SERPL-SCNC: 4.4 MMOL/L — SIGNIFICANT CHANGE UP (ref 3.5–5.3)
PROCALCITONIN SERPL-MCNC: 0.61 NG/ML — HIGH (ref 0–0.04)
PROT SERPL-MCNC: 6.6 G/DL — SIGNIFICANT CHANGE UP (ref 6.6–8.7)
RBC # BLD: 3.37 M/UL — LOW (ref 4.6–6.2)
RBC # FLD: 17.1 % — HIGH (ref 11–15.6)
SODIUM SERPL-SCNC: 137 MMOL/L — SIGNIFICANT CHANGE UP (ref 135–145)
T3FREE SERPL-MCNC: 1.62 PG/ML — LOW (ref 1.8–4.6)
T4 FREE SERPL-MCNC: 1 NG/DL — SIGNIFICANT CHANGE UP (ref 0.9–1.8)
TROPONIN T SERPL-MCNC: 0.27 NG/ML — HIGH (ref 0–0.06)
WBC # BLD: 5.3 K/UL — SIGNIFICANT CHANGE UP (ref 4.8–10.8)
WBC # FLD AUTO: 5.3 K/UL — SIGNIFICANT CHANGE UP (ref 4.8–10.8)

## 2017-08-14 PROCEDURE — 99233 SBSQ HOSP IP/OBS HIGH 50: CPT

## 2017-08-14 RX ORDER — METOPROLOL TARTRATE 50 MG
25 TABLET ORAL EVERY 12 HOURS
Qty: 0 | Refills: 0 | Status: DISCONTINUED | OUTPATIENT
Start: 2017-08-14 | End: 2017-08-17

## 2017-08-14 RX ORDER — ERGOCALCIFEROL 1.25 MG/1
50000 CAPSULE ORAL
Qty: 0 | Refills: 0 | Status: DISCONTINUED | OUTPATIENT
Start: 2017-08-14 | End: 2017-08-17

## 2017-08-14 RX ORDER — CALCITRIOL 0.5 UG/1
0.25 CAPSULE ORAL DAILY
Qty: 0 | Refills: 0 | Status: DISCONTINUED | OUTPATIENT
Start: 2017-08-14 | End: 2017-08-17

## 2017-08-14 RX ADMIN — Medication 667 MILLIGRAM(S): at 18:36

## 2017-08-14 RX ADMIN — TAMSULOSIN HYDROCHLORIDE 0.4 MILLIGRAM(S): 0.4 CAPSULE ORAL at 22:45

## 2017-08-14 RX ADMIN — GABAPENTIN 300 MILLIGRAM(S): 400 CAPSULE ORAL at 13:12

## 2017-08-14 RX ADMIN — NYSTATIN CREAM 1 APPLICATION(S): 100000 CREAM TOPICAL at 17:55

## 2017-08-14 RX ADMIN — Medication 325 MILLIGRAM(S): at 13:12

## 2017-08-14 RX ADMIN — MIDODRINE HYDROCHLORIDE 10 MILLIGRAM(S): 2.5 TABLET ORAL at 08:42

## 2017-08-14 RX ADMIN — MIDODRINE HYDROCHLORIDE 10 MILLIGRAM(S): 2.5 TABLET ORAL at 18:36

## 2017-08-14 RX ADMIN — GABAPENTIN 300 MILLIGRAM(S): 400 CAPSULE ORAL at 05:34

## 2017-08-14 RX ADMIN — Medication 1 DROP(S): at 13:12

## 2017-08-14 RX ADMIN — GABAPENTIN 300 MILLIGRAM(S): 400 CAPSULE ORAL at 22:45

## 2017-08-14 RX ADMIN — PIPERACILLIN AND TAZOBACTAM 200 GRAM(S): 4; .5 INJECTION, POWDER, LYOPHILIZED, FOR SOLUTION INTRAVENOUS at 05:33

## 2017-08-14 RX ADMIN — Medication 1 APPLICATION(S): at 13:12

## 2017-08-14 RX ADMIN — Medication 1 MILLIGRAM(S): at 13:12

## 2017-08-14 RX ADMIN — MIDODRINE HYDROCHLORIDE 10 MILLIGRAM(S): 2.5 TABLET ORAL at 16:43

## 2017-08-14 RX ADMIN — Medication 667 MILLIGRAM(S): at 08:42

## 2017-08-14 RX ADMIN — Medication 667 MILLIGRAM(S): at 13:12

## 2017-08-14 RX ADMIN — PIPERACILLIN AND TAZOBACTAM 200 GRAM(S): 4; .5 INJECTION, POWDER, LYOPHILIZED, FOR SOLUTION INTRAVENOUS at 18:36

## 2017-08-14 RX ADMIN — NYSTATIN CREAM 1 APPLICATION(S): 100000 CREAM TOPICAL at 05:34

## 2017-08-14 NOTE — PROGRESS NOTE ADULT - PROBLEM SELECTOR PLAN 2
likely secondary to SIRS/ESRD  -down trending troponin   - will c/w monitoring unchanged ekg   -pt did have beats of v tach overnight   -cardiology f/u noted and appreciated and started low dose metoprolol 25mg po bid

## 2017-08-14 NOTE — PROGRESS NOTE ADULT - SUBJECTIVE AND OBJECTIVE BOX
Patient is a 87y old  Male who presents with a chief complaint of hypervolemia (13 Aug 2017 07:37)      HEALTH ISSUES - PROBLEM Dx:  Chronic systolic heart failure: Chronic systolic heart failure  Elevated troponin: Elevated troponin  SIRS (systemic inflammatory response syndrome): SIRS (systemic inflammatory response syndrome)  Prophylactic measure: Prophylactic measure  Diabetes mellitus: Diabetes mellitus  Hypotension: Hypotension  Conjunctivitis, acute, right eye: Conjunctivitis, acute, right eye  Pancytopenia: Pancytopenia  Bacteremia due to coagulase-negative Staphylococcus: Bacteremia due to coagulase-negative Staphylococcus  Pubic ramus fracture, right, sequela: Pubic ramus fracture, right, sequela  Compression fracture of C-spine: Compression fracture of C-spine  ESRD on dialysis: ESRD on dialysis      INTERVAL HPI/OVERNIGHT EVENTS:  Patient seen and examined at bedside. No acute events overnight. Patient states he is feeling well,  does have vernon pain on his right side that has been there since he was dropped as an outpatient. Patient denies chest pain, SOB, abd pain, N/V, fever, chills, dysuria or any other complaints. All remainder ROS negative.     Vital Signs Last 24 Hrs  T(C): 36.3 (14 Aug 2017 04:21), Max: 37.1 (13 Aug 2017 20:09)  T(F): 97.3 (14 Aug 2017 04:21), Max: 98.7 (13 Aug 2017 20:09)  HR: 95 (14 Aug 2017 04:21) (83 - 103)  BP: 128/74 (14 Aug 2017 04:21) (114/60 - 131/69)  BP(mean): --  RR: 18 (14 Aug 2017 04:21) (16 - 18)  SpO2: 99% (14 Aug 2017 04:21) (96% - 100%)    CAPILLARY BLOOD GLUCOSE  148 (14 Aug 2017 12:00)  82 (14 Aug 2017 08:19)  112 (13 Aug 2017 22:08)  141 (13 Aug 2017 17:15)        CONSTITUTIONAL: Well appearing, well nourished, awake, alert and in no apparent distress  ENMT: C Collar in place   CARDIAC: Normal rate, regular rhythm.  Heart sounds S1, S2.  No murmurs, rubs or gallops   RESPIRATORY: Decreased air entry b/l, no WRR, bibasilar crackles   GASTROENTEROLOGY: Soft, NT ND BS + normoactive   EXTREMITIES: peripheral edema +1, no cyanosis or deformity   NEUROLOGICAL: Alert and oriented x 2 person and place not the date, no focal deficits, no motor or sensory deficits.  SKIN: No rash, skin turgor wnl   R PC C/D/I no evidence of infection  and Left chest ICD C/D/I no evidence of infection       MEDICATIONS  (STANDING):  epoetin una Injectable 16675 Unit(s) IV Push <User Schedule>  folic acid 1 milliGRAM(s) Oral daily  ferrous    sulfate 325 milliGRAM(s) Oral daily  insulin lispro (HumaLOG) corrective regimen sliding scale   SubCutaneous three times a day before meals  dextrose 5%. 1000 milliLiter(s) (50 mL/Hr) IV Continuous <Continuous>  dextrose 50% Injectable 12.5 Gram(s) IV Push once  dextrose 50% Injectable 25 Gram(s) IV Push once  dextrose 50% Injectable 25 Gram(s) IV Push once  erythromycin   Ointment 1 Application(s) Right EYE daily  midodrine 10 milliGRAM(s) Oral <User Schedule>  calcium acetate 667 milliGRAM(s) Oral three times a day with meals  tamsulosin 0.4 milliGRAM(s) Oral at bedtime  gabapentin 300 milliGRAM(s) Oral three times a day  piperacillin/tazobactam IVPB.   IV Intermittent   piperacillin/tazobactam IVPB. 2.25 Gram(s) IV Intermittent every 12 hours  vancomycin  IVPB 500 milliGRAM(s) IV Intermittent <User Schedule>  nystatin Powder 1 Application(s) Topical two times a day  metoprolol 25 milliGRAM(s) Oral every 12 hours    MEDICATIONS  (PRN):  dextrose Gel 1 Dose(s) Oral once PRN Blood Glucose LESS THAN 70 milliGRAM(s)/deciliter  glucagon  Injectable 1 milliGRAM(s) IntraMuscular once PRN Glucose LESS THAN 70 milligrams/deciliter  artificial  tears Solution 1 Drop(s) Both EYES three times a day PRN Dry Eyes  acetaminophen   Tablet. 650 milliGRAM(s) Oral every 6 hours PRN Mild Pain (1 - 3)  polyethylene glycol 3350 17 Gram(s) Oral daily PRN Constipation  acetaminophen   Tablet 650 milliGRAM(s) Oral every 6 hours PRN For Temp greater than 38 C (100.4 F)      LABS:                        9.0    5.3   )-----------( 79       ( 14 Aug 2017 07:09 )             29.4     08-14    137  |  98  |  25.0<H>  ----------------------------<  87  4.4   |  25.0  |  5.41<H>    Ca    8.7      14 Aug 2017 07:09  Phos  2.8     08-13  Mg     1.8     08-13    TPro  6.6  /  Alb  3.1<L>  /  TBili  0.4  /  DBili  x   /  AST  15  /  ALT  11  /  AlkPhos  73  08-14        LIVER FUNCTIONS - ( 14 Aug 2017 07:09 )  Alb: 3.1 g/dL / Pro: 6.6 g/dL / ALK PHOS: 73 U/L / ALT: 11 U/L / AST: 15 U/L / GGT: x             RADIOLOGY & ADDITIONAL TESTS:  No new imaging studies

## 2017-08-14 NOTE — PROGRESS NOTE ADULT - PROBLEM SELECTOR PLAN 4
-Patient was evaluated by neurosurgery Dr. Austin and had c collar placed. D/w neurosurgery NP who d/w Dr. Austin and patient is to remain in the c collar and follow up with Dr. Austin in 2 weeks in the office. Patient is able to do activity as tolerated which includes walking, sitting in a wheel chair as long as collar stays in place there is no contraindication to activity.    R pubic ramus fx -c/w supportive care and patient to c/w PT at the facility

## 2017-08-14 NOTE — PROGRESS NOTE ADULT - PROBLEM SELECTOR PLAN 5
-Pt was d/c from [facility] on 8/2/17 and at that time ID recommended for the patient to c/w vancomycin IVPB post HD Tu Th Sat until 9/2/17.   [facility] and [facility] was receiving his [facility]com[facility]    -c/w vancomycin 500mg OVPB q tu th sat post HD  -ID f/u noted and appreciated

## 2017-08-14 NOTE — PROGRESS NOTE ADULT - PROBLEM SELECTOR PLAN 6
HFrEF:20-25% NICM with biv ICD  - Fluid status improved   -pt had beats of v tach on telemetry but has ICD in place   - not on ace inhibitor given renal function and hypotension   -started metoprolol 25mg po bid   -cardiology f/u noted and appreciated

## 2017-08-14 NOTE — PROGRESS NOTE ADULT - PROBLEM SELECTOR PLAN 3
Missed HD secondary to transport issue  -fluid overload improved on exam   -s/p emergent HD 8/12/17   -c/w regularly scheduled HD   -nephrology f/u noted and appreciated   - d/w SW and CM in regards to transportation issue. Missed HD secondary to transport issue  -fluid overload improved on exam   -s/p emergent HD 8/12/17   -c/w regularly scheduled HD   -low vitamin d leve started on vit d supplementation   -nephrology f/u noted and appreciated   - d/w SW and CM in regards to transportation issue.

## 2017-08-14 NOTE — PROGRESS NOTE ADULT - PROBLEM SELECTOR PLAN 1
secondary to unclear source but prior coag negative bacteremia and presumed source could be the permacath. Pt also has Biv ICD in place.   -pt remains afebrile  -no leukocytosis   -f/u Bcx to evaluate further for recurrent bacteremia  -cxr shows pulm vascular congestion no evidence of pna  -f/u ct chest   -pt was to receive vancomycin 500mg ivpb post HD on tu th sat but unsure if HD facility was giving the pt the abx. [facility] [phone] and left 2 vms for call back to confirm. [facility] was receiving [facility].   -ID f/u noted and appreciated   -empiric coverage with renally adjusted zosyn and vanco post HD   -lactate wnl   -procalcitonin elevated

## 2017-08-14 NOTE — PROGRESS NOTE ADULT - PROBLEM SELECTOR PLAN 7
Anemia with iron deficiency   -c/w ferrous sulfate po qd  -c/w folic acid 1mg po qd     Thromboctopenia   -As per prior labs at baseline but worsened today  possibly from sepsis   -will c/w monitoring     -wbc at this time is wnl   -f/u vitamin b12 level to rule out b12 deficiency

## 2017-08-14 NOTE — PROGRESS NOTE ADULT - ASSESSMENT
87 M from [facility] with PMHx dementia, HTN, HLD, DM, chronic systolic HFrEF:20-25% (July 2017), ICM s/p bi vent ICD (medtronic), BPH and ESRD on HD tu th sat, was d/c from [facility] on 8/2/17 after being tx for coag negative staph bacteremia with vancomycin during HD to end on 9/2/17; pt was sent to the hospital for emergent HD secondary to transportation issue, b/c last transport team dropped the pt and he obtained a c4 compression fx and pubic ramus fx. Pt with congestion on exam, mildly fluid overloaded. No electrolyte derangements. Nephrology consulted and pt S/p emergent HD on admission and thereafter has remained euvolemic. Course complicated by code sepsis on 8/13/17, prior presumed infectious source was PC and pt was positive for coag neg bacteremia and was to complete course of abx on 9/2/17 unclear if he was receiving abx. Bcx sent and pending results. Pt empirically continues on renally adjusted vanco and zosyn. ID continues to follow the patient.

## 2017-08-14 NOTE — PROGRESS NOTE ADULT - SUBJECTIVE AND OBJECTIVE BOX
NEPHROLOGY INTERVAL HPI/OVERNIGHT EVENTS:    Examined earlier  Looks comfortable    MEDICATIONS  (STANDING):  epoetin una Injectable 70988 Unit(s) IV Push <User Schedule>  folic acid 1 milliGRAM(s) Oral daily  ferrous    sulfate 325 milliGRAM(s) Oral daily  insulin lispro (HumaLOG) corrective regimen sliding scale   SubCutaneous three times a day before meals  dextrose 5%. 1000 milliLiter(s) (50 mL/Hr) IV Continuous <Continuous>  dextrose 50% Injectable 12.5 Gram(s) IV Push once  dextrose 50% Injectable 25 Gram(s) IV Push once  dextrose 50% Injectable 25 Gram(s) IV Push once  erythromycin   Ointment 1 Application(s) Right EYE daily  midodrine 10 milliGRAM(s) Oral <User Schedule>  calcium acetate 667 milliGRAM(s) Oral three times a day with meals  tamsulosin 0.4 milliGRAM(s) Oral at bedtime  gabapentin 300 milliGRAM(s) Oral three times a day  piperacillin/tazobactam IVPB.   IV Intermittent   piperacillin/tazobactam IVPB. 2.25 Gram(s) IV Intermittent every 12 hours  vancomycin  IVPB 500 milliGRAM(s) IV Intermittent <User Schedule>  nystatin Powder 1 Application(s) Topical two times a day  metoprolol 25 milliGRAM(s) Oral every 12 hours  ergocalciferol 31317 Unit(s) Oral every week    MEDICATIONS  (PRN):  dextrose Gel 1 Dose(s) Oral once PRN Blood Glucose LESS THAN 70 milliGRAM(s)/deciliter  glucagon  Injectable 1 milliGRAM(s) IntraMuscular once PRN Glucose LESS THAN 70 milligrams/deciliter  artificial  tears Solution 1 Drop(s) Both EYES three times a day PRN Dry Eyes  acetaminophen   Tablet. 650 milliGRAM(s) Oral every 6 hours PRN Mild Pain (1 - 3)  polyethylene glycol 3350 17 Gram(s) Oral daily PRN Constipation  acetaminophen   Tablet 650 milliGRAM(s) Oral every 6 hours PRN For Temp greater than 38 C (100.4 F)      Allergies    No Known Allergies    Intolerances        Vital Signs Last 24 Hrs  T(C): 37.2 (14 Aug 2017 17:38), Max: 37.2 (14 Aug 2017 17:38)  T(F): 98.9 (14 Aug 2017 17:38), Max: 98.9 (14 Aug 2017 17:38)  HR: 91 (14 Aug 2017 17:38) (82 - 100)  BP: 128/73 (14 Aug 2017 17:38) (128/73 - 131/78)  BP(mean): --  RR: 18 (14 Aug 2017 12:00) (17 - 18)  SpO2: 82% (14 Aug 2017 12:00) (82% - 100%)  Daily     Daily     PHYSICAL EXAM:  HEENT - legally blind  Chest   clear  CV   no murmur  Abd   soft, NT BS+  Extr   No edema  Neuro  grossly iintact motor      LABS:                        9.0    5.3   )-----------( 79       ( 14 Aug 2017 07:09 )             29.4     08-14    137  |  98  |  25.0<H>  ----------------------------<  87  4.4   |  25.0  |  5.41<H>    Ca    8.7      14 Aug 2017 07:09  Phos  2.8     08-13  Mg     1.8     08-13    TPro  6.6  /  Alb  3.1<L>  /  TBili  0.4  /  DBili  x   /  AST  15  /  ALT  11  /  AlkPhos  73  08-14          ABG - ( 13 Aug 2017 10:14 )  pH: 7.36  /  pCO2: 50    /  pO2: 152   / HCO3: 27    / Base Excess: 2.5   /  SaO2: 100                   RADIOLOGY & ADDITIONAL TESTS:

## 2017-08-15 LAB
ALBUMIN SERPL ELPH-MCNC: 3.1 G/DL — LOW (ref 3.3–5.2)
ALP SERPL-CCNC: 69 U/L — SIGNIFICANT CHANGE UP (ref 40–120)
ALT FLD-CCNC: 9 U/L — SIGNIFICANT CHANGE UP
ANION GAP SERPL CALC-SCNC: 14 MMOL/L — SIGNIFICANT CHANGE UP (ref 5–17)
AST SERPL-CCNC: 14 U/L — SIGNIFICANT CHANGE UP
BASOPHILS # BLD AUTO: 0 K/UL — SIGNIFICANT CHANGE UP (ref 0–0.2)
BASOPHILS NFR BLD AUTO: 0.4 % — SIGNIFICANT CHANGE UP (ref 0–2)
BILIRUB SERPL-MCNC: 0.3 MG/DL — LOW (ref 0.4–2)
BUN SERPL-MCNC: 31 MG/DL — HIGH (ref 8–20)
CALCIUM SERPL-MCNC: 8.7 MG/DL — SIGNIFICANT CHANGE UP (ref 8.6–10.2)
CHLORIDE SERPL-SCNC: 95 MMOL/L — LOW (ref 98–107)
CO2 SERPL-SCNC: 24 MMOL/L — SIGNIFICANT CHANGE UP (ref 22–29)
CREAT SERPL-MCNC: 5.92 MG/DL — HIGH (ref 0.5–1.3)
EOSINOPHIL # BLD AUTO: 0.3 K/UL — SIGNIFICANT CHANGE UP (ref 0–0.5)
EOSINOPHIL NFR BLD AUTO: 6.3 % — HIGH (ref 0–5)
GLUCOSE SERPL-MCNC: 90 MG/DL — SIGNIFICANT CHANGE UP (ref 70–115)
HCT VFR BLD CALC: 28.4 % — LOW (ref 42–52)
HGB BLD-MCNC: 8.6 G/DL — LOW (ref 14–18)
LYMPHOCYTES # BLD AUTO: 1.4 K/UL — SIGNIFICANT CHANGE UP (ref 1–4.8)
LYMPHOCYTES # BLD AUTO: 27.1 % — SIGNIFICANT CHANGE UP (ref 20–55)
MCHC RBC-ENTMCNC: 26.1 PG — LOW (ref 27–31)
MCHC RBC-ENTMCNC: 30.3 G/DL — LOW (ref 32–36)
MCV RBC AUTO: 86.3 FL — SIGNIFICANT CHANGE UP (ref 80–94)
MONOCYTES # BLD AUTO: 0.7 K/UL — SIGNIFICANT CHANGE UP (ref 0–0.8)
MONOCYTES NFR BLD AUTO: 13.6 % — HIGH (ref 3–10)
NEUTROPHILS # BLD AUTO: 2.7 K/UL — SIGNIFICANT CHANGE UP (ref 1.8–8)
NEUTROPHILS NFR BLD AUTO: 52.4 % — SIGNIFICANT CHANGE UP (ref 37–73)
PLATELET # BLD AUTO: 87 K/UL — LOW (ref 150–400)
POTASSIUM SERPL-MCNC: 4.6 MMOL/L — SIGNIFICANT CHANGE UP (ref 3.5–5.3)
POTASSIUM SERPL-SCNC: 4.6 MMOL/L — SIGNIFICANT CHANGE UP (ref 3.5–5.3)
PROT SERPL-MCNC: 6.5 G/DL — LOW (ref 6.6–8.7)
RBC # BLD: 3.29 M/UL — LOW (ref 4.6–6.2)
RBC # FLD: 16.9 % — HIGH (ref 11–15.6)
SODIUM SERPL-SCNC: 133 MMOL/L — LOW (ref 135–145)
VANCOMYCIN TROUGH SERPL-MCNC: 15 UG/ML — SIGNIFICANT CHANGE UP (ref 10–20)
WBC # BLD: 5.1 K/UL — SIGNIFICANT CHANGE UP (ref 4.8–10.8)
WBC # FLD AUTO: 5.1 K/UL — SIGNIFICANT CHANGE UP (ref 4.8–10.8)

## 2017-08-15 PROCEDURE — 99223 1ST HOSP IP/OBS HIGH 75: CPT

## 2017-08-15 PROCEDURE — 71250 CT THORAX DX C-: CPT | Mod: 26

## 2017-08-15 PROCEDURE — 99233 SBSQ HOSP IP/OBS HIGH 50: CPT

## 2017-08-15 RX ADMIN — GABAPENTIN 300 MILLIGRAM(S): 400 CAPSULE ORAL at 21:32

## 2017-08-15 RX ADMIN — GABAPENTIN 300 MILLIGRAM(S): 400 CAPSULE ORAL at 13:43

## 2017-08-15 RX ADMIN — Medication 1: at 12:16

## 2017-08-15 RX ADMIN — Medication 1 APPLICATION(S): at 11:08

## 2017-08-15 RX ADMIN — Medication 25 MILLIGRAM(S): at 17:36

## 2017-08-15 RX ADMIN — CALCITRIOL 0.25 MICROGRAM(S): 0.5 CAPSULE ORAL at 11:08

## 2017-08-15 RX ADMIN — Medication 100 MILLIGRAM(S): at 18:16

## 2017-08-15 RX ADMIN — Medication 325 MILLIGRAM(S): at 11:08

## 2017-08-15 RX ADMIN — Medication 25 MILLIGRAM(S): at 06:23

## 2017-08-15 RX ADMIN — TAMSULOSIN HYDROCHLORIDE 0.4 MILLIGRAM(S): 0.4 CAPSULE ORAL at 21:32

## 2017-08-15 RX ADMIN — Medication 1 MILLIGRAM(S): at 11:08

## 2017-08-15 RX ADMIN — GABAPENTIN 300 MILLIGRAM(S): 400 CAPSULE ORAL at 06:23

## 2017-08-15 RX ADMIN — Medication 667 MILLIGRAM(S): at 08:14

## 2017-08-15 RX ADMIN — PIPERACILLIN AND TAZOBACTAM 200 GRAM(S): 4; .5 INJECTION, POWDER, LYOPHILIZED, FOR SOLUTION INTRAVENOUS at 06:23

## 2017-08-15 RX ADMIN — MIDODRINE HYDROCHLORIDE 10 MILLIGRAM(S): 2.5 TABLET ORAL at 12:16

## 2017-08-15 RX ADMIN — Medication 667 MILLIGRAM(S): at 17:36

## 2017-08-15 RX ADMIN — Medication 667 MILLIGRAM(S): at 12:16

## 2017-08-15 RX ADMIN — ERGOCALCIFEROL 50000 UNIT(S): 1.25 CAPSULE ORAL at 11:08

## 2017-08-15 RX ADMIN — NYSTATIN CREAM 1 APPLICATION(S): 100000 CREAM TOPICAL at 06:23

## 2017-08-15 RX ADMIN — PIPERACILLIN AND TAZOBACTAM 200 GRAM(S): 4; .5 INJECTION, POWDER, LYOPHILIZED, FOR SOLUTION INTRAVENOUS at 17:36

## 2017-08-15 RX ADMIN — NYSTATIN CREAM 1 APPLICATION(S): 100000 CREAM TOPICAL at 17:36

## 2017-08-15 RX ADMIN — MIDODRINE HYDROCHLORIDE 10 MILLIGRAM(S): 2.5 TABLET ORAL at 17:36

## 2017-08-15 RX ADMIN — ERYTHROPOIETIN 10000 UNIT(S): 10000 INJECTION, SOLUTION INTRAVENOUS; SUBCUTANEOUS at 15:59

## 2017-08-15 RX ADMIN — MIDODRINE HYDROCHLORIDE 10 MILLIGRAM(S): 2.5 TABLET ORAL at 08:14

## 2017-08-15 NOTE — PROGRESS NOTE ADULT - SUBJECTIVE AND OBJECTIVE BOX
Patient is a 87y old  Male who presents with a chief complaint of hypervolemia (13 Aug 2017 07:37)      HEALTH ISSUES - PROBLEM Dx:  Chronic systolic heart failure: Chronic systolic heart failure  Elevated troponin: Elevated troponin  SIRS (systemic inflammatory response syndrome): SIRS (systemic inflammatory response syndrome)  Prophylactic measure: Prophylactic measure  Diabetes mellitus: Diabetes mellitus  Hypotension: Hypotension  Conjunctivitis, acute, right eye: Conjunctivitis, acute, right eye  Pancytopenia: Pancytopenia  Bacteremia due to coagulase-negative Staphylococcus: Bacteremia due to coagulase-negative Staphylococcus  Pubic ramus fracture, right, sequela: Pubic ramus fracture, right, sequela  Compression fracture of C-spine: Compression fracture of C-spine  ESRD on dialysis: ESRD on dialysis      INTERVAL HPI/OVERNIGHT EVENTS:  Patient seen and examined at bedside. No acute events overnight. Patient states he is feeling well at times just has pain on the side where he was dropped. Patient denies chest pain, SOB, abd pain, N/V, fever, chills, dysuria or any other complaints. All remainder ROS negative.     Vital Signs Last 24 Hrs  T(C): 37.2 (15 Aug 2017 14:15), Max: 37.3 (15 Aug 2017 08:05)  T(F): 99 (15 Aug 2017 14:15), Max: 99.1 (15 Aug 2017 08:05)  HR: 76 (15 Aug 2017 14:15) (58 - 92)  BP: 99/61 (15 Aug 2017 14:15) (98/50 - 128/73)  BP(mean): --  RR: 18 (15 Aug 2017 14:15) (16 - 18)  SpO2: 100% (15 Aug 2017 14:15) (98% - 100%)    CAPILLARY BLOOD GLUCOSE  168 (15 Aug 2017 12:15)  112 (15 Aug 2017 08:05)  135 (14 Aug 2017 17:00)      I&O's Summary    14 Aug 2017 07:01  -  15 Aug 2017 07:00  --------------------------------------------------------  IN: 100 mL / OUT: 0 mL / NET: 100 mL      CONSTITUTIONAL: Well appearing, well nourished, awake, alert and in no apparent distress  ENMT: C Collar in place   CARDIAC: Normal rate, regular rhythm.  Heart sounds S1, S2.  No murmurs, rubs or gallops   RESPIRATORY: Decreased air entry b/l, no WRR, bibasilar crackles R>L  GASTROENTEROLOGY: Soft, NT ND BS + normoactive   EXTREMITIES: peripheral edema +1, no cyanosis or deformity   NEUROLOGICAL: Alert and oriented x 2 person and place not the date, no focal deficits, no motor or sensory deficits.  SKIN: No rash, skin turgor wnl   R PC C/D/I no evidence of infection  and Left chest ICD C/D/I but swollen       MEDICATIONS  (STANDING):  epoetin una Injectable 95343 Unit(s) IV Push <User Schedule>  folic acid 1 milliGRAM(s) Oral daily  ferrous    sulfate 325 milliGRAM(s) Oral daily  insulin lispro (HumaLOG) corrective regimen sliding scale   SubCutaneous three times a day before meals  dextrose 5%. 1000 milliLiter(s) (50 mL/Hr) IV Continuous <Continuous>  dextrose 50% Injectable 12.5 Gram(s) IV Push once  dextrose 50% Injectable 25 Gram(s) IV Push once  dextrose 50% Injectable 25 Gram(s) IV Push once  midodrine 10 milliGRAM(s) Oral <User Schedule>  calcium acetate 667 milliGRAM(s) Oral three times a day with meals  tamsulosin 0.4 milliGRAM(s) Oral at bedtime  gabapentin 300 milliGRAM(s) Oral three times a day  piperacillin/tazobactam IVPB.   IV Intermittent   piperacillin/tazobactam IVPB. 2.25 Gram(s) IV Intermittent every 12 hours  vancomycin  IVPB 500 milliGRAM(s) IV Intermittent <User Schedule>  nystatin Powder 1 Application(s) Topical two times a day  metoprolol 25 milliGRAM(s) Oral every 12 hours  ergocalciferol 95477 Unit(s) Oral every week  calcitriol   Capsule 0.25 MICROGram(s) Oral daily    MEDICATIONS  (PRN):  dextrose Gel 1 Dose(s) Oral once PRN Blood Glucose LESS THAN 70 milliGRAM(s)/deciliter  glucagon  Injectable 1 milliGRAM(s) IntraMuscular once PRN Glucose LESS THAN 70 milligrams/deciliter  artificial  tears Solution 1 Drop(s) Both EYES three times a day PRN Dry Eyes  acetaminophen   Tablet. 650 milliGRAM(s) Oral every 6 hours PRN Mild Pain (1 - 3)  polyethylene glycol 3350 17 Gram(s) Oral daily PRN Constipation  acetaminophen   Tablet 650 milliGRAM(s) Oral every 6 hours PRN For Temp greater than 38 C (100.4 F)      LABS:                        8.6    5.1   )-----------( 87       ( 15 Aug 2017 08:26 )             28.4     08-15    133<L>  |  95<L>  |  31.0<H>  ----------------------------<  90  4.6   |  24.0  |  5.92<H>    Ca    8.7      15 Aug 2017 08:30    TPro  6.5<L>  /  Alb  3.1<L>  /  TBili  0.3<L>  /  DBili  x   /  AST  14  /  ALT  9   /  AlkPhos  69  08-15        LIVER FUNCTIONS - ( 15 Aug 2017 08:30 )  Alb: 3.1 g/dL / Pro: 6.5 g/dL / ALK PHOS: 69 U/L / ALT: 9 U/L / AST: 14 U/L / GGT: x             RADIOLOGY & ADDITIONAL TESTS:  ct chest:  0IMPRESSION:   1.  Moderate-sized right pleural effusion with near complete atelectasis   of the right lower lobe.  2.  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  3.  Left chest wall ICD with small subcutaneous collection adjacent to   the generator pack, measuring 4.1 x 3.0 cm, possibly infection or   evolving hematoma.

## 2017-08-15 NOTE — PROGRESS NOTE ADULT - PROBLEM SELECTOR PLAN 6
HFrEF:20-25% NICM with biv ICD  - Fluid status improved   -pt had beats of v tach on telemetry but has ICD in place   - not on ace inhibitor given renal function and hypotension   -started metoprolol 25mg po bid   -cardiology f/u noted and appreciated HFrEF:20-25% NICM with biv ICD  - Fluid status improving but pt with above ct chest findings with moderate R pleural effusion   -pt had beats of v tach on telemetry but has ICD in place   - not on ace inhibitor given renal function and hypotension   -c/w metoprolol 25mg po bid with parameters   -cardiology f/u noted and appreciated

## 2017-08-15 NOTE — DIETITIAN INITIAL EVALUATION ADULT. - OTHER INFO
Pt receiving dialysis at bedside, currently in and out of sleep, overall poor historian.  Pt with good po intake at breakfast per HD RN, however lunch not yet consumed.

## 2017-08-15 NOTE — PROGRESS NOTE ADULT - PROBLEM SELECTOR PLAN 2
likely secondary to SIRS/ESRD  -down trending troponin   - will c/w monitoring unchanged ekg   -pt did have beats of v tach overnight   -cardiology f/u noted and appreciated and started low dose metoprolol 25mg po bid likely secondary to SIRS/ESRD  -down trending troponin   - will c/w monitoring unchanged ekg   -pt did have beats of v tach overnight but has ICD in place   -cardiology f/u noted and appreciated

## 2017-08-15 NOTE — CONSULT NOTE ADULT - ATTENDING COMMENTS
Pt with recent bacteremia (coag - staph) treated with Vancomycin. Now with recurrent fevers and hypotension/tachycardia on 8/13, after possibly missing Vancomycin. Fluid collection noted on inferior aspect of the device pocket concerning for device-related infection. Unless another source is identified and infection is well controlled with antibiotics, will need device extraction and pocket washout for full treatment of his infection. Will also likely require removal of permcath, which is another possible infection source.   Pt does have multiple comorbidities, not least of which is recent cervical fracture now requiring C-collar, which makes his anesthesia/airway related risks quite elevated. I discussed with family that unless goals of care are for comfort measures only, successful treatment will likely require device extraction, and that this procedure has a risk of vascular injury, cardiac perforation, hemorrhage and death. they will discuss overall goals of care with the patient and his family and supporters, and let us know if they are amenable to such procedures.   -Will discuss overall plan with primary team and ID, and consider device extraction as above. Potential timing is Thursday afternoon.  If extraction is pursued, fortunately he is not pacemaker dependent, though he has benefited from CRT pacing.

## 2017-08-15 NOTE — PROGRESS NOTE ADULT - SUBJECTIVE AND OBJECTIVE BOX
NEPHROLOGY INTERVAL HPI/OVERNIGHT EVENTS:    Examined earlier  Looks comfortable  HD today    MEDICATIONS  (STANDING):  epoetin una Injectable 09585 Unit(s) IV Push <User Schedule>  folic acid 1 milliGRAM(s) Oral daily  ferrous    sulfate 325 milliGRAM(s) Oral daily  insulin lispro (HumaLOG) corrective regimen sliding scale   SubCutaneous three times a day before meals  dextrose 5%. 1000 milliLiter(s) (50 mL/Hr) IV Continuous <Continuous>  dextrose 50% Injectable 12.5 Gram(s) IV Push once  dextrose 50% Injectable 25 Gram(s) IV Push once  dextrose 50% Injectable 25 Gram(s) IV Push once  midodrine 10 milliGRAM(s) Oral <User Schedule>  calcium acetate 667 milliGRAM(s) Oral three times a day with meals  tamsulosin 0.4 milliGRAM(s) Oral at bedtime  gabapentin 300 milliGRAM(s) Oral three times a day  piperacillin/tazobactam IVPB.   IV Intermittent   piperacillin/tazobactam IVPB. 2.25 Gram(s) IV Intermittent every 12 hours  vancomycin  IVPB 500 milliGRAM(s) IV Intermittent <User Schedule>  nystatin Powder 1 Application(s) Topical two times a day  metoprolol 25 milliGRAM(s) Oral every 12 hours  ergocalciferol 01435 Unit(s) Oral every week  calcitriol   Capsule 0.25 MICROGram(s) Oral daily    MEDICATIONS  (PRN):  dextrose Gel 1 Dose(s) Oral once PRN Blood Glucose LESS THAN 70 milliGRAM(s)/deciliter  glucagon  Injectable 1 milliGRAM(s) IntraMuscular once PRN Glucose LESS THAN 70 milligrams/deciliter  artificial  tears Solution 1 Drop(s) Both EYES three times a day PRN Dry Eyes  acetaminophen   Tablet. 650 milliGRAM(s) Oral every 6 hours PRN Mild Pain (1 - 3)  polyethylene glycol 3350 17 Gram(s) Oral daily PRN Constipation  acetaminophen   Tablet 650 milliGRAM(s) Oral every 6 hours PRN For Temp greater than 38 C (100.4 F)      Allergies    No Known Allergies    Intolerances        Vital Signs Last 24 Hrs  T(C): 37.3 (15 Aug 2017 08:05), Max: 37.3 (15 Aug 2017 08:05)  T(F): 99.1 (15 Aug 2017 08:05), Max: 99.1 (15 Aug 2017 08:05)  HR: 58 (15 Aug 2017 08:05) (58 - 92)  BP: 98/50 (15 Aug 2017 08:05) (98/50 - 128/73)  BP(mean): --  RR: 18 (15 Aug 2017 08:05) (16 - 18)  SpO2: 98% (15 Aug 2017 08:05) (98% - 100%)  Daily     Daily Weight in k.5 (15 Aug 2017 13:46)    PHYSICAL EXAM:  HEENT - legally blind  Chest   clear  CV   no murmur  Abd   soft, NT BS+  Extr   No edema  Neuro  grossly intact motor        LABS:                        8.6    5.1   )-----------( 87       ( 15 Aug 2017 08:26 )             28.4     08-15    133<L>  |  95<L>  |  31.0<H>  ----------------------------<  90  4.6   |  24.0  |  5.92<H>    Ca    8.7      15 Aug 2017 08:30    TPro  6.5<L>  /  Alb  3.1<L>  /  TBili  0.3<L>  /  DBili  x   /  AST  14  /  ALT  9   /  AlkPhos  69  08-15                RADIOLOGY & ADDITIONAL TESTS:

## 2017-08-15 NOTE — PROGRESS NOTE ADULT - PROBLEM SELECTOR PLAN 7
Anemia with iron deficiency   -c/w ferrous sulfate po qd  -c/w folic acid 1mg po qd     Thromboctopenia   -As per prior labs at baseline but worsened today  possibly from sepsis   -will c/w monitoring     -wbc at this time is wnl   -f/u vitamin b12 level to rule out b12 deficiency Anemia with iron deficiency   -c/w ferrous sulfate po qd  -c/w folic acid 1mg po qd     Thromboctopenia   -As per prior labs at baseline but worsened today  possibly from sepsis   -will c/w monitoring   -wbc at this time is wnl   -vitamin b12 level wnl

## 2017-08-15 NOTE — CONSULT NOTE ADULT - SUBJECTIVE AND OBJECTIVE BOX
******PT HAS 2 MR NUMBERS: #[id number] AND #[id number]    88 yo male with pmhx parkinsons dementia, ESRD on HD via R permacath (Dr Pike-4/2017) on T TH Sat(Goyal), hypotension, HLD, DM, CAD/CABG 2012, ICM, HFrEF ~25% s/p MDT BIVICD ([facility]/1/6/2015), recent C4 and pubic Fx secondary to an accident/fall with transportation to HD. Pt was transferred from [place] rehab facility for urgent HD 8/12/17 and was a code sepsis night of admission. He was recently hospitalized at [facility] with coag neg bacteremia 7/23/17-8/2/17, being treated with IV vanco that was to be continued through 9/2/17. It is unclear if vanco was received post discharge. EP SVC consulted for evaluation of BIV ICD pocket infection. Chest Ct with small subcutaneous collection adjacent to generator measuring 4.1 x 3.0cm. Blood Cultures still pending. Currently being treated with IV vanco and zosyn    ID: Dr Anaya  Renal: Dr Hampton    HPI:  87 [facility], HTN, HLD, DM, chronic systolic HFrEF:20-25% (July 2017), ICM s/p bi vent ICD (medtronic), BPH and ESRD on HD tu th sat, was d/c from [facility] ON 8/2/17 after being tx for coag negative staph bacteremia with vancomycin during HD to end on 9/2/17; pt was sent to the hospital for emergent HD. As per the nursing supervisor at [place] last thursday the pt had an accident during transport to the HD facility and was dropped and obtained a c4 fracture and pt was placed in a c collar along with a right pubic ramus fx; currently was sent from the facility for emergent dialysis due to not having transport because the patient was dropped by previous transport and will need to have new transport set up. Pt missed his Thursday dialysis and is due for another dialysis today as per nursing supervision at [place]. Patient is anuric. Patient states that he feels well, he wants to go back to the facility and is tired of this issue he is having in regards to the transportation. He is AAox2, very lethargic but is able to answer all questions appropriately. Patient denies chest pain, SOB, abd pain, N/V, fever, chills, headache or any other complaints. All remainder ROS negative. (12 Aug 2017 23:20)    ECG: AS BIVP at 113bpm  MDT BIV ICD: doi 1/6/2015 Dr JEFFREY Fields. MDT Viva XT Quad SN#[id number]. RA lead MDT 4076-45cm SN#[id number]. RV lead 6935M-55cm SN#[id number]. LV lead 4598-88cm LV lead 4598-88cm SN#[id number].  Interrogation: underlying NSR 90bpm-not dependent. battery, sensing, impedance, and thresholds are stable and wnl. DDD 60-120bpm. VF zone >188bpm.                      optivol threshold crossed 8/8/17. 1 episode of NSVT 7/23/17-monitor zone ~140bpm.    PAST MEDICAL & SURGICAL HISTORY:  Pubic ramus fracture, right, sequela  Compression fracture of C-spine: c4  Hyperlipidemia  ESRD on dialysis  Dementia  Muscle weakness (generalized)  Diabetes mellitus  Hypertension  ICD (implantable cardioverter-defibrillator), biventricular, in situ  RIJ tunneled permacath 4/18/17 Dr Munguia      epoetin una Injectable 43705 Unit(s) IV Push <User Schedule>  folic acid 1 milliGRAM(s) Oral daily  ferrous    sulfate 325 milliGRAM(s) Oral daily  insulin lispro (HumaLOG) corrective regimen sliding scale   SubCutaneous three times a day before meals  dextrose 5%. 1000 milliLiter(s) IV Continuous <Continuous>  dextrose Gel 1 Dose(s) Oral once PRN  dextrose 50% Injectable 12.5 Gram(s) IV Push once  dextrose 50% Injectable 25 Gram(s) IV Push once  dextrose 50% Injectable 25 Gram(s) IV Push once  glucagon  Injectable 1 milliGRAM(s) IntraMuscular once PRN  artificial  tears Solution 1 Drop(s) Both EYES three times a day PRN  midodrine 10 milliGRAM(s) Oral <User Schedule>  acetaminophen   Tablet. 650 milliGRAM(s) Oral every 6 hours PRN  polyethylene glycol 3350 17 Gram(s) Oral daily PRN  calcium acetate 667 milliGRAM(s) Oral three times a day with meals  tamsulosin 0.4 milliGRAM(s) Oral at bedtime  gabapentin 300 milliGRAM(s) Oral three times a day  piperacillin/tazobactam IVPB.   IV Intermittent   piperacillin/tazobactam IVPB. 2.25 Gram(s) IV Intermittent every 12 hours  vancomycin  IVPB 500 milliGRAM(s) IV Intermittent <User Schedule>  nystatin Powder 1 Application(s) Topical two times a day  acetaminophen   Tablet 650 milliGRAM(s) Oral every 6 hours PRN  metoprolol 25 milliGRAM(s) Oral every 12 hours  ergocalciferol 28270 Unit(s) Oral every week  calcitriol   Capsule 0.25 MICROGram(s) Oral daily      Allergies  No Known Allergies  Intolerances    SOCIAL HISTORY: , currently bedbound and at rehab since multiple hospitalizations beginning in Jan. former smoker, denies drugs, etoh    FAMILY HISTORY:  No pertinent family history in first degree relatives      Review of Systems:    CONSTITUTIONAL:per hpi  EYES: right eye discharge since hospitalization  ENMT:  No difficulty hearing, tinnitus, vertigo; No sinus or throat pain  NECK: prior hematoma s/p rRIJ tunneled permacath  BREASTS: No pain, masses, or nipple discharge  RESPIRATORY: No cough, wheezing, chills or hemoptysis; No shortness of breath  CARDIOVASCULAR: per hpi  GASTROINTESTINAL: No abdominal or epigastric pain. No nausea, vomiting, or hematemesis; No diarrhea or constipation. No melena or hematochezia.  GENITOURINARY: No dysuria, frequency, hematuria, or incontinence  NEUROLOGICAL: dementia  SKIN: No itching, burning, rashes, or lesions   LYMPH NODES: No enlarged glands  ENDOCRINE: No heat or cold intolerance; No hair loss  MUSCULOSKELETAL:per hpi  PSYCHIATRIC: No depression, anxiety, mood swings, or difficulty sleeping  HEME/LYMPH: No easy bruising, or bleeding gums  ALLERY AND IMMUNOLOGIC: No hives or eczema    Vital Signs Last 24 Hrs  T(C): 37.2 (15 Aug 2017 14:15), Max: 37.3 (15 Aug 2017 08:05)  T(F): 99 (15 Aug 2017 14:15), Max: 99.1 (15 Aug 2017 08:05)  HR: 76 (15 Aug 2017 14:15) (58 - 92)  BP: 99/61 (15 Aug 2017 14:15) (98/50 - 128/73)  RR: 18 (15 Aug 2017 14:15) (16 - 18)  SpO2: 100% (15 Aug 2017 14:15) (98% - 100%)    Physical Exam:  Constitutional: awake, sleepy/arousable undergoing bedside HD  Neck: supple, No JVD  Cardiovascular: +S1S2 RRR, LACW ICD site with collection at inferior device border +erythema +warmth. No evidence of device erosion or discharge  RIJ permacath cannulated for HD, no visible streaking   Pulmonary: CTA b/l, unlabored, no wheezes, rales. rhonci  Abdomen: +BS, soft NTND  Extremities: trace edema bl  Neuro: non focal, speech clear    LABS:                        8.6    5.1   )-----------( 87       ( 15 Aug 2017 08:26 )             28.4   08-15    133<L>  |  95<L>  |  31.0<H>  ----------------------------<  90  4.6   |  24.0  |  5.92<H>    Ca    8.7      15 Aug 2017 08:30     EXAM:  ECHO TTE W CON COMPLETE W DOPP    PROCEDURE DATE:  Jul 27 2017   Summary:   1. Left ventricular ejection fraction, by visual estimation, is 20 to   25%.   2. Severely decreased global left ventricular systolic function.   3. Basal inferoseptal segment, apical lateral segment, apex, and mid and   apical inferior septum are abnormal as described above.   Anu Broderick DO Electronically signed on 7/28/2017 at 8:28:26 PM    Nuclear Stress 3/31/2016: EF 28%, fixed defects, no ischemia ******PT HAS 2 MR NUMBERS: #[id number] AND #[id number]    86 yo male with pmhx parkinsons dementia, ESRD on HD via R permacath (Dr Pike-4/2017) on T TH Sat(Goyal), hypotension, HLD, DM, CAD/CABG 2012, ICM, HFrEF ~25% s/p MDT BIVICD ([facility]/1/6/2015), recent C4 and pubic Fx secondary to an accident/fall with transportation to HD. Pt was transferred from [place] rehab facility for urgent HD 8/12/17 and was a code sepsis night of admission. He was recently hospitalized at [facility] with coag neg bacteremia 7/23/17-8/2/17, being treated with IV vanco that was to be continued through 9/2/17. It is unclear if vanco was received post discharge. EP SVC consulted for evaluation of BIV ICD pocket infection. Chest Ct with small subcutaneous collection adjacent to generator measuring 4.1 x 3.0cm. Blood Cultures still pending. Currently being treated with IV vanco and zosyn    ID: Dr Anaya  Renal: Dr Hampton    HPI:  87 [facility], HTN, HLD, DM, chronic systolic HFrEF:20-25% (July 2017), ICM s/p bi vent ICD (medtronic), BPH and ESRD on HD tu th sat, was d/c from [facility] 8/2/17 after being tx for coag negative staph bacteremia with vancomycin during HD to end on 9/2/17; pt was sent to the hospital for emergent HD. As per the nursing supervisor at [place] last thursday the pt had an accident during transport to the HD facility and was dropped and obtained a c4 fracture and pt was placed in a c collar along with a right pubic ramus fx; currently was sent from the facility for emergent dialysis due to not having transport because the patient was dropped by previous transport and will need to have new transport set up. Pt missed his Thursday dialysis and is due for another dialysis today as per nursing supervision at [place]. Patient is anuric. Patient states that he feels well, he wants to go back to the facility and is tired of this issue he is having in regards to the transportation. He is AAox2, very lethargic but is able to answer all questions appropriately. Patient denies chest pain, SOB, abd pain, N/V, fever, chills, headache or any other complaints. All remainder ROS negative. (12 Aug 2017 23:20)    ECG: AS BIVP at 113bpm  MDT BIV ICD: doi 1/6/2015 Dr JEFFREY Fields. MDT Viva XT Quad SN#[id number]. RA lead MDT 4076-45cm SN#[id number]. RV lead 6935M-55cm SN#[id number]. LV lead 4598-88cm LV lead 4598-88cm SN#[id number].  Interrogation: underlying NSR 90bpm-not dependent. battery, sensing, impedance, and thresholds are stable and wnl. DDD 60-120bpm. VF zone >188bpm.                      optivol threshold crossed 8/8/17. 1 episode of NSVT 7/23/17-monitor zone ~140bpm.    PAST MEDICAL & SURGICAL HISTORY:  Pubic ramus fracture, right, sequela  Compression fracture of C-spine: c4  Hyperlipidemia  ESRD on dialysis  Dementia  Muscle weakness (generalized)  Diabetes mellitus  Hypertension  ICD (implantable cardioverter-defibrillator), biventricular, in situ  RIJ tunneled permacath 4/18/17 Dr Munguia      epoetin una Injectable 07464 Unit(s) IV Push <User Schedule>  folic acid 1 milliGRAM(s) Oral daily  ferrous    sulfate 325 milliGRAM(s) Oral daily  insulin lispro (HumaLOG) corrective regimen sliding scale   SubCutaneous three times a day before meals  dextrose 5%. 1000 milliLiter(s) IV Continuous <Continuous>  dextrose Gel 1 Dose(s) Oral once PRN  dextrose 50% Injectable 12.5 Gram(s) IV Push once  dextrose 50% Injectable 25 Gram(s) IV Push once  dextrose 50% Injectable 25 Gram(s) IV Push once  glucagon  Injectable 1 milliGRAM(s) IntraMuscular once PRN  artificial  tears Solution 1 Drop(s) Both EYES three times a day PRN  midodrine 10 milliGRAM(s) Oral <User Schedule>  acetaminophen   Tablet. 650 milliGRAM(s) Oral every 6 hours PRN  polyethylene glycol 3350 17 Gram(s) Oral daily PRN  calcium acetate 667 milliGRAM(s) Oral three times a day with meals  tamsulosin 0.4 milliGRAM(s) Oral at bedtime  gabapentin 300 milliGRAM(s) Oral three times a day  piperacillin/tazobactam IVPB.   IV Intermittent   piperacillin/tazobactam IVPB. 2.25 Gram(s) IV Intermittent every 12 hours  vancomycin  IVPB 500 milliGRAM(s) IV Intermittent <User Schedule>  nystatin Powder 1 Application(s) Topical two times a day  acetaminophen   Tablet 650 milliGRAM(s) Oral every 6 hours PRN  metoprolol 25 milliGRAM(s) Oral every 12 hours  ergocalciferol 17812 Unit(s) Oral every week  calcitriol   Capsule 0.25 MICROGram(s) Oral daily      Allergies  No Known Allergies  Intolerances    SOCIAL HISTORY: , currently bedbound and at rehab since multiple hospitalizations beginning in Jan. former smoker, denies drugs, etoh    FAMILY HISTORY:  No pertinent family history in first degree relatives      Review of Systems:    CONSTITUTIONAL:per hpi  EYES: right eye discharge since hospitalization  ENMT:  No difficulty hearing, tinnitus, vertigo; No sinus or throat pain  NECK: prior hematoma s/p rRIJ tunneled permacath  BREASTS: No pain, masses, or nipple discharge  RESPIRATORY: No cough, wheezing, chills or hemoptysis; No shortness of breath  CARDIOVASCULAR: per hpi  GASTROINTESTINAL: No abdominal or epigastric pain. No nausea, vomiting, or hematemesis; No diarrhea or constipation. No melena or hematochezia.  GENITOURINARY: No dysuria, frequency, hematuria, or incontinence  NEUROLOGICAL: dementia  SKIN: No itching, burning, rashes, or lesions   LYMPH NODES: No enlarged glands  ENDOCRINE: No heat or cold intolerance; No hair loss  MUSCULOSKELETAL:per hpi  PSYCHIATRIC: No depression, anxiety, mood swings, or difficulty sleeping  HEME/LYMPH: No easy bruising, or bleeding gums  ALLERY AND IMMUNOLOGIC: No hives or eczema    Vital Signs Last 24 Hrs  T(C): 37.2 (15 Aug 2017 14:15), Max: 37.3 (15 Aug 2017 08:05)  T(F): 99 (15 Aug 2017 14:15), Max: 99.1 (15 Aug 2017 08:05)  HR: 76 (15 Aug 2017 14:15) (58 - 92)  BP: 99/61 (15 Aug 2017 14:15) (98/50 - 128/73)  RR: 18 (15 Aug 2017 14:15) (16 - 18)  SpO2: 100% (15 Aug 2017 14:15) (98% - 100%)    Physical Exam:  Constitutional: awake, sleepy/arousable undergoing bedside HD  Neck: supple, No JVD  Cardiovascular: +S1S2 RRR, LACW ICD site with collection at inferior device border +erythema +warmth. No evidence of device erosion or discharge  RIJ permacath cannulated for HD, no visible streaking   Pulmonary: CTA b/l, unlabored, no wheezes, rales. rhonci  Abdomen: +BS, soft NTND  Extremities: trace edema bl  Neuro: non focal, speech clear    LABS:                        8.6    5.1   )-----------( 87       ( 15 Aug 2017 08:26 )             28.4   08-15    133<L>  |  95<L>  |  31.0<H>  ----------------------------<  90  4.6   |  24.0  |  5.92<H>    Ca    8.7      15 Aug 2017 08:30     EXAM:  ECHO TTE W CON COMPLETE W DOPP    PROCEDURE DATE:  Jul 27 2017   Summary:   1. Left ventricular ejection fraction, by visual estimation, is 20 to   25%.   2. Severely decreased global left ventricular systolic function.   3. Basal inferoseptal segment, apical lateral segment, apex, and mid and   apical inferior septum are abnormal as described above.   Anu Broderick DO Electronically signed on 7/28/2017 at 8:28:26 PM    Nuclear Stress 3/31/2016: EF 28%, fixed defects, no ischemia       A/P:86 yo male with pmhx parkinsons dementia, ESRD on HD via R permacath (Dr Pike-4/2017) on T TH Sat(Trish), hypotension, HLD, DM, CAD/CABG 2012, ICM, HFrEF ~25% s/p MDT BIVICD ([facility]/1/6/2015), recent C4 and pubic Fx secondary to an accident/fall with transportation to HD. [facility]17 and was a code sepsis night of admission. He was recently hospitalized at [facility] with coag neg bacteremia 7/23/17-8/2/17, being treated with IV vanco that was to be continued through 9/2/17. It is unclear if vanco was received post discharge. EP SVC consulted for evaluation of BIV ICD pocket infection. Chest Ct with small subcutaneous collection adjacent to generator measuring 4.1 x 3.0cm. Blood Cultures still pending. Currently being treated with IV vanco and zosyn.     1. ICD pocket infection without evidence of device erosion  continue abx per ID, blood cultures this admission still pending  consider NANETTE  would recommend BIV ICD system extraction.   Pt is not dependent   continue care per primary, renal, cardiology and ID teams  will HENRY Perry ******PT HAS 2 MR NUMBERS: #[id number] AND #[id number]    88 yo male with pmhx parkinsons dementia, ESRD on HD via R permacath (Dr Pike-4/2017) on T TH Sat(Goyal), hypotension, HLD, DM, CAD/CABG 2012, ICM, HFrEF ~25% s/p MDT BIVICD ([facility]/1/6/2015), recent C4 and pubic Fx secondary to an accident/fall with transportation to HD. Pt was transferred from [place] rehab facility for urgent HD 8/12/17 and was a code sepsis night of admission. He was recently hospitalized at [facility] with coag neg bacteremia 7/23/17-8/2/17, being treated with IV vanco that was to be continued through 9/2/17. It is unclear if vanco was received post discharge. EP SVC consulted for evaluation of BIV ICD pocket infection. Chest Ct with small subcutaneous collection adjacent to generator measuring 4.1 x 3.0cm. Blood Cultures still pending. Currently being treated with IV vanco and zosyn    ID: Dr Anaya  Renal: Dr Hampton    HPI:  87 [facility], HTN, HLD, DM, chronic systolic HFrEF:20-25% (July 2017), ICM s/p bi vent ICD (medtronic), BPH and ESRD on HD tu th sat, was d/c from [facility] 8/2/17 after being tx for coag negative staph bacteremia with vancomycin during HD to end on 9/2/17; pt was sent to the hospital for emergent HD. As per the nursing supervisor at [place] last thursday the pt had an accident during transport to the HD facility and was dropped and obtained a c4 fracture and pt was placed in a c collar along with a right pubic ramus fx; currently was sent from the facility for emergent dialysis due to not having transport because the patient was dropped by previous transport and will need to have new transport set up. Pt missed his Thursday dialysis and is due for another dialysis today as per nursing supervision at [place]. Patient is anuric. Patient states that he feels well, he wants to go back to the facility and is tired of this issue he is having in regards to the transportation. He is AAox2, very lethargic but is able to answer all questions appropriately. Patient denies chest pain, SOB, abd pain, N/V, fever, chills, headache or any other complaints. All remainder ROS negative. (12 Aug 2017 23:20)    ECG: AS BIVP at 113bpm  MDT BIV ICD: doi 1/6/2015 Dr JEFFREY Fields. MDT Viva XT Quad SN#[id number]. RA lead MDT 4076-45cm SN#[id number]. RV lead 6935M-55cm SN#[id number]. LV lead 4598-88cm LV lead 4598-88cm SN#[id number].  Interrogation: underlying NSR 90bpm-not dependent. battery, sensing, impedance, and thresholds are stable and wnl. DDD 60-120bpm. VF zone >188bpm.                      optivol threshold crossed 8/8/17. 1 episode of NSVT 7/23/17-monitor zone ~140bpm.    PAST MEDICAL & SURGICAL HISTORY:  Pubic ramus fracture, right, sequela  Compression fracture of C-spine: c4  Hyperlipidemia  ESRD on dialysis  Dementia  Muscle weakness (generalized)  Diabetes mellitus  Hypertension  ICD (implantable cardioverter-defibrillator), biventricular, in situ  RIJ tunneled permacath 4/18/17 Dr Munguia      epoetin una Injectable 71621 Unit(s) IV Push <User Schedule>  folic acid 1 milliGRAM(s) Oral daily  ferrous    sulfate 325 milliGRAM(s) Oral daily  insulin lispro (HumaLOG) corrective regimen sliding scale   SubCutaneous three times a day before meals  dextrose 5%. 1000 milliLiter(s) IV Continuous <Continuous>  dextrose Gel 1 Dose(s) Oral once PRN  dextrose 50% Injectable 12.5 Gram(s) IV Push once  dextrose 50% Injectable 25 Gram(s) IV Push once  dextrose 50% Injectable 25 Gram(s) IV Push once  glucagon  Injectable 1 milliGRAM(s) IntraMuscular once PRN  artificial  tears Solution 1 Drop(s) Both EYES three times a day PRN  midodrine 10 milliGRAM(s) Oral <User Schedule>  acetaminophen   Tablet. 650 milliGRAM(s) Oral every 6 hours PRN  polyethylene glycol 3350 17 Gram(s) Oral daily PRN  calcium acetate 667 milliGRAM(s) Oral three times a day with meals  tamsulosin 0.4 milliGRAM(s) Oral at bedtime  gabapentin 300 milliGRAM(s) Oral three times a day  piperacillin/tazobactam IVPB.   IV Intermittent   piperacillin/tazobactam IVPB. 2.25 Gram(s) IV Intermittent every 12 hours  vancomycin  IVPB 500 milliGRAM(s) IV Intermittent <User Schedule>  nystatin Powder 1 Application(s) Topical two times a day  acetaminophen   Tablet 650 milliGRAM(s) Oral every 6 hours PRN  metoprolol 25 milliGRAM(s) Oral every 12 hours  ergocalciferol 49695 Unit(s) Oral every week  calcitriol   Capsule 0.25 MICROGram(s) Oral daily      Allergies  No Known Allergies  Intolerances    SOCIAL HISTORY: , currently bedbound and at rehab since multiple hospitalizations beginning in Jan. former smoker, denies drugs, etoh    FAMILY HISTORY:  No pertinent family history in first degree relatives      Review of Systems:    CONSTITUTIONAL:per hpi  EYES: right eye discharge since hospitalization  ENMT:  No difficulty hearing, tinnitus, vertigo; No sinus or throat pain  NECK: prior hematoma s/p rRIJ tunneled permacath  BREASTS: No pain, masses, or nipple discharge  RESPIRATORY: No cough, wheezing, chills or hemoptysis; No shortness of breath  CARDIOVASCULAR: per hpi  GASTROINTESTINAL: No abdominal or epigastric pain. No nausea, vomiting, or hematemesis; No diarrhea or constipation. No melena or hematochezia.  GENITOURINARY: No dysuria, frequency, hematuria, or incontinence  NEUROLOGICAL: dementia  SKIN: No itching, burning, rashes, or lesions   LYMPH NODES: No enlarged glands  ENDOCRINE: No heat or cold intolerance; No hair loss  MUSCULOSKELETAL:per hpi  PSYCHIATRIC: No depression, anxiety, mood swings, or difficulty sleeping  HEME/LYMPH: No easy bruising, or bleeding gums  ALLERY AND IMMUNOLOGIC: No hives or eczema    Vital Signs Last 24 Hrs  T(C): 37.2 (15 Aug 2017 14:15), Max: 37.3 (15 Aug 2017 08:05)  T(F): 99 (15 Aug 2017 14:15), Max: 99.1 (15 Aug 2017 08:05)  HR: 76 (15 Aug 2017 14:15) (58 - 92)  BP: 99/61 (15 Aug 2017 14:15) (98/50 - 128/73)  RR: 18 (15 Aug 2017 14:15) (16 - 18)  SpO2: 100% (15 Aug 2017 14:15) (98% - 100%)    Physical Exam:  Constitutional: awake, sleepy/arousable undergoing bedside HD  Neck: supple, No JVD  Cardiovascular: +S1S2 RRR, LACW ICD site with collection at inferior device border +erythema +warmth. No evidence of device erosion or discharge  RIJ permacath cannulated for HD, no visible streaking   Pulmonary: CTA b/l, unlabored, no wheezes, rales. rhonci  Abdomen: +BS, soft NTND  Extremities: trace edema bl  Neuro: non focal, speech clear    LABS:                        8.6    5.1   )-----------( 87       ( 15 Aug 2017 08:26 )             28.4   08-15    133<L>  |  95<L>  |  31.0<H>  ----------------------------<  90  4.6   |  24.0  |  5.92<H>    Ca    8.7      15 Aug 2017 08:30     EXAM:  ECHO TTE W CON COMPLETE W DOPP    PROCEDURE DATE:  Jul 27 2017   Summary:   1. Left ventricular ejection fraction, by visual estimation, is 20 to   25%.   2. Severely decreased global left ventricular systolic function.   3. Basal inferoseptal segment, apical lateral segment, apex, and mid and   apical inferior septum are abnormal as described above.   Anu Broderick DO Electronically signed on 7/28/2017 at 8:28:26 PM    Nuclear Stress 3/31/2016: EF 28%, fixed defects, no ischemia       A/P:88 yo male with pmhx parkinsons dementia, ESRD on HD via R permacath (Dr Pike-4/2017) on T TH Sat(Trish), hypotension, HLD, DM, CAD/CABG 2012, ICM, HFrEF ~25% s/p MDT BIVICD ([facility]/1/6/2015), recent C4 and pubic Fx secondary to an accident/fall with transportation to HD. [facility] and was a code sepsis night of admission. He was recently hospitalized at [facility] with coag neg bacteremia 7/23/17-8/2/17, being treated with IV vanco that was to be continued through 9/2/17. It is unclear if vanco was received post discharge. EP SVC consulted for evaluation of BIV ICD pocket infection. Chest Ct with small subcutaneous collection adjacent to generator measuring 4.1 x 3.0cm. Blood Cultures still pending. Currently being treated with IV vanco and zosyn.     1. Possible ICD pocket infection without evidence of device erosion  continue abx per ID, blood cultures this admission still pending  consider NANETTE  would recommend BIV ICD system extraction.   Pt is not dependent   Vascular consult regarding permacath   continue care per primary, renal, cardiology and ID teams  will HENRY Perry ******PT HAS 2 MR NUMBERS: #[id number] AND #[id number]    88 yo male with pmhx parkinsons dementia, ESRD on HD via R permacath (Dr Pike-4/2017) on T TH Sat([facility]), hypotension, HLD, DM, CAD/CABG 2012, ICM, HFrEF ~25% s/p MDT BIVICD ([facility]/1/6/2015), recent C4 and pubic Fx secondary to an accident/fall with transportation to HD. Pt was transferred from [facility] facility for urgent HD 8/12/17 and was a code sepsis night of admission. He was recently hospitalized at [facility] with coag neg Staph bacteremia 7/23/17-8/2/17, being treated with IV vanco that was to be continued through 9/2/17. It is unclear if vanco was received post discharge. EP SVC consulted for evaluation of BIV ICD pocket infection. Chest Ct with small subcutaneous collection adjacent to generator measuring 4.1 x 3.0cm. Blood Cultures still pending. Currently being treated with IV vanco and zosyn    ID: Dr Anaya  Renal: Dr Hampton    HPI:  87 [facility], HTN, HLD, DM, chronic systolic HFrEF:20-25% (July 2017), ICM s/p bi vent ICD (medtronic), BPH and ESRD on HD tu th sat, was d/c from [facility] 8/2/17 after being tx for coag negative staph bacteremia with vancomycin during HD to end on 9/2/17; pt was sent to the hospital for emergent HD. As per the nursing supervisor at [facility] last thursday the pt had an accident during transport to the HD facility and was dropped and obtained a c4 fracture and pt was placed in a c collar along with a right pubic ramus fx; currently was sent from the facility for emergent dialysis due to not having transport because the patient was dropped by previous transport and will need to have new transport set up. Pt missed his Thursday dialysis and is due for another dialysis today as per nursing supervision at [facility]. Patient is anuric. Patient states that he feels well, he wants to go back to the facility and is tired of this issue he is having in regards to the transportation. He is AAox2, very lethargic but is able to answer all questions appropriately. Patient denies chest pain, SOB, abd pain, N/V, fever, chills, headache or any other complaints. All remainder ROS negative. (12 Aug 2017 23:20)    ECG: AS BIVP at 113bpm  MDT BIV ICD: doi 1/6/2015 Dr JEFFREY Fields. MDT Viva XT Quad SN#[id number]. RA lead MDT 4076-45cm SN#[id number]. RV lead 6935M-55cm SN#[id number]. LV lead 4598-88cm LV lead 4598-88cm SN#[id number].  Interrogation: underlying NSR 90bpm-not dependent. battery, sensing, impedance, and thresholds are stable and wnl. DDD 60-120bpm. VF zone >188bpm.                      optivol threshold crossed 8/8/17. 1 episode of NSVT 7/23/17-monitor zone ~140bpm.    PAST MEDICAL & SURGICAL HISTORY:  Pubic ramus fracture, right, sequela  Compression fracture of C-spine: c4  Hyperlipidemia  ESRD on dialysis  Dementia  Muscle weakness (generalized)  Diabetes mellitus  Hypertension  ICD (implantable cardioverter-defibrillator), biventricular, in situ  RIJ tunneled permacath 4/18/17 Dr Munguia      epoetin una Injectable 86725 Unit(s) IV Push <User Schedule>  folic acid 1 milliGRAM(s) Oral daily  ferrous    sulfate 325 milliGRAM(s) Oral daily  insulin lispro (HumaLOG) corrective regimen sliding scale   SubCutaneous three times a day before meals  dextrose 5%. 1000 milliLiter(s) IV Continuous <Continuous>  dextrose Gel 1 Dose(s) Oral once PRN  dextrose 50% Injectable 12.5 Gram(s) IV Push once  dextrose 50% Injectable 25 Gram(s) IV Push once  dextrose 50% Injectable 25 Gram(s) IV Push once  glucagon  Injectable 1 milliGRAM(s) IntraMuscular once PRN  artificial  tears Solution 1 Drop(s) Both EYES three times a day PRN  midodrine 10 milliGRAM(s) Oral <User Schedule>  acetaminophen   Tablet. 650 milliGRAM(s) Oral every 6 hours PRN  polyethylene glycol 3350 17 Gram(s) Oral daily PRN  calcium acetate 667 milliGRAM(s) Oral three times a day with meals  tamsulosin 0.4 milliGRAM(s) Oral at bedtime  gabapentin 300 milliGRAM(s) Oral three times a day  piperacillin/tazobactam IVPB.   IV Intermittent   piperacillin/tazobactam IVPB. 2.25 Gram(s) IV Intermittent every 12 hours  vancomycin  IVPB 500 milliGRAM(s) IV Intermittent <User Schedule>  nystatin Powder 1 Application(s) Topical two times a day  acetaminophen   Tablet 650 milliGRAM(s) Oral every 6 hours PRN  metoprolol 25 milliGRAM(s) Oral every 12 hours  ergocalciferol 80319 Unit(s) Oral every week  calcitriol   Capsule 0.25 MICROGram(s) Oral daily      Allergies  No Known Allergies  Intolerances    SOCIAL HISTORY: , currently bedbound and at rehab since multiple hospitalizations beginning in Jan. former smoker, denies drugs, etoh    FAMILY HISTORY:  No pertinent family history in first degree relatives      Review of Systems:    CONSTITUTIONAL:per hpi  EYES: right eye discharge since hospitalization  ENMT:  No difficulty hearing, tinnitus, vertigo; No sinus or throat pain  NECK: prior hematoma s/p rRIJ tunneled permacath  BREASTS: No pain, masses, or nipple discharge  RESPIRATORY: No cough, wheezing, chills or hemoptysis; No shortness of breath  CARDIOVASCULAR: per hpi  GASTROINTESTINAL: No abdominal or epigastric pain. No nausea, vomiting, or hematemesis; No diarrhea or constipation. No melena or hematochezia.  GENITOURINARY: No dysuria, frequency, hematuria, or incontinence  NEUROLOGICAL: dementia  SKIN: No itching, burning, rashes, or lesions   LYMPH NODES: No enlarged glands  ENDOCRINE: No heat or cold intolerance; No hair loss  MUSCULOSKELETAL:per hpi  PSYCHIATRIC: No depression, anxiety, mood swings, or difficulty sleeping  HEME/LYMPH: No easy bruising, or bleeding gums  ALLERY AND IMMUNOLOGIC: No hives or eczema    Vital Signs Last 24 Hrs  T(C): 37.2 (15 Aug 2017 14:15), Max: 37.3 (15 Aug 2017 08:05)  T(F): 99 (15 Aug 2017 14:15), Max: 99.1 (15 Aug 2017 08:05)  HR: 76 (15 Aug 2017 14:15) (58 - 92)  BP: 99/61 (15 Aug 2017 14:15) (98/50 - 128/73)  RR: 18 (15 Aug 2017 14:15) (16 - 18)  SpO2: 100% (15 Aug 2017 14:15) (98% - 100%)    Physical Exam:  Constitutional: awake, sleepy/arousable undergoing bedside HD  Neck: supple, No JVD  Cardiovascular: +S1S2 RRR, LACW ICD site with collection at inferior device border +erythema +warmth. No evidence of device erosion or discharge  RIJ permacath cannulated for HD, no visible streaking   Pulmonary: CTA b/l, unlabored, no wheezes, rales. rhonci  Abdomen: +BS, soft NTND  Extremities: trace edema bl  Neuro: non focal, speech clear    LABS:                        8.6    5.1   )-----------( 87       ( 15 Aug 2017 08:26 )             28.4   08-15    133<L>  |  95<L>  |  31.0<H>  ----------------------------<  90  4.6   |  24.0  |  5.92<H>    Ca    8.7      15 Aug 2017 08:30     EXAM:  ECHO TTE W CON COMPLETE W DOPP    PROCEDURE DATE:  Jul 27 2017   Summary:   1. Left ventricular ejection fraction, by visual estimation, is 20 to   25%.   2. Severely decreased global left ventricular systolic function.   3. Basal inferoseptal segment, apical lateral segment, apex, and mid and   apical inferior septum are abnormal as described above.   Anu Broderick DO Electronically signed on 7/28/2017 at 8:28:26 PM    Nuclear Stress 3/31/2016: EF 28%, fixed defects, no ischemia       A/P:88 yo male with pmhx parkinsons dementia, ESRD on HD via R permacath (Dr Pike-4/2017) on T TH Sat([facility]), hypotension, HLD, DM, CAD/CABG 2012, ICM, HFrEF ~25% s/p MDT BIVICD ([facility]/1/6/2015), recent C4 and pubic Fx secondary to an accident/fall with transportation to HD. [facility]12[facility]17 and was a code sepsis night of admission. He was recently hospitalized at [facility] with coag neg bacteremia 7/23/17-8/2/17, being treated with IV vanco that was to be continued through 9/2/17. It is unclear if vanco was received post discharge. EP SVC consulted for evaluation of BIV ICD pocket infection. Chest Ct with small subcutaneous collection adjacent to generator measuring 4.1 x 3.0cm. Blood Cultures still pending. Currently being treated with IV vanco and zosyn.     1. Possible ICD pocket infection without evidence of device erosion  continue abx per ID, blood cultures this admission still pending  consider NANETTE  would recommend BIV ICD system extraction.   Pt is not dependent   Vascular consult regarding permacath replacement  continue care per primary, renal, cardiology and ID teams  will HENRY Perry ******PT HAS 2 MR NUMBERS: #[id number] AND #[id number]    88 yo male with pmhx parkinsons dementia, ESRD on HD via R permacath (Dr Pike-4/2017) on T TH Sat([facility]), hypotension, HLD, DM, CAD/CABG 2012, ICM, HFrEF ~25% s/p MDT BIVICD ([facility]/1/6/2015), recent C4 and pubic Fx secondary to an accident/fall with transportation to HD. [facility] and was a code sepsis night of admission. He was recently hospitalized at [facility] with coag neg Staph bacteremia 7/23/17-8/2/17, being treated with IV vanco that was to be continued through 9/2/17. It is unclear if vanco was received post discharge. EP SVC consulted for evaluation of BIV ICD pocket infection. Chest Ct with small subcutaneous collection adjacent to generator measuring 4.1 x 3.0cm. Blood Cultures still pending. Currently being treated with IV vanco and zosyn    He is AAox2, very lethargic but is able to answer all questions appropriately. Patient denies chest pain, SOB, abd pain, N/V, fever, chills, headache or any other complaints. All remainder ROS negative.     ECG: AS BIVP at 113bpm  MDT BIV ICD: doi 1/6/2015 Dr JEFFREY Fields. MDT Viva XT Quad SN#[id number]. RA lead MDT 4076-45cm SN#[id number]. RV lead 6935M-55cm SN#[id number]. LV lead 4598-88cm LV lead 4598-88cm SN#[id number].  Interrogation: underlying NSR 90bpm-not dependent. battery, sensing, impedance, and thresholds are stable and wnl. DDD 60-120bpm. VF zone >188bpm.                      optivol threshold crossed 8/8/17. 1 episode of NSVT 7/23/17-monitor zone ~140bpm.    PAST MEDICAL & SURGICAL HISTORY:  Pubic ramus fracture, right, sequela  Compression fracture of C-spine: c4  Hyperlipidemia  ESRD on dialysis  Dementia  Muscle weakness (generalized)  Diabetes mellitus  Hypertension  ICD (implantable cardioverter-defibrillator), biventricular, in situ  RIJ tunneled permacath 4/18/17 Dr Munguia      epoetin una Injectable 43005 Unit(s) IV Push <User Schedule>  folic acid 1 milliGRAM(s) Oral daily  ferrous    sulfate 325 milliGRAM(s) Oral daily  insulin lispro (HumaLOG) corrective regimen sliding scale   SubCutaneous three times a day before meals  dextrose 5%. 1000 milliLiter(s) IV Continuous <Continuous>  dextrose Gel 1 Dose(s) Oral once PRN  dextrose 50% Injectable 12.5 Gram(s) IV Push once  dextrose 50% Injectable 25 Gram(s) IV Push once  dextrose 50% Injectable 25 Gram(s) IV Push once  glucagon  Injectable 1 milliGRAM(s) IntraMuscular once PRN  artificial  tears Solution 1 Drop(s) Both EYES three times a day PRN  midodrine 10 milliGRAM(s) Oral <User Schedule>  acetaminophen   Tablet. 650 milliGRAM(s) Oral every 6 hours PRN  polyethylene glycol 3350 17 Gram(s) Oral daily PRN  calcium acetate 667 milliGRAM(s) Oral three times a day with meals  tamsulosin 0.4 milliGRAM(s) Oral at bedtime  gabapentin 300 milliGRAM(s) Oral three times a day  piperacillin/tazobactam IVPB.   IV Intermittent   piperacillin/tazobactam IVPB. 2.25 Gram(s) IV Intermittent every 12 hours  vancomycin  IVPB 500 milliGRAM(s) IV Intermittent <User Schedule>  nystatin Powder 1 Application(s) Topical two times a day  acetaminophen   Tablet 650 milliGRAM(s) Oral every 6 hours PRN  metoprolol 25 milliGRAM(s) Oral every 12 hours  ergocalciferol 70469 Unit(s) Oral every week  calcitriol   Capsule 0.25 MICROGram(s) Oral daily      Allergies  No Known Allergies  Intolerances    SOCIAL HISTORY: , currently bedbound and at rehab since multiple hospitalizations beginning in Jan. former smoker, denies drugs, etoh    FAMILY HISTORY:  No pertinent family history in first degree relatives      Review of Systems:    CONSTITUTIONAL:per hpi  EYES: right eye discharge since hospitalization  ENMT:  No difficulty hearing, tinnitus, vertigo; No sinus or throat pain  NECK: prior hematoma s/p rRIJ tunneled permacath  BREASTS: No pain, masses, or nipple discharge  RESPIRATORY: No cough, wheezing, chills or hemoptysis; No shortness of breath  CARDIOVASCULAR: per hpi  GASTROINTESTINAL: No abdominal or epigastric pain. No nausea, vomiting, or hematemesis; No diarrhea or constipation. No melena or hematochezia.  GENITOURINARY: No dysuria, frequency, hematuria, or incontinence  NEUROLOGICAL: dementia  SKIN: No itching, burning, rashes, or lesions   LYMPH NODES: No enlarged glands  ENDOCRINE: No heat or cold intolerance; No hair loss  MUSCULOSKELETAL:per hpi  PSYCHIATRIC: No depression, anxiety, mood swings, or difficulty sleeping  HEME/LYMPH: No easy bruising, or bleeding gums  ALLERY AND IMMUNOLOGIC: No hives or eczema    Vital Signs Last 24 Hrs  T(C): 37.2 (15 Aug 2017 14:15), Max: 37.3 (15 Aug 2017 08:05)  T(F): 99 (15 Aug 2017 14:15), Max: 99.1 (15 Aug 2017 08:05)  HR: 76 (15 Aug 2017 14:15) (58 - 92)  BP: 99/61 (15 Aug 2017 14:15) (98/50 - 128/73)  RR: 18 (15 Aug 2017 14:15) (16 - 18)  SpO2: 100% (15 Aug 2017 14:15) (98% - 100%)    Physical Exam:  Constitutional: awake, sleepy/arousable undergoing bedside HD  Neck: In c-collar. exam deferred  Cardiovascular: +S1S2 RRR, LACW ICD site with collection at inferior device border +erythema +warmth. No evidence of device erosion or discharge  RIJ permacath cannulated for HD, no visible streaking   Pulmonary: CTA b/l, unlabored, no wheezes, rales. rhonci  Abdomen: +BS, soft NTND  Extremities: trace edema bl  Neuro: non focal, speech clear    LABS:                        8.6    5.1   )-----------( 87       ( 15 Aug 2017 08:26 )             28.4   08-15    133<L>  |  95<L>  |  31.0<H>  ----------------------------<  90  4.6   |  24.0  |  5.92<H>    Ca    8.7      15 Aug 2017 08:30     EXAM:  ECHO TTE W CON COMPLETE W DOPP    PROCEDURE DATE:  Jul 27 2017   Summary:   1. Left ventricular ejection fraction, by visual estimation, is 20 to   25%.   2. Severely decreased global left ventricular systolic function.   3. Basal inferoseptal segment, apical lateral segment, apex, and mid and   apical inferior septum are abnormal as described above.   Anu Broderick DO Electronically signed on 7/28/2017 at 8:28:26 PM    Nuclear Stress 3/31/2016: EF 28%, fixed defects, no ischemia       A/P:88 yo male with pmhx parkinsons dementia, ESRD on HD via R permacath (Dr Pike-4/2017) on T TH Sat(Trish), hypotension, HLD, DM, CAD/CABG 2012, ICM, HFrEF ~25% s/p MDT BIVICD ([facility]/1/6/2015), recent C4 and pubic Fx secondary to an accident/fall with transportation to HD. [facility]17 and was a code sepsis night of admission. He was recently hospitalized at [facility] with coag neg bacteremia 7/23/17-8/2/17, being treated with IV vanco that was to be continued through 9/2/17. It is unclear if vanco was received post discharge. EP SVC consulted for evaluation of BIV ICD pocket infection. Chest Ct with small subcutaneous collection adjacent to generator measuring 4.1 x 3.0cm. Blood Cultures still pending. Currently being treated with IV vanco and zosyn.     1. Possible ICD pocket infection without evidence of device erosion. Likely will require BIV ICD system extraction for clearance of infection. Pt is not pacemaker dependent   -continue abx per ID, blood cultures this admission still pending  -Vascular consult regarding permacath replacement  continue care per primary, renal, cardiology and ID teams  will HENYR Perry

## 2017-08-15 NOTE — PROGRESS NOTE ADULT - ASSESSMENT
ESRD on HD TTS will HD today   Clinically volume overloaded pleural efffusions on CT noted   will HD additional treatment candie for UF  then cont thurs on TTS schedule  Anemia will check iron studies  BP on Low side

## 2017-08-15 NOTE — PROGRESS NOTE ADULT - ASSESSMENT
87 M from [facility] with PMHx dementia, HTN, HLD, DM, chronic systolic HFrEF: 20-25% (July 2017), ICM s/p bi vent ICD (medtronic), BPH and ESRD on HD tu th sat, was d/c from [facility] on 8/2/17 after being tx for coag negative staph bacteremia with vancomycin during HD to end on 9/2/17; pt was sent to the hospital for emergent HD secondary to transportation issue, b/c last transport team dropped the pt and he obtained a c4 compression fx and pubic ramus fx. Pt with congestion on exam, mildly fluid overloaded. No electrolyte derangements. Nephrology consulted and pt S/p emergent HD on admission and thereafter has remained euvolemic. Course complicated by code sepsis on 8/13/17, prior presumed infectious source was PC and pt was positive for coag neg bacteremia and was to complete course of abx on 9/2/17. Bcx sent and pending results. Pt empirically continues on renally adjusted vanco and zosyn. ID continues to follow the patient. CT chest completed today shows 87 M from [facility] with PMHx dementia, HTN, HLD, DM, chronic systolic HFrEF: 20-25% (July 2017), ICM s/p bi vent ICD (medtronic), BPH and ESRD on HD tu th sat, was d/c from [facility] on 8/2/17 after being tx for coag negative staph bacteremia with vancomycin during HD to end on 9/2/17; pt was sent to the hospital for emergent HD secondary to transportation issue, b/c last transport team dropped the pt and he obtained a c4 compression fx and pubic ramus fx. Pt with congestion on exam, mildly fluid overloaded. No electrolyte derangements. Nephrology consulted and pt S/p emergent HD on admission and thereafter has remained euvolemic. Course complicated by code sepsis on 8/13/17, prior presumed infectious source was PC and pt was positive for coag neg bacteremia and was to complete course of abx on 9/2/17. Bcx sent and pending results. Pt empirically continues on renally adjusted vanco and zosyn. ID continues to follow the patient. CT chest completed today shows moderate R sided effusion for which nephrology will give an extra session of HD and fluid collection around ICD site for which cardiology consulted for possible source of infection.

## 2017-08-15 NOTE — PROGRESS NOTE ADULT - PROBLEM SELECTOR PLAN 5
-Pt was d/c from [facility] on 8/2/17 and at that time ID recommended for the patient to c/w vancomycin IVPB post HD Tu Th Sat until 9/2/17.   [facility] and [facility] was receiving his [facility]    -c/w vancomycin 500mg OVPB q tu th sat post HD  -ID f/u noted and appreciated -Pt was d/c from [facility] on 8/2/17 and at that time ID recommended for the patient to c/w vancomycin IVPB post HD Tu Th Sat until 9/2/17.   [facility] was receiving his [facility]    -c/w vancomycin 500mg OVPB q tu th sat post HD  -repeat Bcx pending results   -ID f/u noted and appreciated

## 2017-08-15 NOTE — PROGRESS NOTE ADULT - PROBLEM SELECTOR PLAN 3
Missed HD secondary to transport issue  -fluid overload improved on exam   -s/p emergent HD 8/12/17   -c/w regularly scheduled HD   -low vitamin d leve started on vit d supplementation   -nephrology f/u noted and appreciated   - d/w SW and CM in regards to transportation issue. Missed HD secondary to transport issue  -fluid overload improved on exam but ct chest shows R sided moderate effusion   -s/p emergent HD 8/12/17   -c/w regularly scheduled HD and to have additional session of HD in the am for fluid removal   -low vitamin d level started on vit d supplementation   -nephrology f/u noted and appreciated and d/w Dr. Gaines the plan of care   - d/w SW and CM in regards to transportation issue.

## 2017-08-15 NOTE — PROGRESS NOTE ADULT - PROBLEM SELECTOR PLAN 1
secondary to unclear source but prior coag negative bacteremia and presumed source could be the permacath. Pt also has Biv ICD in place.   -pt remains afebrile  -no leukocytosis   -f/u Bcx to evaluate further for recurrent bacteremia  -cxr shows pulm vascular congestion no evidence of pna  -f/u ct chest   -pt was to receive vancomycin 500mg ivpb post HD on tu th sat but unsure if HD facility was giving the pt the abx. [facility] [phone] and left 2 vms for call back to confirm. [facility] if [facility] was receiving [facility].   -ID f/u noted and appreciated   -empiric coverage with renally adjusted zosyn and vanco post HD   -lactate wnl   -procalcitonin elevated secondary to unclear source but prior coag negative bacteremia and presumed source could be the permacath vs ICD. Ct chest shows fluid accumulation around ICD -d/w Dr. Concepcion and EP to further evaluate to evaluate further for infectious source.   -pt remains afebrile  -no leukocytosis   -f/u Bcx to evaluate further for recurrent bacteremia  -ID f/u noted and appreciated   -empiric coverage with renally adjusted zosyn and vanco post HD   -lactate wnl   -procalcitonin elevated

## 2017-08-16 DIAGNOSIS — T82.7XXA INFECTION AND INFLAMMATORY REACTION DUE TO OTHER CARDIAC AND VASCULAR DEVICES, IMPLANTS AND GRAFTS, INITIAL ENCOUNTER: ICD-10-CM

## 2017-08-16 LAB
ANION GAP SERPL CALC-SCNC: 14 MMOL/L — SIGNIFICANT CHANGE UP (ref 5–17)
BLD GP AB SCN SERPL QL: SIGNIFICANT CHANGE UP
BUN SERPL-MCNC: 17 MG/DL — SIGNIFICANT CHANGE UP (ref 8–20)
CALCIUM SERPL-MCNC: 8.9 MG/DL — SIGNIFICANT CHANGE UP (ref 8.6–10.2)
CHLORIDE SERPL-SCNC: 97 MMOL/L — LOW (ref 98–107)
CO2 SERPL-SCNC: 25 MMOL/L — SIGNIFICANT CHANGE UP (ref 22–29)
CREAT SERPL-MCNC: 4.09 MG/DL — HIGH (ref 0.5–1.3)
GLUCOSE SERPL-MCNC: 88 MG/DL — SIGNIFICANT CHANGE UP (ref 70–115)
HCT VFR BLD CALC: 30.9 % — LOW (ref 42–52)
HGB BLD-MCNC: 9.2 G/DL — LOW (ref 14–18)
INR BLD: 1.28 RATIO — HIGH (ref 0.88–1.16)
MAGNESIUM SERPL-MCNC: 1.9 MG/DL — SIGNIFICANT CHANGE UP (ref 1.6–2.6)
MCHC RBC-ENTMCNC: 26.4 PG — LOW (ref 27–31)
MCHC RBC-ENTMCNC: 29.8 G/DL — LOW (ref 32–36)
MCV RBC AUTO: 88.5 FL — SIGNIFICANT CHANGE UP (ref 80–94)
PLATELET # BLD AUTO: 83 K/UL — LOW (ref 150–400)
POTASSIUM SERPL-MCNC: 4.3 MMOL/L — SIGNIFICANT CHANGE UP (ref 3.5–5.3)
POTASSIUM SERPL-SCNC: 4.3 MMOL/L — SIGNIFICANT CHANGE UP (ref 3.5–5.3)
PROTHROM AB SERPL-ACNC: 14.1 SEC — HIGH (ref 9.8–12.7)
RBC # BLD: 3.49 M/UL — LOW (ref 4.6–6.2)
RBC # FLD: 16.7 % — HIGH (ref 11–15.6)
SODIUM SERPL-SCNC: 136 MMOL/L — SIGNIFICANT CHANGE UP (ref 135–145)
TYPE + AB SCN PNL BLD: SIGNIFICANT CHANGE UP
WBC # BLD: 5.2 K/UL — SIGNIFICANT CHANGE UP (ref 4.8–10.8)
WBC # FLD AUTO: 5.2 K/UL — SIGNIFICANT CHANGE UP (ref 4.8–10.8)

## 2017-08-16 PROCEDURE — 93010 ELECTROCARDIOGRAM REPORT: CPT

## 2017-08-16 PROCEDURE — 99233 SBSQ HOSP IP/OBS HIGH 50: CPT

## 2017-08-16 RX ADMIN — MIDODRINE HYDROCHLORIDE 10 MILLIGRAM(S): 2.5 TABLET ORAL at 13:01

## 2017-08-16 RX ADMIN — PIPERACILLIN AND TAZOBACTAM 200 GRAM(S): 4; .5 INJECTION, POWDER, LYOPHILIZED, FOR SOLUTION INTRAVENOUS at 05:35

## 2017-08-16 RX ADMIN — Medication 1 MILLIGRAM(S): at 11:09

## 2017-08-16 RX ADMIN — GABAPENTIN 300 MILLIGRAM(S): 400 CAPSULE ORAL at 05:35

## 2017-08-16 RX ADMIN — Medication 325 MILLIGRAM(S): at 11:09

## 2017-08-16 RX ADMIN — CALCITRIOL 0.25 MICROGRAM(S): 0.5 CAPSULE ORAL at 11:09

## 2017-08-16 RX ADMIN — Medication 667 MILLIGRAM(S): at 12:58

## 2017-08-16 RX ADMIN — TAMSULOSIN HYDROCHLORIDE 0.4 MILLIGRAM(S): 0.4 CAPSULE ORAL at 21:43

## 2017-08-16 RX ADMIN — Medication 667 MILLIGRAM(S): at 09:21

## 2017-08-16 RX ADMIN — NYSTATIN CREAM 1 APPLICATION(S): 100000 CREAM TOPICAL at 17:43

## 2017-08-16 RX ADMIN — MIDODRINE HYDROCHLORIDE 10 MILLIGRAM(S): 2.5 TABLET ORAL at 17:43

## 2017-08-16 RX ADMIN — GABAPENTIN 300 MILLIGRAM(S): 400 CAPSULE ORAL at 21:43

## 2017-08-16 RX ADMIN — GABAPENTIN 300 MILLIGRAM(S): 400 CAPSULE ORAL at 13:01

## 2017-08-16 RX ADMIN — Medication 25 MILLIGRAM(S): at 05:35

## 2017-08-16 RX ADMIN — Medication 667 MILLIGRAM(S): at 17:43

## 2017-08-16 RX ADMIN — MIDODRINE HYDROCHLORIDE 10 MILLIGRAM(S): 2.5 TABLET ORAL at 09:21

## 2017-08-16 RX ADMIN — NYSTATIN CREAM 1 APPLICATION(S): 100000 CREAM TOPICAL at 05:35

## 2017-08-16 NOTE — PROGRESS NOTE ADULT - ASSESSMENT
87 M from [facility] with PMHx dementia, HTN, HLD, DM, chronic systolic HFrEF:20-25% (July 2017), ICM s/p bi vent ICD (medtronic), BPH and ESRD on HD tu th sat, was d/c from [facility] 8/2/17 after being tx for coag negative staph bacteremia with vancomycin during HD to end on 9/2/17; pt was sent to the hospital for emergent HD. As per the nursing supervisor at [facility] last thursday the pt had an accident during transport to the HD facility and was dropped and obtained a c4 fracture and pt was placed in a c collar along with a right pubic ramus fx; currently was sent from the facility for emergent dialysis due to not having transport because the patient was dropped by previous transport and will need to have new transport set up.  .    PT WAS RECENTLY HOSPITALIZED  AND PLAN WAS TO CONTINUE   IV VANCO AT  DIALYSIS BUT IS UNCLEAR IF RECEIVED VANCO OUTSIDE  OF HOSPITAL  REPEAT BLOOD CX  SO FAR NEG   WILL D/C ZOSYN  CHEST WALL WITH EDEMA  AND ERYTHEMA NEAR PACER SITE  CT DEMONSTRATES COLLECTION  WILL D/W EP FURTHER  ABOUT POTENTIAL  EXTRACTION   WILL FOLLOW UP WITH FURTHER RECOMMENDATIONS 87 M from [facility] with PMHx dementia, HTN, HLD, DM, chronic systolic HFrEF:20-25% (July 2017), ICM s/p bi vent ICD (medtronic), BPH and ESRD on HD tu th sat, was d/c from [facility] 8/2/17 after being tx for coag negative staph bacteremia with vancomycin during HD to end on 9/2/17; pt was sent to the hospital for emergent HD. As per the nursing supervisor at [place] last thursday the pt had an accident during transport to the HD facility and was dropped and obtained a c4 fracture and pt was placed in a c collar along with a right pubic ramus fx; currently was sent from the facility for emergent dialysis due to not having transport because the patient was dropped by previous transport and will need to have new transport set up.  .    PT WAS RECENTLY HOSPITALIZED  AND PLAN WAS TO CONTINUE   IV VANCO AT  DIALYSIS BUT IS UNCLEAR IF RECEIVED VANCO OUTSIDE  OF HOSPITAL  REPEAT BLOOD CX  SO FAR NEG   WILL D/C ZOSYN  CHEST WALL WITH EDEMA  AND ERYTHEMA NEAR PACER SITE  CT DEMONSTRATES COLLECTION   D/W EP PA   AGREE WITH EXTRACTION  TO HELP ERADICATE POTENTIAL INFECTION  WILL FOLLOW UP WITH FURTHER RECOMMENDATIONS

## 2017-08-16 NOTE — PROGRESS NOTE ADULT - PROBLEM SELECTOR PLAN 6
HFrEF:20-25% NICM with biv ICD  - Fluid status slow improvement  -ct chest findings with moderate R pleural effusion and receiving HD today as well   -pt had beats of v tach on telemetry but has ICD in place   - not on ace inhibitor given renal function and hypotension   -c/w metoprolol 25mg po bid with parameters   -cardiology f/u noted and appreciated

## 2017-08-16 NOTE — PROGRESS NOTE ADULT - ASSESSMENT
No new events. I discussed with the patient directly today, and my service as in touch with his family, all in agreement to proceed with device extraction. His infection is unlikely to improve without removal of all intravascular hardware. Plan for device extraction was also reviewed and agreed on with ID and primary medical service. Given suspected pocket infection, persistent infection with prior Gram Positive bacteremia, and recurrent sepsis despite antibiotics, he will likely benefit from extraction of his ICD system, as well as removal of his dialysis catheter. Anesthesia has also evaluated the patient given elevated anesthesia/airway risks in setting of his recent cervical spinal fracture, and will proceed with collaboration from neurosurgery. Risks of the procedure were again reviewed.   NANETTE will likely not be feasible given his cervical fracture. However, given above, NANETTE unlikely to  regardless.

## 2017-08-16 NOTE — PROGRESS NOTE ADULT - PROBLEM SELECTOR PLAN 1
Swollen ICD site with fluid collection concern for possible source of infection along with PC.  -D/w EP/ID and vascular b/c PC and ICD are potential sources of infection will need extraction of ICD and PC. PC will also be replaced.   -prior coag negative bacteremia and at that time repeat Bcx were negative and currently cx from admission negative.   Ct chest shows fluid accumulation around ICD    -pt to be dialyzed today in preparation for OR tomorrow   -pt remains afebrile  -no leukocytosis   -ID f/u noted and appreciated   -empiric coverage with renally adjusted vanco post HD   -EP f/u noted and appreciated  -ID f/u noted and appreciated

## 2017-08-16 NOTE — PROGRESS NOTE ADULT - PROBLEM SELECTOR PLAN 7
Anemia with iron deficiency   -c/w ferrous sulfate po qd  -c/w folic acid 1mg po qd     Thromboctopenia   -As per prior labs at baseline but worsened today  possibly from sepsis   -will c/w monitoring   -wbc at this time is wnl   -vitamin b12 level wnl

## 2017-08-16 NOTE — PROGRESS NOTE ADULT - PROBLEM SELECTOR PLAN 2
-Pt was d/c from [facility] on 8/2/17 and at that time ID recommended for the patient to c/w vancomycin IVPB post HD Tu Th Sat until 9/2/17.   [facility] and pt was receiving his vancomycin    -c/w vancomycin 500mg IVPB q tu th sat post HD  -repeat Bcx remain negative   -extraction of ICD and PC needed as stated above   -ID f/u noted and appreciated
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
## 2017-08-16 NOTE — PROGRESS NOTE ADULT - SUBJECTIVE AND OBJECTIVE BOX
Patient is a 87y old  Male who presents with a chief complaint of hypervolemia (13 Aug 2017 07:37)      HEALTH ISSUES - PROBLEM Dx:  Chronic systolic heart failure: Chronic systolic heart failure  Elevated troponin: Elevated troponin  SIRS (systemic inflammatory response syndrome): SIRS (systemic inflammatory response syndrome)  Prophylactic measure: Prophylactic measure  Diabetes mellitus: Diabetes mellitus  Hypotension: Hypotension  Conjunctivitis, acute, right eye: Conjunctivitis, acute, right eye  Pancytopenia: Pancytopenia  Bacteremia due to coagulase-negative Staphylococcus: Bacteremia due to coagulase-negative Staphylococcus  Pubic ramus fracture, right, sequela: Pubic ramus fracture, right, sequela  Compression fracture of C-spine: Compression fracture of C-spine  ESRD on dialysis: ESRD on dialysis          INTERVAL HPI/OVERNIGHT EVENTS:    Vital Signs Last 24 Hrs  T(C): 36.6 (16 Aug 2017 15:55), Max: 37.2 (15 Aug 2017 20:06)  T(F): 97.9 (16 Aug 2017 15:55), Max: 99 (15 Aug 2017 20:06)  HR: 79 (16 Aug 2017 15:55) (75 - 97)  BP: 109/70 (16 Aug 2017 15:55) (99/53 - 117/68)  BP(mean): --  RR: 18 (16 Aug 2017 15:55) (14 - 18)  SpO2: 98% (16 Aug 2017 12:51) (97% - 98%)    CAPILLARY BLOOD GLUCOSE  87 (16 Aug 2017 12:51)  92 (16 Aug 2017 08:48)  209 (15 Aug 2017 21:31)  92 (15 Aug 2017 17:34)          I&O's Summary    15 Aug 2017 07:01  -  16 Aug 2017 07:00  --------------------------------------------------------  IN: 100 mL / OUT: 1500 mL / NET: -1400 mL        CONSTITUTIONAL: Well appearing, well nourished, awake, alert and in no apparent distress  ENMT: C Collar in place   CARDIAC: Normal rate, regular rhythm.  Heart sounds S1, S2.  No murmurs, rubs or gallops   RESPIRATORY: Decreased air entry b/l, no WRR, bibasilar crackles R>L  GASTROENTEROLOGY: Soft, NT ND BS + normoactive   EXTREMITIES: peripheral edema +1, no cyanosis or deformity   NEUROLOGICAL: Alert and oriented x 2 person and place not the date, no focal deficits, no motor or sensory deficits.  SKIN: No rash, skin turgor wnl   R PC C/D/I no evidence of infection and Left chest ICD C/D/I but swollen with lower pocket       MEDICATIONS  (STANDING):  epoetin una Injectable 60313 Unit(s) IV Push <User Schedule>  folic acid 1 milliGRAM(s) Oral daily  ferrous    sulfate 325 milliGRAM(s) Oral daily  insulin lispro (HumaLOG) corrective regimen sliding scale   SubCutaneous three times a day before meals  dextrose 5%. 1000 milliLiter(s) (50 mL/Hr) IV Continuous <Continuous>  dextrose 50% Injectable 12.5 Gram(s) IV Push once  dextrose 50% Injectable 25 Gram(s) IV Push once  dextrose 50% Injectable 25 Gram(s) IV Push once  midodrine 10 milliGRAM(s) Oral <User Schedule>  calcium acetate 667 milliGRAM(s) Oral three times a day with meals  tamsulosin 0.4 milliGRAM(s) Oral at bedtime  gabapentin 300 milliGRAM(s) Oral three times a day  vancomycin  IVPB 500 milliGRAM(s) IV Intermittent <User Schedule>  nystatin Powder 1 Application(s) Topical two times a day  metoprolol 25 milliGRAM(s) Oral every 12 hours  ergocalciferol 82521 Unit(s) Oral every week  calcitriol   Capsule 0.25 MICROGram(s) Oral daily    MEDICATIONS  (PRN):  dextrose Gel 1 Dose(s) Oral once PRN Blood Glucose LESS THAN 70 milliGRAM(s)/deciliter  glucagon  Injectable 1 milliGRAM(s) IntraMuscular once PRN Glucose LESS THAN 70 milligrams/deciliter  artificial  tears Solution 1 Drop(s) Both EYES three times a day PRN Dry Eyes  acetaminophen   Tablet. 650 milliGRAM(s) Oral every 6 hours PRN Mild Pain (1 - 3)  polyethylene glycol 3350 17 Gram(s) Oral daily PRN Constipation  acetaminophen   Tablet 650 milliGRAM(s) Oral every 6 hours PRN For Temp greater than 38 C (100.4 F)      LABS:                        9.2    5.2   )-----------( 83       ( 16 Aug 2017 05:46 )             30.9     08-16    136  |  97<L>  |  17.0  ----------------------------<  88  4.3   |  25.0  |  4.09<H>    Ca    8.9      16 Aug 2017 05:46  Mg     1.9     08-16    TPro  6.5<L>  /  Alb  3.1<L>  /  TBili  0.3<L>  /  DBili  x   /  AST  14  /  ALT  9   /  AlkPhos  69  08-15    PT/INR - ( 16 Aug 2017 05:46 )   PT: 14.1 sec;   INR: 1.28 ratio             LIVER FUNCTIONS - ( 15 Aug 2017 08:30 )  Alb: 3.1 g/dL / Pro: 6.5 g/dL / ALK PHOS: 69 U/L / ALT: 9 U/L / AST: 14 U/L / GGT: x             RADIOLOGY & ADDITIONAL TESTS:  no new imaging studies
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
## 2017-08-16 NOTE — PROGRESS NOTE ADULT - ASSESSMENT
ESRD:  Clinically volume overloaded/ pleural efffusions   - HD today again in preparation for OR tomorrow    Anemia: cont NATHAN and Fe  - monitor H/H    RO: continue phoslo, rocaltrol and ergocalciferol

## 2017-08-16 NOTE — PROGRESS NOTE ADULT - SUBJECTIVE AND OBJECTIVE BOX
No overnight events. Resting comfortably, easily arousable.  "I'm tired"    TELE:    MEDICATIONS  (STANDING):  epoetin una Injectable 93668 Unit(s) IV Push <User Schedule>  folic acid 1 milliGRAM(s) Oral daily  ferrous    sulfate 325 milliGRAM(s) Oral daily  insulin lispro (HumaLOG) corrective regimen sliding scale   SubCutaneous three times a day before meals  dextrose 5%. 1000 milliLiter(s) (50 mL/Hr) IV Continuous <Continuous>  dextrose 50% Injectable 12.5 Gram(s) IV Push once  dextrose 50% Injectable 25 Gram(s) IV Push once  dextrose 50% Injectable 25 Gram(s) IV Push once  midodrine 10 milliGRAM(s) Oral <User Schedule>  calcium acetate 667 milliGRAM(s) Oral three times a day with meals  tamsulosin 0.4 milliGRAM(s) Oral at bedtime  gabapentin 300 milliGRAM(s) Oral three times a day  vancomycin  IVPB 500 milliGRAM(s) IV Intermittent <User Schedule>  nystatin Powder 1 Application(s) Topical two times a day  metoprolol 25 milliGRAM(s) Oral every 12 hours  ergocalciferol 78403 Unit(s) Oral every week  calcitriol   Capsule 0.25 MICROGram(s) Oral daily    MEDICATIONS  (PRN):  dextrose Gel 1 Dose(s) Oral once PRN Blood Glucose LESS THAN 70 milliGRAM(s)/deciliter  glucagon  Injectable 1 milliGRAM(s) IntraMuscular once PRN Glucose LESS THAN 70 milligrams/deciliter  artificial  tears Solution 1 Drop(s) Both EYES three times a day PRN Dry Eyes  acetaminophen   Tablet. 650 milliGRAM(s) Oral every 6 hours PRN Mild Pain (1 - 3)  polyethylene glycol 3350 17 Gram(s) Oral daily PRN Constipation  acetaminophen   Tablet 650 milliGRAM(s) Oral every 6 hours PRN For Temp greater than 38 C (100.4 F)    PAST MEDICAL & SURGICAL HISTORY:  Pubic ramus fracture, right, sequela  Compression fracture of C-spine: c4  Hyperlipidemia  ESRD on dialysis  Dementia  Muscle weakness (generalized)  Diabetes mellitus  Hypertension  ICD (implantable cardioverter-defibrillator), biventricular, in situ      Vital Signs Last 24 Hrs  T(C): 36.7 (16 Aug 2017 08:48), Max: 37.2 (15 Aug 2017 14:15)  T(F): 98.1 (16 Aug 2017 08:48), Max: 99 (15 Aug 2017 14:15)  HR: 90 (16 Aug 2017 08:48) (76 - 97)  BP: 109/59 (16 Aug 2017 08:48) (99/53 - 109/59)  RR: 18 (16 Aug 2017 08:48) (18 - 18)  SpO2: 98% (16 Aug 2017 08:48) (97% - 100%)    Physical Exam:  Constitutional: NAD, resting comfortably ,+c collar  Cardiovascular: +S1S2 RRR, LACW ICD site with collection at inferior device border +erythema +warmth. No evidence of device erosion or discharge  Pulmonary:  unlabored, decreased right base, poor effort  GI: soft NTND +BS  Extremities: trace edemea bl  Neuro: non focal, OROZCO x4    LABS:                        9.2    5.2   )-----------( 83       ( 16 Aug 2017 05:46 )             30.9     08-16    136  |  97<L>  |  17.0  ----------------------------<  88  4.3   |  25.0  |  4.09<H>    Ca    8.9      16 Aug 2017 05:46  Mg     1.9     08-16    Culture - Blood (08.13.17 @ 02:59)    Specimen Source: .Blood Blood-Peripheral    Culture Results:   No growth at 48 hours    Culture - Blood (08.13.17 @ 02:59)    Specimen Source: .Blood Blood-Peripheral    Culture Results:   No growth at 48 hours    A/P:A/P:86 yo male with pmhx parkinsons dementia, ESRD on HD via R permacath (Dr Pike-4/2017) on T TH Sat(Trish), hypotension, HLD, DM, CAD/CABG 2012, ICM, HFrEF ~25% s/p MDT BIVICD ([facility]/1/6/2015), recent C4 and pubic Fx secondary to an accident/fall with transportation to HD. [facility] and was a code sepsis night of admission. He was recently hospitalized at [facility] with coag neg bacteremia 7/23/17-8/2/17, being treated with IV vanco that was to be continued through 9/2/17. It is unclear if vanco was received post discharge. EP SVC consulted for evaluation of BIV ICD pocket infection. Chest Ct with small subcutaneous collection adjacent to generator measuring 4.1 x 3.0cm. Blood Cultures negative so far on this admission, but pt has been on IV ABX. Currently being treated with IV vanco.    1. Possible ICD pocket infection without evidence of device erosion. Likely will require BIV ICD system extraction for clearance of infection. Pt is not pacemaker dependent   -continue abx per ID, blood cultures this admission negative x 48hours  -Vascular consult regarding permacath replacement, possible removal at same time as extraction?  -Neurosurgery Dr Austin ot eval/clear pt for general anesthesia in setting of recent C4 fx  -Nephrology-s/w Dr Matson will plan on HD today and try to avoid HD tomorrow before extraction if possible  -Cardiac anesthesia Dr Olmedo will eval   -npo after midnight, am labs   -await call back from Pts wife Iarm regarding above plan  continue care per primary, renal, cardiology and ID teams  HENRY Perry No overnight events. Resting comfortably, easily arousable.  "I'm tired"    TELE: SR, AP on demand, BIV paced, PVCs, triplets    MEDICATIONS  (STANDING):  epoetin una Injectable 51567 Unit(s) IV Push <User Schedule>  folic acid 1 milliGRAM(s) Oral daily  ferrous    sulfate 325 milliGRAM(s) Oral daily  insulin lispro (HumaLOG) corrective regimen sliding scale   SubCutaneous three times a day before meals  dextrose 5%. 1000 milliLiter(s) (50 mL/Hr) IV Continuous <Continuous>  dextrose 50% Injectable 12.5 Gram(s) IV Push once  dextrose 50% Injectable 25 Gram(s) IV Push once  dextrose 50% Injectable 25 Gram(s) IV Push once  midodrine 10 milliGRAM(s) Oral <User Schedule>  calcium acetate 667 milliGRAM(s) Oral three times a day with meals  tamsulosin 0.4 milliGRAM(s) Oral at bedtime  gabapentin 300 milliGRAM(s) Oral three times a day  vancomycin  IVPB 500 milliGRAM(s) IV Intermittent <User Schedule>  nystatin Powder 1 Application(s) Topical two times a day  metoprolol 25 milliGRAM(s) Oral every 12 hours  ergocalciferol 39485 Unit(s) Oral every week  calcitriol   Capsule 0.25 MICROGram(s) Oral daily    MEDICATIONS  (PRN):  dextrose Gel 1 Dose(s) Oral once PRN Blood Glucose LESS THAN 70 milliGRAM(s)/deciliter  glucagon  Injectable 1 milliGRAM(s) IntraMuscular once PRN Glucose LESS THAN 70 milligrams/deciliter  artificial  tears Solution 1 Drop(s) Both EYES three times a day PRN Dry Eyes  acetaminophen   Tablet. 650 milliGRAM(s) Oral every 6 hours PRN Mild Pain (1 - 3)  polyethylene glycol 3350 17 Gram(s) Oral daily PRN Constipation  acetaminophen   Tablet 650 milliGRAM(s) Oral every 6 hours PRN For Temp greater than 38 C (100.4 F)    PAST MEDICAL & SURGICAL HISTORY:  Pubic ramus fracture, right, sequela  Compression fracture of C-spine: c4  Hyperlipidemia  ESRD on dialysis  Dementia  Muscle weakness (generalized)  Diabetes mellitus  Hypertension  ICD (implantable cardioverter-defibrillator), biventricular, in situ      Vital Signs Last 24 Hrs  T(C): 36.7 (16 Aug 2017 08:48), Max: 37.2 (15 Aug 2017 14:15)  T(F): 98.1 (16 Aug 2017 08:48), Max: 99 (15 Aug 2017 14:15)  HR: 90 (16 Aug 2017 08:48) (76 - 97)  BP: 109/59 (16 Aug 2017 08:48) (99/53 - 109/59)  RR: 18 (16 Aug 2017 08:48) (18 - 18)  SpO2: 98% (16 Aug 2017 08:48) (97% - 100%)    Physical Exam:  Constitutional: NAD, resting comfortably ,+c collar  Cardiovascular: +S1S2 RRR, LACW ICD site with collection at inferior device border +erythema +warmth. No evidence of device erosion or discharge  Pulmonary:  unlabored, decreased right base, poor effort  GI: soft NTND +BS  Extremities: trace edemea bl  Neuro: non focal, OROZCO x4    LABS:                        9.2    5.2   )-----------( 83       ( 16 Aug 2017 05:46 )             30.9     08-16    136  |  97<L>  |  17.0  ----------------------------<  88  4.3   |  25.0  |  4.09<H>    Ca    8.9      16 Aug 2017 05:46  Mg     1.9     08-16    Culture - Blood (08.13.17 @ 02:59)    Specimen Source: .Blood Blood-Peripheral    Culture Results:   No growth at 48 hours    Culture - Blood (08.13.17 @ 02:59)    Specimen Source: .Blood Blood-Peripheral    Culture Results:   No growth at 48 hours    A/P:A/P:88 yo male with pmhx parkinsons dementia, ESRD on HD via R permacath (Dr Pike-4/2017) on T TH Sat(Trish), hypotension, HLD, DM, CAD/CABG 2012, ICM, HFrEF ~25% s/p MDT BIVICD ([facility]/1/6/2015), recent C4 and pubic Fx secondary to an accident/fall with transportation to HD. [facility] and was a code sepsis night of admission. He was recently hospitalized at [facility] with coag neg bacteremia 7/23/17-8/2/17, being treated with IV vanco that was to be continued through 9/2/17. It is unclear if vanco was received post discharge. EP SVC consulted for evaluation of BIV ICD pocket infection. Chest Ct with small subcutaneous collection adjacent to generator measuring 4.1 x 3.0cm. Blood Cultures negative so far on this admission, but pt has been on IV ABX. Currently being treated with IV vanco.    1. Possible ICD pocket infection without evidence of device erosion. Likely will require BIV ICD system extraction for clearance of infection. Pt is not pacemaker dependent   -continue abx per ID, blood cultures this admission negative x 48hours  -Vascular consult regarding permacath replacement, possible removal at same time as extraction?  -Neurosurgery Dr Austin ot eval/clear pt for general anesthesia in setting of recent C4 fx  -Nephrology-s/w Dr Matson will plan on HD today and try to avoid HD tomorrow before extraction if possible  -Cardiac anesthesia Dr Olmedo will eval   -npo after midnight, am labs   -await call back from Pts wife Iram regarding above plan  continue care per primary, renal, cardiology and ID teams  DW Dr Perry

## 2017-08-16 NOTE — PROGRESS NOTE ADULT - PROBLEM SELECTOR PLAN 3
likely secondary to infection/ESRD   -down trending troponin   - will c/w monitoring unchanged ekg   -pt did have beats of v tach overnight but has ICD in place   -cardiology f/u noted and appreciated

## 2017-08-16 NOTE — PROGRESS NOTE ADULT - PROBLEM SELECTOR PLAN 4
Missed HD secondary to transport issue -s/p emergent HD 8/12/17   -ct chest shows R sided moderate effusion   -c/w regularly scheduled HD and to have additional session of HD today and in prep for OR tomorrow   -low vitamin d level started on vit d supplementation   -continue with phoslo, rocaltrol and ergocalciferol  -nephrology f/u noted and appreciated and d/w Dr. Matson the plan of care   - d/w SW and CM in regards to transportation issue.

## 2017-08-16 NOTE — PROGRESS NOTE ADULT - SUBJECTIVE AND OBJECTIVE BOX
NEPHROLOGY INTERVAL HPI/OVERNIGHT EVENTS:  pt clinically stable  no acute distress    MEDICATIONS  (STANDING):  epoetin una Injectable 85935 Unit(s) IV Push <User Schedule>  folic acid 1 milliGRAM(s) Oral daily  ferrous    sulfate 325 milliGRAM(s) Oral daily  insulin lispro (HumaLOG) corrective regimen sliding scale   SubCutaneous three times a day before meals  dextrose 5%. 1000 milliLiter(s) (50 mL/Hr) IV Continuous <Continuous>  dextrose 50% Injectable 12.5 Gram(s) IV Push once  dextrose 50% Injectable 25 Gram(s) IV Push once  dextrose 50% Injectable 25 Gram(s) IV Push once  midodrine 10 milliGRAM(s) Oral <User Schedule>  calcium acetate 667 milliGRAM(s) Oral three times a day with meals  tamsulosin 0.4 milliGRAM(s) Oral at bedtime  gabapentin 300 milliGRAM(s) Oral three times a day  vancomycin  IVPB 500 milliGRAM(s) IV Intermittent <User Schedule>  nystatin Powder 1 Application(s) Topical two times a day  metoprolol 25 milliGRAM(s) Oral every 12 hours  ergocalciferol 57830 Unit(s) Oral every week  calcitriol   Capsule 0.25 MICROGram(s) Oral daily    MEDICATIONS  (PRN):  dextrose Gel 1 Dose(s) Oral once PRN Blood Glucose LESS THAN 70 milliGRAM(s)/deciliter  glucagon  Injectable 1 milliGRAM(s) IntraMuscular once PRN Glucose LESS THAN 70 milligrams/deciliter  artificial  tears Solution 1 Drop(s) Both EYES three times a day PRN Dry Eyes  acetaminophen   Tablet. 650 milliGRAM(s) Oral every 6 hours PRN Mild Pain (1 - 3)  polyethylene glycol 3350 17 Gram(s) Oral daily PRN Constipation  acetaminophen   Tablet 650 milliGRAM(s) Oral every 6 hours PRN For Temp greater than 38 C (100.4 F)      Allergies    No Known Allergies    Intolerances    Vital Signs Last 24 Hrs  T(C): 36.7 (16 Aug 2017 08:48), Max: 37.2 (15 Aug 2017 14:15)  T(F): 98.1 (16 Aug 2017 08:48), Max: 99 (15 Aug 2017 14:15)  HR: 90 (16 Aug 2017 08:48) (76 - 97)  BP: 109/59 (16 Aug 2017 08:48) (99/53 - 109/59)  BP(mean): --  RR: 18 (16 Aug 2017 08:48) (18 - 18)  SpO2: 98% (16 Aug 2017 08:48) (97% - 100%)    PHYSICAL EXAM:  HEENT - legally blind  Chest   clear; diminished at bases  CV   no murmur  Abd   soft, NT BS+  Extr   No edema  Neuro  grossly intact motor    LABS:                        9.2    5.2   )-----------( 83       ( 16 Aug 2017 05:46 )             30.9     08-16    136  |  97<L>  |  17.0  ----------------------------<  88  4.3   |  25.0  |  4.09<H>    Ca    8.9      16 Aug 2017 05:46  Mg     1.9     08-16    TPro  6.5<L>  /  Alb  3.1<L>  /  TBili  0.3<L>  /  DBili  x   /  AST  14  /  ALT  9   /  AlkPhos  69  08-15    PT/INR - ( 16 Aug 2017 05:46 )   PT: 14.1 sec;   INR: 1.28 ratio             Magnesium, Serum: 1.9 mg/dL (08-16 @ 05:46)      RADIOLOGY & ADDITIONAL TESTS:

## 2017-08-16 NOTE — PROGRESS NOTE ADULT - PROBLEM SELECTOR PLAN 5
-Patient was evaluated by neurosurgery Dr. Austin and had c collar placed when initial encounter occurred.   - D/w neurosurgery NP at that time who d/w Dr. Austin and patient is to remain in the c collar and follow up with Dr. Austin in 2 weeks in the office. Patient is able to do activity as tolerated which includes walking, sitting in a wheel chair as long as collar stays in place there is no contraindication to activity.  -Given patient needs extraction of devices and intubation will need neurosurgery clearance for OR procedures. D/w neursurgery NP and will d/w Dr. Austin.     R pubic ramus fx -c/w supportive care and patient to c/w PT at the facility

## 2017-08-17 ENCOUNTER — RESULT REVIEW (OUTPATIENT)
Age: 82
End: 2017-08-17

## 2017-08-17 ENCOUNTER — APPOINTMENT (OUTPATIENT)
Dept: CARDIOTHORACIC SURGERY | Facility: HOSPITAL | Age: 82
End: 2017-08-17

## 2017-08-17 DIAGNOSIS — T82.7XXA INFECTION AND INFLAMMATORY REACTION DUE TO OTHER CARDIAC AND VASCULAR DEVICES, IMPLANTS AND GRAFTS, INITIAL ENCOUNTER: ICD-10-CM

## 2017-08-17 LAB
ANION GAP SERPL CALC-SCNC: 15 MMOL/L — SIGNIFICANT CHANGE UP (ref 5–17)
ANION GAP SERPL CALC-SCNC: 16 MMOL/L — SIGNIFICANT CHANGE UP (ref 5–17)
APTT BLD: 82.9 SEC — HIGH (ref 27.5–37.4)
BUN SERPL-MCNC: 11 MG/DL — SIGNIFICANT CHANGE UP (ref 8–20)
BUN SERPL-MCNC: 15 MG/DL — SIGNIFICANT CHANGE UP (ref 8–20)
CALCIUM SERPL-MCNC: 8.4 MG/DL — LOW (ref 8.6–10.2)
CALCIUM SERPL-MCNC: 8.9 MG/DL — SIGNIFICANT CHANGE UP (ref 8.6–10.2)
CHLORIDE SERPL-SCNC: 101 MMOL/L — SIGNIFICANT CHANGE UP (ref 98–107)
CHLORIDE SERPL-SCNC: 98 MMOL/L — SIGNIFICANT CHANGE UP (ref 98–107)
CO2 SERPL-SCNC: 22 MMOL/L — SIGNIFICANT CHANGE UP (ref 22–29)
CO2 SERPL-SCNC: 25 MMOL/L — SIGNIFICANT CHANGE UP (ref 22–29)
CREAT SERPL-MCNC: 3.02 MG/DL — HIGH (ref 0.5–1.3)
CREAT SERPL-MCNC: 3.64 MG/DL — HIGH (ref 0.5–1.3)
GAS PNL BLDA: SIGNIFICANT CHANGE UP
GLUCOSE SERPL-MCNC: 158 MG/DL — HIGH (ref 70–115)
GLUCOSE SERPL-MCNC: 77 MG/DL — SIGNIFICANT CHANGE UP (ref 70–115)
GRAM STN FLD: SIGNIFICANT CHANGE UP
GRAM STN FLD: SIGNIFICANT CHANGE UP
HCT VFR BLD CALC: 31.6 % — LOW (ref 42–52)
HCT VFR BLD CALC: 32.7 % — LOW (ref 42–52)
HGB BLD-MCNC: 9.5 G/DL — LOW (ref 14–18)
HGB BLD-MCNC: 9.7 G/DL — LOW (ref 14–18)
INR BLD: 1.24 RATIO — HIGH (ref 0.88–1.16)
INR BLD: 1.28 RATIO — HIGH (ref 0.88–1.16)
MAGNESIUM SERPL-MCNC: 1.9 MG/DL — SIGNIFICANT CHANGE UP (ref 1.6–2.6)
MAGNESIUM SERPL-MCNC: 2 MG/DL — SIGNIFICANT CHANGE UP (ref 1.6–2.6)
MCHC RBC-ENTMCNC: 26.2 PG — LOW (ref 27–31)
MCHC RBC-ENTMCNC: 26.4 PG — LOW (ref 27–31)
MCHC RBC-ENTMCNC: 29.7 G/DL — LOW (ref 32–36)
MCHC RBC-ENTMCNC: 30.1 G/DL — LOW (ref 32–36)
MCV RBC AUTO: 87.8 FL — SIGNIFICANT CHANGE UP (ref 80–94)
MCV RBC AUTO: 88.4 FL — SIGNIFICANT CHANGE UP (ref 80–94)
PHOSPHATE SERPL-MCNC: 2.8 MG/DL — SIGNIFICANT CHANGE UP (ref 2.4–4.7)
PLATELET # BLD AUTO: 78 K/UL — LOW (ref 150–400)
PLATELET # BLD AUTO: 92 K/UL — LOW (ref 150–400)
POTASSIUM SERPL-MCNC: 4 MMOL/L — SIGNIFICANT CHANGE UP (ref 3.5–5.3)
POTASSIUM SERPL-MCNC: 4.4 MMOL/L — SIGNIFICANT CHANGE UP (ref 3.5–5.3)
POTASSIUM SERPL-SCNC: 4 MMOL/L — SIGNIFICANT CHANGE UP (ref 3.5–5.3)
POTASSIUM SERPL-SCNC: 4.4 MMOL/L — SIGNIFICANT CHANGE UP (ref 3.5–5.3)
PROTHROM AB SERPL-ACNC: 13.7 SEC — HIGH (ref 9.8–12.7)
PROTHROM AB SERPL-ACNC: 14.1 SEC — HIGH (ref 9.8–12.7)
RBC # BLD: 3.6 M/UL — LOW (ref 4.6–6.2)
RBC # BLD: 3.7 M/UL — LOW (ref 4.6–6.2)
RBC # FLD: 16.9 % — HIGH (ref 11–15.6)
RBC # FLD: 16.9 % — HIGH (ref 11–15.6)
SODIUM SERPL-SCNC: 138 MMOL/L — SIGNIFICANT CHANGE UP (ref 135–145)
SODIUM SERPL-SCNC: 139 MMOL/L — SIGNIFICANT CHANGE UP (ref 135–145)
SPECIMEN SOURCE: SIGNIFICANT CHANGE UP
SPECIMEN SOURCE: SIGNIFICANT CHANGE UP
VANCOMYCIN TROUGH SERPL-MCNC: 16.1 UG/ML — SIGNIFICANT CHANGE UP (ref 10–20)
WBC # BLD: 6.1 K/UL — SIGNIFICANT CHANGE UP (ref 4.8–10.8)
WBC # BLD: 8.7 K/UL — SIGNIFICANT CHANGE UP (ref 4.8–10.8)
WBC # FLD AUTO: 6.1 K/UL — SIGNIFICANT CHANGE UP (ref 4.8–10.8)
WBC # FLD AUTO: 8.7 K/UL — SIGNIFICANT CHANGE UP (ref 4.8–10.8)

## 2017-08-17 PROCEDURE — 99232 SBSQ HOSP IP/OBS MODERATE 35: CPT

## 2017-08-17 PROCEDURE — 99233 SBSQ HOSP IP/OBS HIGH 50: CPT

## 2017-08-17 PROCEDURE — 88300 SURGICAL PATH GROSS: CPT | Mod: 26

## 2017-08-17 PROCEDURE — 33241 REMOVE PULSE GENERATOR: CPT

## 2017-08-17 PROCEDURE — 33244 REMOVE ELCTRD TRANSVENOUSLY: CPT

## 2017-08-17 RX ORDER — MIDODRINE HYDROCHLORIDE 2.5 MG/1
5 TABLET ORAL DAILY
Qty: 0 | Refills: 0 | Status: DISCONTINUED | OUTPATIENT
Start: 2017-08-17 | End: 2017-08-17

## 2017-08-17 RX ORDER — FOLIC ACID 0.8 MG
1 TABLET ORAL DAILY
Qty: 0 | Refills: 0 | Status: DISCONTINUED | OUTPATIENT
Start: 2017-08-17 | End: 2017-08-24

## 2017-08-17 RX ORDER — FERROUS SULFATE 325(65) MG
325 TABLET ORAL
Qty: 0 | Refills: 0 | Status: DISCONTINUED | OUTPATIENT
Start: 2017-08-17 | End: 2017-08-24

## 2017-08-17 RX ORDER — DOCUSATE SODIUM 100 MG
100 CAPSULE ORAL THREE TIMES A DAY
Qty: 0 | Refills: 0 | Status: DISCONTINUED | OUTPATIENT
Start: 2017-08-17 | End: 2017-08-24

## 2017-08-17 RX ORDER — ERYTHROMYCIN BASE 5 MG/GRAM
1 OINTMENT (GRAM) OPHTHALMIC (EYE) AT BEDTIME
Qty: 0 | Refills: 0 | Status: DISCONTINUED | OUTPATIENT
Start: 2017-08-17 | End: 2017-08-21

## 2017-08-17 RX ORDER — ALBUMIN HUMAN 25 %
250 VIAL (ML) INTRAVENOUS ONCE
Qty: 0 | Refills: 0 | Status: COMPLETED | OUTPATIENT
Start: 2017-08-17 | End: 2017-08-18

## 2017-08-17 RX ORDER — SODIUM CHLORIDE 9 MG/ML
1000 INJECTION INTRAMUSCULAR; INTRAVENOUS; SUBCUTANEOUS
Qty: 0 | Refills: 0 | Status: DISCONTINUED | OUTPATIENT
Start: 2017-08-17 | End: 2017-08-24

## 2017-08-17 RX ORDER — CALCIUM ACETATE 667 MG
667 TABLET ORAL
Qty: 0 | Refills: 0 | Status: DISCONTINUED | OUTPATIENT
Start: 2017-08-17 | End: 2017-08-24

## 2017-08-17 RX ORDER — PANTOPRAZOLE SODIUM 20 MG/1
40 TABLET, DELAYED RELEASE ORAL DAILY
Qty: 0 | Refills: 0 | Status: DISCONTINUED | OUTPATIENT
Start: 2017-08-17 | End: 2017-08-22

## 2017-08-17 RX ORDER — ALBUMIN HUMAN 25 %
250 VIAL (ML) INTRAVENOUS ONCE
Qty: 0 | Refills: 0 | Status: COMPLETED | OUTPATIENT
Start: 2017-08-17 | End: 2017-08-17

## 2017-08-17 RX ORDER — GABAPENTIN 400 MG/1
300 CAPSULE ORAL THREE TIMES A DAY
Qty: 0 | Refills: 0 | Status: DISCONTINUED | OUTPATIENT
Start: 2017-08-17 | End: 2017-08-24

## 2017-08-17 RX ORDER — POLYETHYLENE GLYCOL 3350 17 G/17G
17 POWDER, FOR SOLUTION ORAL AT BEDTIME
Qty: 0 | Refills: 0 | Status: DISCONTINUED | OUTPATIENT
Start: 2017-08-17 | End: 2017-08-20

## 2017-08-17 RX ORDER — NOREPINEPHRINE BITARTRATE/D5W 8 MG/250ML
0.02 PLASTIC BAG, INJECTION (ML) INTRAVENOUS
Qty: 16 | Refills: 0 | Status: DISCONTINUED | OUTPATIENT
Start: 2017-08-17 | End: 2017-08-18

## 2017-08-17 RX ORDER — TAMSULOSIN HYDROCHLORIDE 0.4 MG/1
0.4 CAPSULE ORAL AT BEDTIME
Qty: 0 | Refills: 0 | Status: DISCONTINUED | OUTPATIENT
Start: 2017-08-17 | End: 2017-08-24

## 2017-08-17 RX ORDER — DEXTROSE 50 % IN WATER 50 %
50 SYRINGE (ML) INTRAVENOUS ONCE
Qty: 0 | Refills: 0 | Status: COMPLETED | OUTPATIENT
Start: 2017-08-17 | End: 2017-08-17

## 2017-08-17 RX ADMIN — Medication 50 MILLILITER(S): at 09:42

## 2017-08-17 RX ADMIN — CALCITRIOL 0.25 MICROGRAM(S): 0.5 CAPSULE ORAL at 12:48

## 2017-08-17 RX ADMIN — GABAPENTIN 300 MILLIGRAM(S): 400 CAPSULE ORAL at 05:35

## 2017-08-17 RX ADMIN — Medication 325 MILLIGRAM(S): at 12:48

## 2017-08-17 RX ADMIN — Medication 1 MILLIGRAM(S): at 12:48

## 2017-08-17 RX ADMIN — Medication 1 APPLICATION(S): at 22:28

## 2017-08-17 RX ADMIN — MIDODRINE HYDROCHLORIDE 10 MILLIGRAM(S): 2.5 TABLET ORAL at 12:49

## 2017-08-17 RX ADMIN — Medication 125 MILLILITER(S): at 20:00

## 2017-08-17 RX ADMIN — Medication 25 MILLIGRAM(S): at 05:35

## 2017-08-17 RX ADMIN — MIDODRINE HYDROCHLORIDE 10 MILLIGRAM(S): 2.5 TABLET ORAL at 09:23

## 2017-08-17 NOTE — CONSULT NOTE ADULT - PROBLEM SELECTOR RECOMMENDATION 9
For Lead Removal today with Dr Perry and Dr Monica Nguyễn on standby for OR  IV antibiotics   HD per renal

## 2017-08-17 NOTE — PROGRESS NOTE ADULT - SUBJECTIVE AND OBJECTIVE BOX
NSGY Attg:    Patient seen.  No acute overnight events reported.    Exam:  arousable to voice  follows commands briskly  OROZCO symmetrically to command  collar in place    CT from 8/3/17 shows mild C4 inferior endplate compression fracture    A/P:  neuro exam stable  patient has been compliant with collar  patient now requires removal of cardiac hardware secondary to sepsis -- concern that collar will be in surgical field  I have discussed the case with the anesthesia team and Dr. Anderson with recommendations as below.  PAS called for intraoperative neuromonitoring (SSEPs and MEPs)  - maintain patient neutral and in collar whenever possible  - intubate in collar   - after intubation, obtain baseline SSEPs and MEPs  - may remove front of collar for surgical access; maintain neck in neutral alignment  - reassess SSEPs and MEPs after removing front of collar to ensure no deviation from baseline  - obtain SSEPs and MEPs after any manipulation of the neck and at the end of the intervention to ensure stability  - replace front of collar after dressing applied but prior to emergence from anesthesia and extubation  Please call with additional questions or concerns.

## 2017-08-17 NOTE — PROGRESS NOTE ADULT - PROBLEM SELECTOR PLAN 2
-Pt was d/c from [facility] on 8/2/17 and at that time ID recommended for the patient to c/w vancomycin IVPB post HD Tu Th Sat until 9/2/17.   [facility] receiving his [facility]    -c/w vancomycin 500mg IVPB q tu th sat post HD  -repeat Bcx remain negative   -extraction of ICD and PC needed as stated above   -ID f/u noted and appreciated -Pt was d/c from [facility] on 8/2/17 and at that time ID recommended for the patient to c/w vancomycin IVPB post HD Tu Th Sat until 9/2/17.   [facility]    -c/w vancomycin 500mg IVPB q tu th sat post HD  -repeat Bcx remain negative   -extraction of ICD and PC to be performed today   -ID f/u noted and appreciated

## 2017-08-17 NOTE — PROGRESS NOTE ADULT - PROBLEM SELECTOR PLAN 3
likely secondary to infection/ESRD   -down trending troponin   - will c/w monitoring unchanged ekg   -pt did have beats of v tach overnight but has ICD in place   -cardiology f/u noted and appreciated likely secondary to infection/ESRD   - down trending troponin   - will c/w monitoring unchanged ekg   -pt did have beats of v tach in the beginning of the hospitalization but none overnight  -After ICD device is removed will d/w EP/cardio in regards to further management   -cardiology f/u noted and appreciated

## 2017-08-17 NOTE — BRIEF OPERATIVE NOTE - PROCEDURE
Laser lead extraction  08/17/2017  BiV ICD system extraction with RV lead laser extraction  Active  [email or web address]
Catheter removal, tunneled central venous, without port  08/17/2017    Active  [email or web address]

## 2017-08-17 NOTE — PROGRESS NOTE ADULT - SUBJECTIVE AND OBJECTIVE BOX
EMILY LITTLEJOHN is a 87y Male with HPI:   87 M from [facility] with PMHx dementia, HTN, HLD, DM, chronic systolic HFrEF:20-25% (July 2017), ICM s/p bi vent ICD (medtronic), BPH and ESRD on HD tu th sat, was d/c from [facility] ON 8/2/17 after being tx for coag negative staph bacteremia with vancomycin during HD to end on 9/2/17; pt was sent to the hospital for emergent HD. As per the nursing supervisor at [facility] last thursday the pt had an accident during transport to the HD facility and was dropped and obtained a c4 fracture and pt was placed in a c collar along with a right pubic ramus fx; currently was sent from the facility for emergent dialysis due to not having transport because the patient was dropped by previous transport and will need to have new transport set up. Pt missed his Thursday dialysis and is due for another dialysis today as per nursing supervision at [facility]. Patient is anuric. Patient states that he feels well, he wants to go back to the facility and is tired of this issue he is having in regards to the transportation. He is AAox2, very lethargic but is able to answer all questions appropriately. Patient denies chest pain, SOB, abd pain, N/V, fever, chills, headache or any other complaints. All remainder ROS negative.    PT WAS RECENTLY HOSPITALIZED  AND PLAN WAS TO CONTINUE   IV VANCO AT  DIALYSIS BUT IS UNCLEAR IF RECEIVED VANCO  REPEAT BLOOD CX  SO FAR NEG    [facility] AEA OF POSSIBLE COLLECTION NEAR PACEMAKER  FOR EXTRACTION TODAY      Allergies:  No Known Allergies      Medications:  epoetin una Injectable 09710 Unit(s) IV Push <User Schedule>  folic acid 1 milliGRAM(s) Oral daily  ferrous    sulfate 325 milliGRAM(s) Oral daily  insulin lispro (HumaLOG) corrective regimen sliding scale   SubCutaneous three times a day before meals  dextrose 5%. 1000 milliLiter(s) IV Continuous <Continuous>  dextrose Gel 1 Dose(s) Oral once PRN  dextrose 50% Injectable 12.5 Gram(s) IV Push once  dextrose 50% Injectable 25 Gram(s) IV Push once  dextrose 50% Injectable 25 Gram(s) IV Push once  glucagon  Injectable 1 milliGRAM(s) IntraMuscular once PRN  artificial  tears Solution 1 Drop(s) Both EYES three times a day PRN  midodrine 10 milliGRAM(s) Oral <User Schedule>  acetaminophen   Tablet. 650 milliGRAM(s) Oral every 6 hours PRN  polyethylene glycol 3350 17 Gram(s) Oral daily PRN  calcium acetate 667 milliGRAM(s) Oral three times a day with meals  tamsulosin 0.4 milliGRAM(s) Oral at bedtime  gabapentin 300 milliGRAM(s) Oral three times a day  vancomycin  IVPB 500 milliGRAM(s) IV Intermittent <User Schedule>  nystatin Powder 1 Application(s) Topical two times a day  acetaminophen   Tablet 650 milliGRAM(s) Oral every 6 hours PRN  metoprolol 25 milliGRAM(s) Oral every 12 hours  ergocalciferol 12994 Unit(s) Oral every week  calcitriol   Capsule 0.25 MICROGram(s) Oral daily      ANTIBIOTICS: VANCO        Review of Systems: - Negative except as mentioned above     Physical Exam:  ICU Vital Signs Last 24 Hrs  T(C): 37.1 (16 Aug 2017 04:54), Max: 37.3 (15 Aug 2017 08:05)  T(F): 98.7 (16 Aug 2017 04:54), Max: 99.1 (15 Aug 2017 08:05)  HR: 83 (16 Aug 2017 04:54) (58 - 97)  BP: 99/53 (16 Aug 2017 04:54) (98/50 - 103/61)  BP(mean): --  ABP: --  ABP(mean): --  RR: 18 (16 Aug 2017 04:54) (18 - 18)  SpO2: 98% (15 Aug 2017 20:06) (97% - 100%)    GEN: NAD, pleasant  HEENT: normocephalic and atraumatic. EOMI. LUCY...  NECK: Supple. No carotid bruits.  No lymphadenopathy or thyromegaly.  LUNGS: Clear to auscultation.  HEART: Regular rate and rhythm without murmur. LEFT CHEST WALL PACEMAKER AREA OF ERYTHEMA AND  EDEMA AT SITE  ABDOMEN: Soft, nontender, and nondistended.  Positive bowel sounds.  No hepatosplenomegaly was noted.  NO REBOUND NO GUARDING  EXTREMITIES: Without any cyanosis, clubbing, rash, lesions or edema.  NEUROLOGIC: Cranial nerves II through XII are grossly intact.    SKIN: No ulceration or induration present.      Labs:    08-16    136  |  97<L>  |  17.0  ----------------------------<  88  4.3   |  25.0  |  4.09<H>    Ca    8.9      16 Aug 2017 05:46  Mg     1.9     08-16    TPro  6.5<L>  /  Alb  3.1<L>  /  TBili  0.3<L>  /  DBili  x   /  AST  14  /  ALT  9   /  AlkPhos  69  08-15                          9.2    5.2   )-----------( 83       ( 16 Aug 2017 05:46 )             30.9       PT/INR - ( 16 Aug 2017 05:46 )   PT: 14.1 sec;   INR: 1.28 ratio             LIVER FUNCTIONS - ( 15 Aug 2017 08:30 )  Alb: 3.1 g/dL / Pro: 6.5 g/dL / ALK PHOS: 69 U/L / ALT: 9 U/L / AST: 14 U/L / GGT: x               CAPILLARY BLOOD GLUCOSE  209 (15 Aug 2017 21:31)  92 (15 Aug 2017 17:34)  168 (15 Aug 2017 12:15)  112 (15 Aug 2017 08:05) EMILY LITTLEJOHN is a 87y Male with HPI:   87 M from [facility] with PMHx dementia, HTN, HLD, DM, chronic systolic HFrEF:20-25% (July 2017), ICM s/p bi vent ICD (medtronic), BPH and ESRD on HD tu th sat, was d/c from [facility] ON 8/2/17 after being tx for coag negative staph bacteremia with vancomycin during HD to end on 9/2/17; pt was sent to the hospital for emergent HD. As per the nursing supervisor at [facility] last thursday the pt had an accident during transport to the HD facility and was dropped and obtained a c4 fracture and pt was placed in a c collar along with a right pubic ramus fx; currently was sent from the facility for emergent dialysis due to not having transport because the patient was dropped by previous transport and will need to have new transport set up. Pt missed his Thursday dialysis and is due for another dialysis today as per nursing supervision at [facility]. Patient is anuric. Patient states that he feels well, he wants to go back to the facility and is tired of this issue he is having in regards to the transportation. He is AAox2, very lethargic but is able to answer all questions appropriately. Patient denies chest pain, SOB, abd pain, N/V, fever, chills, headache or any other complaints. All remainder ROS negative.    PT WAS RECENTLY HOSPITALIZED  AND PLAN WAS TO CONTINUE   IV VANCO AT  DIALYSIS BUT IS UNCLEAR IF RECEIVED VANCO  REPEAT BLOOD CX  SO FAR NEG    [facility] AEA OF POSSIBLE COLLECTION NEAR PACEMAKER  FOR EXTRACTION TODAY      Allergies:  No Known Allergies      Medications:  epoetin una Injectable 30424 Unit(s) IV Push <User Schedule>  folic acid 1 milliGRAM(s) Oral daily  ferrous    sulfate 325 milliGRAM(s) Oral daily  insulin lispro (HumaLOG) corrective regimen sliding scale   SubCutaneous three times a day before meals  dextrose 5%. 1000 milliLiter(s) IV Continuous <Continuous>  dextrose Gel 1 Dose(s) Oral once PRN  dextrose 50% Injectable 12.5 Gram(s) IV Push once  dextrose 50% Injectable 25 Gram(s) IV Push once  dextrose 50% Injectable 25 Gram(s) IV Push once  glucagon  Injectable 1 milliGRAM(s) IntraMuscular once PRN  artificial  tears Solution 1 Drop(s) Both EYES three times a day PRN  midodrine 10 milliGRAM(s) Oral <User Schedule>  acetaminophen   Tablet. 650 milliGRAM(s) Oral every 6 hours PRN  polyethylene glycol 3350 17 Gram(s) Oral daily PRN  calcium acetate 667 milliGRAM(s) Oral three times a day with meals  tamsulosin 0.4 milliGRAM(s) Oral at bedtime  gabapentin 300 milliGRAM(s) Oral three times a day  vancomycin  IVPB 500 milliGRAM(s) IV Intermittent <User Schedule>  nystatin Powder 1 Application(s) Topical two times a day  acetaminophen   Tablet 650 milliGRAM(s) Oral every 6 hours PRN  metoprolol 25 milliGRAM(s) Oral every 12 hours  ergocalciferol 47271 Unit(s) Oral every week  calcitriol   Capsule 0.25 MICROGram(s) Oral daily      ANTIBIOTICS: VANCO        Review of Systems: - Negative except as mentioned above     Physical Exam:  IVital Signs Last 24 Hrs  T(C): 37.2 (17 Aug 2017 04:20), Max: 37.5 (16 Aug 2017 22:30)  T(F): 99 (17 Aug 2017 04:20), Max: 99.5 (16 Aug 2017 22:30)  HR: 86 (17 Aug 2017 08:18) (73 - 98)  BP: 101/58 (17 Aug 2017 08:18) (99/56 - 117/68)  BP(mean): --  RR: 16 (17 Aug 2017 08:18) (14 - 189)  SpO2: 97% (17 Aug 2017 04:20) (96% - 100%)    GEN: NAD, pleasant  HEENT: normocephalic and atraumatic. EOMI. LUCY...  NECK: Supple. No carotid bruits.  No lymphadenopathy or thyromegaly.  LUNGS: Clear to auscultation.  HEART: Regular rate and rhythm without murmur. LEFT CHEST WALL PACEMAKER AREA OF ERYTHEMA AND  EDEMA AT SITE  ABDOMEN: Soft, nontender, and nondistended.  Positive bowel sounds.  No hepatosplenomegaly was noted.  NO REBOUND NO GUARDING  EXTREMITIES: Without any cyanosis, clubbing, rash, lesions or edema.  NEUROLOGIC: Cranial nerves II through XII are grossly intact.    SKIN: No ulceration or induration present.      Labs:    08-16    136  |  97<L>  |  17.0  ----------------------------<  88  4.3   |  25.0  |  4.09<H>    Ca    8.9      16 Aug 2017 05:46  Mg     1.9     08-16    TPro  6.5<L>  /  Alb  3.1<L>  /  TBili  0.3<L>  /  DBili  x   /  AST  14  /  ALT  9   /  AlkPhos  69  08-15                          9.2    5.2   )-----------( 83       ( 16 Aug 2017 05:46 )             30.9       PT/INR - ( 16 Aug 2017 05:46 )   PT: 14.1 sec;   INR: 1.28 ratio             LIVER FUNCTIONS - ( 15 Aug 2017 08:30 )  Alb: 3.1 g/dL / Pro: 6.5 g/dL / ALK PHOS: 69 U/L / ALT: 9 U/L / AST: 14 U/L / GGT: x               CAPILLARY BLOOD GLUCOSE  209 (15 Aug 2017 21:31)  92 (15 Aug 2017 17:34)  168 (15 Aug 2017 12:15)  112 (15 Aug 2017 08:05)

## 2017-08-17 NOTE — PROGRESS NOTE ADULT - ASSESSMENT
87 M from [facility] with PMHx dementia, HTN, HLD, DM, chronic systolic HFrEF:20-25% (July 2017), ICM s/p bi vent ICD (medtronic), BPH and ESRD on HD tu th sat, was d/c from [facility] ON 8/2/17 after being tx for coag negative staph bacteremia with vancomycin during HD to end on 9/2/17; pt was sent to the hospital for emergent HD. As per the nursing supervisor at [facility] last thursday the pt had an accident during transport to the HD facility and was dropped and obtained a c4 fracture and pt was placed in a c collar along with a right pubic ramus fx; currently was sent from the facility for emergent dialysis due to not having transport because the patient was dropped by previous transport and will need to have new transport set up.  .    PT WAS RECENTLY HOSPITALIZED  AND PLAN WAS TO CONTINUE   IV VANCO AT  DIALYSIS BUT IS UNCLEAR IF RECEIVED VANCO OUTSIDE  OF HOSPITAL  REPEAT BLOOD CX  SO FAR NEG   WILL D/C ZOSYN  CHEST WALL WITH EDEMA  AND ERYTHEMA NEAR PACER SITE  CT DEMONSTRATES COLLECTION    FRO EXTRACTION TODAY

## 2017-08-17 NOTE — PROGRESS NOTE ADULT - ASSESSMENT
ESRD - S/P HD yesterday  PC and ICD to be removed today   Watch labs     Sepsis - Vanco as per ID  OR today   vanco level in am     Anemia - Continue epogen    RO - Continue binders

## 2017-08-17 NOTE — BRIEF OPERATIVE NOTE - OPERATION/FINDINGS
BiV ICD system extraction w/ RV lead laser extraction and pocket abscess I&D/debridement
permacath removed

## 2017-08-17 NOTE — PROGRESS NOTE ADULT - PROBLEM SELECTOR PLAN 5
-Patient was evaluated by neurosurgery Dr. Austin and had c collar placed when initial encounter occurred.   - D/w neurosurgery NP at that time who d/w Dr. Austin and patient is to remain in the c collar and follow up with Dr. Austin in 2 weeks in the office. Patient is able to do activity as tolerated which includes walking, sitting in a wheel chair as long as collar stays in place there is no contraindication to activity.  -Given patient needs extraction of devices and intubation will need neurosurgery clearance for OR procedures. D/w neursurgery NP and will d/w Dr. Austin.     R pubic ramus fx -c/w supportive care and patient to c/w PT at the facility -Given patient needs extraction of devices and intubation will need neurosurgery clearance  which was placed in the chart.   -Patient was evaluated by neurosurgery Dr. Austin and had c collar placed when initial encounter occurred.       R pubic ramus fx -c/w supportive care and patient to c/w PT at the facility

## 2017-08-17 NOTE — PROGRESS NOTE ADULT - PROBLEM SELECTOR PLAN 7
Anemia with iron deficiency   -c/w ferrous sulfate po qd  -c/w folic acid 1mg po qd     Thromboctopenia   -As per prior labs at baseline but worsened today  possibly from sepsis   -will c/w monitoring   -wbc at this time is wnl   -vitamin b12 level wnl Anemia with iron deficiency   -c/w ferrous sulfate po qd  -c/w folic acid 1mg po qd     Thromboctopenia   -At baseline   -will c/w monitoring     -wbc at this time is wnl   -vitamin b12 level wnl

## 2017-08-17 NOTE — PROGRESS NOTE ADULT - SUBJECTIVE AND OBJECTIVE BOX
Patient is a 87y old  Male who presents with a chief complaint of hypervolemia (13 Aug 2017 07:37)      HEALTH ISSUES - PROBLEM Dx:  Infection and inflammatory reaction due to cardiac device, implant, and graft, initial encounter: Infection and inflammatory reaction due to cardiac device, implant, and graft, initial encounter  Cardiac device, implant, or graft infection or inflammation: Cardiac device, implant, or graft infection or inflammation  Chronic systolic heart failure: Chronic systolic heart failure  Elevated troponin: Elevated troponin  SIRS (systemic inflammatory response syndrome): SIRS (systemic inflammatory response syndrome)  Prophylactic measure: Prophylactic measure  Diabetes mellitus: Diabetes mellitus  Hypotension: Hypotension  Conjunctivitis, acute, right eye: Conjunctivitis, acute, right eye  Pancytopenia: Pancytopenia  Bacteremia due to coagulase-negative Staphylococcus: Bacteremia due to coagulase-negative Staphylococcus  Pubic ramus fracture, right, sequela: Pubic ramus fracture, right, sequela  Compression fracture of C-spine: Compression fracture of C-spine  ESRD on dialysis: ESRD on dialysis          INTERVAL HPI/OVERNIGHT EVENTS:  Patient seen and examined at bedside. No acute events overnight. Patient states he is     Vital Signs Last 24 Hrs  T(C): 36.5 (17 Aug 2017 13:36), Max: 37.5 (16 Aug 2017 22:30)  T(F): 97.7 (17 Aug 2017 13:36), Max: 99.5 (16 Aug 2017 22:30)  HR: 82 (17 Aug 2017 13:36) (66 - 98)  BP: 110/60 (17 Aug 2017 13:36) (99/56 - 116/52)  BP(mean): --  RR: 16 (17 Aug 2017 13:36) (16 - 189)  SpO2: 95% (17 Aug 2017 06:19) (95% - 100%)    CAPILLARY BLOOD GLUCOSE  149 (17 Aug 2017 12:00)  75 (17 Aug 2017 08:18)  128 (16 Aug 2017 22:30)  91 (16 Aug 2017 17:42)          I&O's Summary    16 Aug 2017 07:01  -  17 Aug 2017 07:00  --------------------------------------------------------  IN: 1350 mL / OUT: 2400 mL / NET: -1050 mL        CONSTITUTIONAL: Well appearing, well nourished, awake, alert and in no apparent distress  ENMT: Airway patent, Nasal mucosa clear. Mouth with normal mucosa. Throat has no vesicles, no oropharyngeal exudates and uvula is midline.  EYES: Clear bilaterally, pupils equal, round and reactive to light. EOMI.  CARDIAC: Normal rate, regular rhythm.  Heart sounds S1, S2.  No murmurs, rubs or gallops   RESPIRATORY: Breath sounds clear and equal bilaterally. No wheezes, rhales or rhonchi  MUSCULOSKELETAL: Spine appears normal, range of motion is not limited, no muscle or joint tenderness  EXTREMITIES: No edema, cyanosis or deformity   NEUROLOGICAL: Alert and oriented, no focal deficits, no motor or sensory deficits.  SKIN: No rash, skin turgor     MEDICATIONS  (STANDING):    MEDICATIONS  (PRN):      LABS:                        9.7    6.1   )-----------( 92       ( 17 Aug 2017 07:34 )             32.7     08-17    138  |  98  |  11.0  ----------------------------<  77  4.4   |  25.0  |  3.02<H>    Ca    8.9      17 Aug 2017 07:34  Mg     2.0     08-17      PT/INR - ( 17 Aug 2017 07:34 )   PT: 13.7 sec;   INR: 1.24 ratio                 RADIOLOGY & ADDITIONAL TESTS: Patient is a 87y old  Male who presents with a chief complaint of hypervolemia (13 Aug 2017 07:37)      HEALTH ISSUES - PROBLEM Dx:  Infection and inflammatory reaction due to cardiac device, implant, and graft, initial encounter: Infection and inflammatory reaction due to cardiac device, implant, and graft, initial encounter  Cardiac device, implant, or graft infection or inflammation: Cardiac device, implant, or graft infection or inflammation  Chronic systolic heart failure: Chronic systolic heart failure  Elevated troponin: Elevated troponin  SIRS (systemic inflammatory response syndrome): SIRS (systemic inflammatory response syndrome)  Prophylactic measure: Prophylactic measure  Diabetes mellitus: Diabetes mellitus  Hypotension: Hypotension  Conjunctivitis, acute, right eye: Conjunctivitis, acute, right eye  Pancytopenia: Pancytopenia  Bacteremia due to coagulase-negative Staphylococcus: Bacteremia due to coagulase-negative Staphylococcus  Pubic ramus fracture, right, sequela: Pubic ramus fracture, right, sequela  Compression fracture of C-spine: Compression fracture of C-spine  ESRD on dialysis: ESRD on dialysis        INTERVAL HPI/OVERNIGHT EVENTS:  Patient seen and examined at bedside. No acute events overnight. Patient states he is feeling well, still has some left sided pain from where he fell. States he is eating well and otherwise does not have any complaints. Patient denies chest pain, SOB, abd pain, N/V, fever, chills, dysuria or any other complaints. All remainder ROS negative.     Vital Signs Last 24 Hrs  T(C): 36.5 (17 Aug 2017 13:36), Max: 37.5 (16 Aug 2017 22:30)  T(F): 97.7 (17 Aug 2017 13:36), Max: 99.5 (16 Aug 2017 22:30)  HR: 82 (17 Aug 2017 13:36) (66 - 98)  BP: 110/60 (17 Aug 2017 13:36) (99/56 - 116/52)  BP(mean): --  RR: 16 (17 Aug 2017 13:36) (16 - 189)  SpO2: 95% (17 Aug 2017 06:19) (95% - 100%)    CAPILLARY BLOOD GLUCOSE  149 (17 Aug 2017 12:00)  75 (17 Aug 2017 08:18)  128 (16 Aug 2017 22:30)  91 (16 Aug 2017 17:42)      I&O's Summary    16 Aug 2017 07:01  -  17 Aug 2017 07:00  --------------------------------------------------------  IN: 1350 mL / OUT: 2400 mL / NET: -1050 mL        CONSTITUTIONAL: Well appearing, well nourished, awake, alert and in no apparent distress  ENMT: C Collar in place   CARDIAC: Normal rate, regular rhythm.  Heart sounds S1, S2.  No murmurs, rubs or gallops   RESPIRATORY: Decreased air entry b/l, no WRR, bibasilar crackles R>L  GASTROENTEROLOGY: Soft, NT ND BS + normoactive   EXTREMITIES: peripheral edema +1, no cyanosis or deformity   NEUROLOGICAL: Alert and oriented x 2 person and place not the date, no focal deficits, no motor or sensory deficits.  SKIN: No rash, skin turgor wnl - R PC C/D/I no evidence of infection and Left chest ICD C/D/I but swollen with lower pocket           LABS:                        9.7    6.1   )-----------( 92       ( 17 Aug 2017 07:34 )             32.7     08-17    138  |  98  |  11.0  ----------------------------<  77  4.4   |  25.0  |  3.02<H>    Ca    8.9      17 Aug 2017 07:34  Mg     2.0     08-17      PT/INR - ( 17 Aug 2017 07:34 )   PT: 13.7 sec;   INR: 1.24 ratio            RADIOLOGY & ADDITIONAL TESTS:  No new imaging studies

## 2017-08-17 NOTE — PROGRESS NOTE ADULT - PROBLEM SELECTOR PLAN 4
Missed HD secondary to transport issue -s/p emergent HD 8/12/17   -ct chest shows R sided moderate effusion   -c/w regularly scheduled HD and to have additional session of HD today and in prep for OR tomorrow   -low vitamin d level started on vit d supplementation   -continue with phoslo, rocaltrol and ergocalciferol  -nephrology f/u noted and appreciated and d/w Dr. Matson the plan of care   - d/w SW and CM in regards to transportation issue. Missed HD secondary to transport issue on admission and was given emergent HD 8/12/17   -ct chest shows R sided moderate effusion   -c/w regularly scheduled HD and had additional HD yest in anticipation of OR today will likely be dialyzed tomorrow   -c/w vit d supplementation   -continue with phoslo, rocaltrol and ergocalciferol  -nephrology f/u noted and appreciated and d/w Dr. Matson the plan of care   - d/w SW and CM in regards to transportation issue.

## 2017-08-17 NOTE — PROGRESS NOTE ADULT - ASSESSMENT
87 M from [facility] with PMHx dementia, HTN, HLD, DM, chronic systolic HFrEF: 20-25% (July 2017), ICM s/p bi vent ICD (medtronic), BPH and ESRD on HD tu th sat, was d/c from [facility] on 8/2/17 after being tx for coag negative staph bacteremia with vancomycin during HD to end on 9/2/17; pt was sent to the hospital for emergent HD secondary to transportation issue, b/c last transport team dropped the pt and he obtained a c4 compression fx and pubic ramus fx for which he has a C collar in place. Pt with congestion on exam, was mildly fluid overloaded. No electrolyte derangements. Nephrology consulted and pt S/p emergent HD on admission and thereafter has remained slightly hypervolemic and received extra HD treatments. Course complicated by code sepsis on 8/13/17, prior presumed infectious source was PC and pt was positive for coag neg bacteremia and was to complete course of abx on 9/2/17. Bcx sent and repeat remain negative. Pt empirically continues on renally adjusted vanco and zosyn was discontinued by ID. ICD site looked swollen and  CT chest performed which showed moderate R sided effusion for which nephrology will give an extra session of HD and fluid collection around ICD site for which cardiology/EP consulted for possible source of infection. Ongoing d/w the family for device extraction and PC extraction and replacement, possibly in the am. Neurosurgery and nephrology consulted for clearance purposes for OR. 87 M from [facility] with PMHx dementia, HTN, HLD, DM, chronic systolic HFrEF: 20-25% (July 2017), ICM s/p bi vent ICD (medtronic), BPH and ESRD on HD tu th sat, was d/c from [facility] on 8/2/17 after being tx for coag negative staph bacteremia with vancomycin during HD to end on 9/2/17; pt was sent to the hospital for emergent HD secondary to transportation issue, b/c last transport team dropped the pt and he obtained a c4 compression fx and pubic ramus fx for which he has a C collar in place. Pt with congestion on exam, was mildly fluid overloaded. No electrolyte derangements. Nephrology consulted and pt S/p emergent HD on admission and thereafter has remained slightly hypervolemic and received extra HD treatments. Course complicated by code sepsis on 8/13/17, prior presumed infectious source was PC and pt was positive for coag neg bacteremia and was to complete course of abx on 9/2/17. Bcx sent and repeat remain negative. Pt empirically continues on renally adjusted vanco and is being followed by ID. ICD site looked swollen and CT chest was performed which showed moderate R sided effusion for which nephrology will give an extra session of HD and fluid collection around ICD site for which cardiology/EP consulted for device extraction as a suspected source of infection. Pt for device extraction and PC extraction and replacement today. Neurosurgery and nephrology consulted for clearance purposes for OR.

## 2017-08-17 NOTE — PROGRESS NOTE ADULT - SUBJECTIVE AND OBJECTIVE BOX
NEPHROLOGY INTERVAL HPI/OVERNIGHT EVENTS:    Interim noted   For OR today   Removal of ICD and Permacath   uneventful HD yesterday   ID follow up noted   Vanco level today 16    MEDICATIONS  (STANDING):  epoetin una Injectable 51780 Unit(s) IV Push <User Schedule>  folic acid 1 milliGRAM(s) Oral daily  ferrous    sulfate 325 milliGRAM(s) Oral daily  insulin lispro (HumaLOG) corrective regimen sliding scale   SubCutaneous three times a day before meals  dextrose 5%. 1000 milliLiter(s) (50 mL/Hr) IV Continuous <Continuous>  dextrose 50% Injectable 12.5 Gram(s) IV Push once  dextrose 50% Injectable 25 Gram(s) IV Push once  dextrose 50% Injectable 25 Gram(s) IV Push once  midodrine 10 milliGRAM(s) Oral <User Schedule>  calcium acetate 667 milliGRAM(s) Oral three times a day with meals  tamsulosin 0.4 milliGRAM(s) Oral at bedtime  gabapentin 300 milliGRAM(s) Oral three times a day  vancomycin  IVPB 500 milliGRAM(s) IV Intermittent <User Schedule>  nystatin Powder 1 Application(s) Topical two times a day  metoprolol 25 milliGRAM(s) Oral every 12 hours  ergocalciferol 66141 Unit(s) Oral every week  calcitriol   Capsule 0.25 MICROGram(s) Oral daily    MEDICATIONS  (PRN):  dextrose Gel 1 Dose(s) Oral once PRN Blood Glucose LESS THAN 70 milliGRAM(s)/deciliter  glucagon  Injectable 1 milliGRAM(s) IntraMuscular once PRN Glucose LESS THAN 70 milligrams/deciliter  artificial  tears Solution 1 Drop(s) Both EYES three times a day PRN Dry Eyes  acetaminophen   Tablet. 650 milliGRAM(s) Oral every 6 hours PRN Mild Pain (1 - 3)  polyethylene glycol 3350 17 Gram(s) Oral daily PRN Constipation  acetaminophen   Tablet 650 milliGRAM(s) Oral every 6 hours PRN For Temp greater than 38 C (100.4 F)      Allergies    No Known Allergies    Intolerances    ALLERY AND IMMUNOLOGIC: No hives or eczema      Vital Signs Last 24 Hrs  T(C): 36.5 (17 Aug 2017 13:36), Max: 37.5 (16 Aug 2017 22:30)  T(F): 97.7 (17 Aug 2017 13:36), Max: 99.5 (16 Aug 2017 22:30)  HR: 82 (17 Aug 2017 13:36) (73 - 98)  BP: 110/60 (17 Aug 2017 13:36) (99/56 - 116/52)  BP(mean): --  RR: 16 (17 Aug 2017 13:36) (16 - 189)  SpO2: 97% (17 Aug 2017 04:20) (96% - 100%)  Daily     Daily Weight in k.3 (16 Aug 2017 15:55)  I&O's Detail    16 Aug 2017 07:01  -  17 Aug 2017 07:00  --------------------------------------------------------  IN:    Oral Fluid: 450 mL    Other: 900 mL  Total IN: 1350 mL    OUT:    Other: 2400 mL  Total OUT: 2400 mL    Total NET: -1050 mL        I&O's Summary    16 Aug 2017 07:01  -  17 Aug 2017 07:00  --------------------------------------------------------  IN: 1350 mL / OUT: 2400 mL / NET: -1050 mL        PHYSICAL EXAM:  HEENT - legally blind  Neck + PC , + Cervical collar   Chest   clear; diminished at bases  CV   no murmur  Abd   soft, NT BS+  Extr   No edema      LABS:                        9.7    6.1   )-----------( 92       ( 17 Aug 2017 07:34 )             32.7         138  |  98  |  11.0  ----------------------------<  77  4.4   |  25.0  |  3.02<H>    Ca    8.9      17 Aug 2017 07:34  Mg     2.0           PT/INR - ( 17 Aug 2017 07:34 )   PT: 13.7 sec;   INR: 1.24 ratio             Magnesium, Serum: 2.0 mg/dL ( @ 07:34)          RADIOLOGY & ADDITIONAL TESTS:

## 2017-08-17 NOTE — PROGRESS NOTE ADULT - PROBLEM SELECTOR PLAN 1
Swollen ICD site with fluid collection concern for possible source of infection along with PC.  -D/w EP/ID and vascular b/c PC and ICD are potential sources of infection will need extraction of ICD and PC. PC will also be replaced.   -prior coag negative bacteremia and at that time repeat Bcx were negative and currently cx from admission negative.   Ct chest shows fluid accumulation around ICD    -pt to be dialyzed today in preparation for OR tomorrow   -pt remains afebrile  -no leukocytosis   -ID f/u noted and appreciated   -empiric coverage with renally adjusted vanco post HD   -EP f/u noted and appreciated  -ID f/u noted and appreciated Swollen ICD site with fluid collection concern for possible source of infection along with PC.  -D/w EP/ID and vascular b/c PC and ICD are potential sources of infection will need extraction of ICD and PC. PC will also be replaced By the vascular sx team.   -prior coag negative bacteremia and at that time repeat Bcx were negative and currently cx from admission negative.   -Ct chest shows fluid accumulation around ICD site   -pt remains afebrile  -no leukocytosis   -ID f/u noted and appreciated   -empiric coverage with renally adjusted vanco post HD   -EP f/u noted and appreciated  -ID f/u noted and appreciated  -Neurosurgery clearance in the chart

## 2017-08-17 NOTE — CONSULT NOTE ADULT - SUBJECTIVE AND OBJECTIVE BOX
History of Present Illness:  HPI:  87 M from [facility] with PMHx dementia, HTN, HLD, DM, chronic systolic HFrEF:20-25% (July 2017), ICM s/p bi vent ICD (medtronic), BPH and ESRD on HD tu th sat, was d/c from [facility] 8/2/17 after being tx for coag negative staph bacteremia with vancomycin during HD to end on 9/2/17; pt was sent to the hospital for emergent HD. As per the nursing supervisor at [facility] last thursday the pt had an accident during transport to the HD facility and was dropped and obtained a C4 fracture and pt was placed in a c collar along with a right pubic ramus fx; currently was sent from the facility for emergent dialysis due to not having transport because the patient was dropped by previous transport and will need to have new transport set up. Pt missed his Thursday dialysis and is due for another dialysis (8/12) as per nursing supervision at [facility]. Patient is anuric. Patient states that he feels well, he wants to go back to the facility and is tired of this issue he is having in regards to the transportation. He is AAox2, very lethargic but is able to answer all questions appropriately. Patient denies chest pain, SOB, abd pain, N/V, fever, chills, headache or any other complaints. All remainder ROS negative. (12 Aug 2017 23:20)    Found to have ICD device pocket infection with local cellulitis. Patient is not pacemaker dependent. Will have extraction today by Dr Perry with CT surgery.       Past Medical History  Pubic ramus fracture, right, sequela  Compression fracture of C-spine: c4  Hyperlipidemia  ESRD on dialysis  Dementia  Muscle weakness (generalized)  Ventricular tachycardia  Diabetes mellitus  Renal failure  Hypertension      Past Surgical History  ICD (implantable cardioverter-defibrillator), biventricular, in situ  No significant past surgical history      MEDICATIONS  (STANDING):  epoetin una Injectable 25674 Unit(s) IV Push <User Schedule>  folic acid 1 milliGRAM(s) Oral daily  ferrous    sulfate 325 milliGRAM(s) Oral daily  insulin lispro (HumaLOG) corrective regimen sliding scale   SubCutaneous three times a day before meals  dextrose 5%. 1000 milliLiter(s) (50 mL/Hr) IV Continuous <Continuous>  dextrose 50% Injectable 12.5 Gram(s) IV Push once  dextrose 50% Injectable 25 Gram(s) IV Push once  dextrose 50% Injectable 25 Gram(s) IV Push once  midodrine 10 milliGRAM(s) Oral <User Schedule>  calcium acetate 667 milliGRAM(s) Oral three times a day with meals  tamsulosin 0.4 milliGRAM(s) Oral at bedtime  gabapentin 300 milliGRAM(s) Oral three times a day  vancomycin  IVPB 500 milliGRAM(s) IV Intermittent <User Schedule>  nystatin Powder 1 Application(s) Topical two times a day  metoprolol 25 milliGRAM(s) Oral every 12 hours  ergocalciferol 70038 Unit(s) Oral every week  calcitriol   Capsule 0.25 MICROGram(s) Oral daily    MEDICATIONS  (PRN):  dextrose Gel 1 Dose(s) Oral once PRN Blood Glucose LESS THAN 70 milliGRAM(s)/deciliter  glucagon  Injectable 1 milliGRAM(s) IntraMuscular once PRN Glucose LESS THAN 70 milligrams/deciliter  artificial  tears Solution 1 Drop(s) Both EYES three times a day PRN Dry Eyes  acetaminophen   Tablet. 650 milliGRAM(s) Oral every 6 hours PRN Mild Pain (1 - 3)  polyethylene glycol 3350 17 Gram(s) Oral daily PRN Constipation  acetaminophen   Tablet 650 milliGRAM(s) Oral every 6 hours PRN For Temp greater than 38 C (100.4 F)    Allergies: No Known Allergies      SOCIAL HISTORY:  Smoker: [x ] Yes  [ ] No        PACK YEARS:  Former                       WHEN QUIT?  ETOH use: [ ] Yes  [x ] No              FREQUENCY / QUANTITY:  Ilicit Drug use:  [ ] Yes  [ x] No       Relevant Family History  FAMILY HISTORY:  No pertinent family history in first degree relatives      Review of Systems  GENERAL:  Fevers[] chills[] sweats[] fatigue[] weight loss[] weight gain []                                      [x ] NEGATIVE  NEURO:  parathesias[] seizures []  syncope []  confusion []                                                                [x ] NEGATIVE                 EYES: glasses[]  blurry vision[]  discharge[] pain[] glaucoma []                                                           [x ] NEGATIVE                 ENMT:  difficulty hearing []  vertigo[]  dysphagia[] epistaxis[] recent dental work []                     [x ] NEGATIVE                 CV:  chest pain[] palpitations[] KAY [] diaphoresis [] edema[]                                                           [x ] NEGATIVE                                 RESPIRATORY:  wheezing[] SOB[] cough [] sputum[] hemoptysis[]                                                   [x ] NEGATIVE               GI:  nausea[]  vomiting []  diarrhea[] constipation [] melena []                                                          [x ] NEGATIVE            : hematuria[ ]  dysuria[ ] urgency[] incontinence[]                                                                          [x ] NEGATIVE                    MUSKULOSKELETAL:  arthritis[ ]  joint swelling [ ] muscle weakness [ ]     cervical fx                                       [ ] NEGATIVE                     SKIN/BREAST:  rash[ ] itching [ ]  hair loss[ ] masses[ ]                                                                          [x ] NEGATIVE                     PSYCH:  dementia [ ] depresion [ ] anxiety[ ]                                                                                          [x ] NEGATIVE                        HEME/LYMPH:  bruises easily[ ] enlarged lymph nodes[ ] tender lymph nodes[ ]                           [x ] NEGATIVE                      ENDOCRINE:  cold intolerance[ ] heat intolerance[ ] polydipsia[ ]                                                      [ x] NEGATIVE                        PHYSICAL EXAM  Vital Signs Last 24 Hrs  T(C): 37.2 (17 Aug 2017 04:20), Max: 37.5 (16 Aug 2017 22:30)  T(F): 99 (17 Aug 2017 04:20), Max: 99.5 (16 Aug 2017 22:30)  HR: 86 (17 Aug 2017 08:18) (73 - 98)  BP: 101/58 (17 Aug 2017 08:18) (99/56 - 117/68)  BP(mean): --  RR: 16 (17 Aug 2017 08:18) (14 - 189)  SpO2: 97% (17 Aug 2017 04:20) (96% - 100%)    General: Well nourished, well developed, no acute distress.                                                         Neuro: Normal exam oriented to person/place & time with no focal motor or sensory  deficits.                    Eyes: Normal exam of conjunctiva & lids, pupils equally reactive.   ENT: Normal exam of nasal/oral mucosa with absence of cyanosis.   Neck: Normal exam of jugular veins, trachea & thyroid.   Chest: Normal lung exam with good air movement absence of wheezes, rales, or rhonchi:       erythema, fluctuance and tenderness around pacemaker site left anterior chest wall                                                                   CV:  Auscultation: normal [ x] S3[ ] S4[ ] Irregular [ ] Rub[ ] Clicks[ ]  Murmurs none:[x ]systolic [ ]  diastolic [ ] holosystolic [ ]                                                               GI: Normal exam of abdomen, liver & spleen with no noted masses or tenderness.                                                                                              Extremities: Normal no evidence of cyanosis or deformity Edema: none[x ]trace[ ]1+[ ]2+[ ]3+[ ]4+[ ]  Lower Extremity Pulses: Right[+ ] Left[+ ]Varicosities[ ]  SKIN : Normal exam to inspection & palpation.                                                           LABS:                        9.7    6.1   )-----------( 92       ( 17 Aug 2017 07:34 )             32.7     08-17    138  |  98  |  11.0  ----------------------------<  77  4.4   |  25.0  |  3.02<H>    Ca    8.9      17 Aug 2017 07:34  Mg     2.0     08-17      PT/INR - ( 17 Aug 2017 07:34 )   PT: 13.7 sec;   INR: 1.24 ratio

## 2017-08-17 NOTE — CONSULT NOTE ADULT - ASSESSMENT
87M admitted 8/12 for emergent dialysis from [facility], s/p recent C4 compression and pubic ramus fx with c-collar required, patient was also on antibiotics for a positive blood culture of coag negative staph from that admission. Code sepsis called on 8/13 and Chest CT revealed collection around AICD generator pocket.

## 2017-08-17 NOTE — PROGRESS NOTE ADULT - PROBLEM SELECTOR PLAN 6
HFrEF:20-25% NICM with biv ICD  - Fluid status slow improvement  -ct chest findings with moderate R pleural effusion and receiving HD today as well   -pt had beats of v tach on telemetry but has ICD in place   - not on ace inhibitor given renal function and hypotension   -c/w metoprolol 25mg po bid with parameters   -cardiology f/u noted and appreciated HFrEF:20-25% NICM with biv ICD which will be removed today secondary to suspected infectious source   - Fluid status with slow improvement with HD   -ct chest findings with moderate R pleural effusion   -Pt received HD extra session yest but will not be dialyzed today  -Pt had NSVT on admission will d/w EP once device removed on how to further manage arrhythmias   - not on ace inhibitor given renal function and hypotension   -c/w metoprolol 25mg po bid with parameters   -cardiology f/u noted and appreciated

## 2017-08-18 DIAGNOSIS — E11.22 TYPE 2 DIABETES MELLITUS WITH DIABETIC CHRONIC KIDNEY DISEASE: ICD-10-CM

## 2017-08-18 DIAGNOSIS — F03.90 UNSPECIFIED DEMENTIA, UNSPECIFIED SEVERITY, WITHOUT BEHAVIORAL DISTURBANCE, PSYCHOTIC DISTURBANCE, MOOD DISTURBANCE, AND ANXIETY: ICD-10-CM

## 2017-08-18 DIAGNOSIS — T82.7XXD INFECTION AND INFLAMMATORY REACTION DUE TO OTHER CARDIAC AND VASCULAR DEVICES, IMPLANTS AND GRAFTS, SUBSEQUENT ENCOUNTER: ICD-10-CM

## 2017-08-18 DIAGNOSIS — M48.52XA COLLAPSED VERTEBRA, NOT ELSEWHERE CLASSIFIED, CERVICAL REGION, INITIAL ENCOUNTER FOR FRACTURE: ICD-10-CM

## 2017-08-18 LAB
ALBUMIN SERPL ELPH-MCNC: 3.7 G/DL — SIGNIFICANT CHANGE UP (ref 3.3–5.2)
ALP SERPL-CCNC: 67 U/L — SIGNIFICANT CHANGE UP (ref 40–120)
ALT FLD-CCNC: 8 U/L — SIGNIFICANT CHANGE UP
ANION GAP SERPL CALC-SCNC: 18 MMOL/L — HIGH (ref 5–17)
APTT BLD: 32 SEC — SIGNIFICANT CHANGE UP (ref 27.5–37.4)
AST SERPL-CCNC: 13 U/L — SIGNIFICANT CHANGE UP
BASOPHILS # BLD AUTO: 0 K/UL — SIGNIFICANT CHANGE UP (ref 0–0.2)
BASOPHILS NFR BLD AUTO: 0.1 % — SIGNIFICANT CHANGE UP (ref 0–2)
BILIRUB DIRECT SERPL-MCNC: 0.2 MG/DL — SIGNIFICANT CHANGE UP (ref 0–0.3)
BILIRUB INDIRECT FLD-MCNC: 0.2 MG/DL — SIGNIFICANT CHANGE UP (ref 0.2–1)
BILIRUB SERPL-MCNC: 0.4 MG/DL — SIGNIFICANT CHANGE UP (ref 0.4–2)
BUN SERPL-MCNC: 18 MG/DL — SIGNIFICANT CHANGE UP (ref 8–20)
CALCIUM SERPL-MCNC: 8.5 MG/DL — LOW (ref 8.6–10.2)
CHLORIDE SERPL-SCNC: 99 MMOL/L — SIGNIFICANT CHANGE UP (ref 98–107)
CO2 SERPL-SCNC: 22 MMOL/L — SIGNIFICANT CHANGE UP (ref 22–29)
CREAT SERPL-MCNC: 4.05 MG/DL — HIGH (ref 0.5–1.3)
CULTURE RESULTS: SIGNIFICANT CHANGE UP
CULTURE RESULTS: SIGNIFICANT CHANGE UP
EOSINOPHIL # BLD AUTO: 0.1 K/UL — SIGNIFICANT CHANGE UP (ref 0–0.5)
EOSINOPHIL NFR BLD AUTO: 0.8 % — SIGNIFICANT CHANGE UP (ref 0–5)
GAS PNL BLDA: SIGNIFICANT CHANGE UP
GAS PNL BLDA: SIGNIFICANT CHANGE UP
GLUCOSE SERPL-MCNC: 186 MG/DL — HIGH (ref 70–115)
GRAM STN FLD: SIGNIFICANT CHANGE UP
HCT VFR BLD CALC: 24.6 % — LOW (ref 42–52)
HCT VFR BLD CALC: 28.5 % — LOW (ref 42–52)
HGB BLD-MCNC: 7.4 G/DL — LOW (ref 14–18)
HGB BLD-MCNC: 8.4 G/DL — LOW (ref 14–18)
INR BLD: 1.28 RATIO — HIGH (ref 0.88–1.16)
LYMPHOCYTES # BLD AUTO: 1.1 K/UL — SIGNIFICANT CHANGE UP (ref 1–4.8)
LYMPHOCYTES # BLD AUTO: 14.5 % — LOW (ref 20–55)
MAGNESIUM SERPL-MCNC: 1.9 MG/DL — SIGNIFICANT CHANGE UP (ref 1.6–2.6)
MCHC RBC-ENTMCNC: 25.8 PG — LOW (ref 27–31)
MCHC RBC-ENTMCNC: 26.2 PG — LOW (ref 27–31)
MCHC RBC-ENTMCNC: 29.5 G/DL — LOW (ref 32–36)
MCHC RBC-ENTMCNC: 30.1 G/DL — LOW (ref 32–36)
MCV RBC AUTO: 87.2 FL — SIGNIFICANT CHANGE UP (ref 80–94)
MCV RBC AUTO: 87.7 FL — SIGNIFICANT CHANGE UP (ref 80–94)
MONOCYTES # BLD AUTO: 0.7 K/UL — SIGNIFICANT CHANGE UP (ref 0–0.8)
MONOCYTES NFR BLD AUTO: 9.5 % — SIGNIFICANT CHANGE UP (ref 3–10)
NEUTROPHILS # BLD AUTO: 5.4 K/UL — SIGNIFICANT CHANGE UP (ref 1.8–8)
NEUTROPHILS NFR BLD AUTO: 74.7 % — HIGH (ref 37–73)
PHOSPHATE SERPL-MCNC: 3.4 MG/DL — SIGNIFICANT CHANGE UP (ref 2.4–4.7)
PLATELET # BLD AUTO: 70 K/UL — LOW (ref 150–400)
PLATELET # BLD AUTO: 73 K/UL — LOW (ref 150–400)
POTASSIUM SERPL-MCNC: 4.6 MMOL/L — SIGNIFICANT CHANGE UP (ref 3.5–5.3)
POTASSIUM SERPL-SCNC: 4.6 MMOL/L — SIGNIFICANT CHANGE UP (ref 3.5–5.3)
PROT SERPL-MCNC: 7 G/DL — SIGNIFICANT CHANGE UP (ref 6.6–8.7)
PROTHROM AB SERPL-ACNC: 14.1 SEC — HIGH (ref 9.8–12.7)
RBC # BLD: 2.82 M/UL — LOW (ref 4.6–6.2)
RBC # BLD: 3.25 M/UL — LOW (ref 4.6–6.2)
RBC # FLD: 16.9 % — HIGH (ref 11–15.6)
RBC # FLD: 17 % — HIGH (ref 11–15.6)
SODIUM SERPL-SCNC: 139 MMOL/L — SIGNIFICANT CHANGE UP (ref 135–145)
SPECIMEN SOURCE: SIGNIFICANT CHANGE UP
VANCOMYCIN TROUGH SERPL-MCNC: 14 UG/ML — SIGNIFICANT CHANGE UP (ref 10–20)
WBC # BLD: 7 K/UL — SIGNIFICANT CHANGE UP (ref 4.8–10.8)
WBC # BLD: 7.3 K/UL — SIGNIFICANT CHANGE UP (ref 4.8–10.8)
WBC # FLD AUTO: 7 K/UL — SIGNIFICANT CHANGE UP (ref 4.8–10.8)
WBC # FLD AUTO: 7.3 K/UL — SIGNIFICANT CHANGE UP (ref 4.8–10.8)

## 2017-08-18 PROCEDURE — 99233 SBSQ HOSP IP/OBS HIGH 50: CPT

## 2017-08-18 PROCEDURE — 93010 ELECTROCARDIOGRAM REPORT: CPT

## 2017-08-18 PROCEDURE — 71010: CPT | Mod: 26

## 2017-08-18 PROCEDURE — 93308 TTE F-UP OR LMTD: CPT | Mod: 26

## 2017-08-18 RX ORDER — MIDODRINE HYDROCHLORIDE 2.5 MG/1
10 TABLET ORAL THREE TIMES A DAY
Qty: 0 | Refills: 0 | Status: DISCONTINUED | OUTPATIENT
Start: 2017-08-18 | End: 2017-08-24

## 2017-08-18 RX ORDER — CALCITRIOL 0.5 UG/1
0.25 CAPSULE ORAL DAILY
Qty: 0 | Refills: 0 | Status: DISCONTINUED | OUTPATIENT
Start: 2017-08-18 | End: 2017-08-24

## 2017-08-18 RX ORDER — DEXTROSE 50 % IN WATER 50 %
25 SYRINGE (ML) INTRAVENOUS ONCE
Qty: 0 | Refills: 0 | Status: DISCONTINUED | OUTPATIENT
Start: 2017-08-18 | End: 2017-08-24

## 2017-08-18 RX ORDER — ERYTHROPOIETIN 10000 [IU]/ML
10000 INJECTION, SOLUTION INTRAVENOUS; SUBCUTANEOUS
Qty: 0 | Refills: 0 | Status: DISCONTINUED | OUTPATIENT
Start: 2017-08-18 | End: 2017-08-24

## 2017-08-18 RX ORDER — DEXTROSE 50 % IN WATER 50 %
1 SYRINGE (ML) INTRAVENOUS ONCE
Qty: 0 | Refills: 0 | Status: DISCONTINUED | OUTPATIENT
Start: 2017-08-18 | End: 2017-08-24

## 2017-08-18 RX ORDER — ALBUMIN HUMAN 25 %
250 VIAL (ML) INTRAVENOUS ONCE
Qty: 0 | Refills: 0 | Status: COMPLETED | OUTPATIENT
Start: 2017-08-18 | End: 2017-08-18

## 2017-08-18 RX ORDER — POLYETHYLENE GLYCOL 3350 17 G/17G
17 POWDER, FOR SOLUTION ORAL DAILY
Qty: 0 | Refills: 0 | Status: DISCONTINUED | OUTPATIENT
Start: 2017-08-18 | End: 2017-08-20

## 2017-08-18 RX ORDER — ACETAMINOPHEN 500 MG
1000 TABLET ORAL ONCE
Qty: 0 | Refills: 0 | Status: COMPLETED | OUTPATIENT
Start: 2017-08-18 | End: 2017-08-18

## 2017-08-18 RX ORDER — FENTANYL CITRATE 50 UG/ML
25 INJECTION INTRAVENOUS
Qty: 0 | Refills: 0 | Status: DISCONTINUED | OUTPATIENT
Start: 2017-08-18 | End: 2017-08-21

## 2017-08-18 RX ORDER — VANCOMYCIN HCL 1 G
750 VIAL (EA) INTRAVENOUS ONCE
Qty: 0 | Refills: 0 | Status: COMPLETED | OUTPATIENT
Start: 2017-08-18 | End: 2017-08-18

## 2017-08-18 RX ORDER — METOPROLOL TARTRATE 50 MG
25 TABLET ORAL
Qty: 0 | Refills: 0 | Status: DISCONTINUED | OUTPATIENT
Start: 2017-08-18 | End: 2017-08-18

## 2017-08-18 RX ORDER — DEXTROSE 50 % IN WATER 50 %
12.5 SYRINGE (ML) INTRAVENOUS ONCE
Qty: 0 | Refills: 0 | Status: DISCONTINUED | OUTPATIENT
Start: 2017-08-18 | End: 2017-08-24

## 2017-08-18 RX ORDER — ERGOCALCIFEROL 1.25 MG/1
50000 CAPSULE ORAL
Qty: 0 | Refills: 0 | Status: DISCONTINUED | OUTPATIENT
Start: 2017-08-18 | End: 2017-08-24

## 2017-08-18 RX ORDER — SODIUM CHLORIDE 9 MG/ML
1000 INJECTION, SOLUTION INTRAVENOUS
Qty: 0 | Refills: 0 | Status: DISCONTINUED | OUTPATIENT
Start: 2017-08-18 | End: 2017-08-24

## 2017-08-18 RX ORDER — NOREPINEPHRINE BITARTRATE/D5W 8 MG/250ML
0.01 PLASTIC BAG, INJECTION (ML) INTRAVENOUS
Qty: 8 | Refills: 0 | Status: DISCONTINUED | OUTPATIENT
Start: 2017-08-18 | End: 2017-08-22

## 2017-08-18 RX ORDER — VANCOMYCIN HCL 1 G
500 VIAL (EA) INTRAVENOUS ONCE
Qty: 0 | Refills: 0 | Status: DISCONTINUED | OUTPATIENT
Start: 2017-08-18 | End: 2017-08-18

## 2017-08-18 RX ORDER — GLUCAGON INJECTION, SOLUTION 0.5 MG/.1ML
1 INJECTION, SOLUTION SUBCUTANEOUS ONCE
Qty: 0 | Refills: 0 | Status: DISCONTINUED | OUTPATIENT
Start: 2017-08-18 | End: 2017-08-24

## 2017-08-18 RX ORDER — ACETAMINOPHEN 500 MG
650 TABLET ORAL EVERY 6 HOURS
Qty: 0 | Refills: 0 | Status: DISCONTINUED | OUTPATIENT
Start: 2017-08-18 | End: 2017-08-24

## 2017-08-18 RX ORDER — INSULIN LISPRO 100/ML
VIAL (ML) SUBCUTANEOUS
Qty: 0 | Refills: 0 | Status: DISCONTINUED | OUTPATIENT
Start: 2017-08-18 | End: 2017-08-24

## 2017-08-18 RX ADMIN — Medication 667 MILLIGRAM(S): at 13:37

## 2017-08-18 RX ADMIN — GABAPENTIN 300 MILLIGRAM(S): 400 CAPSULE ORAL at 13:38

## 2017-08-18 RX ADMIN — MIDODRINE HYDROCHLORIDE 10 MILLIGRAM(S): 2.5 TABLET ORAL at 21:30

## 2017-08-18 RX ADMIN — Medication 1 DROP(S): at 21:30

## 2017-08-18 RX ADMIN — GABAPENTIN 300 MILLIGRAM(S): 400 CAPSULE ORAL at 21:30

## 2017-08-18 RX ADMIN — TAMSULOSIN HYDROCHLORIDE 0.4 MILLIGRAM(S): 0.4 CAPSULE ORAL at 21:30

## 2017-08-18 RX ADMIN — Medication 667 MILLIGRAM(S): at 21:30

## 2017-08-18 RX ADMIN — Medication 125 MILLILITER(S): at 12:15

## 2017-08-18 RX ADMIN — MIDODRINE HYDROCHLORIDE 10 MILLIGRAM(S): 2.5 TABLET ORAL at 17:58

## 2017-08-18 RX ADMIN — Medication 1 MILLIGRAM(S): at 13:37

## 2017-08-18 RX ADMIN — FENTANYL CITRATE 25 MICROGRAM(S): 50 INJECTION INTRAVENOUS at 19:39

## 2017-08-18 RX ADMIN — Medication 400 MILLIGRAM(S): at 09:20

## 2017-08-18 RX ADMIN — Medication 125 MILLILITER(S): at 05:32

## 2017-08-18 RX ADMIN — ERGOCALCIFEROL 50000 UNIT(S): 1.25 CAPSULE ORAL at 13:36

## 2017-08-18 RX ADMIN — FENTANYL CITRATE 25 MICROGRAM(S): 50 INJECTION INTRAVENOUS at 18:39

## 2017-08-18 RX ADMIN — Medication 325 MILLIGRAM(S): at 13:37

## 2017-08-18 RX ADMIN — Medication 325 MILLIGRAM(S): at 17:58

## 2017-08-18 RX ADMIN — Medication 125 MILLILITER(S): at 11:24

## 2017-08-18 RX ADMIN — Medication 1000 MILLIGRAM(S): at 09:35

## 2017-08-18 RX ADMIN — Medication 125 MILLILITER(S): at 00:10

## 2017-08-18 RX ADMIN — Medication 1 TABLET(S): at 13:38

## 2017-08-18 RX ADMIN — Medication 667 MILLIGRAM(S): at 17:58

## 2017-08-18 RX ADMIN — Medication: at 13:20

## 2017-08-18 RX ADMIN — Medication 1 DROP(S): at 05:03

## 2017-08-18 RX ADMIN — PANTOPRAZOLE SODIUM 40 MILLIGRAM(S): 20 TABLET, DELAYED RELEASE ORAL at 14:20

## 2017-08-18 RX ADMIN — Medication 150 MILLIGRAM(S): at 17:31

## 2017-08-18 RX ADMIN — Medication 1 APPLICATION(S): at 21:44

## 2017-08-18 RX ADMIN — Medication 100 MILLIGRAM(S): at 21:30

## 2017-08-18 RX ADMIN — MIDODRINE HYDROCHLORIDE 10 MILLIGRAM(S): 2.5 TABLET ORAL at 13:37

## 2017-08-18 RX ADMIN — ERYTHROPOIETIN 10000 UNIT(S): 10000 INJECTION, SOLUTION INTRAVENOUS; SUBCUTANEOUS at 12:55

## 2017-08-18 RX ADMIN — CALCITRIOL 0.25 MICROGRAM(S): 0.5 CAPSULE ORAL at 13:36

## 2017-08-18 RX ADMIN — Medication 100 MILLIGRAM(S): at 13:36

## 2017-08-18 NOTE — PROGRESS NOTE ADULT - ASSESSMENT
88 yo male with pmhxdementia, ESRD on HD via R permacath (Dr Pike-4/2017) on T TH Sat(Trish), hypotension, HLD, DM, CAD/CABG 2012, ICM, HFrEF ~25% s/p MDT BIVICD ([facility]/1/6/2015), recent C4 and pubic Fx secondary to an accident/fall with transportation to HD. Pt was transferred from [facility] facility for urgent HD 8/12/17 and was a code sepsis night of admission. He was recently hospitalized at [facility] with coag neg bacteremia 7/23/17-8/2/17, being treated with IV vanco that was to be continued through 9/2/17. It is unclear if vanco was received post discharge. Pt underwent  LC AICD extraction, permatcath removal on 8/17 w/ Dr. Anderson.  Pt currently w/ mild hypercapnea requiring non invasive.

## 2017-08-18 NOTE — PROVIDER CONTACT NOTE (OTHER) - SITUATION
Pt chart reviewed, contents noted, pt transferred to CICU, please enter new orders when deemed appropriate by CICU team, P.T. to sign off pending receipt of new orders.

## 2017-08-18 NOTE — PROGRESS NOTE ADULT - SUBJECTIVE AND OBJECTIVE BOX
EMILY LITTLEJOHN is a 87y Male with HPI:   87 M from [facility] with PMHx dementia, HTN, HLD, DM, chronic systolic HFrEF:20-25% (July 2017), ICM s/p bi vent ICD (medtronic), BPH and ESRD on HD tu th sat, was d/c from [facility] ON 8/2/17 after being tx for coag negative staph bacteremia with vancomycin during HD to end on 9/2/17; pt was sent to the hospital for emergent HD. As per the nursing supervisor at [facility] last thursday the pt had an accident during transport to the HD facility and was dropped and obtained a c4 fracture and pt was placed in a c collar along with a right pubic ramus fx; currently was sent from the facility for emergent dialysis due to not having transport because the patient was dropped by previous transport and will need to have new transport set up. Pt missed his Thursday dialysis and is due for another dialysis today as per nursing supervision at [facility]. Patient is anuric. Patient states that he feels well, he wants to go back to the facility and is tired of this issue he is having in regards to the transportation. He is AAox2, very lethargic but is able to answer all questions appropriately. Patient denies chest pain, SOB, abd pain, N/V, fever, chills, headache or any other complaints. All remainder ROS negative.    PT WAS RECENTLY HOSPITALIZED  AND PLAN WAS TO CONTINUE   IV VANCO AT  DIALYSIS BUT IS UNCLEAR IF RECEIVED VANCO  REPEAT BLOOD CX  SO FAR NEG    [facility] AREA OF POSSIBLE COLLECTION NEAR PACEMAKER  S/P EXTRACTION AND REMOVAL OF PERMACATH      Allergies:  No Known Allergies      Medications:  epoetin una Injectable 80441 Unit(s) IV Push <User Schedule>  folic acid 1 milliGRAM(s) Oral daily  ferrous    sulfate 325 milliGRAM(s) Oral daily  insulin lispro (HumaLOG) corrective regimen sliding scale   SubCutaneous three times a day before meals  dextrose 5%. 1000 milliLiter(s) IV Continuous <Continuous>  dextrose Gel 1 Dose(s) Oral once PRN  dextrose 50% Injectable 12.5 Gram(s) IV Push once  dextrose 50% Injectable 25 Gram(s) IV Push once  dextrose 50% Injectable 25 Gram(s) IV Push once  glucagon  Injectable 1 milliGRAM(s) IntraMuscular once PRN  artificial  tears Solution 1 Drop(s) Both EYES three times a day PRN  midodrine 10 milliGRAM(s) Oral <User Schedule>  acetaminophen   Tablet. 650 milliGRAM(s) Oral every 6 hours PRN  polyethylene glycol 3350 17 Gram(s) Oral daily PRN  calcium acetate 667 milliGRAM(s) Oral three times a day with meals  tamsulosin 0.4 milliGRAM(s) Oral at bedtime  gabapentin 300 milliGRAM(s) Oral three times a day  vancomycin  IVPB 500 milliGRAM(s) IV Intermittent <User Schedule>  nystatin Powder 1 Application(s) Topical two times a day  acetaminophen   Tablet 650 milliGRAM(s) Oral every 6 hours PRN  metoprolol 25 milliGRAM(s) Oral every 12 hours  ergocalciferol 12691 Unit(s) Oral every week  calcitriol   Capsule 0.25 MICROGram(s) Oral daily      ANTIBIOTICS: VANCO        Review of Systems: - Negative except as mentioned above     Physical Exam:  IVital Signs Last 24 Hrs   Vital Signs Last 24 Hrs  T(C): 37.2 (18 Aug 2017 06:00), Max: 37.5 (18 Aug 2017 03:00)  T(F): 99 (18 Aug 2017 06:00), Max: 99.5 (18 Aug 2017 03:00)  HR: 118 (18 Aug 2017 09:00) (82 - 119)  BP: 102/45 (17 Aug 2017 19:00) (102/45 - 110/60)  BP(mean): 60 (17 Aug 2017 19:00) (60 - 60)  RR: 19 (18 Aug 2017 09:00) (11 - 21)  SpO2: 100% (18 Aug 2017 09:00) (94% - 100%)  GEN: NAD, pleasant  HEENT: normocephalic and atraumatic. EOMI. LUCY...  NECK: Supple. No carotid bruits.  No lymphadenopathy or thyromegaly.  LUNGS: Clear to auscultation.  HEART: Regular rate and rhythm without murmur. LEFT CHEST WALL PACEMAKER AREA DRESSING PLACE  ABDOMEN: Soft, nontender, and nondistended.  Positive bowel sounds.  No hepatosplenomegaly was noted.  NO REBOUND NO GUARDING  EXTREMITIES: Without any cyanosis, clubbing, rash, lesions or edema.  NEUROLOGIC: Cranial nerves II through XII are grossly intact.    SKIN: No ulceration or induration present.      Labs:                          8.4    7.3   )-----------( 73       ( 18 Aug 2017 03:25 )             28.5         L                      8.4    7.3   )-----------( 73       ( 18 Aug 2017 03:25 )             28.5    08-18    139  |  99  |  18.0  ----------------------------<  186<H>  4.6   |  22.0  |  4.05<H>    Ca    8.5<L>      18 Aug 2017 03:25  Phos  3.4     08-18  Mg     1.9     08-18    TPro  7.0  /  Alb  3.7  /  TBili  0.4  /  DBili  0.2  /  AST  13  /  ALT  8   /  AlkPhos  67  08-18

## 2017-08-18 NOTE — PROGRESS NOTE ADULT - SUBJECTIVE AND OBJECTIVE BOX
POST ANESTHESIA EVALUATION    87y Male POSTOP DAY 1 S/P LASER LEAD EXTRACTION, REMOVAL OF DIALYSIS CATHETER    MENTAL STATUS: Patient participation [ x ] Awake     [  ] Arousable     [  ] Sedated    AIRWAY PATENCY: [ x ] Satisfactory  [  ] Other:     Vital Signs Last 24 Hrs  T(C): 37.2 (18 Aug 2017 06:00), Max: 37.5 (18 Aug 2017 03:00)  T(F): 99 (18 Aug 2017 06:00), Max: 99.5 (18 Aug 2017 03:00)  HR: 119 (18 Aug 2017 07:49) (82 - 119)  BP: 102/45 (17 Aug 2017 19:00) (102/45 - 110/60)  BP(mean): 60 (17 Aug 2017 19:00) (60 - 60)  RR: 20 (18 Aug 2017 07:49) (11 - 20)  SpO2: 100% (18 Aug 2017 07:49) (94% - 100%)  I&O's Summary    17 Aug 2017 07:01  -  18 Aug 2017 07:00  --------------------------------------------------------  IN: 916 mL / OUT: 0 mL / NET: 916 mL          NAUSEA/ VOMITTING:  [ x ] NONE  [  ] CONTROLLED [  ] OTHER     PAIN: [x  ] CONTROLLED WITH CURRENT REGIMEN  [  ] OTHER    [x  ] NO APPARENT ANESTHESIA COMPLICATIONS      Comments:  Pt extubated and still in bed with cervical collar in place.  No complaints or residual anesthetic issues or complications noted.

## 2017-08-18 NOTE — PROGRESS NOTE ADULT - SUBJECTIVE AND OBJECTIVE BOX
Pt seen and examined in CTICU. POD#1 s/p MDT BIVICD system extraction and Right permacath removal.  Chart reviewed, pt with hypotension this am, responded to IV fluid and pressor support.  Currently tolerating bedside HD. s/p 1 unit PRBCs this am.    HPI:A/P: 88 yo male with pmhx parkinsons dementia, ESRD on HD via R permacath (Dr Pike-4/2017) on T TH Sat(Trish), hypotension, HLD, DM, CAD/CABG 2012, ICM, HFrEF ~25% s/p MDT BIVICD ([facility]/1/6/2015), recent C4 and pubic Fx secondary to an accident/fall with transportation to HD. Pt was transferred from [facility] facility for urgent HD 8/12/17 and was a code sepsis night of admission. He was recently hospitalized at [facility] with coag neg bacteremia 7/23/17-8/2/17, being treated with IV vanco that was to be continued through 9/2/17. It is unclear if vanco was received post discharge. EP SVC consulted for evaluation of BIV ICD pocket infection. Chest Ct with small subcutaneous collection adjacent to generator measuring 4.1 x 3.0cm. Blood Cultures negative so far on this admission, but pt has been on IV ABX. Currently being treated with IV vanco.    MEDICATIONS  (STANDING):  sodium chloride 0.9%. 1000 milliLiter(s) (5 mL/Hr) IV Continuous <Continuous>  pantoprazole  Injectable 40 milliGRAM(s) IV Push daily  docusate sodium 100 milliGRAM(s) Oral three times a day  tamsulosin 0.4 milliGRAM(s) Oral at bedtime  gabapentin 300 milliGRAM(s) Oral three times a day  ferrous    sulfate 325 milliGRAM(s) Oral three times a day with meals  polyethylene glycol 3350 17 Gram(s) Oral at bedtime  erythromycin   Ointment 1 Application(s) Right EYE at bedtime  artificial  tears Solution 1 Drop(s) Both EYES two times a day  calcium acetate 667 milliGRAM(s) Oral four times a day with meals  folic acid 1 milliGRAM(s) Oral daily  Nephro-viviana 1 Tablet(s) Oral daily  norepinephrine Infusion 0.008 MICROgram(s)/kG/Min (1 mL/Hr) IV Continuous <Continuous>  insulin lispro (HumaLOG) corrective regimen sliding scale   SubCutaneous Before meals and at bedtime  dextrose 5%. 1000 milliLiter(s) (50 mL/Hr) IV Continuous <Continuous>  dextrose 50% Injectable 12.5 Gram(s) IV Push once  dextrose 50% Injectable 25 Gram(s) IV Push once  dextrose 50% Injectable 25 Gram(s) IV Push once  vancomycin  IVPB 750 milliGRAM(s) IV Intermittent once  ergocalciferol 26846 Unit(s) Oral every week  calcitriol   Capsule 0.25 MICROGram(s) Oral daily  midodrine 10 milliGRAM(s) Oral three times a day  epoetin una Injectable 45078 Unit(s) IV Push <User Schedule    Vital Signs Last 24 Hrs  T(C): 36.9 (18 Aug 2017 12:35), Max: 37.5 (18 Aug 2017 03:00)  T(F): 98.4 (18 Aug 2017 12:35), Max: 99.5 (18 Aug 2017 03:00)  HR: 103 (18 Aug 2017 12:35) (82 - 119)  BP: 81/47 (18 Aug 2017 10:00) (81/47 - 110/60)  BP(mean): 56 (18 Aug 2017 10:00) (56 - 60)  RR: 17 (18 Aug 2017 12:35) (10 - 21)  SpO2: 100% (18 Aug 2017 12:35) (94% - 100%)    Physical Exam:  Constitutional: NAD, undergoing bedside HD, awake  Cardiovascular: +S1S2 RRR  Pulmonary: CTA b/l, unlabored  LACW: dressing with serosanguinous fluid, +drain tubing, no gross bleeding, no hematoma +2radial pulse  GI: soft NTND +BS  Groin sheaths in place, stable without hematoma  Neuro: non focal, OROZCO x4    LABS:                        7.4    7.0   )-----------( 70       ( 18 Aug 2017 09:50 )             24.6     08-18    139  |  99  |  18.0  ----------------------------<  186<H>  4.6   |  22.0  |  4.05<H>    Ca    8.5<L>      18 Aug 2017 03:25  Phos  3.4     08-18  Mg     1.9     08-18    TPro  7.0  /  Alb  3.7  /  TBili  0.4  /  DBili  0.2  /  AST  13  /  ALT  8   /  AlkPhos  67  08-18    PT/INR - ( 18 Aug 2017 04:30 )   PT: 14.1 sec;   INR: 1.28 ratio         PTT - ( 18 Aug 2017 04:30 )  PTT:32.0 sec      A/P: 88 yo male with extensive medical hx POD#1 s/p MDT BIV ICD system extraction and right permacath secondary to suspected pocket infection, persistent infection with prior Gram Positive bacteremia, and recurrent sepsis despite antibiotics. Pt with episode of hypotension this am, resolved with IV fluid and pressor support.    -limited TTE to r/o effusion as source of hypotension- prelim: no pericardial effusion, f/u official  -pt for LACW wound vac today  -continue IV ABX and plan per ID  -with need to discuss long term goals of care with wife regarding reimplantation. If consideration for reimplant will discuss CRT-P for reduction of heart failure symptoms.  will benoit Perry

## 2017-08-18 NOTE — PROGRESS NOTE ADULT - SUBJECTIVE AND OBJECTIVE BOX
Subjective:  87yMale POD#1   Left subclavian AICD extraction, R subclavian permatch removal    Postop  hypercapnea requiring Bipap    Past Medical History:  Hypervolemia  H/o or current diagnosis of HF- Contraindication to ACEI/ARBs  No pertinent family history in first degree relatives  Handoff  MEWS Score  Pubic ramus fracture, right, sequela  Compression fracture of C-spine  Hyperlipidemia  ESRD on dialysis  Dementia  Muscle weakness (generalized)  Ventricular tachycardia  Diabetes mellitus  Renal failure  Hypertension  Bacteremia  Bacteremia  Volume overload  Infection and inflammatory reaction due to cardiac device, implant, and graft, initial encounter  Cardiac device, implant, or graft infection or inflammation  Chronic systolic heart failure  Elevated troponin  SIRS (systemic inflammatory response syndrome)  Prophylactic measure  Diabetes mellitus  Hypotension  Conjunctivitis, acute, right eye  Pancytopenia  Bacteremia due to coagulase-negative Staphylococcus  Pubic ramus fracture, right, sequela  Compression fracture of C-spine  ESRD on dialysis  Laser lead extraction  Catheter removal, tunneled central venous, without port  ICD (implantable cardioverter-defibrillator), biventricular, in situ  No significant past surgical history  TO GET DIALYSIS  9        sodium chloride 0.9%. 1000 milliLiter(s) IV Continuous <Continuous>  pantoprazole  Injectable 40 milliGRAM(s) IV Push daily  docusate sodium 100 milliGRAM(s) Oral three times a day  tamsulosin 0.4 milliGRAM(s) Oral at bedtime  gabapentin 300 milliGRAM(s) Oral three times a day  ferrous    sulfate 325 milliGRAM(s) Oral three times a day with meals  polyethylene glycol 3350 17 Gram(s) Oral at bedtime  erythromycin   Ointment 1 Application(s) Right EYE at bedtime  artificial  tears Solution 1 Drop(s) Both EYES two times a day  calcium acetate 667 milliGRAM(s) Oral four times a day with meals  folic acid 1 milliGRAM(s) Oral daily  Nephro-viviana 1 Tablet(s) Oral daily  norepinephrine Infusion 0.016 MICROgram(s)/kG/Min IV Continuous <Continuous>  MEDICATIONS  (PRN):      Daily     Daily       ABG - ( 18 Aug 2017 01:18 )  pH: 7.34  /  pCO2: 46    /  pO2: 106   / HCO3: 23    / Base Excess: -1.2  /  SaO2: 99                                      9.5    8.7   )-----------( 78       ( 17 Aug 2017 20:15 )             31.6   08-17    139  |  101  |  15.0  ----------------------------<  158<H>  4.0   |  22.0  |  3.64<H>    Ca    8.4<L>      17 Aug 2017 20:15  Phos  2.8     08-17  Mg     1.9     08-17        PT/INR - ( 17 Aug 2017 20:15 )   PT: 14.1 sec;   INR: 1.28 ratio         PTT - ( 17 Aug 2017 20:15 )  PTT:82.9 sec      Objective:  T(C): 36.9 (08-18-17 @ 02:00), Max: 37.2 (08-17-17 @ 04:20)  HR: 111 (08-18-17 @ 02:00) (66 - 114)  BP: 102/45 (08-17-17 @ 19:00) (101/58 - 110/60)  RR: 13 (08-18-17 @ 02:00) (11 - 18)  SpO2: 99% (08-18-17 @ 02:00) (95% - 100%)  Wt(kg): --CAPILLARY BLOOD GLUCOSE  150 (17 Aug 2017 20:00)  149 (17 Aug 2017 12:00)  75 (17 Aug 2017 08:18)      I&O's Summary    16 Aug 2017 07:01  -  17 Aug 2017 07:00  --------------------------------------------------------  IN: 1350 mL / OUT: 2400 mL / NET: -1050 mL    17 Aug 2017 07:01  -  18 Aug 2017 02:12  --------------------------------------------------------  IN: 614 mL / OUT: 0 mL / NET: 614 mL        Physical Exam:  Neuro: Alert, Moves all extremities,   + C Collar  Pulm:  unlabores respirations,  + Bs b/l  CV: S1/S2  Tachy  Abd: soft nt/nd  Ext: 1+ edema, warm  Inc: Left aicd pocket open with serosangious drainainge packed with gauze

## 2017-08-18 NOTE — PROGRESS NOTE ADULT - ASSESSMENT
87 M from [facility] with PMHx dementia, HTN, HLD, DM, chronic systolic HFrEF:20-25% (July 2017), ICM s/p bi vent ICD (medtronic), BPH and ESRD on HD tu th sat, was d/c from [facility] ON 8/2/17 after being tx for coag negative staph bacteremia with vancomycin during HD to end on 9/2/17; pt was sent to the hospital for emergent HD. As per the nursing supervisor at [facility] last thursday the pt had an accident during transport to the HD facility and was dropped and obtained a c4 fracture and pt was placed in a c collar along with a right pubic ramus fx; currently was sent from the facility for emergent dialysis due to not having transport because the patient was dropped by previous transport and will need to have new transport set up.  .    PT WAS RECENTLY HOSPITALIZED  AND PLAN WAS TO CONTINUE   IV VANCO AT  DIALYSIS BUT IS UNCLEAR IF RECEIVED VANCO OUTSIDE  OF HOSPITAL  REPEAT BLOOD CX  SO FAR NEG      CHEST WALL WITH EDEMA  AND ERYTHEMA NEAR PACER SITE  CT DEMONSTRATES COLLECTION  S/P  EXTRACTION TODAY AND CATHETER REMOVAL  CONITINUE  VANCO

## 2017-08-18 NOTE — PROGRESS NOTE ADULT - PROBLEM SELECTOR PLAN 1
s/p AICD extractoin w/ open pocket  plan for possible wound vac in AM  cont ABx w/ HD per infectious disease

## 2017-08-19 LAB
ANION GAP SERPL CALC-SCNC: 18 MMOL/L — HIGH (ref 5–17)
BUN SERPL-MCNC: 16 MG/DL — SIGNIFICANT CHANGE UP (ref 8–20)
CALCIUM SERPL-MCNC: 8.9 MG/DL — SIGNIFICANT CHANGE UP (ref 8.6–10.2)
CHLORIDE SERPL-SCNC: 98 MMOL/L — SIGNIFICANT CHANGE UP (ref 98–107)
CO2 SERPL-SCNC: 23 MMOL/L — SIGNIFICANT CHANGE UP (ref 22–29)
CREAT SERPL-MCNC: 3.53 MG/DL — HIGH (ref 0.5–1.3)
GLUCOSE SERPL-MCNC: 133 MG/DL — HIGH (ref 70–115)
HCT VFR BLD CALC: 26.8 % — LOW (ref 42–52)
HGB BLD-MCNC: 8.3 G/DL — LOW (ref 14–18)
MAGNESIUM SERPL-MCNC: 1.8 MG/DL — SIGNIFICANT CHANGE UP (ref 1.6–2.6)
MCHC RBC-ENTMCNC: 27 PG — SIGNIFICANT CHANGE UP (ref 27–31)
MCHC RBC-ENTMCNC: 31 G/DL — LOW (ref 32–36)
MCV RBC AUTO: 87.3 FL — SIGNIFICANT CHANGE UP (ref 80–94)
PLATELET # BLD AUTO: 68 K/UL — LOW (ref 150–400)
POTASSIUM SERPL-MCNC: 3.9 MMOL/L — SIGNIFICANT CHANGE UP (ref 3.5–5.3)
POTASSIUM SERPL-SCNC: 3.9 MMOL/L — SIGNIFICANT CHANGE UP (ref 3.5–5.3)
RBC # BLD: 3.07 M/UL — LOW (ref 4.6–6.2)
RBC # FLD: 16.4 % — HIGH (ref 11–15.6)
SODIUM SERPL-SCNC: 139 MMOL/L — SIGNIFICANT CHANGE UP (ref 135–145)
VANCOMYCIN TROUGH SERPL-MCNC: 18.9 UG/ML — SIGNIFICANT CHANGE UP (ref 10–20)
WBC # BLD: 6.6 K/UL — SIGNIFICANT CHANGE UP (ref 4.8–10.8)
WBC # FLD AUTO: 6.6 K/UL — SIGNIFICANT CHANGE UP (ref 4.8–10.8)

## 2017-08-19 PROCEDURE — 71010: CPT | Mod: 26

## 2017-08-19 RX ORDER — VANCOMYCIN HCL 1 G
750 VIAL (EA) INTRAVENOUS ONCE
Qty: 0 | Refills: 0 | Status: COMPLETED | OUTPATIENT
Start: 2017-08-19 | End: 2017-08-19

## 2017-08-19 RX ORDER — ALBUMIN HUMAN 25 %
100 VIAL (ML) INTRAVENOUS ONCE
Qty: 0 | Refills: 0 | Status: COMPLETED | OUTPATIENT
Start: 2017-08-19 | End: 2017-08-19

## 2017-08-19 RX ORDER — ENOXAPARIN SODIUM 100 MG/ML
30 INJECTION SUBCUTANEOUS DAILY
Qty: 0 | Refills: 0 | Status: DISCONTINUED | OUTPATIENT
Start: 2017-08-20 | End: 2017-08-20

## 2017-08-19 RX ORDER — MAGNESIUM SULFATE 500 MG/ML
2 VIAL (ML) INJECTION ONCE
Qty: 0 | Refills: 0 | Status: COMPLETED | OUTPATIENT
Start: 2017-08-19 | End: 2017-08-19

## 2017-08-19 RX ADMIN — Medication 1 DROP(S): at 06:21

## 2017-08-19 RX ADMIN — Medication 50 MILLILITER(S): at 12:44

## 2017-08-19 RX ADMIN — Medication 1 APPLICATION(S): at 22:21

## 2017-08-19 RX ADMIN — Medication 150 MILLIGRAM(S): at 13:31

## 2017-08-19 RX ADMIN — PANTOPRAZOLE SODIUM 40 MILLIGRAM(S): 20 TABLET, DELAYED RELEASE ORAL at 13:23

## 2017-08-19 RX ADMIN — Medication 50 GRAM(S): at 11:50

## 2017-08-19 RX ADMIN — MIDODRINE HYDROCHLORIDE 10 MILLIGRAM(S): 2.5 TABLET ORAL at 14:00

## 2017-08-19 RX ADMIN — Medication 100 MILLIGRAM(S): at 06:21

## 2017-08-19 RX ADMIN — Medication 1: at 22:20

## 2017-08-19 RX ADMIN — GABAPENTIN 300 MILLIGRAM(S): 400 CAPSULE ORAL at 06:21

## 2017-08-19 RX ADMIN — Medication 1 MICROGRAM(S)/KG/MIN: at 11:50

## 2017-08-19 RX ADMIN — MIDODRINE HYDROCHLORIDE 10 MILLIGRAM(S): 2.5 TABLET ORAL at 06:21

## 2017-08-19 RX ADMIN — SODIUM CHLORIDE 5 MILLILITER(S): 9 INJECTION INTRAMUSCULAR; INTRAVENOUS; SUBCUTANEOUS at 06:21

## 2017-08-19 RX ADMIN — Medication 100 MILLILITER(S): at 11:50

## 2017-08-19 RX ADMIN — Medication 1 DROP(S): at 22:21

## 2017-08-19 NOTE — PROGRESS NOTE ADULT - SUBJECTIVE AND OBJECTIVE BOX
Subjective: no events overnight, no complaints,     T(C): 36.9 (08-19-17 @ 01:00), Max: 36.9 (08-18-17 @ 11:20)  HR: 100 (08-19-17 @ 03:00) (98 - 119)  BP: 81/47 (08-18-17 @ 10:00) (81/47 - 81/47)  ABP: 122/47 (08-19-17 @ 03:00) (56/47 - 139/52)  ABP(mean): 76 (08-19-17 @ 03:00) (51 - 80)  RR: 18 (08-19-17 @ 03:00) (10 - 26)  SpO2: 100% (08-19-17 @ 03:00) (76% - 100%)    08-19    139  |  98  |  16.0  ----------------------------<  133<H>  3.9   |  23.0  |  3.53<H>    Ca    8.9      19 Aug 2017 04:04  Phos  3.4     08-18  Mg     1.8     08-19    TPro  7.0  /  Alb  3.7  /  TBili  0.4  /  DBili  0.2  /  AST  13  /  ALT  8   /  AlkPhos  67  08-18                               8.3    6.6   )-----------( 68       ( 19 Aug 2017 04:04 )             26.8        PT/INR - ( 18 Aug 2017 04:30 )   PT: 14.1 sec;   INR: 1.28 ratio         PTT - ( 18 Aug 2017 04:30 )  PTT:32.0 sec  ABG - ( 18 Aug 2017 09:07 )  pH: 7.32  /  pCO2: 49    /  pO2: 117   / HCO3: 24    / Base Excess: -0.7  /  SaO2: 99        CAPILLARY BLOOD GLUCOSE  138 (18 Aug 2017 22:00)  150 (18 Aug 2017 17:00)    CXR: stable PVC     I&O's Detail    17 Aug 2017 07:01  -  18 Aug 2017 07:00  --------------------------------------------------------  IN:    Albumin 5%  - 250 mL: 750 mL    norepinephrine Infusion: 106 mL    sodium chloride 0.9%.: 60 mL  Total IN: 916 mL    OUT:  Total OUT: 0 mL    Total NET: 916 mL      18 Aug 2017 07:01  -  19 Aug 2017 06:03  --------------------------------------------------------  IN:    norepinephrine Infusion: 78 mL    Oral Fluid: 60 mL    Packed Red Blood Cells: 315 mL    sodium chloride 0.9%.: 60 mL  Total IN: 513 mL    OUT:    Other: 1000 mL  Total OUT: 1000 mL    Total NET: -487 mL    Drug Dosing Weight  Height (cm): 170.18 (12 Aug 2017 05:41)  Weight (kg): 68 (12 Aug 2017 05:41)  BMI (kg/m2): 23.5 (12 Aug 2017 05:41)  BSA (m2): 1.79 (12 Aug 2017 05:41)    MEDICATIONS  (STANDING):  sodium chloride 0.9%. 1000 milliLiter(s) (5 mL/Hr) IV Continuous <Continuous>  pantoprazole  Injectable 40 milliGRAM(s) IV Push daily  docusate sodium 100 milliGRAM(s) Oral three times a day  tamsulosin 0.4 milliGRAM(s) Oral at bedtime  gabapentin 300 milliGRAM(s) Oral three times a day  ferrous    sulfate 325 milliGRAM(s) Oral three times a day with meals  polyethylene glycol 3350 17 Gram(s) Oral at bedtime  erythromycin   Ointment 1 Application(s) Right EYE at bedtime  artificial  tears Solution 1 Drop(s) Both EYES two times a day  calcium acetate 667 milliGRAM(s) Oral four times a day with meals  folic acid 1 milliGRAM(s) Oral daily  Nephro-viviana 1 Tablet(s) Oral daily  norepinephrine Infusion 0.008 MICROgram(s)/kG/Min (1 mL/Hr) IV Continuous <Continuous>  insulin lispro (HumaLOG) corrective regimen sliding scale   SubCutaneous Before meals and at bedtime  dextrose 5%. 1000 milliLiter(s) (50 mL/Hr) IV Continuous <Continuous>  dextrose 50% Injectable 12.5 Gram(s) IV Push once  dextrose 50% Injectable 25 Gram(s) IV Push once  dextrose 50% Injectable 25 Gram(s) IV Push once  ergocalciferol 37936 Unit(s) Oral every week  calcitriol   Capsule 0.25 MICROGram(s) Oral daily  midodrine 10 milliGRAM(s) Oral three times a day  epoetin una Injectable 41140 Unit(s) IV Push <User Schedule>  vancomycin  IVPB 750 milliGRAM(s) IV Intermittent once    MEDICATIONS  (PRN):  polyethylene glycol 3350 17 Gram(s) Oral daily PRN Constipation  acetaminophen   Tablet 650 milliGRAM(s) Oral every 6 hours PRN For Temp greater than 38 C (100.4 F)  dextrose Gel 1 Dose(s) Oral once PRN Blood Glucose LESS THAN 70 milliGRAM(s)/deciLiter  glucagon  Injectable 1 milliGRAM(s) IntraMuscular once PRN Glucose <70 milliGRAM(s)/deciLiter  fentaNYL    Injectable 25 MICROGram(s) IV Push every 10 minutes PRN Severe Pain (7 - 10)    Physical Exam  Neuro: A&O x3  Pulm: clear b/l  CV: S1S2 Regular, mildly tachy   Abd: + BS soft NT Nd  Extrem/MS: no edema, palpable DP pulses b/l   Incision(s): L chest wall Vac intact, R fem HDcatheter, L fem TLC/juan

## 2017-08-19 NOTE — SWALLOW BEDSIDE ASSESSMENT ADULT - SLP GENERAL OBSERVATIONS
Pt received awake in bed, +02 via nc Sp02 99% baseline, +c-collar (front removed by RN for swallow eval as per MD), reduced cognition, wet upper airway sounds. Per RN, pt independently expectorating secretions

## 2017-08-19 NOTE — SWALLOW BEDSIDE ASSESSMENT ADULT - PHARYNGEAL PHASE
Delayed pharyngeal swallow/Delayed cough post oral intake/Multiple swallows/+cough after 3rd trial of puree, with increase in wet upper airway congestion Wet vocal quality post oral intake/Multiple swallows/Cough post oral intake Multiple swallows/Wet vocal quality post oral intake/Cough post oral intake Cough post oral intake/Wet vocal quality post oral intake/Multiple swallows

## 2017-08-19 NOTE — PROGRESS NOTE ADULT - SUBJECTIVE AND OBJECTIVE BOX
NSGY Attg:    Patient seen.  No acute overnight events reported.    Exam:  opens eyes  collar in place  OROZCO to command with symmetric strength    A/P:  neuro stable  cont c-collar  patient may be OOB to chair or ambulate with assistance in collar  falls precautions

## 2017-08-19 NOTE — SWALLOW BEDSIDE ASSESSMENT ADULT - ASR SWALLOW DENTITION
Pt with upper dentures at bedside. Attempted to place, however no fixodent present and dentures unable to be secured. Upper dentures therefore removed from pt's mouth/lower dentures

## 2017-08-19 NOTE — SWALLOW BEDSIDE ASSESSMENT ADULT - SWALLOW EVAL: DIAGNOSIS
Mild oral dysphagia confounded by incomplete dentition. Suspect pharyngeal dysphagia across all administered consistencies.

## 2017-08-19 NOTE — PROGRESS NOTE ADULT - PROBLEM SELECTOR PLAN 1
s/p AICD extractoin  vac placed 8/18 (change monday)  cont ABx w/ HD per ID s/p AICD extraction  vac placed 8/18 (change monday)  cont ABx w/ HD per ID

## 2017-08-19 NOTE — PROGRESS NOTE ADULT - ASSESSMENT
ESRD - S/P HD yesterday  PC and ICD to be removed 8-18  + Femoral blanca   Watch labs   No pressing need for acute HD today     Sepsis - Vanco as per ID  vanco level in am     Anemia - Continue epogen    RO - Continue binders , phos reasonable

## 2017-08-19 NOTE — PROGRESS NOTE ADULT - ASSESSMENT
88 yo male with pmhx dementia, ESRD on HD via R permacath (Dr Pike-4/2017) on HD T TH Sat(Trish), hypotension, HLD, DM, CAD/CABG 2012, ICM, HFrEF ~25% s/p MDT BIVICD ([facility]/1/6/2015), recent C4 and pubic Fx secondary to an accident/fall with transportation to HD. Pt was transferred from [facility] facility for urgent HD 8/12/17 and was a code sepsis night of admission. He was recently hospitalized at [facility] with coag neg bacteremia 7/23/17-8/2/17, being treated with IV vanco that was to be continued through 9/2/17. It is unclear if vanco was received post discharge. Pt underwent  LSC AICD extraction, permatcath removal on 8/17 w/ Dr. Anderson, postop hypercapnea requiring bipap, now resolved and vasoplegia requiring intermittent pressors

## 2017-08-19 NOTE — PROGRESS NOTE ADULT - PROBLEM SELECTOR PLAN 3
HD yesterday, mary again today as pt normal days T/Th/Sat  currently R fem HD catheter placed 8/18  will need new permacath pending ID clearance

## 2017-08-19 NOTE — SWALLOW BEDSIDE ASSESSMENT ADULT - SLP PERTINENT HISTORY OF CURRENT PROBLEM
Pt known to this service and is s/p a bedside swallow eval 7/28/17 during prior admission 2* RN concerns about pt with delayed coughing and expectoration after meals. Results of bedside assessment indicated oral and pharyngeal stages functional with a Rx for a regular with thin liquid diet at that time. Follow up indicated that pt was tolerating diet with no overt s/s aspiration and ST signed off.  Pt now readmitted for emergent dialysis as pt is  s/p a fall while being transported to dialysis with a C4 compression fracture and right pubic ramus fracture.  Pt is also s/p a code sepsis 8/13 and Left subclavian AICD extraction, R subclavian permatch removal with transfer to CICU. Per RN, pt with cough after all PO (delayed after puree and immediate with liquids)

## 2017-08-19 NOTE — PROGRESS NOTE ADULT - SUBJECTIVE AND OBJECTIVE BOX
NEPHROLOGY INTERVAL HPI/OVERNIGHT EVENTS:    Comfortable   Alert and interactive this am   No new complaints   Uneventful HD yesterday   Remains on some Levophed   received dose of Vanco today   level 18.9     MEDICATIONS  (STANDING):  sodium chloride 0.9%. 1000 milliLiter(s) (5 mL/Hr) IV Continuous <Continuous>  pantoprazole  Injectable 40 milliGRAM(s) IV Push daily  docusate sodium 100 milliGRAM(s) Oral three times a day  tamsulosin 0.4 milliGRAM(s) Oral at bedtime  gabapentin 300 milliGRAM(s) Oral three times a day  ferrous    sulfate 325 milliGRAM(s) Oral three times a day with meals  polyethylene glycol 3350 17 Gram(s) Oral at bedtime  erythromycin   Ointment 1 Application(s) Right EYE at bedtime  artificial  tears Solution 1 Drop(s) Both EYES two times a day  calcium acetate 667 milliGRAM(s) Oral four times a day with meals  folic acid 1 milliGRAM(s) Oral daily  Nephro-viviana 1 Tablet(s) Oral daily  norepinephrine Infusion 0.008 MICROgram(s)/kG/Min (1 mL/Hr) IV Continuous <Continuous>  insulin lispro (HumaLOG) corrective regimen sliding scale   SubCutaneous Before meals and at bedtime  dextrose 5%. 1000 milliLiter(s) (50 mL/Hr) IV Continuous <Continuous>  dextrose 50% Injectable 12.5 Gram(s) IV Push once  dextrose 50% Injectable 25 Gram(s) IV Push once  dextrose 50% Injectable 25 Gram(s) IV Push once  ergocalciferol 84752 Unit(s) Oral every week  calcitriol   Capsule 0.25 MICROGram(s) Oral daily  midodrine 10 milliGRAM(s) Oral three times a day  epoetin una Injectable 70225 Unit(s) IV Push <User Schedule>  vancomycin  IVPB 750 milliGRAM(s) IV Intermittent once    MEDICATIONS  (PRN):  polyethylene glycol 3350 17 Gram(s) Oral daily PRN Constipation  acetaminophen   Tablet 650 milliGRAM(s) Oral every 6 hours PRN For Temp greater than 38 C (100.4 F)  dextrose Gel 1 Dose(s) Oral once PRN Blood Glucose LESS THAN 70 milliGRAM(s)/deciLiter  glucagon  Injectable 1 milliGRAM(s) IntraMuscular once PRN Glucose <70 milliGRAM(s)/deciLiter  fentaNYL    Injectable 25 MICROGram(s) IV Push every 10 minutes PRN Severe Pain (7 - 10)      Allergies    No Known Allergies    Intolerances      Vital Signs Last 24 Hrs  T(C): 36.6 (19 Aug 2017 06:00), Max: 36.9 (18 Aug 2017 11:20)  T(F): 97.9 (19 Aug 2017 06:00), Max: 98.5 (19 Aug 2017 01:00)  HR: 103 (19 Aug 2017 07:00) (95 - 119)  BP: 81/47 (18 Aug 2017 10:00) (81/47 - 81/47)  BP(mean): 56 (18 Aug 2017 10:00) (56 - 56)  RR: 20 (19 Aug 2017 07:00) (10 - 26)  SpO2: 98% (19 Aug 2017 07:00) (76% - 100%)  Daily     Daily   I&O's Detail    18 Aug 2017 07:01  -  19 Aug 2017 07:00  --------------------------------------------------------  IN:    norepinephrine Infusion: 78 mL    Oral Fluid: 60 mL    Packed Red Blood Cells: 315 mL    sodium chloride 0.9%.: 100 mL  Total IN: 553 mL    OUT:    Other: 1000 mL  Total OUT: 1000 mL    Total NET: -447 mL        I&O's Summary    18 Aug 2017 07:01  -  19 Aug 2017 07:00  --------------------------------------------------------  IN: 553 mL / OUT: 1000 mL / NET: -447 mL        PHYSICAL EXAM:  HEENT - legally blind  Neck PC and ICD out . + Left sided wound Vac , + Cervical collar   Chest   clear; diminished at bases  CV   no murmur, no rub   Abd   soft, NT BS+  Extr   No edema, + femoral blanca   LABS:                        8.3    6.6   )-----------( 68       ( 19 Aug 2017 04:04 )             26.8     08-19    139  |  98  |  16.0  ----------------------------<  133<H>  3.9   |  23.0  |  3.53<H>    Ca    8.9      19 Aug 2017 04:04  Phos  3.4     08-18  Mg     1.8     08-19    TPro  7.0  /  Alb  3.7  /  TBili  0.4  /  DBili  0.2  /  AST  13  /  ALT  8   /  AlkPhos  67  08-18    PT/INR - ( 18 Aug 2017 04:30 )   PT: 14.1 sec;   INR: 1.28 ratio         PTT - ( 18 Aug 2017 04:30 )  PTT:32.0 sec    Magnesium, Serum: 1.8 mg/dL (08-19 @ 04:04)    ABG - ( 18 Aug 2017 09:07 )  pH: 7.32  /  pCO2: 49    /  pO2: 117   / HCO3: 24    / Base Excess: -0.7  /  SaO2: 99                    RADIOLOGY & ADDITIONAL TESTS:

## 2017-08-20 ENCOUNTER — TRANSCRIPTION ENCOUNTER (OUTPATIENT)
Age: 82
End: 2017-08-20

## 2017-08-20 DIAGNOSIS — R13.10 DYSPHAGIA, UNSPECIFIED: ICD-10-CM

## 2017-08-20 LAB
ANION GAP SERPL CALC-SCNC: 19 MMOL/L — HIGH (ref 5–17)
BUN SERPL-MCNC: 20 MG/DL — SIGNIFICANT CHANGE UP (ref 8–20)
CALCIUM SERPL-MCNC: 9.1 MG/DL — SIGNIFICANT CHANGE UP (ref 8.6–10.2)
CHLORIDE SERPL-SCNC: 98 MMOL/L — SIGNIFICANT CHANGE UP (ref 98–107)
CO2 SERPL-SCNC: 20 MMOL/L — LOW (ref 22–29)
CREAT SERPL-MCNC: 4.25 MG/DL — HIGH (ref 0.5–1.3)
GLUCOSE SERPL-MCNC: 140 MG/DL — HIGH (ref 70–115)
HCT VFR BLD CALC: 27.8 % — LOW (ref 42–52)
HGB BLD-MCNC: 8.4 G/DL — LOW (ref 14–18)
MAGNESIUM SERPL-MCNC: 2.5 MG/DL — SIGNIFICANT CHANGE UP (ref 1.6–2.6)
MCHC RBC-ENTMCNC: 26.7 PG — LOW (ref 27–31)
MCHC RBC-ENTMCNC: 30.2 G/DL — LOW (ref 32–36)
MCV RBC AUTO: 88.3 FL — SIGNIFICANT CHANGE UP (ref 80–94)
PLATELET # BLD AUTO: 78 K/UL — LOW (ref 150–400)
POTASSIUM SERPL-MCNC: 4.6 MMOL/L — SIGNIFICANT CHANGE UP (ref 3.5–5.3)
POTASSIUM SERPL-SCNC: 4.6 MMOL/L — SIGNIFICANT CHANGE UP (ref 3.5–5.3)
RBC # BLD: 3.15 M/UL — LOW (ref 4.6–6.2)
RBC # FLD: 16.7 % — HIGH (ref 11–15.6)
SODIUM SERPL-SCNC: 137 MMOL/L — SIGNIFICANT CHANGE UP (ref 135–145)
VANCOMYCIN TROUGH SERPL-MCNC: 24.4 UG/ML — HIGH (ref 10–20)
WBC # BLD: 6.9 K/UL — SIGNIFICANT CHANGE UP (ref 4.8–10.8)
WBC # FLD AUTO: 6.9 K/UL — SIGNIFICANT CHANGE UP (ref 4.8–10.8)

## 2017-08-20 PROCEDURE — 71010: CPT | Mod: 26

## 2017-08-20 RX ADMIN — Medication 1: at 21:31

## 2017-08-20 RX ADMIN — Medication 1 DROP(S): at 05:35

## 2017-08-20 RX ADMIN — Medication 1 DROP(S): at 17:23

## 2017-08-20 RX ADMIN — Medication 1 APPLICATION(S): at 21:33

## 2017-08-20 NOTE — PROGRESS NOTE ADULT - PROBLEM SELECTOR PLAN 9
Formal S/S failed 8/19 and pt refusing NGT for TF at this time. Will continue to encourage pt that NGT may be necessary for feeding.

## 2017-08-20 NOTE — PROGRESS NOTE ADULT - SUBJECTIVE AND OBJECTIVE BOX
NEPHROLOGY INTERVAL HPI/OVERNIGHT EVENTS:    Comfortable this am   Had HD yesterday  Remains on pressors     MEDICATIONS  (STANDING):  sodium chloride 0.9%. 1000 milliLiter(s) (5 mL/Hr) IV Continuous <Continuous>  pantoprazole  Injectable 40 milliGRAM(s) IV Push daily  docusate sodium 100 milliGRAM(s) Oral three times a day  tamsulosin 0.4 milliGRAM(s) Oral at bedtime  gabapentin 300 milliGRAM(s) Oral three times a day  ferrous    sulfate 325 milliGRAM(s) Oral three times a day with meals  erythromycin   Ointment 1 Application(s) Right EYE at bedtime  artificial  tears Solution 1 Drop(s) Both EYES two times a day  calcium acetate 667 milliGRAM(s) Oral four times a day with meals  folic acid 1 milliGRAM(s) Oral daily  Nephro-viviana 1 Tablet(s) Oral daily  norepinephrine Infusion 0.008 MICROgram(s)/kG/Min (1 mL/Hr) IV Continuous <Continuous>  insulin lispro (HumaLOG) corrective regimen sliding scale   SubCutaneous Before meals and at bedtime  dextrose 5%. 1000 milliLiter(s) (50 mL/Hr) IV Continuous <Continuous>  dextrose 50% Injectable 12.5 Gram(s) IV Push once  dextrose 50% Injectable 25 Gram(s) IV Push once  dextrose 50% Injectable 25 Gram(s) IV Push once  ergocalciferol 02796 Unit(s) Oral every week  calcitriol   Capsule 0.25 MICROGram(s) Oral daily  midodrine 10 milliGRAM(s) Oral three times a day  epoetin una Injectable 56231 Unit(s) IV Push <User Schedule>    MEDICATIONS  (PRN):  acetaminophen   Tablet 650 milliGRAM(s) Oral every 6 hours PRN For Temp greater than 38 C (100.4 F)  dextrose Gel 1 Dose(s) Oral once PRN Blood Glucose LESS THAN 70 milliGRAM(s)/deciLiter  glucagon  Injectable 1 milliGRAM(s) IntraMuscular once PRN Glucose <70 milliGRAM(s)/deciLiter  fentaNYL    Injectable 25 MICROGram(s) IV Push every 10 minutes PRN Severe Pain (7 - 10)      Allergies    No Known Allergies    Intolerances          Vital Signs Last 24 Hrs  T(C): 36.5 (20 Aug 2017 09:00), Max: 36.8 (19 Aug 2017 12:30)  T(F): 97.7 (20 Aug 2017 09:00), Max: 98.2 (19 Aug 2017 12:30)  HR: 116 (20 Aug 2017 09:00) (97 - 118)  BP: 74/49 (19 Aug 2017 20:00) (64/38 - 85/48)  BP(mean): 57 (19 Aug 2017 20:00) (45 - 60)  RR: 21 (20 Aug 2017 09:00) (13 - 35)  SpO2: 96% (20 Aug 2017 09:00) (91% - 100%)  Daily     Daily   I&O's Detail    19 Aug 2017 07:01  -  20 Aug 2017 07:00  --------------------------------------------------------  IN:    Albumin 5%  - 250 mL: 200 mL    IV PiggyBack: 200 mL    norepinephrine Infusion: 92 mL    sodium chloride 0.9%.: 90 mL  Total IN: 582 mL    OUT:  Total OUT: 0 mL    Total NET: 582 mL        I&O's Summary    19 Aug 2017 07:01  -  20 Aug 2017 07:00  --------------------------------------------------------  IN: 582 mL / OUT: 0 mL / NET: 582 mL        PHYSICAL EXAM:  HEENT - legally blind  Neck PC and ICD out . + Left sided wound Vac , + Cervical collar   Chest   clear; diminished at bases  CV   no murmur, no rub   Abd   soft, NT BS+  Extr   No edema, + femoral blanca         LABS:                        8.4    6.9   )-----------( 78       ( 20 Aug 2017 04:21 )             27.8     08-20    137  |  98  |  20.0  ----------------------------<  140<H>  4.6   |  20.0<L>  |  4.25<H>    Ca    9.1      20 Aug 2017 04:21  Mg     2.5     08-20          Magnesium, Serum: 2.5 mg/dL (08-20 @ 04:21)          RADIOLOGY & ADDITIONAL TESTS:

## 2017-08-20 NOTE — PROGRESS NOTE ADULT - ASSESSMENT
88 yo male with pmhx dementia, ESRD on HD via R permacath (Dr Pike-4/2017) on HD T TH Sat(Trish), hypotension, HLD, DM, CAD/CABG 2012, ICM, HFrEF ~25% s/p MDT BIVICD ([facility]/1/6/2015), recent C4 and pubic Fx secondary to an accident/fall with transportation to HD. [facility] and was a code sepsis night of admission. He was recently hospitalized at [facility] with coag neg bacteremia 7/23/17-8/2/17, being treated with IV vanco that was to be continued through 9/2/17. It is unclear if vanco was received post discharge. Pt underwent  LSC AICD extraction, permatcath removal on 8/17 w/ Dr. Anderson, postop hypercapnea requiring bipap, now resolved and vasoplegia requiring intermittent pressors. 8/18 pt hypotensive with increasing pressor requirements, left femoral juan and TLC placed. 8/18 Pt while on hemodialysis requiring pressor support after being off levophed. Pt made NPO after failed bedside speech sand swallow, formal S/S obtained and recommendation for NPO and tube feeds which patient is refusing at this time.

## 2017-08-20 NOTE — PROGRESS NOTE ADULT - SUBJECTIVE AND OBJECTIVE BOX
Subjective: Pt resting in bed comfortably without any complaints at this time.     T(C): 36.4 (08-19-17 @ 20:00), Max: 36.9 (08-19-17 @ 01:00)  HR: 106 (08-20-17 @ 00:00) (95 - 112)  BP: 74/49 (08-19-17 @ 20:00) (64/38 - 85/48)  ABP: 113/41 (08-20-17 @ 00:00) (71/24 - 127/47)  ABP(mean): 69 (08-20-17 @ 00:00) (40 - 78)  RR: 17 (08-20-17 @ 00:00) (13 - 29)  SpO2: 97% (08-20-17 @ 00:00) (91% - 100%)  Wt(kg): --  CVP(mm Hg): --  CO: --  CI: --  PA: --      08-19    139  |  98  |  16.0  ----------------------------<  133<H>  3.9   |  23.0  |  3.53<H>    Ca    8.9      19 Aug 2017 04:04  Phos  3.4     08-18  Mg     1.8     08-19    TPro  7.0  /  Alb  3.7  /  TBili  0.4  /  DBili  0.2  /  AST  13  /  ALT  8   /  AlkPhos  67  08-18                               8.3    6.6   )-----------( 68       ( 19 Aug 2017 04:04 )             26.8        PT/INR - ( 18 Aug 2017 04:30 )   PT: 14.1 sec;   INR: 1.28 ratio         PTT - ( 18 Aug 2017 04:30 )  PTT:32.0 sec  ABG - ( 18 Aug 2017 09:07 )  pH: 7.32  /  pCO2: 49    /  pO2: 117   / HCO3: 24    / Base Excess: -0.7  /  SaO2: 99                                   CAPILLARY BLOOD GLUCOSE  179 (19 Aug 2017 22:00)  120 (19 Aug 2017 13:15)  122 (19 Aug 2017 08:00)           CXR: Pending in AM.     I&O's Detail    18 Aug 2017 07:01  -  19 Aug 2017 07:00  --------------------------------------------------------  IN:    norepinephrine Infusion: 78 mL    Oral Fluid: 60 mL    Packed Red Blood Cells: 315 mL    sodium chloride 0.9%.: 100 mL  Total IN: 553 mL    OUT:    Other: 1000 mL  Total OUT: 1000 mL    Total NET: -447 mL      19 Aug 2017 07:01  -  20 Aug 2017 00:37  --------------------------------------------------------  IN:    Albumin 5%  - 250 mL: 200 mL    IV PiggyBack: 200 mL    norepinephrine Infusion: 36 mL    sodium chloride 0.9%.: 50 mL  Total IN: 486 mL    OUT:  Total OUT: 0 mL    Total NET: 486 mL        Drug Dosing Weight  Height (cm): 170.18 (12 Aug 2017 05:41)  Weight (kg): 68 (12 Aug 2017 05:41)  BMI (kg/m2): 23.5 (12 Aug 2017 05:41)  BSA (m2): 1.79 (12 Aug 2017 05:41)    MEDICATIONS  (STANDING):  sodium chloride 0.9%. 1000 milliLiter(s) (5 mL/Hr) IV Continuous <Continuous>  pantoprazole  Injectable 40 milliGRAM(s) IV Push daily  docusate sodium 100 milliGRAM(s) Oral three times a day  tamsulosin 0.4 milliGRAM(s) Oral at bedtime  gabapentin 300 milliGRAM(s) Oral three times a day  ferrous    sulfate 325 milliGRAM(s) Oral three times a day with meals  polyethylene glycol 3350 17 Gram(s) Oral at bedtime  erythromycin   Ointment 1 Application(s) Right EYE at bedtime  artificial  tears Solution 1 Drop(s) Both EYES two times a day  calcium acetate 667 milliGRAM(s) Oral four times a day with meals  folic acid 1 milliGRAM(s) Oral daily  Nephro-viviana 1 Tablet(s) Oral daily  norepinephrine Infusion 0.008 MICROgram(s)/kG/Min (1 mL/Hr) IV Continuous <Continuous>  insulin lispro (HumaLOG) corrective regimen sliding scale   SubCutaneous Before meals and at bedtime  dextrose 5%. 1000 milliLiter(s) (50 mL/Hr) IV Continuous <Continuous>  dextrose 50% Injectable 12.5 Gram(s) IV Push once  dextrose 50% Injectable 25 Gram(s) IV Push once  dextrose 50% Injectable 25 Gram(s) IV Push once  ergocalciferol 28091 Unit(s) Oral every week  calcitriol   Capsule 0.25 MICROGram(s) Oral daily  midodrine 10 milliGRAM(s) Oral three times a day  epoetin una Injectable 64618 Unit(s) IV Push <User Schedule>  enoxaparin Injectable 30 milliGRAM(s) SubCutaneous daily    MEDICATIONS  (PRN):  polyethylene glycol 3350 17 Gram(s) Oral daily PRN Constipation  acetaminophen   Tablet 650 milliGRAM(s) Oral every 6 hours PRN For Temp greater than 38 C (100.4 F)  dextrose Gel 1 Dose(s) Oral once PRN Blood Glucose LESS THAN 70 milliGRAM(s)/deciLiter  glucagon  Injectable 1 milliGRAM(s) IntraMuscular once PRN Glucose <70 milliGRAM(s)/deciLiter  fentaNYL    Injectable 25 MICROGram(s) IV Push every 10 minutes PRN Severe Pain (7 - 10)      Physical Exam  Neuro: AAOx3 in NAD  Pulm: CTA b/l  CV: RRR, normal S1/S2  Abd: NT/ND, positive bowel sounds  Extremities: DP 2+, no pitting edema   Incision(s): Left chest wall wound vac c/d/i , right subclavian dressing c/d/i

## 2017-08-20 NOTE — PROGRESS NOTE ADULT - PROBLEM SELECTOR PLAN 3
HD yesterday,  pt normal days T/Th/Sat  currently R fem HD catheter placed 8/18  will need new permacath pending ID clearance

## 2017-08-20 NOTE — PROGRESS NOTE ADULT - ASSESSMENT
ESRD - S/P HD yesterday  PC and ICD to be removed 8-18  + Femoral blanca placed , pending new permacath   No pressing need for acute HD today   Will set up for tomorrow     Sepsis - Vanco as per ID  vanco level in am     Anemia - Continue epogen    RO - Continue binders , phos reasonable

## 2017-08-21 DIAGNOSIS — H10.31 UNSPECIFIED ACUTE CONJUNCTIVITIS, RIGHT EYE: ICD-10-CM

## 2017-08-21 LAB
ANION GAP SERPL CALC-SCNC: 20 MMOL/L — HIGH (ref 5–17)
BUN SERPL-MCNC: 29 MG/DL — HIGH (ref 8–20)
CALCIUM SERPL-MCNC: 9 MG/DL — SIGNIFICANT CHANGE UP (ref 8.6–10.2)
CHLORIDE SERPL-SCNC: 100 MMOL/L — SIGNIFICANT CHANGE UP (ref 98–107)
CO2 SERPL-SCNC: 20 MMOL/L — LOW (ref 22–29)
CREAT SERPL-MCNC: 5.59 MG/DL — HIGH (ref 0.5–1.3)
GLUCOSE SERPL-MCNC: 127 MG/DL — HIGH (ref 70–115)
HCT VFR BLD CALC: 26.7 % — LOW (ref 42–52)
HGB BLD-MCNC: 8.1 G/DL — LOW (ref 14–18)
MAGNESIUM SERPL-MCNC: 2.5 MG/DL — SIGNIFICANT CHANGE UP (ref 1.8–2.6)
MCHC RBC-ENTMCNC: 27.2 PG — SIGNIFICANT CHANGE UP (ref 27–31)
MCHC RBC-ENTMCNC: 30.3 G/DL — LOW (ref 32–36)
MCV RBC AUTO: 89.6 FL — SIGNIFICANT CHANGE UP (ref 80–94)
PLATELET # BLD AUTO: 72 K/UL — LOW (ref 150–400)
POTASSIUM SERPL-MCNC: 4.3 MMOL/L — SIGNIFICANT CHANGE UP (ref 3.5–5.3)
POTASSIUM SERPL-SCNC: 4.3 MMOL/L — SIGNIFICANT CHANGE UP (ref 3.5–5.3)
RBC # BLD: 2.98 M/UL — LOW (ref 4.6–6.2)
RBC # FLD: 16.7 % — HIGH (ref 11–15.6)
SODIUM SERPL-SCNC: 140 MMOL/L — SIGNIFICANT CHANGE UP (ref 135–145)
VANCOMYCIN FLD-MCNC: 28.8 UG/ML
VANCOMYCIN FLD-MCNC: 30.3 UG/ML
WBC # BLD: 5.2 K/UL — SIGNIFICANT CHANGE UP (ref 4.8–10.8)
WBC # FLD AUTO: 5.2 K/UL — SIGNIFICANT CHANGE UP (ref 4.8–10.8)

## 2017-08-21 PROCEDURE — 99233 SBSQ HOSP IP/OBS HIGH 50: CPT

## 2017-08-21 PROCEDURE — 74230 X-RAY XM SWLNG FUNCJ C+: CPT | Mod: 26

## 2017-08-21 PROCEDURE — 71010: CPT | Mod: 26

## 2017-08-21 RX ORDER — ALBUMIN HUMAN 25 %
50 VIAL (ML) INTRAVENOUS
Qty: 0 | Refills: 0 | Status: DISCONTINUED | OUTPATIENT
Start: 2017-08-21 | End: 2017-08-24

## 2017-08-21 RX ADMIN — GABAPENTIN 300 MILLIGRAM(S): 400 CAPSULE ORAL at 22:37

## 2017-08-21 RX ADMIN — PANTOPRAZOLE SODIUM 40 MILLIGRAM(S): 20 TABLET, DELAYED RELEASE ORAL at 11:39

## 2017-08-21 RX ADMIN — Medication 667 MILLIGRAM(S): at 22:37

## 2017-08-21 RX ADMIN — MIDODRINE HYDROCHLORIDE 10 MILLIGRAM(S): 2.5 TABLET ORAL at 22:37

## 2017-08-21 RX ADMIN — TAMSULOSIN HYDROCHLORIDE 0.4 MILLIGRAM(S): 0.4 CAPSULE ORAL at 22:37

## 2017-08-21 RX ADMIN — Medication 325 MILLIGRAM(S): at 18:30

## 2017-08-21 RX ADMIN — Medication 1 DROP(S): at 06:35

## 2017-08-21 RX ADMIN — Medication 1: at 13:40

## 2017-08-21 RX ADMIN — Medication 1 DROP(S): at 18:31

## 2017-08-21 RX ADMIN — Medication 1 MICROGRAM(S)/KG/MIN: at 08:00

## 2017-08-21 RX ADMIN — Medication 667 MILLIGRAM(S): at 18:30

## 2017-08-21 RX ADMIN — ERYTHROPOIETIN 10000 UNIT(S): 10000 INJECTION, SOLUTION INTRAVENOUS; SUBCUTANEOUS at 16:45

## 2017-08-21 NOTE — PROGRESS NOTE ADULT - PROBLEM SELECTOR PLAN 6
based on TTE  resume heart failure meds when BP will tolerate  cont midodrine (Unable as patient is NPO)

## 2017-08-21 NOTE — SWALLOW VFSS/MBS ASSESSMENT ADULT - ORAL PHASE
Delayed oral transit time/Uncontrolled bolus / spillover in kei-pharynx Uncontrolled bolus / spillover in hypopharynx/Delayed oral transit time/Uncontrolled bolus / spillover in kei-pharynx Uncontrolled bolus / spillover in kei-pharynx/Uncontrolled bolus / spillover in hypopharynx/Delayed oral transit time

## 2017-08-21 NOTE — PHYSICAL THERAPY INITIAL EVALUATION ADULT - GENERAL OBSERVATIONS, REHAB EVAL
awake in bed. c-collar on but loosened; +VCDs;+nasal O2
Pt. in bed; +monitor, O2 nasal canula, Cervical Collar, wound vac; alert and cooperative

## 2017-08-21 NOTE — SWALLOW VFSS/MBS ASSESSMENT ADULT - NS SWALLOW VFSS REC ASPIR MON
change of breathing pattern/throat clearing/fever/pneumonia/position upright (90Y)/cough/gurgly voice/oral hygiene/upper respiratory infection/If any s/s aspiration noted, d/c PO & resume NPO with alternative means of nutrition/hydration as per pt/family wishes

## 2017-08-21 NOTE — PROGRESS NOTE ADULT - SUBJECTIVE AND OBJECTIVE BOX
[facility]  INF[facility] DISE[facility] [place] [facility]  =======================================================  Scotty Pennington MD  Diplomates American Board of Internal Medicine and Infectious Diseases  =======================================================    EMILY LITTLEJOHN [id number]  chief complaint: possible AICD pocket infection, ESRD on HD    patient seen and examined in follow up.  Chart and labs reviewed.   remains in CTICU.  last vanco trough 8/20/17 of 20.       =======================================================  Allergies:  No Known Allergies      =======================================================  MEDICATIONS  (STANDING):  sodium chloride 0.9%. 1000 milliLiter(s) (5 mL/Hr) IV Continuous <Continuous>  pantoprazole  Injectable 40 milliGRAM(s) IV Push daily  docusate sodium 100 milliGRAM(s) Oral three times a day  tamsulosin 0.4 milliGRAM(s) Oral at bedtime  gabapentin 300 milliGRAM(s) Oral three times a day  ferrous    sulfate 325 milliGRAM(s) Oral three times a day with meals  erythromycin   Ointment 1 Application(s) Right EYE at bedtime  artificial  tears Solution 1 Drop(s) Both EYES two times a day  calcium acetate 667 milliGRAM(s) Oral four times a day with meals  folic acid 1 milliGRAM(s) Oral daily  Nephro-viviana 1 Tablet(s) Oral daily  norepinephrine Infusion 0.008 MICROgram(s)/kG/Min (1 mL/Hr) IV Continuous <Continuous>  insulin lispro (HumaLOG) corrective regimen sliding scale   SubCutaneous Before meals and at bedtime  dextrose 5%. 1000 milliLiter(s) (50 mL/Hr) IV Continuous <Continuous>  dextrose 50% Injectable 12.5 Gram(s) IV Push once  dextrose 50% Injectable 25 Gram(s) IV Push once  dextrose 50% Injectable 25 Gram(s) IV Push once  ergocalciferol 19613 Unit(s) Oral every week  calcitriol   Capsule 0.25 MICROGram(s) Oral daily  midodrine 10 milliGRAM(s) Oral three times a day  epoetin una Injectable 18701 Unit(s) IV Push <User Schedule>    MEDICATIONS  (PRN):  acetaminophen   Tablet 650 milliGRAM(s) Oral every 6 hours PRN For Temp greater than 38 C (100.4 F)  dextrose Gel 1 Dose(s) Oral once PRN Blood Glucose LESS THAN 70 milliGRAM(s)/deciLiter  glucagon  Injectable 1 milliGRAM(s) IntraMuscular once PRN Glucose <70 milliGRAM(s)/deciLiter  fentaNYL    Injectable 25 MICROGram(s) IV Push every 10 minutes PRN Severe Pain (7 - 10)       =======================================================     REVIEW OF SYSTEMS:  CONSTITUTIONAL:  No Fever or chills  HEENT:   No diplopia or blurred vision.  No earache, sore throat or runny nose.  CARDIOVASCULAR:  No pressure, squeezing, strangling, tightness, heaviness or aching about the chest, neck, axilla or epigastrium.  RESPIRATORY:  No cough, shortness of breath, PND or orthopnea.  GASTROINTESTINAL:  No nausea, vomiting or diarrhea.  GENITOURINARY:  No dysuria, frequency or urgency. No Blood in urine  MUSCULOSKELETAL:  no joint aches, no muscle pain  SKIN:  No change in skin, hair or nails.  NEUROLOGIC:  No paresthesias, fasciculations, seizures or weakness.  PSYCHIATRIC:  No disorder of thought or mood.  ENDOCRINE:  No heat or cold intolerance, polyuria or polydipsia.  HEMATOLOGICAL:  No easy bruising or bleeding.     =======================================================     Physical Exam:  ICU Vital Signs Last 24 Hrs  T(C): 36.4 (21 Aug 2017 07:00), Max: 36.5 (21 Aug 2017 06:00)  T(F): 97.5 (21 Aug 2017 07:00), Max: 97.7 (21 Aug 2017 06:00)  HR: 101 (21 Aug 2017 08:00) (96 - 112)  BP: --  BP(mean): --  ABP: 121/45 (21 Aug 2017 08:00) (108/38 - 128/51)  ABP(mean): 74 (21 Aug 2017 08:00) (64 - 82)  RR: 21 (21 Aug 2017 08:00) (12 - 24)  SpO2: 99% (21 Aug 2017 08:00) (96% - 100%)    GEN: NAD, pleasant  HEENT: normocephalic and atraumatic. EOMI. LUCY.    NECK: Supple. No carotid bruits.  No lymphadenopathy or thyromegaly.  LUNGS: Clear to auscultation.  HEART: Regular rate and rhythm without murmur.  ABDOMEN: Soft, nontender, and nondistended.  Positive bowel sounds.    : No CVA tenderness  EXTREMITIES: Without any cyanosis, clubbing, rash, lesions or edema.  MSK: no joint swelling  NEUROLOGIC: Cranial nerves II through XII are grossly intact.  PSYCHIATRIC: Appropriate affect .  SKIN: No ulceration or induration present.    =======================================================    Labs:  08-21    140  |  100  |  29.0<H>  ----------------------------<  127<H>  4.3   |  20.0<L>  |  5.59<H>    Ca    9.0      21 Aug 2017 02:35  Mg     2.5     08-21                            8.1    5.2   )-----------( 72       ( 21 Aug 2017 02:35 )             26.7         CAPILLARY BLOOD GLUCOSE  173 (20 Aug 2017 22:00) [facility]  INFECTIOUS DISEASES AND [facility] [place] [facility]  =======================================================  Scotty Pennington MD  Diplomates American Board of Internal Medicine and Infectious Diseases  =======================================================    EMILY LITTLEJOHN [id number]  chief complaint: possible AICD pocket infection, ESRD on HD    patient seen and examined in follow up.  Chart and labs reviewed.   remains in CTICU.  last vanco trough 8/20/17 of 24.4    LILLY Mora reports that patient is coughing with food;   =======================================================  Allergies:  No Known Allergies      =======================================================  MEDICATIONS  (STANDING):  sodium chloride 0.9%. 1000 milliLiter(s) (5 mL/Hr) IV Continuous <Continuous>  pantoprazole  Injectable 40 milliGRAM(s) IV Push daily  docusate sodium 100 milliGRAM(s) Oral three times a day  tamsulosin 0.4 milliGRAM(s) Oral at bedtime  gabapentin 300 milliGRAM(s) Oral three times a day  ferrous    sulfate 325 milliGRAM(s) Oral three times a day with meals  erythromycin   Ointment 1 Application(s) Right EYE at bedtime  artificial  tears Solution 1 Drop(s) Both EYES two times a day  calcium acetate 667 milliGRAM(s) Oral four times a day with meals  folic acid 1 milliGRAM(s) Oral daily  Nephro-viviana 1 Tablet(s) Oral daily  norepinephrine Infusion 0.008 MICROgram(s)/kG/Min (1 mL/Hr) IV Continuous <Continuous>  insulin lispro (HumaLOG) corrective regimen sliding scale   SubCutaneous Before meals and at bedtime  dextrose 5%. 1000 milliLiter(s) (50 mL/Hr) IV Continuous <Continuous>  dextrose 50% Injectable 12.5 Gram(s) IV Push once  dextrose 50% Injectable 25 Gram(s) IV Push once  dextrose 50% Injectable 25 Gram(s) IV Push once  ergocalciferol 31384 Unit(s) Oral every week  calcitriol   Capsule 0.25 MICROGram(s) Oral daily  midodrine 10 milliGRAM(s) Oral three times a day  epoetin una Injectable 50682 Unit(s) IV Push <User Schedule>    MEDICATIONS  (PRN):  acetaminophen   Tablet 650 milliGRAM(s) Oral every 6 hours PRN For Temp greater than 38 C (100.4 F)  dextrose Gel 1 Dose(s) Oral once PRN Blood Glucose LESS THAN 70 milliGRAM(s)/deciLiter  glucagon  Injectable 1 milliGRAM(s) IntraMuscular once PRN Glucose <70 milliGRAM(s)/deciLiter  fentaNYL    Injectable 25 MICROGram(s) IV Push every 10 minutes PRN Severe Pain (7 - 10)       =======================================================     REVIEW OF SYSTEMS:  CONSTITUTIONAL:  No Fever or chills  HEENT:   No diplopia or blurred vision.  No earache, sore throat or runny nose.  CARDIOVASCULAR:  No pressure, squeezing, strangling, tightness, heaviness or aching about the chest, neck, axilla or epigastrium.  RESPIRATORY:  No cough, shortness of breath, PND or orthopnea.  GASTROINTESTINAL:  No nausea, vomiting or diarrhea.  GENITOURINARY:  No dysuria, frequency or urgency. No Blood in urine  MUSCULOSKELETAL:  no joint aches, no muscle pain  SKIN:  No change in skin, hair or nails.  NEUROLOGIC:  No paresthesias, fasciculations, seizures or weakness.  PSYCHIATRIC:  No disorder of thought or mood.  ENDOCRINE:  No heat or cold intolerance, polyuria or polydipsia.  HEMATOLOGICAL:  No easy bruising or bleeding.     =======================================================     Physical Exam:  ICU Vital Signs Last 24 Hrs  T(C): 36.4 (21 Aug 2017 07:00), Max: 36.5 (21 Aug 2017 06:00)  T(F): 97.5 (21 Aug 2017 07:00), Max: 97.7 (21 Aug 2017 06:00)  HR: 101 (21 Aug 2017 08:00) (96 - 112)  ABP: 121/45 (21 Aug 2017 08:00) (108/38 - 128/51)  ABP(mean): 74 (21 Aug 2017 08:00) (64 - 82)  RR: 21 (21 Aug 2017 08:00) (12 - 24)  SpO2: 99% (21 Aug 2017 08:00) (96% - 100%)    GEN: NAD, pleasant, TALL THIN AA  HEENT: normocephalic and atraumatic. EOMI. LUCY.  DRY OM POOR DENTITION;   NECK: Supple. No carotid bruits.  No lymphadenopathy or thyromegaly. NECK BRACE IN PLACE  LUNGS: GOOD AIR ENTRY  CHEST: LEFT chest with vac dressing, right chest at prior tunnel catheter site with dressing in olace  HEART: Regular rate and rhythm without murmur.  ABDOMEN: Soft, nontender, and nondistended.  Positive bowel sounds.    : No CVA tenderness. no Mario  INGUINAL: LEFT side with TLC catheter and artery line; RIGHT side with HD catheter  EXTREMITIES: Without any cyanosis, clubbing, rash, lesions or edema.  MSK: no joint swelling  NEUROLOGIC: Cranial nerves II through XII are grossly intact.  PSYCHIATRIC: Appropriate affect .  SKIN: as above    =======================================================    Labs:  08-21    140  |  100  |  29.0<H>  ----------------------------<  127<H>  4.3   |  20.0<L>  |  5.59<H>    Ca    9.0      21 Aug 2017 02:35  Mg     2.5     08-21                            8.1    5.2   )-----------( 72       ( 21 Aug 2017 02:35 )             26.7         CAPILLARY BLOOD GLUCOSE  173 (20 Aug 2017 22:00)

## 2017-08-21 NOTE — PROGRESS NOTE ADULT - SUBJECTIVE AND OBJECTIVE BOX
87y Male s/p Laser lead extraction        Subjective:  Patient states: "stop bothering me and leave me alone".    T(C): 36.5 (08-20-17 @ 09:00), Max: 36.5 (08-20-17 @ 09:00)  HR: 99 (08-20-17 @ 22:00) (99 - 118)  ABP: 128/48 (08-20-17 @ 22:00) (102/37 - 128/51)  ABP(mean): 78 (08-20-17 @ 22:00) (59 - 82)  RR: 18 (08-20-17 @ 22:00) (14 - 35)  SpO2: 100% (08-20-17 @ 22:00) (95% - 100%)          08-20    137  |  98  |  20.0  ----------------------------<  140<H>  4.6   |  20.0<L>  |  4.25<H>    Ca    9.1      20 Aug 2017 04:21  Mg     2.5     08-20                                 8.4    6.9   )-----------( 78       ( 20 Aug 2017 04:21 )             27.8                     CAPILLARY BLOOD GLUCOSE  173 (20 Aug 2017 22:00)               CXR: < from: Xray Chest 1 View AP/PA. (08.20.17 @ 05:16) >  IMPRESSION: Findings suggestive of worsening interstitial edema when   compared to prior study dated 8/19/2017. Status post CABG.           < end of copied text >      I&O's Detail    19 Aug 2017 07:01  -  20 Aug 2017 07:00  --------------------------------------------------------  IN:    Albumin 5%  - 250 mL: 200 mL    IV PiggyBack: 200 mL    norepinephrine Infusion: 92 mL    sodium chloride 0.9%.: 90 mL  Total IN: 582 mL    OUT:  Total OUT: 0 mL    Total NET: 582 mL      20 Aug 2017 07:01  -  21 Aug 2017 00:29  --------------------------------------------------------  IN:    norepinephrine Infusion: 117 mL  Total IN: 117 mL    OUT:  Total OUT: 0 mL    Total NET: 117 mL          MEDICATIONS  (STANDING):  Failed Speech and Swallow, NOT taking meds PO (NPO w/ aspiration precaution)    sodium chloride 0.9%. 1000 milliLiter(s) (5 mL/Hr) IV Continuous <Continuous>  pantoprazole  Injectable 40 milliGRAM(s) IV Push daily  docusate sodium 100 milliGRAM(s) Oral three times a day  tamsulosin 0.4 milliGRAM(s) Oral at bedtime  gabapentin 300 milliGRAM(s) Oral three times a day  ferrous    sulfate 325 milliGRAM(s) Oral three times a day with meals  erythromycin   Ointment 1 Application(s) Right EYE at bedtime  artificial  tears Solution 1 Drop(s) Both EYES two times a day  calcium acetate 667 milliGRAM(s) Oral four times a day with meals  folic acid 1 milliGRAM(s) Oral daily  Nephro-viviana 1 Tablet(s) Oral daily  norepinephrine Infusion 0.008 MICROgram(s)/kG/Min (1 mL/Hr) IV Continuous <Continuous>  insulin lispro (HumaLOG) corrective regimen sliding scale   SubCutaneous Before meals and at bedtime  dextrose 5%. 1000 milliLiter(s) (50 mL/Hr) IV Continuous <Continuous>  dextrose 50% Injectable 12.5 Gram(s) IV Push once  dextrose 50% Injectable 25 Gram(s) IV Push once  dextrose 50% Injectable 25 Gram(s) IV Push once  ergocalciferol 07181 Unit(s) Oral every week  calcitriol   Capsule 0.25 MICROGram(s) Oral daily  midodrine 10 milliGRAM(s) Oral three times a day  epoetin una Injectable 64438 Unit(s) IV Push <User Schedule>    MEDICATIONS  (PRN):  acetaminophen   Tablet 650 milliGRAM(s) Oral every 6 hours PRN For Temp greater than 38 C (100.4 F)  dextrose Gel 1 Dose(s) Oral once PRN Blood Glucose LESS THAN 70 milliGRAM(s)/deciLiter  glucagon  Injectable 1 milliGRAM(s) IntraMuscular once PRN Glucose <70 milliGRAM(s)/deciLiter  fentaNYL    Injectable 25 MICROGram(s) IV Push every 10 minutes PRN Severe Pain (7 - 10)      Physical Exam  Neuro: mild dementia, awake and alert and conversational, no apparent CN abnormality, moves all extremities weakly.  Pulm: CTA, mildly deminished at bases  CV: RRR, +S1S2, tachycardic  Abd: soft, NT, ND,   Ext: OROZCO x 4 weakly, groin lines are in place, with no apparent hematoma, pulses present & palable bilateral DP  Inc: Wound vac in place w/ appropriate seal and no apparent drainage from wound.  Former port site @ right subclavian w/ drainage, dressing clean and dry

## 2017-08-21 NOTE — PHYSICAL THERAPY INITIAL EVALUATION ADULT - PLANNED THERAPY INTERVENTIONS, PT EVAL
transfer training/bed mobility training/gait training
bed mobility training/balance training/strengthening/transfer training/gait training/postural re-education

## 2017-08-21 NOTE — PHYSICAL THERAPY INITIAL EVALUATION ADULT - ADDITIONAL COMMENTS
Pt. is poor historian; Pt. unable to state level of function prior to admission.  As of 8/16 PT eval. pt. bed mobility was mod. assist and able to stand with mod. assist x2 with rolling walker.

## 2017-08-21 NOTE — PROGRESS NOTE ADULT - SUBJECTIVE AND OBJECTIVE BOX
Pt POD #4 s/p left prepectoral BiV ICD system extraction.     Wound vac in place at extraction site; no bleeding, hematoma, edema    Tele: sinus rhythm with LBBB; 1 episode NSVT; no sustained arrhythmias or acute changes    Plan/recs:   Continue abx per ID recs.   Continue wound vac  Regarding reimplantation: no current need for bradycardia support; will need to discuss long term goals of care with wife. If consideration for reimplant will discuss CRT-P for reduction of heart failure symptoms.

## 2017-08-21 NOTE — PHYSICAL THERAPY INITIAL EVALUATION ADULT - IMPAIRMENTS FOUND, PT EVAL
aerobic capacity/endurance/gait, locomotion, and balance
poor safety awareness/aerobic capacity/endurance/muscle strength/gait, locomotion, and balance

## 2017-08-21 NOTE — SWALLOW VFSS/MBS ASSESSMENT ADULT - SLP GENERAL OBSERVATIONS
Awake, reduced cognition, +wet, gurgly vocal quality baseline with baseline cough, +cervical collar, +O2NC SpO2 98-99% RN Abby present 2* pt on monitor

## 2017-08-21 NOTE — PHYSICAL THERAPY INITIAL EVALUATION ADULT - CRITERIA FOR SKILLED THERAPEUTIC INTERVENTIONS
impairments found/functional limitations in following categories
risk reduction/prevention/rehab potential/impairments found/anticipated equipment needs at discharge/functional limitations in following categories/therapy frequency/anticipated discharge recommendation

## 2017-08-21 NOTE — CHART NOTE - NSCHARTNOTEFT_GEN_A_CORE
Left PPM site wound vacuum was changed at the bedside at 1030. Wound appeared with normal healing pattern. Leak assessment was appropriate. No acute issues.

## 2017-08-21 NOTE — PROGRESS NOTE ADULT - ASSESSMENT
86 yo male with pmhx dementia, ESRD on HD via R permacath (Dr Pike-4/2017) on HD T TH Sat([facility]), hypotension, HLD, DM, CAD/CABG 2012, ICM, HFrEF ~25% s/p MDT BIVICD ([facility]/1/6/2015), recent C4 and pubic Fx secondary to an accident/fall with transportation to HD. [facility] 8/12/17 and was a code sepsis night of admission. He was recently hospitalized at [facility] with coag neg bacteremia 7/23/17-8/2/17, being treated with IV vanco that was to be continued through 9/2/17. It is unclear if vanco was received post discharge. Pt underwent  LSC AICD extraction, permatcath removal on 8/17 w/ Dr. Anderson, postop hypercapnea requiring bipap, now resolved and vasoplegia requiring intermittent pressors. 8/18 pt hypotensive with increasing pressor requirements, left femoral juan and TLC placed. 8/18 Pt while on hemodialysis requiring pressor support after being off levophed. Pt made NPO after failed bedside speech sand swallow, formal S/S obtained and recommendation for NPO and tube feeds which patient is refusing at this time.

## 2017-08-21 NOTE — PROGRESS NOTE ADULT - ASSESSMENT
This 87 y.o. man from SNF,  dementia, HTN, HLD, DM, chronic systolic HFrEF:20-25% (July 2017), ICM s/p bi vent AICD, ESRD on HD TTS, recently treated for CoNS bacteremia at [facility], admitted after missing dialysis after accidental fall during transport HD facility and sustained a C4 fracture and right pubic ramus fx;    found with AICD Pocket collection, s/p removal of AICD 8/17/17, and also removal of subclavian HD catheter,  ALL Cultures remain negative, thus far. This 87 y.o. man from SNF,  dementia, HTN, HLD, DM, chronic systolic HFrEF:20-25% (July 2017), ICM s/p bi vent AICD, ESRD on HD TTS, recently treated for CoNS bacteremia at [facility], admitted after missing dialysis after accidental fall during transport HD facility and sustained a C4 fracture and right pubic ramus fx; found with FEVER 101. on admission.     found with AICD Pocket collection, s/p removal of AICD 8/17/17, and also removal of subclavian HD catheter,  ALL Cultures remain negative, thus far.

## 2017-08-21 NOTE — PHYSICAL THERAPY INITIAL EVALUATION ADULT - PERTINENT HX OF CURRENT PROBLEM, REHAB EVAL
the pt had an accident during transport to the HD facility and was dropped and obtained a c4 fracture and pt was placed in a c collar along with a right pubic ramus fx; currently was sent from the facility for emergent dialysis due to not having transport because the patient was dropped by previous transport and will need to have new transport set up.
s/p fall; C4 and Right pubic ramus fracture

## 2017-08-21 NOTE — SWALLOW VFSS/MBS ASSESSMENT ADULT - RECOMMENDED FEEDING/EATING TECHNIQUES
maintain upright posture during/after eating for 30 mins/oral hygiene/position upright (90 degrees)/crush medication (when feasible)/small sips/bites/All PO fed via teaspoon

## 2017-08-21 NOTE — PROGRESS NOTE ADULT - PROBLEM SELECTOR PLAN 1
CoNS bacteremia during prior hospital stay  patient had been on Vanco since 8/12/17, device removed 8/17/17  - all blood cultures negative. All AICD Lead culture negative (8/17/17)  - continue Vancomycin by level;

## 2017-08-21 NOTE — PHYSICAL THERAPY INITIAL EVALUATION ADULT - MANUAL MUSCLE TESTING RESULTS, REHAB EVAL
UE 3-5, LE knee ext 3-/5, ankle 3/5
bilateral UEs WFL; bilateral hip flex 3-/5, knee flex 2/5, knee ext 3-/5, ankle df 3+/5

## 2017-08-21 NOTE — SWALLOW VFSS/MBS ASSESSMENT ADULT - SLP PERTINENT HISTORY OF CURRENT PROBLEM
Pt known to this service and is s/p a bedside swallow eval 7/28/17 during prior admission 2* RN concerns about pt with delayed coughing and expectoration after meals. Results of bedside assessment indicated oral and pharyngeal stages functional with a Rx for a regular with thin liquid diet at that time. Follow up indicated that pt was tolerating diet with no overt s/s aspiration and ST signed off.  Pt now readmitted for emergent dialysis as pt is  s/p a fall while being transported to dialysis with a C4 compression fracture and right pubic ramus fracture.  Pt is also s/p a code sepsis 8/13 and Left subclavian AICD extraction, R subclavian permatch removal with transfer to CICU. Per RN, pt with cough after all PO (delayed after puree and immediate with liquids) .  Pt seen at bedside this admission with suspected pharyngeal dysphagia with Rx of NPO and MBS.

## 2017-08-21 NOTE — SWALLOW VFSS/MBS ASSESSMENT ADULT - PHARYNGEAL PHASE COMMENTS
+Sluggish epiglottic inversion, residue reduced after liquid wash +Sluggish epiglottic inversion Sluggish epiglottic inversion

## 2017-08-21 NOTE — SWALLOW VFSS/MBS ASSESSMENT ADULT - DIAGNOSTIC IMPRESSIONS
Moderate oral dysphagia marked by reduced lingual ROM/strength/coordination; Mild-moderate pharyngeal dysphagia marked by reduced tongue base retraction, reduced epiglottic inversion, increased swallow trigger, reduced laryngeal elevation, reduced upper airway closure, +silent penetration above the vocal folds without clearance with thin.  Pharyngeal residue after puree reduced after liquid wash. It should be noted that this study represents a segment in time and does not represent swallow status over the course of a meal.  No penetration, no aspiration observed with puree, honey & nectar, however, pt remains at significant risk for aspiration due to overall medical status.

## 2017-08-21 NOTE — PROGRESS NOTE ADULT - SUBJECTIVE AND OBJECTIVE BOX
NEPHROLOGY INTERVAL HPI/OVERNIGHT EVENTS: No new events overnite.    MEDICATIONS  (STANDING):  sodium chloride 0.9%. 1000 milliLiter(s) (5 mL/Hr) IV Continuous <Continuous>  pantoprazole  Injectable 40 milliGRAM(s) IV Push daily  docusate sodium 100 milliGRAM(s) Oral three times a day  tamsulosin 0.4 milliGRAM(s) Oral at bedtime  gabapentin 300 milliGRAM(s) Oral three times a day  ferrous    sulfate 325 milliGRAM(s) Oral three times a day with meals  erythromycin   Ointment 1 Application(s) Right EYE at bedtime  artificial  tears Solution 1 Drop(s) Both EYES two times a day  calcium acetate 667 milliGRAM(s) Oral four times a day with meals  folic acid 1 milliGRAM(s) Oral daily  Nephro-viviana 1 Tablet(s) Oral daily  norepinephrine Infusion 0.008 MICROgram(s)/kG/Min (1 mL/Hr) IV Continuous <Continuous>  insulin lispro (HumaLOG) corrective regimen sliding scale   SubCutaneous Before meals and at bedtime  dextrose 5%. 1000 milliLiter(s) (50 mL/Hr) IV Continuous <Continuous>  dextrose 50% Injectable 12.5 Gram(s) IV Push once  dextrose 50% Injectable 25 Gram(s) IV Push once  dextrose 50% Injectable 25 Gram(s) IV Push once  ergocalciferol 63390 Unit(s) Oral every week  calcitriol   Capsule 0.25 MICROGram(s) Oral daily  midodrine 10 milliGRAM(s) Oral three times a day  epoetin una Injectable 68123 Unit(s) IV Push <User Schedule>    MEDICATIONS  (PRN):  acetaminophen   Tablet 650 milliGRAM(s) Oral every 6 hours PRN For Temp greater than 38 C (100.4 F)  dextrose Gel 1 Dose(s) Oral once PRN Blood Glucose LESS THAN 70 milliGRAM(s)/deciLiter  glucagon  Injectable 1 milliGRAM(s) IntraMuscular once PRN Glucose <70 milliGRAM(s)/deciLiter  fentaNYL    Injectable 25 MICROGram(s) IV Push every 10 minutes PRN Severe Pain (7 - 10)      Allergies    No Known Allergies    Intolerances        Vital Signs Last 24 Hrs  T(C): 36.4 (21 Aug 2017 07:00), Max: 36.5 (21 Aug 2017 06:00)  T(F): 97.5 (21 Aug 2017 07:00), Max: 97.7 (21 Aug 2017 06:00)  HR: 101 (21 Aug 2017 08:00) (96 - 112)  BP: --  BP(mean): --  RR: 21 (21 Aug 2017 08:00) (12 - 24)  SpO2: 99% (21 Aug 2017 08:00) (96% - 100%)  Daily     Daily Weight in k.3 (21 Aug 2017 05:00)    PHYSICAL EXAM:    GENERAL: appears chronically  HEAD:  same  EYES:   ENMT:   NECK: brace noted  NERVOUS SYSTEM:  alert, verbal in ICU bed  CHEST/LUNG: no wheezes, PO 96 %; vac. left upper chest  HEART: regular rate, no rub  ABDOMEN: soft, not tender  EXTREMITIES:  bilateral fem. caths noted with iv fluid  let  LYMPH:   SKIN: no rash    LABS:                        8.1    5.2   )-----------( 72       ( 21 Aug 2017 02:35 )             26.7         140  |  100  |  29.0<H>  ----------------------------<  127<H>  4.3   |  20.0<L>  |  5.59<H>    Ca    9.0      21 Aug 2017 02:35  Mg     2.5               Magnesium, Serum: 2.5 mg/dL ( @ 02:35)          RADIOLOGY & ADDITIONAL TESTS:

## 2017-08-22 LAB
ANION GAP SERPL CALC-SCNC: 18 MMOL/L — HIGH (ref 5–17)
BUN SERPL-MCNC: 21 MG/DL — HIGH (ref 8–20)
CALCIUM SERPL-MCNC: 8.8 MG/DL — SIGNIFICANT CHANGE UP (ref 8.6–10.2)
CHLORIDE SERPL-SCNC: 103 MMOL/L — SIGNIFICANT CHANGE UP (ref 98–107)
CO2 SERPL-SCNC: 22 MMOL/L — SIGNIFICANT CHANGE UP (ref 22–29)
CREAT SERPL-MCNC: 4.79 MG/DL — HIGH (ref 0.5–1.3)
CULTURE RESULTS: SIGNIFICANT CHANGE UP
CULTURE RESULTS: SIGNIFICANT CHANGE UP
GLUCOSE SERPL-MCNC: 120 MG/DL — HIGH (ref 70–115)
HCT VFR BLD CALC: 25.9 % — LOW (ref 42–52)
HGB BLD-MCNC: 8 G/DL — LOW (ref 14–18)
MAGNESIUM SERPL-MCNC: 2.3 MG/DL — SIGNIFICANT CHANGE UP (ref 1.6–2.6)
MCHC RBC-ENTMCNC: 27.1 PG — SIGNIFICANT CHANGE UP (ref 27–31)
MCHC RBC-ENTMCNC: 30.9 G/DL — LOW (ref 32–36)
MCV RBC AUTO: 87.8 FL — SIGNIFICANT CHANGE UP (ref 80–94)
PF4 HEPARIN CMPLX AB SER-ACNC: NEGATIVE — SIGNIFICANT CHANGE UP
PF4 HEPARIN CMPLX AB SERPL QL IA: 0.32 ABS — SIGNIFICANT CHANGE UP
PLATELET # BLD AUTO: 63 K/UL — LOW (ref 150–400)
POTASSIUM SERPL-MCNC: 4.3 MMOL/L — SIGNIFICANT CHANGE UP (ref 3.5–5.3)
POTASSIUM SERPL-SCNC: 4.3 MMOL/L — SIGNIFICANT CHANGE UP (ref 3.5–5.3)
RBC # BLD: 2.95 M/UL — LOW (ref 4.6–6.2)
RBC # FLD: 17 % — HIGH (ref 11–15.6)
SODIUM SERPL-SCNC: 143 MMOL/L — SIGNIFICANT CHANGE UP (ref 135–145)
SPECIMEN SOURCE: SIGNIFICANT CHANGE UP
SPECIMEN SOURCE: SIGNIFICANT CHANGE UP
VANCOMYCIN TROUGH SERPL-MCNC: 24.3 UG/ML — HIGH (ref 10–20)
WBC # BLD: 4.6 K/UL — LOW (ref 4.8–10.8)
WBC # FLD AUTO: 4.6 K/UL — LOW (ref 4.8–10.8)

## 2017-08-22 PROCEDURE — 71010: CPT | Mod: 26

## 2017-08-22 PROCEDURE — 99233 SBSQ HOSP IP/OBS HIGH 50: CPT

## 2017-08-22 RX ORDER — PANTOPRAZOLE SODIUM 20 MG/1
40 TABLET, DELAYED RELEASE ORAL
Qty: 0 | Refills: 0 | Status: DISCONTINUED | OUTPATIENT
Start: 2017-08-22 | End: 2017-08-24

## 2017-08-22 RX ORDER — ASCORBIC ACID 60 MG
500 TABLET,CHEWABLE ORAL DAILY
Qty: 0 | Refills: 0 | Status: DISCONTINUED | OUTPATIENT
Start: 2017-08-22 | End: 2017-08-24

## 2017-08-22 RX ORDER — POTASSIUM CHLORIDE 20 MEQ
10 PACKET (EA) ORAL
Qty: 0 | Refills: 0 | Status: DISCONTINUED | OUTPATIENT
Start: 2017-08-22 | End: 2017-08-22

## 2017-08-22 RX ORDER — POTASSIUM CHLORIDE 20 MEQ
40 PACKET (EA) ORAL ONCE
Qty: 0 | Refills: 0 | Status: DISCONTINUED | OUTPATIENT
Start: 2017-08-22 | End: 2017-08-22

## 2017-08-22 RX ORDER — ZINC SULFATE TAB 220 MG (50 MG ZINC EQUIVALENT) 220 (50 ZN) MG
220 TAB ORAL DAILY
Qty: 0 | Refills: 0 | Status: DISCONTINUED | OUTPATIENT
Start: 2017-08-22 | End: 2017-08-24

## 2017-08-22 RX ADMIN — Medication 1: at 21:44

## 2017-08-22 RX ADMIN — Medication 1 MILLIGRAM(S): at 12:14

## 2017-08-22 RX ADMIN — MIDODRINE HYDROCHLORIDE 10 MILLIGRAM(S): 2.5 TABLET ORAL at 12:14

## 2017-08-22 RX ADMIN — Medication 1: at 17:41

## 2017-08-22 RX ADMIN — GABAPENTIN 300 MILLIGRAM(S): 400 CAPSULE ORAL at 21:43

## 2017-08-22 RX ADMIN — Medication 667 MILLIGRAM(S): at 17:40

## 2017-08-22 RX ADMIN — MIDODRINE HYDROCHLORIDE 10 MILLIGRAM(S): 2.5 TABLET ORAL at 05:26

## 2017-08-22 RX ADMIN — Medication 325 MILLIGRAM(S): at 17:40

## 2017-08-22 RX ADMIN — Medication 1 TABLET(S): at 12:15

## 2017-08-22 RX ADMIN — PANTOPRAZOLE SODIUM 40 MILLIGRAM(S): 20 TABLET, DELAYED RELEASE ORAL at 12:14

## 2017-08-22 RX ADMIN — Medication 500 MILLIGRAM(S): at 19:11

## 2017-08-22 RX ADMIN — GABAPENTIN 300 MILLIGRAM(S): 400 CAPSULE ORAL at 05:26

## 2017-08-22 RX ADMIN — GABAPENTIN 300 MILLIGRAM(S): 400 CAPSULE ORAL at 12:15

## 2017-08-22 RX ADMIN — MIDODRINE HYDROCHLORIDE 10 MILLIGRAM(S): 2.5 TABLET ORAL at 21:43

## 2017-08-22 RX ADMIN — Medication 667 MILLIGRAM(S): at 09:30

## 2017-08-22 RX ADMIN — Medication 667 MILLIGRAM(S): at 12:15

## 2017-08-22 RX ADMIN — Medication 325 MILLIGRAM(S): at 09:30

## 2017-08-22 RX ADMIN — Medication 325 MILLIGRAM(S): at 12:14

## 2017-08-22 RX ADMIN — TAMSULOSIN HYDROCHLORIDE 0.4 MILLIGRAM(S): 0.4 CAPSULE ORAL at 21:43

## 2017-08-22 RX ADMIN — Medication 1 DROP(S): at 05:29

## 2017-08-22 RX ADMIN — Medication 1 DROP(S): at 17:40

## 2017-08-22 RX ADMIN — Medication 667 MILLIGRAM(S): at 21:43

## 2017-08-22 RX ADMIN — ZINC SULFATE TAB 220 MG (50 MG ZINC EQUIVALENT) 220 MILLIGRAM(S): 220 (50 ZN) TAB at 21:44

## 2017-08-22 RX ADMIN — CALCITRIOL 0.25 MICROGRAM(S): 0.5 CAPSULE ORAL at 12:15

## 2017-08-22 RX ADMIN — Medication 100 MILLIGRAM(S): at 21:43

## 2017-08-22 RX ADMIN — Medication 1: at 12:14

## 2017-08-22 NOTE — PROGRESS NOTE ADULT - PROBLEM SELECTOR PLAN 3
HD on 8/21 took off 1.75 liters, pt normal days T/Th/Sat  currently R fem HD catheter placed 8/18, will need new permacath pending ID clearance, will get cardiology clearance for that.

## 2017-08-22 NOTE — PROGRESS NOTE ADULT - SUBJECTIVE AND OBJECTIVE BOX
EMILY LITTLEJOHN    [id number]    87y      Male    Patient is a 87y old  Male who presents with a chief complaint of hypervolemia (13 Aug 2017 07:37)      INTERVAL HPI/OVERNIGHT EVENTS:    REVIEW OF SYSTEMS:    CONSTITUTIONAL: No fever, weight loss, or fatigue  RESPIRATORY: No cough, wheezing, hemoptysis; No shortness of breath  CARDIOVASCULAR: No chest pain, palpitations  GASTROINTESTINAL: No abdominal or epigastric pain. No nausea, vomiting  NEUROLOGICAL: No headaches,  loss of strength.  MISCELLANEOUS: No joint swelling or pain       Vital Signs Last 24 Hrs  T(C): 36.7 (22 Aug 2017 07:00), Max: 36.7 (22 Aug 2017 07:00)  T(F): 98.1 (22 Aug 2017 07:00), Max: 98.1 (22 Aug 2017 07:00)  HR: 100 (22 Aug 2017 10:00) (93 - 103)  RR: 22 (22 Aug 2017 10:00) (12 - 27)  SpO2: 100% (22 Aug 2017 10:00) (99% - 100%)    PHYSICAL EXAM:    GENERAL: Elderly male looking   HEENT: PERRL, +EOMI  NECK: soft, Supple, No JVD,   CHEST/LUNG: Clear to auscultate bilaterally; No wheezing  HEART: S1S2+, Regular rate and rhythm; No murmurs, rubs, or gallops  ABDOMEN: Soft, Nontender, Nondistended; Bowel sounds present  EXTREMITIES:  2+ Peripheral Pulses, No clubbing, cyanosis, or edema  SKIN: No rashes or lesions  NEURO: AAOX3, no focal deficits, no motor r sensory loss  PSYCH: normal mood      LABS:                        8.0    4.6   )-----------( 63       ( 22 Aug 2017 03:51 )             25.9     08-22    143  |  103  |  21.0<H>  ----------------------------<  120<H>  4.3   |  22.0  |  4.79<H>    Ca    8.8      22 Aug 2017 03:51  Mg     2.3     08-22              I&O's Summary    21 Aug 2017 07:01  -  22 Aug 2017 07:00  --------------------------------------------------------  IN: 150 mL / OUT: 0 mL / NET: 150 mL    22 Aug 2017 07:01  -  22 Aug 2017 11:14  --------------------------------------------------------  IN: 120 mL / OUT: 0 mL / NET: 120 mL        MEDICATIONS  (STANDING):  sodium chloride 0.9%. 1000 milliLiter(s) (5 mL/Hr) IV Continuous <Continuous>  pantoprazole  Injectable 40 milliGRAM(s) IV Push daily  docusate sodium 100 milliGRAM(s) Oral three times a day  tamsulosin 0.4 milliGRAM(s) Oral at bedtime  gabapentin 300 milliGRAM(s) Oral three times a day  ferrous    sulfate 325 milliGRAM(s) Oral three times a day with meals  artificial  tears Solution 1 Drop(s) Both EYES two times a day  calcium acetate 667 milliGRAM(s) Oral four times a day with meals  folic acid 1 milliGRAM(s) Oral daily  Nephro-viviana 1 Tablet(s) Oral daily  insulin lispro (HumaLOG) corrective regimen sliding scale   SubCutaneous Before meals and at bedtime  dextrose 5%. 1000 milliLiter(s) (50 mL/Hr) IV Continuous <Continuous>  dextrose 50% Injectable 12.5 Gram(s) IV Push once  dextrose 50% Injectable 25 Gram(s) IV Push once  dextrose 50% Injectable 25 Gram(s) IV Push once  ergocalciferol 17009 Unit(s) Oral every week  calcitriol   Capsule 0.25 MICROGram(s) Oral daily  midodrine 10 milliGRAM(s) Oral three times a day  epoetin una Injectable 64821 Unit(s) IV Push <User Schedule>  albumin human 25% IVPB 50 milliLiter(s) IV Intermittent <User Schedule>    MEDICATIONS  (PRN):  acetaminophen   Tablet 650 milliGRAM(s) Oral every 6 hours PRN For Temp greater than 38 C (100.4 F)  dextrose Gel 1 Dose(s) Oral once PRN Blood Glucose LESS THAN 70 milliGRAM(s)/deciLiter  glucagon  Injectable 1 milliGRAM(s) IntraMuscular once PRN Glucose <70 milliGRAM(s)/deciLiter

## 2017-08-22 NOTE — PROGRESS NOTE ADULT - PROBLEM SELECTOR PLAN 4
cont neuro precautions, may be out of bed as per neurosurgery only with C-Collar  cont C collar, pain geoff as needed

## 2017-08-22 NOTE — PROGRESS NOTE ADULT - SUBJECTIVE AND OBJECTIVE BOX
Pt without new complaints. Anxious to go home.   s/p extraction now with woundvac in place.    MEDICATIONS  (STANDING):  sodium chloride 0.9%. 1000 milliLiter(s) (5 mL/Hr) IV Continuous <Continuous>  docusate sodium 100 milliGRAM(s) Oral three times a day  tamsulosin 0.4 milliGRAM(s) Oral at bedtime  gabapentin 300 milliGRAM(s) Oral three times a day  ferrous    sulfate 325 milliGRAM(s) Oral three times a day with meals  artificial  tears Solution 1 Drop(s) Both EYES two times a day  calcium acetate 667 milliGRAM(s) Oral four times a day with meals  folic acid 1 milliGRAM(s) Oral daily  Nephro-viviana 1 Tablet(s) Oral daily  insulin lispro (HumaLOG) corrective regimen sliding scale   SubCutaneous Before meals and at bedtime  dextrose 5%. 1000 milliLiter(s) (50 mL/Hr) IV Continuous <Continuous>  dextrose 50% Injectable 12.5 Gram(s) IV Push once  dextrose 50% Injectable 25 Gram(s) IV Push once  dextrose 50% Injectable 25 Gram(s) IV Push once  ergocalciferol 67323 Unit(s) Oral every week  calcitriol   Capsule 0.25 MICROGram(s) Oral daily  midodrine 10 milliGRAM(s) Oral three times a day  epoetin una Injectable 05995 Unit(s) IV Push <User Schedule>  albumin human 25% IVPB 50 milliLiter(s) IV Intermittent <User Schedule>  ascorbic acid 500 milliGRAM(s) Oral daily  zinc sulfate 220 milliGRAM(s) Oral daily  pantoprazole    Tablet 40 milliGRAM(s) Oral before breakfast    MEDICATIONS  (PRN):  acetaminophen   Tablet 650 milliGRAM(s) Oral every 6 hours PRN For Temp greater than 38 C (100.4 F)  dextrose Gel 1 Dose(s) Oral once PRN Blood Glucose LESS THAN 70 milliGRAM(s)/deciLiter  glucagon  Injectable 1 milliGRAM(s) IntraMuscular once PRN Glucose <70 milliGRAM(s)/deciLiter      Allergies    No Known Allergies    Intolerances      PAST MEDICAL & SURGICAL HISTORY:  Pubic ramus fracture, right, sequela  Compression fracture of C-spine: c4  Hyperlipidemia  ESRD on dialysis  Dementia  Muscle weakness (generalized)  Diabetes mellitus  Hypertension  ICD (implantable cardioverter-defibrillator), biventricular, in situ      Vital Signs Last 24 Hrs  T(C): 37.1 (22 Aug 2017 16:40), Max: 37.1 (22 Aug 2017 16:40)  T(F): 98.7 (22 Aug 2017 16:40), Max: 98.7 (22 Aug 2017 16:40)  HR: 108 (22 Aug 2017 16:16) (93 - 108)  BP: 98/56 (22 Aug 2017 16:16) (98/51 - 105/55)  BP(mean): 70 (22 Aug 2017 15:00) (70 - 75)  RR: 19 (22 Aug 2017 16:16) (14 - 27)  SpO2: 100% (22 Aug 2017 16:16) (99% - 100%)    Physical Exam:    GENERAL: Elderly male looking   HEENT: PERRL, +EOMI  NECK: in neck collar  CHEST/LUNG: Clear to auscultate bilaterally; No wheezing  HEART: S1S2+, Regular rate and rhythm; No murmurs, rubs, or gallops  ABDOMEN: Soft, Nontender, Nondistended; Bowel sounds present  EXTREMITIES:  wound vac in left chest. appears clean.   NEURO confused, nonfocal                8.0  4.6   )-----------( 63       ( 22 Aug 2017 03:51 )             25.9     08-22    143  |  103  |  21.0<H>  ----------------------------<  120<H>  4.3   |  22.0  |  4.79<H>    Ca    8.8      22 Aug 2017 03:51  Mg     2.3     08-22            RADIOLOGY & ADDITIONAL TESTS:

## 2017-08-22 NOTE — PROGRESS NOTE ADULT - PROBLEM SELECTOR PLAN 3
HD on 8/21 took off 1.75 liters.  pt normal days T/Th/Sat  currently R fem HD catheter placed 8/18  will need new permacath pending ID clearance

## 2017-08-22 NOTE — PROGRESS NOTE ADULT - PROBLEM SELECTOR PLAN 1
CoNS bacteremia during prior hospital stay; on on Vanco since 8/12/17, device removed 8/17/17  - all blood cultures negative. All AICD Lead culture negative (8/17/17)  - continue Vancomycin by level; repeat Vanco level tomorrow,  Give dose if level at 17.5 or lower.

## 2017-08-22 NOTE — PROGRESS NOTE ADULT - PROBLEM SELECTOR PLAN 1
s/p AICD extraction  vac placed 8/18 (change monday)  cont ABx w/ HD per ID  Check Vanco level in Am.   Hold vanco for now due to elevated level

## 2017-08-22 NOTE — PROGRESS NOTE ADULT - SUBJECTIVE AND OBJECTIVE BOX
NEPHROLOGY INTERVAL HPI/OVERNIGHT EVENTS: No new events overnite.    MEDICATIONS  (STANDING):  sodium chloride 0.9%. 1000 milliLiter(s) (5 mL/Hr) IV Continuous <Continuous>  pantoprazole  Injectable 40 milliGRAM(s) IV Push daily  docusate sodium 100 milliGRAM(s) Oral three times a day  tamsulosin 0.4 milliGRAM(s) Oral at bedtime  gabapentin 300 milliGRAM(s) Oral three times a day  ferrous    sulfate 325 milliGRAM(s) Oral three times a day with meals  erythromycin   Ointment 1 Application(s) Right EYE at bedtime  artificial  tears Solution 1 Drop(s) Both EYES two times a day  calcium acetate 667 milliGRAM(s) Oral four times a day with meals  folic acid 1 milliGRAM(s) Oral daily  Nephro-viviana 1 Tablet(s) Oral daily  norepinephrine Infusion 0.008 MICROgram(s)/kG/Min (1 mL/Hr) IV Continuous <Continuous>  insulin lispro (HumaLOG) corrective regimen sliding scale   SubCutaneous Before meals and at bedtime  dextrose 5%. 1000 milliLiter(s) (50 mL/Hr) IV Continuous <Continuous>  dextrose 50% Injectable 12.5 Gram(s) IV Push once  dextrose 50% Injectable 25 Gram(s) IV Push once  dextrose 50% Injectable 25 Gram(s) IV Push once  ergocalciferol 77824 Unit(s) Oral every week  calcitriol   Capsule 0.25 MICROGram(s) Oral daily  midodrine 10 milliGRAM(s) Oral three times a day  epoetin una Injectable 49717 Unit(s) IV Push <User Schedule>    MEDICATIONS  (PRN):  acetaminophen   Tablet 650 milliGRAM(s) Oral every 6 hours PRN For Temp greater than 38 C (100.4 F)  dextrose Gel 1 Dose(s) Oral once PRN Blood Glucose LESS THAN 70 milliGRAM(s)/deciLiter  glucagon  Injectable 1 milliGRAM(s) IntraMuscular once PRN Glucose <70 milliGRAM(s)/deciLiter  fentaNYL    Injectable 25 MICROGram(s) IV Push every 10 minutes PRN Severe Pain (7 - 10)      Allergies    No Known Allergies    Intolerances      ICU Vital Signs Last 24 Hrs  T(C): 36.7 (22 Aug 2017 07:00), Max: 36.8 (21 Aug 2017 11:00)  T(F): 98.1 (22 Aug 2017 07:00), Max: 98.2 (21 Aug 2017 11:00)  HR: 95 (22 Aug 2017 08:00) (93 - 103)  BP: --  BP(mean): --  ABP: 116/44 (22 Aug 2017 08:00) (101/32 - 132/52)  ABP(mean): 71 (22 Aug 2017 08:00) (58 - 78)  RR: 15 (22 Aug 2017 08:00) (12 - 28)  SpO2: 99% (22 Aug 2017 08:00) (92% - 100%)  T(C): 36.4 (21 Aug 2017 07:00), Max: 36.5 (21 Aug 2017 06:00)  T(F): 97.5 (21 Aug 2017 07:00), Max: 97.7 (21 Aug 2017 06:00)  HR: 101 (21 Aug 2017 08:00) (96 - 112)  BP: --  BP(mean): --  RR: 21 (21 Aug 2017 08:00) (12 - 24)  SpO2: 99% (21 Aug 2017 08:00) (96% - 100%)  Daily     Daily Weight in k.3 (21 Aug 2017 05:00)    PHYSICAL EXAM:    GENERAL: appears chronically ill  HEAD:  same  EYES:   ENMT:   NECK: brace noted  NERVOUS SYSTEM:  alert, verbal in ICU bed  CHEST/LUNG: no wheezes, PO 97%; vac. left  upper chest  HEART: regular rate, no rub  ABDOMEN: soft, not tender  EXTREMITIES:  bilateral fem. caths  noted with iv fluid  left  LYMPH:   SKIN: no rash    LABS:      143  |  103  |  21.0<H>  ----------------------------<  120<H>  4.3   |  22.0  |  4.79<H>    Ca    8.8      22 Aug 2017 03:51  Mg     2.3                                 8.1    5.2   )-----------( 72       ( 21 Aug 2017 02:35 )             26.7     08    140  |  100  |  29.0<H>  ----------------------------<  127<H>  4.3   |  20.0<L>  |  5.59<H>    Ca    9.0      21 Aug 2017 02:35  Mg     2.5               Magnesium, Serum: 2.5 mg/dL ( @ 02:35)          RADIOLOGY & ADDITIONAL TESTS:

## 2017-08-22 NOTE — PROGRESS NOTE ADULT - SUBJECTIVE AND OBJECTIVE BOX
87y Male s/p Laser lead extraction  Catheter removal, tunneled central venous, without port      Subjective:  "Leave me alone, I can reach the railing by myself"    T(C): 36.4 (08-21-17 @ 16:00), Max: 36.8 (08-21-17 @ 11:00)  HR: 95 (08-22-17 @ 00:00) (94 - 103)  ABP: 104/42 (08-22-17 @ 00:00) (101/32 - 132/52)  ABP(mean): 64 (08-22-17 @ 00:00) (58 - 77)  RR: 14 (08-22-17 @ 00:00) (12 - 28)  SpO2: 100% (08-22-17 @ 00:00) (92% - 100%)        08-21    140  |  100  |  29.0<H>  ----------------------------<  127<H>  4.3   |  20.0<L>  |  5.59<H>    Ca    9.0      21 Aug 2017 02:35  Mg     2.5     08-21                                 8.1    5.2   )-----------( 72       ( 21 Aug 2017 02:35 )             26.7                     CAPILLARY BLOOD GLUCOSE  166 (21 Aug 2017 11:00)               CXR: < from: Xray Chest 1 View AP/PA. (08.21.17 @ 05:11) >  Impression:  Increasing congestive heart failure..    < end of copied text >      I&O's Detail    20 Aug 2017 07:01  -  21 Aug 2017 07:00  --------------------------------------------------------  IN:    norepinephrine Infusion: 199 mL  Total IN: 199 mL    OUT:    VAC (Vacuum Assisted Closure) System: 25 mL  Total OUT: 25 mL    Total NET: 174 mL      21 Aug 2017 07:01  -  22 Aug 2017 01:52  --------------------------------------------------------  IN:    norepinephrine Infusion: 130 mL    sodium chloride 0.9%.: 20 mL  Total IN: 150 mL    OUT:  Total OUT: 0 mL    Total NET: 150 mL          MEDICATIONS  (STANDING):  sodium chloride 0.9%. 1000 milliLiter(s) (5 mL/Hr) IV Continuous <Continuous>  pantoprazole  Injectable 40 milliGRAM(s) IV Push daily  docusate sodium 100 milliGRAM(s) Oral three times a day  tamsulosin 0.4 milliGRAM(s) Oral at bedtime  gabapentin 300 milliGRAM(s) Oral three times a day  ferrous    sulfate 325 milliGRAM(s) Oral three times a day with meals  artificial  tears Solution 1 Drop(s) Both EYES two times a day  calcium acetate 667 milliGRAM(s) Oral four times a day with meals  folic acid 1 milliGRAM(s) Oral daily  Nephro-viviana 1 Tablet(s) Oral daily  insulin lispro (HumaLOG) corrective regimen sliding scale   SubCutaneous Before meals and at bedtime  ergocalciferol 43286 Unit(s) Oral every week  calcitriol   Capsule 0.25 MICROGram(s) Oral daily  midodrine 10 milliGRAM(s) Oral three times a day  epoetin una Injectable 34271 Unit(s) IV Push <User Schedule>  albumin human 25% IVPB 50 milliLiter(s) IV Intermittent <User Schedule>    MEDICATIONS  (PRN):  acetaminophen   Tablet 650 milliGRAM(s) Oral every 6 hours PRN For Temp greater than 38 C (100.4 F)  dextrose Gel 1 Dose(s) Oral once PRN Blood Glucose LESS THAN 70 milliGRAM(s)/deciLiter  glucagon  Injectable 1 milliGRAM(s) IntraMuscular once PRN Glucose <70 milliGRAM(s)/deciLiter      Physical Exam  Neuro: Awake, alert, conversational, mildly agitated at times.  C collar on, OROZCO to command  Pulm: CTA, equal bilaterally  CV: RRR, +S1S2  Abd: soft, NT, ND,   Ext: OROZCO x 4, no edema  Inc: Wound vac in place producing proper suction, without drainage

## 2017-08-22 NOTE — PROGRESS NOTE ADULT - ASSESSMENT
This 87 y.o. man from SNF,  dementia, HTN, HLD, DM, chronic systolic HFrEF:20-25% (July 2017), ICM s/p bi vent AICD, ESRD on HD TTS, recently treated for CoNS bacteremia at [facility], admitted after missing dialysis after accidental fall during transport HD facility and sustained a C4 fracture and right pubic ramus fx; found with FEVER 101. on admission.     found with AICD Pocket collection, s/p removal of AICD 8/17/17, and also removal of subclavian HD catheter,  ALL Cultures remain negative, thus far.

## 2017-08-22 NOTE — PROGRESS NOTE ADULT - PROBLEM SELECTOR PLAN 1
s/p AICD extraction, has wound Vac  placed 8/18 (change monday), will cont ABx w/ HD per ID, Check Vanco level in Am and if level around 17 will give dose if higher will hold,  Hold vanco for now due to elevated level, will continue with cardiac monitor.

## 2017-08-22 NOTE — PROGRESS NOTE ADULT - ASSESSMENT
86 yo male with ischemic cmp, LBBB s/p CRT-D implant in 2015, now with bacteremia and sepsis with ICD pocket infection. Now s/p MDT BIV ICD system extraction and removal of right permacath. Now clinically improving. Likely more septic post device removal from acute spread of infection, now stable. Wound vac in place.   -Can offer LifeVest on discharge, but may not tolerate it well.   -PICC line and antibiotic course per ID. Please place PICC on LEFT side, and save right side for eventual device reimplant.   -outpatient followup for possible device reimplant after completion of antibiotics.   -call with questions. Thx

## 2017-08-22 NOTE — PROGRESS NOTE ADULT - ASSESSMENT
86 yo male with pmhx dementia, ESRD on HD via R permacath (Dr Pike-4/2017) on HD T TH Sat(Trs[facility]), hypotension, HLD, DM, CAD/CABG 2012, ICM, HFrEF ~25% s/p MDT BIVICD ([facility]/1/6/2015), recent C4 and pubic Fx secondary to an accident/fall with transportation to HD. [facility] and was a code sepsis night of admission. He was recently hospitalized at [facility] with coag neg bacteremia 7/23/17-8/2/17, being treated with IV vanco that was to be continued through 9/2/17. It is unclear if vanco was received post discharge. Pt underwent  LSC AICD extraction, permatcath removal on 8/17 w/ Dr. Anderson, postop hypercapnea requiring bipap, now resolved and vasoplegia requiring intermittent pressors. 8/18 pt hypotensive with increasing pressor requirements, left femoral juan and TLC placed. 8/18 Pt while on hemodialysis requiring pressor support after being off levophed. Patient initially NPO pending modified barium swallow, which patient passed, and diet started with constant supervision & aspiration precautions

## 2017-08-22 NOTE — PROGRESS NOTE ADULT - ASSESSMENT
88 yo male with pmhx dementia, ESRD on HD via R permacath (Dr Pike-4/2017) on HD T TH Sat(Trish), hypotension, HLD, DM, CAD/CABG 2012, ICM, HFrEF ~25% s/p MDT BIVICD ([facility]/1/6/2015), recent C4 and pubic Fx secondary to an accident/fall with transportation to HD. [facility] and was a code sepsis night of admission. He was recently hospitalized at [facility] with coag neg bacteremia 7/23/17-8/2/17, being treated with IV vanco that was to be continued through 9/2/17. It is unclear if vanco was received post discharge. Pt underwent  LSC AICD extraction, permatcath removal on 8/17 w/ Dr. Anderson, postop hypercapnea requiring bipap, now resolved and vasoplegia requiring intermittent pressors. 8/18 pt hypotensive with increasing pressor requirements, left femoral juan and TLC placed. 8/18 Pt while on hemodialysis requiring pressor support after being off levophed, BP is better on midodrine, Patient initially was NPO pending modified barium swallow, which patient passed, and diet started with constant supervision & aspiration precautions, no being continued on antibiotics, has wound Vac on AICD removal place.

## 2017-08-22 NOTE — PROGRESS NOTE ADULT - SUBJECTIVE AND OBJECTIVE BOX
[facility]  INFECTIOUS DISEASES [facility]  =======================================================  Scotty Pennington MD  Diplomates American Board of Internal Medicine and Infectious Diseases  =======================================================    EMILY LITTLEJOHN [id number]  chief complaint: possible AICD pocket infection, ESRD on HD    patient seen and examined in follow up.  Chart and labs reviewed.   remains in CTICU.  vanco trough at 24 today.     =======================================================  Allergies:  No Known Allergies    =======================================================  MEDICATIONS  (STANDING):  sodium chloride 0.9%. 1000 milliLiter(s) (5 mL/Hr) IV Continuous <Continuous>  pantoprazole  Injectable 40 milliGRAM(s) IV Push daily  docusate sodium 100 milliGRAM(s) Oral three times a day  tamsulosin 0.4 milliGRAM(s) Oral at bedtime  gabapentin 300 milliGRAM(s) Oral three times a day  ferrous    sulfate 325 milliGRAM(s) Oral three times a day with meals  artificial  tears Solution 1 Drop(s) Both EYES two times a day  calcium acetate 667 milliGRAM(s) Oral four times a day with meals  folic acid 1 milliGRAM(s) Oral daily  Nephro-viviana 1 Tablet(s) Oral daily  insulin lispro (HumaLOG) corrective regimen sliding scale   SubCutaneous Before meals and at bedtime  dextrose 5%. 1000 milliLiter(s) (50 mL/Hr) IV Continuous <Continuous>  dextrose 50% Injectable 12.5 Gram(s) IV Push once  dextrose 50% Injectable 25 Gram(s) IV Push once  dextrose 50% Injectable 25 Gram(s) IV Push once  ergocalciferol 49002 Unit(s) Oral every week  calcitriol   Capsule 0.25 MICROGram(s) Oral daily  midodrine 10 milliGRAM(s) Oral three times a day  epoetin una Injectable 53009 Unit(s) IV Push <User Schedule>  albumin human 25% IVPB 50 milliLiter(s) IV Intermittent <User Schedule>    MEDICATIONS  (PRN):  acetaminophen   Tablet 650 milliGRAM(s) Oral every 6 hours PRN For Temp greater than 38 C (100.4 F)  dextrose Gel 1 Dose(s) Oral once PRN Blood Glucose LESS THAN 70 milliGRAM(s)/deciLiter  glucagon  Injectable 1 milliGRAM(s) IntraMuscular once PRN Glucose <70 milliGRAM(s)/deciLiter     =======================================================     REVIEW OF SYSTEMS:  CONSTITUTIONAL:  No Fever or chills  HEENT:   No diplopia or blurred vision.  No earache, sore throat or runny nose.  CARDIOVASCULAR:  No pressure, squeezing, strangling, tightness, heaviness or aching about the chest, neck, axilla or epigastrium.  RESPIRATORY:  No cough, shortness of breath, PND or orthopnea.  GASTROINTESTINAL:  No nausea, vomiting or diarrhea.  GENITOURINARY:  No dysuria, frequency or urgency. No Blood in urine  MUSCULOSKELETAL:  no joint aches, no muscle pain  SKIN:  No change in skin, hair or nails.  NEUROLOGIC:  No paresthesias, fasciculations, seizures or weakness.  PSYCHIATRIC:  No disorder of thought or mood.  ENDOCRINE:  No heat or cold intolerance, polyuria or polydipsia.  HEMATOLOGICAL:  No easy bruising or bleeding.     =======================================================     Physical Exam:  ICU Vital Signs Last 24 Hrs  T(C): 36.7 (22 Aug 2017 07:00), Max: 36.8 (21 Aug 2017 11:00)  T(F): 98.1 (22 Aug 2017 07:00), Max: 98.2 (21 Aug 2017 11:00)  HR: 100 (22 Aug 2017 10:00) (93 - 103)  ABP: 118/43 (22 Aug 2017 10:00) (104/42 - 132/52)  ABP(mean): 73 (22 Aug 2017 10:00) (64 - 78)  RR: 22 (22 Aug 2017 10:00) (12 - 27)  SpO2: 100% (22 Aug 2017 10:00) (99% - 100%)    GEN: NAD, pleasant, TALL THIN AA  HEENT: normocephalic and atraumatic. EOMI. LUCY.  DRY OM POOR DENTITION;   NECK: Supple. No carotid bruits.  No lymphadenopathy or thyromegaly. NECK BRACE IN PLACE  LUNGS: GOOD AIR ENTRY  CHEST: LEFT chest with vac dressing, right chest at prior tunnel catheter site with dressing in olace  HEART: Regular rate and rhythm without murmur.  ABDOMEN: Soft, nontender, and nondistended.  Positive bowel sounds.    : No CVA tenderness. no Mario  INGUINAL: LEFT side with TLC catheter and artery line; RIGHT side with HD catheter  EXTREMITIES: Without any cyanosis, clubbing, rash, lesions or edema.  MSK: no joint swelling  NEUROLOGIC: Cranial nerves II through XII are grossly intact.  PSYCHIATRIC: Appropriate affect .  SKIN: as above    =======================================================      08-22    143  |  103  |  21.0<H>  ----------------------------<  120<H>  4.3   |  22.0  |  4.79<H>    Ca    8.8      22 Aug 2017 03:51  Mg     2.3     08-22                            8.0    4.6   )-----------( 63       ( 22 Aug 2017 03:51 )             25.9            CAPILLARY BLOOD GLUCOSE  131 (22 Aug 2017 09:29)  166 (21 Aug 2017 11:00)      Vancomycin Level, Trough (08.22.17 @ 07:29)    Vancomycin Level, Trough: 24.3: Vancomycin trough levels should be rapidly reached and maintained at  15-20 ug/ml for life threatening MRSA  infections such as sepsis, endocarditis, osteomyelitis and pneumonia. A  first trough level should be drawn  before the 3rd or 4th dose.  Risk24.3:  of renal toxicity is increased for levels >15 ug/ml, in patients on  other nephrotoxic drugs, who are  hemodynamically unstable, have unstable renal function, or are on  Vancomycin therapy for >14 days. Renal function with  creatinine levels should b24.3: e monitored for those patients. ug/mL

## 2017-08-23 LAB
ANION GAP SERPL CALC-SCNC: 15 MMOL/L — SIGNIFICANT CHANGE UP (ref 5–17)
BUN SERPL-MCNC: 32 MG/DL — HIGH (ref 8–20)
CALCIUM SERPL-MCNC: 9.4 MG/DL — SIGNIFICANT CHANGE UP (ref 8.6–10.2)
CHLORIDE SERPL-SCNC: 104 MMOL/L — SIGNIFICANT CHANGE UP (ref 98–107)
CO2 SERPL-SCNC: 24 MMOL/L — SIGNIFICANT CHANGE UP (ref 22–29)
CREAT SERPL-MCNC: 5.97 MG/DL — HIGH (ref 0.5–1.3)
GLUCOSE SERPL-MCNC: 112 MG/DL — SIGNIFICANT CHANGE UP (ref 70–115)
HCT VFR BLD CALC: 25.5 % — LOW (ref 42–52)
HGB BLD-MCNC: 7.8 G/DL — LOW (ref 14–18)
MCHC RBC-ENTMCNC: 26.9 PG — LOW (ref 27–31)
MCHC RBC-ENTMCNC: 30.6 G/DL — LOW (ref 32–36)
MCV RBC AUTO: 87.9 FL — SIGNIFICANT CHANGE UP (ref 80–94)
PLATELET # BLD AUTO: 76 K/UL — LOW (ref 150–400)
POTASSIUM SERPL-MCNC: 4.3 MMOL/L — SIGNIFICANT CHANGE UP (ref 3.5–5.3)
POTASSIUM SERPL-SCNC: 4.3 MMOL/L — SIGNIFICANT CHANGE UP (ref 3.5–5.3)
RBC # BLD: 2.9 M/UL — LOW (ref 4.6–6.2)
RBC # FLD: 17.2 % — HIGH (ref 11–15.6)
SODIUM SERPL-SCNC: 143 MMOL/L — SIGNIFICANT CHANGE UP (ref 135–145)
VANCOMYCIN TROUGH SERPL-MCNC: 25.7 UG/ML — CRITICAL HIGH (ref 10–20)
WBC # BLD: 5.1 K/UL — SIGNIFICANT CHANGE UP (ref 4.8–10.8)
WBC # FLD AUTO: 5.1 K/UL — SIGNIFICANT CHANGE UP (ref 4.8–10.8)

## 2017-08-23 PROCEDURE — 99233 SBSQ HOSP IP/OBS HIGH 50: CPT

## 2017-08-23 PROCEDURE — 71010: CPT | Mod: 26

## 2017-08-23 RX ADMIN — PANTOPRAZOLE SODIUM 40 MILLIGRAM(S): 20 TABLET, DELAYED RELEASE ORAL at 05:40

## 2017-08-23 RX ADMIN — Medication 1 DROP(S): at 05:41

## 2017-08-23 RX ADMIN — MIDODRINE HYDROCHLORIDE 10 MILLIGRAM(S): 2.5 TABLET ORAL at 21:49

## 2017-08-23 RX ADMIN — Medication 100 MILLIGRAM(S): at 05:40

## 2017-08-23 RX ADMIN — Medication 1: at 21:49

## 2017-08-23 RX ADMIN — Medication 1 DROP(S): at 21:49

## 2017-08-23 RX ADMIN — Medication 667 MILLIGRAM(S): at 08:07

## 2017-08-23 RX ADMIN — Medication 500 MILLIGRAM(S): at 11:27

## 2017-08-23 RX ADMIN — Medication 1 MILLIGRAM(S): at 11:28

## 2017-08-23 RX ADMIN — MIDODRINE HYDROCHLORIDE 10 MILLIGRAM(S): 2.5 TABLET ORAL at 05:40

## 2017-08-23 RX ADMIN — GABAPENTIN 300 MILLIGRAM(S): 400 CAPSULE ORAL at 21:49

## 2017-08-23 RX ADMIN — TAMSULOSIN HYDROCHLORIDE 0.4 MILLIGRAM(S): 0.4 CAPSULE ORAL at 21:49

## 2017-08-23 RX ADMIN — Medication 100 MILLIGRAM(S): at 21:49

## 2017-08-23 RX ADMIN — MIDODRINE HYDROCHLORIDE 10 MILLIGRAM(S): 2.5 TABLET ORAL at 15:58

## 2017-08-23 RX ADMIN — Medication 667 MILLIGRAM(S): at 21:49

## 2017-08-23 RX ADMIN — GABAPENTIN 300 MILLIGRAM(S): 400 CAPSULE ORAL at 05:40

## 2017-08-23 RX ADMIN — Medication 667 MILLIGRAM(S): at 19:40

## 2017-08-23 RX ADMIN — ERYTHROPOIETIN 10000 UNIT(S): 10000 INJECTION, SOLUTION INTRAVENOUS; SUBCUTANEOUS at 15:28

## 2017-08-23 RX ADMIN — Medication 325 MILLIGRAM(S): at 08:07

## 2017-08-23 RX ADMIN — ZINC SULFATE TAB 220 MG (50 MG ZINC EQUIVALENT) 220 MILLIGRAM(S): 220 (50 ZN) TAB at 11:27

## 2017-08-23 RX ADMIN — Medication 50 MILLILITER(S): at 15:29

## 2017-08-23 RX ADMIN — Medication 325 MILLIGRAM(S): at 19:40

## 2017-08-23 RX ADMIN — CALCITRIOL 0.25 MICROGRAM(S): 0.5 CAPSULE ORAL at 11:27

## 2017-08-23 RX ADMIN — Medication 1 TABLET(S): at 11:27

## 2017-08-23 NOTE — PROGRESS NOTE ADULT - ASSESSMENT
86 yo male with pmhx dementia, ESRD on HD via R permacath (Dr Pike-4/2017) on HD T TH Sat(Trish), hypotension, HLD, DM, CAD/CABG 2012, ICM, HFrEF ~25% s/p MDT BIVICD ([facility]/1/6/2015), recent C4 and pubic Fx secondary to an accident/fall with transportation to HD. [facility] and was a code sepsis night of admission. He was recently hospitalized at [facility] with coag neg bacteremia 7/23/17-8/2/17, being treated with IV vanco that was to be continued through 9/2/17. It is unclear if vanco was received post discharge. Pt underwent  LSC AICD extraction, permatcath removal on 8/17 w/ Dr. Anderson, postop hypercapnea requiring bipap, now resolved and vasoplegia requiring intermittent pressors. 8/18 pt hypotensive with increasing pressor requirements, left femoral juan and TLC placed. 8/18 Pt while on hemodialysis requiring pressor support after being off levophed, BP is better on midodrine, Patient initially was NPO pending modified barium swallow, which patient passed, and diet started with constant supervision & aspiration precautions, no being continued on antibiotics, has wound Vac on AICD removal place.

## 2017-08-23 NOTE — PROGRESS NOTE ADULT - PROBLEM SELECTOR PLAN 4
cont neuro precautions, may be out of bed as per neurosurgery only with C-Collar  cont C collar, pain meds as needed

## 2017-08-23 NOTE — PROGRESS NOTE ADULT - SUBJECTIVE AND OBJECTIVE BOX
Patient was seen , awaiting to go on dialysis.   had some difficulty swallowing earlier per RN  No c/o CP SOB NV  no F/C  no swelling    T(C): 36.6 (08-23-17 @ 15:00), Max: 36.7 (08-23-17 @ 05:28)  HR: 98 (08-23-17 @ 15:00) (92 - 108)  BP: 103/44 (08-23-17 @ 15:00) (88/55 - 109/56)  Wt(kg): --  PE ;  NAD  lungs - CTA  CV gr 1 murmer,  No gallop or rub  Abd : soft, NT BS +, No masses  Ext- No edema  Neuro : Grossly intact, moving extremities                             7.8    5.1   )-----------( 76       ( 23 Aug 2017 06:15 )             25.5        08-23    143  |  104  |  32.0<H>  ----------------------------<  112  4.3   |  24.0  |  5.97<H>    Ca    9.4      23 Aug 2017 06:15  Mg     2.3     08-22        MEDICATIONS  (STANDING):  sodium chloride 0.9%.  docusate sodium  tamsulosin  gabapentin  ferrous    sulfate  artificial  tears Solution  calcium acetate  folic acid  Nephro-viviana  acetaminophen   Tablet PRN  insulin lispro (HumaLOG) corrective regimen sliding scale  dextrose 5%.  dextrose Gel PRN  dextrose 50% Injectable  dextrose 50% Injectable  dextrose 50% Injectable  glucagon  Injectable PRN  ergocalciferol  calcitriol   Capsule  midodrine  epoetin una Injectable  albumin human 25% IVPB  ascorbic acid  zinc sulfate  pantoprazole    Tablet      Patient stable  HD now  Plt low - watch  Continue

## 2017-08-23 NOTE — PROGRESS NOTE ADULT - PROBLEM SELECTOR PLAN 3
c/w HD as per routine via right groin catheter  Patient with normocytic anemia likely 2/2 aocd   Thrombocytopenia likely reactive, f/up cbc with diff to eval for clumping

## 2017-08-23 NOTE — PROGRESS NOTE ADULT - SUBJECTIVE AND OBJECTIVE BOX
Patient is a 87y old  Male who presents with a chief complaint of hypervolemia (13 Aug 2017 07:37)    Patient seen and examined at bedside. No acute distress and no acute overnight events. States that he is not in any pain. He was difficult to arouse this morning but when he did wake up, had no complaints. He has a c-collar in place. Lines as below     ROS: No chest pain, palpitations, sob, light headedness/dizziness, difficulty breathing/cough, fevers/chills, abdominal pain, n/v, diarrhea/constipation    Vital Signs Last 24 Hrs  T(C): 36.6 (23 Aug 2017 10:00), Max: 37.1 (22 Aug 2017 16:40)  T(F): 97.9 (23 Aug 2017 10:00), Max: 98.7 (22 Aug 2017 16:40)  HR: 104 (23 Aug 2017 10:00) (92 - 108)  BP: 88/55 (23 Aug 2017 10:00) (88/55 - 109/56)  BP(mean): 70 (22 Aug 2017 15:00) (70 - 70)  RR: 20 (23 Aug 2017 10:00) (17 - 20)  SpO2: 99% (23 Aug 2017 08:12) (99% - 100%)    c/s 139    PHYSICAL EXAM:    GENERAL: NAD. Laying in bed with c collar in place   HEENT: PERRL, +EOMI  CHEST/LUNG: Clear to auscultate bilaterally; No wheezing  HEART: S1S2+, Regular rate and rhythm; No murmurs, rubs, or gallops  ABDOMEN: Soft, Nontender, Nondistended; Bowel sounds present  EXTREMITIES:  2+ Peripheral Pulses, No clubbing, cyanosis, or edema  Skin: left chest wound vac in place. Right chest dressing in place. Left groin triple lumen noted and right groin HD catheter in place      LABS:                        7.8    5.1   )-----------( 76       ( 23 Aug 2017 06:15 )             25.5   08-23    143  |  104  |  32.0<H>  ----------------------------<  112  4.3   |  24.0  |  5.97<H>    Ca    9.4      23 Aug 2017 06:15  Mg     2.3     08-22    MEDICATIONS  (STANDING):  sodium chloride 0.9%. 1000 milliLiter(s) (5 mL/Hr) IV Continuous <Continuous>  docusate sodium 100 milliGRAM(s) Oral three times a day  tamsulosin 0.4 milliGRAM(s) Oral at bedtime  gabapentin 300 milliGRAM(s) Oral three times a day  ferrous    sulfate 325 milliGRAM(s) Oral three times a day with meals  artificial  tears Solution 1 Drop(s) Both EYES two times a day  calcium acetate 667 milliGRAM(s) Oral four times a day with meals  folic acid 1 milliGRAM(s) Oral daily  Nephro-viviana 1 Tablet(s) Oral daily  insulin lispro (HumaLOG) corrective regimen sliding scale   SubCutaneous Before meals and at bedtime  dextrose 5%. 1000 milliLiter(s) (50 mL/Hr) IV Continuous <Continuous>  dextrose 50% Injectable 12.5 Gram(s) IV Push once  dextrose 50% Injectable 25 Gram(s) IV Push once  dextrose 50% Injectable 25 Gram(s) IV Push once  ergocalciferol 95743 Unit(s) Oral every week  calcitriol   Capsule 0.25 MICROGram(s) Oral daily  midodrine 10 milliGRAM(s) Oral three times a day  epoetin una Injectable 59352 Unit(s) IV Push <User Schedule>  albumin human 25% IVPB 50 milliLiter(s) IV Intermittent <User Schedule>  ascorbic acid 500 milliGRAM(s) Oral daily  zinc sulfate 220 milliGRAM(s) Oral daily  pantoprazole    Tablet 40 milliGRAM(s) Oral before breakfast    MEDICATIONS  (PRN):  acetaminophen   Tablet 650 milliGRAM(s) Oral every 6 hours PRN For Temp greater than 38 C (100.4 F)  dextrose Gel 1 Dose(s) Oral once PRN Blood Glucose LESS THAN 70 milliGRAM(s)/deciLiter  glucagon  Injectable 1 milliGRAM(s) IntraMuscular once PRN Glucose <70 milliGRAM(s)/deciLiter

## 2017-08-23 NOTE — PROGRESS NOTE ADULT - PROBLEM SELECTOR PLAN 2
Recent blood cultues negative to date, cont vanco w/ HD as per ID.  Will not place PICC line as patient may need permanent HD access in the future   f/up with ID and Ct surgery. May need life vest on d/c

## 2017-08-24 ENCOUNTER — APPOINTMENT (OUTPATIENT)
Dept: NEUROSURGERY | Facility: CLINIC | Age: 82
End: 2017-08-24

## 2017-08-24 VITALS — OXYGEN SATURATION: 69 % | RESPIRATION RATE: 14 BRPM

## 2017-08-24 LAB
-  CEFTRIAXONE: SIGNIFICANT CHANGE UP
-  CLINDAMYCIN: SIGNIFICANT CHANGE UP
-  ERYTHROMYCIN: SIGNIFICANT CHANGE UP
-  PENICILLIN: SIGNIFICANT CHANGE UP
-  VANCOMYCIN: SIGNIFICANT CHANGE UP
ANION GAP SERPL CALC-SCNC: 13 MMOL/L — SIGNIFICANT CHANGE UP (ref 5–17)
BASOPHILS # BLD AUTO: 0 K/UL — SIGNIFICANT CHANGE UP (ref 0–0.2)
BASOPHILS NFR BLD AUTO: 0.5 % — SIGNIFICANT CHANGE UP (ref 0–2)
BUN SERPL-MCNC: 25 MG/DL — HIGH (ref 8–20)
CALCIUM SERPL-MCNC: 9.5 MG/DL — SIGNIFICANT CHANGE UP (ref 8.6–10.2)
CHLORIDE SERPL-SCNC: 103 MMOL/L — SIGNIFICANT CHANGE UP (ref 98–107)
CO2 SERPL-SCNC: 27 MMOL/L — SIGNIFICANT CHANGE UP (ref 22–29)
CORTIS AM PEAK SERPL-MCNC: 16.3 UG/DL — SIGNIFICANT CHANGE UP (ref 6–18.4)
CREAT SERPL-MCNC: 4.49 MG/DL — HIGH (ref 0.5–1.3)
CULTURE RESULTS: SIGNIFICANT CHANGE UP
EOSINOPHIL # BLD AUTO: 0.2 K/UL — SIGNIFICANT CHANGE UP (ref 0–0.5)
EOSINOPHIL NFR BLD AUTO: 3.1 % — SIGNIFICANT CHANGE UP (ref 0–6)
GLUCOSE SERPL-MCNC: 158 MG/DL — HIGH (ref 70–115)
HBV SURFACE AB SER-ACNC: <3 MIU/ML — LOW
HBV SURFACE AG SER-ACNC: SIGNIFICANT CHANGE UP
HCT VFR BLD CALC: 28.3 % — LOW (ref 42–52)
HCV AB S/CO SERPL IA: 0.25 S/CO — SIGNIFICANT CHANGE UP
HCV AB SERPL-IMP: SIGNIFICANT CHANGE UP
HGB BLD-MCNC: 8.4 G/DL — LOW (ref 14–18)
LYMPHOCYTES # BLD AUTO: 1.5 K/UL — SIGNIFICANT CHANGE UP (ref 1–4.8)
LYMPHOCYTES # BLD AUTO: 23.5 % — SIGNIFICANT CHANGE UP (ref 20–55)
MAGNESIUM SERPL-MCNC: 2.2 MG/DL — SIGNIFICANT CHANGE UP (ref 1.8–2.6)
MCHC RBC-ENTMCNC: 26.8 PG — LOW (ref 27–31)
MCHC RBC-ENTMCNC: 29.7 G/DL — LOW (ref 32–36)
MCV RBC AUTO: 90.4 FL — SIGNIFICANT CHANGE UP (ref 80–94)
METHOD TYPE: SIGNIFICANT CHANGE UP
MONOCYTES # BLD AUTO: 0.8 K/UL — SIGNIFICANT CHANGE UP (ref 0–0.8)
MONOCYTES NFR BLD AUTO: 11.9 % — HIGH (ref 3–10)
NEUTROPHILS # BLD AUTO: 3.9 K/UL — SIGNIFICANT CHANGE UP (ref 1.8–8)
NEUTROPHILS NFR BLD AUTO: 60.7 % — SIGNIFICANT CHANGE UP (ref 37–73)
ORGANISM # SPEC MICROSCOPIC CNT: SIGNIFICANT CHANGE UP
ORGANISM # SPEC MICROSCOPIC CNT: SIGNIFICANT CHANGE UP
PHOSPHATE SERPL-MCNC: 2 MG/DL — LOW (ref 2.4–4.7)
PLATELET # BLD AUTO: 84 K/UL — LOW (ref 150–400)
POTASSIUM SERPL-MCNC: 4.7 MMOL/L — SIGNIFICANT CHANGE UP (ref 3.5–5.3)
POTASSIUM SERPL-SCNC: 4.7 MMOL/L — SIGNIFICANT CHANGE UP (ref 3.5–5.3)
RBC # BLD: 3.13 M/UL — LOW (ref 4.6–6.2)
RBC # FLD: 17.4 % — HIGH (ref 11–15.6)
SODIUM SERPL-SCNC: 143 MMOL/L — SIGNIFICANT CHANGE UP (ref 135–145)
SPECIMEN SOURCE: SIGNIFICANT CHANGE UP
VANCOMYCIN FLD-MCNC: 30 UG/ML
VANCOMYCIN TROUGH SERPL-MCNC: 24.3 UG/ML — HIGH (ref 10–20)
WBC # BLD: 6.4 K/UL — SIGNIFICANT CHANGE UP (ref 4.8–10.8)
WBC # FLD AUTO: 6.4 K/UL — SIGNIFICANT CHANGE UP (ref 4.8–10.8)

## 2017-08-24 PROCEDURE — 71010: CPT | Mod: 26

## 2017-08-24 PROCEDURE — 93010 ELECTROCARDIOGRAM REPORT: CPT

## 2017-08-24 PROCEDURE — 99233 SBSQ HOSP IP/OBS HIGH 50: CPT

## 2017-08-24 PROCEDURE — 99291 CRITICAL CARE FIRST HOUR: CPT

## 2017-08-24 RX ORDER — CHLORHEXIDINE GLUCONATE 213 G/1000ML
15 SOLUTION TOPICAL
Qty: 0 | Refills: 0 | Status: DISCONTINUED | OUTPATIENT
Start: 2017-08-24 | End: 2017-08-24

## 2017-08-24 RX ORDER — NOREPINEPHRINE BITARTRATE/D5W 8 MG/250ML
0.23 PLASTIC BAG, INJECTION (ML) INTRAVENOUS
Qty: 8 | Refills: 0 | Status: DISCONTINUED | OUTPATIENT
Start: 2017-08-24 | End: 2017-08-24

## 2017-08-24 RX ORDER — PANTOPRAZOLE SODIUM 20 MG/1
40 TABLET, DELAYED RELEASE ORAL DAILY
Qty: 0 | Refills: 0 | Status: DISCONTINUED | OUTPATIENT
Start: 2017-08-24 | End: 2017-08-24

## 2017-08-24 RX ADMIN — CALCITRIOL 0.25 MICROGRAM(S): 0.5 CAPSULE ORAL at 12:41

## 2017-08-24 RX ADMIN — Medication 325 MILLIGRAM(S): at 09:00

## 2017-08-24 RX ADMIN — Medication 325 MILLIGRAM(S): at 12:42

## 2017-08-24 RX ADMIN — Medication 1 TABLET(S): at 12:42

## 2017-08-24 RX ADMIN — GABAPENTIN 300 MILLIGRAM(S): 400 CAPSULE ORAL at 06:01

## 2017-08-24 RX ADMIN — Medication 1 DROP(S): at 06:01

## 2017-08-24 RX ADMIN — ZINC SULFATE TAB 220 MG (50 MG ZINC EQUIVALENT) 220 MILLIGRAM(S): 220 (50 ZN) TAB at 12:42

## 2017-08-24 RX ADMIN — Medication 667 MILLIGRAM(S): at 12:41

## 2017-08-24 RX ADMIN — Medication 1: at 12:42

## 2017-08-24 RX ADMIN — Medication 1: at 09:00

## 2017-08-24 RX ADMIN — Medication 100 MILLIGRAM(S): at 12:44

## 2017-08-24 RX ADMIN — MIDODRINE HYDROCHLORIDE 10 MILLIGRAM(S): 2.5 TABLET ORAL at 06:01

## 2017-08-24 RX ADMIN — MIDODRINE HYDROCHLORIDE 10 MILLIGRAM(S): 2.5 TABLET ORAL at 12:44

## 2017-08-24 RX ADMIN — PANTOPRAZOLE SODIUM 40 MILLIGRAM(S): 20 TABLET, DELAYED RELEASE ORAL at 06:01

## 2017-08-24 RX ADMIN — Medication 1 MILLIGRAM(S): at 12:42

## 2017-08-24 RX ADMIN — Medication 667 MILLIGRAM(S): at 09:00

## 2017-08-24 RX ADMIN — Medication 500 MILLIGRAM(S): at 12:42

## 2017-08-24 RX ADMIN — Medication 100 MILLIGRAM(S): at 06:01

## 2017-08-24 NOTE — PROCEDURE NOTE - DIFFICULT/CRASH INTUBATION DETAILS
Precautions taken for emergent airway, given c-collar in place. No hyperextension of the neck performed

## 2017-08-24 NOTE — PROGRESS NOTE ADULT - ASSESSMENT
88 yo male with pmhx dementia, ESRD on HD via R permacath (Dr Pike-4/2017) on HD T TH Sat(Trish), hypotension, HLD, DM, CAD/CABG 2012, ICM, HFrEF ~25% s/p MDT BIVICD ([facility]/1/6/2015), recent C4 and pubic Fx secondary to an accident/fall with transportation to HD. [facility] and was a code sepsis night of admission. He was recently hospitalized at [facility] with coag neg bacteremia 7/23/17-8/2/17, being treated with IV vanco that was to be continued through 9/2/17. It is unclear if vanco was received post discharge. Pt underwent  LSC AICD extraction, permatcath removal on 8/17 w/ Dr. Anderson, postop hypercapnea requiring bipap, now resolved and vasoplegia requiring intermittent pressors. 8/18 pt hypotensive with increasing pressor requirements, left femoral juan and TLC placed. 8/18 Pt while on hemodialysis requiring pressor support after being off levophed, BP is better on midodrine, Patient initially was NPO pending modified barium swallow, which patient passed, and diet started with constant supervision & aspiration precautions, no being continued on antibiotics, has wound Vac on AICD removal place.

## 2017-08-24 NOTE — CHART NOTE - NSCHARTNOTEFT_GEN_A_CORE
Met with wife with Chaplain Shelton at request of Mode MCGRAW. She understands prognosis, and she was hopeful that her children can come in to see him. She understood that his heart can stop at anytime, and knows he would not want aggressive measures at this time. While Chaplain Shelton was with wife outside the room, I went in to check on patient, and shortly thereafter his heart stopped again and he was prounced, I had wife come back into room, and Chaplain Shelton remained with her. She is reaching out to her children to let them know.

## 2017-08-24 NOTE — PROGRESS NOTE ADULT - PROBLEM SELECTOR PROBLEM 1
Cardiac device, implant, or graft infection or inflammation
SIRS (systemic inflammatory response syndrome)
Bacteremia due to coagulase-negative Staphylococcus
Cardiac device, implant, or graft infection or inflammation
Infection and inflammatory reaction due to cardiac device, implant, and graft, subsequent encounter
SIRS (systemic inflammatory response syndrome)
SIRS (systemic inflammatory response syndrome)
Bacteremia due to coagulase-negative Staphylococcus
Infection and inflammatory reaction due to cardiac device, implant, and graft, subsequent encounter

## 2017-08-24 NOTE — CHART NOTE - NSCHARTNOTEFT_GEN_A_CORE
Code blue initially called overhead by nursing staff due to this unresponsive patient.    Upon my arrival, marcio iverson was called as patient had no pulse. ACLS protocol was initiated. Patient was coded for 20 minutes with multiple doses of Epi, Amiodarone as well as 1x Bicarb and calcium x 1.  He was successfully intubated by CCPA.  During each pulse check patient was PEA arrest, given Epi/Amio.  Went to pulseless Vtach, was shocked multiple times up to 300J.  Reviewed labs from the AM without significant electrolyte derangements.  Reviewed vitals - patient hemodynamically stable prior to event. 5H's and T's reviewed; No evidence of preceding Hypovolemia, Hypo/erkalemia, Hypo/erthermia, Acidosis, PTX, Tamponade, Thrombosis, Thromboembolism or Toxins. Suspect possible hypoxia 2/2 to aspiration given debris during intubation.  Able to achieve ROSC within 20 mins.   Family contacted by Attending.  After 20 minutes compressions, with return of spontaneous pulse, code blue ended.  Family en route to hospital.    Pt has been transferred to MICU Code blue initially called overhead by nursing staff due to this unresponsive patient.    Upon my arrival, code zayra was called as patient had no pulse. ACLS protocol was initiated. Patient was coded for 20 minutes with multiple doses of Epi & Amiodarone as well as 1x Bicarb and calcium x1, Neosyn x1, and Levophed.  He was successfully intubated by CCPA - ETT verified by ET CO2, Ausc, and Capnography.  During each pulse check patient was PEA arrest, given Epi/Amio.  Went to pulseless Vtach, was shocked multiple times up to 300J.  Reviewed labs from the AM without significant electrolyte derangements.  Reviewed vitals - patient hemodynamically stable prior to event. 5H's and T's reviewed; No evidence of preceding Hypovolemia, Hypo/erkalemia, Hypo/erthermia, Acidosis, PTX, Tamponade, Thrombosis, Thromboembolism or Toxins.  Suspect possible hypoxia 2/2 to aspiration given debris during intubation vs. arrhythmia as pt has ICM, HFrEF ~25% s/p MDT BiV ICD and is post op Day #7 from [facility] AICD extraction with seemingly cardiogenic shock on pressor support.  Able to achieve ROSC within 20 mins.   Family contacted by Attending.  After 20 minutes compressions, with return of spontaneous pulse, code blue ended.  Family en route to hospital.    Pt has been transferred to MICU

## 2017-08-24 NOTE — PROGRESS NOTE ADULT - PROBLEM SELECTOR PLAN 3
c/w HD as per routine via right groin catheter  Patient with normocytic anemia likely 2/2 aocd   Thrombocytopenia likely reactive

## 2017-08-24 NOTE — PROGRESS NOTE ADULT - PROVIDER SPECIALTY LIST ADULT
Anesthesia
Anesthesia
CT Surgery
Cardiology
Electrophysiology
Hospitalist
Infectious Disease
Nephrology
Neurosurgery
Neurosurgery
Infectious Disease
Nephrology

## 2017-08-24 NOTE — PROGRESS NOTE ADULT - SUBJECTIVE AND OBJECTIVE BOX
Chief Complaint:  afib on monitor      Assessment: Nurse states no hx of afib, EP called to see patient Chief Complaint:  afib on monitor      Assessment: Nurse states no hx of afib, EP called to see patient.    Addendum:  EP PA came and evaluated patient and to follow up with MD Schmitz.  MD Koenig aware, pending recommendations

## 2017-08-24 NOTE — PROGRESS NOTE ADULT - ATTENDING COMMENTS
HD today.
HD tomorrow; new dialysis cath when cleared  by ID. HIT pending.
Patient had a code blue this afternoon, vtach noted, full acls protocol was run and patient was found after 15 minutes to have a pulse, however weak. The wife was called who asked for all measures to be taken. Patient was intubated, shocked several times and in the ICU passed shortly after the initial code blue event. Wife was with him at that time and was spoken to by the ICU team.
Dispo: PT consulted  and recommend return to [facility] when pt is medically stable. Patient has multiple co-morbidities and is high risk for deterioration wife Iram is aware of the plan of care.
Dispo: PT consulted  and recommend return to [facility] when pt is medically stable. Patient has multiple co-morbidities and is high risk for deterioration wife Iram is aware of the plan of care.
Dispo: PT consulted awaiting further recommendations
Dispo: PT consulted awaiting further recommendations
Dispo: PT consulted

## 2017-08-24 NOTE — PROGRESS NOTE ADULT - SUBJECTIVE AND OBJECTIVE BOX
Patient is a 87y old  Male who presents with a chief complaint of hypervolemia (13 Aug 2017 07:37)    Patient seen and examined at bedside earlier this am (since then - multiple events as below ending in his passing this aternoonn). This am, aroused, awoke and spoke a few words    ROS: No chest pain, palpitations, sob, light headedness/dizziness, difficulty breathing/cough, fevers/chills, abdominal pain, n/v, diarrhea/constipation    TELE: ST with PACs     ICU Vital Signs Last 24 Hrs  T(C): 36.6 (24 Aug 2017 10:00), Max: 37.3 (24 Aug 2017 05:54)  T(F): 97.9 (24 Aug 2017 10:00), Max: 99.1 (24 Aug 2017 05:54)  HR: 0 (24 Aug 2017 15:15) (0 - 130)  BP: 60/32 (24 Aug 2017 15:00) (60/32 - 113/65)  BP(mean): 55 (24 Aug 2017 14:45) (55 - 73)  ABP: --  ABP(mean): --  RR: 14 (24 Aug 2017 15:15) (14 - 25)  SpO2: 69% (24 Aug 2017 15:15) (69% - 100%)    CAPILLARY BLOOD GLUCOSE  166 (24 Aug 2017 12:03)  154 (24 Aug 2017 08:00)  168 (23 Aug 2017 20:56)  113 (23 Aug 2017 18:00)      PHYSICAL EXAM:  (exam this am)     GENERAL: NAD. Laying in bed with c collar in place   HEENT: PERRL, +EOMI  CHEST/LUNG: Clear to auscultate bilaterally; No wheezing  HEART: S1S2+, Regular rate and rhythm; No murmurs, rubs, or gallops  ABDOMEN: Soft, Nontender, Nondistended; Bowel sounds present  EXTREMITIES:  2+ Peripheral Pulses, No clubbing, cyanosis, or edema  Skin: left chest wound vac in place. Right chest dressing in place. Left groin triple lumen noted and right groin HD catheter in place. sacral stage 2 noted       LABS:                        8.4    6.4   )-----------( 84       ( 24 Aug 2017 06:33 )             28.3   08-24    143  |  103  |  25.0<H>  ----------------------------<  158<H>  4.7   |  27.0  |  4.49<H>    Ca    9.5      24 Aug 2017 06:33  Phos  2.0     08-24  Mg     2.2     08-24      MEDICATIONS  (STANDING):  docusate sodium 100 milliGRAM(s) Oral three times a day  tamsulosin 0.4 milliGRAM(s) Oral at bedtime  artificial  tears Solution 1 Drop(s) Both EYES two times a day  calcium acetate 667 milliGRAM(s) Oral four times a day with meals  Nephro-viviana 1 Tablet(s) Oral daily  insulin lispro (HumaLOG) corrective regimen sliding scale   SubCutaneous Before meals and at bedtime  dextrose 5%. 1000 milliLiter(s) (50 mL/Hr) IV Continuous <Continuous>  dextrose 50% Injectable 12.5 Gram(s) IV Push once  dextrose 50% Injectable 25 Gram(s) IV Push once  dextrose 50% Injectable 25 Gram(s) IV Push once  ergocalciferol 88700 Unit(s) Oral every week  calcitriol   Capsule 0.25 MICROGram(s) Oral daily  epoetin una Injectable 35238 Unit(s) IV Push <User Schedule>  albumin human 25% IVPB 50 milliLiter(s) IV Intermittent <User Schedule>  norepinephrine Infusion 0.235 MICROgram(s)/kG/Min (30 mL/Hr) IV Continuous <Continuous>  chlorhexidine 0.12% Liquid 15 milliLiter(s) Swish and Spit two times a day  pantoprazole  Injectable 40 milliGRAM(s) IV Push daily    MEDICATIONS  (PRN):  acetaminophen   Tablet 650 milliGRAM(s) Oral every 6 hours PRN For Temp greater than 38 C (100.4 F)  dextrose Gel 1 Dose(s) Oral once PRN Blood Glucose LESS THAN 70 milliGRAM(s)/deciLiter  glucagon  Injectable 1 milliGRAM(s) IntraMuscular once PRN Glucose <70 milliGRAM(s)/deciLiter

## 2017-08-24 NOTE — DISCHARGE NOTE FOR THE EXPIRED PATIENT - HOSPITAL COURSE
87M with CRF on HD, HTN, Dementia, recurrent bouts with bacteremia, recent fall with C-4 and Pubic Rami Fx. S/P removal of t  his AICD.    Found unresponsive in bed. Last seen approximately 15mins prior.  Resuscitation was initiated. He was intubated without difficulty, with good equal breath sounds B/L. Initial rhythm on the monitor was asystole. CPR as resuscitation were continued.  After multiple rounds of meds a wide complex rhythm was established which initially had a pulse, but it was then lost. He was defibrillated twice and  IV Amiodarone was given. A weak pulse became present and Levophed was started, but his BP remained low.  A review of his recent labs showed a K = 4.7, hgb of 28 with no sign of acidosis.     his wife was contacted, informed of his status and she came to the hospital. He was transported to the ICU     At the bedside I had an extensive conversation with the wife regarding his arrest and his overall course over the last few months. given his overall condition, the combination of his underlying co-morbidities, age, deconditioned state and his acute arrest, that this was not a situation which he would survive and recover from. Given this, she agreed that we should allow him to pass away peacefully, when hs heart stops. A DNR was put in place and Palliative Care was called as well.  At 15:15 he became pulseless and lost his heart rhythm He was pronounced , with his wife at his bedside.     No autopsy was requested.

## 2017-08-24 NOTE — CHART NOTE - NSCHARTNOTEFT_GEN_A_CORE
Cardiac Arrest Note    87M with CRF on HD, HTN, Dementia, recurrent bouts with bacteremia, recent fall with C-4 and Pubic Rami Fx.  Found unresponsive in bed. last seen approximately 15mins prior.  Resuscitation was initiated. He was intubated without difficulty, with good equal breath sounds B/L. Initial rhythm on the monitor was asystole. CPR as resuscitation were continued.  After multiple rounds of meds a wide complex rhythm was established which initially had a pulse, but it was then lost. He was defibrillated twice and  IV Amiodarone was given. A weak pulse became present and Levophed was started, but his BP remained low.  A review of his recent labs showed a K = 4.7, hgb of 28 with no sign of acidosis.     his wife was contacted, informed of his status and she came to the hospital. He was transported to the ICU     At the bedside I had an extensive conversation with the wife regarding his arrest and his overall course over the last few months. given his overall condition, the combination of his underlying co-morbidities, age, deconditioned state and his acute arrest, that this was not a situation which he would survive and recover from. Given this, she agreed that we should allow him to pass away peacefully, when hs heart stops. There would be no further escalation of his pressors. I also spoke with his son Duane and Robson. They both understood and are coming to the hospital.    A total of 45 mins was spent treating this critical patient including speaking with the family.

## 2017-08-24 NOTE — PROGRESS NOTE ADULT - PROBLEM SELECTOR PLAN 1
s/p AICD extraction, has wound Vac  placed  (change monday)  Patient  today - please see below addendum

## 2017-08-24 NOTE — PROGRESS NOTE ADULT - NSHPATTENDINGPLANDISCUSS_GEN_ALL_CORE
MAULIK Richardson
LATIA Goyal
nurse, wife over the phone. Prognosis guarded. Plan d/w ID Dr Medeiros
Wife Iram and the patient
spoke to wife and nurse
patient

## 2017-08-24 NOTE — PROGRESS NOTE ADULT - PROBLEM SELECTOR PROBLEM 5
Compression fracture of C-spine
Bacteremia due to coagulase-negative Staphylococcus
Compression fracture of C-spine
Pubic ramus fracture, right, sequela

## 2017-08-29 ENCOUNTER — APPOINTMENT (OUTPATIENT)
Dept: ORTHOPEDIC SURGERY | Facility: CLINIC | Age: 82
End: 2017-08-29

## 2017-09-15 RX ORDER — POLYETHYLENE GLYCOL 3350 17 G/17G
0 POWDER, FOR SOLUTION ORAL
Qty: 0 | Refills: 0 | COMMUNITY

## 2017-09-15 RX ORDER — CALCIUM ACETATE 667 MG
3 TABLET ORAL
Qty: 0 | Refills: 0 | COMMUNITY

## 2017-09-15 RX ORDER — INSULIN LISPRO 100/ML
0 VIAL (ML) SUBCUTANEOUS
Qty: 0 | Refills: 0 | COMMUNITY

## 2017-09-15 RX ORDER — MIDODRINE HYDROCHLORIDE 2.5 MG/1
2 TABLET ORAL
Qty: 0 | Refills: 0 | COMMUNITY

## 2017-09-15 RX ORDER — MIDODRINE HYDROCHLORIDE 2.5 MG/1
1 TABLET ORAL
Qty: 0 | Refills: 0 | COMMUNITY

## 2017-09-15 RX ORDER — TAMSULOSIN HYDROCHLORIDE 0.4 MG/1
1 CAPSULE ORAL
Qty: 0 | Refills: 0 | COMMUNITY

## 2017-09-15 RX ORDER — FERROUS SULFATE 325(65) MG
1 TABLET ORAL
Qty: 0 | Refills: 0 | COMMUNITY

## 2017-09-15 RX ORDER — ERYTHROMYCIN BASE 5 MG/GRAM
1 OINTMENT (GRAM) OPHTHALMIC (EYE)
Qty: 0 | Refills: 0 | COMMUNITY

## 2017-09-15 RX ORDER — MOXIFLOXACIN HCL 0.5 %
1 DROPS OPHTHALMIC (EYE)
Qty: 0 | Refills: 0 | COMMUNITY

## 2017-09-15 RX ORDER — GABAPENTIN 400 MG/1
1 CAPSULE ORAL
Qty: 0 | Refills: 0 | COMMUNITY

## 2017-09-15 RX ORDER — INSULIN GLARGINE 100 [IU]/ML
5 INJECTION, SOLUTION SUBCUTANEOUS
Qty: 0 | Refills: 0 | COMMUNITY

## 2017-09-15 RX ORDER — FERROUS SULFATE 325(65) MG
0 TABLET ORAL
Qty: 0 | Refills: 0 | COMMUNITY

## 2017-09-15 RX ORDER — POLYETHYLENE GLYCOL 3350 17 G/17G
17 POWDER, FOR SOLUTION ORAL
Qty: 0 | Refills: 0 | COMMUNITY

## 2017-09-15 RX ORDER — ERYTHROMYCIN BASE 5 MG/GRAM
0 OINTMENT (GRAM) OPHTHALMIC (EYE)
Qty: 0 | Refills: 0 | COMMUNITY

## 2017-09-15 RX ORDER — CALCIUM ACETATE 667 MG
1 TABLET ORAL
Qty: 0 | Refills: 0 | COMMUNITY

## 2017-12-07 NOTE — PHYSICAL THERAPY INITIAL EVALUATION ADULT - RISK REDUCTION/PREVENTION, PT EVAL
FOLLOWUP WITH TYMPANOGRAM WITH AUDIO    Conservative management.    Medical vs surgical options discussed    CALL FOR ANY PROBLEMS - want to see her if parents suspect an infection   risk factors

## 2018-02-15 NOTE — PROGRESS NOTE ADULT - PROBLEM SELECTOR PROBLEM 10
Health Maintenance Due   Topic Date Due    Influenza Age 5 to Adult  08/01/2017    GLAUCOMA SCREENING Q2Y  10/06/2017       Chief Complaint   Patient presents with    Hypertension     6 month follow up    Hypotension    Cholesterol Problem    GI Problem     states very acidic after eating and every 2 weeks had epidoes of multiple BMS for a day, usually has constipation       1. Have you been to the ER, urgent care clinic since your last visit? Hospitalized since your last visit? No    2. Have you seen or consulted any other health care providers outside of the [address] [place] [address] since your last visit? Include any pap smears or colon screening. No    3) Do you have an Advance Directive on file? no    4) Are you interested in receiving information on Advance Directives? NO      Patient is accompanied by self I have received verbal consent from [facility] to discuss any/all medical information while they are present in the room.
Preventive measure

## 2018-03-12 NOTE — PROGRESS NOTE ADULT - PROBLEM SELECTOR PROBLEM 4
Pharmacy note vancomycin trough  Vancomycin for sepsis: target trough: 15-20  Vancomycin trough drawn today at 17:25 was 22.4  Renal function is stable and vancomycin levels should be very close to steady state;  Will decreased vancomycin from 1000 mg iv q24hrs to 750 mg iv q24hrs to target trough of ~ 17.  Will continue to monitor  Dylan Massey [facility]     Ischemic cardiomyopathy

## 2018-04-30 NOTE — PROGRESS NOTE ADULT - PROVIDER SPECIALTY LIST ADULT
Cardiology
Hospitalist
Internal Medicine
Internal Medicine
Nephrology
Palliative Care
Nephrology
Perinephric abscess

## 2018-07-23 NOTE — PROGRESS NOTE ADULT - PROBLEM SELECTOR PLAN 1
LVM for patient to see if she has done the labs that SENG ordered back in November and if so where she had them done. She has an appt with PORSHA on 8/6/2018   s/p AICD extraction, has wound Vac  placed 8/18 (change monday)  will cont ABx w/ HD per ID - has been receiving vanco since 8/12 with checks on trough, last dose on 19th with current vanco trough 25. f/up in am, if level around 17 will give dose if higher will hold

## 2018-07-30 NOTE — BRIEF OPERATIVE NOTE - PRIMARY SURGEON
"[facility]  Ambulatory Clinic  Obstetrics & Gynecology    Visit Date:  2018    Chief Complaint:  Vaginal yeast infection    Subjective:      Emerita Gomez is a 28 y.o.  here with c/o vaginal yeast infection.  Pt described discharge as itchy, white, non bloody, without pelvic pain or abnormal vaginal bleeding for past few days.  Pt is tolerating Mirena IUD well.   Pt denies any abnormal vaginal bleeding, dysmenorrhea, dyspareunia, pelvic pain, breast mass/skin changes, GI or urinary complaints.      Review of Systems:      GENERAL:  No fever, fatigue, excessive weight gain or loss  GENITOURINARY:  See HPI    Objective:     /62   Ht 5' 3" (1.6 m)   Wt 62.1 kg (136 lb 12.7 oz)   BMI 24.23 kg/m²    Pulse 74, Resp rate 18     GENERAL:  NAD, well-nourished  ABDOMEN:  Soft, non-tender, non-distended, normoactive BS, no obvious organomegaly  GENITOURINARY:  NFEG no lesion. No vaginal or cervical lesion. No bleeding. Mild white discharge. No CMT. Uterus and ovaries small, NT. Wet prep +yeast. Declined rectal exam. No obvious external lesions.    Chaperone present for exam.    Assessment:     28 y.o.  with Mirena IUD:    1. Vaginal yeast infection    Plan:    Diflucan 150 mg po x 1 for yeast infection.  If unresolved, use monistat 7.  Hygiene advice.  Encourage tobacco cessation.  F/u with PCP for health maintenance.  F/u 3 months for annual GYN exam, or sooner as needed.    Voiced understanding.         Kirby Ramirez MD            " Dr. Joyce

## 2018-08-17 NOTE — ED ADULT NURSE REASSESSMENT NOTE - NS ED NURSE REASSESS COMMENT FT1
Assumed patient care from LILLY Longo at 1930, charting as noted. Received patient lying in bed, A&ox3 with c-collar on, on o2 @ 2 lpm via nc. Patient is noted with right chest wall marley cath, right forearm iv site. Patient denies any pain and discomfort at this time, vss. Plan of care and wait time explained, patient verbalized understanding. Patient not in respiratory distress. To continue to monitor.
Lab notified for Hemoglobin A1c whole blood pending collection.
Report given to LILLY Leyva.
English

## 2019-01-24 NOTE — PROGRESS NOTE ADULT - ASSESSMENT
Patient resting comfortably during infusion. Family at his side. Call light in hand. Patient given blankets and warm pack for comfort. ESRD on HD TTS will HD candie  Anemia will check iron studies  BP electrolytes ok

## 2020-01-29 NOTE — PROGRESS NOTE ADULT - PROBLEM SELECTOR PLAN 7
Author: Rita Norris MD Service: -- Author Type: Physician   Filed: 1/29/2020  3:05 PM Encounter Date: 1/8/2020 Status: Signed   : Rita Norris MD (Physician)        The 10-year ASCVD risk score (Eliezer MENDOZA Jr., et al., 2013) is: 5.2%    Values used to calculate the score:      Age: 54 years      Sex: Female      Is Non- : Yes      Diabetic: No      Tobacco smoker: No      Systolic Blood Pressure: 122 mmHg      Is BP treated: Yes      HDL Cholesterol: 48 mg/dL      Total Cholesterol: 238 mg/dL  Please inform patient:  Chol is mildly elevated:  Increase fiber to  25 gm daily (Fiber One honey cluster cereal is a good start) and decrease chol to <300 mg daily; increase exercise to 150 mins weekly; pls mail FYWB chol h/o.       Pt to make appt in 3 months:  Recheck FLP 2 days before appt.  Thanks.              Supportive care.  Monitor H/H

## 2020-02-02 NOTE — PROGRESS NOTE ADULT - PROBLEM SELECTOR PLAN 10
no Tone is normal, moving all extremities well, reflexes normal for age. fs stable  -c/w HSS   -c/w low carb diet  -c/w hypoglycemia protocol    DVT PPX

## 2020-03-14 NOTE — DISCHARGE NOTE ADULT - PATIENT PORTAL LINK FT
Consulting surgical team: B Team [id number]  Consulting attending: Dr. Simons    HPI:  19 yo man with a hx of schizophrenia, bipolar disorder, seizure disorder, and anxiety presenting with worsening RLE pain since Monday. He reports intermittent pain extending from the proximal leg to the ankle on Monday and Tuesday, then constant since Wednesday. The pain is worsened by walking and improved with pain medication. He reports difficulty ambulating secondary to pain. He denies any recent trauma, falls, or other injuries.       PAST MEDICAL HISTORY:  Bipolar disorder  Schizophrenia  Seizure disorder  Anxiety      PAST SURGICAL HISTORY:  No significant past surgical history      MEDICATIONS:  Home Medications:  EpiPen 2-Wesley 0.3 mg injectable kit: 1 inch(es) injectable once, As Needed for anaphylaxis (05 May 2016 16:07)  lithium 300 mg oral capsule: 1 cap(s) orally once a day at 4pm (05 May 2016 16:09)  lithium 600 mg oral capsule: 1 cap(s) orally 2 times a day at 8am and 8pm (05 May 2016 16:09)  propranolol 10 mg oral tablet: 1 tab(s) orally  (05 May 2016 16:07)  Zoloft: 150 milligram(s) orally once a day (10 Nov 2015 19:50)      ALLERGIES:  No Known Drug Allergies  peanuts (Unknown)      VITALS & I/Os:  Vital Signs Last 24 Hrs  T(C): 37.2 (14 Mar 2020 02:35), Max: 37.2 (14 Mar 2020 02:35)  T(F): 99 (14 Mar 2020 02:35), Max: 99 (14 Mar 2020 02:35)  HR: 84 (14 Mar 2020 02:35) (84 - 89)  BP: 133/84 (14 Mar 2020 02:35) (133/79 - 133/84)  BP(mean): --  RR: 18 (14 Mar 2020 02:35) (18 - 20)  SpO2: 98% (14 Mar 2020 02:35) (98% - 98%)      PHYSICAL EXAM:  GEN: NAD, resting quietly  PULM: symmetric chest rise bilaterally, no increased WOB  CV: regular rate, peripheral pulses intact  ABD: soft, NTND  EXTR: RLE no abrasions or contusions, tenderness to palpation of the right proximal leg extending to and including the bony ankle, compartments soft; 2+ DP and PT pulses bilaterally; sensation and motor intact      LABS:                        12.1   9.32  )-----------( 327      ( 14 Mar 2020 02:15 )             37.7           Lactate:        CARDIAC MARKERS ( 14 Mar 2020 01:45 )  x     / x     / 138 u/L / x     / x              IMAGING:  < from: Xray Tibia + Fibula 2 Views, Right (03.14.20 @ 01:57) >  EXAM:  RAD TIB-FIB RT        PROCEDURE DATE:  Mar 14 2020     ******PRELIMINARY REPORT******    ******PRELIMINARY REPORT******            INTERPRETATION:  no emergent findings    < end of copied text >    < from: Xray Ankle Complete 3 Views, Right (03.14.20 @ 01:57) >  EXAM:  RAD ANKLE MIN 3 VIEWS RIGHT        PROCEDURE DATE:  Mar 14 2020     ******PRELIMINARY REPORT******    ******PRELIMINARY REPORT******            INTERPRETATION:  no emergent findings    < end of copied text >    < from: US Duplex Venous Lower Ext Ltd, Right (03.14.20 @ 01:48) >  PROCEDURE DATE:  Mar 14 2020         INTERPRETATION:  CLINICAL INFORMATION: Right ankle pain of 2 days' duration.    COMPARISON: None available.    TECHNIQUE: Duplex sonography of the RIGHT LOWER extremityveins with color and spectral Doppler, with and without compression.      FINDINGS:    There is normal compressibility of the right common femoral, femoral and popliteal veins.     The contralateral common femoral vein is patent.    Doppler examination shows normal spontaneous and phasic flow.    No calf vein thrombosis is detected.    IMPRESSION:     No evidence of right lower extremity deep venous thrombosis.    < end of copied text > “You can access the [facility] Patient Portal, offered by [facility], by registering with the following website: [email or web address]”

## 2020-04-28 NOTE — ED ADULT NURSE NOTE - AS SC BRADEN ACTIVITY
Patient called back because a message was left for patient to reschedule 5/26 visit to a video visit before 4/16.  Please advise date and time for 50min video visit. [phone] ok to leave voicemail.   (2) chairfast

## 2020-09-25 NOTE — ED ADULT NURSE NOTE - CADM POA CENTRAL LINE
Referred by: Julius Reno MD; Medical Diagnosis (from order):    Diagnosis Information      Diagnosis    715.16 (ICD-9-CM) - M17.11 (ICD-10-CM) - Unilateral primary osteoarthritis, right knee    V54.81 (ICD-9-CM) - Z47.1 (ICD-10-CM) - Aftercare following joint replacement                Physical Therapy -  Initial Evaluation    Visit:  1   Treatment Diagnosis: right: lower extremity and knee symptoms with increased pain/symptoms, impaired range of motion, impaired balance, impaired strength, impaired gait, impaired activity tolerance  Date of surgery: 9/8/2020  Procedure: R TKR.     Chart reviewed at time of initial evaluation (relevant co-morbidities, allergies, tests and medications listed): Chart reviewed    SUBJECTIVE                                                                                                             Pt states she underwent a TKR on 9-8-20.  She went home on the same day and then home PT started the next day.  Pt states her last day was yesterday.  Pt reports she is having a lot of pain at times.  Pt states if her pain is severe she will take an Oxycodone but she is trying not to take it.  Pt is having the worst pain at night and that has been over the past 3 days    Pain / Symptoms:  Pain rating (out of 10): ; Best: 2; Worst: 9  Location: R knee   Quality / Description: ache, discomfort. Throbbing  Alleviating Factors: change in position, prescription medications.   Function:   Limitations / Exacerbation Factors: pain, difficulty, sleep disturbed, lower body dressing, squatting/lifting, standing, walking and sitting, stairs  Prior Level of Function: declining function, therefore referred to therapy,  Patient Goals: decreased pain, increased motion, increased strength and independence with ADLs/IADLs    Prior treatment: home PT  Discharged from hospital, home health, or skilled nursing facility in last 30 days: no    Home Environment:   Patient lives with significant other  Type of  home: multiple level home  Assistance available: consistent  Feels safe at home/work/school.  2 or more falls or an unexplained fall with injury in the last year:  No    OBJECTIVE                                                                                                                  PROM R knee:  Extension -5  Flexion 88  Gait:    Ambulating with std walker, antalgic R, lacking TKE and heel-toe pattern  Strength:  R Quad and HS 3-/5  Observation:  Sig bruising inner thigh  Steri strips on, no drainage or bleeding  Mild edema but not sig         Comments / Details: Outcome Measures:   Lower Extremity Functional Scale: LEFS Calculated Total: 18 (0=extreme difficulty; 80=no difficulty) see flowsheet for additional documentation    TREATMENT                                                                                                                initial evaluation completed    Therapeutic Exercise:  Patellar mobs R knee  Quad sets 10x  AAROM heel slides 10x  PROM knee flex and ext  Instruct with cane  Standing quad set/knee ext  Standing knee flex on step    Skilled input: verbal instruction/cues    Home Exercise Program:   Access Code: [email or web address]   URL: [email or web address]   Date: 09/25/2020   Prepared by: Gianna Simon      Exercises  · Supine Quadricep Sets - 10 reps - 1 sets - 3 hold - 2x daily - 7x weekly  · Supine Heel Slide - 10 reps - 1 sets - 2x daily - 7x weekly  · Standing Knee Flexion Stretch on Step - 10 reps - 1 sets - 5 hold - 4x daily - 7x weekly  · Standing Quad Set - 10 reps - 1 sets - 2x daily - 7x weekly         ASSESSMENT                                                                                                           57 year old female patient has reported functional limitations listed above impacted by signs and symptoms consistent with below   • Involved:       - right lower extremity and knee   • Symptoms/impairments: increased pain/symptoms, impaired  range of motion, impaired balance, impaired strength, impaired gait and impaired activity tolerance  Pt has been referred to PT s/p R TKR.  Pt is currently in a lot of pain and is having sig difficulty sleeping at night.  Pt felt better after session.  Will initially focus on ROM>strength and progress as kimber    Prognosis: patient will benefit from skilled therapy  Rehabilitative potential is: excellent  Predicted patient presentation: Low (stable) - Patient comorbidities and complexities, as defined above, will have little effect on progress for prescribed plan of care.  Patient Education:   Who will be receiving education: patient and patient's significant other  Preferred learning style: written  Barriers to learning: no barriers apparent at this time  Results of above outlined education: Verbalizes understanding      PLAN                                                                                                                         The following skilled interventions to be implemented to achieve goals listed below:  Gait Training (01398)  Manual Therapy (56590)  Therapeutic Exercise (97585)    Frequency / Duration: 3 times per week tapering as patient progresses for 8 weeks    Patient involved in and agreed to plan of care and goals.  Suggestions for next session as indicated: Progress per plan of care      GOALS                                                                                                                       Long Term Goals: To be met by end of plan of care:       Pt able to ambulate I with a normal gait pattern (Walking and moving (mobility))  Negotiate stairs reciprocally without difficulty (Changing and maintaining body position (mobility))  90 deg squat without difficulty   (changing and maintaining body position (mobility))  Improve LEFS to 50/80 or better to assist with overall ADL kimber (Acquisition of necessities (examples: grocery shopping, gardening) (domestic  life))      Procedures and total treatment time documented Time Entry flowsheet.   No

## 2020-11-11 NOTE — DISCHARGE NOTE ADULT - PLAN OF CARE
ELECTRONIC FOLLOW-UP NOTE / TELEMEDICINE ENCOUNTER     Patient Active Problem List    Diagnosis Date Noted   • Hypertension 11/24/2015     Priority: Medium   • Iron deficiency anemia due to chronic blood loss 06/25/2014     Priority: Medium     Overview Note:     Intermittent blood loss  PRN IV iron ferritin less than 30 ng/dL     • History of migraine 12/05/2019     Priority: Low   • Numbness and tingling 12/05/2019     Priority: Low   • Memory impairment 12/05/2019     Priority: Low   • Peripheral polyneuropathy 12/05/2019     Priority: Low   • Thyroid disease 12/05/2019     Priority: Low   • Osteoporosis 10/29/2019     Priority: Low   • Class 3 severe obesity due to excess calories with body mass index (BMI) of 40.0 to 44.9 in adult (CMS/HCC) 10/25/2019     Priority: Low   • Diabetic ulcer of toe of left foot associated with type 2 diabetes mellitus, limited to breakdown of skin (CMS/[facility]) 08/01/2019     Priority: Low   • Irritable bowel syndrome with constipation 07/25/2019     Priority: Low   • Idiopathic peripheral neuropathy 07/25/2019     Priority: Low     Overview Note:     Toes only. ?secondary to chronic steroid use.     • Chronic gastritis 07/25/2019     Priority: Low   • OCD (obsessive compulsive disorder) 07/25/2019     Priority: Low     Overview Note:     pamelor and Shruthi help. Dr Mckeon - psychiatry ([facility])     • Anxiety disorder 07/25/2019     Priority: Low   • Chronic insomnia 07/25/2019     Priority: Low   • Chronic systolic heart failure (CMS/HCC) 07/25/2019     Priority: Low     Overview Note:     Cardiology at [facility].     • Hemorrhoids 07/25/2019     Priority: Low   • Gastroparesis 07/25/2019     Priority: Low     Overview Note:     History of gastroparesis - currently controlled.     • Moderate episode of recurrent major depressive disorder (CMS/HCC) 07/08/2019     Priority: Low   • Hypogammaglobulinemia (CMS/HCC) 04/11/2019     Priority: Low     Overview Note:     Mild  hypogammaglobulinemia, IgG levels in the 400 range     • Multiple skin tears 11/21/2018     Priority: Low   • Great toe amputation status, right 11/21/2018     Priority: Low   • Hypothyroid 07/21/2017     Priority: Low   • Seasonal allergies 05/31/2017     Priority: Low   • Adrenal insufficiency (CMS/[facility]) 05/05/2016     Priority: Low   • Hyperprolactinemia (CMS/[facility]) 04/27/2015     Priority: Low   • Secondary adrenal insufficiency (CMS/[facility]) 01/27/2015     Priority: Low   • Iron malabsorption 07/22/2014     Priority: Low   • Iatrogenic Cushing's syndrome (CMS/[facility]) 07/22/2014     Priority: Low   • Chronic fatigue syndrome 08/05/2013     Priority: Low   • Migraine without aura 08/05/2013     Priority: Low   • Parasomnia 08/05/2013     Priority: Low       HISTORY OF PRESENT ILLNESS AND MEDICAL ISSUES DISCUSSED:  Golden was called in follow-up of his iron deficiency anemia as well as for evaluation prior to surgery.  Patient has chronic intermittent GI bleed secondary to hemorrhoids as well as iron malabsorption.  He has been treated with intravenous iron recently and has responded partially.  He denies any obvious blood loss.  He is scheduled for hemorrhoid surgery for 11/23/2020.  He was feeling well up until recently but over the last 24-36 hours he has felt wiped out.  No fevers, chills, night sweats no issues with bleeding.    PAST MEDICAL HISTORY AND PAST SURGICAL HISTORY:  Reviewed and interval changes as noted above.    ALLERGIES AND DRUG INTOLERANCE:  Reviewed and interval changes as noted above    CURRENT MEDICATIONS:  Reviewed and interval changes as noted above    FAMILY HISTORY AND SOCIAL HISTORY:  Reviewed and interval changes as noted above.    REVIEW OF SYSTEMS:      REPORTED ECOG Performance Status: 1-2    SIGNIFICANT LABORATORY AND RADIOLOGY DATA:  Lab Services on 11/03/2020   Component Date Value   • Protein, Urine 11/03/2020 11    • Creatinine, Urine 11/03/2020 28.04    • Protein/ Creatinine Ratio  11/03/2020 392*   • COLOR, URINALYSIS 11/03/2020 Yellow    • APPEARANCE, URINALYSIS 11/03/2020 Clear    • GLUCOSE, URINALYSIS 11/03/2020 Negative    • BILIRUBIN, URINALYSIS 11/03/2020 Negative    • KETONES, URINALYSIS 11/03/2020 Negative    • SPECIFIC GRAVITY, URINAL* 11/03/2020 1.010    • OCCULT BLOOD, URINALYSIS 11/03/2020 Negative    • PH, URINALYSIS 11/03/2020 7.0    • PROTEIN, URINALYSIS 11/03/2020 Negative    • UROBILINOGEN, URINALYSIS 11/03/2020 0.2    • NITRITE, URINALYSIS 11/03/2020 Negative    • LEUKOCYTE ESTERASE, URIN* 11/03/2020 Negative    • SQUAMOUS EPITHELIAL, URI* 11/03/2020 1 to 5    • ERYTHROCYTES, URINALYSIS 11/03/2020 None Seen    • LEUKOCYTES, URINALYSIS 11/03/2020 None Seen    • BACTERIA, URINALYSIS 11/03/2020 None Seen    • HYALINE CASTS, URINALYSIS 11/03/2020 None Seen    • Vitamin D, 25-Hydroxy 11/03/2020 63.1    • Phosphorus 11/03/2020 4.2    • PTH, Intact 11/03/2020 70    • Sodium 11/03/2020 140    • Potassium 11/03/2020 4.2    • Chloride 11/03/2020 100    • Carbon Dioxide 11/03/2020 33*   • Anion Gap 11/03/2020 11    • Glucose 11/03/2020 100*   • BUN 11/03/2020 17    • Creatinine 11/03/2020 1.05    • Glomerular Filtration Ra* 11/03/2020 77*   • BUN/ Creatinine Ratio 11/03/2020 16    • Calcium 11/03/2020 8.9    • WBC 11/03/2020 4.4    • RBC 11/03/2020 3.78*   • HGB 11/03/2020 10.8*   • HCT 11/03/2020 36.0*   • MCV 11/03/2020 95.2    • MCH 11/03/2020 28.6    • MCHC 11/03/2020 30.0*   • RDW-CV 11/03/2020 16.6*   • PLT 11/03/2020 244    • NRBC 11/03/2020 0    • Neutrophil, Percent 11/03/2020 60    • Lymphocytes, Percent 11/03/2020 24    • Mono, Percent 11/03/2020 11    • Eosinophils, Percent 11/03/2020 2    • Basophils, Percent 11/03/2020 1    • Immature Granulocytes 11/03/2020 2    • Absolute Neutrophils 11/03/2020 2.7    • Absolute Lymphocytes 11/03/2020 1.0    • Absolute Monocytes 11/03/2020 0.5    • Absolute Eosinophils  11/03/2020 0.1    • Absolute Basophils 11/03/2020 0.0    • Absolute  Killian Cheung* 11/03/2020 0.1    • RDW-SD 11/03/2020 58.5*       CLINICAL IMPRESSION AND PLAN:  1. Recurring iron deficiency anemia.  Hopefully will improve/resolve with upcoming surgery.  Seems to be stable from the hematologic standpoint.  He is asked to call us with any problems.  He will keep his follow-up appointment as previously scheduled.  Medication adjustments and refill:  None  Follow-up arrangements:  As previously scheduled  Laboratory tests:  CBC, CMP, ferritin  Imaging studies:  As clinically indicated      Patient contacted by telephone today. Telephone/ Video contact was initiated to provide care and to minimize the patient's potential exposure and / or transmission of COVID-19.    Patient consented to telephone/video visit. They were made aware that this visit is taking the place of an in-person visit.   stable for discharge It is important to see your primary physician as well as the physicians noted below within the next week to perform a comprehensive medical review.  Call their offices for an appointment as soon as you leave the hospital.  If you do not have a primary physician, contact the [facility] [place] [phone] located on [address] [place] [address].  Your medical issues appear to be stable at this time, but if your symptoms recur or worsen, contact your physicians and/or return to the hospital if necessary.  If you encounter any issues or questions with your medication, call your physicians before stopping the medication.  Do not drive.  Limit your diet to 2 grams of sodium daily.

## 2021-01-15 NOTE — PROGRESS NOTE ADULT - PROBLEM SELECTOR PLAN 9
VCD boots Same Histology In Subsequent Stages Text: The pattern and morphology of the tumor is as described in the first stage.

## 2021-03-26 NOTE — H&P ADULT - PMH
September 16, 2019      [facility] [address]  [place] [address]  Phone: [phone]  Fax: [phone]       Patient: Joanne Stallings   YOB: 1965  Date of Visit: 09/16/2019    To Whom It May Concern:    Tony Stallings  was at [facility] on 09/16/2019. She may return to work/school on 9/17/19 with no restrictions. If you have any questions or concerns, or if I can be of further assistance, please do not hesitate to contact me.    Sincerely,    Torsten Wan MA      none Dementia    Diabetes mellitus    Hypertension    Muscle weakness (generalized)    Renal failure    Ventricular tachycardia

## 2021-10-14 NOTE — PHYSICAL THERAPY INITIAL EVALUATION ADULT - PHYSICAL ASSIST/NONPHYSICAL ASSIST: STAND/SIT, REHAB EVAL
-bronchitis  Patient acknowledges and states understanding of plan   Patient to take medication as prescribed   -with antibiotics take daily with food take a probiotic or eat a yogurt daily to prevent GI upset   For congestion:    -take Allegra-D, Claritin-D, or Zyrtec -D regularly for the next 7-10 day   -take Allegra, Claritin, or Zyrtec daily   -use the nasal spray regularly for the next 2-4 weeks  Patient to drink plenty of fluids   Encourage hand washing  Patient can drink warm tea with honey or do warm salt water gargles for sore throat   Can take Advil, Aleve, Ibuprofen or Tylenol as needed for aches and pains   Can take one 25 mg Benadryl 45 minutes prior to bed for postnasal drip if no improvement in symptoms with cough medication  Patient can use saline nasal spray, neti pot (sinus rinse), and humidifier at the bedside to help with congestion   Follow up if no improvement           1 person assist

## 2021-11-18 NOTE — PROGRESS NOTE ADULT - PROBLEM SELECTOR PLAN 3
Echo 4/6: EF 30-35% with moderate AS (possibly pseudoaortic stensosis)  c/w carvedilol, Bumex Detail Level: Detailed General Sunscreen Counseling: I recommended a broad spectrum sunscreen with a SPF of 30 or higher.  I explained that SPF 30 sunscreens block approximately 97 percent of the sun's harmful rays.  Sunscreens should be applied at least 15 minutes prior to expected sun exposure and then every 2 hours after that as long as sun exposure continues. If swimming or exercising sunscreen should be reapplied every 45 minutes to an hour after getting wet or sweating.  One ounce, or the equivalent of a shot glass full of sunscreen, is adequate to protect the skin not covered by a bathing suit. I also recommended a lip balm with a sunscreen as well. Sun protective clothing can be used in lieu of sunscreen but must be worn the entire time you are exposed to the sun's rays.ABCD,s and E of melanoma reviewed

## 2021-11-19 NOTE — SWALLOW BEDSIDE ASSESSMENT ADULT - DIET PRIOR TO ADMI
Sumanth Mims is a 80year old female. Patient presents with:  Wound Care    HPI  11/12/2021: Patient is 79 y/o woman with laceration of left lower leg with dresser early Nov 2021.  Patient has medical history of afib, HTN, COPD, Pulmona approximated 11/19/21 1500   Drainage Amount Moderate 11/19/21 1500   Drainage Description Serosanguineous 11/19/21 1500   Treatments Compression; Saline/Wound  11/19/21 1500   Dressing Hydrofiber with silver 11/19/21 1500   Dressing Changed Changed veins of right lower extremity with ulcer of calf with fat layer exposed ([address]  (primary encounter diagnosis)  Noninfected skin tear of right lower extremity, initial encounter    Problem List  Patient Active Problem List:     Essential hypertension, colby well        Plan  Orders  Continue with advanced dressing (silver alginate), multi layer compression therapy. I will get authorzation to use CURLY negative pressure to enhance wound healing.    Discussed leg elevation and regular exercises to manage edema i As per pt, regular solids & thin fluids

## 2022-01-28 NOTE — SWALLOW VFSS/MBS ASSESSMENT ADULT - DELAYED INITIATION OF THE PHARYNGEAL SWALLOW (IN SECONDS)
Go for blood tests as directed. Your doctor will do lab tests at regular visits to monitor the effects of this medicine. Please follow up with your doctor and keep your health care provider appointments. 2-3 3-4

## 2022-02-28 NOTE — PATIENT PROFILE ADULT. - BLOOD TRANSFUSION, PREVIOUS, PROFILE
Daily Note     Today's date: 2022  Patient name: Melo Mcelroy  : 2004  MRN: [id number]  Referring provider: Mackenzie Stephen, *  Dx:   Encounter Diagnosis     ICD-10-CM    1  Strain of left hamstring muscle, subsequent encounter  [id number]                   Subjective:  Patient cont's to report improvement in hamstring flexibility and decreased pain  Objective: See treatment diary below  Assessment: Tolerated treatment well  Medial hamstring TTP/adhesions decreasing with manual therapy  Pt cont's to respond well to STM and stretches  Plan: Continue per plan of care  Progress treatment as tolerated            Precautions: none      Manuals            STM to medial  hamstring 15 GH                                                  Neuro Re-Ed             Adductor stretch in standing 10x10" [address]           Self ball massage 5 min GH           Functional reaching 10x10" [address]           Active elongation hamstring 10x10" [address]           Functional prone RF stretch 30x [address]                                     Ther Ex                                                                                                                                  Ther Activity                                       Gait Training                                       Modalities no

## 2022-05-12 NOTE — PROGRESS NOTE ADULT - ASSESSMENT
Chief Complaint   Patient presents with   • Ear Problem     right earache onset 3 days ago /  states was in [place] swimming          HISTORY OF PRESENT ILLNESS:   The patient is a 67 year old male who presents with complaints of earache this is been going on for 3 days patient states he was in [place] in came back and continues to have stuffiness and pressure in his ear so decided to come in and get checked out.  No other concerns.    PAST MEDICAL HISTORY, PAST SURGICAL HISTORY, SOCIAL HISTORY, MEDICATIONS, AND ALLERGIES:  Reviewed per electronic chart.    REVIEW OF SYSTEMS:  Constitutional:  Denies fever or chills   Eyes:  Denies change in visual acuity   Respiratory:  Per History of Present Illness   Cardiovascular:  Denies chest pain or edema   Gastrointestinal:  Denies abdominal pain, nausea, vomiting, bloody stools or diarrhea   Genitourinary:  Denies dysuria   Musculoskeletal:  Denies back pain or joint pain   Integument:  Denies rash       PHYSICAL EXAM:  Vitals:   Vitals:    05/12/22 1200   BP: (!) 150/92   Pulse: 64   Temp: 96.8 °F (36 °C)   TempSrc: Tympanic   SpO2: 99%   Weight: 101.2 kg (223 lb)   Height: 6' 1\" (1.854 m)      SpO2 Readings from Last 1 Encounters:   05/12/22 99%     Constitutional:  No acute distress, nontoxic appearance.  Patient sitting comfortably able to speak full sentences no respiratory distress.  HENT: Normocephalic, atraumatic. Bilateral external ears normal. Oropharynx moist.  No significant pharyngeal inflammation, postnasal drainage is seen,, No oral exudates. Nose minimally inflamed hyper  Mucous membranes with swollen turbinates. Serous fluid present behind the bilateral tympanic membranes. No inflammation.  Eyes:  PERRLA (pupils equal, round, and reactive to light and accommodation), EOMI (extraocular movements are intact). Conjunctivae normal. No discharge.  Neck:  Normal range of motion. No tenderness. Supple. No lymphadenopathy. No stridor.  Cardiovascular:   Normal heart rate. Normal rhythm. No murmurs. No rubs. No gallops.  Pulmonary/Chest:  Normal breath sounds. No respiratory distress. No wheezing. No chest tenderness .  Abdomen:  Bowel sounds normal. Soft. No tenderness. No masses. No pulsatile masses.    Lab/Radiology:  No orders of the defined types were placed in this encounter.      ASSESSMENT:   Encounter Diagnoses   Name Primary?   • Bilateral acute serous otitis media, recurrence not specified Yes   • Seasonal allergic rhinitis, unspecified trigger        PLAN:  Symptomatic care reviewed underlying allergies with eustachian tube dysfunction conservative care at this time over-the-counter decongestant usage reviewed.    Follow up with primary if no improvement of symptoms or worsening. Patient acknowledged understanding of the instructions.     Pt is a 87M s/p fall from wheelchair with a C4 superior endplate fx. Xray of Pelvis also revealed a R superior pubic ramus fx. Ortho rec no operative interventions required. Had HD yesterday.  - collar but refusing  - continue diet  - monitor blood sugar  - monitor BP  - d/c planning

## 2022-09-29 NOTE — DISCHARGE NOTE ADULT - PHYSICIAN SECTION COMPLETE
Problem: At Risk for Falls  Goal: # Patient does not fall  Outcome: Outcome Met, Continue evaluating goal progress toward completion  Goal: # Takes action to control fall-related risks  Outcome: Outcome Met, Continue evaluating goal progress toward completion  Goal: # Verbalizes understanding of fall risk/precautions  Description: Document education using the patient education activity  Outcome: Outcome Met, Continue evaluating goal progress toward completion     Problem: At Risk for Injury Due to Fall  Goal: # Patient does not fall  Outcome: Outcome Met, Continue evaluating goal progress toward completion  Goal: # Takes action to control condition specific risks  Outcome: Outcome Met, Continue evaluating goal progress toward completion  Goal: # Verbalizes understanding of fall-related injury personal risks  Description: Document education using the patient education activity  Outcome: Outcome Met, Continue evaluating goal progress toward completion     Problem: Fluid Volume Excess  Goal: # Fluid Volume Excess Symptoms Resolved  Description: Treatment often consists of oxygen and respiratory support with diuretic therapy at doses that exceed usual dose (typically doubled).  Monitor patient response to treatment.    Acute dyspnea should resolve quickly if dose is adequate and kidney function is adequate. Dyspnea/SOB should only be observed with Activity after effective treatment. Patient should be able to lie down comfortably, without SOB.  Outcome: Outcome Met, Continue evaluating goal progress toward completion  Goal: # Oxygenation is maintained (SpO2 greater than or equal to 90% or as ordered)  Outcome: Outcome Met, Continue evaluating goal progress toward completion  Goal: # Verbalizes understanding of FVE management plan  Description: Document on Patient Education Activity  Outcome: Outcome Met, Continue evaluating goal progress toward completion     Problem: Potential for injury, Restraints  Goal: # Remains  free from injury  Outcome: Outcome Met, Continue evaluating goal progress toward completion  Goal: Verbalizes criteria for restraint discontinuation  Description: Document on Patient Education Activity  Outcome: Outcome Met, Continue evaluating goal progress toward completion      Yes

## 2022-10-26 NOTE — PROGRESS NOTE ADULT - PROBLEM SELECTOR PLAN 2
PT HAS FENTANYL PATCH ON RIGHT CHEST, THIS RN REINFORCED WITH TAPE worsening likely 2/2 diuresis  Renal recommends continuing diuretics  will consider switching to PO diuretic

## 2022-11-21 NOTE — DIETITIAN INITIAL EVALUATION ADULT. - OTHER INFO
Pt reports a decline in appetite over the past 6 months but does not report any weight changes. Pt presents with difficulty feeding himself and requested total assist at meal times. Pt currently receiving and consuming Glucerna BID but reports eating less than half of the food on his trays. Pt denies any N/V/D/C Rifampin Counseling: I discussed with the patient the risks of rifampin including but not limited to liver damage, kidney damage, red-orange body fluids, nausea/vomiting and severe allergy.

## 2022-12-25 NOTE — PROGRESS NOTE ADULT - SUBJECTIVE AND OBJECTIVE BOX
Patient: EMILY LITTLEJOHN [id number] 87y Male  Internal Medicine Hospitalist Progress Note - Dr. Live Schmitz    Chief Complaint: Patient is a 87y old  Male who presents with a chief complaint of pt got intubated in the ED after HD (19 Apr 2017 04:25)    Hospital course:  88 yo M with h/o DM, HTN, ischemic cardiomyopathy (EF 30-35%), chronic renal failure presents with worsening ARF. Pt was previously admitted for similar condition, but had refused hemodialysis until this admission. Pt had permacath placed but developed large neck swelling at the site and was unable to swallow secretions and had difficulty breathing. Pt was emergently intubated on 4/18. CTA of neck did not reveal any vascular injury, and showed severe diffuse bilateral neck edema/infiltrating hematoma in the right supraclavicular fossa extending cephalad bilaterally. Per vascular surgery, unclear cause to neck swelling, and may be an anaphylactic reaction to medication. Swelling improved, and pt was extubated on 4/20. Pt passed speech and swallow and advanced to soft diet. However, 4/21, pt developed afib with RVR and found to have acute hypoxic respiratory failure which improved with urgent dialysis. Noted have fever and had blood cultures. Seen by ID, off antibiotics.     Afebrle, off antibiotics.  Denies complants.     ____________________PHYSICAL EXAM:  Vitals reviewed as indicated below  GENERAL:  NAD Alert and Oriented x 3   HEENT: NCAT.  Visually impaired.   CARDIOVASCULAR:  S1, S2  LUNGS: CTAB  ABDOMEN:  soft, (-) tenderness, (-) distension, (+) bowel sounds, (-) guarding, (-) rebound (-) rigidity  EXTREMITIES:  no cyanosis / clubbing / edema.   ____________________      MEDICATIONS:  aspirin 325milliGRAM(s) Oral daily  tamsulosin 0.4milliGRAM(s) Oral at bedtime  gabapentin 300milliGRAM(s) Oral three times a day  atorvastatin 40milliGRAM(s) Oral at bedtime  docusate sodium 100milliGRAM(s) Oral two times a day  dextrose Gel 1Dose(s) Oral once PRN  dextrose 50% Injectable 12.5Gram(s) IV Push once  dextrose 50% Injectable 25Gram(s) IV Push once  dextrose 50% Injectable 25Gram(s) IV Push once  glucagon  Injectable 1milliGRAM(s) IntraMuscular once PRN  epoetin una Injectable 82154Ktyk(s) IV Push <User Schedule>  heparin  Injectable 5000Unit(s) SubCutaneous every 12 hours  artificial  tears Solution 1Drop(s) Left EYE two times a day  calcium acetate 667milliGRAM(s) Oral three times a day with meals  buMETAnide 0.5milliGRAM(s) Oral two times a day  carvedilol 3.125milliGRAM(s) Oral every 12 hours  insulin glargine Injectable (LANTUS) 10Unit(s) SubCutaneous at bedtime  insulin lispro (HumaLOG) corrective regimen sliding scale  SubCutaneous Before meals and at bedtime  midodrine 5milliGRAM(s) Oral every 8 hours  lisinopril 5milliGRAM(s) Oral daily  iron sucrose IVPB 100milliGRAM(s) IV Intermittent <User Schedule>      VITALS:  Vital Signs Last 24 Hrs  T(C): 36.3, Max: 36.9 (04-26 @ 15:37)  T(F): 97.4, Max: 98.4 (04-26 @ 15:37)  HR: 79 (76 - 92)  BP: 108/56 (108/56 - 130/68)  BP(mean): --  RR: 16 (16 - 16)  SpO2: 99% (95% - 99%) Daily     Daily   CAPILLARY BLOOD GLUCOSE  197 (26 Apr 2017 21:17)  272 (26 Apr 2017 15:37)  187 (26 Apr 2017 11:45)    I&O's Summary      LABS:                        8.7    5.2   )-----------( 65       ( 27 Apr 2017 07:14 )             28.4     04-27    139  |  98  |  83.0<H>  ----------------------------<  112  4.4   |  25.0  |  4.32<H>    Ca    8.8      27 Apr 2017 07:14        RECENT CULTURES:  04-21 .Blood Blood XXXX XXXX   No growth at 5 days.    04-21 .Blood Blood XXXX XXXX   No growth at 5 days.    04-21 .Sputum Sputum Pseudomonas aeruginosa   No White blood cells  No organisms seen   Numerous Pseudomonas aeruginosa  Moderate Routine respiratory eusebio present Allergic reaction

## 2023-09-25 NOTE — DISCHARGE NOTE ADULT - NS AS ACTIVITY OBS
Walking-Outdoors allowed/Do not drive or operate machinery/No Heavy lifting/straining/Bathing allowed/Showering allowed/Walking-Indoors allowed regular

## 2023-11-26 NOTE — H&P ADULT - HISTORY OF PRESENT ILLNESS
86yo male with ESRD fell out of wheelchair at 525am (5 hrs prior to arrival) at dialysis center. Dialysis center dialyzed patient despite fall (no heparin) and then called EMS for AMS. On arrival patient primary survey intact. GCS 15. Complaining of mid cervical pain around C7. Marilin collar applied. No other signs of external trauma. No complaints of SOB, Abdominal pain, Chest pain. Awaiting CT head/C Spine.
Cold/Sinus

## 2023-12-11 NOTE — CONSULT NOTE ADULT - PROBLEM SELECTOR PROBLEM 2
Handoff     Primary Team: [facility] [place] [place] 2 Room Number: [id number] A     Patient Name: Francois Otero Sr. MRN: [id number]     Date of Birth: [id number] Allergies: Lisinopril, Penicillin g sodium, Penicillins, and Sulfa (sulfonamide antibiotics)     Age: 79 y.o. Admit Date: 12/5/2023     Sex: male  BMI: Body mass index is 20.04 kg/m².     Code Status: DNR        Illness Level (current clinical status): Watcher - No    Reason for Admission: COPD exacerbation    Hospital Course (updated, brief assessment by system or problem, significant events):   79M with PMHx of CAD, hyperlipidemia, hypertension and COPD on 5 L of O2 at home presents to the ED c/o worsening shortness of breath for the last several days worse with exertion.  Admitted to MICU for COPD exacerbation requiring BiPAP. Started on solumedrol, duonebs, and levaquin. Wean to 4 L NC and on nightly BiPAP. Palliative Care to be consulted for additional assistance with GOC discussions     Tasks (specific, using if-then statements): NA    Contingency Plan (special circumstances anticipated and plan): NA    Estimated Discharge Date: 2 days    Discharge Disposition: Home or Self Care    Mentored By: Dr. Boswell     Chronic systolic congestive heart failure

## 2024-03-26 NOTE — PATIENT PROFILE ADULT. - NS PRO PT REFERRAL QUES 2 YN
As discussed you should take Ibuprofen (also called Advil or Motrin) or Naproxen (also called Aleve) for your pain. If you are taking Ibuprofen, you can take 600 mg every 6 hours, or 800 mg every 8 hours. If you are taking Naproxen, you can take 500 mg every 12 hours. Do not take more than this amount as it can cause kidney problems or bleeding in your stomach. Do not take these medications at the same time as it can increase your risk for these side effects. If you have a history of heart problems or have had uncontrolled blood pressure for a significant period of time you should not take these medications as it can increase your risk for heart attack or stroke.    If your pain is not controlled you can also take Acetaminophen (also called Tylenol) 1 gram every 6 hours. Do not take more than 4 grams over the course of a day from all medications you take. You can take this medication in alternation with Ibuprofen so that every 3 hours you are taking either 600 mg of Ibuprofen or 1 gram of Acetaminophen. If you have a history of liver disease you should not take Acetaminophen as it can worsen your liver function.    As previously advised, please follow up with your primary care doctor for further management of your pain.  
yes

## 2024-12-06 NOTE — H&P ADULT - NEGATIVE HEMATOLOGY SYMPTOMS
"Called patient to follow up with how he was feeling after his procedure with Dr. Batista on 11/27/24 and to advise him of a follow up appointment with NABOR Briggs on 2/17/25. Also, I was calling to advise patient that he is scheduled for 3 MRI's at [facility] on 12/14/24.    If patient calls back please relay the following message.    Relay     \"You are scheduled to have three MRI's performed at [facility] on 12/14/24. Your appointment time is at 10:30 but you need to arrive at 10:00.\"      "
no gum bleeding/no nose bleeding

## 2025-01-13 NOTE — PHYSICAL THERAPY INITIAL EVALUATION ADULT - LEVEL OF INDEPENDENCE: STAND/SIT, REHAB EVAL
Medication: pantoprazole  Medication refill denied due to duplicate request.      moderate assist (50% patients effort)

## 2025-05-13 NOTE — PROGRESS NOTE ADULT - PROBLEM SELECTOR PROBLEM 7
Diabetes
Palliative care encounter
Palliative care encounter
fair balance
Diabetes